# Patient Record
Sex: MALE | Race: WHITE | NOT HISPANIC OR LATINO
[De-identification: names, ages, dates, MRNs, and addresses within clinical notes are randomized per-mention and may not be internally consistent; named-entity substitution may affect disease eponyms.]

---

## 2019-11-11 ENCOUNTER — NON-APPOINTMENT (OUTPATIENT)
Age: 59
End: 2019-11-11

## 2019-11-11 ENCOUNTER — APPOINTMENT (OUTPATIENT)
Dept: OPHTHALMOLOGY | Facility: CLINIC | Age: 59
End: 2019-11-11
Payer: COMMERCIAL

## 2019-11-11 PROCEDURE — 92020 GONIOSCOPY: CPT

## 2019-11-11 PROCEDURE — 92134 CPTRZ OPH DX IMG PST SGM RTA: CPT

## 2019-11-11 PROCEDURE — 92004 COMPRE OPH EXAM NEW PT 1/>: CPT

## 2019-11-11 PROCEDURE — 76514 ECHO EXAM OF EYE THICKNESS: CPT

## 2019-11-29 ENCOUNTER — APPOINTMENT (OUTPATIENT)
Dept: OPHTHALMOLOGY | Facility: CLINIC | Age: 59
End: 2019-11-29
Payer: COMMERCIAL

## 2019-11-29 ENCOUNTER — NON-APPOINTMENT (OUTPATIENT)
Age: 59
End: 2019-11-29

## 2019-11-29 PROCEDURE — 92012 INTRM OPH EXAM EST PATIENT: CPT

## 2020-02-07 ENCOUNTER — APPOINTMENT (OUTPATIENT)
Dept: OPHTHALMOLOGY | Facility: CLINIC | Age: 60
End: 2020-02-07
Payer: COMMERCIAL

## 2020-02-07 ENCOUNTER — NON-APPOINTMENT (OUTPATIENT)
Age: 60
End: 2020-02-07

## 2020-02-07 PROCEDURE — 92083 EXTENDED VISUAL FIELD XM: CPT

## 2020-02-07 PROCEDURE — 92012 INTRM OPH EXAM EST PATIENT: CPT

## 2020-08-27 ENCOUNTER — APPOINTMENT (OUTPATIENT)
Dept: OPHTHALMOLOGY | Facility: CLINIC | Age: 60
End: 2020-08-27

## 2020-10-22 ENCOUNTER — APPOINTMENT (OUTPATIENT)
Dept: OPHTHALMOLOGY | Facility: CLINIC | Age: 60
End: 2020-10-22

## 2022-07-27 ENCOUNTER — APPOINTMENT (OUTPATIENT)
Dept: ORTHOPEDIC SURGERY | Facility: CLINIC | Age: 62
End: 2022-07-27

## 2022-07-27 VITALS
BODY MASS INDEX: 32.58 KG/M2 | HEIGHT: 69 IN | DIASTOLIC BLOOD PRESSURE: 81 MMHG | WEIGHT: 220 LBS | SYSTOLIC BLOOD PRESSURE: 201 MMHG | HEART RATE: 92 BPM

## 2022-07-27 DIAGNOSIS — M77.8 OTHER ENTHESOPATHIES, NOT ELSEWHERE CLASSIFIED: ICD-10-CM

## 2022-07-27 DIAGNOSIS — S62.002K UNSPECIFIED FRACTURE OF NAVICULAR [SCAPHOID] BONE OF LEFT WRIST, SUBSEQUENT ENCOUNTER FOR FRACTURE WITH NONUNION: ICD-10-CM

## 2022-07-27 DIAGNOSIS — M25.532 PAIN IN LEFT WRIST: ICD-10-CM

## 2022-07-27 DIAGNOSIS — M79.642 PAIN IN LEFT HAND: ICD-10-CM

## 2022-07-27 PROCEDURE — 99204 OFFICE O/P NEW MOD 45 MIN: CPT

## 2022-07-27 PROCEDURE — 73110 X-RAY EXAM OF WRIST: CPT | Mod: LT

## 2022-07-27 PROCEDURE — 73130 X-RAY EXAM OF HAND: CPT | Mod: LT

## 2022-07-27 RX ORDER — GLIPIZIDE 5 MG/1
5 TABLET ORAL
Qty: 90 | Refills: 0 | Status: ACTIVE | COMMUNITY
Start: 2022-05-19

## 2022-07-27 RX ORDER — ALOGLIPTIN AND METFORMIN HYDROCHLORIDE 12.5; 1 MG/1; MG/1
12.5-1 TABLET, FILM COATED ORAL
Qty: 180 | Refills: 0 | Status: ACTIVE | COMMUNITY
Start: 2022-05-19

## 2022-07-27 NOTE — PHYSICAL EXAM
[de-identified] : - Constitutional: This is a male in no obvious distress.  \par - Psych: Patient is alert and oriented to person, place and time.  Patient has a normal mood and affect.\par - Cardiovascular: Normal pulses throughout the upper extremities.  No significant varicosities are noted in the upper extremities. \par - Neuro: Strength and sensation are intact throughout the upper extremities.  Patient has normal coordination.\par - Respiratory:  Patient exhibits no evidence of shortness of breath or difficulty breathing.\par - Skin: No rashes, lesions, or other abnormalities are noted in the upper extremities.\par \par ---\par \par Examination of his left wrist and hand demonstrates mild swelling dorsally.  There is mild tenderness along the snuffbox and radial scaphoid joint.  He has limitation of motion of the wrist.  He also has a limitation of flexion and extension of the middle finger and cannot fully straighten out the middle finger.  There is triggering with flexion which appears to be just distal to the A1 pulley of the middle finger.  There is no triggering of the other digits.  He is neurovascularly intact distally. [de-identified] : PA, lateral, oblique and PA ulnar deviation views of his left wrist and hand demonstrate a scaphoid nonunion involving the distal waist.  There are spurs noted dorsally.  There is no obvious arthritis noted of the radiocarpal joint.  There are calcifications noted of the vessels radially and ulnarly.

## 2022-07-27 NOTE — HISTORY OF PRESENT ILLNESS
[Right] : right hand dominant [FreeTextEntry1] : He comes in today for evaluation of left wrist pain which suddenly began 26 days ago.  He has a history of a scaphoid nonunion which was treated with a cast at 16 years old.  He has had intermittent pain and more notable stiffness since then.  However, 26 days ago, he describes his pain as 10 out of 10.  He began to wear support and has been taking 1 Aleve twice a day and has noticed significant improvement in his symptoms.  He also has complaints of difficulty raising out his middle finger which does not particularly bother him.\par \par He has a history of type 2 diabetes.\par \par He was accompanied by his wife today.

## 2022-07-27 NOTE — DISCUSSION/SUMMARY
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  He has had chronic stiffness, but had a sudden onset of pain 26 days ago which has improved significantly with Aleve.  He also has left middle finger flexor tendinitis/trigger finger that is not particularly symptomatic.\par \par I had a discussion regarding today's visit, the prognosis of this diagnosis and treatment recommendations and options.  At this time, I recommended continuation of the Aleve for another 2 to 2 weeks.  He was warned about potential GI side effects.  If his symptoms recur, then he will follow-up for possible cortisone injection.  The possible need for surgery in the future was discussed if this becomes more of a chronic problem.\par \par The patient has agreed to this plan of management and has expressed full understanding.  All questions were fully answered to the patient's satisfaction.\par \par My cumulative time spent on this patient's visit included: Preparation for the visit, review of the medical records, review of pertinent diagnostic studies, examination and counseling of the patient on the above diagnosis, treatment plan and prognosis, orders of diagnostic tests, medications and/or appropriate procedures and documentation in the medical records of today's visit.

## 2023-11-29 ENCOUNTER — TRANSCRIPTION ENCOUNTER (OUTPATIENT)
Age: 63
End: 2023-11-29

## 2023-11-29 ENCOUNTER — RESULT REVIEW (OUTPATIENT)
Age: 63
End: 2023-11-29

## 2023-11-29 ENCOUNTER — INPATIENT (INPATIENT)
Facility: HOSPITAL | Age: 63
LOS: 16 days | Discharge: NURSING, REHAB OR LONG-TERM CARE | DRG: 853 | End: 2023-12-16
Attending: STUDENT IN AN ORGANIZED HEALTH CARE EDUCATION/TRAINING PROGRAM | Admitting: STUDENT IN AN ORGANIZED HEALTH CARE EDUCATION/TRAINING PROGRAM
Payer: COMMERCIAL

## 2023-11-29 VITALS
HEART RATE: 115 BPM | DIASTOLIC BLOOD PRESSURE: 84 MMHG | WEIGHT: 229.94 LBS | TEMPERATURE: 98 F | SYSTOLIC BLOOD PRESSURE: 164 MMHG | RESPIRATION RATE: 20 BRPM | HEIGHT: 69 IN | OXYGEN SATURATION: 97 %

## 2023-11-29 DIAGNOSIS — Z89.429 ACQUIRED ABSENCE OF OTHER TOE(S), UNSPECIFIED SIDE: Chronic | ICD-10-CM

## 2023-11-29 DIAGNOSIS — Z98.89 OTHER SPECIFIED POSTPROCEDURAL STATES: Chronic | ICD-10-CM

## 2023-11-29 DIAGNOSIS — M72.6 NECROTIZING FASCIITIS: ICD-10-CM

## 2023-11-29 LAB
ALBUMIN SERPL ELPH-MCNC: 2.6 G/DL — LOW (ref 3.3–5)
ALBUMIN SERPL ELPH-MCNC: 2.6 G/DL — LOW (ref 3.3–5)
ALBUMIN SERPL ELPH-MCNC: 2.7 G/DL — LOW (ref 3.3–5)
ALBUMIN SERPL ELPH-MCNC: 2.7 G/DL — LOW (ref 3.3–5)
ALP SERPL-CCNC: 232 U/L — HIGH (ref 40–120)
ALP SERPL-CCNC: 232 U/L — HIGH (ref 40–120)
ALP SERPL-CCNC: 253 U/L — HIGH (ref 40–120)
ALP SERPL-CCNC: 253 U/L — HIGH (ref 40–120)
ALT FLD-CCNC: 45 U/L — SIGNIFICANT CHANGE UP (ref 10–45)
ALT FLD-CCNC: 45 U/L — SIGNIFICANT CHANGE UP (ref 10–45)
ALT FLD-CCNC: 49 U/L — HIGH (ref 10–45)
ALT FLD-CCNC: 49 U/L — HIGH (ref 10–45)
ANION GAP SERPL CALC-SCNC: 12 MMOL/L — SIGNIFICANT CHANGE UP (ref 5–17)
ANION GAP SERPL CALC-SCNC: 12 MMOL/L — SIGNIFICANT CHANGE UP (ref 5–17)
ANION GAP SERPL CALC-SCNC: 15 MMOL/L — SIGNIFICANT CHANGE UP (ref 5–17)
ANION GAP SERPL CALC-SCNC: 15 MMOL/L — SIGNIFICANT CHANGE UP (ref 5–17)
APTT BLD: 27.9 SEC — SIGNIFICANT CHANGE UP (ref 24.5–35.6)
APTT BLD: 27.9 SEC — SIGNIFICANT CHANGE UP (ref 24.5–35.6)
APTT BLD: 29.2 SEC — SIGNIFICANT CHANGE UP (ref 24.5–35.6)
APTT BLD: 29.2 SEC — SIGNIFICANT CHANGE UP (ref 24.5–35.6)
AST SERPL-CCNC: 38 U/L — SIGNIFICANT CHANGE UP (ref 10–40)
AST SERPL-CCNC: 38 U/L — SIGNIFICANT CHANGE UP (ref 10–40)
AST SERPL-CCNC: 47 U/L — HIGH (ref 10–40)
AST SERPL-CCNC: 47 U/L — HIGH (ref 10–40)
BASE EXCESS BLDV CALC-SCNC: 2.3 MMOL/L — SIGNIFICANT CHANGE UP (ref -2–3)
BASE EXCESS BLDV CALC-SCNC: 2.3 MMOL/L — SIGNIFICANT CHANGE UP (ref -2–3)
BASE EXCESS BLDV CALC-SCNC: 5.6 MMOL/L — HIGH (ref -2–3)
BASE EXCESS BLDV CALC-SCNC: 5.6 MMOL/L — HIGH (ref -2–3)
BASOPHILS # BLD AUTO: 0.04 K/UL — SIGNIFICANT CHANGE UP (ref 0–0.2)
BASOPHILS # BLD AUTO: 0.04 K/UL — SIGNIFICANT CHANGE UP (ref 0–0.2)
BASOPHILS NFR BLD AUTO: 0.2 % — SIGNIFICANT CHANGE UP (ref 0–2)
BASOPHILS NFR BLD AUTO: 0.2 % — SIGNIFICANT CHANGE UP (ref 0–2)
BILIRUB SERPL-MCNC: 0.3 MG/DL — SIGNIFICANT CHANGE UP (ref 0.2–1.2)
BLD GP AB SCN SERPL QL: NEGATIVE — SIGNIFICANT CHANGE UP
BLD GP AB SCN SERPL QL: NEGATIVE — SIGNIFICANT CHANGE UP
BUN SERPL-MCNC: 14 MG/DL — SIGNIFICANT CHANGE UP (ref 7–23)
BUN SERPL-MCNC: 14 MG/DL — SIGNIFICANT CHANGE UP (ref 7–23)
BUN SERPL-MCNC: 16 MG/DL — SIGNIFICANT CHANGE UP (ref 7–23)
BUN SERPL-MCNC: 16 MG/DL — SIGNIFICANT CHANGE UP (ref 7–23)
CA-I SERPL-SCNC: 1.18 MMOL/L — SIGNIFICANT CHANGE UP (ref 1.15–1.33)
CALCIUM SERPL-MCNC: 8.9 MG/DL — SIGNIFICANT CHANGE UP (ref 8.4–10.5)
CALCIUM SERPL-MCNC: 8.9 MG/DL — SIGNIFICANT CHANGE UP (ref 8.4–10.5)
CALCIUM SERPL-MCNC: 9.5 MG/DL — SIGNIFICANT CHANGE UP (ref 8.4–10.5)
CALCIUM SERPL-MCNC: 9.5 MG/DL — SIGNIFICANT CHANGE UP (ref 8.4–10.5)
CHLORIDE BLDV-SCNC: 96 MMOL/L — SIGNIFICANT CHANGE UP (ref 96–108)
CHLORIDE BLDV-SCNC: 96 MMOL/L — SIGNIFICANT CHANGE UP (ref 96–108)
CHLORIDE BLDV-SCNC: 98 MMOL/L — SIGNIFICANT CHANGE UP (ref 96–108)
CHLORIDE BLDV-SCNC: 98 MMOL/L — SIGNIFICANT CHANGE UP (ref 96–108)
CHLORIDE SERPL-SCNC: 94 MMOL/L — LOW (ref 96–108)
CHLORIDE SERPL-SCNC: 94 MMOL/L — LOW (ref 96–108)
CHLORIDE SERPL-SCNC: 96 MMOL/L — SIGNIFICANT CHANGE UP (ref 96–108)
CHLORIDE SERPL-SCNC: 96 MMOL/L — SIGNIFICANT CHANGE UP (ref 96–108)
CO2 BLDV-SCNC: 29 MMOL/L — HIGH (ref 22–26)
CO2 BLDV-SCNC: 29 MMOL/L — HIGH (ref 22–26)
CO2 BLDV-SCNC: 31 MMOL/L — HIGH (ref 22–26)
CO2 BLDV-SCNC: 31 MMOL/L — HIGH (ref 22–26)
CO2 SERPL-SCNC: 22 MMOL/L — SIGNIFICANT CHANGE UP (ref 22–31)
CO2 SERPL-SCNC: 22 MMOL/L — SIGNIFICANT CHANGE UP (ref 22–31)
CO2 SERPL-SCNC: 24 MMOL/L — SIGNIFICANT CHANGE UP (ref 22–31)
CO2 SERPL-SCNC: 24 MMOL/L — SIGNIFICANT CHANGE UP (ref 22–31)
CREAT SERPL-MCNC: 0.85 MG/DL — SIGNIFICANT CHANGE UP (ref 0.5–1.3)
CREAT SERPL-MCNC: 0.85 MG/DL — SIGNIFICANT CHANGE UP (ref 0.5–1.3)
CREAT SERPL-MCNC: 0.94 MG/DL — SIGNIFICANT CHANGE UP (ref 0.5–1.3)
CREAT SERPL-MCNC: 0.94 MG/DL — SIGNIFICANT CHANGE UP (ref 0.5–1.3)
CRP SERPL-MCNC: 307 MG/L — HIGH (ref 0–4)
CRP SERPL-MCNC: 307 MG/L — HIGH (ref 0–4)
EGFR: 91 ML/MIN/1.73M2 — SIGNIFICANT CHANGE UP
EGFR: 91 ML/MIN/1.73M2 — SIGNIFICANT CHANGE UP
EGFR: 98 ML/MIN/1.73M2 — SIGNIFICANT CHANGE UP
EGFR: 98 ML/MIN/1.73M2 — SIGNIFICANT CHANGE UP
EOSINOPHIL # BLD AUTO: 0.02 K/UL — SIGNIFICANT CHANGE UP (ref 0–0.5)
EOSINOPHIL # BLD AUTO: 0.02 K/UL — SIGNIFICANT CHANGE UP (ref 0–0.5)
EOSINOPHIL NFR BLD AUTO: 0.1 % — SIGNIFICANT CHANGE UP (ref 0–6)
EOSINOPHIL NFR BLD AUTO: 0.1 % — SIGNIFICANT CHANGE UP (ref 0–6)
GAS PNL BLDV: 130 MMOL/L — LOW (ref 136–145)
GAS PNL BLDV: 130 MMOL/L — LOW (ref 136–145)
GAS PNL BLDV: 131 MMOL/L — LOW (ref 136–145)
GAS PNL BLDV: 131 MMOL/L — LOW (ref 136–145)
GAS PNL BLDV: SIGNIFICANT CHANGE UP
GLUCOSE BLDV-MCNC: 377 MG/DL — HIGH (ref 70–99)
GLUCOSE BLDV-MCNC: 377 MG/DL — HIGH (ref 70–99)
GLUCOSE BLDV-MCNC: 403 MG/DL — HIGH (ref 70–99)
GLUCOSE BLDV-MCNC: 403 MG/DL — HIGH (ref 70–99)
GLUCOSE SERPL-MCNC: 338 MG/DL — HIGH (ref 70–99)
GLUCOSE SERPL-MCNC: 338 MG/DL — HIGH (ref 70–99)
GLUCOSE SERPL-MCNC: 387 MG/DL — HIGH (ref 70–99)
GLUCOSE SERPL-MCNC: 387 MG/DL — HIGH (ref 70–99)
HCO3 BLDV-SCNC: 28 MMOL/L — SIGNIFICANT CHANGE UP (ref 22–29)
HCO3 BLDV-SCNC: 28 MMOL/L — SIGNIFICANT CHANGE UP (ref 22–29)
HCO3 BLDV-SCNC: 30 MMOL/L — HIGH (ref 22–29)
HCO3 BLDV-SCNC: 30 MMOL/L — HIGH (ref 22–29)
HCT VFR BLD CALC: 33.9 % — LOW (ref 39–50)
HCT VFR BLD CALC: 33.9 % — LOW (ref 39–50)
HCT VFR BLD CALC: 35.3 % — LOW (ref 39–50)
HCT VFR BLD CALC: 35.3 % — LOW (ref 39–50)
HCT VFR BLDA CALC: 33 % — LOW (ref 39–51)
HGB BLD CALC-MCNC: 11 G/DL — LOW (ref 12.6–17.4)
HGB BLD-MCNC: 11.1 G/DL — LOW (ref 13–17)
HGB BLD-MCNC: 11.1 G/DL — LOW (ref 13–17)
HGB BLD-MCNC: 11.4 G/DL — LOW (ref 13–17)
HGB BLD-MCNC: 11.4 G/DL — LOW (ref 13–17)
HOROWITZ INDEX BLDV+IHG-RTO: 21 — SIGNIFICANT CHANGE UP
HOROWITZ INDEX BLDV+IHG-RTO: 21 — SIGNIFICANT CHANGE UP
IMM GRANULOCYTES NFR BLD AUTO: 0.8 % — SIGNIFICANT CHANGE UP (ref 0–0.9)
IMM GRANULOCYTES NFR BLD AUTO: 0.8 % — SIGNIFICANT CHANGE UP (ref 0–0.9)
INR BLD: 1.32 RATIO — HIGH (ref 0.85–1.18)
INR BLD: 1.32 RATIO — HIGH (ref 0.85–1.18)
INR BLD: 1.4 RATIO — HIGH (ref 0.85–1.18)
INR BLD: 1.4 RATIO — HIGH (ref 0.85–1.18)
LACTATE BLDV-MCNC: 1.6 MMOL/L — SIGNIFICANT CHANGE UP (ref 0.5–2)
LACTATE BLDV-MCNC: 1.6 MMOL/L — SIGNIFICANT CHANGE UP (ref 0.5–2)
LACTATE BLDV-MCNC: 2.4 MMOL/L — HIGH (ref 0.5–2)
LACTATE BLDV-MCNC: 2.4 MMOL/L — HIGH (ref 0.5–2)
LYMPHOCYTES # BLD AUTO: 0.95 K/UL — LOW (ref 1–3.3)
LYMPHOCYTES # BLD AUTO: 0.95 K/UL — LOW (ref 1–3.3)
LYMPHOCYTES # BLD AUTO: 4.5 % — LOW (ref 13–44)
LYMPHOCYTES # BLD AUTO: 4.5 % — LOW (ref 13–44)
MAGNESIUM SERPL-MCNC: 1.4 MG/DL — LOW (ref 1.6–2.6)
MAGNESIUM SERPL-MCNC: 1.4 MG/DL — LOW (ref 1.6–2.6)
MCHC RBC-ENTMCNC: 26.5 PG — LOW (ref 27–34)
MCHC RBC-ENTMCNC: 26.5 PG — LOW (ref 27–34)
MCHC RBC-ENTMCNC: 26.6 PG — LOW (ref 27–34)
MCHC RBC-ENTMCNC: 26.6 PG — LOW (ref 27–34)
MCHC RBC-ENTMCNC: 32.3 GM/DL — SIGNIFICANT CHANGE UP (ref 32–36)
MCHC RBC-ENTMCNC: 32.3 GM/DL — SIGNIFICANT CHANGE UP (ref 32–36)
MCHC RBC-ENTMCNC: 32.7 GM/DL — SIGNIFICANT CHANGE UP (ref 32–36)
MCHC RBC-ENTMCNC: 32.7 GM/DL — SIGNIFICANT CHANGE UP (ref 32–36)
MCV RBC AUTO: 81.3 FL — SIGNIFICANT CHANGE UP (ref 80–100)
MCV RBC AUTO: 81.3 FL — SIGNIFICANT CHANGE UP (ref 80–100)
MCV RBC AUTO: 82.1 FL — SIGNIFICANT CHANGE UP (ref 80–100)
MCV RBC AUTO: 82.1 FL — SIGNIFICANT CHANGE UP (ref 80–100)
MONOCYTES # BLD AUTO: 1.24 K/UL — HIGH (ref 0–0.9)
MONOCYTES # BLD AUTO: 1.24 K/UL — HIGH (ref 0–0.9)
MONOCYTES NFR BLD AUTO: 5.9 % — SIGNIFICANT CHANGE UP (ref 2–14)
MONOCYTES NFR BLD AUTO: 5.9 % — SIGNIFICANT CHANGE UP (ref 2–14)
NEUTROPHILS # BLD AUTO: 18.62 K/UL — HIGH (ref 1.8–7.4)
NEUTROPHILS # BLD AUTO: 18.62 K/UL — HIGH (ref 1.8–7.4)
NEUTROPHILS NFR BLD AUTO: 88.5 % — HIGH (ref 43–77)
NEUTROPHILS NFR BLD AUTO: 88.5 % — HIGH (ref 43–77)
NRBC # BLD: 0 /100 WBCS — SIGNIFICANT CHANGE UP (ref 0–0)
PCO2 BLDV: 41 MMHG — LOW (ref 42–55)
PCO2 BLDV: 41 MMHG — LOW (ref 42–55)
PCO2 BLDV: 46 MMHG — SIGNIFICANT CHANGE UP (ref 42–55)
PCO2 BLDV: 46 MMHG — SIGNIFICANT CHANGE UP (ref 42–55)
PH BLDV: 7.39 — SIGNIFICANT CHANGE UP (ref 7.32–7.43)
PH BLDV: 7.39 — SIGNIFICANT CHANGE UP (ref 7.32–7.43)
PH BLDV: 7.47 — HIGH (ref 7.32–7.43)
PH BLDV: 7.47 — HIGH (ref 7.32–7.43)
PHOSPHATE SERPL-MCNC: 3.1 MG/DL — SIGNIFICANT CHANGE UP (ref 2.5–4.5)
PHOSPHATE SERPL-MCNC: 3.1 MG/DL — SIGNIFICANT CHANGE UP (ref 2.5–4.5)
PLATELET # BLD AUTO: 394 K/UL — SIGNIFICANT CHANGE UP (ref 150–400)
PLATELET # BLD AUTO: 394 K/UL — SIGNIFICANT CHANGE UP (ref 150–400)
PLATELET # BLD AUTO: 408 K/UL — HIGH (ref 150–400)
PLATELET # BLD AUTO: 408 K/UL — HIGH (ref 150–400)
PO2 BLDV: 30 MMHG — SIGNIFICANT CHANGE UP (ref 25–45)
PO2 BLDV: 30 MMHG — SIGNIFICANT CHANGE UP (ref 25–45)
PO2 BLDV: 61 MMHG — HIGH (ref 25–45)
PO2 BLDV: 61 MMHG — HIGH (ref 25–45)
POTASSIUM BLDV-SCNC: 4.6 MMOL/L — SIGNIFICANT CHANGE UP (ref 3.5–5.1)
POTASSIUM BLDV-SCNC: 4.6 MMOL/L — SIGNIFICANT CHANGE UP (ref 3.5–5.1)
POTASSIUM BLDV-SCNC: 5 MMOL/L — SIGNIFICANT CHANGE UP (ref 3.5–5.1)
POTASSIUM BLDV-SCNC: 5 MMOL/L — SIGNIFICANT CHANGE UP (ref 3.5–5.1)
POTASSIUM SERPL-MCNC: 4.7 MMOL/L — SIGNIFICANT CHANGE UP (ref 3.5–5.3)
POTASSIUM SERPL-MCNC: 4.7 MMOL/L — SIGNIFICANT CHANGE UP (ref 3.5–5.3)
POTASSIUM SERPL-MCNC: 5.1 MMOL/L — SIGNIFICANT CHANGE UP (ref 3.5–5.3)
POTASSIUM SERPL-MCNC: 5.1 MMOL/L — SIGNIFICANT CHANGE UP (ref 3.5–5.3)
POTASSIUM SERPL-SCNC: 4.7 MMOL/L — SIGNIFICANT CHANGE UP (ref 3.5–5.3)
POTASSIUM SERPL-SCNC: 4.7 MMOL/L — SIGNIFICANT CHANGE UP (ref 3.5–5.3)
POTASSIUM SERPL-SCNC: 5.1 MMOL/L — SIGNIFICANT CHANGE UP (ref 3.5–5.3)
POTASSIUM SERPL-SCNC: 5.1 MMOL/L — SIGNIFICANT CHANGE UP (ref 3.5–5.3)
PROT SERPL-MCNC: 7.3 G/DL — SIGNIFICANT CHANGE UP (ref 6–8.3)
PROT SERPL-MCNC: 7.3 G/DL — SIGNIFICANT CHANGE UP (ref 6–8.3)
PROT SERPL-MCNC: 7.8 G/DL — SIGNIFICANT CHANGE UP (ref 6–8.3)
PROT SERPL-MCNC: 7.8 G/DL — SIGNIFICANT CHANGE UP (ref 6–8.3)
PROTHROM AB SERPL-ACNC: 14.4 SEC — HIGH (ref 9.5–13)
PROTHROM AB SERPL-ACNC: 14.4 SEC — HIGH (ref 9.5–13)
PROTHROM AB SERPL-ACNC: 15.2 SEC — HIGH (ref 9.5–13)
PROTHROM AB SERPL-ACNC: 15.2 SEC — HIGH (ref 9.5–13)
RBC # BLD: 4.17 M/UL — LOW (ref 4.2–5.8)
RBC # BLD: 4.17 M/UL — LOW (ref 4.2–5.8)
RBC # BLD: 4.3 M/UL — SIGNIFICANT CHANGE UP (ref 4.2–5.8)
RBC # BLD: 4.3 M/UL — SIGNIFICANT CHANGE UP (ref 4.2–5.8)
RBC # FLD: 14.3 % — SIGNIFICANT CHANGE UP (ref 10.3–14.5)
RBC # FLD: 14.3 % — SIGNIFICANT CHANGE UP (ref 10.3–14.5)
RBC # FLD: 14.5 % — SIGNIFICANT CHANGE UP (ref 10.3–14.5)
RBC # FLD: 14.5 % — SIGNIFICANT CHANGE UP (ref 10.3–14.5)
RH IG SCN BLD-IMP: POSITIVE — SIGNIFICANT CHANGE UP
RH IG SCN BLD-IMP: POSITIVE — SIGNIFICANT CHANGE UP
SAO2 % BLDV: 36.3 % — LOW (ref 67–88)
SAO2 % BLDV: 36.3 % — LOW (ref 67–88)
SAO2 % BLDV: 91.4 % — HIGH (ref 67–88)
SAO2 % BLDV: 91.4 % — HIGH (ref 67–88)
SODIUM SERPL-SCNC: 131 MMOL/L — LOW (ref 135–145)
SODIUM SERPL-SCNC: 131 MMOL/L — LOW (ref 135–145)
SODIUM SERPL-SCNC: 132 MMOL/L — LOW (ref 135–145)
SODIUM SERPL-SCNC: 132 MMOL/L — LOW (ref 135–145)
WBC # BLD: 21.04 K/UL — HIGH (ref 3.8–10.5)
WBC # BLD: 21.04 K/UL — HIGH (ref 3.8–10.5)
WBC # BLD: 21.79 K/UL — HIGH (ref 3.8–10.5)
WBC # BLD: 21.79 K/UL — HIGH (ref 3.8–10.5)
WBC # FLD AUTO: 21.04 K/UL — HIGH (ref 3.8–10.5)
WBC # FLD AUTO: 21.04 K/UL — HIGH (ref 3.8–10.5)
WBC # FLD AUTO: 21.79 K/UL — HIGH (ref 3.8–10.5)
WBC # FLD AUTO: 21.79 K/UL — HIGH (ref 3.8–10.5)

## 2023-11-29 PROCEDURE — 99221 1ST HOSP IP/OBS SF/LOW 40: CPT | Mod: 57

## 2023-11-29 PROCEDURE — 88311 DECALCIFY TISSUE: CPT | Mod: 26

## 2023-11-29 PROCEDURE — 99223 1ST HOSP IP/OBS HIGH 75: CPT

## 2023-11-29 PROCEDURE — 73630 X-RAY EXAM OF FOOT: CPT | Mod: 26,LT

## 2023-11-29 PROCEDURE — 88307 TISSUE EXAM BY PATHOLOGIST: CPT | Mod: 26

## 2023-11-29 PROCEDURE — 99291 CRITICAL CARE FIRST HOUR: CPT

## 2023-11-29 PROCEDURE — 73701 CT LOWER EXTREMITY W/DYE: CPT | Mod: 26,LT

## 2023-11-29 RX ORDER — PIPERACILLIN AND TAZOBACTAM 4; .5 G/20ML; G/20ML
3.38 INJECTION, POWDER, LYOPHILIZED, FOR SOLUTION INTRAVENOUS ONCE
Refills: 0 | Status: COMPLETED | OUTPATIENT
Start: 2023-11-29 | End: 2023-11-29

## 2023-11-29 RX ORDER — VANCOMYCIN HCL 1 G
1000 VIAL (EA) INTRAVENOUS ONCE
Refills: 0 | Status: COMPLETED | OUTPATIENT
Start: 2023-11-29 | End: 2023-11-29

## 2023-11-29 RX ORDER — OXYCODONE HYDROCHLORIDE 5 MG/1
2.5 TABLET ORAL EVERY 4 HOURS
Refills: 0 | Status: DISCONTINUED | OUTPATIENT
Start: 2023-11-29 | End: 2023-11-30

## 2023-11-29 RX ORDER — INSULIN HUMAN 100 [IU]/ML
3 INJECTION, SOLUTION SUBCUTANEOUS
Qty: 100 | Refills: 0 | Status: DISCONTINUED | OUTPATIENT
Start: 2023-11-29 | End: 2023-11-30

## 2023-11-29 RX ORDER — OXYCODONE HYDROCHLORIDE 5 MG/1
5 TABLET ORAL
Refills: 0 | Status: DISCONTINUED | OUTPATIENT
Start: 2023-11-29 | End: 2023-11-30

## 2023-11-29 RX ORDER — MAGNESIUM SULFATE 500 MG/ML
2 VIAL (ML) INJECTION ONCE
Refills: 0 | Status: COMPLETED | OUTPATIENT
Start: 2023-11-29 | End: 2023-11-29

## 2023-11-29 RX ORDER — SODIUM CHLORIDE 9 MG/ML
1000 INJECTION, SOLUTION INTRAVENOUS
Refills: 0 | Status: DISCONTINUED | OUTPATIENT
Start: 2023-11-29 | End: 2023-11-30

## 2023-11-29 RX ORDER — ACETAMINOPHEN 500 MG
1000 TABLET ORAL ONCE
Refills: 0 | Status: COMPLETED | OUTPATIENT
Start: 2023-11-29 | End: 2023-11-29

## 2023-11-29 RX ORDER — INSULIN HUMAN 100 [IU]/ML
3 INJECTION, SOLUTION SUBCUTANEOUS ONCE
Refills: 0 | Status: COMPLETED | OUTPATIENT
Start: 2023-11-29 | End: 2023-11-29

## 2023-11-29 RX ORDER — MAGNESIUM SULFATE 500 MG/ML
2 VIAL (ML) INJECTION ONCE
Refills: 0 | Status: DISCONTINUED | OUTPATIENT
Start: 2023-11-29 | End: 2023-11-30

## 2023-11-29 RX ORDER — SODIUM CHLORIDE 9 MG/ML
1000 INJECTION INTRAMUSCULAR; INTRAVENOUS; SUBCUTANEOUS ONCE
Refills: 0 | Status: COMPLETED | OUTPATIENT
Start: 2023-11-29 | End: 2023-11-29

## 2023-11-29 RX ORDER — PIPERACILLIN AND TAZOBACTAM 4; .5 G/20ML; G/20ML
3.38 INJECTION, POWDER, LYOPHILIZED, FOR SOLUTION INTRAVENOUS EVERY 8 HOURS
Refills: 0 | Status: DISCONTINUED | OUTPATIENT
Start: 2023-11-30 | End: 2023-11-30

## 2023-11-29 RX ADMIN — PIPERACILLIN AND TAZOBACTAM 200 GRAM(S): 4; .5 INJECTION, POWDER, LYOPHILIZED, FOR SOLUTION INTRAVENOUS at 18:38

## 2023-11-29 RX ADMIN — INSULIN HUMAN 3 UNIT(S): 100 INJECTION, SOLUTION SUBCUTANEOUS at 22:39

## 2023-11-29 RX ADMIN — SODIUM CHLORIDE 125 MILLILITER(S): 9 INJECTION, SOLUTION INTRAVENOUS at 22:56

## 2023-11-29 RX ADMIN — SODIUM CHLORIDE 1000 MILLILITER(S): 9 INJECTION INTRAMUSCULAR; INTRAVENOUS; SUBCUTANEOUS at 18:37

## 2023-11-29 RX ADMIN — Medication 100 MILLIGRAM(S): at 22:42

## 2023-11-29 RX ADMIN — INSULIN HUMAN 3 UNIT(S)/HR: 100 INJECTION, SOLUTION SUBCUTANEOUS at 22:39

## 2023-11-29 RX ADMIN — Medication 1000 MILLIGRAM(S): at 22:10

## 2023-11-29 RX ADMIN — PIPERACILLIN AND TAZOBACTAM 25 GRAM(S): 4; .5 INJECTION, POWDER, LYOPHILIZED, FOR SOLUTION INTRAVENOUS at 23:22

## 2023-11-29 RX ADMIN — Medication 400 MILLIGRAM(S): at 18:40

## 2023-11-29 RX ADMIN — Medication 250 MILLIGRAM(S): at 20:14

## 2023-11-29 RX ADMIN — Medication 25 GRAM(S): at 23:22

## 2023-11-29 NOTE — CONSULT NOTE ADULT - ATTENDING COMMENTS
Patient seen and examined and agree with above.   63 year old male with PMH significant for HTN, HLD, DM, peripheral arterial disease on metformin who presents with left foot wound with cellulitis and concerns for sepsis. The patient was hyperglycemic in the emergency department requiring insulin drip. Vascular surgery was to take the patient for below knee amputation as foot was deemed not salvageable.   On exam the patient is hemodynamically appropriate.  Agree with exam stated above and in addition to the left foot is erythematous that extends proximal to the ankle with gangrenous ulcer noted on the heel area.  Current management includes:  Neuro- pain control as needed   CV- Hemodynamically stable   -will resuscitate as necessitated  Pulm - oxygenation appropriate on room air       -goal SpO2 92%  GI- NPO for plan for the OR   -on IVF  ID- continue zosyn, clindamycin and vancomycin  -will follow up with cultures   Endocrine - hyperglycemia on insulin drip; will monitor closely and suspect  as the infection is controlled the hyperglycemia will improve  -will send HbA1c  Dispo- plan for OR and back to the SICU

## 2023-11-29 NOTE — CONSULT NOTE ADULT - ASSESSMENT
63M presents with left foot wounds with likely necrotizing fascitis.   - Pt seen and evaluated.  - Temp 102.3, WBC 21.04.  - Left lateral foot into the posterior heel and leg wound to dermis with overlying sloughing skin and ischemic changes, severe malodor, erythema to the distal 1/2 leg. Left foot submet 1 wound to subq with no acute signs of infection. Right foot no wounds, no acute signs of infection.  - Left Foot X-Rays: Gas tracking up posterior heel to above the ankle joint.  - Recommend LLE CT scan.  - Recommend vascular consult for further management.  - No further podiatric intervention needed. Please re-consult as necessary.  - Pt may followup with Dr. Rodney Mercer ([phone]).  - Discussed with attending.

## 2023-11-29 NOTE — H&P ADULT - NSHPLABSRESULTS_GEN_ALL_CORE
11.4   21.04 )-----------( 394      ( 29 Nov 2023 18:29 )             35.3   11-29    131<L>  |  94<L>  |  16  ----------------------------<  387<H>  5.1   |  22  |  0.94    Ca    9.5      29 Nov 2023 18:29    TPro  7.8  /  Alb  2.7<L>  /  TBili  0.3  /  DBili  x   /  AST  47<H>  /  ALT  49<H>  /  AlkPhos  253<H>  11-29  < from: Xray Foot AP + Lateral + Oblique, Left (11.29.23 @ 18:20) >        INTERPRETATION:  Bandage material overlies the lateral hindfoot. Soft   tissue swelling about the lateral hindfoot with diffuse foci of   subcutaneous gas, predominantly adjacent to the 5th metatarsal stump and   the calcaneus. Onlateral view there is gas in the soft tissues posterior   to the ankle joint. No discrete cortical erosions.        ******PRELIMINARY REPORT******      ******PRELIMINARY REPORT******        MIHIR SEN MD; Resident Radiologist  This document is a PRELIMINARY interpretation and is pending final   attending approval. Nov 29 2023  6:34PM    < end of copied text >

## 2023-11-29 NOTE — ED ADULT NURSE NOTE - OBJECTIVE STATEMENT
63y Male PMHx HTN, HLD, DM on metformin and glipizide presenting to ED via walk-in triage for 3w of worsening L foot ulcer. PT states he had dry skin in the back of heel that caused a hairline cut and now wound has worsened and is causing swelling and redness travelling up leg. Pt states he's also had some fevers. Pt has hx of L toe amputation in 2010 and doesn't follow up with podiatry outpatient. Pt reports foul odor with purulent discharge and pain with ambulation. IV placed, labs drawn and sent, medicated as ordered, seen and eval by MD. Haresh in lowest position and locked.

## 2023-11-29 NOTE — ED PROVIDER NOTE - NSICDXFAMILYHX_GEN_ALL_CORE_FT
FAMILY HISTORY:  Grandparent  Still living? Unknown  Family history of diabetes mellitus in grandmother, Age at diagnosis: Age Unknown

## 2023-11-29 NOTE — H&P ADULT - NSHPPHYSICALEXAM_GEN_ALL_CORE
General: NAD  Neuro: A/Ox4  HEENT: NC/AT  Respiratory: RA, no increased work of breathing  CV: RRR  Abdomen: Soft, Non distended, non tender to palpation    Extremities: LLE w infected foot wound, see skin exam below  Vascular: b/l radial pulses palpable, b/l brachial pulses palpable, b/l femoral pulses palpable, RLE DP/PT Palpable, LLE DP/PT dopplerable  Skin: wet gangrene of L foot, w proximally tracking erythema to distal 1/3 of lower left leg, significant malodor

## 2023-11-29 NOTE — PATIENT PROFILE ADULT - FALL HARM RISK - RISK INTERVENTIONS
Assistance OOB with selected safe patient handling equipment/Communicate Fall Risk and Risk Factors to all staff, patient, and family/Reinforce activity limits and safety measures with patient and family/Visual Cue: Yellow wristband/Bed in lowest position, wheels locked, appropriate side rails in place/Call bell, personal items and telephone in reach/Instruct patient to call for assistance before getting out of bed or chair/Non-slip footwear when patient is out of bed/[place] to call system/Physically safe environment - no spills, clutter or unnecessary equipment/Purposeful Proactive Rounding/Room/bathroom lighting operational, light cord in reach
warm/dry

## 2023-11-29 NOTE — ED PROVIDER NOTE - CLINICAL SUMMARY MEDICAL DECISION MAKING FREE TEXT BOX
63-year-old male with history of diabetes prior foot infections resulting in amputation of the toe presenting with 3 weeks of left foot and heel pain discomfort drainage redness and warmth denies any fever or chills at home but has been feeling increasingly difficult to ambulate chronic has not taken any antibiotics and is an ex-smoker  On physical examination patient is well-appearing no acute distress nontoxic-appearing neuro grossly intact clear lungs S1-S2 soft nontender abdomen on lower extremity examination patient has an amputated 5 toe on the left side with extensive warmth redness swelling of the leg up to mid leg and an area of gangrenous changes at the heel extending into the sole and an additional area at the base of the first medical tarsal with no active drainage  High concern for osteomyelitis versus cellulitis we will start broad-spectrum antibiotics patient was febrile here we will treat as sepsis we will get x-rays as well as CT scan to evaluate for need for immediate debridement will consult podiatry and vascular surgery patient will need to be admitted

## 2023-11-29 NOTE — CONSULT NOTE ADULT - ASSESSMENT
Assessment  ELVIS VILLATORO is a 63yom pmhx of HTN HLD DM, on metformin, former smoker, presenting with 3 weeks of worsening L foot ulcer, now with leg swelling, redness, tachycardia, and fever. Hyperglycemic in ED  Sent to SICU for insulin drip pending OR guillotine amputation in LLE.    =============  NEURO  - AOx4 at present  - tylenol/oxycodone PRN for pain  =============  CV  #hx HTN, HLD, CAD s/p CABG  - c/w home enalapril 5mg BID, simvastatin 40mg qd     =============  PULM  #prior hx smoking  - no acute issues    =============  GI  #Diet  -NPO in preparation for OR    =============  /RENAL  -Cr 0.85; no acute issues  -1L NS in ED    =============  HEME  #elevated INR  - 1.32    =============  ID  #Necrotizing fasciitis LLE  #sepsis  - pt presented with ulcer under L 1st toe, with gangrenous tissue on L posterolateral heel, and erythema/swelling spreading up leg, below the knee, Initially tachycardic to 120, fever 102F, WBC 21, procal 0.33,   - XR LLE shows subcutaneous gas, predominantly adjacent to the 5th metatarsal stump and the calcaneus; gas in the soft tissues posterior to the ankle joint. No discrete cortical erosions.  - plan for OR  - s/p vanc/zosyn/clinda in ED; c/w abx after return from OR  - CTM procal, WBC    =============  ENDOCRINE  #DM2, currently hyperglycemic  - on metformin and glipizide at home, reports medication adherence  - insulin drip; currently NPO  - f/u A1c

## 2023-11-29 NOTE — ED PROVIDER NOTE - NSICDXPASTMEDICALHX_GEN_ALL_CORE_FT
PAST MEDICAL HISTORY:  Diabetes mellitus type 2 in obese     Hyperlipidemia     Retinopathy     Ruptured appendix     Toe infection s/p amputation

## 2023-11-29 NOTE — CONSULT NOTE ADULT - SUBJECTIVE AND OBJECTIVE BOX
63yom pmhx of HTN HLD DM, on metformin, former smoker, presenting with 3 weeks of worsening L foot ulcer, now with leg swelling, redness, tachycardia, and fever. PSH appendectomy, and L 5th toe amputation in 2010, poor outpt follow up with Podiatry and PMD. Reports foul odor with purulent discharge, and pain with ambulation.    Pt presentation concerning for necrotizing fasciitis in LLE. In ED, patient was tachycardic to 120s, febrile to 102.  L foot xray shows gas tracking up posterior heel to above ankle joint. In ED labs notable for WBC 21, H/H 11.4/35, Neutrophil%88, INR 1.4, Na 131, Gluc 387, albumin 2.7, Alk phos 253, , Procal 0.33,     Pt given vanc/zosyn/clindamycin, s/p 1L NS. Podiatry and vascular surgery consulted. Recs for CT lower extremity. Plans for patient to have guillotine amputation in E. SICU consulted for insulin drip for hyperglycemia and hemodynamic monitoring while pending OR.     HISTORY OF PRESENT ILLNESS:  ELVIS VILLATORO is a 63yom pmhx of HTN HLD DM, on metformin, former smoker, presenting with 3 weeks of worsening L foot ulcer, now with leg swelling, redness, tachycardia, and fever. PSH appendectomy, and L 5th toe amputation in 2010, poor outpatient follow up with Podiatry and PMD. Reports foul odor with purulent discharge, and pain with ambulation.    Pt presentation concerning for necrotizing fasciitis in LLE. In ED, patient was tachycardic to 120s, febrile to 102.  L foot xray shows gas tracking up posterior heel to above ankle joint. In ED labs notable for WBC 21, H/H 11.4/35, Neutrophil%88, INR 1.4, Na 131, Gluc 387, albumin 2.7, Alk phos 253, , Procal 0.33,     Pt given vanc/zosyn/clindamycin, s/p 1L NS. Podiatry and vascular surgery consulted. Recs for CT lower extremity. Plans for patient to have guillotine amputation in [facility]. SICU consulted for insulin drip for hyperglycemia and hemodynamic monitoring while pending OR.         PAST MEDICAL HISTORY: Non-Insulin Dependent Diabetes Mellitus    DM (Diabetes Mellitus)    HTN (Hypertension)    Dyslipidemia    Retinopathy    Diabetes mellitus type 2 in obese    Hyperlipidemia    Toe infection    Ruptured appendix        PAST SURGICAL HISTORY: S/P appendectomy    Toe amputation status        FAMILY HISTORY: Family history of diabetes mellitus in grandmother (Grandparent)        SOCIAL HISTORY: former smoker    CODE STATUS:     HOME MEDICATIONS:    ALLERGIES: No Known Allergies      VITAL SIGNS:  ICU Vital Signs Last 24 Hrs  T(C): 37.3 (29 Nov 2023 22:00), Max: 39.1 (29 Nov 2023 18:09)  T(F): 99.1 (29 Nov 2023 22:00), Max: 102.4 (29 Nov 2023 18:09)  HR: 101 (29 Nov 2023 22:00) (96 - 123)  BP: 186/85 (29 Nov 2023 22:00) (155/76 - 205/81)  BP(mean): 122 (29 Nov 2023 22:00) (122 - 122)  ABP: --  ABP(mean): --  RR: 18 (29 Nov 2023 22:00) (16 - 20)  SpO2: 96% (29 Nov 2023 22:00) (95% - 98%)    O2 Parameters below as of 29 Nov 2023 22:00  Patient On (Oxygen Delivery Method): room air            NEURO  Exam: AOx4  oxyCODONE    IR 2.5 milliGRAM(s) Oral every 4 hours PRN Moderate Pain (4 - 6)  oxyCODONE    IR 5 milliGRAM(s) Oral every 3 hours PRN Severe Pain (7 - 10)      RESPIRATORY  Mechanical Ventilation:     Exam: CTA b/l. breathing on RA. Non-tachypneic. No increased WOB      CARDIOVASCULAR  VBG - ( 29 Nov 2023 22:05 )  pH: 7.47  /  pCO2: 41    /  pO2: 61    / HCO3: 30    / Base Excess: 5.6   /  SaO2: 91.4   Lactate: 1.6      Exam: S1, S2, mildly tachycardic at 101 bpm. No R/M/G  Cardiac Rhythm: Tachycarcia 101, regular rhythm      GI/NUTRITION  Exam: Non-ttp, non-distended. Soft  Diet: NPO       GENITOURINARY/RENAL  lactated ringers. 1000 milliLiter(s) IV Continuous <Continuous>  magnesium sulfate  IVPB 2 Gram(s) IV Intermittent once      11-29 @ 07:01  -  11-30 @ 00:24  --------------------------------------------------------  IN:    Insulin: 6 mL    IV PiggyBack: 50 mL    IV PiggyBack: 50 mL    Lactated Ringers: 250 mL  Total IN: 356 mL    OUT:  Total OUT: 0 mL    Total NET: 356 mL        Weight (kg): 108.9 (11-29 @ 22:00)  11-29    132<L>  |  96  |  14  ----------------------------<  338<H>  4.7   |  24  |  0.85    Ca    8.9      29 Nov 2023 22:21  Phos  3.1     11-29  Mg     1.4     11-29    TPro  7.3  /  Alb  2.6<L>  /  TBili  0.3  /  DBili  x   /  AST  38  /  ALT  45  /  AlkPhos  232<H>  11-29    [ ] Contreras catheter, indication: urine output monitoring in critically ill patient    HEMATOLOGIC  [ ] VTE Prophylaxis:                          11.1   21.79 )-----------( 408      ( 29 Nov 2023 22:21 )             33.9     PT/INR - ( 29 Nov 2023 22:21 )   PT: 14.4 sec;   INR: 1.32 ratio         PTT - ( 29 Nov 2023 22:21 )  PTT:27.9 sec  Transfusion: [ ] PRBC	[ ] Platelets	[ ] FFP	[ ] Cryoprecipitate      INFECTIOUS DISEASES  piperacillin/tazobactam IVPB.. 3.375 Gram(s) IV Intermittent every 8 hours    RECENT CULTURES:      ENDOCRINE  insulin regular Infusion 3 Unit(s)/Hr IV Continuous <Continuous>    CAPILLARY BLOOD GLUCOSE      POCT Blood Glucose.: 260 mg/dL (29 Nov 2023 23:52)  POCT Blood Glucose.: 311 mg/dL (29 Nov 2023 22:11)      PATIENT CARE ACCESS DEVICES:  [ ] Peripheral IV  [ ] Central Venous Line	[ ] R	[ ] L	[ ] IJ	[ ] Fem	[ ] SC	Placed:   [ ] Arterial Line		[ ] R	[ ] L	[ ] Fem	[ ] Rad	[ ] Ax	Placed:   [ ] PICC:					[ ] Mediport  [ ] Urinary Catheter, Date Placed:   [x] Necessity of urinary, arterial, and venous catheters discussed    OTHER MEDICATIONS:     IMAGING STUDIES:

## 2023-11-29 NOTE — ED PROVIDER NOTE - OBJECTIVE STATEMENT
63yom pmhx of HTN HLD DM on metformin here with 3 weeks of worsening L foot ulcer now with leg swelling and redness and fever. pt s/o L 5th toe amputation in 2010, poor outpt follow up with Podiatry and PMD. reports foul odor with purulent discharge. pain with ambulation.

## 2023-11-29 NOTE — H&P ADULT - ATTENDING COMMENTS
OR for source control, necrotizing soft tissue infection  [facility]

## 2023-11-29 NOTE — CONSULT NOTE ADULT - SUBJECTIVE AND OBJECTIVE BOX
Patient is a 63y old  Male who presents with a chief complaint of     HPI:      PAST MEDICAL & SURGICAL HISTORY:  Retinopathy      Diabetes mellitus type 2 in obese      Hyperlipidemia      Toe infection  s/p amputation      Ruptured appendix      S/P appendectomy      Toe amputation status  left pinky toe          MEDICATIONS  (STANDING):  piperacillin/tazobactam IVPB.- 3.375 Gram(s) IV Intermittent once  vancomycin  IVPB. 1000 milliGRAM(s) IV Intermittent once    MEDICATIONS  (PRN):      Allergies    No Known Allergies    Intolerances        VITALS:    Vital Signs Last 24 Hrs  T(C): 39.1 (29 Nov 2023 18:09), Max: 39.1 (29 Nov 2023 18:09)  T(F): 102.4 (29 Nov 2023 18:09), Max: 102.4 (29 Nov 2023 18:09)  HR: 115 (29 Nov 2023 18:09) (115 - 123)  BP: 174/91 (29 Nov 2023 18:09) (164/84 - 205/81)  BP(mean): --  RR: 18 (29 Nov 2023 18:09) (18 - 20)  SpO2: 98% (29 Nov 2023 18:09) (97% - 98%)    Parameters below as of 29 Nov 2023 18:09  Patient On (Oxygen Delivery Method): room air        LABS:                          11.4   21.04 )-----------( 394      ( 29 Nov 2023 18:29 )             35.3               CAPILLARY BLOOD GLUCOSE          PT/INR - ( 29 Nov 2023 18:29 )   PT: 15.2 sec;   INR: 1.40 ratio         PTT - ( 29 Nov 2023 18:29 )  PTT:29.2 sec    LOWER EXTREMITY PHYSICAL EXAM:    Vascular: DP/PT 1/4, B/L, CFT <3 seconds B/L, Temperature gradient warm to cool, B/L.   Neuro: Epicritic sensation intact to the level of digits, B/L.  Musculoskeletal/Ortho: Unremarkable.  Skin: Left lateral foot into the posterior heel and leg wound to dermis with overlying sloughing skin and ischemic changes, severe malodor, erythema to the distal 1/2 leg. Left foot submet 1 wound to subq with no acute signs of infection. Right foot no wounds, no acute signs of infection.      RADIOLOGY & ADDITIONAL STUDIES:

## 2023-11-29 NOTE — H&P ADULT - HISTORY OF PRESENT ILLNESS
Mr. Mcdaniel is a 63M PMHx DM, HLD, PAD, prior toe amputation, presents to [facility] ED w L foot wound. Reports wound has been present on hindfoot x 3 weeks. Started as small cut. Over last 24 hours, pt unable to ambulate d/t severe pain. Reports significant malodor. Denies subjective fevers, chills, purulent drainage, numbness/paresthesia.     In ED, patient is tachycardic to 123. BP w HTN. Febrile to 102.4 F. Labs w WBC 21. Hgb 11.4. Lac 2.4. XR L foot w soft tissue gas at lateral hindfoot. CT LLE non con w soft tissue gas tracking ~7cm above foot.

## 2023-11-29 NOTE — ED ADULT NURSE REASSESSMENT NOTE - NS ED NURSE REASSESS COMMENT FT1
Report receive from Bibiana FALL in red. Pt is resting comfortably. Pt afebrile orally. Zosyn infusing. A&Ox4. Awaiting dispo.

## 2023-11-29 NOTE — ED PROVIDER NOTE - RAPID ASSESSMENT
63-year-old male with past medical history of hypertension, diabetes presenting with left foot ulcer.  Patient reports that he developed ulceration on his left heel 2 weeks ago.  Over the past 2 weeks he has noticed worsening redness and swelling to the area.  Patient denies fevers.  Patient reports that he had a similar episode several years ago that required amputation of his left fifth toe.  Patient has not seen a podiatrist for this.    **Patient was rapidly assessed by me, Miles Velez PA-C. A limited history was obtained. The patient will be seen and further examined/worked up in the main ED and their care will be completed by the main ED team. Receiving team will follow up on labs, analgesia, any clinical imaging, and perform reassessment and disposition of the patient as clinically indicated. All decisions regarding the progression of care will be made at their discretion. 63-year-old male with past medical history of hypertension, diabetes presenting with left foot ulcer.  Patient reports that he developed ulceration on his left heel 2 weeks ago.  Over the past 2 weeks he has noticed worsening redness and swelling to the area.  Patient denies fevers.  Patient reports that he had a similar episode several years ago that required amputation of his left fifth toe.  Patient has not seen a podiatrist for this.    **Patient was rapidly assessed by me, Miles Velez PA-C. A limited history was obtained. The patient will be seen and further examined/worked up in the main ED and their care will be completed by the main ED team. Receiving team will follow up on labs, analgesia, any clinical imaging, and perform reassessment and disposition of the patient as clinically indicated. All decisions regarding the progression of care will be made at their discretion.    Eddie YEE: This patient was seen and orders were placed by the PA as per our department's QPA model.  I was not consulted in regards to this patient although I was present and available in the Emergency Department to the PA.  Patient was to be sent to main ED for full medical evaluation and receiving team was to follow up on any labs, analgesia, clinical imaging ordered by the PA.  Any reassessment and disposition decisions were to be made by receiving team as clinically indicated, all decisions regarding the progression of care to be made at their discretion.  I did not perform a comprehensive history and physical on this patient unless stated otherwise in this note.

## 2023-11-29 NOTE — H&P ADULT - ASSESSMENT
Mr. Mcdaniel is a 63M PMHx DM, HLD, PAD, prior toe amputation, presents to [facility] ED w L foot wound.     Overall clinical picture cf NSTI of non salvageable L foot. Septic in ED. LRINEC 9. XR/CT both w soft tissue gas.     Plan:   -Admit to Dr. Dean, Vascular Surgery   -Emergent OR: LLE guillotine amputation   -SICU consult i/s/o sepsis and hyperglycemia requiring Insulin gtt  -Vanc/Zosyn/Clinda  -NPO/IVF  -Insulin gtt per SICU  -SQH for VTE ppx     Discussed with Attending Surgeon Dr. Jermaine Badillo MD PGY3  Vascular, [id number]

## 2023-11-29 NOTE — ED PROVIDER NOTE - PHYSICAL EXAMINATION
Gen: AAO x 3, appears uncomfortable   HEENT: NC/AT, PERRLA, EOMI, MMM  Resp: unlabored CTAB  Cardiac: tachy s1s2   GI: ND, +BS, Soft, NT  Ext: L leg with diffuse edema to the leg with redness and warmth up mid leg with large posterior calcaneus eschar ulcer, possible bone exposed with copious purulent discharge and foul odor.    Neuro: no focal deficits

## 2023-11-30 LAB
ALBUMIN SERPL ELPH-MCNC: 2.5 G/DL — LOW (ref 3.3–5)
ALBUMIN SERPL ELPH-MCNC: 2.5 G/DL — LOW (ref 3.3–5)
ALP SERPL-CCNC: 216 U/L — HIGH (ref 40–120)
ALP SERPL-CCNC: 216 U/L — HIGH (ref 40–120)
ALT FLD-CCNC: 42 U/L — SIGNIFICANT CHANGE UP (ref 10–45)
ALT FLD-CCNC: 42 U/L — SIGNIFICANT CHANGE UP (ref 10–45)
ANION GAP SERPL CALC-SCNC: 11 MMOL/L — SIGNIFICANT CHANGE UP (ref 5–17)
ANION GAP SERPL CALC-SCNC: 11 MMOL/L — SIGNIFICANT CHANGE UP (ref 5–17)
APTT BLD: 27.7 SEC — SIGNIFICANT CHANGE UP (ref 24.5–35.6)
APTT BLD: 27.7 SEC — SIGNIFICANT CHANGE UP (ref 24.5–35.6)
AST SERPL-CCNC: 30 U/L — SIGNIFICANT CHANGE UP (ref 10–40)
AST SERPL-CCNC: 30 U/L — SIGNIFICANT CHANGE UP (ref 10–40)
BASE EXCESS BLDV CALC-SCNC: 3.3 MMOL/L — HIGH (ref -2–3)
BASE EXCESS BLDV CALC-SCNC: 3.3 MMOL/L — HIGH (ref -2–3)
BILIRUB SERPL-MCNC: 0.3 MG/DL — SIGNIFICANT CHANGE UP (ref 0.2–1.2)
BILIRUB SERPL-MCNC: 0.3 MG/DL — SIGNIFICANT CHANGE UP (ref 0.2–1.2)
BUN SERPL-MCNC: 13 MG/DL — SIGNIFICANT CHANGE UP (ref 7–23)
BUN SERPL-MCNC: 13 MG/DL — SIGNIFICANT CHANGE UP (ref 7–23)
CA-I SERPL-SCNC: 1.14 MMOL/L — LOW (ref 1.15–1.33)
CA-I SERPL-SCNC: 1.14 MMOL/L — LOW (ref 1.15–1.33)
CALCIUM SERPL-MCNC: 8.6 MG/DL — SIGNIFICANT CHANGE UP (ref 8.4–10.5)
CALCIUM SERPL-MCNC: 8.6 MG/DL — SIGNIFICANT CHANGE UP (ref 8.4–10.5)
CHLORIDE BLDV-SCNC: 99 MMOL/L — SIGNIFICANT CHANGE UP (ref 96–108)
CHLORIDE BLDV-SCNC: 99 MMOL/L — SIGNIFICANT CHANGE UP (ref 96–108)
CHLORIDE SERPL-SCNC: 99 MMOL/L — SIGNIFICANT CHANGE UP (ref 96–108)
CHLORIDE SERPL-SCNC: 99 MMOL/L — SIGNIFICANT CHANGE UP (ref 96–108)
CO2 BLDV-SCNC: 29 MMOL/L — HIGH (ref 22–26)
CO2 BLDV-SCNC: 29 MMOL/L — HIGH (ref 22–26)
CO2 SERPL-SCNC: 26 MMOL/L — SIGNIFICANT CHANGE UP (ref 22–31)
CO2 SERPL-SCNC: 26 MMOL/L — SIGNIFICANT CHANGE UP (ref 22–31)
CREAT SERPL-MCNC: 0.86 MG/DL — SIGNIFICANT CHANGE UP (ref 0.5–1.3)
CREAT SERPL-MCNC: 0.86 MG/DL — SIGNIFICANT CHANGE UP (ref 0.5–1.3)
EGFR: 97 ML/MIN/1.73M2 — SIGNIFICANT CHANGE UP
EGFR: 97 ML/MIN/1.73M2 — SIGNIFICANT CHANGE UP
ERYTHROCYTE [SEDIMENTATION RATE] IN BLOOD: 120 MM/HR — HIGH (ref 0–20)
ERYTHROCYTE [SEDIMENTATION RATE] IN BLOOD: 120 MM/HR — HIGH (ref 0–20)
GAS PNL BLDV: 131 MMOL/L — LOW (ref 136–145)
GAS PNL BLDV: 131 MMOL/L — LOW (ref 136–145)
GAS PNL BLDV: SIGNIFICANT CHANGE UP
GAS PNL BLDV: SIGNIFICANT CHANGE UP
GLUCOSE BLDV-MCNC: 198 MG/DL — HIGH (ref 70–99)
GLUCOSE BLDV-MCNC: 198 MG/DL — HIGH (ref 70–99)
GLUCOSE SERPL-MCNC: 192 MG/DL — HIGH (ref 70–99)
GLUCOSE SERPL-MCNC: 192 MG/DL — HIGH (ref 70–99)
HCO3 BLDV-SCNC: 28 MMOL/L — SIGNIFICANT CHANGE UP (ref 22–29)
HCO3 BLDV-SCNC: 28 MMOL/L — SIGNIFICANT CHANGE UP (ref 22–29)
HCT VFR BLD CALC: 31.6 % — LOW (ref 39–50)
HCT VFR BLD CALC: 31.6 % — LOW (ref 39–50)
HCT VFR BLDA CALC: 33 % — LOW (ref 39–51)
HCT VFR BLDA CALC: 33 % — LOW (ref 39–51)
HGB BLD CALC-MCNC: 10.9 G/DL — LOW (ref 12.6–17.4)
HGB BLD CALC-MCNC: 10.9 G/DL — LOW (ref 12.6–17.4)
HGB BLD-MCNC: 10.5 G/DL — LOW (ref 13–17)
HGB BLD-MCNC: 10.5 G/DL — LOW (ref 13–17)
HOROWITZ INDEX BLDV+IHG-RTO: 28 — SIGNIFICANT CHANGE UP
HOROWITZ INDEX BLDV+IHG-RTO: 28 — SIGNIFICANT CHANGE UP
INR BLD: 1.39 RATIO — HIGH (ref 0.85–1.18)
INR BLD: 1.39 RATIO — HIGH (ref 0.85–1.18)
LACTATE BLDV-MCNC: 1 MMOL/L — SIGNIFICANT CHANGE UP (ref 0.5–2)
LACTATE BLDV-MCNC: 1 MMOL/L — SIGNIFICANT CHANGE UP (ref 0.5–2)
MAGNESIUM SERPL-MCNC: 1.7 MG/DL — SIGNIFICANT CHANGE UP (ref 1.6–2.6)
MAGNESIUM SERPL-MCNC: 1.7 MG/DL — SIGNIFICANT CHANGE UP (ref 1.6–2.6)
MCHC RBC-ENTMCNC: 26.9 PG — LOW (ref 27–34)
MCHC RBC-ENTMCNC: 26.9 PG — LOW (ref 27–34)
MCHC RBC-ENTMCNC: 33.2 GM/DL — SIGNIFICANT CHANGE UP (ref 32–36)
MCHC RBC-ENTMCNC: 33.2 GM/DL — SIGNIFICANT CHANGE UP (ref 32–36)
MCV RBC AUTO: 80.8 FL — SIGNIFICANT CHANGE UP (ref 80–100)
MCV RBC AUTO: 80.8 FL — SIGNIFICANT CHANGE UP (ref 80–100)
MRSA PCR RESULT.: SIGNIFICANT CHANGE UP
MRSA PCR RESULT.: SIGNIFICANT CHANGE UP
NRBC # BLD: 0 /100 WBCS — SIGNIFICANT CHANGE UP (ref 0–0)
NRBC # BLD: 0 /100 WBCS — SIGNIFICANT CHANGE UP (ref 0–0)
PCO2 BLDV: 41 MMHG — LOW (ref 42–55)
PCO2 BLDV: 41 MMHG — LOW (ref 42–55)
PH BLDV: 7.44 — HIGH (ref 7.32–7.43)
PH BLDV: 7.44 — HIGH (ref 7.32–7.43)
PHOSPHATE SERPL-MCNC: 3 MG/DL — SIGNIFICANT CHANGE UP (ref 2.5–4.5)
PHOSPHATE SERPL-MCNC: 3 MG/DL — SIGNIFICANT CHANGE UP (ref 2.5–4.5)
PLATELET # BLD AUTO: 362 K/UL — SIGNIFICANT CHANGE UP (ref 150–400)
PLATELET # BLD AUTO: 362 K/UL — SIGNIFICANT CHANGE UP (ref 150–400)
PO2 BLDV: 136 MMHG — HIGH (ref 25–45)
PO2 BLDV: 136 MMHG — HIGH (ref 25–45)
POTASSIUM BLDV-SCNC: 3.8 MMOL/L — SIGNIFICANT CHANGE UP (ref 3.5–5.1)
POTASSIUM BLDV-SCNC: 3.8 MMOL/L — SIGNIFICANT CHANGE UP (ref 3.5–5.1)
POTASSIUM SERPL-MCNC: 3.9 MMOL/L — SIGNIFICANT CHANGE UP (ref 3.5–5.3)
POTASSIUM SERPL-MCNC: 3.9 MMOL/L — SIGNIFICANT CHANGE UP (ref 3.5–5.3)
POTASSIUM SERPL-SCNC: 3.9 MMOL/L — SIGNIFICANT CHANGE UP (ref 3.5–5.3)
POTASSIUM SERPL-SCNC: 3.9 MMOL/L — SIGNIFICANT CHANGE UP (ref 3.5–5.3)
PROT SERPL-MCNC: 7.1 G/DL — SIGNIFICANT CHANGE UP (ref 6–8.3)
PROT SERPL-MCNC: 7.1 G/DL — SIGNIFICANT CHANGE UP (ref 6–8.3)
PROTHROM AB SERPL-ACNC: 15.1 SEC — HIGH (ref 9.5–13)
PROTHROM AB SERPL-ACNC: 15.1 SEC — HIGH (ref 9.5–13)
RBC # BLD: 3.91 M/UL — LOW (ref 4.2–5.8)
RBC # BLD: 3.91 M/UL — LOW (ref 4.2–5.8)
RBC # FLD: 14.4 % — SIGNIFICANT CHANGE UP (ref 10.3–14.5)
RBC # FLD: 14.4 % — SIGNIFICANT CHANGE UP (ref 10.3–14.5)
S AUREUS DNA NOSE QL NAA+PROBE: SIGNIFICANT CHANGE UP
S AUREUS DNA NOSE QL NAA+PROBE: SIGNIFICANT CHANGE UP
SAO2 % BLDV: 99.1 % — HIGH (ref 67–88)
SAO2 % BLDV: 99.1 % — HIGH (ref 67–88)
SODIUM SERPL-SCNC: 136 MMOL/L — SIGNIFICANT CHANGE UP (ref 135–145)
SODIUM SERPL-SCNC: 136 MMOL/L — SIGNIFICANT CHANGE UP (ref 135–145)
WBC # BLD: 20.54 K/UL — HIGH (ref 3.8–10.5)
WBC # BLD: 20.54 K/UL — HIGH (ref 3.8–10.5)
WBC # FLD AUTO: 20.54 K/UL — HIGH (ref 3.8–10.5)
WBC # FLD AUTO: 20.54 K/UL — HIGH (ref 3.8–10.5)

## 2023-11-30 PROCEDURE — 99233 SBSQ HOSP IP/OBS HIGH 50: CPT

## 2023-11-30 PROCEDURE — 51703 INSERT BLADDER CATH COMPLEX: CPT

## 2023-11-30 PROCEDURE — 27882 AMPUTATION OF LOWER LEG: CPT

## 2023-11-30 DEVICE — SURGICEL NU-KNIT 3 X 4": Type: IMPLANTABLE DEVICE | Site: LEFT | Status: FUNCTIONAL

## 2023-11-30 RX ORDER — OXYCODONE HYDROCHLORIDE 5 MG/1
2.5 TABLET ORAL EVERY 4 HOURS
Refills: 0 | Status: DISCONTINUED | OUTPATIENT
Start: 2023-11-30 | End: 2023-11-30

## 2023-11-30 RX ORDER — HYDROMORPHONE HYDROCHLORIDE 2 MG/ML
0.5 INJECTION INTRAMUSCULAR; INTRAVENOUS; SUBCUTANEOUS
Refills: 0 | Status: DISCONTINUED | OUTPATIENT
Start: 2023-11-30 | End: 2023-12-04

## 2023-11-30 RX ORDER — OXYCODONE HYDROCHLORIDE 5 MG/1
10 TABLET ORAL EVERY 4 HOURS
Refills: 0 | Status: DISCONTINUED | OUTPATIENT
Start: 2023-11-30 | End: 2023-12-04

## 2023-11-30 RX ORDER — CHLORHEXIDINE GLUCONATE 213 G/1000ML
1 SOLUTION TOPICAL
Refills: 0 | Status: DISCONTINUED | OUTPATIENT
Start: 2023-11-30 | End: 2023-12-02

## 2023-11-30 RX ORDER — LIDOCAINE HCL 20 MG/ML
5 VIAL (ML) INJECTION ONCE
Refills: 0 | Status: COMPLETED | OUTPATIENT
Start: 2023-11-30 | End: 2023-11-30

## 2023-11-30 RX ORDER — POLYETHYLENE GLYCOL 3350 17 G/17G
17 POWDER, FOR SOLUTION ORAL EVERY 24 HOURS
Refills: 0 | Status: DISCONTINUED | OUTPATIENT
Start: 2023-11-30 | End: 2023-12-01

## 2023-11-30 RX ORDER — MAGNESIUM SULFATE 500 MG/ML
2 VIAL (ML) INJECTION ONCE
Refills: 0 | Status: COMPLETED | OUTPATIENT
Start: 2023-11-30 | End: 2023-11-30

## 2023-11-30 RX ORDER — TAMSULOSIN HYDROCHLORIDE 0.4 MG/1
0.4 CAPSULE ORAL ONCE
Refills: 0 | Status: COMPLETED | OUTPATIENT
Start: 2023-11-30 | End: 2023-11-30

## 2023-11-30 RX ORDER — PIPERACILLIN AND TAZOBACTAM 4; .5 G/20ML; G/20ML
3.38 INJECTION, POWDER, LYOPHILIZED, FOR SOLUTION INTRAVENOUS EVERY 8 HOURS
Refills: 0 | Status: DISCONTINUED | OUTPATIENT
Start: 2023-11-30 | End: 2023-11-30

## 2023-11-30 RX ORDER — TAMSULOSIN HYDROCHLORIDE 0.4 MG/1
0.4 CAPSULE ORAL AT BEDTIME
Refills: 0 | Status: DISCONTINUED | OUTPATIENT
Start: 2023-11-30 | End: 2023-12-04

## 2023-11-30 RX ORDER — ENOXAPARIN SODIUM 100 MG/ML
40 INJECTION SUBCUTANEOUS EVERY 24 HOURS
Refills: 0 | Status: DISCONTINUED | OUTPATIENT
Start: 2023-11-30 | End: 2023-12-04

## 2023-11-30 RX ORDER — SODIUM CHLORIDE 9 MG/ML
1000 INJECTION, SOLUTION INTRAVENOUS
Refills: 0 | Status: DISCONTINUED | OUTPATIENT
Start: 2023-11-30 | End: 2023-11-30

## 2023-11-30 RX ORDER — VANCOMYCIN HCL 1 G
1250 VIAL (EA) INTRAVENOUS EVERY 12 HOURS
Refills: 0 | Status: DISCONTINUED | OUTPATIENT
Start: 2023-11-30 | End: 2023-12-01

## 2023-11-30 RX ORDER — CALCIUM GLUCONATE 100 MG/ML
2 VIAL (ML) INTRAVENOUS ONCE
Refills: 0 | Status: COMPLETED | OUTPATIENT
Start: 2023-11-30 | End: 2023-11-30

## 2023-11-30 RX ORDER — PIPERACILLIN AND TAZOBACTAM 4; .5 G/20ML; G/20ML
3.38 INJECTION, POWDER, LYOPHILIZED, FOR SOLUTION INTRAVENOUS EVERY 8 HOURS
Refills: 0 | Status: DISCONTINUED | OUTPATIENT
Start: 2023-11-30 | End: 2023-12-01

## 2023-11-30 RX ORDER — ACETAMINOPHEN 500 MG
1000 TABLET ORAL EVERY 6 HOURS
Refills: 0 | Status: COMPLETED | OUTPATIENT
Start: 2023-11-30 | End: 2023-11-30

## 2023-11-30 RX ORDER — INSULIN HUMAN 100 [IU]/ML
3 INJECTION, SOLUTION SUBCUTANEOUS
Qty: 100 | Refills: 0 | Status: DISCONTINUED | OUTPATIENT
Start: 2023-11-30 | End: 2023-11-30

## 2023-11-30 RX ORDER — OXYCODONE HYDROCHLORIDE 5 MG/1
5 TABLET ORAL
Refills: 0 | Status: DISCONTINUED | OUTPATIENT
Start: 2023-11-30 | End: 2023-11-30

## 2023-11-30 RX ORDER — SIMVASTATIN 20 MG/1
40 TABLET, FILM COATED ORAL AT BEDTIME
Refills: 0 | Status: DISCONTINUED | OUTPATIENT
Start: 2023-11-30 | End: 2023-12-04

## 2023-11-30 RX ORDER — PIPERACILLIN AND TAZOBACTAM 4; .5 G/20ML; G/20ML
3.38 INJECTION, POWDER, LYOPHILIZED, FOR SOLUTION INTRAVENOUS ONCE
Refills: 0 | Status: COMPLETED | OUTPATIENT
Start: 2023-11-30 | End: 2023-11-30

## 2023-11-30 RX ORDER — INSULIN GLARGINE 100 [IU]/ML
25 INJECTION, SOLUTION SUBCUTANEOUS ONCE
Refills: 0 | Status: COMPLETED | OUTPATIENT
Start: 2023-11-30 | End: 2023-11-30

## 2023-11-30 RX ORDER — VANCOMYCIN HCL 1 G
1250 VIAL (EA) INTRAVENOUS EVERY 12 HOURS
Refills: 0 | Status: DISCONTINUED | OUTPATIENT
Start: 2023-11-30 | End: 2023-11-30

## 2023-11-30 RX ORDER — OXYCODONE HYDROCHLORIDE 5 MG/1
5 TABLET ORAL EVERY 4 HOURS
Refills: 0 | Status: DISCONTINUED | OUTPATIENT
Start: 2023-11-30 | End: 2023-12-04

## 2023-11-30 RX ORDER — INSULIN LISPRO 100/ML
VIAL (ML) SUBCUTANEOUS
Refills: 0 | Status: DISCONTINUED | OUTPATIENT
Start: 2023-11-30 | End: 2023-12-04

## 2023-11-30 RX ORDER — GABAPENTIN 400 MG/1
300 CAPSULE ORAL THREE TIMES A DAY
Refills: 0 | Status: DISCONTINUED | OUTPATIENT
Start: 2023-11-30 | End: 2023-12-04

## 2023-11-30 RX ORDER — VANCOMYCIN HCL 1 G
1750 VIAL (EA) INTRAVENOUS EVERY 12 HOURS
Refills: 0 | Status: DISCONTINUED | OUTPATIENT
Start: 2023-11-30 | End: 2023-11-30

## 2023-11-30 RX ORDER — SENNA PLUS 8.6 MG/1
2 TABLET ORAL AT BEDTIME
Refills: 0 | Status: DISCONTINUED | OUTPATIENT
Start: 2023-11-30 | End: 2023-12-04

## 2023-11-30 RX ADMIN — OXYCODONE HYDROCHLORIDE 5 MILLIGRAM(S): 5 TABLET ORAL at 18:38

## 2023-11-30 RX ADMIN — GABAPENTIN 300 MILLIGRAM(S): 400 CAPSULE ORAL at 22:23

## 2023-11-30 RX ADMIN — Medication 1000 MILLIGRAM(S): at 18:36

## 2023-11-30 RX ADMIN — POLYETHYLENE GLYCOL 3350 17 GRAM(S): 17 POWDER, FOR SOLUTION ORAL at 04:33

## 2023-11-30 RX ADMIN — HYDROMORPHONE HYDROCHLORIDE 0.5 MILLIGRAM(S): 2 INJECTION INTRAMUSCULAR; INTRAVENOUS; SUBCUTANEOUS at 02:30

## 2023-11-30 RX ADMIN — Medication 166.67 MILLIGRAM(S): at 18:06

## 2023-11-30 RX ADMIN — INSULIN HUMAN 3 UNIT(S)/HR: 100 INJECTION, SOLUTION SUBCUTANEOUS at 02:59

## 2023-11-30 RX ADMIN — Medication 1000 MILLIGRAM(S): at 11:30

## 2023-11-30 RX ADMIN — Medication 112 MILLIGRAM(S): at 22:21

## 2023-11-30 RX ADMIN — PIPERACILLIN AND TAZOBACTAM 25 GRAM(S): 4; .5 INJECTION, POWDER, LYOPHILIZED, FOR SOLUTION INTRAVENOUS at 05:13

## 2023-11-30 RX ADMIN — Medication 25 GRAM(S): at 04:45

## 2023-11-30 RX ADMIN — OXYCODONE HYDROCHLORIDE 5 MILLIGRAM(S): 5 TABLET ORAL at 18:08

## 2023-11-30 RX ADMIN — TAMSULOSIN HYDROCHLORIDE 0.4 MILLIGRAM(S): 0.4 CAPSULE ORAL at 10:07

## 2023-11-30 RX ADMIN — OXYCODONE HYDROCHLORIDE 10 MILLIGRAM(S): 5 TABLET ORAL at 03:00

## 2023-11-30 RX ADMIN — Medication 100 MILLIGRAM(S): at 05:19

## 2023-11-30 RX ADMIN — Medication 4: at 23:03

## 2023-11-30 RX ADMIN — Medication 400 MILLIGRAM(S): at 23:03

## 2023-11-30 RX ADMIN — Medication 1000 MILLIGRAM(S): at 05:30

## 2023-11-30 RX ADMIN — SODIUM CHLORIDE 125 MILLILITER(S): 9 INJECTION, SOLUTION INTRAVENOUS at 07:39

## 2023-11-30 RX ADMIN — Medication 112 MILLIGRAM(S): at 15:08

## 2023-11-30 RX ADMIN — PIPERACILLIN AND TAZOBACTAM 200 GRAM(S): 4; .5 INJECTION, POWDER, LYOPHILIZED, FOR SOLUTION INTRAVENOUS at 12:27

## 2023-11-30 RX ADMIN — OXYCODONE HYDROCHLORIDE 10 MILLIGRAM(S): 5 TABLET ORAL at 13:28

## 2023-11-30 RX ADMIN — SODIUM CHLORIDE 125 MILLILITER(S): 9 INJECTION, SOLUTION INTRAVENOUS at 03:01

## 2023-11-30 RX ADMIN — SENNA PLUS 2 TABLET(S): 8.6 TABLET ORAL at 22:23

## 2023-11-30 RX ADMIN — TAMSULOSIN HYDROCHLORIDE 0.4 MILLIGRAM(S): 0.4 CAPSULE ORAL at 22:23

## 2023-11-30 RX ADMIN — Medication 25 GRAM(S): at 02:59

## 2023-11-30 RX ADMIN — Medication 1000 MILLIGRAM(S): at 23:33

## 2023-11-30 RX ADMIN — Medication 400 MILLIGRAM(S): at 05:00

## 2023-11-30 RX ADMIN — PIPERACILLIN AND TAZOBACTAM 25 GRAM(S): 4; .5 INJECTION, POWDER, LYOPHILIZED, FOR SOLUTION INTRAVENOUS at 22:24

## 2023-11-30 RX ADMIN — PIPERACILLIN AND TAZOBACTAM 25 GRAM(S): 4; .5 INJECTION, POWDER, LYOPHILIZED, FOR SOLUTION INTRAVENOUS at 15:07

## 2023-11-30 RX ADMIN — INSULIN GLARGINE 25 UNIT(S): 100 INJECTION, SOLUTION SUBCUTANEOUS at 20:05

## 2023-11-30 RX ADMIN — CHLORHEXIDINE GLUCONATE 1 APPLICATION(S): 213 SOLUTION TOPICAL at 05:13

## 2023-11-30 RX ADMIN — Medication 400 MILLIGRAM(S): at 11:01

## 2023-11-30 RX ADMIN — OXYCODONE HYDROCHLORIDE 10 MILLIGRAM(S): 5 TABLET ORAL at 12:28

## 2023-11-30 RX ADMIN — SIMVASTATIN 40 MILLIGRAM(S): 20 TABLET, FILM COATED ORAL at 22:22

## 2023-11-30 RX ADMIN — Medication 400 MILLIGRAM(S): at 18:06

## 2023-11-30 RX ADMIN — Medication 250 MILLIGRAM(S): at 06:18

## 2023-11-30 RX ADMIN — Medication 5 MILLIGRAM(S): at 18:06

## 2023-11-30 RX ADMIN — Medication 200 GRAM(S): at 04:32

## 2023-11-30 RX ADMIN — ENOXAPARIN SODIUM 40 MILLIGRAM(S): 100 INJECTION SUBCUTANEOUS at 04:33

## 2023-11-30 RX ADMIN — HYDROMORPHONE HYDROCHLORIDE 0.5 MILLIGRAM(S): 2 INJECTION INTRAMUSCULAR; INTRAVENOUS; SUBCUTANEOUS at 02:15

## 2023-11-30 RX ADMIN — OXYCODONE HYDROCHLORIDE 10 MILLIGRAM(S): 5 TABLET ORAL at 03:16

## 2023-11-30 RX ADMIN — INSULIN HUMAN 3 UNIT(S)/HR: 100 INJECTION, SOLUTION SUBCUTANEOUS at 07:39

## 2023-11-30 RX ADMIN — Medication 5 MILLILITER(S): at 04:52

## 2023-11-30 NOTE — CHART NOTE - NSCHARTNOTEFT_GEN_A_CORE
POST-OPERATIVE NOTE    Subjective:  Patient is s/p L silverio IZQUIERDO. Patient denies any n/v, chest pain, or SOB. Pain is currently well controlled. Recovering appropriately.    Objective:  Physical Exam:  General: NAD, resting comfortably in bed  Pulmonary: Nonlabored breathing, no respiratory distress  Cardiovascular: RRR  Abdominal: soft, nontender  Extremities: L BKA wound dressed, c/d/i      Vital Signs Last 24 Hrs  T(C): 37.1 (30 Nov 2023 03:00), Max: 39.1 (29 Nov 2023 18:09)  T(F): 98.7 (30 Nov 2023 03:00), Max: 102.4 (29 Nov 2023 18:09)  HR: 76 (30 Nov 2023 07:00) (76 - 123)  BP: 139/67 (30 Nov 2023 07:00) (123/61 - 205/81)  BP(mean): 96 (30 Nov 2023 07:00) (85 - 122)  RR: 17 (30 Nov 2023 07:00) (16 - 33)  SpO2: 94% (30 Nov 2023 07:00) (92% - 98%)    Parameters below as of 29 Nov 2023 22:00  Patient On (Oxygen Delivery Method): room air      I&O's Detail    29 Nov 2023 07:01  -  30 Nov 2023 07:00  --------------------------------------------------------  IN:    Insulin: 7 mL    Insulin: 14 mL    IV PiggyBack: 250 mL    IV PiggyBack: 200 mL    IV PiggyBack: 275 mL    Lactated Ringers: 250 mL    Lactated Ringers: 750 mL  Total IN: 1746 mL    OUT:    Indwelling Catheter - Urethral (mL): 620 mL  Total OUT: 620 mL    Total NET: 1126 mL        clindamycin IVPB 900  piperacillin/tazobactam IVPB.. 3.375  vancomycin  IVPB 1250  clindamycin IVPB 900  enoxaparin Injectable 40  piperacillin/tazobactam IVPB.. 3.375  vancomycin  IVPB 1250    PAST MEDICAL & SURGICAL HISTORY:  Retinopathy      Diabetes mellitus type 2 in obese      Hyperlipidemia      Toe infection  s/p amputation      Ruptured appendix      S/P appendectomy      Toe amputation status  left pinky toe              LABS:                        10.5   20.54 )-----------( 362      ( 30 Nov 2023 02:19 )             31.6     11-30    136  |  99  |  13  ----------------------------<  192<H>  3.9   |  26  |  0.86    Ca    8.6      30 Nov 2023 02:19  Phos  3.0     11-30  Mg     1.7     11-30    TPro  7.1  /  Alb  2.5<L>  /  TBili  0.3  /  DBili  x   /  AST  30  /  ALT  42  /  AlkPhos  216<H>  11-30    PT/INR - ( 30 Nov 2023 02:19 )   PT: 15.1 sec;   INR: 1.39 ratio         PTT - ( 30 Nov 2023 02:19 )  PTT:27.7 sec  CAPILLARY BLOOD GLUCOSE      POCT Blood Glucose.: 210 mg/dL (30 Nov 2023 09:08)  POCT Blood Glucose.: 190 mg/dL (30 Nov 2023 07:47)  POCT Blood Glucose.: 192 mg/dL (30 Nov 2023 06:50)  POCT Blood Glucose.: 174 mg/dL (30 Nov 2023 06:00)  POCT Blood Glucose.: 172 mg/dL (30 Nov 2023 04:58)  POCT Blood Glucose.: 160 mg/dL (30 Nov 2023 04:00)  POCT Blood Glucose.: 176 mg/dL (30 Nov 2023 03:08)  POCT Blood Glucose.: 175 mg/dL (30 Nov 2023 02:08)  POCT Blood Glucose.: 260 mg/dL (29 Nov 2023 23:52)  POCT Blood Glucose.: 272 mg/dL (29 Nov 2023 23:11)  POCT Blood Glucose.: 311 mg/dL (29 Nov 2023 22:11)      Radiology and Additional Studies:    Assessment:  The patient is a 63y Male who is now several hours post-op from  L guillotine ATIYA.     Plan:  - Pain control as needed  - IV Abx  - Lovenox for DVT ppx  - Activity as tolerated  - Continue insulin drip for glycemic control  - Care per SICU  - F/u AM labs

## 2023-11-30 NOTE — PHYSICAL THERAPY INITIAL EVALUATION ADULT - NSPTDMEREC_GEN_A_CORE
Patient will require a rolling walker for safe ambulation due to diagnosis of L BKA for discharge./rolling walker

## 2023-11-30 NOTE — PROGRESS NOTE ADULT - ATTENDING COMMENTS
63yr m pmhx of HTN HLD DM, on metformin, former smoker, presenting with 3 weeks of worsening L foot ulcer s/p   Sent to SICU for insulin drip pending OR guillotine amputation in LLE s/p Lt BKA  Awake and alert, pain well controlled. Add gabapentin for neurogenic pain  Saturating well on RA  Hemodynamically stable  Start PO  DC IVF, renal function stable, replace Mag  Abx Vanco, Zosyn, Clinda, now with source control can DC abx if BC remains neg  Wean off  Insulin drip gtt to Lantus/premeal/ISS by evening  Wound care  Start pharm DVT ppx with Lovenox  PT  Spoke to his wife in detail

## 2023-11-30 NOTE — PROGRESS NOTE ADULT - SUBJECTIVE AND OBJECTIVE BOX
VASCULAR SURGERY DAILY PROGRESS NOTE:     PROCEDURE: L guillotine BKA 11/30    INTERVAL EVENTS: On Insulin GTT    SUBJECTIVE/ROS: Patient seen and examined at bedside by surgical team.     OBJECTIVE:  Vital Signs Last 24 Hrs  T(C): 36.6 (30 Nov 2023 07:00), Max: 39.1 (29 Nov 2023 18:09)  T(F): 97.9 (30 Nov 2023 07:00), Max: 102.4 (29 Nov 2023 18:09)  HR: 76 (30 Nov 2023 09:00) (76 - 123)  BP: 152/78 (30 Nov 2023 09:00) (123/61 - 205/81)  BP(mean): 108 (30 Nov 2023 09:00) (85 - 122)  RR: 16 (30 Nov 2023 09:00) (16 - 33)  SpO2: 96% (30 Nov 2023 09:00) (92% - 98%)    Parameters below as of 30 Nov 2023 07:00  Patient On (Oxygen Delivery Method): room air    PHYSICAL EXAM:  Constitutional: NAD  Respiratory: non-labored breathing, patent airway  Extremities: warm  Neurological: intact  Extremities: L BKA wound dressing without strikethrough                        10.5   20.54 )-----------( 362      ( 30 Nov 2023 02:19 )             31.6     11-30    136  |  99  |  13  ----------------------------<  192<H>  3.9   |  26  |  0.86    Ca    8.6      30 Nov 2023 02:19  Phos  3.0     11-30  Mg     1.7     11-30    TPro  7.1  /  Alb  2.5<L>  /  TBili  0.3  /  DBili  x   /  AST  30  /  ALT  42  /  AlkPhos  216<H>  11-30   PT/INR - ( 30 Nov 2023 02:19 )   PT: 15.1 sec;   INR: 1.39 ratio         PTT - ( 30 Nov 2023 02:19 )  PTT:27.7 sec  I&O's Detail    29 Nov 2023 07:01  -  30 Nov 2023 07:00  --------------------------------------------------------  IN:    Insulin: 7 mL    Insulin: 14 mL    IV PiggyBack: 250 mL    IV PiggyBack: 200 mL    IV PiggyBack: 275 mL    Lactated Ringers: 250 mL    Lactated Ringers: 750 mL  Total IN: 1746 mL    OUT:    Indwelling Catheter - Urethral (mL): 620 mL  Total OUT: 620 mL    Total NET: 1126 mL      30 Nov 2023 07:01  -  30 Nov 2023 09:23  --------------------------------------------------------  IN:    Insulin: 5 mL    IV PiggyBack: 125 mL    IV PiggyBack: 50 mL    Lactated Ringers: 250 mL  Total IN: 430 mL    OUT:    Indwelling Catheter - Urethral (mL): 110 mL  Total OUT: 110 mL    Total NET: 320 mL

## 2023-11-30 NOTE — PROGRESS NOTE ADULT - SUBJECTIVE AND OBJECTIVE BOX
24 HOUR EVENTS:  -Insulin drip  - patient sent to OR    SUBJECTIVE/ROS:  [X] A ten-point review of systems was otherwise negative except as noted.  [ ] Due to altered mental status/intubation, subjective information were not able to be obtained from the patient. History was obtained, to the extent possible, from review of the chart and collateral sources of information.      NEURO  Exam: AOx4  Meds:   [x] Adequacy of sedation and pain control has been assessed and adjusted      RESPIRATORY  RR: 33 (11-30-23 @ 00:00) (16 - 33)  SpO2: 95% (11-30-23 @ 00:00) (95% - 98%)  Wt(kg): --  Exam: CTA b/l. No murmurs, rubs, gallops appreciated.   Mechanical Ventilation:     [ ] Extubation Readiness Assessed  Meds:       CARDIOVASCULAR  HR: 101 (11-30-23 @ 00:00) (96 - 123)  BP: 173/81 (11-30-23 @ 00:00) (155/76 - 205/81)  BP(mean): 117 (11-30-23 @ 00:00) (117 - 122)  ABP: --  ABP(mean): --  Wt(kg): --  CVP(cm H2O): --  VBG - ( 29 Nov 2023 22:05 )  pH: 7.47  /  pCO2: 41    /  pO2: 61    / HCO3: 30    / Base Excess: 5.6   /  SaO2: 91.4   Lactate: 1.6        Exam: S1S2. No murmurs, rubs, gallops appreciated.  Cardiac Rhythm: tachycardic;   Meds:       GI/NUTRITION  Exam: Soft, non-distended, non-tender.   Diet: NPO  Meds:     GENITOURINARY  I&O's Detail    11-29 @ 07:01  -  11-30 @ 00:59  --------------------------------------------------------  IN:    Insulin: 7 mL    IV PiggyBack: 50 mL    IV PiggyBack: 75 mL    Lactated Ringers: 250 mL  Total IN: 382 mL    OUT:  Total OUT: 0 mL    Total NET: 382 mL        Weight (kg): 108.9 (11-29 @ 22:00)  11-29    132<L>  |  96  |  14  ----------------------------<  338<H>  4.7   |  24  |  0.85    Ca    8.9      29 Nov 2023 22:21  Phos  3.1     11-29  Mg     1.4     11-29    TPro  7.3  /  Alb  2.6<L>  /  TBili  0.3  /  DBili  x   /  AST  38  /  ALT  45  /  AlkPhos  232<H>  11-29    [ ] Contreras catheter, indication: N/A  Meds:       HEMATOLOGIC  Meds:   [x] VTE Prophylaxis                        11.1 21.79 )-----------( 408      ( 29 Nov 2023 22:21 )             33.9     PT/INR - ( 29 Nov 2023 22:21 )   PT: 14.4 sec;   INR: 1.32 ratio         PTT - ( 29 Nov 2023 22:21 )  PTT:27.9 sec  Transfusion     [ ] PRBC   [ ] Platelets   [ ] FFP   [ ] Cryoprecipitate      INFECTIOUS DISEASES  T(C): 37.3 (11-29-23 @ 22:00), Max: 39.1 (11-29-23 @ 18:09)  Wt(kg): --  WBC Count: 21.79 K/uL (11-29 @ 22:21)  WBC Count: 21.04 K/uL (11-29 @ 18:29)    Recent Cultures:    Meds:       ENDOCRINE  Capillary Blood Glucose    Meds:       ACCESS DEVICES:  [ ] Peripheral IV  [ ] Central Venous Line	[ ] R	[ ] L	[ ] IJ	[ ] Fem	[ ] SC	Placed:   [ ] Arterial Line		[ ] R	[ ] L	[ ] Fem	[ ] Rad	[ ] Ax	Placed:   [ ] PICC:					[ ] Mediport  [ ] Urinary Catheter, Date Placed:   [ ] Necessity of urinary, arterial, and venous catheters discussed    IMAGING:    < from: Xray Foot AP + Lateral + Oblique, Left (11.29.23 @ 18:20) >  ACC: [id number] EXAM:  XR FOOT COMP MIN 3 VIEWS LT   ORDERED BY: ELIZ HUANG     PROCEDURE DATE:  11/29/2023    ******PRELIMINARY REPORT******      ******PRELIMINARY REPORT******           INTERPRETATION:  Bandage material overlies the lateral hindfoot. Soft   tissue swelling about the lateral hindfoot with diffuse foci of   subcutaneous gas, predominantly adjacent to the 5th metatarsal stump and   the calcaneus. Onlateral view there is gas in the soft tissues posterior   to the ankle joint. No discrete cortical erosions.    < end of copied text >

## 2023-11-30 NOTE — PROGRESS NOTE ADULT - ASSESSMENT
63M PMHx DM, HLD, PAD, prior toe amputation, presents to [facility] ED w L foot wound. NSTI of non salvageable L foot. Septic in ED. LRINEC 9. XR/CT both w soft tissue gas. s/p 11/30 L BKA guillotalanis amp.    PLAN  - Insulin GTT  - IV abx: Vanc/Zosyn/Clinda  - Diet: CC reg  - SQH for VTE ppx   - Dressing change tomorrow - sooner if sig strikethrough or clinically worsening  - Closure TBD  - Excellent care per SICU    Vascular Surgery  [id number]   63M PMHx DM, HLD, PAD, prior toe amputation, presents to [facility] ED w L foot wound. NSTI of non salvageable L foot. Septic in ED. LRINEC 9. XR/CT both w soft tissue gas. s/p 11/30 L BKA guillotine amp.    PLAN  - Insulin GTT  - IV abx: Vanc/Zosyn/Clinda  - Diet: CC reg  - DVT PPx: Lovenox  - Dressing change tomorrow - sooner if sig strikethrough or clinically worsening  - Closure TBD  - Excellent care per SICU    Vascular Surgery  [id number]   63M PMHx DM, HLD, PAD, prior toe amputation, presents to [facility] ED w L foot wound. NSTI of non salvageable L foot. Septic in ED. LRINEC 9. XR/CT both w soft tissue gas. s/p 11/30 L BKA guillotine amp.    PLAN  - Insulin GTT  - IV abx: Vanc/Zosyn/Clinda  - Diet: CC reg  - DVT PPx: Lovenox  - Dressing change tomorrow - sooner if sig strikethrough or clinically worsening  - Tentative amp revision on Mon, 12/4  - Excellent care per SICU    Vascular Surgery  [id number]

## 2023-11-30 NOTE — OCCUPATIONAL THERAPY INITIAL EVALUATION ADULT - PERTINENT HX OF CURRENT PROBLEM, REHAB EVAL
Mr. Mcdaniel is a 63M PMHx DM, HLD, PAD, prior toe amputation, presents to [facility] ED w L foot wound. Reports wound has been present on hindfoot x 3 weeks. Started as small cut. Over last 24 hours, pt unable to ambulate d/t severe pain. Reports significant malodor. Denies subjective fevers, chills, purulent drainage, numbness/paresthesia. In ED, patient is tachycardic to 123. BP w HTN. Febrile to 102.4 F. Labs w WBC 21. Hgb 11.4. Lac 2.4. XR L foot w soft tissue gas at lateral hindfoot. CT LLE non con w soft tissue gas tracking ~7cm above foot. now s/p Below the knee amputation, left 30-Nov-2023

## 2023-11-30 NOTE — PHYSICAL THERAPY INITIAL EVALUATION ADULT - ADDITIONAL COMMENTS
Pt lives in a private home with wife there is 1 flight of stairs to bedroom, can stay on first floor if needed. Pt performed ADL/IADLs independently. Ambulates with no assistive device.

## 2023-11-30 NOTE — PHYSICAL THERAPY INITIAL EVALUATION ADULT - PERTINENT HX OF CURRENT PROBLEM, REHAB EVAL
ELVIS VILLATORO is a 63yom pmhx of HTN HLD DM, on metformin, former smoker, presenting with 3 weeks of worsening L foot ulcer, now with leg swelling, redness, tachycardia, and fever. PSH appendectomy, and L 5th toe amputation in 2010, poor outpatient follow up with Podiatry and PMD. Reports foul odor with purulent discharge, and pain with ambulation.    Pt presentation concerning for necrotizing fasciitis in LLE. In ED, patient was tachycardic to 120s, febrile to 102.  L foot xray shows gas tracking up posterior heel to above ankle joint. In ED labs notable for WBC 21, H/H 11.4/35, Neutrophil%88, INR 1.4, Na 131, Gluc 387, albumin 2.7, Alk phos 253, , Procal 0.33,     Pt given vanc/zosyn/clindamycin, s/p 1L NS. Podiatry and vascular surgery consulted. Recs for CT lower extremity. Plans for patient to have guillotine amputation in E. SICU consulted for insulin drip for hyperglycemia and hemodynamic monitoring while pending OR. ELVIS VILLATORO is a 63yom pmhx of HTN HLD DM, on metformin, former smoker, presenting with 3 weeks of worsening L foot ulcer, now with leg swelling, redness, tachycardia, and fever. PSH appendectomy, and L 5th toe amputation in 2010, poor outpatient follow up with Podiatry and PMD. Reports foul odor with purulent discharge, and pain with ambulation. Pt presentation concerning for necrotizing fasciitis in LLE. In ED, patient was tachycardic to 120s, febrile to 102.  L foot xray shows gas tracking up posterior heel to above ankle joint. In ED labs notable for WBC 21, H/H 11.4/35, Neutrophil%88, INR 1.4, Na 131, Gluc 387, albumin 2.7, Alk phos 253, , Procal 0.33, Pt given vanc/zosyn/clindamycin, s/p 1L NS. Podiatry and vascular surgery consulted. Recs for CT lower extremity. Plans for patient to have guillotine amputation in LLE. SICU consulted for insulin drip for hyperglycemia and hemodynamic monitoring while pending OR.  CT LLE Soft tissue ulcerations seen along the plantar aspect of the first toe, posterior aspect of the heel, and possibly along the lateral aspect of the forefoot. 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 Amputation margin appears fairly well-corticated. However, there is soft tissue gas abutting the lateral margin of the fifth metatarsal. Underlying osteomyelitis of the fifth metatarsal cannot be excluded and is within the differential.Nonspecific subcutaneous edema about the visualized lower extremity, which may be related to cellulitis andlower extremity edema. No drainable fluid collection within the soft tissues.Chronic osteochondral lesion of the lateral talar dome. XR L foot Multiple foci of subcutaneous emphysema involving the soft tissues of left foot, concerning for necrotizing softtissue infection.

## 2023-11-30 NOTE — OCCUPATIONAL THERAPY INITIAL EVALUATION ADULT - LIVES WITH, PROFILE
Pt lives in a private home with wife there is 1 flight of stairs to bedroom, can stay on first floor if needed. Pt performed ADL/IADLs independently. Ambulates with no assistive device./spouse

## 2023-12-01 DIAGNOSIS — E78.5 HYPERLIPIDEMIA, UNSPECIFIED: ICD-10-CM

## 2023-12-01 DIAGNOSIS — I10 ESSENTIAL (PRIMARY) HYPERTENSION: ICD-10-CM

## 2023-12-01 DIAGNOSIS — E11.65 TYPE 2 DIABETES MELLITUS WITH HYPERGLYCEMIA: ICD-10-CM

## 2023-12-01 LAB
A1C WITH ESTIMATED AVERAGE GLUCOSE RESULT: 11.4 % — HIGH (ref 4–5.6)
A1C WITH ESTIMATED AVERAGE GLUCOSE RESULT: 11.4 % — HIGH (ref 4–5.6)
ALBUMIN SERPL ELPH-MCNC: 2.6 G/DL — LOW (ref 3.3–5)
ALBUMIN SERPL ELPH-MCNC: 2.6 G/DL — LOW (ref 3.3–5)
ALP SERPL-CCNC: 248 U/L — HIGH (ref 40–120)
ALP SERPL-CCNC: 248 U/L — HIGH (ref 40–120)
ALT FLD-CCNC: 40 U/L — SIGNIFICANT CHANGE UP (ref 10–45)
ALT FLD-CCNC: 40 U/L — SIGNIFICANT CHANGE UP (ref 10–45)
ANION GAP SERPL CALC-SCNC: 11 MMOL/L — SIGNIFICANT CHANGE UP (ref 5–17)
ANION GAP SERPL CALC-SCNC: 11 MMOL/L — SIGNIFICANT CHANGE UP (ref 5–17)
AST SERPL-CCNC: 35 U/L — SIGNIFICANT CHANGE UP (ref 10–40)
AST SERPL-CCNC: 35 U/L — SIGNIFICANT CHANGE UP (ref 10–40)
BILIRUB SERPL-MCNC: 0.2 MG/DL — SIGNIFICANT CHANGE UP (ref 0.2–1.2)
BILIRUB SERPL-MCNC: 0.2 MG/DL — SIGNIFICANT CHANGE UP (ref 0.2–1.2)
BUN SERPL-MCNC: 15 MG/DL — SIGNIFICANT CHANGE UP (ref 7–23)
BUN SERPL-MCNC: 15 MG/DL — SIGNIFICANT CHANGE UP (ref 7–23)
CALCIUM SERPL-MCNC: 8.7 MG/DL — SIGNIFICANT CHANGE UP (ref 8.4–10.5)
CALCIUM SERPL-MCNC: 8.7 MG/DL — SIGNIFICANT CHANGE UP (ref 8.4–10.5)
CHLORIDE SERPL-SCNC: 100 MMOL/L — SIGNIFICANT CHANGE UP (ref 96–108)
CHLORIDE SERPL-SCNC: 100 MMOL/L — SIGNIFICANT CHANGE UP (ref 96–108)
CO2 SERPL-SCNC: 25 MMOL/L — SIGNIFICANT CHANGE UP (ref 22–31)
CO2 SERPL-SCNC: 25 MMOL/L — SIGNIFICANT CHANGE UP (ref 22–31)
CREAT SERPL-MCNC: 0.86 MG/DL — SIGNIFICANT CHANGE UP (ref 0.5–1.3)
CREAT SERPL-MCNC: 0.86 MG/DL — SIGNIFICANT CHANGE UP (ref 0.5–1.3)
EGFR: 97 ML/MIN/1.73M2 — SIGNIFICANT CHANGE UP
EGFR: 97 ML/MIN/1.73M2 — SIGNIFICANT CHANGE UP
ESTIMATED AVERAGE GLUCOSE: 280 MG/DL — HIGH (ref 68–114)
ESTIMATED AVERAGE GLUCOSE: 280 MG/DL — HIGH (ref 68–114)
GLUCOSE SERPL-MCNC: 289 MG/DL — HIGH (ref 70–99)
GLUCOSE SERPL-MCNC: 289 MG/DL — HIGH (ref 70–99)
HCT VFR BLD CALC: 31.6 % — LOW (ref 39–50)
HCT VFR BLD CALC: 31.6 % — LOW (ref 39–50)
HGB BLD-MCNC: 10.2 G/DL — LOW (ref 13–17)
HGB BLD-MCNC: 10.2 G/DL — LOW (ref 13–17)
MAGNESIUM SERPL-MCNC: 1.8 MG/DL — SIGNIFICANT CHANGE UP (ref 1.6–2.6)
MAGNESIUM SERPL-MCNC: 1.8 MG/DL — SIGNIFICANT CHANGE UP (ref 1.6–2.6)
MCHC RBC-ENTMCNC: 26.4 PG — LOW (ref 27–34)
MCHC RBC-ENTMCNC: 26.4 PG — LOW (ref 27–34)
MCHC RBC-ENTMCNC: 32.3 GM/DL — SIGNIFICANT CHANGE UP (ref 32–36)
MCHC RBC-ENTMCNC: 32.3 GM/DL — SIGNIFICANT CHANGE UP (ref 32–36)
MCV RBC AUTO: 81.7 FL — SIGNIFICANT CHANGE UP (ref 80–100)
MCV RBC AUTO: 81.7 FL — SIGNIFICANT CHANGE UP (ref 80–100)
NRBC # BLD: 0 /100 WBCS — SIGNIFICANT CHANGE UP (ref 0–0)
NRBC # BLD: 0 /100 WBCS — SIGNIFICANT CHANGE UP (ref 0–0)
PHOSPHATE SERPL-MCNC: 3.5 MG/DL — SIGNIFICANT CHANGE UP (ref 2.5–4.5)
PHOSPHATE SERPL-MCNC: 3.5 MG/DL — SIGNIFICANT CHANGE UP (ref 2.5–4.5)
PLATELET # BLD AUTO: 405 K/UL — HIGH (ref 150–400)
PLATELET # BLD AUTO: 405 K/UL — HIGH (ref 150–400)
POTASSIUM SERPL-MCNC: 4.4 MMOL/L — SIGNIFICANT CHANGE UP (ref 3.5–5.3)
POTASSIUM SERPL-MCNC: 4.4 MMOL/L — SIGNIFICANT CHANGE UP (ref 3.5–5.3)
POTASSIUM SERPL-SCNC: 4.4 MMOL/L — SIGNIFICANT CHANGE UP (ref 3.5–5.3)
POTASSIUM SERPL-SCNC: 4.4 MMOL/L — SIGNIFICANT CHANGE UP (ref 3.5–5.3)
PROT SERPL-MCNC: 7 G/DL — SIGNIFICANT CHANGE UP (ref 6–8.3)
PROT SERPL-MCNC: 7 G/DL — SIGNIFICANT CHANGE UP (ref 6–8.3)
RBC # BLD: 3.87 M/UL — LOW (ref 4.2–5.8)
RBC # BLD: 3.87 M/UL — LOW (ref 4.2–5.8)
RBC # FLD: 14.6 % — HIGH (ref 10.3–14.5)
RBC # FLD: 14.6 % — HIGH (ref 10.3–14.5)
SODIUM SERPL-SCNC: 136 MMOL/L — SIGNIFICANT CHANGE UP (ref 135–145)
SODIUM SERPL-SCNC: 136 MMOL/L — SIGNIFICANT CHANGE UP (ref 135–145)
VANCOMYCIN TROUGH SERPL-MCNC: 10.4 UG/ML — SIGNIFICANT CHANGE UP (ref 10–20)
VANCOMYCIN TROUGH SERPL-MCNC: 10.4 UG/ML — SIGNIFICANT CHANGE UP (ref 10–20)
WBC # BLD: 11.18 K/UL — HIGH (ref 3.8–10.5)
WBC # BLD: 11.18 K/UL — HIGH (ref 3.8–10.5)
WBC # FLD AUTO: 11.18 K/UL — HIGH (ref 3.8–10.5)
WBC # FLD AUTO: 11.18 K/UL — HIGH (ref 3.8–10.5)

## 2023-12-01 PROCEDURE — 99223 1ST HOSP IP/OBS HIGH 75: CPT | Mod: GC

## 2023-12-01 PROCEDURE — 99233 SBSQ HOSP IP/OBS HIGH 50: CPT

## 2023-12-01 RX ORDER — INSULIN LISPRO 100/ML
6 VIAL (ML) SUBCUTANEOUS
Refills: 0 | Status: DISCONTINUED | OUTPATIENT
Start: 2023-12-01 | End: 2023-12-01

## 2023-12-01 RX ORDER — PIPERACILLIN AND TAZOBACTAM 4; .5 G/20ML; G/20ML
3.38 INJECTION, POWDER, LYOPHILIZED, FOR SOLUTION INTRAVENOUS EVERY 8 HOURS
Refills: 0 | Status: DISCONTINUED | OUTPATIENT
Start: 2023-12-01 | End: 2023-12-01

## 2023-12-01 RX ORDER — HYDRALAZINE HCL 50 MG
10 TABLET ORAL EVERY 6 HOURS
Refills: 0 | Status: DISCONTINUED | OUTPATIENT
Start: 2023-12-01 | End: 2023-12-02

## 2023-12-01 RX ORDER — POLYETHYLENE GLYCOL 3350 17 G/17G
17 POWDER, FOR SOLUTION ORAL EVERY 12 HOURS
Refills: 0 | Status: DISCONTINUED | OUTPATIENT
Start: 2023-12-01 | End: 2023-12-04

## 2023-12-01 RX ORDER — PIPERACILLIN AND TAZOBACTAM 4; .5 G/20ML; G/20ML
3.38 INJECTION, POWDER, LYOPHILIZED, FOR SOLUTION INTRAVENOUS EVERY 8 HOURS
Refills: 0 | Status: DISCONTINUED | OUTPATIENT
Start: 2023-12-01 | End: 2023-12-02

## 2023-12-01 RX ORDER — INSULIN GLARGINE 100 [IU]/ML
32 INJECTION, SOLUTION SUBCUTANEOUS AT BEDTIME
Refills: 0 | Status: DISCONTINUED | OUTPATIENT
Start: 2023-12-01 | End: 2023-12-03

## 2023-12-01 RX ORDER — INSULIN LISPRO 100/ML
10 VIAL (ML) SUBCUTANEOUS
Refills: 0 | Status: COMPLETED | OUTPATIENT
Start: 2023-12-01 | End: 2023-12-03

## 2023-12-01 RX ADMIN — OXYCODONE HYDROCHLORIDE 10 MILLIGRAM(S): 5 TABLET ORAL at 07:45

## 2023-12-01 RX ADMIN — HYDROMORPHONE HYDROCHLORIDE 0.5 MILLIGRAM(S): 2 INJECTION INTRAMUSCULAR; INTRAVENOUS; SUBCUTANEOUS at 09:18

## 2023-12-01 RX ADMIN — POLYETHYLENE GLYCOL 3350 17 GRAM(S): 17 POWDER, FOR SOLUTION ORAL at 17:09

## 2023-12-01 RX ADMIN — TAMSULOSIN HYDROCHLORIDE 0.4 MILLIGRAM(S): 0.4 CAPSULE ORAL at 22:10

## 2023-12-01 RX ADMIN — ENOXAPARIN SODIUM 40 MILLIGRAM(S): 100 INJECTION SUBCUTANEOUS at 06:30

## 2023-12-01 RX ADMIN — PIPERACILLIN AND TAZOBACTAM 25 GRAM(S): 4; .5 INJECTION, POWDER, LYOPHILIZED, FOR SOLUTION INTRAVENOUS at 05:12

## 2023-12-01 RX ADMIN — OXYCODONE HYDROCHLORIDE 10 MILLIGRAM(S): 5 TABLET ORAL at 18:54

## 2023-12-01 RX ADMIN — Medication 6 UNIT(S): at 07:57

## 2023-12-01 RX ADMIN — PIPERACILLIN AND TAZOBACTAM 25 GRAM(S): 4; .5 INJECTION, POWDER, LYOPHILIZED, FOR SOLUTION INTRAVENOUS at 22:09

## 2023-12-01 RX ADMIN — Medication 5 MILLIGRAM(S): at 17:10

## 2023-12-01 RX ADMIN — GABAPENTIN 300 MILLIGRAM(S): 400 CAPSULE ORAL at 13:03

## 2023-12-01 RX ADMIN — GABAPENTIN 300 MILLIGRAM(S): 400 CAPSULE ORAL at 22:10

## 2023-12-01 RX ADMIN — Medication 6: at 17:10

## 2023-12-01 RX ADMIN — Medication 6: at 11:43

## 2023-12-01 RX ADMIN — Medication 112 MILLIGRAM(S): at 06:29

## 2023-12-01 RX ADMIN — OXYCODONE HYDROCHLORIDE 10 MILLIGRAM(S): 5 TABLET ORAL at 09:18

## 2023-12-01 RX ADMIN — Medication 5 MILLIGRAM(S): at 06:29

## 2023-12-01 RX ADMIN — GABAPENTIN 300 MILLIGRAM(S): 400 CAPSULE ORAL at 06:28

## 2023-12-01 RX ADMIN — Medication 10 MILLIGRAM(S): at 18:18

## 2023-12-01 RX ADMIN — POLYETHYLENE GLYCOL 3350 17 GRAM(S): 17 POWDER, FOR SOLUTION ORAL at 06:30

## 2023-12-01 RX ADMIN — Medication 166.67 MILLIGRAM(S): at 05:12

## 2023-12-01 RX ADMIN — Medication 10 UNIT(S): at 17:10

## 2023-12-01 RX ADMIN — PIPERACILLIN AND TAZOBACTAM 25 GRAM(S): 4; .5 INJECTION, POWDER, LYOPHILIZED, FOR SOLUTION INTRAVENOUS at 13:01

## 2023-12-01 RX ADMIN — Medication 10 UNIT(S): at 11:45

## 2023-12-01 RX ADMIN — CHLORHEXIDINE GLUCONATE 1 APPLICATION(S): 213 SOLUTION TOPICAL at 06:28

## 2023-12-01 RX ADMIN — SENNA PLUS 2 TABLET(S): 8.6 TABLET ORAL at 22:12

## 2023-12-01 RX ADMIN — Medication 6: at 07:57

## 2023-12-01 RX ADMIN — SIMVASTATIN 40 MILLIGRAM(S): 20 TABLET, FILM COATED ORAL at 22:10

## 2023-12-01 RX ADMIN — INSULIN GLARGINE 32 UNIT(S): 100 INJECTION, SOLUTION SUBCUTANEOUS at 22:11

## 2023-12-01 RX ADMIN — HYDROMORPHONE HYDROCHLORIDE 0.5 MILLIGRAM(S): 2 INJECTION INTRAMUSCULAR; INTRAVENOUS; SUBCUTANEOUS at 09:04

## 2023-12-01 NOTE — PROGRESS NOTE ADULT - SUBJECTIVE AND OBJECTIVE BOX
24 HOUR EVENTS  - OR L BKA guillotine amputation POD 0  - Lantus 40  - restarted home enalapril  - Now regular diet, dc LR  - started flomax    SUBJECTIVE/ROS:  [X] A ten-point review of systems was otherwise negative except as noted.  [ ] Due to altered mental status/intubation, subjective information were not able to be obtained from the patient. History was obtained, to the extent possible, from review of the chart and collateral sources of information.      NEURO  Exam: AOx4  Meds: gabapentin 300 milliGRAM(s) Oral three times a day  HYDROmorphone  Injectable 0.5 milliGRAM(s) IV Push every 3 hours PRN breakthrough pain  oxyCODONE    IR 5 milliGRAM(s) Oral every 4 hours PRN Moderate Pain (4 - 6)  oxyCODONE    IR 10 milliGRAM(s) Oral every 4 hours PRN Severe Pain (7 - 10)    [x] Adequacy of sedation and pain control has been assessed and adjusted      RESPIRATORY  RR: 23 (11-30-23 @ 20:00) (14 - 33)  SpO2: 93% (11-30-23 @ 20:00) (91% - 97%)  Wt(kg): --  Exam: breathing comfortably on RA, O2 sat 93. non-tachypneic  Mechanical Ventilation:     [ ] Extubation Readiness Assessed  Meds:       CARDIOVASCULAR  HR: 82 (11-30-23 @ 20:00) (75 - 101)  BP: 154/80 (11-30-23 @ 20:00) (123/61 - 173/82)  BP(mean): 110 (11-30-23 @ 20:00) (85 - 122)  ABP: --  ABP(mean): --  Wt(kg): --  CVP(cm H2O): --  VBG - ( 30 Nov 2023 02:05 )  pH: 7.44  /  pCO2: 41    /  pO2: 136   / HCO3: 28    / Base Excess: 3.3   /  SaO2: 99.1   Lactate: 1.0          Exam: S1S2. No tachycardia  Cardiac Rhythm: NSR  Meds: enalapril 5 milliGRAM(s) Oral every 12 hours        GI/NUTRITION  Exam: Soft, non-distended, non-tender.   Diet: Regular  Meds: polyethylene glycol 3350 17 Gram(s) Oral every 24 hours  senna 2 Tablet(s) Oral at bedtime      GENITOURINARY  I&O's Detail    11-29 @ 07:01  -  11-30 @ 07:00  --------------------------------------------------------  IN:    Insulin: 7 mL    Insulin: 14 mL    IV PiggyBack: 250 mL    IV PiggyBack: 200 mL    IV PiggyBack: 275 mL    Lactated Ringers: 250 mL    Lactated Ringers: 750 mL  Total IN: 1746 mL    OUT:    Indwelling Catheter - Urethral (mL): 620 mL  Total OUT: 620 mL    Total NET: 1126 mL      11-30 @ 07:01 - 12-01 @ 00:21  --------------------------------------------------------  IN:    Insulin: 60 mL    IV PiggyBack: 125 mL    IV PiggyBack: 150 mL    IV PiggyBack: 400 mL    Lactated Ringers: 250 mL  Total IN: 985 mL    OUT:    Indwelling Catheter - Urethral (mL): 500 mL  Total OUT: 500 mL    Total NET: 485 mL        Weight (kg): 108.9 (11-30 @ 01:09)  11-30    136  |  99  |  13  ----------------------------<  192<H>  3.9   |  26  |  0.86    Ca    8.6      30 Nov 2023 02:19  Phos  3.0     11-30  Mg     1.7     11-30    TPro  7.1  /  Alb  2.5<L>  /  TBili  0.3  /  DBili  x   /  AST  30  /  ALT  42  /  AlkPhos  216<H>  11-30    [ ] Contreras catheter, indication: N/A  Meds: tamsulosin 0.4 milliGRAM(s) Oral at bedtime        HEMATOLOGIC  Meds: enoxaparin Injectable 40 milliGRAM(s) SubCutaneous every 24 hours    [x] VTE Prophylaxis                        10.5   20.54 )-----------( 362      ( 30 Nov 2023 02:19 )             31.6     PT/INR - ( 30 Nov 2023 02:19 )   PT: 15.1 sec;   INR: 1.39 ratio         PTT - ( 30 Nov 2023 02:19 )  PTT:27.7 sec  Transfusion     [ ] PRBC   [ ] Platelets   [ ] FFP   [ ] Cryoprecipitate      INFECTIOUS DISEASES  T(C): 36.6 (11-30-23 @ 15:00), Max: 37.3 (11-30-23 @ 01:09)  Wt(kg): --  WBC Count: 20.54 K/uL (11-30 @ 02:19)    Recent Cultures:  Specimen Source: .Blood Blood-Venous, 11-29 @ 18:23; Results   No growth at 24 hours; Gram Stain: --; Organism: --    Meds: clindamycin IVPB 900 milliGRAM(s) IV Intermittent every 8 hours  piperacillin/tazobactam IVPB.. 3.375 Gram(s) IV Intermittent every 8 hours  vancomycin  IVPB 1250 milliGRAM(s) IV Intermittent every 12 hours        ENDOCRINE  Capillary Blood Glucose    Meds: insulin lispro (ADMELOG) corrective regimen sliding scale   SubCutaneous three times a day before meals  simvastatin 40 milliGRAM(s) Oral at bedtime      ACCESS DEVICES:  [ ] Peripheral IV  [ ] Central Venous Line	[ ] R	[ ] L	[ ] IJ	[ ] Fem	[ ] SC	Placed:   [ ] Arterial Line		[ ] R	[ ] L	[ ] Fem	[ ] Rad	[ ] Ax	Placed:   [ ] PICC:					[ ] Mediport  [ ] Urinary Catheter, Date Placed:   [ ] Necessity of urinary, arterial, and venous catheters discussed    OTHER MEDICATIONS:  chlorhexidine 2% Cloths 1 Application(s) Topical <User Schedule>

## 2023-12-01 NOTE — PROGRESS NOTE ADULT - SUBJECTIVE AND OBJECTIVE BOX
VASCULAR SURGERY DAILY PROGRESS NOTE:     24 HOUR EVENTS  - OR L BKA guillotine amputation POD 1  - Lantus 40  - restarted home enalapril  - Now regular diet, dc LR  - started flomax    SUBJECTIVE/ROS: Patient seen and examined. No patient is resting comfortably, pain is controlled.      MEDICATIONS  (STANDING):  chlorhexidine 2% Cloths 1 Application(s) Topical <User Schedule>  clindamycin IVPB 900 milliGRAM(s) IV Intermittent every 8 hours  enalapril 5 milliGRAM(s) Oral every 12 hours  enoxaparin Injectable 40 milliGRAM(s) SubCutaneous every 24 hours  gabapentin 300 milliGRAM(s) Oral three times a day  insulin lispro (ADMELOG) corrective regimen sliding scale   SubCutaneous three times a day before meals  insulin lispro Injectable (ADMELOG) 6 Unit(s) SubCutaneous three times a day before meals  piperacillin/tazobactam IVPB.. 3.375 Gram(s) IV Intermittent every 8 hours  polyethylene glycol 3350 17 Gram(s) Oral every 24 hours  senna 2 Tablet(s) Oral at bedtime  simvastatin 40 milliGRAM(s) Oral at bedtime  tamsulosin 0.4 milliGRAM(s) Oral at bedtime  vancomycin  IVPB 1250 milliGRAM(s) IV Intermittent every 12 hours    MEDICATIONS  (PRN):  HYDROmorphone  Injectable 0.5 milliGRAM(s) IV Push every 3 hours PRN breakthrough pain  oxyCODONE    IR 5 milliGRAM(s) Oral every 4 hours PRN Moderate Pain (4 - 6)  oxyCODONE    IR 10 milliGRAM(s) Oral every 4 hours PRN Severe Pain (7 - 10)      OBJECTIVE:    Vital Signs Last 24 Hrs  T(C): 37.2 (01 Dec 2023 07:00), Max: 37.2 (01 Dec 2023 07:00)  T(F): 98.9 (01 Dec 2023 07:00), Max: 98.9 (01 Dec 2023 07:00)  HR: 92 (01 Dec 2023 08:00) (72 - 92)  BP: 184/87 (01 Dec 2023 08:00) (123/61 - 184/87)  BP(mean): 125 (01 Dec 2023 08:00) (85 - 125)  RR: 22 (01 Dec 2023 08:00) (13 - 32)  SpO2: 95% (01 Dec 2023 08:00) (91% - 97%)    Parameters below as of 30 Nov 2023 19:00  Patient On (Oxygen Delivery Method): room air            I&O's Detail    30 Nov 2023 07:01  -  01 Dec 2023 07:00  --------------------------------------------------------  IN:    Insulin: 60 mL    IV PiggyBack: 600 mL    IV PiggyBack: 500 mL    IV PiggyBack: 150 mL    Lactated Ringers: 250 mL    Oral Fluid: 720 mL  Total IN: 2280 mL    OUT:    Indwelling Catheter - Urethral (mL): 1310 mL  Total OUT: 1310 mL    Total NET: 970 mL      01 Dec 2023 07:01  -  01 Dec 2023 08:45  --------------------------------------------------------  IN:    IV PiggyBack: 25 mL  Total IN: 25 mL    OUT:    Indwelling Catheter - Urethral (mL): 175 mL  Total OUT: 175 mL    Total NET: -150 mL          Daily     Daily     LABS:                        10.2   11.18 )-----------( 405      ( 01 Dec 2023 05:12 )             31.6     12-01    136  |  100  |  15  ----------------------------<  289<H>  4.4   |  25  |  0.86    Ca    8.7      01 Dec 2023 05:12  Phos  3.5     12-01  Mg     1.8     12-01    TPro  7.0  /  Alb  2.6<L>  /  TBili  0.2  /  DBili  x   /  AST  35  /  ALT  40  /  AlkPhos  248<H>  12-01    PT/INR - ( 30 Nov 2023 02:19 )   PT: 15.1 sec;   INR: 1.39 ratio         PTT - ( 30 Nov 2023 02:19 )  PTT:27.7 sec  Urinalysis Basic - ( 01 Dec 2023 05:12 )    Color: x / Appearance: x / SG: x / pH: x  Gluc: 289 mg/dL / Ketone: x  / Bili: x / Urobili: x   Blood: x / Protein: x / Nitrite: x   Leuk Esterase: x / RBC: x / WBC x   Sq Epi: x / Non Sq Epi: x / Bacteria: x      PHYSICAL EXAM:  Physical Exam:  General: NAD, resting comfortably in bed  Pulmonary: Nonlabored breathing, no respiratory distress  Cardiovascular: RRR  Abdominal: soft, nontender  Extremities: L BKA wound dressed, c/d/i

## 2023-12-01 NOTE — CONSULT NOTE ADULT - SUBJECTIVE AND OBJECTIVE BOX
ENDOCRINE INITIAL CONSULT -     HPI:  Mr. Mcdaniel is a 63M PMHx DM, HLD, PAD, prior toe amputation, presents to [facility] ED w L foot wound. Reports wound has been present on hindfoot x 3 weeks. Started as small cut. Over last 24 hours, pt unable to ambulate d/t severe pain. Reports significant malodor. Denies subjective fevers, chills, purulent drainage, numbness/paresthesia.     In ED, patient is tachycardic to 123. BP w HTN. Febrile to 102.4 F. Labs w WBC 21. Hgb 11.4. Lac 2.4. XR L foot w soft tissue gas at lateral hindfoot. CT LLE non con w soft tissue gas tracking ~7cm above foot.      (29 Nov 2023 20:52)  HPI as above. Patient lost his primary care provider (retired) last year. since then, has not been consistently checking his sugars. Prior to this, he stated he would frequently check his sugars and watch his diet. c/o of polyuria and polydipsia for the past 2 months associated with generalized fatigue.   Diet - typically eats nut flakes and grapes for breakfast, chicken cutlet for lunch, dinner chicken and shrimp salad. Of note, patient recently have been eating more fast foods and pizza.     Diabetes history - DM type II  - Diagnosed 30 years ago   - Metformin 1000mg BID and Glipizide 5mg BID   - Also takes simvastatin and Enalapril  - Never on insulin   - Hx of retinopathy, foot ulcers, neuropathy, nephropathy (reported microalbumin)   - Does not recall last a1c   Patient is currently POD from a BKA due to necrotizing soft tissue infection.     ENDOCRINE HISTORY     Endocrinologist: N/a   Social History: No history of smoking or alcohol use. Previously worked in [employer].   Family History: Grandmother with DM   Surgical History: hx of appendectomy and currently BKA     PAST MEDICAL & SURGICAL HISTORY:  Retinopathy      Diabetes mellitus type 2 in obese      Hyperlipidemia      Toe infection  s/p amputation      Ruptured appendix      S/P appendectomy      Toe amputation status  left pinky toe          FAMILY HISTORY:  Family history of diabetes mellitus in grandmother (Grandparent)        Social History: as above       Home Medications:  glipiZIDE 5 mg oral tablet: 1 tab(s) orally 2 times a day (29 Nov 2023 20:56)  metFORMIN 1000 mg oral tablet: 1 tab(s) orally 2 times a day (29 Nov 2023 20:56)      MEDICATIONS  (STANDING):  chlorhexidine 2% Cloths 1 Application(s) Topical <User Schedule>  enalapril 5 milliGRAM(s) Oral every 12 hours  enoxaparin Injectable 40 milliGRAM(s) SubCutaneous every 24 hours  gabapentin 300 milliGRAM(s) Oral three times a day  insulin glargine Injectable (LANTUS) 32 Unit(s) SubCutaneous at bedtime  insulin lispro (ADMELOG) corrective regimen sliding scale   SubCutaneous three times a day before meals  insulin lispro Injectable (ADMELOG) 10 Unit(s) SubCutaneous three times a day before meals  piperacillin/tazobactam IVPB.. 3.375 Gram(s) IV Intermittent every 8 hours  polyethylene glycol 3350 17 Gram(s) Oral every 12 hours  senna 2 Tablet(s) Oral at bedtime  simvastatin 40 milliGRAM(s) Oral at bedtime  tamsulosin 0.4 milliGRAM(s) Oral at bedtime    MEDICATIONS  (PRN):  HYDROmorphone  Injectable 0.5 milliGRAM(s) IV Push every 3 hours PRN breakthrough pain  oxyCODONE    IR 5 milliGRAM(s) Oral every 4 hours PRN Moderate Pain (4 - 6)  oxyCODONE    IR 10 milliGRAM(s) Oral every 4 hours PRN Severe Pain (7 - 10)      Allergies    No Known Allergies    Intolerances        REVIEW OF SYSTEMS  Constitutional: No fever  Eyes: No blurry vision  Neuro: No tremors  HEENT: No pain  Cardiovascular: No chest pain, palpitations  Respiratory: No SOB, no cough  GI: No nausea, vomiting, abdominal pain  : No dysuria  Skin: no rash  Psych: no depression  Endocrine: no polyuria, polydipsia  Hem/lymph: no swelling  Osteoporosis: no fractures  ALL OTHER SYSTEMS REVIEWED AND NEGATIVE     UNABLE TO OBTAIN     PHYSICAL EXAM   Vital Signs Last 24 Hrs  T(C): 37.2 (01 Dec 2023 07:00), Max: 37.2 (01 Dec 2023 07:00)  T(F): 98.9 (01 Dec 2023 07:00), Max: 98.9 (01 Dec 2023 07:00)  HR: 96 (01 Dec 2023 10:45) (72 - 96)  BP: 136/60 (01 Dec 2023 10:45) (123/61 - 184/87)  BP(mean): 87 (01 Dec 2023 10:45) (85 - 125)  RR: 32 (01 Dec 2023 10:45) (11 - 32)  SpO2: 96% (01 Dec 2023 10:45) (91% - 97%)    Parameters below as of 01 Dec 2023 10:45  Patient On (Oxygen Delivery Method): room air      GENERAL: NAD, well-groomed, well-developed  EYES: No proptosis, no lid lag, anicteric  HEENT:  Atraumatic, Normocephalic, moist mucous membranes  THYROID: Normal size, no palpable nodules  RESPIRATORY: Clear to auscultation bilaterally; No rales, rhonchi, wheezing  CARDIOVASCULAR: Regular rate and rhythm; No murmurs; no peripheral edema  GI: Soft, nontender, mildly distended, normal bowel sounds   SKIN: Dry, intact, No rashes or lesions  MUSCULOSKELETAL: Full range of motion, normal strength  NEURO: sensation intact, extraocular movements intact, no tremor  PSYCH: Alert and oriented x 3, normal affect, normal mood  CUSHING'S SIGNS: no striae  Extremities - no ulceration on the right foot, left foot wrapped     CAPILLARY BLOOD GLUCOSE      POCT Blood Glucose.: 251 mg/dL (01 Dec 2023 07:54)  POCT Blood Glucose.: 265 mg/dL (01 Dec 2023 07:53)  POCT Blood Glucose.: 250 mg/dL (01 Dec 2023 03:40)  POCT Blood Glucose.: 211 mg/dL (30 Nov 2023 22:33)  POCT Blood Glucose.: 147 mg/dL (30 Nov 2023 19:37)  POCT Blood Glucose.: 118 mg/dL (30 Nov 2023 19:03)  POCT Blood Glucose.: 163 mg/dL (30 Nov 2023 18:11)  POCT Blood Glucose.: 124 mg/dL (30 Nov 2023 16:54)  POCT Blood Glucose.: 122 mg/dL (30 Nov 2023 16:14)  POCT Blood Glucose.: 156 mg/dL (30 Nov 2023 15:03)  POCT Blood Glucose.: 220 mg/dL (30 Nov 2023 14:09)  POCT Blood Glucose.: 286 mg/dL (30 Nov 2023 14:07)  POCT Blood Glucose.: 253 mg/dL (30 Nov 2023 13:03)  POCT Blood Glucose.: 267 mg/dL (30 Nov 2023 13:02)  POCT Blood Glucose.: 238 mg/dL (30 Nov 2023 11:51)      A1C with Estimated Average Glucose Result: 11.4 % (12-01-23 @ 05:19)                            10.2   11.18 )-----------( 405      ( 01 Dec 2023 05:12 )             31.6       12-01    136  |  100  |  15  ----------------------------<  289<H>  4.4   |  25  |  0.86    Ca    8.7      01 Dec 2023 05:12  Phos  3.5     12-01  Mg     1.8     12-01    TPro  7.0  /  Alb  2.6<L>  /  TBili  0.2  /  DBili  x   /  AST  35  /  ALT  40  /  AlkPhos  248<H>  12-01          LIPIDS    RADIOLOGY

## 2023-12-01 NOTE — PROGRESS NOTE ADULT - ASSESSMENT
63M PMHx DM, HLD, PAD, prior toe amputation, presents to [facility] ED w L foot wound. NSTI of non salvageable L foot. Septic in ED. LRINEC 9. XR/CT both w soft tissue gas. s/p 11/30 L BKA guillotine amp, recovering appropriately.    Plan:  - Pain control as needed  - IV Abx  - Lovenox for DVT ppx  - Activity as tolerated  - Continue insulin drip for glycemic control  - Care per SICU  - F/u AM labs    Vascular [id number]

## 2023-12-01 NOTE — CONSULT NOTE ADULT - ATTENDING COMMENTS
Agree with assessment and plan as above by Dr. Ferraro. Reviewed all pertinent labs, glucose values, and imaging studies. Modifications made as indicated above. Pt. with uncontrolled diabetes with A1c > 11% with severe hyperglycemia s/p left LE amputation. Was on insulin gtt now transitioned to basal bolus. Recommend Lantus 32 units daily and Admelog 10 units qac with moderate correction scale qac and low correction scale at bedtime. Will adjust further as needed. Plan to d/c on basal bolus insulin.     Cade Travis D.O  [phone]

## 2023-12-01 NOTE — PROGRESS NOTE ADULT - ASSESSMENT
ELVIS VILLATORO is a 63yom pmhx of HTN HLD DM, on metformin, former smoker, presenting with 3 weeks of worsening L foot ulcer, now with leg swelling, redness, tachycardia, and fever. Hyperglycemic in ED. Sent to SICU for insulin drip pending OR guillotine amputation in LLE.      NEURO  - AOx4 at present  - tylenol/oxycodone PRN for pain    CV  #hx HTN, HLD, CAD s/p CABG  - c/w home enalapril 5mg BID, simvastatin 40mg qd     PULM  #prior hx smoking  - no acute issues    GI  #Diet  - NPO in preparation for OR    /RENAL  - Cr 0.85; no acute issues  - DC LR  - remove crain    HEME  #elevated INR  - 1.32    ID  #Necrotizing fasciitis LLE  #sepsis  - pt presented with ulcer under L 1st toe, with gangrenous tissue on L posterolateral heel, and erythema/swelling spreading up leg, below the knee, Initially tachycardic to 120, fever 102F, WBC 21, procal 0.33,   - XR LLE shows subcutaneous gas, predominantly adjacent to the 5th metatarsal stump and the calcaneus; gas in the soft tissues posterior to the ankle joint. No discrete cortical erosions.  - plan for OR  - s/p vanc/zosyn/clinda in ED; c/w abx after return from OR  - CTM procal, WBC  - f/u vanc trough before 4th dose  - started gabapentin    ENDOCRINE  #DM2, currently hyperglycemic  - on metformin and glipizide at home, reports medication adherence  - insulin drip; currently NPO  - f/u A1c

## 2023-12-01 NOTE — CONSULT NOTE ADULT - ASSESSMENT
63M PMHx DM2 (on oral meds), HLD, PAD, hx of toe amputations, retinopathy, presenting to the [facility] ED with L foot wound, found to be tachycardic to 123, /80, Febrile 102, WBC 21, Hgb 11.4, Lact 2.4, XL L foot with soft tissue gas at lateral hindfoot. CT LLE non con with soft tissue taurus racking ~ 7 cm above foot. Consulted for hyperglycemia  Admitted with glucose of 400. Started on an insulin ggt for 1 day, with improving glycemic control.   Started on 25 units of Lantus and 10 units pre-meals with moderate dose correctional insulin.     Recommendations pending discussion with attending  #Hyperglycemia 2/2 to Type II DM    Long standing history of DM on for many years. Metformin and Glipizide regiment currently. Evidence of microvascular complications including retinopathy, nephropathy and neuropathy. History of taking Kazano (algoliptin and metformin), however discontinued.    - Insulin ggt started, ranging between 1.5units - 8 units / hour   - Started on 25 units at bedtime 11/30 and 10 units pre-meals   - Please check an a1c and Lipid panel while inpatient   - Patient should be set up with both primary care provider and endocrinology   - Upon discharge, recommend DM glucose monitoring supplies and teaching (Glucometer, lancets, testing strips and alcohol swabs)    #Hypertension   - Patient takes Enalapril for BP   - Will recommend out patient titration to goal of <130/85    #HLD   - Continue with high potency statin    63M PMHx DM2 (on oral meds), HLD, PAD, hx of toe amputations, retinopathy, presenting to the [facility] ED with L foot wound, found to be tachycardic to 123, /80, Febrile 102, WBC 21, Hgb 11.4, Lact 2.4, XL L foot with soft tissue gas at lateral hindfoot. CT LLE non con with soft tissue taurus racking ~ 7 cm above foot. Consulted for hyperglycemia  Admitted with glucose of 400. Started on an insulin ggt for 1 day, with improving glycemic control.   Started on 25 units of Lantus and 10 units pre-meals with moderate dose correctional insulin.     Recommendations pending discussion with attending  #Hyperglycemia 2/2 to Type II DM    Long standing history of DM on for many years. Metformin and Glipizide regiment currently. Evidence of microvascular complications including retinopathy, nephropathy and neuropathy. History of taking Kazano (algoliptin and metformin), however discontinued.    - Insulin ggt started, ranging between 1.5units - 8 units / hour   - Started on 25 units at bedtime 11/30 and 10 units pre-meals   - Continue with consistent carbohydrate diet  - Please check an a1c and Lipid panel while inpatient   - Patient should be set up with both primary care provider and endocrinology   - Upon discharge, recommend DM glucose monitoring supplies and teaching (Glucometer, lancets, testing strips and alcohol swabs)    #Hypertension   - Patient takes Enalapril for BP   - Will recommend out patient titration to goal of <130/85    #HLD   - Continue with high potency statin    63M PMHx DM2 (on oral meds), HLD, PAD, hx of toe amputations, retinopathy, presenting to the [facility] ED with L foot wound, found to be tachycardic to 123, /80, Febrile 102, WBC 21, Hgb 11.4, Lact 2.4, XL L foot with soft tissue gas at lateral hindfoot. CT LLE non con with soft tissue taurus racking ~ 7 cm above foot. Consulted for hyperglycemia. Admitted with glucose of 400. Started on an insulin ggt for 1 day, with improving glycemic control.   Started on 25 units of Lantus and 10 units pre-meals with moderate dose correctional insulin.     #Hyperglycemia 2/2 to Type II DM    Long standing history of DM on for many years. Metformin and Glipizide regiment currently. Evidence of microvascular complications including retinopathy, nephropathy and neuropathy. History of taking Kazano (algoliptin and metformin), however discontinued. a1c 11.   - Insulin ggt started, ranging between 1.5units - 8 units / hour   - Started on 25 units at bedtime 11/30 and 10 units pre-meals     Recommendations   - Please continue 32 units of Lantus at bedtime. Continue with 10 units pre-meal ADMELOG with moderate dose correctional insulin.   - Continue with consistent carbohydrate diet  - Please check an a1c and Lipid panel while inpatient   - Patient should be set up with both primary care provider and endocrinology   - Upon discharge, recommend DM glucose monitoring supplies and teaching (Glucometer, lancets, testing strips and alcohol swabs)    #Hypertension   - Patient takes Enalapril for BP   - Will recommend out patient titration to goal of <130/85    #HLD   - Continue with high potency statin    63M PMHx DM2 (on oral meds), HLD, PAD, hx of toe amputations, retinopathy, presenting to the [facility] ED with L foot wound, found to be tachycardic to 123, /80, Febrile 102, WBC 21, Hgb 11.4, Lact 2.4, XL L foot with soft tissue gas at lateral hindfoot. CT LLE non con with soft tissue taurus racking ~ 7 cm above foot. Consulted for hyperglycemia. Admitted with glucose of 400. Started on an insulin ggt for 1 day, with improving glycemic control. (high risk patient with severely uncontrolled diabetes with A1c of 11% at high risk of CAD and CVA with high level decision-making).   Started on 25 units of Lantus and 10 units pre-meals with moderate dose correctional insulin.     #Hyperglycemia 2/2 to Type II DM    Long standing history of DM on for many years. Metformin and Glipizide regiment currently. Evidence of microvascular complications including retinopathy, nephropathy and neuropathy. History of taking Kazano (algoliptin and metformin), however discontinued. a1c 11.   - Insulin ggt started, ranging between 1.5units - 8 units / hour   - Started on 25 units at bedtime 11/30 and 10 units pre-meals     Recommendations   - Please continue 32 units of Lantus at bedtime. Continue with 10 units pre-meal ADMELOG with moderate dose correctional insulin.   - Continue with consistent carbohydrate diet  - Please check an a1c and Lipid panel while inpatient   - Patient should be set up with both primary care provider and endocrinology   - Upon discharge, recommend DM glucose monitoring supplies and teaching (Glucometer, lancets, testing strips and alcohol swabs)    #Hypertension   - Patient takes Enalapril for BP   - Will recommend out patient titration to goal of <130/85    #HLD   - Continue with high potency statin

## 2023-12-01 NOTE — PROGRESS NOTE ADULT - ATTENDING COMMENTS
63yr m pmhx of HTN HLD DM, on metformin, former smoker, presenting with 3 weeks of worsening L foot ulcer s/p   Sent to SICU for insulin drip pending OR guillotine amputation in LLE s/p Lt BKA    Awake and alert, multimodal pain control  Saturating well on RA  Hemodynamically stable  Diabetic diet  Off IVF, renal function stable, replace Mag again   BC remains neg so far, tissue culture P, Can DC Clinda and Francko, continue Zosyn for now  Wean off  Insulin drip gtt to Lantus/premeal/ISS   Wound care  Pharm DVT ppx with Lovenox  PT  Spoke to his wife at bed side

## 2023-12-02 LAB
-  AMOXICILLIN/CLAVULANIC ACID: SIGNIFICANT CHANGE UP
-  AMOXICILLIN/CLAVULANIC ACID: SIGNIFICANT CHANGE UP
-  AMPICILLIN/SULBACTAM: SIGNIFICANT CHANGE UP
-  AMPICILLIN/SULBACTAM: SIGNIFICANT CHANGE UP
-  AMPICILLIN: SIGNIFICANT CHANGE UP
-  AZTREONAM: SIGNIFICANT CHANGE UP
-  AZTREONAM: SIGNIFICANT CHANGE UP
-  BLOOD PCR PANEL: SIGNIFICANT CHANGE UP
-  BLOOD PCR PANEL: SIGNIFICANT CHANGE UP
-  CEFAZOLIN: SIGNIFICANT CHANGE UP
-  CEFAZOLIN: SIGNIFICANT CHANGE UP
-  CEFEPIME: SIGNIFICANT CHANGE UP
-  CEFEPIME: SIGNIFICANT CHANGE UP
-  CEFOXITIN: SIGNIFICANT CHANGE UP
-  CEFOXITIN: SIGNIFICANT CHANGE UP
-  CEFTRIAXONE: SIGNIFICANT CHANGE UP
-  CEFTRIAXONE: SIGNIFICANT CHANGE UP
-  CIPROFLOXACIN: SIGNIFICANT CHANGE UP
-  CIPROFLOXACIN: SIGNIFICANT CHANGE UP
-  ERTAPENEM: SIGNIFICANT CHANGE UP
-  ERTAPENEM: SIGNIFICANT CHANGE UP
-  GENTAMICIN: SIGNIFICANT CHANGE UP
-  GENTAMICIN: SIGNIFICANT CHANGE UP
-  IMIPENEM: SIGNIFICANT CHANGE UP
-  IMIPENEM: SIGNIFICANT CHANGE UP
-  LEVOFLOXACIN: SIGNIFICANT CHANGE UP
-  LEVOFLOXACIN: SIGNIFICANT CHANGE UP
-  MEROPENEM: SIGNIFICANT CHANGE UP
-  MEROPENEM: SIGNIFICANT CHANGE UP
-  PIPERACILLIN/TAZOBACTAM: SIGNIFICANT CHANGE UP
-  PIPERACILLIN/TAZOBACTAM: SIGNIFICANT CHANGE UP
-  TETRACYCLINE: SIGNIFICANT CHANGE UP
-  TETRACYCLINE: SIGNIFICANT CHANGE UP
-  TOBRAMYCIN: SIGNIFICANT CHANGE UP
-  TOBRAMYCIN: SIGNIFICANT CHANGE UP
-  TRIMETHOPRIM/SULFAMETHOXAZOLE: SIGNIFICANT CHANGE UP
-  TRIMETHOPRIM/SULFAMETHOXAZOLE: SIGNIFICANT CHANGE UP
-  VANCOMYCIN: SIGNIFICANT CHANGE UP
-  VANCOMYCIN: SIGNIFICANT CHANGE UP
ANION GAP SERPL CALC-SCNC: 8 MMOL/L — SIGNIFICANT CHANGE UP (ref 5–17)
ANION GAP SERPL CALC-SCNC: 8 MMOL/L — SIGNIFICANT CHANGE UP (ref 5–17)
BUN SERPL-MCNC: 10 MG/DL — SIGNIFICANT CHANGE UP (ref 7–23)
BUN SERPL-MCNC: 10 MG/DL — SIGNIFICANT CHANGE UP (ref 7–23)
CALCIUM SERPL-MCNC: 8.9 MG/DL — SIGNIFICANT CHANGE UP (ref 8.4–10.5)
CALCIUM SERPL-MCNC: 8.9 MG/DL — SIGNIFICANT CHANGE UP (ref 8.4–10.5)
CHLORIDE SERPL-SCNC: 100 MMOL/L — SIGNIFICANT CHANGE UP (ref 96–108)
CHLORIDE SERPL-SCNC: 100 MMOL/L — SIGNIFICANT CHANGE UP (ref 96–108)
CO2 SERPL-SCNC: 29 MMOL/L — SIGNIFICANT CHANGE UP (ref 22–31)
CO2 SERPL-SCNC: 29 MMOL/L — SIGNIFICANT CHANGE UP (ref 22–31)
CREAT SERPL-MCNC: 0.83 MG/DL — SIGNIFICANT CHANGE UP (ref 0.5–1.3)
CREAT SERPL-MCNC: 0.83 MG/DL — SIGNIFICANT CHANGE UP (ref 0.5–1.3)
CULTURE RESULTS: ABNORMAL
EGFR: 98 ML/MIN/1.73M2 — SIGNIFICANT CHANGE UP
EGFR: 98 ML/MIN/1.73M2 — SIGNIFICANT CHANGE UP
GLUCOSE SERPL-MCNC: 117 MG/DL — HIGH (ref 70–99)
GLUCOSE SERPL-MCNC: 117 MG/DL — HIGH (ref 70–99)
GRAM STN FLD: ABNORMAL
GRAM STN FLD: ABNORMAL
HCT VFR BLD CALC: 33.8 % — LOW (ref 39–50)
HCT VFR BLD CALC: 33.8 % — LOW (ref 39–50)
HGB BLD-MCNC: 10.8 G/DL — LOW (ref 13–17)
HGB BLD-MCNC: 10.8 G/DL — LOW (ref 13–17)
MAGNESIUM SERPL-MCNC: 1.9 MG/DL — SIGNIFICANT CHANGE UP (ref 1.6–2.6)
MAGNESIUM SERPL-MCNC: 1.9 MG/DL — SIGNIFICANT CHANGE UP (ref 1.6–2.6)
MCHC RBC-ENTMCNC: 26.2 PG — LOW (ref 27–34)
MCHC RBC-ENTMCNC: 26.2 PG — LOW (ref 27–34)
MCHC RBC-ENTMCNC: 32 GM/DL — SIGNIFICANT CHANGE UP (ref 32–36)
MCHC RBC-ENTMCNC: 32 GM/DL — SIGNIFICANT CHANGE UP (ref 32–36)
MCV RBC AUTO: 81.8 FL — SIGNIFICANT CHANGE UP (ref 80–100)
MCV RBC AUTO: 81.8 FL — SIGNIFICANT CHANGE UP (ref 80–100)
METHOD TYPE: SIGNIFICANT CHANGE UP
NRBC # BLD: 0 /100 WBCS — SIGNIFICANT CHANGE UP (ref 0–0)
NRBC # BLD: 0 /100 WBCS — SIGNIFICANT CHANGE UP (ref 0–0)
ORGANISM # SPEC MICROSCOPIC CNT: ABNORMAL
PHOSPHATE SERPL-MCNC: 3 MG/DL — SIGNIFICANT CHANGE UP (ref 2.5–4.5)
PHOSPHATE SERPL-MCNC: 3 MG/DL — SIGNIFICANT CHANGE UP (ref 2.5–4.5)
PLATELET # BLD AUTO: 468 K/UL — HIGH (ref 150–400)
PLATELET # BLD AUTO: 468 K/UL — HIGH (ref 150–400)
POTASSIUM SERPL-MCNC: 3.9 MMOL/L — SIGNIFICANT CHANGE UP (ref 3.5–5.3)
POTASSIUM SERPL-MCNC: 3.9 MMOL/L — SIGNIFICANT CHANGE UP (ref 3.5–5.3)
POTASSIUM SERPL-SCNC: 3.9 MMOL/L — SIGNIFICANT CHANGE UP (ref 3.5–5.3)
POTASSIUM SERPL-SCNC: 3.9 MMOL/L — SIGNIFICANT CHANGE UP (ref 3.5–5.3)
RBC # BLD: 4.13 M/UL — LOW (ref 4.2–5.8)
RBC # BLD: 4.13 M/UL — LOW (ref 4.2–5.8)
RBC # FLD: 14.7 % — HIGH (ref 10.3–14.5)
RBC # FLD: 14.7 % — HIGH (ref 10.3–14.5)
SODIUM SERPL-SCNC: 137 MMOL/L — SIGNIFICANT CHANGE UP (ref 135–145)
SODIUM SERPL-SCNC: 137 MMOL/L — SIGNIFICANT CHANGE UP (ref 135–145)
SPECIMEN SOURCE: SIGNIFICANT CHANGE UP
WBC # BLD: 12.07 K/UL — HIGH (ref 3.8–10.5)
WBC # BLD: 12.07 K/UL — HIGH (ref 3.8–10.5)
WBC # FLD AUTO: 12.07 K/UL — HIGH (ref 3.8–10.5)
WBC # FLD AUTO: 12.07 K/UL — HIGH (ref 3.8–10.5)

## 2023-12-02 RX ORDER — PIPERACILLIN AND TAZOBACTAM 4; .5 G/20ML; G/20ML
3.38 INJECTION, POWDER, LYOPHILIZED, FOR SOLUTION INTRAVENOUS EVERY 8 HOURS
Refills: 0 | Status: DISCONTINUED | OUTPATIENT
Start: 2023-12-02 | End: 2023-12-04

## 2023-12-02 RX ADMIN — Medication 10 MILLIGRAM(S): at 17:24

## 2023-12-02 RX ADMIN — GABAPENTIN 300 MILLIGRAM(S): 400 CAPSULE ORAL at 14:03

## 2023-12-02 RX ADMIN — POLYETHYLENE GLYCOL 3350 17 GRAM(S): 17 POWDER, FOR SOLUTION ORAL at 05:13

## 2023-12-02 RX ADMIN — PIPERACILLIN AND TAZOBACTAM 25 GRAM(S): 4; .5 INJECTION, POWDER, LYOPHILIZED, FOR SOLUTION INTRAVENOUS at 12:26

## 2023-12-02 RX ADMIN — GABAPENTIN 300 MILLIGRAM(S): 400 CAPSULE ORAL at 21:16

## 2023-12-02 RX ADMIN — Medication 5 MILLIGRAM(S): at 05:12

## 2023-12-02 RX ADMIN — POLYETHYLENE GLYCOL 3350 17 GRAM(S): 17 POWDER, FOR SOLUTION ORAL at 17:16

## 2023-12-02 RX ADMIN — Medication 2: at 18:44

## 2023-12-02 RX ADMIN — Medication 10 UNIT(S): at 14:03

## 2023-12-02 RX ADMIN — Medication 10 UNIT(S): at 09:50

## 2023-12-02 RX ADMIN — ENOXAPARIN SODIUM 40 MILLIGRAM(S): 100 INJECTION SUBCUTANEOUS at 06:54

## 2023-12-02 RX ADMIN — Medication 6: at 14:02

## 2023-12-02 RX ADMIN — Medication 10 UNIT(S): at 18:44

## 2023-12-02 RX ADMIN — INSULIN GLARGINE 32 UNIT(S): 100 INJECTION, SOLUTION SUBCUTANEOUS at 22:13

## 2023-12-02 RX ADMIN — SENNA PLUS 2 TABLET(S): 8.6 TABLET ORAL at 21:16

## 2023-12-02 RX ADMIN — TAMSULOSIN HYDROCHLORIDE 0.4 MILLIGRAM(S): 0.4 CAPSULE ORAL at 21:16

## 2023-12-02 RX ADMIN — SIMVASTATIN 40 MILLIGRAM(S): 20 TABLET, FILM COATED ORAL at 21:17

## 2023-12-02 RX ADMIN — GABAPENTIN 300 MILLIGRAM(S): 400 CAPSULE ORAL at 05:12

## 2023-12-02 RX ADMIN — Medication 2: at 09:49

## 2023-12-02 RX ADMIN — PIPERACILLIN AND TAZOBACTAM 25 GRAM(S): 4; .5 INJECTION, POWDER, LYOPHILIZED, FOR SOLUTION INTRAVENOUS at 05:12

## 2023-12-02 RX ADMIN — PIPERACILLIN AND TAZOBACTAM 25 GRAM(S): 4; .5 INJECTION, POWDER, LYOPHILIZED, FOR SOLUTION INTRAVENOUS at 20:59

## 2023-12-02 NOTE — PROGRESS NOTE ADULT - ASSESSMENT
63M PMHx DM, HLD, PAD, prior toe amputation, presents to [facility] ED w L foot wound. NSTI of non salvageable L foot. Septic in ED. LRINEC 9. XR/CT both w soft tissue gas. s/p 11/30 L BKA guillotine amp, downgraded from SICU and recovering appropriately on the floor. Formalization planning for Monday.    Plan:  - Plan for formalization Mon 12/4  - Pain control as needed  - Zosyn  - Lovenox for DVT ppx  - Activity as tolerated  - Endo consulted, appreciate recs- 32u lantus qhs, lispro 10u premeal, ISS  - BP control  - Bowel regimen  - Daily dressing changes    Vascular Surgery  [phone]

## 2023-12-02 NOTE — PROGRESS NOTE ADULT - SUBJECTIVE AND OBJECTIVE BOX
SURGERY DAILY PROGRESS NOTE    SUBJECTIVE: Patient seen and examined on AM rounds. 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 Pain is tolerable but he is worried about taking opiates as he wants to avoid constipation. Also reports discomfort with his Contreras. Denies fevers, chills, N/V, chest pain.      OBJECTIVE:  Vital Signs Last 24 Hrs  T(C): 36.9 (02 Dec 2023 09:04), Max: 37.4 (01 Dec 2023 21:45)  T(F): 98.4 (02 Dec 2023 09:04), Max: 99.4 (01 Dec 2023 21:45)  HR: 91 (02 Dec 2023 09:04) (86 - 97)  BP: 161/80 (02 Dec 2023 09:35) (126/61 - 192/98)  BP(mean): 108 (01 Dec 2023 16:00) (84 - 108)  RR: 18 (02 Dec 2023 09:04) (11 - 28)  SpO2: 96% (02 Dec 2023 09:04) (93% - 99%)    Parameters below as of 02 Dec 2023 09:04  Patient On (Oxygen Delivery Method): room air        I&O's Summary    01 Dec 2023 07:01  -  02 Dec 2023 07:00  --------------------------------------------------------  IN: 565 mL / OUT: 1655 mL / NET: -1090 mL    02 Dec 2023 07:01  -  02 Dec 2023 11:01  --------------------------------------------------------  IN: 240 mL / OUT: 0 mL / NET: 240 mL        Physical Exam:  General Appearance: Appears well, NAD   Chest: Nonlabored breathing on RA  CV: RRR  Extremities: s/p L BKA, dressing C/D/I with some strikethrough  Vascular: L BKA amputation site C/D/I, minimal bleeding, no evidence of purulence    LABS:                        10.8   12.07 )-----------( 468      ( 02 Dec 2023 07:11 )             33.8     12-02    137  |  100  |  10  ----------------------------<  117<H>  3.9   |  29  |  0.83    Ca    8.9      02 Dec 2023 07:10  Phos  3.0     12-02  Mg     1.9     12-02    TPro  7.0  /  Alb  2.6<L>  /  TBili  0.2  /  DBili  x   /  AST  35  /  ALT  40  /  AlkPhos  248<H>  12-01      Urinalysis Basic - ( 02 Dec 2023 07:10 )    Color: x / Appearance: x / SG: x / pH: x  Gluc: 117 mg/dL / Ketone: x  / Bili: x / Urobili: x   Blood: x / Protein: x / Nitrite: x   Leuk Esterase: x / RBC: x / WBC x   Sq Epi: x / Non Sq Epi: x / Bacteria: x

## 2023-12-03 ENCOUNTER — TRANSCRIPTION ENCOUNTER (OUTPATIENT)
Age: 63
End: 2023-12-03

## 2023-12-03 DIAGNOSIS — Z01.818 ENCOUNTER FOR OTHER PREPROCEDURAL EXAMINATION: ICD-10-CM

## 2023-12-03 DIAGNOSIS — M72.6 NECROTIZING FASCIITIS: ICD-10-CM

## 2023-12-03 LAB
ANION GAP SERPL CALC-SCNC: 10 MMOL/L — SIGNIFICANT CHANGE UP (ref 5–17)
ANION GAP SERPL CALC-SCNC: 10 MMOL/L — SIGNIFICANT CHANGE UP (ref 5–17)
BLD GP AB SCN SERPL QL: NEGATIVE — SIGNIFICANT CHANGE UP
BLD GP AB SCN SERPL QL: NEGATIVE — SIGNIFICANT CHANGE UP
BUN SERPL-MCNC: 13 MG/DL — SIGNIFICANT CHANGE UP (ref 7–23)
BUN SERPL-MCNC: 13 MG/DL — SIGNIFICANT CHANGE UP (ref 7–23)
CALCIUM SERPL-MCNC: 9.3 MG/DL — SIGNIFICANT CHANGE UP (ref 8.4–10.5)
CALCIUM SERPL-MCNC: 9.3 MG/DL — SIGNIFICANT CHANGE UP (ref 8.4–10.5)
CHLORIDE SERPL-SCNC: 99 MMOL/L — SIGNIFICANT CHANGE UP (ref 96–108)
CHLORIDE SERPL-SCNC: 99 MMOL/L — SIGNIFICANT CHANGE UP (ref 96–108)
CO2 SERPL-SCNC: 27 MMOL/L — SIGNIFICANT CHANGE UP (ref 22–31)
CO2 SERPL-SCNC: 27 MMOL/L — SIGNIFICANT CHANGE UP (ref 22–31)
CREAT SERPL-MCNC: 0.85 MG/DL — SIGNIFICANT CHANGE UP (ref 0.5–1.3)
CREAT SERPL-MCNC: 0.85 MG/DL — SIGNIFICANT CHANGE UP (ref 0.5–1.3)
EGFR: 98 ML/MIN/1.73M2 — SIGNIFICANT CHANGE UP
EGFR: 98 ML/MIN/1.73M2 — SIGNIFICANT CHANGE UP
GLUCOSE SERPL-MCNC: 245 MG/DL — HIGH (ref 70–99)
GLUCOSE SERPL-MCNC: 245 MG/DL — HIGH (ref 70–99)
HCT VFR BLD CALC: 36.1 % — LOW (ref 39–50)
HCT VFR BLD CALC: 36.1 % — LOW (ref 39–50)
HGB BLD-MCNC: 11.1 G/DL — LOW (ref 13–17)
HGB BLD-MCNC: 11.1 G/DL — LOW (ref 13–17)
MCHC RBC-ENTMCNC: 26.1 PG — LOW (ref 27–34)
MCHC RBC-ENTMCNC: 26.1 PG — LOW (ref 27–34)
MCHC RBC-ENTMCNC: 30.7 GM/DL — LOW (ref 32–36)
MCHC RBC-ENTMCNC: 30.7 GM/DL — LOW (ref 32–36)
MCV RBC AUTO: 84.7 FL — SIGNIFICANT CHANGE UP (ref 80–100)
MCV RBC AUTO: 84.7 FL — SIGNIFICANT CHANGE UP (ref 80–100)
NRBC # BLD: 0 /100 WBCS — SIGNIFICANT CHANGE UP (ref 0–0)
NRBC # BLD: 0 /100 WBCS — SIGNIFICANT CHANGE UP (ref 0–0)
PLATELET # BLD AUTO: 469 K/UL — HIGH (ref 150–400)
PLATELET # BLD AUTO: 469 K/UL — HIGH (ref 150–400)
POTASSIUM SERPL-MCNC: 4.4 MMOL/L — SIGNIFICANT CHANGE UP (ref 3.5–5.3)
POTASSIUM SERPL-MCNC: 4.4 MMOL/L — SIGNIFICANT CHANGE UP (ref 3.5–5.3)
POTASSIUM SERPL-SCNC: 4.4 MMOL/L — SIGNIFICANT CHANGE UP (ref 3.5–5.3)
POTASSIUM SERPL-SCNC: 4.4 MMOL/L — SIGNIFICANT CHANGE UP (ref 3.5–5.3)
RBC # BLD: 4.26 M/UL — SIGNIFICANT CHANGE UP (ref 4.2–5.8)
RBC # BLD: 4.26 M/UL — SIGNIFICANT CHANGE UP (ref 4.2–5.8)
RBC # FLD: 14.7 % — HIGH (ref 10.3–14.5)
RBC # FLD: 14.7 % — HIGH (ref 10.3–14.5)
RH IG SCN BLD-IMP: POSITIVE — SIGNIFICANT CHANGE UP
RH IG SCN BLD-IMP: POSITIVE — SIGNIFICANT CHANGE UP
SODIUM SERPL-SCNC: 136 MMOL/L — SIGNIFICANT CHANGE UP (ref 135–145)
SODIUM SERPL-SCNC: 136 MMOL/L — SIGNIFICANT CHANGE UP (ref 135–145)
WBC # BLD: 12.09 K/UL — HIGH (ref 3.8–10.5)
WBC # BLD: 12.09 K/UL — HIGH (ref 3.8–10.5)
WBC # FLD AUTO: 12.09 K/UL — HIGH (ref 3.8–10.5)
WBC # FLD AUTO: 12.09 K/UL — HIGH (ref 3.8–10.5)

## 2023-12-03 PROCEDURE — 99223 1ST HOSP IP/OBS HIGH 75: CPT

## 2023-12-03 RX ORDER — INSULIN LISPRO 100/ML
VIAL (ML) SUBCUTANEOUS EVERY 6 HOURS
Refills: 0 | Status: DISCONTINUED | OUTPATIENT
Start: 2023-12-03 | End: 2023-12-04

## 2023-12-03 RX ORDER — INSULIN GLARGINE 100 [IU]/ML
30 INJECTION, SOLUTION SUBCUTANEOUS AT BEDTIME
Refills: 0 | Status: DISCONTINUED | OUTPATIENT
Start: 2023-12-03 | End: 2023-12-04

## 2023-12-03 RX ADMIN — Medication 10 UNIT(S): at 18:55

## 2023-12-03 RX ADMIN — ENOXAPARIN SODIUM 40 MILLIGRAM(S): 100 INJECTION SUBCUTANEOUS at 05:08

## 2023-12-03 RX ADMIN — Medication 6: at 18:54

## 2023-12-03 RX ADMIN — GABAPENTIN 300 MILLIGRAM(S): 400 CAPSULE ORAL at 22:19

## 2023-12-03 RX ADMIN — INSULIN GLARGINE 30 UNIT(S): 100 INJECTION, SOLUTION SUBCUTANEOUS at 22:20

## 2023-12-03 RX ADMIN — PIPERACILLIN AND TAZOBACTAM 25 GRAM(S): 4; .5 INJECTION, POWDER, LYOPHILIZED, FOR SOLUTION INTRAVENOUS at 14:01

## 2023-12-03 RX ADMIN — SIMVASTATIN 40 MILLIGRAM(S): 20 TABLET, FILM COATED ORAL at 22:19

## 2023-12-03 RX ADMIN — Medication 6: at 09:19

## 2023-12-03 RX ADMIN — TAMSULOSIN HYDROCHLORIDE 0.4 MILLIGRAM(S): 0.4 CAPSULE ORAL at 22:19

## 2023-12-03 RX ADMIN — Medication 100 MILLIGRAM(S): at 22:20

## 2023-12-03 RX ADMIN — Medication 10 UNIT(S): at 14:02

## 2023-12-03 RX ADMIN — PIPERACILLIN AND TAZOBACTAM 25 GRAM(S): 4; .5 INJECTION, POWDER, LYOPHILIZED, FOR SOLUTION INTRAVENOUS at 22:20

## 2023-12-03 RX ADMIN — Medication 100 MILLIGRAM(S): at 14:01

## 2023-12-03 RX ADMIN — GABAPENTIN 300 MILLIGRAM(S): 400 CAPSULE ORAL at 05:07

## 2023-12-03 RX ADMIN — Medication 10 MILLIGRAM(S): at 05:07

## 2023-12-03 RX ADMIN — PIPERACILLIN AND TAZOBACTAM 25 GRAM(S): 4; .5 INJECTION, POWDER, LYOPHILIZED, FOR SOLUTION INTRAVENOUS at 04:53

## 2023-12-03 RX ADMIN — Medication 10 UNIT(S): at 09:19

## 2023-12-03 RX ADMIN — GABAPENTIN 300 MILLIGRAM(S): 400 CAPSULE ORAL at 14:02

## 2023-12-03 RX ADMIN — Medication 10 MILLIGRAM(S): at 18:55

## 2023-12-03 NOTE — CHART NOTE - NSCHARTNOTEFT_GEN_A_CORE
Age: 63y    Gender: Male    POCT Blood Glucose:  257 mg/dL (12-03-23 @ 08:55)  180 mg/dL (12-02-23 @ 21:57)  177 mg/dL (12-02-23 @ 18:42)  253 mg/dL (12-02-23 @ 13:57)      eMAR:insulin glargine Injectable (LANTUS)   32 Unit(s) SubCutaneous (12-02-23 @ 22:13)    insulin lispro (ADMELOG) corrective regimen sliding scale   6 Unit(s) SubCutaneous (12-03-23 @ 09:19)   2 Unit(s) SubCutaneous (12-02-23 @ 18:44)   6 Unit(s) SubCutaneous (12-02-23 @ 14:02)    insulin lispro Injectable (ADMELOG)   10 Unit(s) SubCutaneous (12-03-23 @ 09:19)   10 Unit(s) SubCutaneous (12-02-23 @ 18:44)   10 Unit(s) SubCutaneous (12-02-23 @ 14:03)    simvastatin   40 milliGRAM(s) Oral (12-02-23 @ 21:17)      POC glucose, insulin requirements, lab values reviewed.   FBG above goal, post prandial BG at/mildly above goal  plan for vascular formalization on monday 12/4     Hyperglycemia 2/2 to Type II DM    A1C with Estimated Average Glucose Result: 11.4 % (12-01-23 @ 05:19)  Long standing history of DM on for many years. Metformin and Glipizide regiment currently. Evidence of microvascular complications including retinopathy, nephropathy and neuropathy. History of taking Kazano (algoliptin and metformin), however discontinued. a1c 11.     Recommendations   - If diet continues, Increase Lantus to 34 units sq qhs, if patient is NPO P mn reduce Lantus to 30 units sq qhs  - Adjust to Admelog 11 units TID AC HOLD IF NPO   - continue with  moderate dose correctional insulin TID AC and moderate correctional scale qhs  - Continue with consistent carbohydrate diet  - Please check an a1c and Lipid panel while inpatient   - Patient should be set up with both primary care provider and endocrinology   - Upon discharge, recommend DM glucose monitoring supplies and teaching (Glucometer, lancets, testing strips and alcohol swabs)

## 2023-12-03 NOTE — CONSULT NOTE ADULT - PROBLEM SELECTOR RECOMMENDATION 4
Patient with no known cardiac/pulmonary disease. He is able to achieve >4 METs without anginal signs/symptoms. The left BKA formalization is moderate risk procedure.    Patient may proceed from a medical standpoint without further work up
No significant past surgical history

## 2023-12-03 NOTE — CONSULT NOTE ADULT - SUBJECTIVE AND OBJECTIVE BOX
Patient is a 63y old  Male who presents with a chief complaint of LE amputation (01 Dec 2023 11:35)    HPI:  "Mr. Mcdaneil is a 63M PMHx DM, HLD, PAD, prior toe amputation, presents to [facility] ED w L foot wound. Reports wound has been present on hindfoot x 3 weeks. Started as small cut. Over last 24 hours, pt unable to ambulate d/t severe pain. Reports significant malodor. Denies subjective fevers, chills, purulent drainage, numbness/paresthesia.     In ED, patient is tachycardic to 123. BP w HTN. Febrile to 102.4 F. Labs w WBC 21. Hgb 11.4. Lac 2.4. XR L foot w soft tissue gas at lateral hindfoot. CT LLE non con w soft tissue gas tracking ~7cm above foot. "     (29 Nov 2023 20:52)    HOSPITAL COURSE: Patient admitted to SICU for necrotizing fasciitis of left foot. He underwent emergent left BKA and was admitted to SICU. While there was on insulin drip for hyperglycemia. He was being treated with Vanc/Zosyn/Clindamycin, but now only on Zosyn. Downgraded to regular surgical floor. Pending formalization on 12/4. Medicine consulted for medical clearance.    SUBJECTIVE / OVERNIGHT EVENTS: Patient seen and examined at bedside. No acute events overnight. Denies pain at stump. No CP/SOB.    REVIEW OF SYSTEMS:   GEN: no night sweats or change in appetite  EYES: no changes in vision or diplopia   ENT: no epistaxis, sinus pain, gingival bleeding, odynophagia or dysphagia  CV: no CP, PND or palpitations  RESP: no cough, wheezing, or hemoptysis  GI: no hematemesis, hematochezia, or melena  : no dysuria, polyuria, or hematuria  MSK: no arthralgias or joint swelling   NEURO: no gross sensory changes, numbness, focal deficits  PSYCH: no depression or changes in concentration  HEME/ONC: no purpura, petechiae or night sweats  SKIN: no pruritus, hair loss or skin lesions  ALL: no photosensitivity, no complaints of anaphylaxis (SOB, throat swelling)      PAST MEDICAL & SURGICAL HISTORY:  Retinopathy  Diabetes mellitus type 2 in obese  Hyperlipidemia    Toe infection  s/p amputation    Ruptured appendix  S/P appendectomy    Toe amputation status  left pinky toe    FAMILY HISTORY:  Family history of diabetes mellitus in grandmother (Grandparent)    Social History: Patient denies tobacco or IVDU.       MEDICATIONS  (STANDING):  enalapril 10 milliGRAM(s) Oral every 12 hours  enoxaparin Injectable 40 milliGRAM(s) SubCutaneous every 24 hours  gabapentin 300 milliGRAM(s) Oral three times a day  insulin glargine Injectable (LANTUS) 30 Unit(s) SubCutaneous at bedtime  insulin lispro (ADMELOG) corrective regimen sliding scale   SubCutaneous three times a day before meals  insulin lispro Injectable (ADMELOG) 10 Unit(s) SubCutaneous three times a day before meals  piperacillin/tazobactam IVPB.. 3.375 Gram(s) IV Intermittent every 8 hours  polyethylene glycol 3350 17 Gram(s) Oral every 12 hours  senna 2 Tablet(s) Oral at bedtime  simvastatin 40 milliGRAM(s) Oral at bedtime  tamsulosin 0.4 milliGRAM(s) Oral at bedtime    MEDICATIONS  (PRN):  guaiFENesin Oral Liquid (Sugar-Free) 100 milliGRAM(s) Oral every 6 hours PRN Cough  HYDROmorphone  Injectable 0.5 milliGRAM(s) IV Push every 3 hours PRN breakthrough pain  oxyCODONE    IR 5 milliGRAM(s) Oral every 4 hours PRN Moderate Pain (4 - 6)  oxyCODONE    IR 10 milliGRAM(s) Oral every 4 hours PRN Severe Pain (7 - 10)      CAPILLARY BLOOD GLUCOSE      POCT Blood Glucose.: 145 mg/dL (03 Dec 2023 13:32)  POCT Blood Glucose.: 257 mg/dL (03 Dec 2023 08:55)  POCT Blood Glucose.: 180 mg/dL (02 Dec 2023 21:57)  POCT Blood Glucose.: 177 mg/dL (02 Dec 2023 18:42)    I&O's Summary    02 Dec 2023 07:01  -  03 Dec 2023 07:00  --------------------------------------------------------  IN: 1060 mL / OUT: 1575 mL / NET: -515 mL    03 Dec 2023 07:01  -  03 Dec 2023 14:30  --------------------------------------------------------  IN: 500 mL / OUT: 0 mL / NET: 500 mL        PHYSICAL EXAM:  Vital Signs Last 24 Hrs  T(C): 36.8 (03 Dec 2023 10:03), Max: 37.1 (02 Dec 2023 21:16)  T(F): 98.3 (03 Dec 2023 10:03), Max: 98.7 (02 Dec 2023 21:16)  HR: 97 (03 Dec 2023 10:03) (90 - 97)  BP: 132/75 (03 Dec 2023 10:03) (132/75 - 173/84)  BP(mean): --  RR: 18 (03 Dec 2023 10:03) (18 - 18)  SpO2: 95% (03 Dec 2023 10:03) (95% - 97%)    Parameters below as of 03 Dec 2023 10:03  Patient On (Oxygen Delivery Method): room air    GEN: male in NAD, appears comfortable, no diaphoresis  EYES: No scleral injection, PERRL, EOMI  ENTM: neck supple & symmetric without tracheal deviation, moist membranes, no gross hearing impairment, thyroid gland not enlarged  CV: +S1/S2, no m/r/g, no abdominal bruit, no LE edema  RESP: breathing comfortably, no respiratory accessory muscle use, CTAB, no w/r/r  GI: normoactive BS, soft, NTND, no rebounding/guarding, no palpable masses  LYMPHATICS: no LAD or tenderness to palpation  NEURO: AOx3, no focal deficits, CNII-XII grossly intact  PSYCH: No SI/HI/AVH, appropriate affect, appropriate insight/judgment   SKIN: no petechiae, ecchymosis or maculopapular rash noted    LABS:                        11.1   12.09 )-----------( 469      ( 03 Dec 2023 07:20 )             36.1     12-03    136  |  99  |  13  ----------------------------<  245<H>  4.4   |  27  |  0.85    Ca    9.3      03 Dec 2023 07:16  Phos  3.0     12-02  Mg     1.9     12-02            Urinalysis Basic - ( 03 Dec 2023 07:16 )    Color: x / Appearance: x / SG: x / pH: x  Gluc: 245 mg/dL / Ketone: x  / Bili: x / Urobili: x   Blood: x / Protein: x / Nitrite: x   Leuk Esterase: x / RBC: x / WBC x   Sq Epi: x / Non Sq Epi: x / Bacteria: x          RADIOLOGY & ADDITIONAL TESTS:  Results Reviewed:   Imaging Personally Reviewed:  Electrocardiogram Personally Reviewed:    COORDINATION OF CARE:  Care Discussed with Consultants/Other Providers [Y/N]:  Prior or Outpatient Records Reviewed [Y/N]: Patient is a 63y old  Male who presents with a chief complaint of LE amputation (01 Dec 2023 11:35)    HPI:  "Mr. Mcdaniel is a 63M PMHx DM, HLD, PAD, prior toe amputation, presents to [facility] ED w L foot wound. Reports wound has been present on hindfoot x 3 weeks. Started as small cut. Over last 24 hours, pt unable to ambulate d/t severe pain. Reports significant malodor. Denies subjective fevers, chills, purulent drainage, numbness/paresthesia.     In ED, patient is tachycardic to 123. BP w HTN. Febrile to 102.4 F. Labs w WBC 21. Hgb 11.4. Lac 2.4. XR L foot w soft tissue gas at lateral hindfoot. CT LLE non con w soft tissue gas tracking ~7cm above foot. "     (29 Nov 2023 20:52)    HOSPITAL COURSE: Patient admitted to SICU for necrotizing fasciitis of left foot. He underwent emergent left BKA and was admitted to SICU. While there was on insulin drip for hyperglycemia. He was being treated with Vanc/Zosyn/Clindamycin, but now only on Zosyn. Downgraded to regular surgical floor. Pending formalization on 12/4. Medicine consulted for medical clearance.    SUBJECTIVE / OVERNIGHT EVENTS: Patient seen and examined at bedside. No acute events overnight. Denies pain at stump. No CP/SOB.    REVIEW OF SYSTEMS:   GEN: no night sweats or change in appetite  EYES: no changes in vision or diplopia   ENT: no epistaxis, sinus pain, gingival bleeding, odynophagia or dysphagia  CV: no CP, PND or palpitations  RESP: no cough, wheezing, or hemoptysis  GI: no hematemesis, hematochezia, or melena  : no dysuria, polyuria, or hematuria  MSK: no arthralgias or joint swelling   NEURO: no gross sensory changes, numbness, focal deficits  PSYCH: no depression or changes in concentration  HEME/ONC: no purpura, petechiae or night sweats  SKIN: no pruritus, hair loss or skin lesions  ALL: no photosensitivity, no complaints of anaphylaxis (SOB, throat swelling)      PAST MEDICAL & SURGICAL HISTORY:  Retinopathy  Diabetes mellitus type 2 in obese  Hyperlipidemia    Toe infection  s/p amputation    Ruptured appendix  S/P appendectomy    Toe amputation status  left pinky toe    FAMILY HISTORY:  Family history of diabetes mellitus in grandmother (Grandparent)    Social History: Patient denies tobacco or IVDU.       MEDICATIONS  (STANDING):  enalapril 10 milliGRAM(s) Oral every 12 hours  enoxaparin Injectable 40 milliGRAM(s) SubCutaneous every 24 hours  gabapentin 300 milliGRAM(s) Oral three times a day  insulin glargine Injectable (LANTUS) 30 Unit(s) SubCutaneous at bedtime  insulin lispro (ADMELOG) corrective regimen sliding scale   SubCutaneous three times a day before meals  insulin lispro Injectable (ADMELOG) 10 Unit(s) SubCutaneous three times a day before meals  piperacillin/tazobactam IVPB.. 3.375 Gram(s) IV Intermittent every 8 hours  polyethylene glycol 3350 17 Gram(s) Oral every 12 hours  senna 2 Tablet(s) Oral at bedtime  simvastatin 40 milliGRAM(s) Oral at bedtime  tamsulosin 0.4 milliGRAM(s) Oral at bedtime    MEDICATIONS  (PRN):  guaiFENesin Oral Liquid (Sugar-Free) 100 milliGRAM(s) Oral every 6 hours PRN Cough  HYDROmorphone  Injectable 0.5 milliGRAM(s) IV Push every 3 hours PRN breakthrough pain  oxyCODONE    IR 5 milliGRAM(s) Oral every 4 hours PRN Moderate Pain (4 - 6)  oxyCODONE    IR 10 milliGRAM(s) Oral every 4 hours PRN Severe Pain (7 - 10)      CAPILLARY BLOOD GLUCOSE      POCT Blood Glucose.: 145 mg/dL (03 Dec 2023 13:32)  POCT Blood Glucose.: 257 mg/dL (03 Dec 2023 08:55)  POCT Blood Glucose.: 180 mg/dL (02 Dec 2023 21:57)  POCT Blood Glucose.: 177 mg/dL (02 Dec 2023 18:42)    I&O's Summary    02 Dec 2023 07:01  -  03 Dec 2023 07:00  --------------------------------------------------------  IN: 1060 mL / OUT: 1575 mL / NET: -515 mL    03 Dec 2023 07:01  -  03 Dec 2023 14:30  --------------------------------------------------------  IN: 500 mL / OUT: 0 mL / NET: 500 mL        PHYSICAL EXAM:  Vital Signs Last 24 Hrs  T(C): 36.8 (03 Dec 2023 10:03), Max: 37.1 (02 Dec 2023 21:16)  T(F): 98.3 (03 Dec 2023 10:03), Max: 98.7 (02 Dec 2023 21:16)  HR: 97 (03 Dec 2023 10:03) (90 - 97)  BP: 132/75 (03 Dec 2023 10:03) (132/75 - 173/84)  BP(mean): --  RR: 18 (03 Dec 2023 10:03) (18 - 18)  SpO2: 95% (03 Dec 2023 10:03) (95% - 97%)    Parameters below as of 03 Dec 2023 10:03  Patient On (Oxygen Delivery Method): room air    GEN: male in NAD, appears comfortable, no diaphoresis  EYES: No scleral injection, PERRL, EOMI  ENTM: neck supple & symmetric without tracheal deviation, moist membranes, no gross hearing impairment, thyroid gland not enlarged  CV: +S1/S2, no m/r/g, no abdominal bruit, no LE edema  RESP: breathing comfortably, no respiratory accessory muscle use, CTAB, no w/r/r  GI: normoactive BS, soft, NTND, no rebounding/guarding, no palpable masses  LYMPHATICS: no LAD or tenderness to palpation  NEURO: AOx3, no focal deficits, CNII-XII grossly intact  PSYCH: No SI/HI/AVH, appropriate affect, appropriate insight/judgment   SKIN: no petechiae, ecchymosis or maculopapular rash noted    LABS:                        11.1   12.09 )-----------( 469      ( 03 Dec 2023 07:20 )             36.1     12-03    136  |  99  |  13  ----------------------------<  245<H>  4.4   |  27  |  0.85    Ca    9.3      03 Dec 2023 07:16  Phos  3.0     12-02  Mg     1.9     12-02            Urinalysis Basic - ( 03 Dec 2023 07:16 )    Color: x / Appearance: x / SG: x / pH: x  Gluc: 245 mg/dL / Ketone: x  / Bili: x / Urobili: x   Blood: x / Protein: x / Nitrite: x   Leuk Esterase: x / RBC: x / WBC x   Sq Epi: x / Non Sq Epi: x / Bacteria: x          RADIOLOGY & ADDITIONAL TESTS:  Results Reviewed:   Imaging Personally Reviewed:  Electrocardiogram Personally Reviewed:    COORDINATION OF CARE:  Care Discussed with Consultants/Other Providers [Y/N]:  Prior or Outpatient Records Reviewed [Y/N]:

## 2023-12-03 NOTE — CONSULT NOTE ADULT - PROBLEM SELECTOR RECOMMENDATION 9
- Patient is currently on Zosyn which seems appropriate given wound culture with enterococcus and citrobacter  - His admission BCx with 1/2 Dermobacter hominis. Unclear the significance of this? I would repeat blood cultures and consult ID  - He is now s/p left BKA on 11/30 and pending formalization  - Pain control per surgery

## 2023-12-03 NOTE — PROGRESS NOTE ADULT - SUBJECTIVE AND OBJECTIVE BOX
SUBJECTIVE: Patient seen and examined on AM rounds. Patient reports that they're feeling well. Denies fever, chills. Reports pain as controlled. No complaints at this time.     Vital Signs Last 24 Hrs  T(C): 36.8 (03 Dec 2023 10:03), Max: 37.1 (02 Dec 2023 21:16)  T(F): 98.3 (03 Dec 2023 10:03), Max: 98.7 (02 Dec 2023 21:16)  HR: 97 (03 Dec 2023 10:03) (90 - 97)  BP: 132/75 (03 Dec 2023 10:03) (132/75 - 173/84)  BP(mean): --  RR: 18 (03 Dec 2023 10:03) (18 - 18)  SpO2: 95% (03 Dec 2023 10:03) (95% - 97%)    Parameters below as of 03 Dec 2023 10:03  Patient On (Oxygen Delivery Method): room air        General Appearance: Appears well, NAD  Neck: Supple  Chest: Equal expansion bilaterally  CV: Pulse regular presently  Abdomen: Soft, nontense  Extremities: L BKA amputation site C/D/I, minimal bleeding, no evidence of purulence    I&O's Summary    02 Dec 2023 07:01  -  03 Dec 2023 07:00  --------------------------------------------------------  IN: 1060 mL / OUT: 1575 mL / NET: -515 mL    03 Dec 2023 07:01  -  03 Dec 2023 15:43  --------------------------------------------------------  IN: 500 mL / OUT: 0 mL / NET: 500 mL      I&O's Detail    02 Dec 2023 07:01  -  03 Dec 2023 07:00  --------------------------------------------------------  IN:    IV PiggyBack: 100 mL    Oral Fluid: 960 mL  Total IN: 1060 mL    OUT:    Intermittent Catheterization - Urethral (mL): 600 mL    Voided (mL): 975 mL  Total OUT: 1575 mL    Total NET: -515 mL      03 Dec 2023 07:01  -  03 Dec 2023 15:43  --------------------------------------------------------  IN:    Oral Fluid: 500 mL  Total IN: 500 mL    OUT:  Total OUT: 0 mL    Total NET: 500 mL          LABS:                        11.1   12.09 )-----------( 469      ( 03 Dec 2023 07:20 )             36.1     12-03    136  |  99  |  13  ----------------------------<  245<H>  4.4   |  27  |  0.85    Ca    9.3      03 Dec 2023 07:16  Phos  3.0     12-02  Mg     1.9     12-02        Urinalysis Basic - ( 03 Dec 2023 07:16 )    Color: x / Appearance: x / SG: x / pH: x  Gluc: 245 mg/dL / Ketone: x  / Bili: x / Urobili: x   Blood: x / Protein: x / Nitrite: x   Leuk Esterase: x / RBC: x / WBC x   Sq Epi: x / Non Sq Epi: x / Bacteria: x        RADIOLOGY & ADDITIONAL STUDIES:

## 2023-12-03 NOTE — CONSULT NOTE ADULT - ASSESSMENT
63M with PMHx of poorly controlled diabetes presented to [facility] with several week history of worsening left hindfoot wound. On admission CT of foot showing soft tissue gas tracking. Patient admitted to SICU for necrotizing fasciitis of left foot now s/p emergent left BKA. 63M with PMHx of poorly controlled diabetes presented to [facility] with several week history of worsening left hindfoot wound. On admission CT of foot showing soft tissue gas tracking. Patient admitted to SICU for necrotizing fasciitis of left foot now s/p emergent left BKA.

## 2023-12-03 NOTE — PROGRESS NOTE ADULT - ASSESSMENT
63M PMHx DM, HLD, PAD, prior toe amputation, presents to [facility] ED w L foot wound. NSTI of non salvageable L foot. Septic in ED. LRINEC 9. XR/CT both w soft tissue gas. s/p 11/30 L BKA guillotine amp, downgraded from SICU and recovering appropriately on the floor. Formalization planning for Monday.    Plan:  - Plan for formalization Mon 12/4  - Pain control as needed  - Zosyn  - Lovenox for DVT ppx  - Activity as tolerated  - Endo consulted, appreciate recs- 32u lantus qhs, lispro 10u premeal, ISS (30u lantus qhs and hold premeal while NPO)  - BP control  - Bowel regimen  - Daily dressing changes    Plan discussed with fellow on behalf of attending.     Vascular Surgery  [id number] 63M PMHx DM, HLD, PAD, prior toe amputation, presents to [facility] ED w L foot wound. NSTI of non salvageable L foot. Septic in ED. LRINEC 9. XR/CT both w soft tissue gas. s/p 11/30 L BKA guillotine amp, downgraded from SICU and recovering appropriately on the floor. Formalization planning for Monday.    Plan:  - Plan for formalization Mon 12/4  - Pain control as needed  - Zosyn  - Lovenox for DVT ppx  - Activity as tolerated  - Endo consulted, appreciate recs- 32u lantus qhs, lispro 10u premeal, ISS (30u lantus qhs and hold premeal while NPO)  - BP control  - Bowel regimen  - Daily dressing changes    Plan discussed with fellow on behalf of attending.     Vascular Surgery  [id number]

## 2023-12-04 ENCOUNTER — RESULT REVIEW (OUTPATIENT)
Age: 63
End: 2023-12-04

## 2023-12-04 DIAGNOSIS — R78.81 BACTEREMIA: ICD-10-CM

## 2023-12-04 DIAGNOSIS — E66.01 MORBID (SEVERE) OBESITY DUE TO EXCESS CALORIES: ICD-10-CM

## 2023-12-04 LAB
ANION GAP SERPL CALC-SCNC: 9 MMOL/L — SIGNIFICANT CHANGE UP (ref 5–17)
ANION GAP SERPL CALC-SCNC: 9 MMOL/L — SIGNIFICANT CHANGE UP (ref 5–17)
APTT BLD: 29.2 SEC — SIGNIFICANT CHANGE UP (ref 24.5–35.6)
APTT BLD: 29.2 SEC — SIGNIFICANT CHANGE UP (ref 24.5–35.6)
BLD GP AB SCN SERPL QL: NEGATIVE — SIGNIFICANT CHANGE UP
BLD GP AB SCN SERPL QL: NEGATIVE — SIGNIFICANT CHANGE UP
BUN SERPL-MCNC: 15 MG/DL — SIGNIFICANT CHANGE UP (ref 7–23)
BUN SERPL-MCNC: 15 MG/DL — SIGNIFICANT CHANGE UP (ref 7–23)
CALCIUM SERPL-MCNC: 9 MG/DL — SIGNIFICANT CHANGE UP (ref 8.4–10.5)
CALCIUM SERPL-MCNC: 9 MG/DL — SIGNIFICANT CHANGE UP (ref 8.4–10.5)
CHLORIDE SERPL-SCNC: 99 MMOL/L — SIGNIFICANT CHANGE UP (ref 96–108)
CHLORIDE SERPL-SCNC: 99 MMOL/L — SIGNIFICANT CHANGE UP (ref 96–108)
CO2 SERPL-SCNC: 28 MMOL/L — SIGNIFICANT CHANGE UP (ref 22–31)
CO2 SERPL-SCNC: 28 MMOL/L — SIGNIFICANT CHANGE UP (ref 22–31)
CREAT SERPL-MCNC: 0.89 MG/DL — SIGNIFICANT CHANGE UP (ref 0.5–1.3)
CREAT SERPL-MCNC: 0.89 MG/DL — SIGNIFICANT CHANGE UP (ref 0.5–1.3)
CULTURE RESULTS: SIGNIFICANT CHANGE UP
CULTURE RESULTS: SIGNIFICANT CHANGE UP
EGFR: 96 ML/MIN/1.73M2 — SIGNIFICANT CHANGE UP
EGFR: 96 ML/MIN/1.73M2 — SIGNIFICANT CHANGE UP
GLUCOSE SERPL-MCNC: 187 MG/DL — HIGH (ref 70–99)
GLUCOSE SERPL-MCNC: 187 MG/DL — HIGH (ref 70–99)
HCT VFR BLD CALC: 33.9 % — LOW (ref 39–50)
HCT VFR BLD CALC: 33.9 % — LOW (ref 39–50)
HGB BLD-MCNC: 10.7 G/DL — LOW (ref 13–17)
HGB BLD-MCNC: 10.7 G/DL — LOW (ref 13–17)
INR BLD: 1.06 RATIO — SIGNIFICANT CHANGE UP (ref 0.85–1.18)
INR BLD: 1.06 RATIO — SIGNIFICANT CHANGE UP (ref 0.85–1.18)
MAGNESIUM SERPL-MCNC: 1.9 MG/DL — SIGNIFICANT CHANGE UP (ref 1.6–2.6)
MAGNESIUM SERPL-MCNC: 1.9 MG/DL — SIGNIFICANT CHANGE UP (ref 1.6–2.6)
MCHC RBC-ENTMCNC: 26.4 PG — LOW (ref 27–34)
MCHC RBC-ENTMCNC: 26.4 PG — LOW (ref 27–34)
MCHC RBC-ENTMCNC: 31.6 GM/DL — LOW (ref 32–36)
MCHC RBC-ENTMCNC: 31.6 GM/DL — LOW (ref 32–36)
MCV RBC AUTO: 83.5 FL — SIGNIFICANT CHANGE UP (ref 80–100)
MCV RBC AUTO: 83.5 FL — SIGNIFICANT CHANGE UP (ref 80–100)
NRBC # BLD: 0 /100 WBCS — SIGNIFICANT CHANGE UP (ref 0–0)
NRBC # BLD: 0 /100 WBCS — SIGNIFICANT CHANGE UP (ref 0–0)
PHOSPHATE SERPL-MCNC: 3.4 MG/DL — SIGNIFICANT CHANGE UP (ref 2.5–4.5)
PHOSPHATE SERPL-MCNC: 3.4 MG/DL — SIGNIFICANT CHANGE UP (ref 2.5–4.5)
PLATELET # BLD AUTO: 427 K/UL — HIGH (ref 150–400)
PLATELET # BLD AUTO: 427 K/UL — HIGH (ref 150–400)
POTASSIUM SERPL-MCNC: 4.3 MMOL/L — SIGNIFICANT CHANGE UP (ref 3.5–5.3)
POTASSIUM SERPL-MCNC: 4.3 MMOL/L — SIGNIFICANT CHANGE UP (ref 3.5–5.3)
POTASSIUM SERPL-SCNC: 4.3 MMOL/L — SIGNIFICANT CHANGE UP (ref 3.5–5.3)
POTASSIUM SERPL-SCNC: 4.3 MMOL/L — SIGNIFICANT CHANGE UP (ref 3.5–5.3)
PROTHROM AB SERPL-ACNC: 11.6 SEC — SIGNIFICANT CHANGE UP (ref 9.5–13)
PROTHROM AB SERPL-ACNC: 11.6 SEC — SIGNIFICANT CHANGE UP (ref 9.5–13)
RBC # BLD: 4.06 M/UL — LOW (ref 4.2–5.8)
RBC # BLD: 4.06 M/UL — LOW (ref 4.2–5.8)
RBC # FLD: 14.8 % — HIGH (ref 10.3–14.5)
RBC # FLD: 14.8 % — HIGH (ref 10.3–14.5)
RH IG SCN BLD-IMP: POSITIVE — SIGNIFICANT CHANGE UP
RH IG SCN BLD-IMP: POSITIVE — SIGNIFICANT CHANGE UP
SODIUM SERPL-SCNC: 136 MMOL/L — SIGNIFICANT CHANGE UP (ref 135–145)
SODIUM SERPL-SCNC: 136 MMOL/L — SIGNIFICANT CHANGE UP (ref 135–145)
SPECIMEN SOURCE: SIGNIFICANT CHANGE UP
SPECIMEN SOURCE: SIGNIFICANT CHANGE UP
WBC # BLD: 14.15 K/UL — HIGH (ref 3.8–10.5)
WBC # BLD: 14.15 K/UL — HIGH (ref 3.8–10.5)
WBC # FLD AUTO: 14.15 K/UL — HIGH (ref 3.8–10.5)
WBC # FLD AUTO: 14.15 K/UL — HIGH (ref 3.8–10.5)

## 2023-12-04 PROCEDURE — 99222 1ST HOSP IP/OBS MODERATE 55: CPT

## 2023-12-04 PROCEDURE — 88307 TISSUE EXAM BY PATHOLOGIST: CPT | Mod: 26

## 2023-12-04 PROCEDURE — 27882 AMPUTATION OF LOWER LEG: CPT | Mod: LT,58

## 2023-12-04 PROCEDURE — 99232 SBSQ HOSP IP/OBS MODERATE 35: CPT

## 2023-12-04 RX ORDER — INSULIN GLARGINE 100 [IU]/ML
34 INJECTION, SOLUTION SUBCUTANEOUS AT BEDTIME
Refills: 0 | Status: DISCONTINUED | OUTPATIENT
Start: 2023-12-04 | End: 2023-12-05

## 2023-12-04 RX ORDER — GABAPENTIN 400 MG/1
300 CAPSULE ORAL THREE TIMES A DAY
Refills: 0 | Status: DISCONTINUED | OUTPATIENT
Start: 2023-12-04 | End: 2023-12-12

## 2023-12-04 RX ORDER — INSULIN LISPRO 100/ML
12 VIAL (ML) SUBCUTANEOUS
Refills: 0 | Status: DISCONTINUED | OUTPATIENT
Start: 2023-12-04 | End: 2023-12-06

## 2023-12-04 RX ORDER — ENOXAPARIN SODIUM 100 MG/ML
40 INJECTION SUBCUTANEOUS EVERY 24 HOURS
Refills: 0 | Status: DISCONTINUED | OUTPATIENT
Start: 2023-12-04 | End: 2023-12-12

## 2023-12-04 RX ORDER — VANCOMYCIN HCL 1 G
1750 VIAL (EA) INTRAVENOUS EVERY 12 HOURS
Refills: 0 | Status: DISCONTINUED | OUTPATIENT
Start: 2023-12-04 | End: 2023-12-05

## 2023-12-04 RX ORDER — INSULIN LISPRO 100/ML
VIAL (ML) SUBCUTANEOUS
Refills: 0 | Status: DISCONTINUED | OUTPATIENT
Start: 2023-12-04 | End: 2023-12-11

## 2023-12-04 RX ORDER — FENTANYL CITRATE 50 UG/ML
50 INJECTION INTRAVENOUS ONCE
Refills: 0 | Status: DISCONTINUED | OUTPATIENT
Start: 2023-12-04 | End: 2023-12-04

## 2023-12-04 RX ORDER — INSULIN GLARGINE 100 [IU]/ML
34 INJECTION, SOLUTION SUBCUTANEOUS AT BEDTIME
Refills: 0 | Status: DISCONTINUED | OUTPATIENT
Start: 2023-12-04 | End: 2023-12-04

## 2023-12-04 RX ORDER — FENTANYL CITRATE 50 UG/ML
25 INJECTION INTRAVENOUS
Refills: 0 | Status: DISCONTINUED | OUTPATIENT
Start: 2023-12-04 | End: 2023-12-04

## 2023-12-04 RX ORDER — INSULIN LISPRO 100/ML
VIAL (ML) SUBCUTANEOUS EVERY 6 HOURS
Refills: 0 | Status: DISCONTINUED | OUTPATIENT
Start: 2023-12-04 | End: 2023-12-04

## 2023-12-04 RX ORDER — SIMVASTATIN 20 MG/1
40 TABLET, FILM COATED ORAL AT BEDTIME
Refills: 0 | Status: DISCONTINUED | OUTPATIENT
Start: 2023-12-04 | End: 2023-12-12

## 2023-12-04 RX ORDER — INSULIN LISPRO 100/ML
12 VIAL (ML) SUBCUTANEOUS
Refills: 0 | Status: DISCONTINUED | OUTPATIENT
Start: 2023-12-04 | End: 2023-12-04

## 2023-12-04 RX ORDER — OXYCODONE HYDROCHLORIDE 5 MG/1
5 TABLET ORAL EVERY 4 HOURS
Refills: 0 | Status: DISCONTINUED | OUTPATIENT
Start: 2023-12-04 | End: 2023-12-05

## 2023-12-04 RX ORDER — SODIUM CHLORIDE 9 MG/ML
1000 INJECTION, SOLUTION INTRAVENOUS
Refills: 0 | Status: DISCONTINUED | OUTPATIENT
Start: 2023-12-04 | End: 2023-12-06

## 2023-12-04 RX ORDER — HYDROMORPHONE HYDROCHLORIDE 2 MG/ML
0.5 INJECTION INTRAMUSCULAR; INTRAVENOUS; SUBCUTANEOUS
Refills: 0 | Status: DISCONTINUED | OUTPATIENT
Start: 2023-12-04 | End: 2023-12-04

## 2023-12-04 RX ORDER — HYDROMORPHONE HYDROCHLORIDE 2 MG/ML
0.5 INJECTION INTRAMUSCULAR; INTRAVENOUS; SUBCUTANEOUS
Refills: 0 | Status: DISCONTINUED | OUTPATIENT
Start: 2023-12-04 | End: 2023-12-09

## 2023-12-04 RX ORDER — TAMSULOSIN HYDROCHLORIDE 0.4 MG/1
0.4 CAPSULE ORAL AT BEDTIME
Refills: 0 | Status: DISCONTINUED | OUTPATIENT
Start: 2023-12-04 | End: 2023-12-12

## 2023-12-04 RX ORDER — SENNA PLUS 8.6 MG/1
2 TABLET ORAL AT BEDTIME
Refills: 0 | Status: DISCONTINUED | OUTPATIENT
Start: 2023-12-04 | End: 2023-12-12

## 2023-12-04 RX ORDER — SODIUM CHLORIDE 9 MG/ML
1000 INJECTION, SOLUTION INTRAVENOUS
Refills: 0 | Status: DISCONTINUED | OUTPATIENT
Start: 2023-12-04 | End: 2023-12-04

## 2023-12-04 RX ORDER — OXYCODONE HYDROCHLORIDE 5 MG/1
10 TABLET ORAL EVERY 4 HOURS
Refills: 0 | Status: DISCONTINUED | OUTPATIENT
Start: 2023-12-04 | End: 2023-12-06

## 2023-12-04 RX ORDER — POLYETHYLENE GLYCOL 3350 17 G/17G
17 POWDER, FOR SOLUTION ORAL EVERY 12 HOURS
Refills: 0 | Status: DISCONTINUED | OUTPATIENT
Start: 2023-12-04 | End: 2023-12-12

## 2023-12-04 RX ORDER — PIPERACILLIN AND TAZOBACTAM 4; .5 G/20ML; G/20ML
3.38 INJECTION, POWDER, LYOPHILIZED, FOR SOLUTION INTRAVENOUS EVERY 8 HOURS
Refills: 0 | Status: DISCONTINUED | OUTPATIENT
Start: 2023-12-04 | End: 2023-12-05

## 2023-12-04 RX ADMIN — PIPERACILLIN AND TAZOBACTAM 25 GRAM(S): 4; .5 INJECTION, POWDER, LYOPHILIZED, FOR SOLUTION INTRAVENOUS at 13:08

## 2023-12-04 RX ADMIN — HYDROMORPHONE HYDROCHLORIDE 0.5 MILLIGRAM(S): 2 INJECTION INTRAMUSCULAR; INTRAVENOUS; SUBCUTANEOUS at 20:04

## 2023-12-04 RX ADMIN — Medication 10 MILLIGRAM(S): at 23:37

## 2023-12-04 RX ADMIN — PIPERACILLIN AND TAZOBACTAM 25 GRAM(S): 4; .5 INJECTION, POWDER, LYOPHILIZED, FOR SOLUTION INTRAVENOUS at 05:33

## 2023-12-04 RX ADMIN — GABAPENTIN 300 MILLIGRAM(S): 400 CAPSULE ORAL at 13:07

## 2023-12-04 RX ADMIN — Medication 10 MILLIGRAM(S): at 05:33

## 2023-12-04 RX ADMIN — Medication 100 MILLIGRAM(S): at 13:39

## 2023-12-04 RX ADMIN — HYDROMORPHONE HYDROCHLORIDE 0.5 MILLIGRAM(S): 2 INJECTION INTRAMUSCULAR; INTRAVENOUS; SUBCUTANEOUS at 19:16

## 2023-12-04 RX ADMIN — FENTANYL CITRATE 50 MICROGRAM(S): 50 INJECTION INTRAVENOUS at 19:04

## 2023-12-04 RX ADMIN — GABAPENTIN 300 MILLIGRAM(S): 400 CAPSULE ORAL at 23:36

## 2023-12-04 RX ADMIN — GABAPENTIN 300 MILLIGRAM(S): 400 CAPSULE ORAL at 05:33

## 2023-12-04 RX ADMIN — FENTANYL CITRATE 50 MICROGRAM(S): 50 INJECTION INTRAVENOUS at 19:00

## 2023-12-04 RX ADMIN — HYDROMORPHONE HYDROCHLORIDE 0.5 MILLIGRAM(S): 2 INJECTION INTRAMUSCULAR; INTRAVENOUS; SUBCUTANEOUS at 19:41

## 2023-12-04 RX ADMIN — TAMSULOSIN HYDROCHLORIDE 0.4 MILLIGRAM(S): 0.4 CAPSULE ORAL at 23:37

## 2023-12-04 RX ADMIN — Medication 2: at 00:08

## 2023-12-04 RX ADMIN — ENOXAPARIN SODIUM 40 MILLIGRAM(S): 100 INJECTION SUBCUTANEOUS at 05:33

## 2023-12-04 RX ADMIN — Medication 100 MILLIGRAM(S): at 05:34

## 2023-12-04 RX ADMIN — OXYCODONE HYDROCHLORIDE 10 MILLIGRAM(S): 5 TABLET ORAL at 21:16

## 2023-12-04 RX ADMIN — SIMVASTATIN 40 MILLIGRAM(S): 20 TABLET, FILM COATED ORAL at 23:37

## 2023-12-04 RX ADMIN — OXYCODONE HYDROCHLORIDE 10 MILLIGRAM(S): 5 TABLET ORAL at 20:46

## 2023-12-04 RX ADMIN — SODIUM CHLORIDE 100 MILLILITER(S): 9 INJECTION, SOLUTION INTRAVENOUS at 04:35

## 2023-12-04 RX ADMIN — Medication 2: at 06:51

## 2023-12-04 RX ADMIN — HYDROMORPHONE HYDROCHLORIDE 0.5 MILLIGRAM(S): 2 INJECTION INTRAMUSCULAR; INTRAVENOUS; SUBCUTANEOUS at 20:34

## 2023-12-04 RX ADMIN — PIPERACILLIN AND TAZOBACTAM 25 GRAM(S): 4; .5 INJECTION, POWDER, LYOPHILIZED, FOR SOLUTION INTRAVENOUS at 23:39

## 2023-12-04 RX ADMIN — Medication 2: at 13:05

## 2023-12-04 NOTE — PROGRESS NOTE ADULT - PROBLEM SELECTOR PLAN 1
Test BG ac and hs. Q6H for NPO status  Increase Lantus dose to 34 units sine pt's BG are high 100s while NPO.    Increase Admelog to 12 units ac meals. HOLD IF NOT EATING  Continue with moderate dose correctional insulin scales ac and add moderate scale at hs .   Will adjust regimen as needed  Please teach and allow pt to do own finger sticks and insulin injections under RN supervision  Please teach use of insulin pen  Please document teach back  Discharge:  Will be determined according to BG/insulin needs/PO intake at time of discharge.   Likely Metformin plus  basal/bolus due To high insulin doses requirements and to promote wound  healing   Please write Rxs for: Solo Star Insulin pen/Humalog Kwik insulin pen/Bren insulin pen needles/glucose meter/strips/lancets  -Will need home care upon discharge due to new insulin therapy  Can follow at [facility] practice. [address]. Phone . Call for apt closer to discharge  Needs podiatry/vascular and optho f/u care.   Might benefir from GLP1RA for cardiorenal protection and also weight control in addition to DM, control> will need close f/u with optho. Test BG ac and hs. Q6H for NPO status  Increase Lantus dose to 34 units sine pt's BG are high 100s while NPO.    Increase Admelog to 12 units ac meals. HOLD IF NOT EATING  Continue with moderate dose correctional insulin scales ac and add moderate scale at hs .   Will adjust regimen as needed  Please teach and allow pt to do own finger sticks and insulin injections under RN supervision  Please teach use of insulin pen  Please document teach back  Discharge:  Will be determined according to BG/insulin needs/PO intake at time of discharge.   Likely Metformin plus  basal/bolus due To high insulin doses requirements and to promote wound  healing   Please write Rxs for: Solo Star Insulin pen/Humalog Kwik insulin pen/Bren insulin pen needles/glucose meter/strips/lancets  -Will need home care upon discharge due to new insulin therapy  Can follow at [facility] practice. [address]. Phone . Call for apt closer to discharge  Needs podiatry/vascular and optho f/u care.   Might benefir from GLP1RA for cardiorenal protection and also weight control in addition to DM, control> will need close f/u with optho.

## 2023-12-04 NOTE — PROGRESS NOTE ADULT - NSPROGADDITIONALINFOA_GEN_ALL_CORE
-Plan discussed with pt/team.  Contact info: [phone] (24/7). pager [phone]  Amion on [place]  Teams on M-T-W-F. Unavailable Thu/Weekends/Holidays  Provided face to face education as well as assessed  pt/labs/meds and discussed plan with primary team  Adjusting insulin  Discharge plan  Follow up care -Plan discussed with pt/team.  Contact info: [phone] (24/7). pager [phone]  Amion on [place]  Teams on M-T-W-F. Unavailable Thu/Weekends/Holidays  Provided face to face education as well as assessed  pt/labs/meds and discussed plan with primary team  Adjusting insulin  Discharge plan  Follow up care

## 2023-12-04 NOTE — CONSULT NOTE ADULT - ATTENDING COMMENTS
Patient seen and examined. Chart with pertinent labs and imaging reviewed.  S/P guillotine amputation, for additional debridement and closure this PM.  Blood isolate of dubious significance. Coryneform-type bacteria, skin wil, later growth, single bottle.  Leukocytosis with reactive component in this clinical setting given accompanying thrombocytosis.  Await OR findings  Hope to limit antibiotic use here.  Thank you for the courtesy of this referral.  Eduin Gutierrez MD  Attending Physician  [facility]  Division of Infectious Diseases  [phone] Patient seen and examined. Chart with pertinent labs and imaging reviewed.  S/P guillotine amputation, for additional debridement and closure this PM.  Blood isolate of dubious significance. Coryneform-type bacteria, skin wil, later growth, single bottle.  Leukocytosis with reactive component in this clinical setting given accompanying thrombocytosis.  Await OR findings  Hope to limit antibiotic use here.  Thank you for the courtesy of this referral.  Eduin Gutierrez MD  Attending Physician  [facility]  Division of Infectious Diseases  [phone]

## 2023-12-04 NOTE — CONSULT NOTE ADULT - SUBJECTIVE AND OBJECTIVE BOX
Patient is a 63y old  Male who presents with a chief complaint of Necrotizing fasciitis (03 Dec 2023 14:29)    HPI:  Mr. Mcdaniel is a 63M PMHx DM, HLD, PAD, prior toe amputation, [place] [facility] w L foot wound. Reports wound has been present on hindfoot x 3 weeks. Started as small cut. Over last 24 hours, pt unable to ambulate d/t severe pain. Reports significant malodor. Denies subjective fevers, chills, purulent drainage, numbness/paresthesia.     In ED, patient is tachycardic to 123. BP w HTN. Febrile to 102.4 F. Labs w WBC 21. Hgb 11.4. Lac 2.4. . XR L foot w soft tissue gas at lateral hindfoot. CT LLE non con w soft tissue gas tracking ~7cm above foot.     Was started on Vancomycin, Zosyn and Clindamycin. 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    No further fevers, WBC 21-->12-->14  ,   Bcx 11/29; 1/4 Dermabacter hominis  Wound CX 11/30 : Citrobacter Koseri, Enterococcus fecalis    prior hospital charts reviewed [  ]  primary team notes reviewed [ x ]  other consultant notes reviewed [  x]    PAST MEDICAL & SURGICAL HISTORY:  Retinopathy      Diabetes mellitus type 2 in obese      Hyperlipidemia      Toe infection  s/p amputation      Ruptured appendix      S/P appendectomy      Toe amputation status  left pinky toe          Allergies  No Known Allergies    ANTIMICROBIALS (past 90 days)  MEDICATIONS  (STANDING):  clindamycin IVPB   112 mL/Hr IV Intermittent (12-01-23 @ 06:29)   112 mL/Hr IV Intermittent (11-30-23 @ 22:21)   112 mL/Hr IV Intermittent (11-30-23 @ 15:08)    clindamycin IVPB   100 mL/Hr IV Intermittent (11-30-23 @ 05:19)    clindamycin IVPB   100 mL/Hr IV Intermittent (11-29-23 @ 22:42)    piperacillin/tazobactam IVPB.   200 mL/Hr IV Intermittent (11-29-23 @ 18:38)    piperacillin/tazobactam IVPB.   200 mL/Hr IV Intermittent (11-30-23 @ 12:27)    piperacillin/tazobactam IVPB.-   25 mL/Hr IV Intermittent (11-30-23 @ 15:07)    piperacillin/tazobactam IVPB.-   25 mL/Hr IV Intermittent (11-29-23 @ 23:22)    piperacillin/tazobactam IVPB..   25 mL/Hr IV Intermittent (12-01-23 @ 05:12)   25 mL/Hr IV Intermittent (11-30-23 @ 22:24)    piperacillin/tazobactam IVPB..   25 mL/Hr IV Intermittent (11-30-23 @ 05:13)    piperacillin/tazobactam IVPB..   25 mL/Hr IV Intermittent (12-02-23 @ 12:26)   25 mL/Hr IV Intermittent (12-02-23 @ 05:12)   25 mL/Hr IV Intermittent (12-01-23 @ 22:09)   25 mL/Hr IV Intermittent (12-01-23 @ 13:01)    piperacillin/tazobactam IVPB..   25 mL/Hr IV Intermittent (12-04-23 @ 05:33)   25 mL/Hr IV Intermittent (12-03-23 @ 22:20)   25 mL/Hr IV Intermittent (12-03-23 @ 14:01)   25 mL/Hr IV Intermittent (12-03-23 @ 04:53)   25 mL/Hr IV Intermittent (12-02-23 @ 20:59)    vancomycin  IVPB   166.67 mL/Hr IV Intermittent (12-01-23 @ 05:12)   166.67 mL/Hr IV Intermittent (11-30-23 @ 18:06)    vancomycin  IVPB   250 mL/Hr IV Intermittent (11-30-23 @ 06:18)    vancomycin  IVPB.   250 mL/Hr IV Intermittent (11-29-23 @ 20:14)        piperacillin/tazobactam IVPB.. 3.375 every 8 hours    MEDICATIONS  (STANDING):  enalapril 10 every 12 hours  enoxaparin Injectable 40 every 24 hours  gabapentin 300 three times a day  guaiFENesin Oral Liquid (Sugar-Free) 100 every 6 hours PRN  HYDROmorphone  Injectable 0.5 every 3 hours PRN  insulin glargine Injectable (LANTUS) 30 at bedtime  insulin lispro (ADMELOG) corrective regimen sliding scale  every 6 hours  insulin lispro (ADMELOG) corrective regimen sliding scale  three times a day before meals  oxyCODONE    IR 5 every 4 hours PRN  oxyCODONE    IR 10 every 4 hours PRN  polyethylene glycol 3350 17 every 12 hours  senna 2 at bedtime  simvastatin 40 at bedtime  tamsulosin 0.4 at bedtime    SOCIAL HISTORY:       FAMILY HISTORY:  Family history of diabetes mellitus in grandmother (Grandparent)      REVIEW OF SYSTEMS  [  ] ROS unobtainable because:    [  ] All other systems negative except as noted below:	    Constitutional:  [ ] fever [ ] chills  [ ] weight loss  [ ] weakness  Skin:  [ ] rash [ ] phlebitis	  Eyes: [ ] icterus [ ] pain  [ ] discharge	  ENMT: [ ] sore throat  [ ] thrush [ ] ulcers [ ] exudates  Respiratory: [ ] dyspnea [ ] hemoptysis [ ] cough [ ] sputum	  Cardiovascular:  [ ] chest pain [ ] palpitations [ ] edema	  Gastrointestinal:  [ ] nausea [ ] vomiting [ ] diarrhea [ ] constipation [ ] pain	  Genitourinary:  [ ] dysuria [ ] frequency [ ] hematuria [ ] discharge [ ] flank pain  [ ] incontinence  Musculoskeletal:  [ ] myalgias [ ] arthralgias [ ] arthritis  [ ] back pain  Neurological:  [ ] headache [ ] seizures  [ ] confusion/altered mental status  Psychiatric:  [ ] anxiety [ ] depression	  Hematology/Lymphatics:  [ ] lymphadenopathy  Endocrine:  [ ] adrenal [ ] thyroid  Allergic/Immunologic:	 [ ] transplant [ ] seasonal    Vital Signs Last 24 Hrs  T(F): 97.9 (12-04-23 @ 10:11), Max: 102.4 (11-29-23 @ 18:09)  Vital Signs Last 24 Hrs  HR: 91 (12-04-23 @ 10:11) (89 - 92)  BP: 137/72 (12-04-23 @ 10:11) (137/72 - 167/90)  RR: 18 (12-04-23 @ 10:11)  SpO2: 93% (12-04-23 @ 10:11) (92% - 95%)  Wt(kg): --    PHYSICAL EXAM:                              10.7   14.15 )-----------( 427      ( 04 Dec 2023 05:31 )             33.9   12-04    136  |  99  |  15  ----------------------------<  187<H>  4.3   |  28  |  0.89    Ca    9.0      04 Dec 2023 05:31  Phos  3.4     12-04  Mg     1.9     12-04      Urinalysis Basic - ( 04 Dec 2023 05:31 )    Color: x / Appearance: x / SG: x / pH: x  Gluc: 187 mg/dL / Ketone: x  / Bili: x / Urobili: x   Blood: x / Protein: x / Nitrite: x   Leuk Esterase: x / RBC: x / WBC x   Sq Epi: x / Non Sq Epi: x / Bacteria: x    MICROBIOLOGY:Vancomycin Level, Trough: 10.4 (12-01 @ 05:13)    Culture - Other (collected 30 Nov 2023 06:27)  Source: .Other Other  Final Report (02 Dec 2023 22:18):    Numerous Citrobacter koseri    Moderate Enterococcus faecalis  Organism: Citrobacter koseri  Enterococcus faecalis (02 Dec 2023 22:18)  Organism: Enterococcus faecalis (02 Dec 2023 22:18)      -  Vancomycin: S 2      -  Ampicillin: S <=2 Predicts results to ampicillin/sulbactam, amoxacillin-clavulanate and  piperacillin-tazobactam.      -  Tetracycline: R >8      Method Type: ARMEN  Organism: Citrobacter koseri (02 Dec 2023 22:18)      -  Levofloxacin: S <=0.5      -  Tobramycin: S <=2      -  Aztreonam: S <=4      -  Gentamicin: S <=2      -  Cefazolin: S <=2      -  Cefepime: S <=2      -  Piperacillin/Tazobactam: S <=8      -  Ciprofloxacin: S <=0.25      -  Imipenem: S <=1      -  Ceftriaxone: S <=1      -  Ampicillin: R >16 These ampicillin results predict results for amoxicillin      Method Type: ARMEN      -  Meropenem: S <=1      -  Ampicillin/Sulbactam: S <=4/2      -  Cefoxitin: S <=8      -  Amoxicillin/Clavulanic Acid: S <=8/4      -  Trimethoprim/Sulfamethoxazole: S <=0.5/9.5      -  Ertapenem: S <=0.5    Culture - Blood (collected 29 Nov 2023 18:23)  Source: .Blood Blood-Venous  Preliminary Report (03 Dec 2023 22:01):    No growth at 4 days    Culture - Blood (collected 29 Nov 2023 18:23)  Source: .Blood Blood-Venous  Gram Stain (02 Dec 2023 03:35):    Growth in anaerobic bottle: Gram Positive Rods  Final Report (02 Dec 2023 23:49):    Growth in anaerobic bottle: Dermabacter hominis "Susceptibilities not    performed"    Direct identification is available within approximately 3-5    hours either by Blood Panel Multiplexed PCR or Direct    MALDI-TOF. Details: [email or web address]  Organism: Blood Culture PCR (02 Dec 2023 23:49)  Organism: Blood Culture PCR (02 Dec 2023 23:49)      Method Type: PCR      -  Blood PCR Panel: NEG                  RADIOLOGY:  imaging below personally reviewed and agree with findings    < from: CT Lower Extremity w/ IV Cont, Left (11.29.23 @ 21:05) >    ACC: [id number] EXAM:  CT LWR EXT IC LT   ORDERED BY: SARI VILLAR     PROCEDURE DATE:  11/29/2023          INTERPRETATION:  INDICATION: Evaluate for osteomyelitis.    TECHNIQUE: CT of the left tibia/fibula/ankle with intravenous contrast   with axial, sagittal, and coronal multiplanar reformats. Approximately 90   cc intravenous Omnipaque 350 was administered.    Comparison:Foot radiographs dated 11/29/2023    FINDINGS:    Soft tissue ulcerations seen along the plantar aspect of the first toe,   posterior aspect of the heel, and possibly along the lateral aspect of   the forefoot. There is associated soft tissue gas tracking within the   lateral soft tissues along the fifth metatarsal, within the plantar soft   tissues of the midfoot/hindfoot, posterior aspect of the heel, and   traveling proximally along the full length of the Achilles tendon.    No drainable fluid collection seen within the soft tissues. Nonspecific   subcutaneous edema about the visualized lower extremity, which may be  related to cellulitis and lower extremity edema.    There is emphysematous osteomyelitis of the posterolateral aspect of the   calcaneus (series 4 image 493).    Status post amputation of the fifth ray to the level of the fifth   metatarsal neck. Amputation margin appears fairly well-corticated.   However, there is soft tissue gas abutting the lateral margin of the   fifth metatarsal. Underlying osteomyelitis of the fifth metatarsal cannot   be excluded and is within the differential.    No acute displaced fracture or dislocation. Osteoarthritic changes within   the knee. Chronic osteochondral lesion of the lateral talar dome.   Osteoarthritic changes throughout the foot.    Atherosclerotic calcifications are noted.    IMPRESSION:    Soft tissue ulcerations seen along the plantar aspect of the first toe,   posterior aspect of the heel, and possibly along the lateral aspect of   the forefoot. Associated soft tissue gas tracking within the lateral soft   tissues along the fifth metatarsal, within the plantar soft tissues of   the midfoot/hindfoot, posterior aspect of the heel, and traveling   proximally along the full length of the Achilles tendon, which may   represent a gas-forming/necrotizing infection.    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    Status post amputation of the fifth ray to the level of the fifth   metatarsal neck. Amputation margin appears fairly well-corticated.   However, there is soft tissue gas abutting the lateral margin of the   fifth metatarsal. Underlying osteomyelitis of the fifth metatarsal cannot   be excluded and is within the differential.    Nonspecific subcutaneous edema about the visualized lower extremity,   which may be related to cellulitis andlower extremity edema. No   drainable fluid collection within the soft tissues.    Chronic osteochondral lesion of the lateral talar dome.    --- End of Report ---    < end of copied text >     Patient is a 63y old  Male who presents with a chief complaint of Necrotizing fasciitis (03 Dec 2023 14:29)    HPI:  Mr. Mcdaniel is a 63M PMHx DM, HLD, PAD, prior toe amputation, presents to [facility] ED w L foot wound. Reports wound has been present on hindfoot x 3 weeks. Started as small cut. Over last 24 hours, pt unable to ambulate d/t severe pain. Reports significant malodor. Denies subjective fevers, chills, purulent drainage, numbness/paresthesia.     In ED, patient is tachycardic to 123. BP w HTN. Febrile to 102.4 F. Labs w WBC 21. Hgb 11.4. Lac 2.4. . XR L foot w soft tissue gas at lateral hindfoot. CT LLE non con w soft tissue gas tracking ~7cm above foot.     Was started on Vancomycin, Zosyn and Clindamycin. 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    No further fevers, WBC 21-->12-->14  ,   Bcx 11/29; 1/4 Dermabacter hominis  Wound CX 11/30 : Citrobacter Koseri, Enterococcus fecalis    prior hospital charts reviewed [  ]  primary team notes reviewed [ x ]  other consultant notes reviewed [  x]    PAST MEDICAL & SURGICAL HISTORY:  Retinopathy      Diabetes mellitus type 2 in obese      Hyperlipidemia      Toe infection  s/p amputation      Ruptured appendix      S/P appendectomy      Toe amputation status  left pinky toe          Allergies  No Known Allergies    ANTIMICROBIALS (past 90 days)  MEDICATIONS  (STANDING):  clindamycin IVPB   112 mL/Hr IV Intermittent (12-01-23 @ 06:29)   112 mL/Hr IV Intermittent (11-30-23 @ 22:21)   112 mL/Hr IV Intermittent (11-30-23 @ 15:08)    clindamycin IVPB   100 mL/Hr IV Intermittent (11-30-23 @ 05:19)    clindamycin IVPB   100 mL/Hr IV Intermittent (11-29-23 @ 22:42)    piperacillin/tazobactam IVPB.   200 mL/Hr IV Intermittent (11-29-23 @ 18:38)    piperacillin/tazobactam IVPB.   200 mL/Hr IV Intermittent (11-30-23 @ 12:27)    piperacillin/tazobactam IVPB.-   25 mL/Hr IV Intermittent (11-30-23 @ 15:07)    piperacillin/tazobactam IVPB.-   25 mL/Hr IV Intermittent (11-29-23 @ 23:22)    piperacillin/tazobactam IVPB..   25 mL/Hr IV Intermittent (12-01-23 @ 05:12)   25 mL/Hr IV Intermittent (11-30-23 @ 22:24)    piperacillin/tazobactam IVPB..   25 mL/Hr IV Intermittent (11-30-23 @ 05:13)    piperacillin/tazobactam IVPB..   25 mL/Hr IV Intermittent (12-02-23 @ 12:26)   25 mL/Hr IV Intermittent (12-02-23 @ 05:12)   25 mL/Hr IV Intermittent (12-01-23 @ 22:09)   25 mL/Hr IV Intermittent (12-01-23 @ 13:01)    piperacillin/tazobactam IVPB..   25 mL/Hr IV Intermittent (12-04-23 @ 05:33)   25 mL/Hr IV Intermittent (12-03-23 @ 22:20)   25 mL/Hr IV Intermittent (12-03-23 @ 14:01)   25 mL/Hr IV Intermittent (12-03-23 @ 04:53)   25 mL/Hr IV Intermittent (12-02-23 @ 20:59)    vancomycin  IVPB   166.67 mL/Hr IV Intermittent (12-01-23 @ 05:12)   166.67 mL/Hr IV Intermittent (11-30-23 @ 18:06)    vancomycin  IVPB   250 mL/Hr IV Intermittent (11-30-23 @ 06:18)    vancomycin  IVPB.   250 mL/Hr IV Intermittent (11-29-23 @ 20:14)        piperacillin/tazobactam IVPB.. 3.375 every 8 hours    MEDICATIONS  (STANDING):  enalapril 10 every 12 hours  enoxaparin Injectable 40 every 24 hours  gabapentin 300 three times a day  guaiFENesin Oral Liquid (Sugar-Free) 100 every 6 hours PRN  HYDROmorphone  Injectable 0.5 every 3 hours PRN  insulin glargine Injectable (LANTUS) 30 at bedtime  insulin lispro (ADMELOG) corrective regimen sliding scale  every 6 hours  insulin lispro (ADMELOG) corrective regimen sliding scale  three times a day before meals  oxyCODONE    IR 5 every 4 hours PRN  oxyCODONE    IR 10 every 4 hours PRN  polyethylene glycol 3350 17 every 12 hours  senna 2 at bedtime  simvastatin 40 at bedtime  tamsulosin 0.4 at bedtime    SOCIAL HISTORY:       FAMILY HISTORY:  Family history of diabetes mellitus in grandmother (Grandparent)      REVIEW OF SYSTEMS  [  ] ROS unobtainable because:    [  ] All other systems negative except as noted below:	    Constitutional:  [ ] fever [ ] chills  [ ] weight loss  [ ] weakness  Skin:  [ ] rash [ ] phlebitis	  Eyes: [ ] icterus [ ] pain  [ ] discharge	  ENMT: [ ] sore throat  [ ] thrush [ ] ulcers [ ] exudates  Respiratory: [ ] dyspnea [ ] hemoptysis [ ] cough [ ] sputum	  Cardiovascular:  [ ] chest pain [ ] palpitations [ ] edema	  Gastrointestinal:  [ ] nausea [ ] vomiting [ ] diarrhea [ ] constipation [ ] pain	  Genitourinary:  [ ] dysuria [ ] frequency [ ] hematuria [ ] discharge [ ] flank pain  [ ] incontinence  Musculoskeletal:  [ ] myalgias [ ] arthralgias [ ] arthritis  [ ] back pain  Neurological:  [ ] headache [ ] seizures  [ ] confusion/altered mental status  Psychiatric:  [ ] anxiety [ ] depression	  Hematology/Lymphatics:  [ ] lymphadenopathy  Endocrine:  [ ] adrenal [ ] thyroid  Allergic/Immunologic:	 [ ] transplant [ ] seasonal    Vital Signs Last 24 Hrs  T(F): 97.9 (12-04-23 @ 10:11), Max: 102.4 (11-29-23 @ 18:09)  Vital Signs Last 24 Hrs  HR: 91 (12-04-23 @ 10:11) (89 - 92)  BP: 137/72 (12-04-23 @ 10:11) (137/72 - 167/90)  RR: 18 (12-04-23 @ 10:11)  SpO2: 93% (12-04-23 @ 10:11) (92% - 95%)  Wt(kg): --    PHYSICAL EXAM:                              10.7   14.15 )-----------( 427      ( 04 Dec 2023 05:31 )             33.9   12-04    136  |  99  |  15  ----------------------------<  187<H>  4.3   |  28  |  0.89    Ca    9.0      04 Dec 2023 05:31  Phos  3.4     12-04  Mg     1.9     12-04      Urinalysis Basic - ( 04 Dec 2023 05:31 )    Color: x / Appearance: x / SG: x / pH: x  Gluc: 187 mg/dL / Ketone: x  / Bili: x / Urobili: x   Blood: x / Protein: x / Nitrite: x   Leuk Esterase: x / RBC: x / WBC x   Sq Epi: x / Non Sq Epi: x / Bacteria: x    MICROBIOLOGY:Vancomycin Level, Trough: 10.4 (12-01 @ 05:13)    Culture - Other (collected 30 Nov 2023 06:27)  Source: .Other Other  Final Report (02 Dec 2023 22:18):    Numerous Citrobacter koseri    Moderate Enterococcus faecalis  Organism: Citrobacter koseri  Enterococcus faecalis (02 Dec 2023 22:18)  Organism: Enterococcus faecalis (02 Dec 2023 22:18)      -  Vancomycin: S 2      -  Ampicillin: S <=2 Predicts results to ampicillin/sulbactam, amoxacillin-clavulanate and  piperacillin-tazobactam.      -  Tetracycline: R >8      Method Type: ARMEN  Organism: Citrobacter koseri (02 Dec 2023 22:18)      -  Levofloxacin: S <=0.5      -  Tobramycin: S <=2      -  Aztreonam: S <=4      -  Gentamicin: S <=2      -  Cefazolin: S <=2      -  Cefepime: S <=2      -  Piperacillin/Tazobactam: S <=8      -  Ciprofloxacin: S <=0.25      -  Imipenem: S <=1      -  Ceftriaxone: S <=1      -  Ampicillin: R >16 These ampicillin results predict results for amoxicillin      Method Type: ARMEN      -  Meropenem: S <=1      -  Ampicillin/Sulbactam: S <=4/2      -  Cefoxitin: S <=8      -  Amoxicillin/Clavulanic Acid: S <=8/4      -  Trimethoprim/Sulfamethoxazole: S <=0.5/9.5      -  Ertapenem: S <=0.5    Culture - Blood (collected 29 Nov 2023 18:23)  Source: .Blood Blood-Venous  Preliminary Report (03 Dec 2023 22:01):    No growth at 4 days    Culture - Blood (collected 29 Nov 2023 18:23)  Source: .Blood Blood-Venous  Gram Stain (02 Dec 2023 03:35):    Growth in anaerobic bottle: Gram Positive Rods  Final Report (02 Dec 2023 23:49):    Growth in anaerobic bottle: Dermabacter hominis "Susceptibilities not    performed"    Direct identification is available within approximately 3-5    hours either by Blood Panel Multiplexed PCR or Direct    MALDI-TOF. Details: [email or web address]  Organism: Blood Culture PCR (02 Dec 2023 23:49)  Organism: Blood Culture PCR (02 Dec 2023 23:49)      Method Type: PCR      -  Blood PCR Panel: NEG                  RADIOLOGY:  imaging below personally reviewed and agree with findings    < from: CT Lower Extremity w/ IV Cont, Left (11.29.23 @ 21:05) >    ACC: [id number] EXAM:  CT LWR EXT IC LT   ORDERED BY: SARI VILLAR     PROCEDURE DATE:  11/29/2023          INTERPRETATION:  INDICATION: Evaluate for osteomyelitis.    TECHNIQUE: CT of the left tibia/fibula/ankle with intravenous contrast   with axial, sagittal, and coronal multiplanar reformats. Approximately 90   cc intravenous Omnipaque 350 was administered.    Comparison:Foot radiographs dated 11/29/2023    FINDINGS:    Soft tissue ulcerations seen along the plantar aspect of the first toe,   posterior aspect of the heel, and possibly along the lateral aspect of   the forefoot. There is associated soft tissue gas tracking within the   lateral soft tissues along the fifth metatarsal, within the plantar soft   tissues of the midfoot/hindfoot, posterior aspect of the heel, and   traveling proximally along the full length of the Achilles tendon.    No drainable fluid collection seen within the soft tissues. Nonspecific   subcutaneous edema about the visualized lower extremity, which may be  related to cellulitis and lower extremity edema.    There is emphysematous osteomyelitis of the posterolateral aspect of the   calcaneus (series 4 image 493).    Status post amputation of the fifth ray to the level of the fifth   metatarsal neck. Amputation margin appears fairly well-corticated.   However, there is soft tissue gas abutting the lateral margin of the   fifth metatarsal. Underlying osteomyelitis of the fifth metatarsal cannot   be excluded and is within the differential.    No acute displaced fracture or dislocation. Osteoarthritic changes within   the knee. Chronic osteochondral lesion of the lateral talar dome.   Osteoarthritic changes throughout the foot.    Atherosclerotic calcifications are noted.    IMPRESSION:    Soft tissue ulcerations seen along the plantar aspect of the first toe,   posterior aspect of the heel, and possibly along the lateral aspect of   the forefoot. Associated soft tissue gas tracking within the lateral soft   tissues along the fifth metatarsal, within the plantar soft tissues of   the midfoot/hindfoot, posterior aspect of the heel, and traveling   proximally along the full length of the Achilles tendon, which may   represent a gas-forming/necrotizing infection.    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    Status post amputation of the fifth ray to the level of the fifth   metatarsal neck. Amputation margin appears fairly well-corticated.   However, there is soft tissue gas abutting the lateral margin of the   fifth metatarsal. Underlying osteomyelitis of the fifth metatarsal cannot   be excluded and is within the differential.    Nonspecific subcutaneous edema about the visualized lower extremity,   which may be related to cellulitis andlower extremity edema. No   drainable fluid collection within the soft tissues.    Chronic osteochondral lesion of the lateral talar dome.    --- End of Report ---    < end of copied text >     Patient is a 63y old  Male who presents with a chief complaint of Necrotizing fasciitis (03 Dec 2023 14:29)    HPI:  Mr. Mcdaniel is a 63M PMHx DM, HLD, PAD, prior toe amputation, presents to [facility] ED w L foot wound. Reports wound has been present on hindfoot x 3 weeks. Started as small cut. Over last 24 hours, pt unable to ambulate d/t severe pain. Reports significant malodor. Denies subjective fevers, chills, purulent drainage, numbness/paresthesia.     In ED, patient is tachycardic to 123. BP w HTN. Febrile to 102.4 F. Labs w WBC 21. Hgb 11.4. Lac 2.4. . XR L foot w soft tissue gas at lateral hindfoot. CT LLE non con w soft tissue gas tracking ~7cm above foot.     Was started on Vancomycin, Zosyn and Clindamycin. 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    No further fevers, WBC 21-->12-->14  ,   Bcx 11/29; 1/4 Dermabacter hominis  Wound CX 11/30 : Citrobacter Koseri, Enterococcus fecalis    s/p Vancomycin 11/29-12/1  s/p clindamycin 11/29/21/1  On Zosyn    prior hospital charts reviewed [  ]  primary team notes reviewed [ x ]  other consultant notes reviewed [  x]    PAST MEDICAL & SURGICAL HISTORY:  Retinopathy      Diabetes mellitus type 2 in obese      Hyperlipidemia      Toe infection  s/p amputation      Ruptured appendix      S/P appendectomy      Toe amputation status  left pinky toe          Allergies  No Known Allergies    ANTIMICROBIALS (past 90 days)  MEDICATIONS  (STANDING):      piperacillin/tazobactam IVPB.. 3.375 every 8 hours    MEDICATIONS  (STANDING):  enalapril 10 every 12 hours  enoxaparin Injectable 40 every 24 hours  gabapentin 300 three times a day  guaiFENesin Oral Liquid (Sugar-Free) 100 every 6 hours PRN  HYDROmorphone  Injectable 0.5 every 3 hours PRN  insulin glargine Injectable (LANTUS) 30 at bedtime  insulin lispro (ADMELOG) corrective regimen sliding scale  every 6 hours  insulin lispro (ADMELOG) corrective regimen sliding scale  three times a day before meals  oxyCODONE    IR 5 every 4 hours PRN  oxyCODONE    IR 10 every 4 hours PRN  polyethylene glycol 3350 17 every 12 hours  senna 2 at bedtime  simvastatin 40 at bedtime  tamsulosin 0.4 at bedtime    SOCIAL HISTORY:      FAMILY HISTORY:  Family history of diabetes mellitus in grandmother (Grandparent)      REVIEW OF SYSTEMS  [  ] ROS unobtainable because:    [ x ] All other systems negative except as noted below:	    Constitutional:  [ ] fever [ ] chills  [ ] weight loss  [ ] weakness  Skin:  [ ] rash [ ] phlebitis	  Eyes: [ ] icterus [ ] pain  [ ] discharge	  ENMT: [ ] sore throat  [ ] thrush [ ] ulcers [ ] exudates  Respiratory: [ ] dyspnea [ ] hemoptysis [ ] cough [ ] sputum	  Cardiovascular:  [ ] chest pain [ ] palpitations [ ] edema	  Gastrointestinal:  [ ] nausea [ ] vomiting [ ] diarrhea [ ] constipation [ ] pain	  Genitourinary:  [ ] dysuria [ ] frequency [ ] hematuria [ ] discharge [ ] flank pain  [ ] incontinence  Musculoskeletal:  [ ] myalgias [ ] arthralgias [ ] arthritis  [ ] back pain  Neurological:  [ ] headache [ ] seizures  [ ] confusion/altered mental status  Psychiatric:  [ ] anxiety [ ] depression	  Hematology/Lymphatics:  [ ] lymphadenopathy  Endocrine:  [ ] adrenal [ ] thyroid  Allergic/Immunologic:	 [ ] transplant [ ] seasonal    Vital Signs Last 24 Hrs  T(F): 97.9 (12-04-23 @ 10:11), Max: 102.4 (11-29-23 @ 18:09)  Vital Signs Last 24 Hrs  HR: 91 (12-04-23 @ 10:11) (89 - 92)  BP: 137/72 (12-04-23 @ 10:11) (137/72 - 167/90)  RR: 18 (12-04-23 @ 10:11)  SpO2: 93% (12-04-23 @ 10:11) (92% - 95%)  Wt(kg): --    PHYSICAL EXAM:                              10.7   14.15 )-----------( 427      ( 04 Dec 2023 05:31 )             33.9   12-04    136  |  99  |  15  ----------------------------<  187<H>  4.3   |  28  |  0.89    Ca    9.0      04 Dec 2023 05:31  Phos  3.4     12-04  Mg     1.9     12-04      Urinalysis Basic - ( 04 Dec 2023 05:31 )    Color: x / Appearance: x / SG: x / pH: x  Gluc: 187 mg/dL / Ketone: x  / Bili: x / Urobili: x   Blood: x / Protein: x / Nitrite: x   Leuk Esterase: x / RBC: x / WBC x   Sq Epi: x / Non Sq Epi: x / Bacteria: x    MICROBIOLOGY:Vancomycin Level, Trough: 10.4 (12-01 @ 05:13)    Culture - Other (collected 30 Nov 2023 06:27)  Source: .Other Other  Final Report (02 Dec 2023 22:18):    Numerous Citrobacter koseri    Moderate Enterococcus faecalis  Organism: Citrobacter koseri  Enterococcus faecalis (02 Dec 2023 22:18)  Organism: Enterococcus faecalis (02 Dec 2023 22:18)      -  Vancomycin: S 2      -  Ampicillin: S <=2 Predicts results to ampicillin/sulbactam, amoxacillin-clavulanate and  piperacillin-tazobactam.      -  Tetracycline: R >8      Method Type: ARMEN  Organism: Citrobacter koseri (02 Dec 2023 22:18)      -  Levofloxacin: S <=0.5      -  Tobramycin: S <=2      -  Aztreonam: S <=4      -  Gentamicin: S <=2      -  Cefazolin: S <=2      -  Cefepime: S <=2      -  Piperacillin/Tazobactam: S <=8      -  Ciprofloxacin: S <=0.25      -  Imipenem: S <=1      -  Ceftriaxone: S <=1      -  Ampicillin: R >16 These ampicillin results predict results for amoxicillin      Method Type: ARMEN      -  Meropenem: S <=1      -  Ampicillin/Sulbactam: S <=4/2      -  Cefoxitin: S <=8      -  Amoxicillin/Clavulanic Acid: S <=8/4      -  Trimethoprim/Sulfamethoxazole: S <=0.5/9.5      -  Ertapenem: S <=0.5    Culture - Blood (collected 29 Nov 2023 18:23)  Source: .Blood Blood-Venous  Preliminary Report (03 Dec 2023 22:01):    No growth at 4 days    Culture - Blood (collected 29 Nov 2023 18:23)  Source: .Blood Blood-Venous  Gram Stain (02 Dec 2023 03:35):    Growth in anaerobic bottle: Gram Positive Rods  Final Report (02 Dec 2023 23:49):    Growth in anaerobic bottle: Dermabacter hominis "Susceptibilities not    performed"    Direct identification is available within approximately 3-5    hours either by Blood Panel Multiplexed PCR or Direct    MALDI-TOF. Details: [email or web address]  Organism: Blood Culture PCR (02 Dec 2023 23:49)  Organism: Blood Culture PCR (02 Dec 2023 23:49)      Method Type: PCR      -  Blood PCR Panel: NEG                  RADIOLOGY:  imaging below personally reviewed and agree with findings    < from: CT Lower Extremity w/ IV Cont, Left (11.29.23 @ 21:05) >    ACC: [id number] EXAM:  CT LWR EXT IC LT   ORDERED BY: SARI VILLAR     PROCEDURE DATE:  11/29/2023          INTERPRETATION:  INDICATION: Evaluate for osteomyelitis.    TECHNIQUE: CT of the left tibia/fibula/ankle with intravenous contrast   with axial, sagittal, and coronal multiplanar reformats. Approximately 90   cc intravenous Omnipaque 350 was administered.    Comparison:Foot radiographs dated 11/29/2023    FINDINGS:    Soft tissue ulcerations seen along the plantar aspect of the first toe,   posterior aspect of the heel, and possibly along the lateral aspect of   the forefoot. There is associated soft tissue gas tracking within the   lateral soft tissues along the fifth metatarsal, within the plantar soft   tissues of the midfoot/hindfoot, posterior aspect of the heel, and   traveling proximally along the full length of the Achilles tendon.    No drainable fluid collection seen within the soft tissues. Nonspecific   subcutaneous edema about the visualized lower extremity, which may be  related to cellulitis and lower extremity edema.    There is emphysematous osteomyelitis of the posterolateral aspect of the   calcaneus (series 4 image 493).    Status post amputation of the fifth ray to the level of the fifth   metatarsal neck. Amputation margin appears fairly well-corticated.   However, there is soft tissue gas abutting the lateral margin of the   fifth metatarsal. Underlying osteomyelitis of the fifth metatarsal cannot   be excluded and is within the differential.    No acute displaced fracture or dislocation. Osteoarthritic changes within   the knee. Chronic osteochondral lesion of the lateral talar dome.   Osteoarthritic changes throughout the foot.    Atherosclerotic calcifications are noted.    IMPRESSION:    Soft tissue ulcerations seen along the plantar aspect of the first toe,   posterior aspect of the heel, and possibly along the lateral aspect of   the forefoot. Associated soft tissue gas tracking within the lateral soft   tissues along the fifth metatarsal, within the plantar soft tissues of   the midfoot/hindfoot, posterior aspect of the heel, and traveling   proximally along the full length of the Achilles tendon, which may   represent a gas-forming/necrotizing infection.    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    Status post amputation of the fifth ray to the level of the fifth   metatarsal neck. Amputation margin appears fairly well-corticated.   However, there is soft tissue gas abutting the lateral margin of the   fifth metatarsal. Underlying osteomyelitis of the fifth metatarsal cannot   be excluded and is within the differential.    Nonspecific subcutaneous edema about the visualized lower extremity,   which may be related to cellulitis andlower extremity edema. No   drainable fluid collection within the soft tissues.    Chronic osteochondral lesion of the lateral talar dome.    --- End of Report ---    < end of copied text >     Patient is a 63y old  Male who presents with a chief complaint of Necrotizing fasciitis (03 Dec 2023 14:29)    HPI:  Mr. Mcdaniel is a 63M PMHx DM, HLD, PAD, prior toe amputation, presents to [facility] ED w L foot wound. Reports wound has been present on hindfoot x 3 weeks. Started as small cut. Over last 24 hours, pt unable to ambulate d/t severe pain. Reports significant malodor. Denies subjective fevers, chills, purulent drainage, numbness/paresthesia.     In ED, patient is tachycardic to 123. BP w HTN. Febrile to 102.4 F. Labs w WBC 21. Hgb 11.4. Lac 2.4. . XR L foot w soft tissue gas at lateral hindfoot. CT LLE non con w soft tissue gas tracking ~7cm above foot.     Was started on Vancomycin, Zosyn and Clindamycin. 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    No further fevers, WBC 21-->12-->14  ,   Bcx 11/29; 1/4 Dermabacter hominis  Wound CX 11/30 : Citrobacter Koseri, Enterococcus fecalis    s/p Vancomycin 11/29-12/1  s/p clindamycin 11/29/21/1  On Zosyn    prior hospital charts reviewed [  ]  primary team notes reviewed [ x ]  other consultant notes reviewed [  x]    PAST MEDICAL & SURGICAL HISTORY:  Retinopathy      Diabetes mellitus type 2 in obese      Hyperlipidemia      Toe infection  s/p amputation      Ruptured appendix      S/P appendectomy      Toe amputation status  left pinky toe          Allergies  No Known Allergies    ANTIMICROBIALS (past 90 days)  MEDICATIONS  (STANDING):      piperacillin/tazobactam IVPB.. 3.375 every 8 hours    MEDICATIONS  (STANDING):  enalapril 10 every 12 hours  enoxaparin Injectable 40 every 24 hours  gabapentin 300 three times a day  guaiFENesin Oral Liquid (Sugar-Free) 100 every 6 hours PRN  HYDROmorphone  Injectable 0.5 every 3 hours PRN  insulin glargine Injectable (LANTUS) 30 at bedtime  insulin lispro (ADMELOG) corrective regimen sliding scale  every 6 hours  insulin lispro (ADMELOG) corrective regimen sliding scale  three times a day before meals  oxyCODONE    IR 5 every 4 hours PRN  oxyCODONE    IR 10 every 4 hours PRN  polyethylene glycol 3350 17 every 12 hours  senna 2 at bedtime  simvastatin 40 at bedtime  tamsulosin 0.4 at bedtime    SOCIAL HISTORY:      FAMILY HISTORY:  Family history of diabetes mellitus in grandmother (Grandparent)      REVIEW OF SYSTEMS  [  ] ROS unobtainable because:    [ x ] All other systems negative except as noted below:	    Constitutional:  [ ] fever [ ] chills  [ ] weight loss  [ ] weakness  Skin:  [ ] rash [ ] phlebitis	  Eyes: [ ] icterus [ ] pain  [ ] discharge	  ENMT: [ ] sore throat  [ ] thrush [ ] ulcers [ ] exudates  Respiratory: [ ] dyspnea [ ] hemoptysis [ ] cough [ ] sputum	  Cardiovascular:  [ ] chest pain [ ] palpitations [ ] edema	  Gastrointestinal:  [ ] nausea [ ] vomiting [ ] diarrhea [ ] constipation [ ] pain	  Genitourinary:  [ ] dysuria [ ] frequency [ ] hematuria [ ] discharge [ ] flank pain  [ ] incontinence  Musculoskeletal:  [ ] myalgias [ ] arthralgias [ ] arthritis  [ ] back pain  Neurological:  [ ] headache [ ] seizures  [ ] confusion/altered mental status  Psychiatric:  [ ] anxiety [ ] depression	  Hematology/Lymphatics:  [ ] lymphadenopathy  Endocrine:  [ ] adrenal [ ] thyroid  Allergic/Immunologic:	 [ ] transplant [ ] seasonal    Vital Signs Last 24 Hrs  T(F): 97.9 (12-04-23 @ 10:11), Max: 102.4 (11-29-23 @ 18:09)  Vital Signs Last 24 Hrs  HR: 91 (12-04-23 @ 10:11) (89 - 92)  BP: 137/72 (12-04-23 @ 10:11) (137/72 - 167/90)  RR: 18 (12-04-23 @ 10:11)  SpO2: 93% (12-04-23 @ 10:11) (92% - 95%)  Wt(kg): --    PHYSICAL EXAM:                              10.7   14.15 )-----------( 427      ( 04 Dec 2023 05:31 )             33.9   12-04    136  |  99  |  15  ----------------------------<  187<H>  4.3   |  28  |  0.89    Ca    9.0      04 Dec 2023 05:31  Phos  3.4     12-04  Mg     1.9     12-04      Urinalysis Basic - ( 04 Dec 2023 05:31 )    Color: x / Appearance: x / SG: x / pH: x  Gluc: 187 mg/dL / Ketone: x  / Bili: x / Urobili: x   Blood: x / Protein: x / Nitrite: x   Leuk Esterase: x / RBC: x / WBC x   Sq Epi: x / Non Sq Epi: x / Bacteria: x    MICROBIOLOGY:Vancomycin Level, Trough: 10.4 (12-01 @ 05:13)    Culture - Other (collected 30 Nov 2023 06:27)  Source: .Other Other  Final Report (02 Dec 2023 22:18):    Numerous Citrobacter koseri    Moderate Enterococcus faecalis  Organism: Citrobacter koseri  Enterococcus faecalis (02 Dec 2023 22:18)  Organism: Enterococcus faecalis (02 Dec 2023 22:18)      -  Vancomycin: S 2      -  Ampicillin: S <=2 Predicts results to ampicillin/sulbactam, amoxacillin-clavulanate and  piperacillin-tazobactam.      -  Tetracycline: R >8      Method Type: ARMEN  Organism: Citrobacter koseri (02 Dec 2023 22:18)      -  Levofloxacin: S <=0.5      -  Tobramycin: S <=2      -  Aztreonam: S <=4      -  Gentamicin: S <=2      -  Cefazolin: S <=2      -  Cefepime: S <=2      -  Piperacillin/Tazobactam: S <=8      -  Ciprofloxacin: S <=0.25      -  Imipenem: S <=1      -  Ceftriaxone: S <=1      -  Ampicillin: R >16 These ampicillin results predict results for amoxicillin      Method Type: ARMEN      -  Meropenem: S <=1      -  Ampicillin/Sulbactam: S <=4/2      -  Cefoxitin: S <=8      -  Amoxicillin/Clavulanic Acid: S <=8/4      -  Trimethoprim/Sulfamethoxazole: S <=0.5/9.5      -  Ertapenem: S <=0.5    Culture - Blood (collected 29 Nov 2023 18:23)  Source: .Blood Blood-Venous  Preliminary Report (03 Dec 2023 22:01):    No growth at 4 days    Culture - Blood (collected 29 Nov 2023 18:23)  Source: .Blood Blood-Venous  Gram Stain (02 Dec 2023 03:35):    Growth in anaerobic bottle: Gram Positive Rods  Final Report (02 Dec 2023 23:49):    Growth in anaerobic bottle: Dermabacter hominis "Susceptibilities not    performed"    Direct identification is available within approximately 3-5    hours either by Blood Panel Multiplexed PCR or Direct    MALDI-TOF. Details: [email or web address]  Organism: Blood Culture PCR (02 Dec 2023 23:49)  Organism: Blood Culture PCR (02 Dec 2023 23:49)      Method Type: PCR      -  Blood PCR Panel: NEG                  RADIOLOGY:  imaging below personally reviewed and agree with findings    < from: CT Lower Extremity w/ IV Cont, Left (11.29.23 @ 21:05) >    ACC: [id number] EXAM:  CT LWR EXT IC LT   ORDERED BY: SARI VILLAR     PROCEDURE DATE:  11/29/2023          INTERPRETATION:  INDICATION: Evaluate for osteomyelitis.    TECHNIQUE: CT of the left tibia/fibula/ankle with intravenous contrast   with axial, sagittal, and coronal multiplanar reformats. Approximately 90   cc intravenous Omnipaque 350 was administered.    Comparison:Foot radiographs dated 11/29/2023    FINDINGS:    Soft tissue ulcerations seen along the plantar aspect of the first toe,   posterior aspect of the heel, and possibly along the lateral aspect of   the forefoot. There is associated soft tissue gas tracking within the   lateral soft tissues along the fifth metatarsal, within the plantar soft   tissues of the midfoot/hindfoot, posterior aspect of the heel, and   traveling proximally along the full length of the Achilles tendon.    No drainable fluid collection seen within the soft tissues. Nonspecific   subcutaneous edema about the visualized lower extremity, which may be  related to cellulitis and lower extremity edema.    There is emphysematous osteomyelitis of the posterolateral aspect of the   calcaneus (series 4 image 493).    Status post amputation of the fifth ray to the level of the fifth   metatarsal neck. Amputation margin appears fairly well-corticated.   However, there is soft tissue gas abutting the lateral margin of the   fifth metatarsal. Underlying osteomyelitis of the fifth metatarsal cannot   be excluded and is within the differential.    No acute displaced fracture or dislocation. Osteoarthritic changes within   the knee. Chronic osteochondral lesion of the lateral talar dome.   Osteoarthritic changes throughout the foot.    Atherosclerotic calcifications are noted.    IMPRESSION:    Soft tissue ulcerations seen along the plantar aspect of the first toe,   posterior aspect of the heel, and possibly along the lateral aspect of   the forefoot. Associated soft tissue gas tracking within the lateral soft   tissues along the fifth metatarsal, within the plantar soft tissues of   the midfoot/hindfoot, posterior aspect of the heel, and traveling   proximally along the full length of the Achilles tendon, which may   represent a gas-forming/necrotizing infection.    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    Status post amputation of the fifth ray to the level of the fifth   metatarsal neck. Amputation margin appears fairly well-corticated.   However, there is soft tissue gas abutting the lateral margin of the   fifth metatarsal. Underlying osteomyelitis of the fifth metatarsal cannot   be excluded and is within the differential.    Nonspecific subcutaneous edema about the visualized lower extremity,   which may be related to cellulitis andlower extremity edema. No   drainable fluid collection within the soft tissues.    Chronic osteochondral lesion of the lateral talar dome.    --- End of Report ---    < end of copied text >     Patient is a 63y old  Male who presents with a chief complaint of Necrotizing fasciitis (03 Dec 2023 14:29)    HPI:  Mr. Mcdaniel is a 63M PMHx DM, HLD, PAD, prior toe amputation ( 2013), presents to [facility] ED w L foot wound. Reports wound has been present on hindfoot x 3 weeks. Started as small cut. Over last 24 hours, pt unable to ambulate d/t severe pain. Reports significant malodor. Denies subjective fevers, chills, purulent drainage, numbness/paresthesia.     In ED, patient is tachycardic to 123. BP w HTN. Febrile to 102.4 F. Labs w WBC 21. Hgb 11.4. Lac 2.4. . XR L foot w soft tissue gas at lateral hindfoot. CT LLE non con w soft tissue gas tracking ~7cm above foot.     Was started on Vancomycin, Zosyn and Clindamycin. 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    No further fevers, WBC 21-->12-->14  ,   Bcx 11/29; 1/4 Dermabacter hominis  Wound CX 11/30 : Citrobacter Koseri, Enterococcus fecalis    s/p Vancomycin 11/29-12/1  s/p clindamycin 11/29/21/1  On Zosyn    prior hospital charts reviewed [  ]  primary team notes reviewed [ x ]  other consultant notes reviewed [  x]    PAST MEDICAL & SURGICAL HISTORY:  Retinopathy      Diabetes mellitus type 2 in obese      Hyperlipidemia      Toe infection  s/p amputation      Ruptured appendix      S/P appendectomy      Toe amputation status  left pinky toe          Allergies  No Known Allergies    ANTIMICROBIALS (past 90 days)  MEDICATIONS  (STANDING):      piperacillin/tazobactam IVPB.. 3.375 every 8 hours    MEDICATIONS  (STANDING):  enalapril 10 every 12 hours  enoxaparin Injectable 40 every 24 hours  gabapentin 300 three times a day  guaiFENesin Oral Liquid (Sugar-Free) 100 every 6 hours PRN  HYDROmorphone  Injectable 0.5 every 3 hours PRN  insulin glargine Injectable (LANTUS) 30 at bedtime  insulin lispro (ADMELOG) corrective regimen sliding scale  every 6 hours  insulin lispro (ADMELOG) corrective regimen sliding scale  three times a day before meals  oxyCODONE    IR 5 every 4 hours PRN  oxyCODONE    IR 10 every 4 hours PRN  polyethylene glycol 3350 17 every 12 hours  senna 2 at bedtime  simvastatin 40 at bedtime  tamsulosin 0.4 at bedtime    SOCIAL HISTORY:      FAMILY HISTORY:  Family history of diabetes mellitus in grandmother (Grandparent)      REVIEW OF SYSTEMS  [  ] ROS unobtainable because:    [ x ] All other systems negative except as noted below:	    Constitutional:  [ ] fever [ ] chills  [ ] weight loss  [ ] weakness  Skin:  [ ] rash [ ] phlebitis	  Eyes: [ ] icterus [ ] pain  [ ] discharge	  ENMT: [ ] sore throat  [ ] thrush [ ] ulcers [ ] exudates  Respiratory: [ ] dyspnea [ ] hemoptysis [ ] cough [ ] sputum	  Cardiovascular:  [ ] chest pain [ ] palpitations [ ] edema	  Gastrointestinal:  [ ] nausea [ ] vomiting [ ] diarrhea [ ] constipation [ ] pain	  Genitourinary:  [ ] dysuria [ ] frequency [ ] hematuria [ ] discharge [ ] flank pain  [ ] incontinence  Musculoskeletal:  [ ] myalgias [ ] arthralgias [ ] arthritis  [ ] back pain  Neurological:  [ ] headache [ ] seizures  [ ] confusion/altered mental status  Psychiatric:  [ ] anxiety [ ] depression	  Hematology/Lymphatics:  [ ] lymphadenopathy  Endocrine:  [ ] adrenal [ ] thyroid  Allergic/Immunologic:	 [ ] transplant [ ] seasonal    Vital Signs Last 24 Hrs  T(F): 97.9 (12-04-23 @ 10:11), Max: 102.4 (11-29-23 @ 18:09)  Vital Signs Last 24 Hrs  HR: 91 (12-04-23 @ 10:11) (89 - 92)  BP: 137/72 (12-04-23 @ 10:11) (137/72 - 167/90)  RR: 18 (12-04-23 @ 10:11)  SpO2: 93% (12-04-23 @ 10:11) (92% - 95%)  Wt(kg): --    PHYSICAL EXAM:                              10.7   14.15 )-----------( 427      ( 04 Dec 2023 05:31 )             33.9   12-04    136  |  99  |  15  ----------------------------<  187<H>  4.3   |  28  |  0.89    Ca    9.0      04 Dec 2023 05:31  Phos  3.4     12-04  Mg     1.9     12-04      Urinalysis Basic - ( 04 Dec 2023 05:31 )    Color: x / Appearance: x / SG: x / pH: x  Gluc: 187 mg/dL / Ketone: x  / Bili: x / Urobili: x   Blood: x / Protein: x / Nitrite: x   Leuk Esterase: x / RBC: x / WBC x   Sq Epi: x / Non Sq Epi: x / Bacteria: x    MICROBIOLOGY:Vancomycin Level, Trough: 10.4 (12-01 @ 05:13)    Culture - Other (collected 30 Nov 2023 06:27)  Source: .Other Other  Final Report (02 Dec 2023 22:18):    Numerous Citrobacter koseri    Moderate Enterococcus faecalis  Organism: Citrobacter koseri  Enterococcus faecalis (02 Dec 2023 22:18)  Organism: Enterococcus faecalis (02 Dec 2023 22:18)      -  Vancomycin: S 2      -  Ampicillin: S <=2 Predicts results to ampicillin/sulbactam, amoxacillin-clavulanate and  piperacillin-tazobactam.      -  Tetracycline: R >8      Method Type: ARMEN  Organism: Citrobacter koseri (02 Dec 2023 22:18)      -  Levofloxacin: S <=0.5      -  Tobramycin: S <=2      -  Aztreonam: S <=4      -  Gentamicin: S <=2      -  Cefazolin: S <=2      -  Cefepime: S <=2      -  Piperacillin/Tazobactam: S <=8      -  Ciprofloxacin: S <=0.25      -  Imipenem: S <=1      -  Ceftriaxone: S <=1      -  Ampicillin: R >16 These ampicillin results predict results for amoxicillin      Method Type: ARMEN      -  Meropenem: S <=1      -  Ampicillin/Sulbactam: S <=4/2      -  Cefoxitin: S <=8      -  Amoxicillin/Clavulanic Acid: S <=8/4      -  Trimethoprim/Sulfamethoxazole: S <=0.5/9.5      -  Ertapenem: S <=0.5    Culture - Blood (collected 29 Nov 2023 18:23)  Source: .Blood Blood-Venous  Preliminary Report (03 Dec 2023 22:01):    No growth at 4 days    Culture - Blood (collected 29 Nov 2023 18:23)  Source: .Blood Blood-Venous  Gram Stain (02 Dec 2023 03:35):    Growth in anaerobic bottle: Gram Positive Rods  Final Report (02 Dec 2023 23:49):    Growth in anaerobic bottle: Dermabacter hominis "Susceptibilities not    performed"    Direct identification is available within approximately 3-5    hours either by Blood Panel Multiplexed PCR or Direct    MALDI-TOF. Details: [email or web address]  Organism: Blood Culture PCR (02 Dec 2023 23:49)  Organism: Blood Culture PCR (02 Dec 2023 23:49)      Method Type: PCR      -  Blood PCR Panel: NEG                  RADIOLOGY:  imaging below personally reviewed and agree with findings    < from: CT Lower Extremity w/ IV Cont, Left (11.29.23 @ 21:05) >    ACC: [id number] EXAM:  CT LWR EXT IC LT   ORDERED BY: SARI VILLAR     PROCEDURE DATE:  11/29/2023          INTERPRETATION:  INDICATION: Evaluate for osteomyelitis.    TECHNIQUE: CT of the left tibia/fibula/ankle with intravenous contrast   with axial, sagittal, and coronal multiplanar reformats. Approximately 90   cc intravenous Omnipaque 350 was administered.    Comparison:Foot radiographs dated 11/29/2023    FINDINGS:    Soft tissue ulcerations seen along the plantar aspect of the first toe,   posterior aspect of the heel, and possibly along the lateral aspect of   the forefoot. There is associated soft tissue gas tracking within the   lateral soft tissues along the fifth metatarsal, within the plantar soft   tissues of the midfoot/hindfoot, posterior aspect of the heel, and   traveling proximally along the full length of the Achilles tendon.    No drainable fluid collection seen within the soft tissues. Nonspecific   subcutaneous edema about the visualized lower extremity, which may be  related to cellulitis and lower extremity edema.    There is emphysematous osteomyelitis of the posterolateral aspect of the   calcaneus (series 4 image 493).    Status post amputation of the fifth ray to the level of the fifth   metatarsal neck. Amputation margin appears fairly well-corticated.   However, there is soft tissue gas abutting the lateral margin of the   fifth metatarsal. Underlying osteomyelitis of the fifth metatarsal cannot   be excluded and is within the differential.    No acute displaced fracture or dislocation. Osteoarthritic changes within   the knee. Chronic osteochondral lesion of the lateral talar dome.   Osteoarthritic changes throughout the foot.    Atherosclerotic calcifications are noted.    IMPRESSION:    Soft tissue ulcerations seen along the plantar aspect of the first toe,   posterior aspect of the heel, and possibly along the lateral aspect of   the forefoot. Associated soft tissue gas tracking within the lateral soft   tissues along the fifth metatarsal, within the plantar soft tissues of   the midfoot/hindfoot, posterior aspect of the heel, and traveling   proximally along the full length of the Achilles tendon, which may   represent a gas-forming/necrotizing infection.    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    Status post amputation of the fifth ray to the level of the fifth   metatarsal neck. Amputation margin appears fairly well-corticated.   However, there is soft tissue gas abutting the lateral margin of the   fifth metatarsal. Underlying osteomyelitis of the fifth metatarsal cannot   be excluded and is within the differential.    Nonspecific subcutaneous edema about the visualized lower extremity,   which may be related to cellulitis andlower extremity edema. No   drainable fluid collection within the soft tissues.    Chronic osteochondral lesion of the lateral talar dome.    --- End of Report ---    < end of copied text >     Patient is a 63y old  Male who presents with a chief complaint of Necrotizing fasciitis (03 Dec 2023 14:29)    HPI:  Mr. Mcdaniel is a 63M PMHx DM, HLD, PAD, prior toe amputation ( 2013), presents to [facility] ED w L foot wound. Reports wound has been present on hindfoot x 3 weeks. Started as small cut. Over last 24 hours, pt unable to ambulate d/t severe pain. Reports significant malodor. Denies subjective fevers, chills, purulent drainage, numbness/paresthesia.     In ED, patient is tachycardic to 123. BP w HTN. Febrile to 102.4 F. Labs w WBC 21. Hgb 11.4. Lac 2.4. . XR L foot w soft tissue gas at lateral hindfoot. CT LLE non con w soft tissue gas tracking ~7cm above foot.     Was started on Vancomycin, Zosyn and Clindamycin. 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    No further fevers, WBC 21-->12-->14  ,   Bcx 11/29; 1/4 Dermabacter hominis  Wound CX 11/30 : Citrobacter Koseri, Enterococcus fecalis    s/p Vancomycin 11/29-12/1  s/p clindamycin 11/29/21/1  On Zosyn    prior hospital charts reviewed [  ]  primary team notes reviewed [ x ]  other consultant notes reviewed [  x]    PAST MEDICAL & SURGICAL HISTORY:  Retinopathy      Diabetes mellitus type 2 in obese      Hyperlipidemia      Toe infection  s/p amputation      Ruptured appendix      S/P appendectomy      Toe amputation status  left pinky toe          Allergies  No Known Allergies    ANTIMICROBIALS (past 90 days)  MEDICATIONS  (STANDING):      piperacillin/tazobactam IVPB.. 3.375 every 8 hours    MEDICATIONS  (STANDING):  enalapril 10 every 12 hours  enoxaparin Injectable 40 every 24 hours  gabapentin 300 three times a day  guaiFENesin Oral Liquid (Sugar-Free) 100 every 6 hours PRN  HYDROmorphone  Injectable 0.5 every 3 hours PRN  insulin glargine Injectable (LANTUS) 30 at bedtime  insulin lispro (ADMELOG) corrective regimen sliding scale  every 6 hours  insulin lispro (ADMELOG) corrective regimen sliding scale  three times a day before meals  oxyCODONE    IR 5 every 4 hours PRN  oxyCODONE    IR 10 every 4 hours PRN  polyethylene glycol 3350 17 every 12 hours  senna 2 at bedtime  simvastatin 40 at bedtime  tamsulosin 0.4 at bedtime    SOCIAL HISTORY:      FAMILY HISTORY:  Family history of diabetes mellitus in grandmother (Grandparent)      REVIEW OF SYSTEMS  [  ] ROS unobtainable because:    [ x ] All other systems negative except as noted below:	    Constitutional:  [ ] fever [ ] chills  [ ] weight loss  [ ] weakness  Skin:  [ ] rash [ ] phlebitis	  Eyes: [ ] icterus [ ] pain  [ ] discharge	  ENMT: [ ] sore throat  [ ] thrush [ ] ulcers [ ] exudates  Respiratory: [ ] dyspnea [ ] hemoptysis [ ] cough [ ] sputum	  Cardiovascular:  [ ] chest pain [ ] palpitations [ ] edema	  Gastrointestinal:  [ ] nausea [ ] vomiting [ ] diarrhea [ ] constipation [ ] pain	  Genitourinary:  [ ] dysuria [ ] frequency [ ] hematuria [ ] discharge [ ] flank pain  [ ] incontinence  Musculoskeletal:  [ ] myalgias [ ] arthralgias [ ] arthritis  [ ] back pain  Neurological:  [ ] headache [ ] seizures  [ ] confusion/altered mental status  Psychiatric:  [ ] anxiety [ ] depression	  Hematology/Lymphatics:  [ ] lymphadenopathy  Endocrine:  [ ] adrenal [ ] thyroid  Allergic/Immunologic:	 [ ] transplant [ ] seasonal    Vital Signs Last 24 Hrs  T(F): 97.9 (12-04-23 @ 10:11), Max: 102.4 (11-29-23 @ 18:09)  Vital Signs Last 24 Hrs  HR: 91 (12-04-23 @ 10:11) (89 - 92)  BP: 137/72 (12-04-23 @ 10:11) (137/72 - 167/90)  RR: 18 (12-04-23 @ 10:11)  SpO2: 93% (12-04-23 @ 10:11) (92% - 95%)  Wt(kg): --    PHYSICAL EXAM:                              10.7   14.15 )-----------( 427      ( 04 Dec 2023 05:31 )             33.9   12-04    136  |  99  |  15  ----------------------------<  187<H>  4.3   |  28  |  0.89    Ca    9.0      04 Dec 2023 05:31  Phos  3.4     12-04  Mg     1.9     12-04      Urinalysis Basic - ( 04 Dec 2023 05:31 )    Color: x / Appearance: x / SG: x / pH: x  Gluc: 187 mg/dL / Ketone: x  / Bili: x / Urobili: x   Blood: x / Protein: x / Nitrite: x   Leuk Esterase: x / RBC: x / WBC x   Sq Epi: x / Non Sq Epi: x / Bacteria: x    MICROBIOLOGY:Vancomycin Level, Trough: 10.4 (12-01 @ 05:13)    Culture - Other (collected 30 Nov 2023 06:27)  Source: .Other Other  Final Report (02 Dec 2023 22:18):    Numerous Citrobacter koseri    Moderate Enterococcus faecalis  Organism: Citrobacter koseri  Enterococcus faecalis (02 Dec 2023 22:18)  Organism: Enterococcus faecalis (02 Dec 2023 22:18)      -  Vancomycin: S 2      -  Ampicillin: S <=2 Predicts results to ampicillin/sulbactam, amoxacillin-clavulanate and  piperacillin-tazobactam.      -  Tetracycline: R >8      Method Type: ARMEN  Organism: Citrobacter koseri (02 Dec 2023 22:18)      -  Levofloxacin: S <=0.5      -  Tobramycin: S <=2      -  Aztreonam: S <=4      -  Gentamicin: S <=2      -  Cefazolin: S <=2      -  Cefepime: S <=2      -  Piperacillin/Tazobactam: S <=8      -  Ciprofloxacin: S <=0.25      -  Imipenem: S <=1      -  Ceftriaxone: S <=1      -  Ampicillin: R >16 These ampicillin results predict results for amoxicillin      Method Type: ARMEN      -  Meropenem: S <=1      -  Ampicillin/Sulbactam: S <=4/2      -  Cefoxitin: S <=8      -  Amoxicillin/Clavulanic Acid: S <=8/4      -  Trimethoprim/Sulfamethoxazole: S <=0.5/9.5      -  Ertapenem: S <=0.5    Culture - Blood (collected 29 Nov 2023 18:23)  Source: .Blood Blood-Venous  Preliminary Report (03 Dec 2023 22:01):    No growth at 4 days    Culture - Blood (collected 29 Nov 2023 18:23)  Source: .Blood Blood-Venous  Gram Stain (02 Dec 2023 03:35):    Growth in anaerobic bottle: Gram Positive Rods  Final Report (02 Dec 2023 23:49):    Growth in anaerobic bottle: Dermabacter hominis "Susceptibilities not    performed"    Direct identification is available within approximately 3-5    hours either by Blood Panel Multiplexed PCR or Direct    MALDI-TOF. Details: [email or web address]  Organism: Blood Culture PCR (02 Dec 2023 23:49)  Organism: Blood Culture PCR (02 Dec 2023 23:49)      Method Type: PCR      -  Blood PCR Panel: NEG                  RADIOLOGY:  imaging below personally reviewed and agree with findings    < from: CT Lower Extremity w/ IV Cont, Left (11.29.23 @ 21:05) >    ACC: [id number] EXAM:  CT LWR EXT IC LT   ORDERED BY: SARI VILLAR     PROCEDURE DATE:  11/29/2023          INTERPRETATION:  INDICATION: Evaluate for osteomyelitis.    TECHNIQUE: CT of the left tibia/fibula/ankle with intravenous contrast   with axial, sagittal, and coronal multiplanar reformats. Approximately 90   cc intravenous Omnipaque 350 was administered.    Comparison:Foot radiographs dated 11/29/2023    FINDINGS:    Soft tissue ulcerations seen along the plantar aspect of the first toe,   posterior aspect of the heel, and possibly along the lateral aspect of   the forefoot. There is associated soft tissue gas tracking within the   lateral soft tissues along the fifth metatarsal, within the plantar soft   tissues of the midfoot/hindfoot, posterior aspect of the heel, and   traveling proximally along the full length of the Achilles tendon.    No drainable fluid collection seen within the soft tissues. Nonspecific   subcutaneous edema about the visualized lower extremity, which may be  related to cellulitis and lower extremity edema.    There is emphysematous osteomyelitis of the posterolateral aspect of the   calcaneus (series 4 image 493).    Status post amputation of the fifth ray to the level of the fifth   metatarsal neck. Amputation margin appears fairly well-corticated.   However, there is soft tissue gas abutting the lateral margin of the   fifth metatarsal. Underlying osteomyelitis of the fifth metatarsal cannot   be excluded and is within the differential.    No acute displaced fracture or dislocation. Osteoarthritic changes within   the knee. Chronic osteochondral lesion of the lateral talar dome.   Osteoarthritic changes throughout the foot.    Atherosclerotic calcifications are noted.    IMPRESSION:    Soft tissue ulcerations seen along the plantar aspect of the first toe,   posterior aspect of the heel, and possibly along the lateral aspect of   the forefoot. Associated soft tissue gas tracking within the lateral soft   tissues along the fifth metatarsal, within the plantar soft tissues of   the midfoot/hindfoot, posterior aspect of the heel, and traveling   proximally along the full length of the Achilles tendon, which may   represent a gas-forming/necrotizing infection.    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    Status post amputation of the fifth ray to the level of the fifth   metatarsal neck. Amputation margin appears fairly well-corticated.   However, there is soft tissue gas abutting the lateral margin of the   fifth metatarsal. Underlying osteomyelitis of the fifth metatarsal cannot   be excluded and is within the differential.    Nonspecific subcutaneous edema about the visualized lower extremity,   which may be related to cellulitis andlower extremity edema. No   drainable fluid collection within the soft tissues.    Chronic osteochondral lesion of the lateral talar dome.    --- End of Report ---    < end of copied text >     Patient is a 63y old  Male who presents with a chief complaint of Necrotizing fasciitis (03 Dec 2023 14:29)    HPI:  Mr. Mcdaniel is a 63M PMHx DM, HLD, PAD, prior toe amputation ( 2013), presents to [facility] ED w L foot wound. Reports wound has been present on hindfoot x 3 weeks. Started as small cut. Over last 24 hours, pt unable to ambulate d/t severe pain. Reports significant malodor. Denies subjective fevers, chills, purulent drainage, numbness/paresthesia.     In ED, patient is tachycardic to 123. BP w HTN. Febrile to 102.4 F. Labs w WBC 21. Hgb 11.4. Lac 2.4. . XR L foot w soft tissue gas at lateral hindfoot. CT LLE non con w soft tissue gas tracking ~7cm above foot.     Was started on Vancomycin, Zosyn and Clindamycin. 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    No further fevers, WBC 21-->12-->14  ,   Bcx 11/29; 1/4 Dermabacter hominis  Wound CX 11/30 : Citrobacter Koseri, Enterococcus fecalis    s/p Vancomycin 11/29-12/1  s/p clindamycin 11/29/21/1  On Zosyn    prior hospital charts reviewed [  ]  primary team notes reviewed [ x ]  other consultant notes reviewed [  x]    PAST MEDICAL & SURGICAL HISTORY:  Retinopathy      Diabetes mellitus type 2 in obese      Hyperlipidemia      Toe infection  s/p amputation      Ruptured appendix      S/P appendectomy      Toe amputation status  left pinky toe          Allergies  No Known Allergies    ANTIMICROBIALS (past 90 days)  MEDICATIONS  (STANDING):      piperacillin/tazobactam IVPB.. 3.375 every 8 hours    MEDICATIONS  (STANDING):  enalapril 10 every 12 hours  enoxaparin Injectable 40 every 24 hours  gabapentin 300 three times a day  guaiFENesin Oral Liquid (Sugar-Free) 100 every 6 hours PRN  HYDROmorphone  Injectable 0.5 every 3 hours PRN  insulin glargine Injectable (LANTUS) 30 at bedtime  insulin lispro (ADMELOG) corrective regimen sliding scale  every 6 hours  insulin lispro (ADMELOG) corrective regimen sliding scale  three times a day before meals  oxyCODONE    IR 5 every 4 hours PRN  oxyCODONE    IR 10 every 4 hours PRN  polyethylene glycol 3350 17 every 12 hours  senna 2 at bedtime  simvastatin 40 at bedtime  tamsulosin 0.4 at bedtime    SOCIAL HISTORY:  Lives with family, no drug alcohol use    FAMILY HISTORY:  Family history of diabetes mellitus in grandmother (Grandparent)      REVIEW OF SYSTEMS  [  ] ROS unobtainable because:    [ x ] All other systems negative except as noted below:	    Constitutional:  [ ] fever [ ] chills  [ ] weight loss  [ ] weakness  Skin:  [ ] rash [ ] phlebitis	  Eyes: [ ] icterus [ ] pain  [ ] discharge	  ENMT: [ ] sore throat  [ ] thrush [ ] ulcers [ ] exudates  Respiratory: [ ] dyspnea [ ] hemoptysis [ ] cough [ ] sputum	  Cardiovascular:  [ ] chest pain [ ] palpitations [ ] edema	  Gastrointestinal:  [ ] nausea [ ] vomiting [ ] diarrhea [ ] constipation [ ] pain	  Genitourinary:  [ ] dysuria [ ] frequency [ ] hematuria [ ] discharge [ ] flank pain  [ ] incontinence  Musculoskeletal:  [ ] myalgias [ ] arthralgias [ ] arthritis  [ ] back pain + leg pain  Neurological:  [ ] headache [ ] seizures  [ ] confusion/altered mental status  Psychiatric:  [ ] anxiety [ ] depression	  Hematology/Lymphatics:  [ ] lymphadenopathy  Endocrine:  [ ] adrenal [ ] thyroid  Allergic/Immunologic:	 [ ] transplant [ ] seasonal    Vital Signs Last 24 Hrs  T(F): 97.9 (12-04-23 @ 10:11), Max: 102.4 (11-29-23 @ 18:09)  Vital Signs Last 24 Hrs  HR: 91 (12-04-23 @ 10:11) (89 - 92)  BP: 137/72 (12-04-23 @ 10:11) (137/72 - 167/90)  RR: 18 (12-04-23 @ 10:11)  SpO2: 93% (12-04-23 @ 10:11) (92% - 95%)  Wt(kg): --    PHYSICAL EXAM:    General: Patient in NAD  HEENT: NCAT, EOMI, PERRL, no oral lesions  CV: S1+S2, no m/r/g appreciated   Lungs: No respiratory distress, CTAB  Abd: Soft, nontender, no guarding, no rebound tenderness, + bowel sounds   : No suprapubic tenderness  Neuro: Alert and oriented to time, place and person. No focal deficits noted.   Ext: L BKA; stump with no discharge/purulence  Skin: No rash, no phlebitis                              10.7   14.15 )-----------( 427      ( 04 Dec 2023 05:31 )             33.9   12-04    136  |  99  |  15  ----------------------------<  187<H>  4.3   |  28  |  0.89    Ca    9.0      04 Dec 2023 05:31  Phos  3.4     12-04  Mg     1.9     12-04      Urinalysis Basic - ( 04 Dec 2023 05:31 )    Color: x / Appearance: x / SG: x / pH: x  Gluc: 187 mg/dL / Ketone: x  / Bili: x / Urobili: x   Blood: x / Protein: x / Nitrite: x   Leuk Esterase: x / RBC: x / WBC x   Sq Epi: x / Non Sq Epi: x / Bacteria: x    MICROBIOLOGY:Vancomycin Level, Trough: 10.4 (12-01 @ 05:13)    Culture - Other (collected 30 Nov 2023 06:27)  Source: .Other Other  Final Report (02 Dec 2023 22:18):    Numerous Citrobacter koseri    Moderate Enterococcus faecalis  Organism: Citrobacter koseri  Enterococcus faecalis (02 Dec 2023 22:18)  Organism: Enterococcus faecalis (02 Dec 2023 22:18)      -  Vancomycin: S 2      -  Ampicillin: S <=2 Predicts results to ampicillin/sulbactam, amoxacillin-clavulanate and  piperacillin-tazobactam.      -  Tetracycline: R >8      Method Type: ARMEN  Organism: Citrobacter koseri (02 Dec 2023 22:18)      -  Levofloxacin: S <=0.5      -  Tobramycin: S <=2      -  Aztreonam: S <=4      -  Gentamicin: S <=2      -  Cefazolin: S <=2      -  Cefepime: S <=2      -  Piperacillin/Tazobactam: S <=8      -  Ciprofloxacin: S <=0.25      -  Imipenem: S <=1      -  Ceftriaxone: S <=1      -  Ampicillin: R >16 These ampicillin results predict results for amoxicillin      Method Type: ARMEN      -  Meropenem: S <=1      -  Ampicillin/Sulbactam: S <=4/2      -  Cefoxitin: S <=8      -  Amoxicillin/Clavulanic Acid: S <=8/4      -  Trimethoprim/Sulfamethoxazole: S <=0.5/9.5      -  Ertapenem: S <=0.5    Culture - Blood (collected 29 Nov 2023 18:23)  Source: .Blood Blood-Venous  Preliminary Report (03 Dec 2023 22:01):    No growth at 4 days    Culture - Blood (collected 29 Nov 2023 18:23)  Source: .Blood Blood-Venous  Gram Stain (02 Dec 2023 03:35):    Growth in anaerobic bottle: Gram Positive Rods  Final Report (02 Dec 2023 23:49):    Growth in anaerobic bottle: Dermabacter hominis "Susceptibilities not    performed"    Direct identification is available within approximately 3-5    hours either by Blood Panel Multiplexed PCR or Direct    MALDI-TOF. Details: [email or web address]  Organism: Blood Culture PCR (02 Dec 2023 23:49)  Organism: Blood Culture PCR (02 Dec 2023 23:49)      Method Type: PCR      -  Blood PCR Panel: NEG                  RADIOLOGY:  imaging below personally reviewed and agree with findings    < from: CT Lower Extremity w/ IV Cont, Left (11.29.23 @ 21:05) >    ACC: [id number] EXAM:  CT LWR EXT IC LT   ORDERED BY: SARI VILLAR     PROCEDURE DATE:  11/29/2023          INTERPRETATION:  INDICATION: Evaluate for osteomyelitis.    TECHNIQUE: CT of the left tibia/fibula/ankle with intravenous contrast   with axial, sagittal, and coronal multiplanar reformats. Approximately 90   cc intravenous Omnipaque 350 was administered.    Comparison:Foot radiographs dated 11/29/2023    FINDINGS:    Soft tissue ulcerations seen along the plantar aspect of the first toe,   posterior aspect of the heel, and possibly along the lateral aspect of   the forefoot. There is associated soft tissue gas tracking within the   lateral soft tissues along the fifth metatarsal, within the plantar soft   tissues of the midfoot/hindfoot, posterior aspect of the heel, and   traveling proximally along the full length of the Achilles tendon.    No drainable fluid collection seen within the soft tissues. Nonspecific   subcutaneous edema about the visualized lower extremity, which may be  related to cellulitis and lower extremity edema.    There is emphysematous osteomyelitis of the posterolateral aspect of the   calcaneus (series 4 image 493).    Status post amputation of the fifth ray to the level of the fifth   metatarsal neck. Amputation margin appears fairly well-corticated.   However, there is soft tissue gas abutting the lateral margin of the   fifth metatarsal. Underlying osteomyelitis of the fifth metatarsal cannot   be excluded and is within the differential.    No acute displaced fracture or dislocation. Osteoarthritic changes within   the knee. Chronic osteochondral lesion of the lateral talar dome.   Osteoarthritic changes throughout the foot.    Atherosclerotic calcifications are noted.    IMPRESSION:    Soft tissue ulcerations seen along the plantar aspect of the first toe,   posterior aspect of the heel, and possibly along the lateral aspect of   the forefoot. Associated soft tissue gas tracking within the lateral soft   tissues along the fifth metatarsal, within the plantar soft tissues of   the midfoot/hindfoot, posterior aspect of the heel, and traveling   proximally along the full length of the Achilles tendon, which may   represent a gas-forming/necrotizing infection.    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    Status post amputation of the fifth ray to the level of the fifth   metatarsal neck. Amputation margin appears fairly well-corticated.   However, there is soft tissue gas abutting the lateral margin of the   fifth metatarsal. Underlying osteomyelitis of the fifth metatarsal cannot   be excluded and is within the differential.    Nonspecific subcutaneous edema about the visualized lower extremity,   which may be related to cellulitis andlower extremity edema. No   drainable fluid collection within the soft tissues.    Chronic osteochondral lesion of the lateral talar dome.    --- End of Report ---    < end of copied text >     Patient is a 63y old  Male who presents with a chief complaint of Necrotizing fasciitis (03 Dec 2023 14:29)    HPI:  Mr. Mcdaniel is a 63M PMHx DM, HLD, PAD, prior toe amputation ( 2013), presents to [facility] ED w L foot wound. Reports wound has been present on hindfoot x 3 weeks. Started as small cut. Over last 24 hours, pt unable to ambulate d/t severe pain. Reports significant malodor. Denies subjective fevers, chills, purulent drainage, numbness/paresthesia.     In ED, patient is tachycardic to 123. BP w HTN. Febrile to 102.4 F. Labs w WBC 21. Hgb 11.4. Lac 2.4. . XR L foot w soft tissue gas at lateral hindfoot. CT LLE non con w soft tissue gas tracking ~7cm above foot.     Was started on Vancomycin, Zosyn and Clindamycin. 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    No further fevers, WBC 21-->12-->14  ,   Bcx 11/29; 1/4 Dermabacter hominis  Wound CX 11/30 : Citrobacter Koseri, Enterococcus fecalis    s/p Vancomycin 11/29-12/1  s/p clindamycin 11/29/21/1  On Zosyn    prior hospital charts reviewed [  ]  primary team notes reviewed [ x ]  other consultant notes reviewed [  x]    PAST MEDICAL & SURGICAL HISTORY:  Retinopathy      Diabetes mellitus type 2 in obese      Hyperlipidemia      Toe infection  s/p amputation      Ruptured appendix      S/P appendectomy      Toe amputation status  left pinky toe          Allergies  No Known Allergies    ANTIMICROBIALS (past 90 days)  MEDICATIONS  (STANDING):      piperacillin/tazobactam IVPB.. 3.375 every 8 hours    MEDICATIONS  (STANDING):  enalapril 10 every 12 hours  enoxaparin Injectable 40 every 24 hours  gabapentin 300 three times a day  guaiFENesin Oral Liquid (Sugar-Free) 100 every 6 hours PRN  HYDROmorphone  Injectable 0.5 every 3 hours PRN  insulin glargine Injectable (LANTUS) 30 at bedtime  insulin lispro (ADMELOG) corrective regimen sliding scale  every 6 hours  insulin lispro (ADMELOG) corrective regimen sliding scale  three times a day before meals  oxyCODONE    IR 5 every 4 hours PRN  oxyCODONE    IR 10 every 4 hours PRN  polyethylene glycol 3350 17 every 12 hours  senna 2 at bedtime  simvastatin 40 at bedtime  tamsulosin 0.4 at bedtime    SOCIAL HISTORY:  Lives with family, no drug alcohol use    FAMILY HISTORY:  Family history of diabetes mellitus in grandmother (Grandparent)      REVIEW OF SYSTEMS  [  ] ROS unobtainable because:    [ x ] All other systems negative except as noted below:	    Constitutional:  [ ] fever [ ] chills  [ ] weight loss  [ ] weakness  Skin:  [ ] rash [ ] phlebitis	  Eyes: [ ] icterus [ ] pain  [ ] discharge	  ENMT: [ ] sore throat  [ ] thrush [ ] ulcers [ ] exudates  Respiratory: [ ] dyspnea [ ] hemoptysis [ ] cough [ ] sputum	  Cardiovascular:  [ ] chest pain [ ] palpitations [ ] edema	  Gastrointestinal:  [ ] nausea [ ] vomiting [ ] diarrhea [ ] constipation [ ] pain	  Genitourinary:  [ ] dysuria [ ] frequency [ ] hematuria [ ] discharge [ ] flank pain  [ ] incontinence  Musculoskeletal:  [ ] myalgias [ ] arthralgias [ ] arthritis  [ ] back pain + leg pain  Neurological:  [ ] headache [ ] seizures  [ ] confusion/altered mental status  Psychiatric:  [ ] anxiety [ ] depression	  Hematology/Lymphatics:  [ ] lymphadenopathy  Endocrine:  [ ] adrenal [ ] thyroid  Allergic/Immunologic:	 [ ] transplant [ ] seasonal    Vital Signs Last 24 Hrs  T(F): 97.9 (12-04-23 @ 10:11), Max: 102.4 (11-29-23 @ 18:09)  Vital Signs Last 24 Hrs  HR: 91 (12-04-23 @ 10:11) (89 - 92)  BP: 137/72 (12-04-23 @ 10:11) (137/72 - 167/90)  RR: 18 (12-04-23 @ 10:11)  SpO2: 93% (12-04-23 @ 10:11) (92% - 95%)  Wt(kg): --    PHYSICAL EXAM:    General: Patient in NAD  HEENT: NCAT, EOMI, PERRL, no oral lesions  CV: S1+S2, no m/r/g appreciated   Lungs: No respiratory distress, CTAB  Abd: Soft, nontender, no guarding, no rebound tenderness, + bowel sounds   : No suprapubic tenderness  Neuro: Alert and oriented to time, place and person. No focal deficits noted.   Ext: L BKA; stump with no discharge/purulence  Skin: No rash, no phlebitis                              10.7   14.15 )-----------( 427      ( 04 Dec 2023 05:31 )             33.9   12-04    136  |  99  |  15  ----------------------------<  187<H>  4.3   |  28  |  0.89    Ca    9.0      04 Dec 2023 05:31  Phos  3.4     12-04  Mg     1.9     12-04      Urinalysis Basic - ( 04 Dec 2023 05:31 )    Color: x / Appearance: x / SG: x / pH: x  Gluc: 187 mg/dL / Ketone: x  / Bili: x / Urobili: x   Blood: x / Protein: x / Nitrite: x   Leuk Esterase: x / RBC: x / WBC x   Sq Epi: x / Non Sq Epi: x / Bacteria: x    MICROBIOLOGY:Vancomycin Level, Trough: 10.4 (12-01 @ 05:13)    Culture - Other (collected 30 Nov 2023 06:27)  Source: .Other Other  Final Report (02 Dec 2023 22:18):    Numerous Citrobacter koseri    Moderate Enterococcus faecalis  Organism: Citrobacter koseri  Enterococcus faecalis (02 Dec 2023 22:18)  Organism: Enterococcus faecalis (02 Dec 2023 22:18)      -  Vancomycin: S 2      -  Ampicillin: S <=2 Predicts results to ampicillin/sulbactam, amoxacillin-clavulanate and  piperacillin-tazobactam.      -  Tetracycline: R >8      Method Type: ARMEN  Organism: Citrobacter koseri (02 Dec 2023 22:18)      -  Levofloxacin: S <=0.5      -  Tobramycin: S <=2      -  Aztreonam: S <=4      -  Gentamicin: S <=2      -  Cefazolin: S <=2      -  Cefepime: S <=2      -  Piperacillin/Tazobactam: S <=8      -  Ciprofloxacin: S <=0.25      -  Imipenem: S <=1      -  Ceftriaxone: S <=1      -  Ampicillin: R >16 These ampicillin results predict results for amoxicillin      Method Type: ARMEN      -  Meropenem: S <=1      -  Ampicillin/Sulbactam: S <=4/2      -  Cefoxitin: S <=8      -  Amoxicillin/Clavulanic Acid: S <=8/4      -  Trimethoprim/Sulfamethoxazole: S <=0.5/9.5      -  Ertapenem: S <=0.5    Culture - Blood (collected 29 Nov 2023 18:23)  Source: .Blood Blood-Venous  Preliminary Report (03 Dec 2023 22:01):    No growth at 4 days    Culture - Blood (collected 29 Nov 2023 18:23)  Source: .Blood Blood-Venous  Gram Stain (02 Dec 2023 03:35):    Growth in anaerobic bottle: Gram Positive Rods  Final Report (02 Dec 2023 23:49):    Growth in anaerobic bottle: Dermabacter hominis "Susceptibilities not    performed"    Direct identification is available within approximately 3-5    hours either by Blood Panel Multiplexed PCR or Direct    MALDI-TOF. Details: [email or web address]  Organism: Blood Culture PCR (02 Dec 2023 23:49)  Organism: Blood Culture PCR (02 Dec 2023 23:49)      Method Type: PCR      -  Blood PCR Panel: NEG                  RADIOLOGY:  imaging below personally reviewed and agree with findings    < from: CT Lower Extremity w/ IV Cont, Left (11.29.23 @ 21:05) >    ACC: [id number] EXAM:  CT LWR EXT IC LT   ORDERED BY: SARI VILLAR     PROCEDURE DATE:  11/29/2023          INTERPRETATION:  INDICATION: Evaluate for osteomyelitis.    TECHNIQUE: CT of the left tibia/fibula/ankle with intravenous contrast   with axial, sagittal, and coronal multiplanar reformats. Approximately 90   cc intravenous Omnipaque 350 was administered.    Comparison:Foot radiographs dated 11/29/2023    FINDINGS:    Soft tissue ulcerations seen along the plantar aspect of the first toe,   posterior aspect of the heel, and possibly along the lateral aspect of   the forefoot. There is associated soft tissue gas tracking within the   lateral soft tissues along the fifth metatarsal, within the plantar soft   tissues of the midfoot/hindfoot, posterior aspect of the heel, and   traveling proximally along the full length of the Achilles tendon.    No drainable fluid collection seen within the soft tissues. Nonspecific   subcutaneous edema about the visualized lower extremity, which may be  related to cellulitis and lower extremity edema.    There is emphysematous osteomyelitis of the posterolateral aspect of the   calcaneus (series 4 image 493).    Status post amputation of the fifth ray to the level of the fifth   metatarsal neck. Amputation margin appears fairly well-corticated.   However, there is soft tissue gas abutting the lateral margin of the   fifth metatarsal. Underlying osteomyelitis of the fifth metatarsal cannot   be excluded and is within the differential.    No acute displaced fracture or dislocation. Osteoarthritic changes within   the knee. Chronic osteochondral lesion of the lateral talar dome.   Osteoarthritic changes throughout the foot.    Atherosclerotic calcifications are noted.    IMPRESSION:    Soft tissue ulcerations seen along the plantar aspect of the first toe,   posterior aspect of the heel, and possibly along the lateral aspect of   the forefoot. Associated soft tissue gas tracking within the lateral soft   tissues along the fifth metatarsal, within the plantar soft tissues of   the midfoot/hindfoot, posterior aspect of the heel, and traveling   proximally along the full length of the Achilles tendon, which may   represent a gas-forming/necrotizing infection.    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    Status post amputation of the fifth ray to the level of the fifth   metatarsal neck. Amputation margin appears fairly well-corticated.   However, there is soft tissue gas abutting the lateral margin of the   fifth metatarsal. Underlying osteomyelitis of the fifth metatarsal cannot   be excluded and is within the differential.    Nonspecific subcutaneous edema about the visualized lower extremity,   which may be related to cellulitis andlower extremity edema. No   drainable fluid collection within the soft tissues.    Chronic osteochondral lesion of the lateral talar dome.    --- End of Report ---    < end of copied text >

## 2023-12-04 NOTE — PRE-ANESTHESIA EVALUATION ADULT - NSANTHPEFT_GEN_ALL_CORE
General: well appearing, appears stated age  Cardiovascular: RRR, no murmurs appreciated  Respiratory: CTA B/L
General: well appearing, appears stated age  Cardiovascular: RRR, no murmurs appreciated  Respiratory: CTA B/L

## 2023-12-04 NOTE — PROGRESS NOTE ADULT - ASSESSMENT
63M PMHx DM, HLD, PAD, prior toe amputation, presents to [facility] ED w L foot wound. NSTI of non salvageable L foot. Septic in ED. LRINEC 9. XR/CT both w soft tissue gas. s/p 11/30 L BKA guillotine amp, downgraded from SICU and recovering appropriately on the floor. Formalization planning for Monday.    Plan:  - Plan for formalization today  - Pain control as needed  - Zosyn  - Lovenox for DVT ppx  - Activity as tolerated  - Endo consulted, appreciate recs- 32u lantus qhs, lispro 10u premeal, ISS (30u lantus qhs and hold premeal while NPO)  - BP control  - Bowel regimen  - Daily dressing changes    Plan discussed with fellow on behalf of attending.     Vascular Surgery  [id number]   63M PMHx DM, HLD, PAD, prior toe amputation, presents to [facility] ED w L foot wound. NSTI of non salvageable L foot. Septic in ED. LRINEC 9. XR/CT both w soft tissue gas. s/p 11/30 L BKA guillotine amp, downgraded from SICU and recovering appropriately on the floor. Formalization planning for Monday.    Plan:  - Plan for formalization today  - Pain control as needed  - Zosyn  - Lovenox for DVT ppx  - Activity as tolerated  - Endo consulted, appreciate recs- 32u lantus qhs, lispro 10u premeal, ISS (30u lantus qhs and hold premeal while NPO)  - BP control  - Bowel regimen  - Daily dressing changes    Plan discussed with fellow on behalf of attending.     Vascular Surgery  [id number]

## 2023-12-04 NOTE — CHART NOTE - NSCHARTNOTEFT_GEN_A_CORE
Post Operative Note  Patient: ELVIS VILLATORO 63y (1960) Male   MRN: [id number]  Location: [facility] 9MON 914 W1  Visit: 11-29-23 Inpatient  Date: 12-04-23 @ 23:05    Procedure: S/P below the knee amputation revision on 12/4.    Subjective: Patient seen and examined post operatively. Reports pain as controlled. Denies nausea, vomiting, fever, chills, chest pain, SOB, cough.      Objective:  Vitals: T(F): 98.1 (12-04-23 @ 22:26), Max: 99 (12-04-23 @ 16:36)  HR: 104 (12-04-23 @ 22:26)  BP: 167/98 (12-04-23 @ 22:26) (137/72 - 178/88)  RR: 18 (12-04-23 @ 22:26)  SpO2: 96% (12-04-23 @ 22:26)  Vent Settings:     In:   12-03-23 @ 07:01  -  12-04-23 @ 07:00  --------------------------------------------------------  IN: 1400 mL    12-04-23 @ 07:01  -  12-04-23 @ 23:05  --------------------------------------------------------  IN: 300 mL      IV Fluids: lactated ringers. 1000 milliLiter(s) (100 mL/Hr) IV Continuous <Continuous>      Out:   12-03-23 @ 07:01  -  12-04-23 @ 07:00  --------------------------------------------------------  OUT: 0 mL    12-04-23 @ 07:01  -  12-04-23 @ 23:05  --------------------------------------------------------  OUT: 600 mL      EBL:     Voided Urine:   12-03-23 @ 07:01  -  12-04-23 @ 07:00  --------------------------------------------------------  OUT: 0 mL    12-04-23 @ 07:01  -  12-04-23 @ 23:05  --------------------------------------------------------  OUT: 600 mL      Physical Examination:  General: NAD, resting comfortably in bed, eating sandwich  HEENT: Normocephalic atraumatic  Respiratory: Nonlabored respirations, normal CW expansion.  Cardio: pulse present  Abdomen: soft, nontender  Vascular: left BKA, dressing c/d/i    Imaging:  No post-op imaging studies    Assessment:  63yMale patient S/P below the knee amputation revision on 12/4. No formalization at this time as there was persistent blanching erythema at stump. Will add vanc to zosyn.     Plan:  - IV Abx: vanc/zosyn  - Pain control PRN oxycodone  - Diet: regular, CC  - DVT ppx: SCD's & lovenox    Date/Time: 12-04-23 @ 23:05 Post Operative Note  Patient: ELVIS VILLATORO 63y (1960) Male   MRN: [id number]  Location: [facility] 9MON 914 W1  Visit: 11-29-23 Inpatient  Date: 12-04-23 @ 23:05    Procedure: S/P below the knee amputation revision on 12/4.    Subjective: Patient seen and examined post operatively. Reports pain as controlled. Denies nausea, vomiting, fever, chills, chest pain, SOB, cough.      Objective:  Vitals: T(F): 98.1 (12-04-23 @ 22:26), Max: 99 (12-04-23 @ 16:36)  HR: 104 (12-04-23 @ 22:26)  BP: 167/98 (12-04-23 @ 22:26) (137/72 - 178/88)  RR: 18 (12-04-23 @ 22:26)  SpO2: 96% (12-04-23 @ 22:26)  Vent Settings:     In:   12-03-23 @ 07:01  -  12-04-23 @ 07:00  --------------------------------------------------------  IN: 1400 mL    12-04-23 @ 07:01  -  12-04-23 @ 23:05  --------------------------------------------------------  IN: 300 mL      IV Fluids: lactated ringers. 1000 milliLiter(s) (100 mL/Hr) IV Continuous <Continuous>      Out:   12-03-23 @ 07:01  -  12-04-23 @ 07:00  --------------------------------------------------------  OUT: 0 mL    12-04-23 @ 07:01  -  12-04-23 @ 23:05  --------------------------------------------------------  OUT: 600 mL      EBL:     Voided Urine:   12-03-23 @ 07:01  -  12-04-23 @ 07:00  --------------------------------------------------------  OUT: 0 mL    12-04-23 @ 07:01  -  12-04-23 @ 23:05  --------------------------------------------------------  OUT: 600 mL      Physical Examination:  General: NAD, resting comfortably in bed, eating sandwich  HEENT: Normocephalic atraumatic  Respiratory: Nonlabored respirations, normal CW expansion.  Cardio: pulse present  Abdomen: soft, nontender  Vascular: left BKA, dressing c/d/i    Imaging:  No post-op imaging studies    Assessment:  63yMale patient S/P below the knee amputation revision on 12/4. No formalization at this time as there was persistent blanching erythema at stump. Will add vanc to zosyn.     Plan:  - IV Abx: vanc/zosyn  - Pain control PRN oxycodone  - Diet: regular, CC  - DVT ppx: SCD's & lovenox    Date/Time: 12-04-23 @ 23:05

## 2023-12-04 NOTE — PROGRESS NOTE ADULT - ASSESSMENT
63M w/h/o uncontrolled  DM2 (A1C 11.4%)  > on DM  oral meds PTA. DM c/b Neuropathy, PAD> s/p toe amputation, retinopathy. ? nephropathy per EMR. Also h/o HLD, HTN. Here with severe hyperglycemia and L foot wound and found to have soft tissue gas at lateral hindfoot. 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 Also required insulin drip at time of admission. Endocrine consulted for hyperglycemia. Tolerating POs with BG levels variable. NPO today for vascular surgery. Will continiue to adjust insulin to BG goal 100 to 180s. No hypoglycemia.     Discussed with pt A1C level, comorbidites and the need for at least basal insulin upon discharge to improve DM control and also promote wound healing. Pt states he is willing to do basal/bolus insulin if needed > appears motivated to improve DM care.     Home regimen: [id number] bid and Glipizide 5mg bid. On  Kazano (algoliptin and metformin) in the past,         63M w/h/o uncontrolled  DM2 (A1C 11.4%)  > on DM  oral meds PTA. DM c/b Neuropathy, PAD> s/p toe amputation, retinopathy. ? nephropathy per EMR. Also h/o HLD, HTN. Here with severe hyperglycemia and L foot wound and found to have soft tissue gas at lateral hindfoot. 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 Also required insulin drip at time of admission. Endocrine consulted for hyperglycemia. Tolerating POs with BG levels variable. NPO today for vascular surgery. Will continiue to adjust insulin to BG goal 100 to 180s. No hypoglycemia.     Discussed with pt A1C level, comorbidites and the need for at least basal insulin upon discharge to improve DM control and also promote wound healing. Pt states he is willing to do basal/bolus insulin if needed > appears motivated to improve DM care.     Home regimen: [id number] bid and Glipizide 5mg bid. On  Kazano (algoliptin and metformin) in the past,

## 2023-12-04 NOTE — CONSULT NOTE ADULT - ASSESSMENT
Mr. Mcdaniel is a 63M PMHx DM, HLD, PAD, prior toe amputation, presents to [facility] ED w L foot wound. Reports wound has been present on hindfoot x 3 weeks. Started as small cut. Over last 24 hours, pt unable to ambulate d/t severe pain. Reports significant malodor. Denies subjective fevers, chills, purulent drainage, numbness/paresthesia.     In ED, patient is tachycardic to 123. BP w HTN. Febrile to 102.4 F. Labs w WBC 21. Hgb 11.4. Lac 2.4. . XR L foot w soft tissue gas at lateral hindfoot. CT LLE non con w soft tissue gas tracking ~7cm above foot.     Was started on Vancomycin, Zosyn and Clindamycin. 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    No further fevers, WBC 21-->12-->14  ,   Bcx 11/29; 1/4 Dermabacter hominis  Wound CX 11/30 : Citrobacter Koseri, Enterococcus fecalis    CT  soft tissue ulcerations seen along the plantar aspect of the first toe,   posterior aspect of the heel, and possibly along the lateral aspect of   the forefoot. Associated soft tissue gas tracking within the lateral soft   tissues along the fifth metatarsal, within the plantar soft tissues of   the midfoot/hindfoot, posterior aspect of the heel, and traveling   proximally along the full length of the Achilles tendon, which may   represent a gas-forming/necrotizing infection.    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    # Necrotising fascitis Left foot s/p BKA  # Leucocytosis  # Dermabacter hominis bacteremia  - Noted plan for OR?formalization today  - Please send surgical cultures if concerns for infection  - Agree with Zosyn; will discuss possible Vancomycin coverage for bacteremia    Incomplete note      All recommendations are tentative pending Attending Attestation.    Earnest Kebede MD, PGY-4  ID Fellow  [employer] Teams Preferred  After 5pm/weekends call [phone]     Mr. Mcdaniel is a 63M PMHx DM, HLD, PAD, prior toe amputation, presents to [facility] ED w L foot wound. Reports wound has been present on hindfoot x 3 weeks. Started as small cut. Over last 24 hours, pt unable to ambulate d/t severe pain. Reports significant malodor. Denies subjective fevers, chills, purulent drainage, numbness/paresthesia.     In ED, patient is tachycardic to 123. BP w HTN. Febrile to 102.4 F. Labs w WBC 21. Hgb 11.4. Lac 2.4. . XR L foot w soft tissue gas at lateral hindfoot. CT LLE non con w soft tissue gas tracking ~7cm above foot.     Was started on Vancomycin, Zosyn and Clindamycin. 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    No further fevers, WBC 21-->12-->14  ,   Bcx 11/29; 1/4 Dermabacter hominis  Wound CX 11/30 : Citrobacter Koseri, Enterococcus fecalis    CT  soft tissue ulcerations seen along the plantar aspect of the first toe,   posterior aspect of the heel, and possibly along the lateral aspect of   the forefoot. Associated soft tissue gas tracking within the lateral soft   tissues along the fifth metatarsal, within the plantar soft tissues of   the midfoot/hindfoot, posterior aspect of the heel, and traveling   proximally along the full length of the Achilles tendon, which may   represent a gas-forming/necrotizing infection.    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    # Necrotising fascitis Left foot s/p BKA  # Leucocytosis  # Dermabacter hominis bacteremia  - Noted plan for OR?formalization today  - Please send surgical cultures if concerns for infection  - Agree with Zosyn; will discuss possible Vancomycin coverage for bacteremia    Incomplete note      All recommendations are tentative pending Attending Attestation.    Earnest Kebede MD, PGY-4  ID Fellow  [employer] Teams Preferred  After 5pm/weekends call [phone]     Mr. Mcdaniel is a 63M PMHx DM, HLD, PAD, prior toe amputation, presents to [facility] ED w L foot wound. Reports wound has been present on hindfoot x 3 weeks. Started as small cut. Over last 24 hours, pt unable to ambulate d/t severe pain. Reports significant malodor. Denies subjective fevers, chills, purulent drainage, numbness/paresthesia.     In ED, patient is tachycardic to 123. BP w HTN. Febrile to 102.4 F. Labs w WBC 21. Hgb 11.4. Lac 2.4. . XR L foot w soft tissue gas at lateral hindfoot. CT LLE non con w soft tissue gas tracking ~7cm above foot.     Was started on Vancomycin, Zosyn and Clindamycin. 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    No further fevers, WBC 21-->12-->14  ,   Bcx 11/29; 1/4 Dermabacter hominis  Wound CX 11/30 : Citrobacter Koseri, Enterococcus fecalis    CT  soft tissue ulcerations seen along the plantar aspect of the first toe,   posterior aspect of the heel, and possibly along the lateral aspect of   the forefoot. Associated soft tissue gas tracking within the lateral soft   tissues along the fifth metatarsal, within the plantar soft tissues of   the midfoot/hindfoot, posterior aspect of the heel, and traveling   proximally along the full length of the Achilles tendon, which may   represent a gas-forming/necrotizing infection.    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    # Necrotising fascitis Left foot s/p BKA  # Leucocytosis  # Dermabacter hominis bacteremia  - Noted plan for revision/ formalization today  - Please send surgical cultures if concerns for infection  - Dermacentor bacteremia likely contamination/would not treat  - Leucocytosis likely reactive  - Can switch Zosyn to Unasyn 3 gm [id number]    Discussed with attending    Earnest Kebede MD, PGY-4  ID Fellow  [employer] Teams Preferred  After 5pm/weekends call [phone]     Mr. Mcdaniel is a 63M PMHx DM, HLD, PAD, prior toe amputation, presents to [facility] ED w L foot wound. Reports wound has been present on hindfoot x 3 weeks. Started as small cut. Over last 24 hours, pt unable to ambulate d/t severe pain. Reports significant malodor. Denies subjective fevers, chills, purulent drainage, numbness/paresthesia.     In ED, patient is tachycardic to 123. BP w HTN. Febrile to 102.4 F. Labs w WBC 21. Hgb 11.4. Lac 2.4. . XR L foot w soft tissue gas at lateral hindfoot. CT LLE non con w soft tissue gas tracking ~7cm above foot.     Was started on Vancomycin, Zosyn and Clindamycin. 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    No further fevers, WBC 21-->12-->14  ,   Bcx 11/29; 1/4 Dermabacter hominis  Wound CX 11/30 : Citrobacter Koseri, Enterococcus fecalis    CT  soft tissue ulcerations seen along the plantar aspect of the first toe,   posterior aspect of the heel, and possibly along the lateral aspect of   the forefoot. Associated soft tissue gas tracking within the lateral soft   tissues along the fifth metatarsal, within the plantar soft tissues of   the midfoot/hindfoot, posterior aspect of the heel, and traveling   proximally along the full length of the Achilles tendon, which may   represent a gas-forming/necrotizing infection.    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    # Necrotising fascitis Left foot s/p BKA  # Leucocytosis  # Dermabacter hominis bacteremia  - Noted plan for revision/ formalization today  - Please send surgical cultures if concerns for infection  - Dermacentor bacteremia likely contamination/would not treat  - Leucocytosis likely reactive  - Can switch Zosyn to Unasyn 3 gm [id number]    Discussed with attending    Earnest Kebede MD, PGY-4  ID Fellow  [employer] Teams Preferred  After 5pm/weekends call [phone]

## 2023-12-04 NOTE — PROGRESS NOTE ADULT - SUBJECTIVE AND OBJECTIVE BOX
VASCULAR SURGERY DAILY PROGRESS NOTE:     SUBJECTIVE/ROS: Patient seen and examined. He is resting comfortably with no complaints. NPO for OR today.      MEDICATIONS  (STANDING):  enalapril 10 milliGRAM(s) Oral every 12 hours  enoxaparin Injectable 40 milliGRAM(s) SubCutaneous every 24 hours  gabapentin 300 milliGRAM(s) Oral three times a day  insulin glargine Injectable (LANTUS) 30 Unit(s) SubCutaneous at bedtime  insulin lispro (ADMELOG) corrective regimen sliding scale   SubCutaneous every 6 hours  insulin lispro (ADMELOG) corrective regimen sliding scale   SubCutaneous three times a day before meals  lactated ringers. 1000 milliLiter(s) (100 mL/Hr) IV Continuous <Continuous>  piperacillin/tazobactam IVPB.. 3.375 Gram(s) IV Intermittent every 8 hours  polyethylene glycol 3350 17 Gram(s) Oral every 12 hours  senna 2 Tablet(s) Oral at bedtime  simvastatin 40 milliGRAM(s) Oral at bedtime  tamsulosin 0.4 milliGRAM(s) Oral at bedtime    MEDICATIONS  (PRN):  guaiFENesin Oral Liquid (Sugar-Free) 100 milliGRAM(s) Oral every 6 hours PRN Cough  HYDROmorphone  Injectable 0.5 milliGRAM(s) IV Push every 3 hours PRN breakthrough pain  oxyCODONE    IR 5 milliGRAM(s) Oral every 4 hours PRN Moderate Pain (4 - 6)  oxyCODONE    IR 10 milliGRAM(s) Oral every 4 hours PRN Severe Pain (7 - 10)      OBJECTIVE:    Vital Signs Last 24 Hrs  T(C): 36.6 (04 Dec 2023 05:21), Max: 36.8 (03 Dec 2023 10:03)  T(F): 97.8 (04 Dec 2023 05:21), Max: 98.3 (03 Dec 2023 10:03)  HR: 89 (04 Dec 2023 05:21) (89 - 97)  BP: 157/84 (04 Dec 2023 05:21) (132/75 - 167/90)  BP(mean): --  RR: 18 (04 Dec 2023 05:21) (18 - 18)  SpO2: 92% (04 Dec 2023 05:21) (92% - 96%)    Parameters below as of 04 Dec 2023 05:21  Patient On (Oxygen Delivery Method): room air            I&O's Detail    03 Dec 2023 07:01  -  04 Dec 2023 07:00  --------------------------------------------------------  IN:    IV PiggyBack: 100 mL    Lactated Ringers: 200 mL    Oral Fluid: 1100 mL  Total IN: 1400 mL    OUT:  Total OUT: 0 mL    Total NET: 1400 mL          Daily     Daily     LABS:                        10.7   14.15 )-----------( 427      ( 04 Dec 2023 05:31 )             33.9     12-04    136  |  99  |  15  ----------------------------<  187<H>  4.3   |  28  |  0.89    Ca    9.0      04 Dec 2023 05:31  Phos  3.4     12-04  Mg     1.9     12-04      PT/INR - ( 04 Dec 2023 05:32 )   PT: 11.6 sec;   INR: 1.06 ratio         PTT - ( 04 Dec 2023 05:32 )  PTT:29.2 sec  Urinalysis Basic - ( 04 Dec 2023 05:31 )    Color: x / Appearance: x / SG: x / pH: x  Gluc: 187 mg/dL / Ketone: x  / Bili: x / Urobili: x   Blood: x / Protein: x / Nitrite: x   Leuk Esterase: x / RBC: x / WBC x   Sq Epi: x / Non Sq Epi: x / Bacteria: x                PHYSICAL EXAM:  General Appearance: Appears well, NAD  Neck: Supple  Chest: Equal expansion bilaterally  CV: Pulse regular presently  Abdomen: Soft, nontense  Extremities: L BKA amputation site C/D/I, minimal bleeding, no evidence of purulence

## 2023-12-04 NOTE — PROGRESS NOTE ADULT - SUBJECTIVE AND OBJECTIVE BOX
DIABETES FOLLOW UP NOTE: Saw pt earlier today    Chief Complaint: Endocrine consult requested for management of T2DM    INTERVAL HX: Pt stable, reports tolerating POs with BG variable between 100s to 200s while on present insulin doses. Pt NPO today for vascular surgery with BG 170s to 190s. Noted more hyperglycemia in pm than am. No hypoglycemia. Pt reports no pain in surgical site.       Review of Systems:  General: As above  Cardiovascular: No chest pain, palpitations  Respiratory: No SOB, no cough  GI: No nausea, vomiting, abdominal pain  Endocrine: No polyuria, polydipsia or S&Sx of hypoglycemia    Allergies    No Known Allergies    Intolerances      MEDICATIONS:  gabapentin 300 milliGRAM(s) Oral three times a day  insulin glargine Injectable (LANTUS) 30 Unit(s) SubCutaneous at bedtime  insulin lispro (ADMELOG) corrective regimen sliding scale   SubCutaneous every 6 hours  insulin lispro (ADMELOG) corrective regimen sliding scale   SubCutaneous three times a day before meals  piperacillin/tazobactam IVPB.. 3.375 Gram(s) IV Intermittent every 8 hours  simvastatin 40 milliGRAM(s) Oral at bedtime        PHYSICAL EXAM:  VITALS: T(C): 36.7 (12-04-23 @ 13:48)  T(F): 98 (12-04-23 @ 13:48), Max: 98.2 (12-03-23 @ 21:49)  HR: 92 (12-04-23 @ 13:48) (89 - 92)  BP: 161/86 (12-04-23 @ 13:48) (137/72 - 167/90)  RR:  (18 - 18)  SpO2:  (92% - 95%)  Wt(kg): --  GENERAL: Male laying in bed in NAD  Abdomen: Soft, nontender, non distended,  + obesity  Extremities: Warm, L BKA dressing dry and intact. Minimal edema in RLE.   NEURO: A&O X3    LABS:  POCT Blood Glucose.: 192 mg/dL (12-04-23 @ 12:12)  POCT Blood Glucose.: 176 mg/dL (12-04-23 @ 06:44)  POCT Blood Glucose.: 199 mg/dL (12-04-23 @ 00:00)  POCT Blood Glucose.: 235 mg/dL (12-03-23 @ 21:39)  POCT Blood Glucose.: 258 mg/dL (12-03-23 @ 18:28)  POCT Blood Glucose.: 145 mg/dL (12-03-23 @ 13:32)  POCT Blood Glucose.: 257 mg/dL (12-03-23 @ 08:55)  POCT Blood Glucose.: 180 mg/dL (12-02-23 @ 21:57)  POCT Blood Glucose.: 177 mg/dL (12-02-23 @ 18:42)  POCT Blood Glucose.: 253 mg/dL (12-02-23 @ 13:57)  POCT Blood Glucose.: 167 mg/dL (12-02-23 @ 09:48)  POCT Blood Glucose.: 177 mg/dL (12-01-23 @ 21:26)  POCT Blood Glucose.: 277 mg/dL (12-01-23 @ 17:02)  POCT Blood Glucose.: 277 mg/dL (12-01-23 @ 17:01)                            10.7   14.15 )-----------( 427      ( 04 Dec 2023 05:31 )             33.9       12-04    136  |  99  |  15  ----------------------------<  187<H>  4.3   |  28  |  0.89    eGFR: 96    Ca    9.0      12-04  Mg     1.9     12-04  Phos  3.4     12-04      A1C with Estimated Average Glucose Result: 11.4 % (12-01-23 @ 05:19)      Estimated Average Glucose: 280 mg/dL (12-01-23 @ 05:19)

## 2023-12-05 LAB
ANION GAP SERPL CALC-SCNC: 8 MMOL/L — SIGNIFICANT CHANGE UP (ref 5–17)
ANION GAP SERPL CALC-SCNC: 8 MMOL/L — SIGNIFICANT CHANGE UP (ref 5–17)
BUN SERPL-MCNC: 17 MG/DL — SIGNIFICANT CHANGE UP (ref 7–23)
BUN SERPL-MCNC: 17 MG/DL — SIGNIFICANT CHANGE UP (ref 7–23)
CALCIUM SERPL-MCNC: 9 MG/DL — SIGNIFICANT CHANGE UP (ref 8.4–10.5)
CALCIUM SERPL-MCNC: 9 MG/DL — SIGNIFICANT CHANGE UP (ref 8.4–10.5)
CHLORIDE SERPL-SCNC: 98 MMOL/L — SIGNIFICANT CHANGE UP (ref 96–108)
CHLORIDE SERPL-SCNC: 98 MMOL/L — SIGNIFICANT CHANGE UP (ref 96–108)
CO2 SERPL-SCNC: 28 MMOL/L — SIGNIFICANT CHANGE UP (ref 22–31)
CO2 SERPL-SCNC: 28 MMOL/L — SIGNIFICANT CHANGE UP (ref 22–31)
CREAT SERPL-MCNC: 1.12 MG/DL — SIGNIFICANT CHANGE UP (ref 0.5–1.3)
CREAT SERPL-MCNC: 1.12 MG/DL — SIGNIFICANT CHANGE UP (ref 0.5–1.3)
EGFR: 74 ML/MIN/1.73M2 — SIGNIFICANT CHANGE UP
EGFR: 74 ML/MIN/1.73M2 — SIGNIFICANT CHANGE UP
GLUCOSE SERPL-MCNC: 291 MG/DL — HIGH (ref 70–99)
GLUCOSE SERPL-MCNC: 291 MG/DL — HIGH (ref 70–99)
HCT VFR BLD CALC: 35.7 % — LOW (ref 39–50)
HCT VFR BLD CALC: 35.7 % — LOW (ref 39–50)
HGB BLD-MCNC: 11.1 G/DL — LOW (ref 13–17)
HGB BLD-MCNC: 11.1 G/DL — LOW (ref 13–17)
MAGNESIUM SERPL-MCNC: 2.1 MG/DL — SIGNIFICANT CHANGE UP (ref 1.6–2.6)
MAGNESIUM SERPL-MCNC: 2.1 MG/DL — SIGNIFICANT CHANGE UP (ref 1.6–2.6)
MCHC RBC-ENTMCNC: 26.6 PG — LOW (ref 27–34)
MCHC RBC-ENTMCNC: 26.6 PG — LOW (ref 27–34)
MCHC RBC-ENTMCNC: 31.1 GM/DL — LOW (ref 32–36)
MCHC RBC-ENTMCNC: 31.1 GM/DL — LOW (ref 32–36)
MCV RBC AUTO: 85.4 FL — SIGNIFICANT CHANGE UP (ref 80–100)
MCV RBC AUTO: 85.4 FL — SIGNIFICANT CHANGE UP (ref 80–100)
NRBC # BLD: 0 /100 WBCS — SIGNIFICANT CHANGE UP (ref 0–0)
NRBC # BLD: 0 /100 WBCS — SIGNIFICANT CHANGE UP (ref 0–0)
PHOSPHATE SERPL-MCNC: 3.6 MG/DL — SIGNIFICANT CHANGE UP (ref 2.5–4.5)
PHOSPHATE SERPL-MCNC: 3.6 MG/DL — SIGNIFICANT CHANGE UP (ref 2.5–4.5)
PLATELET # BLD AUTO: 519 K/UL — HIGH (ref 150–400)
PLATELET # BLD AUTO: 519 K/UL — HIGH (ref 150–400)
POTASSIUM SERPL-MCNC: 5.1 MMOL/L — SIGNIFICANT CHANGE UP (ref 3.5–5.3)
POTASSIUM SERPL-MCNC: 5.1 MMOL/L — SIGNIFICANT CHANGE UP (ref 3.5–5.3)
POTASSIUM SERPL-SCNC: 5.1 MMOL/L — SIGNIFICANT CHANGE UP (ref 3.5–5.3)
POTASSIUM SERPL-SCNC: 5.1 MMOL/L — SIGNIFICANT CHANGE UP (ref 3.5–5.3)
RBC # BLD: 4.18 M/UL — LOW (ref 4.2–5.8)
RBC # BLD: 4.18 M/UL — LOW (ref 4.2–5.8)
RBC # FLD: 15.2 % — HIGH (ref 10.3–14.5)
RBC # FLD: 15.2 % — HIGH (ref 10.3–14.5)
SODIUM SERPL-SCNC: 134 MMOL/L — LOW (ref 135–145)
SODIUM SERPL-SCNC: 134 MMOL/L — LOW (ref 135–145)
WBC # BLD: 21.09 K/UL — HIGH (ref 3.8–10.5)
WBC # BLD: 21.09 K/UL — HIGH (ref 3.8–10.5)
WBC # FLD AUTO: 21.09 K/UL — HIGH (ref 3.8–10.5)
WBC # FLD AUTO: 21.09 K/UL — HIGH (ref 3.8–10.5)

## 2023-12-05 PROCEDURE — 99232 SBSQ HOSP IP/OBS MODERATE 35: CPT

## 2023-12-05 PROCEDURE — 99222 1ST HOSP IP/OBS MODERATE 55: CPT

## 2023-12-05 RX ORDER — INSULIN LISPRO 100/ML
VIAL (ML) SUBCUTANEOUS AT BEDTIME
Refills: 0 | Status: DISCONTINUED | OUTPATIENT
Start: 2023-12-05 | End: 2023-12-09

## 2023-12-05 RX ORDER — VANCOMYCIN HCL 1 G
1750 VIAL (EA) INTRAVENOUS ONCE
Refills: 0 | Status: DISCONTINUED | OUTPATIENT
Start: 2023-12-05 | End: 2023-12-05

## 2023-12-05 RX ORDER — AMPICILLIN SODIUM AND SULBACTAM SODIUM 250; 125 MG/ML; MG/ML
3 INJECTION, POWDER, FOR SUSPENSION INTRAMUSCULAR; INTRAVENOUS EVERY 6 HOURS
Refills: 0 | Status: DISCONTINUED | OUTPATIENT
Start: 2023-12-05 | End: 2023-12-12

## 2023-12-05 RX ORDER — VANCOMYCIN HCL 1 G
VIAL (EA) INTRAVENOUS
Refills: 0 | Status: DISCONTINUED | OUTPATIENT
Start: 2023-12-05 | End: 2023-12-05

## 2023-12-05 RX ORDER — INSULIN LISPRO 100/ML
VIAL (ML) SUBCUTANEOUS ONCE
Refills: 0 | Status: COMPLETED | OUTPATIENT
Start: 2023-12-05 | End: 2023-12-05

## 2023-12-05 RX ORDER — VANCOMYCIN HCL 1 G
1750 VIAL (EA) INTRAVENOUS EVERY 12 HOURS
Refills: 0 | Status: DISCONTINUED | OUTPATIENT
Start: 2023-12-05 | End: 2023-12-05

## 2023-12-05 RX ORDER — PIPERACILLIN AND TAZOBACTAM 4; .5 G/20ML; G/20ML
3.38 INJECTION, POWDER, LYOPHILIZED, FOR SOLUTION INTRAVENOUS EVERY 8 HOURS
Refills: 0 | Status: DISCONTINUED | OUTPATIENT
Start: 2023-12-05 | End: 2023-12-05

## 2023-12-05 RX ORDER — INSULIN GLARGINE 100 [IU]/ML
36 INJECTION, SOLUTION SUBCUTANEOUS AT BEDTIME
Refills: 0 | Status: DISCONTINUED | OUTPATIENT
Start: 2023-12-05 | End: 2023-12-06

## 2023-12-05 RX ADMIN — OXYCODONE HYDROCHLORIDE 5 MILLIGRAM(S): 5 TABLET ORAL at 14:19

## 2023-12-05 RX ADMIN — Medication 2: at 00:03

## 2023-12-05 RX ADMIN — INSULIN GLARGINE 34 UNIT(S): 100 INJECTION, SOLUTION SUBCUTANEOUS at 00:02

## 2023-12-05 RX ADMIN — Medication 6: at 09:37

## 2023-12-05 RX ADMIN — Medication 10 MILLIGRAM(S): at 19:05

## 2023-12-05 RX ADMIN — Medication 100 MILLIGRAM(S): at 01:42

## 2023-12-05 RX ADMIN — OXYCODONE HYDROCHLORIDE 5 MILLIGRAM(S): 5 TABLET ORAL at 21:56

## 2023-12-05 RX ADMIN — OXYCODONE HYDROCHLORIDE 5 MILLIGRAM(S): 5 TABLET ORAL at 22:26

## 2023-12-05 RX ADMIN — TAMSULOSIN HYDROCHLORIDE 0.4 MILLIGRAM(S): 0.4 CAPSULE ORAL at 21:55

## 2023-12-05 RX ADMIN — Medication 100 MILLIGRAM(S): at 08:30

## 2023-12-05 RX ADMIN — OXYCODONE HYDROCHLORIDE 5 MILLIGRAM(S): 5 TABLET ORAL at 13:49

## 2023-12-05 RX ADMIN — Medication 12 UNIT(S): at 14:07

## 2023-12-05 RX ADMIN — AMPICILLIN SODIUM AND SULBACTAM SODIUM 200 GRAM(S): 250; 125 INJECTION, POWDER, FOR SUSPENSION INTRAMUSCULAR; INTRAVENOUS at 19:06

## 2023-12-05 RX ADMIN — ENOXAPARIN SODIUM 40 MILLIGRAM(S): 100 INJECTION SUBCUTANEOUS at 07:30

## 2023-12-05 RX ADMIN — INSULIN GLARGINE 36 UNIT(S): 100 INJECTION, SOLUTION SUBCUTANEOUS at 21:55

## 2023-12-05 RX ADMIN — Medication 100 MILLIGRAM(S): at 21:56

## 2023-12-05 RX ADMIN — GABAPENTIN 300 MILLIGRAM(S): 400 CAPSULE ORAL at 21:55

## 2023-12-05 RX ADMIN — Medication 8: at 01:36

## 2023-12-05 RX ADMIN — GABAPENTIN 300 MILLIGRAM(S): 400 CAPSULE ORAL at 13:32

## 2023-12-05 RX ADMIN — GABAPENTIN 300 MILLIGRAM(S): 400 CAPSULE ORAL at 07:31

## 2023-12-05 RX ADMIN — Medication 12 UNIT(S): at 18:53

## 2023-12-05 RX ADMIN — Medication 2: at 18:52

## 2023-12-05 RX ADMIN — OXYCODONE HYDROCHLORIDE 10 MILLIGRAM(S): 5 TABLET ORAL at 07:31

## 2023-12-05 RX ADMIN — SIMVASTATIN 40 MILLIGRAM(S): 20 TABLET, FILM COATED ORAL at 21:56

## 2023-12-05 RX ADMIN — Medication 12 UNIT(S): at 09:39

## 2023-12-05 RX ADMIN — OXYCODONE HYDROCHLORIDE 10 MILLIGRAM(S): 5 TABLET ORAL at 08:31

## 2023-12-05 RX ADMIN — Medication 2: at 14:06

## 2023-12-05 RX ADMIN — AMPICILLIN SODIUM AND SULBACTAM SODIUM 200 GRAM(S): 250; 125 INJECTION, POWDER, FOR SUSPENSION INTRAMUSCULAR; INTRAVENOUS at 13:33

## 2023-12-05 NOTE — PROGRESS NOTE ADULT - ASSESSMENT
63M w/h/o uncontrolled  DM2 (A1C 11.4%)  > on DM  oral meds PTA. DM c/b Neuropathy, PAD> s/p toe amputation, retinopathy. ? nephropathy per EMR. Also h/o HLD, HTN. Here with severe hyperglycemia and L foot wound and found to have soft tissue gas at lateral hindfoot. 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 Also required insulin drip at time of admission. Endocrine consulted for hyperglycemia. Tolerating POs with BG levels variable. s/p  vascular surgery.   continue to adjust insulin to BG goal 100 to 180s. No hypoglycemia.     Discussed with pt A1C level, comorbidites and the need for at least basal insulin upon discharge to improve DM control and also promote wound healing. Pt states he is willing to do basal/bolus insulin if needed > appears motivated to improve DM care.     Home regimen: [id number] bid and Glipizide 5mg bid. On  Kazano (algoliptin and metformin) in the past,         63M w/h/o uncontrolled  DM2 (A1C 11.4%)  > on DM  oral meds PTA. DM c/b Neuropathy, PAD> s/p toe amputation, retinopathy. ? nephropathy per EMR. Also h/o HLD, HTN. Here with severe hyperglycemia and L foot wound and found to have soft tissue gas at lateral hindfoot. 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 Also required insulin drip at time of admission. Endocrine consulted for hyperglycemia. Tolerating POs with BG levels variable. s/p  vascular surgery.   continue to adjust insulin to BG goal 100 to 180s. No hypoglycemia.     Discussed with pt A1C level, comorbidites and the need for at least basal insulin upon discharge to improve DM control and also promote wound healing. Pt states he is willing to do basal/bolus insulin if needed > appears motivated to improve DM care.     Home regimen: [id number] bid and Glipizide 5mg bid. On  Kazano (algoliptin and metformin) in the past,

## 2023-12-05 NOTE — PROGRESS NOTE ADULT - PROBLEM SELECTOR PLAN 1
Test BG ac and hs. Q6H for NPO status  Increase Lantus dose to 36 units sine pt's BG are high 100s while NPO.    Monitor on Admelog to 12 units ac meals. HOLD IF NOT EATING  Continue with moderate dose correctional insulin scales ac and add moderate scale at hs .   Please teach and allow pt to do own finger sticks and insulin injections under RN supervision  Please teach use of insulin pen  Please document teach back  Discharge:  Will be determined according to BG/insulin needs/PO intake at time of discharge.   Likely Metformin plus  basal/bolus due To high insulin doses requirements and to promote wound  healing   Please write Rxs for: Solo Star Insulin pen/Humalog Kwik insulin pen/Bren insulin pen needles/glucose meter/strips/lancets  -Will need home care upon discharge due to new insulin therapy  Can follow at [facility] practice. [address]. Phone . Call for apt closer to discharge  Needs podiatry/vascular and optho f/u care.   Might benefir from GLP1RA for cardiorenal protection and also weight control in addition to DM, control> will need close f/u with optho. Test BG ac and hs. Q6H for NPO status  Increase Lantus dose to 36 units sine pt's BG are high 100s while NPO.    Monitor on Admelog to 12 units ac meals. HOLD IF NOT EATING  Continue with moderate dose correctional insulin scales ac and add moderate scale at hs .   Please teach and allow pt to do own finger sticks and insulin injections under RN supervision  Please teach use of insulin pen  Please document teach back  Discharge:  Will be determined according to BG/insulin needs/PO intake at time of discharge.   Likely Metformin plus  basal/bolus due To high insulin doses requirements and to promote wound  healing   Please write Rxs for: Solo Star Insulin pen/Humalog Kwik insulin pen/Bren insulin pen needles/glucose meter/strips/lancets  -Will need home care upon discharge due to new insulin therapy  Can follow at [facility] practice. [address]. Phone . Call for apt closer to discharge  Needs podiatry/vascular and optho f/u care.   Might benefir from GLP1RA for cardiorenal protection and also weight control in addition to DM, control> will need close f/u with optho.

## 2023-12-05 NOTE — PROGRESS NOTE ADULT - SUBJECTIVE AND OBJECTIVE BOX
[facility]  Division of Infectious Diseases  [phone]    Name: ELVIS VILLATORO  Age: 63y  Gender: Male  MRN: [id number]    Interval History--  Notes reviewed.     Past Medical History--  Non-Insulin Dependent Diabetes Mellitus    DM (Diabetes Mellitus)    HTN (Hypertension)    Dyslipidemia    Retinopathy    Diabetes mellitus type 2 in obese    Hyperlipidemia    Toe infection    Ruptured appendix    S/P appendectomy    Toe amputation status        For details regarding the patient's social history, family history, and other miscellaneous elements, please refer the initial infectious diseases consultation and/or the admitting history and physical examination for this admission.    Allergies    No Known Allergies    Intolerances        Medications--  Antibiotics:    Immunologic:    Other:  enalapril  enoxaparin Injectable  gabapentin  guaiFENesin Oral Liquid (Sugar-Free) PRN  HYDROmorphone  Injectable PRN  insulin glargine Injectable (LANTUS)  insulin lispro (ADMELOG) corrective regimen sliding scale  insulin lispro (ADMELOG) corrective regimen sliding scale  insulin lispro Injectable (ADMELOG)  lactated ringers.  oxyCODONE    IR PRN  oxyCODONE    IR PRN  polyethylene glycol 3350  senna  simvastatin  tamsulosin      Review of Systems--  A 10-point review of systems was obtained.     Pertinent positives and negatives--  Constitutional: No fevers. No Chills. No Rigors.   Cardiovascular: No chest pain. No palpitations.  Respiratory: No shortness of breath. No cough.  Gastrointestinal: No nausea or vomiting. No diarrhea or constipation.   Psychiatric: Pleasant. Appropriate affect.    Review of systems otherwise negative except as previously noted.    Physical Examination--  Vital Signs: T(F): 98 (12-05-23 @ 06:29), Max: 99 (12-04-23 @ 16:36)  HR: 96 (12-05-23 @ 06:29)  BP: 150/74 (12-05-23 @ 06:29)  RR: 18 (12-05-23 @ 06:29)  SpO2: 96% (12-05-23 @ 06:29)  Wt(kg): --  General: Nontoxic-appearing Male in no acute distress.  HEENT: AT/NC. PERRL. EOMI. Anicteric. Conjunctiva pink and moist. Oropharynx clear. Dentition fair.  Neck: Not rigid. No sense of mass.  Nodes: None palpable.  Lungs: Clear bilaterally without rales, wheezing or rhonchi  Heart: Regular rate and rhythm. No Murmur. No rub. No gallop. No palpable thrill.  Abdomen: Bowel sounds present and normoactive. Soft. Nondistended. Nontender. No sense of mass. No organomegaly.  Back: No spinal tenderness. No costovertebral angle tenderness.   Extremities: No cyanosis or clubbing. No edema.   Skin: Warm. Dry. Good turgor. No rash. No vasculitic stigmata.  Psychiatric: Appropriate affect and mood for situation.         Laboratory Studies--  CBC                        11.1   21.09 )-----------( 519      ( 05 Dec 2023 07:22 )             35.7       Chemistries  12-05    134<L>  |  98  |  17  ----------------------------<  291<H>  5.1   |  28  |  1.12    Ca    9.0      05 Dec 2023 07:22  Phos  3.6     12-05  Mg     2.1     12-05        Culture Data    Culture - Other (collected 30 Nov 2023 06:27)  Source: .Other Other  Final Report (02 Dec 2023 22:18):    Numerous Citrobacter koseri    Moderate Enterococcus faecalis  Organism: Citrobacter koseri  Enterococcus faecalis (02 Dec 2023 22:18)  Organism: Enterococcus faecalis (02 Dec 2023 22:18)  Organism: Citrobacter koseri (02 Dec 2023 22:18)    Culture - Blood (collected 29 Nov 2023 18:23)  Source: .Blood Blood-Venous  Final Report (04 Dec 2023 22:01):    No growth at 5 days    Culture - Blood (collected 29 Nov 2023 18:23)  Source: .Blood Blood-Venous  Gram Stain (02 Dec 2023 03:35):    Growth in anaerobic bottle: Gram Positive Rods  Final Report (02 Dec 2023 23:49):    Growth in anaerobic bottle: Dermabacter hominis "Susceptibilities not    performed"    Direct identification is available within approximately 3-5    hours either by Blood Panel Multiplexed PCR or Direct    MALDI-TOF. Details: [email or web address]  Organism: Blood Culture PCR (02 Dec 2023 23:49)  Organism: Blood Culture PCR (02 Dec 2023 23:49)             [facility]  Division of Infectious Diseases  [phone]    Name: ELVIS VILLATORO  Age: 63y  Gender: Male  MRN: [id number]    Interval History--  Notes reviewed.     Past Medical History--  Non-Insulin Dependent Diabetes Mellitus    DM (Diabetes Mellitus)    HTN (Hypertension)    Dyslipidemia    Retinopathy    Diabetes mellitus type 2 in obese    Hyperlipidemia    Toe infection    Ruptured appendix    S/P appendectomy    Toe amputation status        For details regarding the patient's social history, family history, and other miscellaneous elements, please refer the initial infectious diseases consultation and/or the admitting history and physical examination for this admission.    Allergies    No Known Allergies    Intolerances        Medications--  Antibiotics:    Immunologic:    Other:  enalapril  enoxaparin Injectable  gabapentin  guaiFENesin Oral Liquid (Sugar-Free) PRN  HYDROmorphone  Injectable PRN  insulin glargine Injectable (LANTUS)  insulin lispro (ADMELOG) corrective regimen sliding scale  insulin lispro (ADMELOG) corrective regimen sliding scale  insulin lispro Injectable (ADMELOG)  lactated ringers.  oxyCODONE    IR PRN  oxyCODONE    IR PRN  polyethylene glycol 3350  senna  simvastatin  tamsulosin      Review of Systems--  A 10-point review of systems was obtained.     Pertinent positives and negatives--  Constitutional: No fevers. No Chills. No Rigors.   Cardiovascular: No chest pain. No palpitations.  Respiratory: No shortness of breath. No cough.  Gastrointestinal: No nausea or vomiting. No diarrhea or constipation.   Psychiatric: Pleasant. Appropriate affect.    Review of systems otherwise negative except as previously noted.    Physical Examination--  Vital Signs: T(F): 98 (12-05-23 @ 06:29), Max: 99 (12-04-23 @ 16:36)  HR: 96 (12-05-23 @ 06:29)  BP: 150/74 (12-05-23 @ 06:29)  RR: 18 (12-05-23 @ 06:29)  SpO2: 96% (12-05-23 @ 06:29)  Wt(kg): --  General: Nontoxic-appearing Male in no acute distress.  HEENT: AT/NC. PERRL. EOMI. Anicteric. Conjunctiva pink and moist. Oropharynx clear. Dentition fair.  Neck: Not rigid. No sense of mass.  Nodes: None palpable.  Lungs: Clear bilaterally without rales, wheezing or rhonchi  Heart: Regular rate and rhythm. No Murmur. No rub. No gallop. No palpable thrill.  Abdomen: Bowel sounds present and normoactive. Soft. Nondistended. Nontender. No sense of mass. No organomegaly.  Back: No spinal tenderness. No costovertebral angle tenderness.   Extremities: No cyanosis or clubbing. No edema.   Skin: Warm. Dry. Good turgor. No rash. No vasculitic stigmata.  Psychiatric: Appropriate affect and mood for situation.         Laboratory Studies--  CBC                        11.1   21.09 )-----------( 519      ( 05 Dec 2023 07:22 )             35.7       Chemistries  12-05    134<L>  |  98  |  17  ----------------------------<  291<H>  5.1   |  28  |  1.12    Ca    9.0      05 Dec 2023 07:22  Phos  3.6     12-05  Mg     2.1     12-05        Culture Data    Culture - Other (collected 30 Nov 2023 06:27)  Source: .Other Other  Final Report (02 Dec 2023 22:18):    Numerous Citrobacter koseri    Moderate Enterococcus faecalis  Organism: Citrobacter koseri  Enterococcus faecalis (02 Dec 2023 22:18)  Organism: Enterococcus faecalis (02 Dec 2023 22:18)  Organism: Citrobacter koseri (02 Dec 2023 22:18)    Culture - Blood (collected 29 Nov 2023 18:23)  Source: .Blood Blood-Venous  Final Report (04 Dec 2023 22:01):    No growth at 5 days    Culture - Blood (collected 29 Nov 2023 18:23)  Source: .Blood Blood-Venous  Gram Stain (02 Dec 2023 03:35):    Growth in anaerobic bottle: Gram Positive Rods  Final Report (02 Dec 2023 23:49):    Growth in anaerobic bottle: Dermabacter hominis "Susceptibilities not    performed"    Direct identification is available within approximately 3-5    hours either by Blood Panel Multiplexed PCR or Direct    MALDI-TOF. Details: [email or web address]  Organism: Blood Culture PCR (02 Dec 2023 23:49)  Organism: Blood Culture PCR (02 Dec 2023 23:49)             [facility]  Division of Infectious Diseases  [phone]    Name: ELVIS VILLATORO  Age: 63y  Gender: Male  MRN: [id number]    Interval History--  Notes reviewed. Seen earlier today.  Status post LR, but formal closure not completed given apparent concern for viability.  Feels fine.  Denies pain.  No fevers chills or rigors.    Past Medical History--  Non-Insulin Dependent Diabetes Mellitus    DM (Diabetes Mellitus)    HTN (Hypertension)    Dyslipidemia    Retinopathy    Diabetes mellitus type 2 in obese    Hyperlipidemia    Toe infection    Ruptured appendix    S/P appendectomy    Toe amputation status        For details regarding the patient's social history, family history, and other miscellaneous elements, please refer the initial infectious diseases consultation and/or the admitting history and physical examination for this admission.    Allergies    No Known Allergies    Intolerances        Medications--  Antibiotics:    Immunologic:    Other:  enalapril  enoxaparin Injectable  gabapentin  guaiFENesin Oral Liquid (Sugar-Free) PRN  HYDROmorphone  Injectable PRN  insulin glargine Injectable (LANTUS)  insulin lispro (ADMELOG) corrective regimen sliding scale  insulin lispro (ADMELOG) corrective regimen sliding scale  insulin lispro Injectable (ADMELOG)  lactated ringers.  oxyCODONE    IR PRN  oxyCODONE    IR PRN  polyethylene glycol 3350  senna  simvastatin  tamsulosin      Review of Systems--  A 10-point review of systems was obtained.   Review of systems otherwise negative except as previously noted.    Physical Examination--  Vital Signs: T(F): 98 (12-05-23 @ 06:29), Max: 99 (12-04-23 @ 16:36)  HR: 96 (12-05-23 @ 06:29)  BP: 150/74 (12-05-23 @ 06:29)  RR: 18 (12-05-23 @ 06:29)  SpO2: 96% (12-05-23 @ 06:29)  Wt(kg): --  General: Nontoxic-appearing Male in no acute distress.  HEENT: AT/NC. Anicteric. Conjunctiva pink and moist. Oropharynx clear.  Neck: Not rigid. No sense of mass.  Nodes: None palpable.  Lungs: Clear bilaterally without rales, wheezing or rhonchi  Heart: Regular rate and rhythm.  Abdomen: Bowel sounds present and normoactive. Soft. Nondistended. Nontender. Obese.   Extremities: No cyanosis or clubbing. No edema. BKA wrapped, some strike through.   Skin: Warm. Dry. Good turgor. No rash. No vasculitic stigmata.  Psychiatric: Appropriate affect and mood for situation.         Laboratory Studies--  CBC                        11.1   21.09 )-----------( 519      ( 05 Dec 2023 07:22 )             35.7       Chemistries  12-05    134<L>  |  98  |  17  ----------------------------<  291<H>  5.1   |  28  |  1.12    Ca    9.0      05 Dec 2023 07:22  Phos  3.6     12-05  Mg     2.1     12-05        Culture Data    Culture - Other (collected 30 Nov 2023 06:27)  Source: .Other Other  Final Report (02 Dec 2023 22:18):    Numerous Citrobacter koseri    Moderate Enterococcus faecalis  Organism: Citrobacter koseri  Enterococcus faecalis (02 Dec 2023 22:18)  Organism: Enterococcus faecalis (02 Dec 2023 22:18)  Organism: Citrobacter koseri (02 Dec 2023 22:18)    Culture - Blood (collected 29 Nov 2023 18:23)  Source: .Blood Blood-Venous  Final Report (04 Dec 2023 22:01):    No growth at 5 days    Culture - Blood (collected 29 Nov 2023 18:23)  Source: .Blood Blood-Venous  Gram Stain (02 Dec 2023 03:35):    Growth in anaerobic bottle: Gram Positive Rods  Final Report (02 Dec 2023 23:49):    Growth in anaerobic bottle: Dermabacter hominis "Susceptibilities not    performed"    Direct identification is available within approximately 3-5    hours either by Blood Panel Multiplexed PCR or Direct    MALDI-TOF. Details: [email or web address]  Organism: Blood Culture PCR (02 Dec 2023 23:49)  Organism: Blood Culture PCR (02 Dec 2023 23:49)             [facility]  Division of Infectious Diseases  [phone]    Name: ELVIS VILLATORO  Age: 63y  Gender: Male  MRN: [id number]    Interval History--  Notes reviewed. Seen earlier today.  Status post LR, but formal closure not completed given apparent concern for viability.  Feels fine.  Denies pain.  No fevers chills or rigors.    Past Medical History--  Non-Insulin Dependent Diabetes Mellitus    DM (Diabetes Mellitus)    HTN (Hypertension)    Dyslipidemia    Retinopathy    Diabetes mellitus type 2 in obese    Hyperlipidemia    Toe infection    Ruptured appendix    S/P appendectomy    Toe amputation status        For details regarding the patient's social history, family history, and other miscellaneous elements, please refer the initial infectious diseases consultation and/or the admitting history and physical examination for this admission.    Allergies    No Known Allergies    Intolerances        Medications--  Antibiotics:    Immunologic:    Other:  enalapril  enoxaparin Injectable  gabapentin  guaiFENesin Oral Liquid (Sugar-Free) PRN  HYDROmorphone  Injectable PRN  insulin glargine Injectable (LANTUS)  insulin lispro (ADMELOG) corrective regimen sliding scale  insulin lispro (ADMELOG) corrective regimen sliding scale  insulin lispro Injectable (ADMELOG)  lactated ringers.  oxyCODONE    IR PRN  oxyCODONE    IR PRN  polyethylene glycol 3350  senna  simvastatin  tamsulosin      Review of Systems--  A 10-point review of systems was obtained.   Review of systems otherwise negative except as previously noted.    Physical Examination--  Vital Signs: T(F): 98 (12-05-23 @ 06:29), Max: 99 (12-04-23 @ 16:36)  HR: 96 (12-05-23 @ 06:29)  BP: 150/74 (12-05-23 @ 06:29)  RR: 18 (12-05-23 @ 06:29)  SpO2: 96% (12-05-23 @ 06:29)  Wt(kg): --  General: Nontoxic-appearing Male in no acute distress.  HEENT: AT/NC. Anicteric. Conjunctiva pink and moist. Oropharynx clear.  Neck: Not rigid. No sense of mass.  Nodes: None palpable.  Lungs: Clear bilaterally without rales, wheezing or rhonchi  Heart: Regular rate and rhythm.  Abdomen: Bowel sounds present and normoactive. Soft. Nondistended. Nontender. Obese.   Extremities: No cyanosis or clubbing. No edema. BKA wrapped, some strike through.   Skin: Warm. Dry. Good turgor. No rash. No vasculitic stigmata.  Psychiatric: Appropriate affect and mood for situation.         Laboratory Studies--  CBC                        11.1   21.09 )-----------( 519      ( 05 Dec 2023 07:22 )             35.7       Chemistries  12-05    134<L>  |  98  |  17  ----------------------------<  291<H>  5.1   |  28  |  1.12    Ca    9.0      05 Dec 2023 07:22  Phos  3.6     12-05  Mg     2.1     12-05        Culture Data    Culture - Other (collected 30 Nov 2023 06:27)  Source: .Other Other  Final Report (02 Dec 2023 22:18):    Numerous Citrobacter koseri    Moderate Enterococcus faecalis  Organism: Citrobacter koseri  Enterococcus faecalis (02 Dec 2023 22:18)  Organism: Enterococcus faecalis (02 Dec 2023 22:18)  Organism: Citrobacter koseri (02 Dec 2023 22:18)    Culture - Blood (collected 29 Nov 2023 18:23)  Source: .Blood Blood-Venous  Final Report (04 Dec 2023 22:01):    No growth at 5 days    Culture - Blood (collected 29 Nov 2023 18:23)  Source: .Blood Blood-Venous  Gram Stain (02 Dec 2023 03:35):    Growth in anaerobic bottle: Gram Positive Rods  Final Report (02 Dec 2023 23:49):    Growth in anaerobic bottle: Dermabacter hominis "Susceptibilities not    performed"    Direct identification is available within approximately 3-5    hours either by Blood Panel Multiplexed PCR or Direct    MALDI-TOF. Details: [email or web address]  Organism: Blood Culture PCR (02 Dec 2023 23:49)  Organism: Blood Culture PCR (02 Dec 2023 23:49)

## 2023-12-05 NOTE — PROGRESS NOTE ADULT - SUBJECTIVE AND OBJECTIVE BOX
VASCULAR SURGERY DAILY PROGRESS NOTE:     SUBJECTIVE/ROS: Patient seen and examined. pain is well controlled.  He denies chest pain, SOB.    MEDICATIONS  (STANDING):  enalapril 10 milliGRAM(s) Oral every 12 hours  enoxaparin Injectable 40 milliGRAM(s) SubCutaneous every 24 hours  gabapentin 300 milliGRAM(s) Oral three times a day  insulin glargine Injectable (LANTUS) 34 Unit(s) SubCutaneous at bedtime  insulin lispro (ADMELOG) corrective regimen sliding scale   SubCutaneous three times a day before meals  insulin lispro (ADMELOG) corrective regimen sliding scale   SubCutaneous at bedtime  insulin lispro Injectable (ADMELOG) 12 Unit(s) SubCutaneous three times a day before meals  lactated ringers. 1000 milliLiter(s) (100 mL/Hr) IV Continuous <Continuous>  piperacillin/tazobactam IVPB.. 3.375 Gram(s) IV Intermittent every 8 hours  polyethylene glycol 3350 17 Gram(s) Oral every 12 hours  senna 2 Tablet(s) Oral at bedtime  simvastatin 40 milliGRAM(s) Oral at bedtime  tamsulosin 0.4 milliGRAM(s) Oral at bedtime  vancomycin  IVPB        MEDICATIONS  (PRN):  guaiFENesin Oral Liquid (Sugar-Free) 100 milliGRAM(s) Oral every 6 hours PRN Cough  HYDROmorphone  Injectable 0.5 milliGRAM(s) IV Push every 3 hours PRN breakthrough pain  oxyCODONE    IR 10 milliGRAM(s) Oral every 4 hours PRN Severe Pain (7 - 10)  oxyCODONE    IR 5 milliGRAM(s) Oral every 4 hours PRN Moderate Pain (4 - 6)      OBJECTIVE:    Vital Signs Last 24 Hrs  T(C): 36.7 (05 Dec 2023 06:29), Max: 37.2 (04 Dec 2023 16:36)  T(F): 98 (05 Dec 2023 06:29), Max: 99 (04 Dec 2023 16:36)  HR: 96 (05 Dec 2023 06:29) (90 - 106)  BP: 150/74 (05 Dec 2023 06:29) (116/60 - 178/88)  BP(mean): 114 (04 Dec 2023 21:00) (108 - 122)  RR: 18 (05 Dec 2023 06:29) (12 - 18)  SpO2: 96% (05 Dec 2023 06:29) (93% - 100%)    Parameters below as of 05 Dec 2023 06:29  Patient On (Oxygen Delivery Method): room air            I&O's Detail    04 Dec 2023 07:01  -  05 Dec 2023 07:00  --------------------------------------------------------  IN:    Lactated Ringers: 300 mL  Total IN: 300 mL    OUT:    Oral Fluid: 0 mL    Voided (mL): 600 mL  Total OUT: 600 mL    Total NET: -300 mL          Daily Height in cm: 175.26 (04 Dec 2023 18:11)    Daily     LABS:                        10.7   14.15 )-----------( 427      ( 04 Dec 2023 05:31 )             33.9     12-04    136  |  99  |  15  ----------------------------<  187<H>  4.3   |  28  |  0.89    Ca    9.0      04 Dec 2023 05:31  Phos  3.4     12-04  Mg     1.9     12-04      PT/INR - ( 04 Dec 2023 05:32 )   PT: 11.6 sec;   INR: 1.06 ratio         PTT - ( 04 Dec 2023 05:32 )  PTT:29.2 sec  Urinalysis Basic - ( 04 Dec 2023 05:31 )    Color: x / Appearance: x / SG: x / pH: x  Gluc: 187 mg/dL / Ketone: x  / Bili: x / Urobili: x   Blood: x / Protein: x / Nitrite: x   Leuk Esterase: x / RBC: x / WBC x   Sq Epi: x / Non Sq Epi: x / Bacteria: x                PHYSICAL EXAM:    General Appearance: Appears well, NAD  Neck: Supple  Chest: Equal expansion bilaterally  CV: Pulse regular presently  Abdomen: Soft, nontense  Extremities: L BKA amputation site C/D/I, minimal bleeding, no evidence of purulence

## 2023-12-05 NOTE — CONSULT NOTE ADULT - SUBJECTIVE AND OBJECTIVE BOX
Patient is a 63y old  Male who presents with a chief complaint of LE amputation (04 Dec 2023 16:10)    Admission HPI:  Mr. Mcdaniel is a 63M PMHx DM, HLD, PAD, prior toe amputation, presents to [facility] ED w L foot wound. Reports wound has been present on hindfoot x 3 weeks. Started as small cut. Over last 24 hours, pt unable to ambulate d/t severe pain. Reports significant malodor. Denies subjective fevers, chills, purulent drainage, numbness/paresthesia.     In ED, patient is tachycardic to 123. BP w HTN. Febrile to 102.4 F. Labs w WBC 21. Hgb 11.4. Lac 2.4. XR L foot w soft tissue gas at lateral hindfoot. CT LLE non con w soft tissue gas tracking ~7cm above foot.    (29 Nov 2023 20:52)    Interval History:  Patient s/p L BKA on 11/30 and s/p revision on 12/4.  Post-op w functional deficits.     REVIEW OF SYSTEMS: No chest pain, shortness of breath, nausea, vomiting or diarhea; other ROS neg     PAST MEDICAL & SURGICAL HISTORY  Non-Insulin Dependent Diabetes Mellitus  DM (Diabetes Mellitus)  HTN (Hypertension)  Dyslipidemia  Retinopathy  Diabetes mellitus type 2 in obese  Hyperlipidemia  Toe infection  Ruptured appendix  S/P appendectomy  Toe amputation status    FUNCTIONAL HISTORY:   Lives w wife in home w one flight.  PTA Independent    CURRENT FUNCTIONAL STATUS:  Min A transfers and gait    FAMILY HISTORY   Family history of diabetes mellitus in grandmother (Grandparent)    MEDICATIONS   ampicillin/sulbactam  IVPB 3 Gram(s) IV Intermittent every 6 hours  enalapril 10 milliGRAM(s) Oral every 12 hours  enoxaparin Injectable 40 milliGRAM(s) SubCutaneous every 24 hours  gabapentin 300 milliGRAM(s) Oral three times a day  guaiFENesin Oral Liquid (Sugar-Free) 100 milliGRAM(s) Oral every 6 hours PRN  HYDROmorphone  Injectable 0.5 milliGRAM(s) IV Push every 3 hours PRN  insulin glargine Injectable (LANTUS) 34 Unit(s) SubCutaneous at bedtime  insulin lispro (ADMELOG) corrective regimen sliding scale   SubCutaneous three times a day before meals  insulin lispro (ADMELOG) corrective regimen sliding scale   SubCutaneous at bedtime  insulin lispro Injectable (ADMELOG) 12 Unit(s) SubCutaneous three times a day before meals  lactated ringers. 1000 milliLiter(s) IV Continuous <Continuous>  oxyCODONE    IR 10 milliGRAM(s) Oral every 4 hours PRN  oxyCODONE    IR 5 milliGRAM(s) Oral every 4 hours PRN  polyethylene glycol 3350 17 Gram(s) Oral every 12 hours  senna 2 Tablet(s) Oral at bedtime  simvastatin 40 milliGRAM(s) Oral at bedtime  tamsulosin 0.4 milliGRAM(s) Oral at bedtime    ALLERGIES  No Known Allergies    VITALS  T(C): 36.7 (12-05-23 @ 06:29), Max: 37.2 (12-04-23 @ 16:36)  HR: 96 (12-05-23 @ 06:29) (90 - 106)  BP: 150/74 (12-05-23 @ 06:29) (116/60 - 178/88)  RR: 18 (12-05-23 @ 06:29) (12 - 18)  SpO2: 96% (12-05-23 @ 06:29) (93% - 100%)  Wt(kg): --    PHYSICAL EXAM  Constitutional - NAD, Comfortable  HEENT - NCAT, EOMI  Neck - Supple  Chest - No distress, no use of accessory muscles for respiration  Cardiovascular -Well perfused  Abdomen - BS+, Soft, NTND  Extremities - No C/C/E, No calf tenderness   Neurologic Exam -                    Cognitive - Awake, Alert, AAO to self, place, date, year, situation     Communication - Fluent, No dysarthria, no aphasia     Cranial Nerves - CN 2-12 intact     Motor - No focal deficits      Sensory - Intact to LT     Reflexes - DTR Intact, No primitive reflexive  Psychiatric - Mood stable, Affect WNL    RECENT LABS/IMAGING  CBC Full  -  ( 05 Dec 2023 07:22 )  WBC Count : 21.09 K/uL  RBC Count : 4.18 M/uL  Hemoglobin : 11.1 g/dL  Hematocrit : 35.7 %  Platelet Count - Automated : 519 K/uL  Mean Cell Volume : 85.4 fl  Mean Cell Hemoglobin : 26.6 pg  Mean Cell Hemoglobin Concentration : 31.1 gm/dL  Auto Neutrophil # : x  Auto Lymphocyte # : x  Auto Monocyte # : x  Auto Eosinophil # : x  Auto Basophil # : x  Auto Neutrophil % : x  Auto Lymphocyte % : x  Auto Monocyte % : x  Auto Eosinophil % : x  Auto Basophil % : x    12-05    134<L>  |  98  |  17  ----------------------------<  291<H>  5.1   |  28  |  1.12    Ca    9.0      05 Dec 2023 07:22  Phos  3.6     12-05  Mg     2.1     12-05      Urinalysis Basic - ( 05 Dec 2023 07:22 )    Color: x / Appearance: x / SG: x / pH: x  Gluc: 291 mg/dL / Ketone: x  / Bili: x / Urobili: x   Blood: x / Protein: x / Nitrite: x   Leuk Esterase: x / RBC: x / WBC x   Sq Epi: x / Non Sq Epi: x / Bacteria: x    Impression:  64 yo with functional deficits secondary to diagnosis of L BKA    Plan:  PT- ROM, Bed Mob, Transfers, Amb w AD and bracing as needed  OT- ADLs, bracing  Prec- Falls, Cardiac  DVT Prophylaxis- Lovenox  Monitor wbc ct  Skin- Turn q2 h  Dispo-  Patient is a 63y old  Male who presents with a chief complaint of LE amputation (04 Dec 2023 16:10)    Admission HPI:  Mr. Mcdaniel is a 63M PMHx DM, HLD, PAD, prior toe amputation, presents to [facility] ED w L foot wound. Reports wound has been present on hindfoot x 3 weeks. Started as small cut. Over last 24 hours, pt unable to ambulate d/t severe pain. Reports significant malodor. Denies subjective fevers, chills, purulent drainage, numbness/paresthesia.     In ED, patient is tachycardic to 123. BP w HTN. Febrile to 102.4 F. Labs w WBC 21. Hgb 11.4. Lac 2.4. XR L foot w soft tissue gas at lateral hindfoot. CT LLE non con w soft tissue gas tracking ~7cm above foot.    (29 Nov 2023 20:52)    Interval History:  Patient s/p L BKA on 11/30 and s/p revision on 12/4.  Post-op w functional deficits.     REVIEW OF SYSTEMS: No chest pain, shortness of breath, nausea, vomiting or diarhea; other ROS neg     PAST MEDICAL & SURGICAL HISTORY  Non-Insulin Dependent Diabetes Mellitus  DM (Diabetes Mellitus)  HTN (Hypertension)  Dyslipidemia  Retinopathy  Diabetes mellitus type 2 in obese  Hyperlipidemia  Toe infection  Ruptured appendix  S/P appendectomy  Toe amputation status    FUNCTIONAL HISTORY:   Lives w wife in home w one flight.  PTA Independent    CURRENT FUNCTIONAL STATUS:  Min A transfers and gait    FAMILY HISTORY   Family history of diabetes mellitus in grandmother (Grandparent)    MEDICATIONS   ampicillin/sulbactam  IVPB 3 Gram(s) IV Intermittent every 6 hours  enalapril 10 milliGRAM(s) Oral every 12 hours  enoxaparin Injectable 40 milliGRAM(s) SubCutaneous every 24 hours  gabapentin 300 milliGRAM(s) Oral three times a day  guaiFENesin Oral Liquid (Sugar-Free) 100 milliGRAM(s) Oral every 6 hours PRN  HYDROmorphone  Injectable 0.5 milliGRAM(s) IV Push every 3 hours PRN  insulin glargine Injectable (LANTUS) 34 Unit(s) SubCutaneous at bedtime  insulin lispro (ADMELOG) corrective regimen sliding scale   SubCutaneous three times a day before meals  insulin lispro (ADMELOG) corrective regimen sliding scale   SubCutaneous at bedtime  insulin lispro Injectable (ADMELOG) 12 Unit(s) SubCutaneous three times a day before meals  lactated ringers. 1000 milliLiter(s) IV Continuous <Continuous>  oxyCODONE    IR 10 milliGRAM(s) Oral every 4 hours PRN  oxyCODONE    IR 5 milliGRAM(s) Oral every 4 hours PRN  polyethylene glycol 3350 17 Gram(s) Oral every 12 hours  senna 2 Tablet(s) Oral at bedtime  simvastatin 40 milliGRAM(s) Oral at bedtime  tamsulosin 0.4 milliGRAM(s) Oral at bedtime    ALLERGIES  No Known Allergies    VITALS  T(C): 36.7 (12-05-23 @ 06:29), Max: 37.2 (12-04-23 @ 16:36)  HR: 96 (12-05-23 @ 06:29) (90 - 106)  BP: 150/74 (12-05-23 @ 06:29) (116/60 - 178/88)  RR: 18 (12-05-23 @ 06:29) (12 - 18)  SpO2: 96% (12-05-23 @ 06:29) (93% - 100%)  Wt(kg): --    PHYSICAL EXAM  Constitutional - NAD, Comfortable  HEENT - NCAT, EOMI  Neck - Supple  Chest - No distress, no use of accessory muscles for respiration  Cardiovascular -Well perfused  Abdomen - BS+, Soft, NTND  Extremities - No C/C/E, No calf tenderness   Neurologic Exam -                    Cognitive - Awake, Alert, AAO to self, place, date, year, situation     Communication - Fluent, No dysarthria, no aphasia     Cranial Nerves - CN 2-12 intact     Motor - No focal deficits      Sensory - Intact to LT     Reflexes - DTR Intact, No primitive reflexive  Psychiatric - Mood stable, Affect WNL    RECENT LABS/IMAGING  CBC Full  -  ( 05 Dec 2023 07:22 )  WBC Count : 21.09 K/uL  RBC Count : 4.18 M/uL  Hemoglobin : 11.1 g/dL  Hematocrit : 35.7 %  Platelet Count - Automated : 519 K/uL  Mean Cell Volume : 85.4 fl  Mean Cell Hemoglobin : 26.6 pg  Mean Cell Hemoglobin Concentration : 31.1 gm/dL  Auto Neutrophil # : x  Auto Lymphocyte # : x  Auto Monocyte # : x  Auto Eosinophil # : x  Auto Basophil # : x  Auto Neutrophil % : x  Auto Lymphocyte % : x  Auto Monocyte % : x  Auto Eosinophil % : x  Auto Basophil % : x    12-05    134<L>  |  98  |  17  ----------------------------<  291<H>  5.1   |  28  |  1.12    Ca    9.0      05 Dec 2023 07:22  Phos  3.6     12-05  Mg     2.1     12-05      Urinalysis Basic - ( 05 Dec 2023 07:22 )    Color: x / Appearance: x / SG: x / pH: x  Gluc: 291 mg/dL / Ketone: x  / Bili: x / Urobili: x   Blood: x / Protein: x / Nitrite: x   Leuk Esterase: x / RBC: x / WBC x   Sq Epi: x / Non Sq Epi: x / Bacteria: x    Impression:  62 yo with functional deficits secondary to diagnosis of L BKA    Plan:  PT- ROM, Bed Mob, Transfers, Amb w AD and bracing as needed  OT- ADLs, bracing  Prec- Falls, Cardiac  DVT Prophylaxis- Lovenox  Monitor wbc ct  Skin- Turn q2 h  Dispo-  Patient is a 63y old  Male who presents with a chief complaint of LE amputation (04 Dec 2023 16:10)    Admission HPI:  Mr. Mcdaniel is a 63M PMHx DM, HLD, PAD, prior toe amputation, presents to [facility] ED w L foot wound. Reports wound has been present on hindfoot x 3 weeks. Started as small cut. Over last 24 hours, pt unable to ambulate d/t severe pain. Reports significant malodor. Denies subjective fevers, chills, purulent drainage, numbness/paresthesia.     In ED, patient is tachycardic to 123. BP w HTN. Febrile to 102.4 F. Labs w WBC 21. Hgb 11.4. Lac 2.4. XR L foot w soft tissue gas at lateral hindfoot. CT LLE non con w soft tissue gas tracking ~7cm above foot.    (29 Nov 2023 20:52)    Interval History:  Patient s/p L BKA on 11/30 and s/p revision on 12/4.  Post-op w functional deficits.     REVIEW OF SYSTEMS: + phantom pain - controlled w meds, No chest pain, shortness of breath, nausea, vomiting or diarhea; other ROS neg     PAST MEDICAL & SURGICAL HISTORY  Non-Insulin Dependent Diabetes Mellitus  DM (Diabetes Mellitus)  HTN (Hypertension)  Dyslipidemia  Retinopathy  Diabetes mellitus type 2 in obese  Hyperlipidemia  Toe infection  Ruptured appendix  S/P appendectomy  Toe amputation status    FUNCTIONAL HISTORY:   Lives w wife in home w one flight.  PTA Independent    CURRENT FUNCTIONAL STATUS:  Min A transfers and gait    FAMILY HISTORY   Family history of diabetes mellitus in grandmother (Grandparent)    MEDICATIONS   ampicillin/sulbactam  IVPB 3 Gram(s) IV Intermittent every 6 hours  enalapril 10 milliGRAM(s) Oral every 12 hours  enoxaparin Injectable 40 milliGRAM(s) SubCutaneous every 24 hours  gabapentin 300 milliGRAM(s) Oral three times a day  guaiFENesin Oral Liquid (Sugar-Free) 100 milliGRAM(s) Oral every 6 hours PRN  HYDROmorphone  Injectable 0.5 milliGRAM(s) IV Push every 3 hours PRN  insulin glargine Injectable (LANTUS) 34 Unit(s) SubCutaneous at bedtime  insulin lispro (ADMELOG) corrective regimen sliding scale   SubCutaneous three times a day before meals  insulin lispro (ADMELOG) corrective regimen sliding scale   SubCutaneous at bedtime  insulin lispro Injectable (ADMELOG) 12 Unit(s) SubCutaneous three times a day before meals  lactated ringers. 1000 milliLiter(s) IV Continuous <Continuous>  oxyCODONE    IR 10 milliGRAM(s) Oral every 4 hours PRN  oxyCODONE    IR 5 milliGRAM(s) Oral every 4 hours PRN  polyethylene glycol 3350 17 Gram(s) Oral every 12 hours  senna 2 Tablet(s) Oral at bedtime  simvastatin 40 milliGRAM(s) Oral at bedtime  tamsulosin 0.4 milliGRAM(s) Oral at bedtime    ALLERGIES  No Known Allergies    VITALS  T(C): 36.7 (12-05-23 @ 06:29), Max: 37.2 (12-04-23 @ 16:36)  HR: 96 (12-05-23 @ 06:29) (90 - 106)  BP: 150/74 (12-05-23 @ 06:29) (116/60 - 178/88)  RR: 18 (12-05-23 @ 06:29) (12 - 18)  SpO2: 96% (12-05-23 @ 06:29) (93% - 100%)  Wt(kg): --    PHYSICAL EXAM  Constitutional - NAD, Comfortable  HEENT - NCAT, EOMI  Neck - Supple  Chest - No distress, no use of accessory muscles for respiration  Cardiovascular -Well perfused  Abdomen - BS+, Soft, NTND  Extremities - LLE dressing intact, RLE no calf pain.   Neurologic Exam -                 AAO x 3  Motor nml grade throughout     Psychiatric - Mood stable, Affect WNL    RECENT LABS/IMAGING  CBC Full  -  ( 05 Dec 2023 07:22 )  WBC Count : 21.09 K/uL  RBC Count : 4.18 M/uL  Hemoglobin : 11.1 g/dL  Hematocrit : 35.7 %  Platelet Count - Automated : 519 K/uL  Mean Cell Volume : 85.4 fl  Mean Cell Hemoglobin : 26.6 pg  Mean Cell Hemoglobin Concentration : 31.1 gm/dL  Auto Neutrophil # : x  Auto Lymphocyte # : x  Auto Monocyte # : x  Auto Eosinophil # : x  Auto Basophil # : x  Auto Neutrophil % : x  Auto Lymphocyte % : x  Auto Monocyte % : x  Auto Eosinophil % : x  Auto Basophil % : x    12-05    134<L>  |  98  |  17  ----------------------------<  291<H>  5.1   |  28  |  1.12    Ca    9.0      05 Dec 2023 07:22  Phos  3.6     12-05  Mg     2.1     12-05      Urinalysis Basic - ( 05 Dec 2023 07:22 )    Color: x / Appearance: x / SG: x / pH: x  Gluc: 291 mg/dL / Ketone: x  / Bili: x / Urobili: x   Blood: x / Protein: x / Nitrite: x   Leuk Esterase: x / RBC: x / WBC x   Sq Epi: x / Non Sq Epi: x / Bacteria: x    Impression:  62 yo with functional deficits secondary to diagnosis of L BKA    Plan:  PT- ROM, Bed Mob, Transfers, Amb w AD and bracing as needed  OT- ADLs, bracing  Prec- Falls, Cardiac  DVT Prophylaxis- Lovenox  Monitor wbc ct  Skin- Turn q2 h  Dispo- Acute Rehab- patient requires active and ongoing therapeutic interventions of multiple disciplines and can tolerate 3 hours of therapies x 4wks. Can actively participate and benefit from  an intensive rehabilitation program. Requires supervision by a rehabilitation physician and a coordinated interdisciplinary approach to providing rehabilitation.   d/w patient functional prognosis and rehab options.  Patient is a 63y old  Male who presents with a chief complaint of LE amputation (04 Dec 2023 16:10)    Admission HPI:  Mr. Mcdaniel is a 63M PMHx DM, HLD, PAD, prior toe amputation, presents to [facility] ED w L foot wound. Reports wound has been present on hindfoot x 3 weeks. Started as small cut. Over last 24 hours, pt unable to ambulate d/t severe pain. Reports significant malodor. Denies subjective fevers, chills, purulent drainage, numbness/paresthesia.     In ED, patient is tachycardic to 123. BP w HTN. Febrile to 102.4 F. Labs w WBC 21. Hgb 11.4. Lac 2.4. XR L foot w soft tissue gas at lateral hindfoot. CT LLE non con w soft tissue gas tracking ~7cm above foot.    (29 Nov 2023 20:52)    Interval History:  Patient s/p L BKA on 11/30 and s/p revision on 12/4.  Post-op w functional deficits.     REVIEW OF SYSTEMS: + phantom pain - controlled w meds, No chest pain, shortness of breath, nausea, vomiting or diarhea; other ROS neg     PAST MEDICAL & SURGICAL HISTORY  Non-Insulin Dependent Diabetes Mellitus  DM (Diabetes Mellitus)  HTN (Hypertension)  Dyslipidemia  Retinopathy  Diabetes mellitus type 2 in obese  Hyperlipidemia  Toe infection  Ruptured appendix  S/P appendectomy  Toe amputation status    FUNCTIONAL HISTORY:   Lives w wife in home w one flight.  PTA Independent    CURRENT FUNCTIONAL STATUS:  Min A transfers and gait    FAMILY HISTORY   Family history of diabetes mellitus in grandmother (Grandparent)    MEDICATIONS   ampicillin/sulbactam  IVPB 3 Gram(s) IV Intermittent every 6 hours  enalapril 10 milliGRAM(s) Oral every 12 hours  enoxaparin Injectable 40 milliGRAM(s) SubCutaneous every 24 hours  gabapentin 300 milliGRAM(s) Oral three times a day  guaiFENesin Oral Liquid (Sugar-Free) 100 milliGRAM(s) Oral every 6 hours PRN  HYDROmorphone  Injectable 0.5 milliGRAM(s) IV Push every 3 hours PRN  insulin glargine Injectable (LANTUS) 34 Unit(s) SubCutaneous at bedtime  insulin lispro (ADMELOG) corrective regimen sliding scale   SubCutaneous three times a day before meals  insulin lispro (ADMELOG) corrective regimen sliding scale   SubCutaneous at bedtime  insulin lispro Injectable (ADMELOG) 12 Unit(s) SubCutaneous three times a day before meals  lactated ringers. 1000 milliLiter(s) IV Continuous <Continuous>  oxyCODONE    IR 10 milliGRAM(s) Oral every 4 hours PRN  oxyCODONE    IR 5 milliGRAM(s) Oral every 4 hours PRN  polyethylene glycol 3350 17 Gram(s) Oral every 12 hours  senna 2 Tablet(s) Oral at bedtime  simvastatin 40 milliGRAM(s) Oral at bedtime  tamsulosin 0.4 milliGRAM(s) Oral at bedtime    ALLERGIES  No Known Allergies    VITALS  T(C): 36.7 (12-05-23 @ 06:29), Max: 37.2 (12-04-23 @ 16:36)  HR: 96 (12-05-23 @ 06:29) (90 - 106)  BP: 150/74 (12-05-23 @ 06:29) (116/60 - 178/88)  RR: 18 (12-05-23 @ 06:29) (12 - 18)  SpO2: 96% (12-05-23 @ 06:29) (93% - 100%)  Wt(kg): --    PHYSICAL EXAM  Constitutional - NAD, Comfortable  HEENT - NCAT, EOMI  Neck - Supple  Chest - No distress, no use of accessory muscles for respiration  Cardiovascular -Well perfused  Abdomen - BS+, Soft, NTND  Extremities - LLE dressing intact, RLE no calf pain.   Neurologic Exam -                 AAO x 3  Motor nml grade throughout     Psychiatric - Mood stable, Affect WNL    RECENT LABS/IMAGING  CBC Full  -  ( 05 Dec 2023 07:22 )  WBC Count : 21.09 K/uL  RBC Count : 4.18 M/uL  Hemoglobin : 11.1 g/dL  Hematocrit : 35.7 %  Platelet Count - Automated : 519 K/uL  Mean Cell Volume : 85.4 fl  Mean Cell Hemoglobin : 26.6 pg  Mean Cell Hemoglobin Concentration : 31.1 gm/dL  Auto Neutrophil # : x  Auto Lymphocyte # : x  Auto Monocyte # : x  Auto Eosinophil # : x  Auto Basophil # : x  Auto Neutrophil % : x  Auto Lymphocyte % : x  Auto Monocyte % : x  Auto Eosinophil % : x  Auto Basophil % : x    12-05    134<L>  |  98  |  17  ----------------------------<  291<H>  5.1   |  28  |  1.12    Ca    9.0      05 Dec 2023 07:22  Phos  3.6     12-05  Mg     2.1     12-05      Urinalysis Basic - ( 05 Dec 2023 07:22 )    Color: x / Appearance: x / SG: x / pH: x  Gluc: 291 mg/dL / Ketone: x  / Bili: x / Urobili: x   Blood: x / Protein: x / Nitrite: x   Leuk Esterase: x / RBC: x / WBC x   Sq Epi: x / Non Sq Epi: x / Bacteria: x    Impression:  62 yo with functional deficits secondary to diagnosis of L BKA    Plan:  PT- ROM, Bed Mob, Transfers, Amb w AD and bracing as needed  OT- ADLs, bracing  Prec- Falls, Cardiac  DVT Prophylaxis- Lovenox  Monitor wbc ct  Skin- Turn q2 h  Dispo- Acute Rehab- patient requires active and ongoing therapeutic interventions of multiple disciplines and can tolerate 3 hours of therapies x 4wks. Can actively participate and benefit from  an intensive rehabilitation program. Requires supervision by a rehabilitation physician and a coordinated interdisciplinary approach to providing rehabilitation.   d/w patient functional prognosis and rehab options.

## 2023-12-05 NOTE — PROGRESS NOTE ADULT - SUBJECTIVE AND OBJECTIVE BOX
seen earlier today     Chief Complaint: Diabetes Mellitus follow up    INTERVAL HX: patient tolerating po, having optavia bars as snack at bedtime (8gcarbs ) patient returned from OR s/p BKA and ate dinner late last night without insulin coverage-> BG to 309       Review of Systems:  General: As above  GI: No nausea, vomiting  Endocrine: no  S&Sx of hypoglycemia    Allergies    No Known Allergies    Intolerances      MEDICATIONS  (STANDING):  ampicillin/sulbactam  IVPB 3 Gram(s) IV Intermittent every 6 hours  enalapril 10 milliGRAM(s) Oral every 12 hours  enoxaparin Injectable 40 milliGRAM(s) SubCutaneous every 24 hours  gabapentin 300 milliGRAM(s) Oral three times a day  insulin glargine Injectable (LANTUS) 36 Unit(s) SubCutaneous at bedtime  insulin lispro (ADMELOG) corrective regimen sliding scale   SubCutaneous at bedtime  insulin lispro (ADMELOG) corrective regimen sliding scale   SubCutaneous three times a day before meals  insulin lispro Injectable (ADMELOG) 12 Unit(s) SubCutaneous three times a day before meals  lactated ringers. 1000 milliLiter(s) (100 mL/Hr) IV Continuous <Continuous>  polyethylene glycol 3350 17 Gram(s) Oral every 12 hours  senna 2 Tablet(s) Oral at bedtime  simvastatin 40 milliGRAM(s) Oral at bedtime  tamsulosin 0.4 milliGRAM(s) Oral at bedtime        insulin glargine Injectable (LANTUS)   34 Unit(s) SubCutaneous (12-05-23 @ 00:02)    insulin lispro (ADMELOG) corrective regimen sliding scale   2 Unit(s) SubCutaneous (12-05-23 @ 00:03)    insulin lispro (ADMELOG) corrective regimen sliding scale   2 Unit(s) SubCutaneous (12-05-23 @ 14:06)   6 Unit(s) SubCutaneous (12-05-23 @ 09:37)    insulin lispro (ADMELOG) corrective regimen sliding scale   8 Unit(s) SubCutaneous (12-05-23 @ 01:36)    insulin lispro Injectable (ADMELOG)   12 Unit(s) SubCutaneous (12-05-23 @ 14:07)   12 Unit(s) SubCutaneous (12-05-23 @ 09:39)    simvastatin   40 milliGRAM(s) Oral (12-04-23 @ 23:37)        PHYSICAL EXAM:  VITALS: T(C): 37.1 (12-05-23 @ 13:14)  T(F): 98.7 (12-05-23 @ 13:14), Max: 99 (12-04-23 @ 16:36)  HR: 93 (12-05-23 @ 13:14) (90 - 106)  BP: 117/69 (12-05-23 @ 13:14) (116/60 - 178/88)  RR:  (12 - 18)  SpO2:  (93% - 100%)  Wt(kg): --  GENERAL: NAD  Respiratory: Respirations unlabored   Extremities: Warm, L BKA dressing cdi   NEURO: Alert , appropriate     LABS:  POCT Blood Glucose.: 192 mg/dL (12-05-23 @ 14:00)  POCT Blood Glucose.: 263 mg/dL (12-05-23 @ 09:34)  POCT Blood Glucose.: 264 mg/dL (12-05-23 @ 04:06)  POCT Blood Glucose.: 298 mg/dL (12-05-23 @ 02:50)  POCT Blood Glucose.: 340 mg/dL (12-05-23 @ 01:07)  POCT Blood Glucose.: 316 mg/dL (12-05-23 @ 01:02)  POCT Blood Glucose.: 314 mg/dL (12-04-23 @ 23:45)  POCT Blood Glucose.: 309 mg/dL (12-04-23 @ 23:43)  POCT Blood Glucose.: 142 mg/dL (12-04-23 @ 18:53)  POCT Blood Glucose.: 192 mg/dL (12-04-23 @ 12:12)  POCT Blood Glucose.: 176 mg/dL (12-04-23 @ 06:44)  POCT Blood Glucose.: 199 mg/dL (12-04-23 @ 00:00)  POCT Blood Glucose.: 235 mg/dL (12-03-23 @ 21:39)  POCT Blood Glucose.: 258 mg/dL (12-03-23 @ 18:28)  POCT Blood Glucose.: 145 mg/dL (12-03-23 @ 13:32)  POCT Blood Glucose.: 257 mg/dL (12-03-23 @ 08:55)  POCT Blood Glucose.: 180 mg/dL (12-02-23 @ 21:57)  POCT Blood Glucose.: 177 mg/dL (12-02-23 @ 18:42)                          11.1   21.09 )-----------( 519      ( 05 Dec 2023 07:22 )             35.7     12-05    134<L>  |  98  |  17  ----------------------------<  291<H>  5.1   |  28  |  1.12    Ca    9.0      05 Dec 2023 07:22  Phos  3.6     12-05  Mg     2.1     12-05      eGFR: 74 mL/min/1.73m2 (05 Dec 2023 07:22)      Thyroid Function Tests:      A1C with Estimated Average Glucose Result: 11.4 % (12-01-23 @ 05:19)    Estimated Average Glucose: 280 mg/dL (12-01-23 @ 05:19)        Diet, Regular:   Consistent Carbohydrate Evening Snack (CSTCHOSN) (12-04-23 @ 19:34) [Active]

## 2023-12-05 NOTE — PROGRESS NOTE ADULT - ASSESSMENT
63M PMHx DM, HLD, PAD, prior toe amputation, presents to [facility] ED w L foot wound. NSTI of non salvageable L foot. Septic in ED. LRINEC 9. XR/CT both w soft tissue gas. s/p 11/30 L BKA guillotine amp, downgraded from SICU and recovering appropriately on the floor. now S/P below the knee amputation revision on 12/4. Needs eventual formalization.     Plan:  - Pain control as needed  - Zosyn/vanco  - Lovenox for DVT ppx  - Activity as tolerated  - Endo consulted, appreciate recs- will continue to follow   - BP control  - Bowel regimen  - Daily dressing changes    Plan discussed with fellow on behalf of attending.     Vascular Surgery  [id number] 63M PMHx DM, HLD, PAD, prior toe amputation, presents to [facility] ED w L foot wound. NSTI of non salvageable L foot. Septic in ED. LRINEC 9. XR/CT both w soft tissue gas. s/p 11/30 L BKA guillotine amp, downgraded from SICU and recovering appropriately on the floor. now S/P below the knee amputation revision on 12/4. Needs eventual formalization.     Plan:  - Pain control as needed  - Zosyn/vanco  - Lovenox for DVT ppx  - Activity as tolerated  - Endo consulted, appreciate recs- will continue to follow   - BP control  - Bowel regimen  - Daily dressing changes    Plan discussed with fellow on behalf of attending.     Vascular Surgery  [id number]

## 2023-12-05 NOTE — PHARMACOTHERAPY INTERVENTION NOTE - COMMENTS
ELVIS VILLATORO, 63y Male with admission for necrotizing fascitis of left foot, now s/p OR on 12/4 for formalization. Patient was started on zosyn empirically.    Initial OR cultures from admission are growing Citrobacter koseri and Enterococcus faecalis.    Recommendation(s):  1) Discussed on ID rounds, would suggest narrowing zosyn to unasyn 3 grams IV Q6H to cover the organisms from OR cultures (no need for Pseudomonas nor MRSA coverage given lack of growth).  2) Blood culture with Dermabacter likely represents contamination give 1/4 blood culture growth and >50 hours time to positivity, and common skin wil organism.    With kind regards,  Jacrlos Weaver, PharmD, BCIDP  Infectious Diseases Clinical Pharmacist  Available on [employer] Teams  . ELVIS VILLATORO, 63y Male with admission for necrotizing fascitis of left foot, now s/p OR on 12/4 for formalization. Patient was started on zosyn empirically.    Initial OR cultures from admission are growing Citrobacter koseri and Enterococcus faecalis.    Recommendation(s):  1) Discussed on ID rounds, would suggest narrowing zosyn to unasyn 3 grams IV Q6H to cover the organisms from OR cultures (no need for Pseudomonas nor MRSA coverage given lack of growth).  2) Blood culture with Dermabacter likely represents contamination give 1/4 blood culture growth and >50 hours time to positivity, and common skin wil organism.    With kind regards,  Jcarlos Weaver, PharmD, BCIDP  Infectious Diseases Clinical Pharmacist  Available on [employer] Teams  .

## 2023-12-05 NOTE — PROGRESS NOTE ADULT - ASSESSMENT
Mr. Mcdaniel is a 63M PMHx DM, HLD, PAD, prior toe amputation, presents to [facility] ED w L foot wound. Reports wound has been present on hindfoot x 3 weeks. Started as small cut. Over last 24 hours, pt unable to ambulate d/t severe pain. Reports significant malodor. Denies subjective fevers, chills, purulent drainage, numbness/paresthesia.   In ED, patient is tachycardic to 123. BP w HTN. Febrile to 102.4 F. Labs w WBC 21. Hgb 11.4. Lac 2.4. . XR L foot w soft tissue gas at lateral hindfoot. CT LLE non con w soft tissue gas tracking ~7cm above foot.   Was started on Vancomycin, Zosyn and Clindamycin. 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  No further fevers, WBC 21-->12-->14  ,   Bcx 11/29; 1/4 Dermabacter hominis  Wound CX 11/30 : Citrobacter Koseri, Enterococcus fecalis  CT  soft tissue ulcerations seen along the plantar aspect of the first toe,   posterior aspect of the heel, and possibly along the lateral aspect of   the forefoot. Associated soft tissue gas tracking within the lateral soft   tissues along the fifth metatarsal, within the plantar soft tissues of   the midfoot/hindfoot, posterior aspect of the heel, and traveling   proximally along the full length of the Achilles tendon, which may   represent a gas-forming/necrotizing infection.  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    # Necrotising fascitis Left foot s/p BKA  # Leucocytosis  # Dermabacter hominis bacteremia    12/5: Overall appears reasonably stable.  Follow-up cultures thus far negative today.  Renal function adequate.  Still with leukocytosis, reactive component given thrombocytosis and surgery yesterday.  I do not see a role to alter antibiotics here.      Suggestions continue Unasyn  Await formalization  Trend CBC  Monitor renal function  Good but not overly tight diabetic control    Eduin Gutierrez MD  Attending Physician  [facility]  Division of Infectious Diseases  [phone]   Mr. Mcdaniel is a 63M PMHx DM, HLD, PAD, prior toe amputation, presents to [facility] ED w L foot wound. Reports wound has been present on hindfoot x 3 weeks. Started as small cut. Over last 24 hours, pt unable to ambulate d/t severe pain. Reports significant malodor. Denies subjective fevers, chills, purulent drainage, numbness/paresthesia.   In ED, patient is tachycardic to 123. BP w HTN. Febrile to 102.4 F. Labs w WBC 21. Hgb 11.4. Lac 2.4. . XR L foot w soft tissue gas at lateral hindfoot. CT LLE non con w soft tissue gas tracking ~7cm above foot.   Was started on Vancomycin, Zosyn and Clindamycin. 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  No further fevers, WBC 21-->12-->14  ,   Bcx 11/29; 1/4 Dermabacter hominis  Wound CX 11/30 : Citrobacter Koseri, Enterococcus fecalis  CT  soft tissue ulcerations seen along the plantar aspect of the first toe,   posterior aspect of the heel, and possibly along the lateral aspect of   the forefoot. Associated soft tissue gas tracking within the lateral soft   tissues along the fifth metatarsal, within the plantar soft tissues of   the midfoot/hindfoot, posterior aspect of the heel, and traveling   proximally along the full length of the Achilles tendon, which may   represent a gas-forming/necrotizing infection.  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    # Necrotising fascitis Left foot s/p BKA  # Leucocytosis  # Dermabacter hominis bacteremia    12/5: Overall appears reasonably stable.  Follow-up cultures thus far negative today.  Renal function adequate.  Still with leukocytosis, reactive component given thrombocytosis and surgery yesterday.  I do not see a role to alter antibiotics here.      Suggestions continue Unasyn  Await formalization  Trend CBC  Monitor renal function  Good but not overly tight diabetic control    Eduin Gutierrez MD  Attending Physician  [facility]  Division of Infectious Diseases  [phone]

## 2023-12-06 LAB
ANION GAP SERPL CALC-SCNC: 10 MMOL/L — SIGNIFICANT CHANGE UP (ref 5–17)
ANION GAP SERPL CALC-SCNC: 10 MMOL/L — SIGNIFICANT CHANGE UP (ref 5–17)
BUN SERPL-MCNC: 17 MG/DL — SIGNIFICANT CHANGE UP (ref 7–23)
BUN SERPL-MCNC: 17 MG/DL — SIGNIFICANT CHANGE UP (ref 7–23)
CALCIUM SERPL-MCNC: 9 MG/DL — SIGNIFICANT CHANGE UP (ref 8.4–10.5)
CALCIUM SERPL-MCNC: 9 MG/DL — SIGNIFICANT CHANGE UP (ref 8.4–10.5)
CHLORIDE SERPL-SCNC: 100 MMOL/L — SIGNIFICANT CHANGE UP (ref 96–108)
CHLORIDE SERPL-SCNC: 100 MMOL/L — SIGNIFICANT CHANGE UP (ref 96–108)
CO2 SERPL-SCNC: 28 MMOL/L — SIGNIFICANT CHANGE UP (ref 22–31)
CO2 SERPL-SCNC: 28 MMOL/L — SIGNIFICANT CHANGE UP (ref 22–31)
CREAT SERPL-MCNC: 0.99 MG/DL — SIGNIFICANT CHANGE UP (ref 0.5–1.3)
CREAT SERPL-MCNC: 0.99 MG/DL — SIGNIFICANT CHANGE UP (ref 0.5–1.3)
EGFR: 86 ML/MIN/1.73M2 — SIGNIFICANT CHANGE UP
EGFR: 86 ML/MIN/1.73M2 — SIGNIFICANT CHANGE UP
GLUCOSE SERPL-MCNC: 219 MG/DL — HIGH (ref 70–99)
GLUCOSE SERPL-MCNC: 219 MG/DL — HIGH (ref 70–99)
HCT VFR BLD CALC: 33.9 % — LOW (ref 39–50)
HCT VFR BLD CALC: 33.9 % — LOW (ref 39–50)
HGB BLD-MCNC: 10.1 G/DL — LOW (ref 13–17)
HGB BLD-MCNC: 10.1 G/DL — LOW (ref 13–17)
MAGNESIUM SERPL-MCNC: 1.9 MG/DL — SIGNIFICANT CHANGE UP (ref 1.6–2.6)
MAGNESIUM SERPL-MCNC: 1.9 MG/DL — SIGNIFICANT CHANGE UP (ref 1.6–2.6)
MCHC RBC-ENTMCNC: 26 PG — LOW (ref 27–34)
MCHC RBC-ENTMCNC: 26 PG — LOW (ref 27–34)
MCHC RBC-ENTMCNC: 29.8 GM/DL — LOW (ref 32–36)
MCHC RBC-ENTMCNC: 29.8 GM/DL — LOW (ref 32–36)
MCV RBC AUTO: 87.4 FL — SIGNIFICANT CHANGE UP (ref 80–100)
MCV RBC AUTO: 87.4 FL — SIGNIFICANT CHANGE UP (ref 80–100)
NRBC # BLD: 0 /100 WBCS — SIGNIFICANT CHANGE UP (ref 0–0)
NRBC # BLD: 0 /100 WBCS — SIGNIFICANT CHANGE UP (ref 0–0)
PHOSPHATE SERPL-MCNC: 2.6 MG/DL — SIGNIFICANT CHANGE UP (ref 2.5–4.5)
PHOSPHATE SERPL-MCNC: 2.6 MG/DL — SIGNIFICANT CHANGE UP (ref 2.5–4.5)
PLATELET # BLD AUTO: 463 K/UL — HIGH (ref 150–400)
PLATELET # BLD AUTO: 463 K/UL — HIGH (ref 150–400)
POTASSIUM SERPL-MCNC: 4.9 MMOL/L — SIGNIFICANT CHANGE UP (ref 3.5–5.3)
POTASSIUM SERPL-MCNC: 4.9 MMOL/L — SIGNIFICANT CHANGE UP (ref 3.5–5.3)
POTASSIUM SERPL-SCNC: 4.9 MMOL/L — SIGNIFICANT CHANGE UP (ref 3.5–5.3)
POTASSIUM SERPL-SCNC: 4.9 MMOL/L — SIGNIFICANT CHANGE UP (ref 3.5–5.3)
RBC # BLD: 3.88 M/UL — LOW (ref 4.2–5.8)
RBC # BLD: 3.88 M/UL — LOW (ref 4.2–5.8)
RBC # FLD: 15.4 % — HIGH (ref 10.3–14.5)
RBC # FLD: 15.4 % — HIGH (ref 10.3–14.5)
SODIUM SERPL-SCNC: 138 MMOL/L — SIGNIFICANT CHANGE UP (ref 135–145)
SODIUM SERPL-SCNC: 138 MMOL/L — SIGNIFICANT CHANGE UP (ref 135–145)
WBC # BLD: 14.66 K/UL — HIGH (ref 3.8–10.5)
WBC # BLD: 14.66 K/UL — HIGH (ref 3.8–10.5)
WBC # FLD AUTO: 14.66 K/UL — HIGH (ref 3.8–10.5)
WBC # FLD AUTO: 14.66 K/UL — HIGH (ref 3.8–10.5)

## 2023-12-06 PROCEDURE — 99232 SBSQ HOSP IP/OBS MODERATE 35: CPT

## 2023-12-06 PROCEDURE — 99233 SBSQ HOSP IP/OBS HIGH 50: CPT

## 2023-12-06 RX ORDER — INSULIN GLARGINE 100 [IU]/ML
39 INJECTION, SOLUTION SUBCUTANEOUS AT BEDTIME
Refills: 0 | Status: DISCONTINUED | OUTPATIENT
Start: 2023-12-06 | End: 2023-12-09

## 2023-12-06 RX ORDER — INSULIN LISPRO 100/ML
14 VIAL (ML) SUBCUTANEOUS
Refills: 0 | Status: DISCONTINUED | OUTPATIENT
Start: 2023-12-06 | End: 2023-12-08

## 2023-12-06 RX ADMIN — GABAPENTIN 300 MILLIGRAM(S): 400 CAPSULE ORAL at 06:07

## 2023-12-06 RX ADMIN — AMPICILLIN SODIUM AND SULBACTAM SODIUM 200 GRAM(S): 250; 125 INJECTION, POWDER, FOR SUSPENSION INTRAMUSCULAR; INTRAVENOUS at 17:27

## 2023-12-06 RX ADMIN — Medication 4: at 09:00

## 2023-12-06 RX ADMIN — Medication 2: at 12:50

## 2023-12-06 RX ADMIN — AMPICILLIN SODIUM AND SULBACTAM SODIUM 200 GRAM(S): 250; 125 INJECTION, POWDER, FOR SUSPENSION INTRAMUSCULAR; INTRAVENOUS at 06:07

## 2023-12-06 RX ADMIN — HYDROMORPHONE HYDROCHLORIDE 0.5 MILLIGRAM(S): 2 INJECTION INTRAMUSCULAR; INTRAVENOUS; SUBCUTANEOUS at 10:38

## 2023-12-06 RX ADMIN — GABAPENTIN 300 MILLIGRAM(S): 400 CAPSULE ORAL at 14:34

## 2023-12-06 RX ADMIN — SIMVASTATIN 40 MILLIGRAM(S): 20 TABLET, FILM COATED ORAL at 22:25

## 2023-12-06 RX ADMIN — AMPICILLIN SODIUM AND SULBACTAM SODIUM 200 GRAM(S): 250; 125 INJECTION, POWDER, FOR SUSPENSION INTRAMUSCULAR; INTRAVENOUS at 12:09

## 2023-12-06 RX ADMIN — AMPICILLIN SODIUM AND SULBACTAM SODIUM 200 GRAM(S): 250; 125 INJECTION, POWDER, FOR SUSPENSION INTRAMUSCULAR; INTRAVENOUS at 00:01

## 2023-12-06 RX ADMIN — Medication 100 MILLIGRAM(S): at 06:07

## 2023-12-06 RX ADMIN — ENOXAPARIN SODIUM 40 MILLIGRAM(S): 100 INJECTION SUBCUTANEOUS at 08:30

## 2023-12-06 RX ADMIN — Medication 10 MILLIGRAM(S): at 06:07

## 2023-12-06 RX ADMIN — OXYCODONE HYDROCHLORIDE 10 MILLIGRAM(S): 5 TABLET ORAL at 21:15

## 2023-12-06 RX ADMIN — HYDROMORPHONE HYDROCHLORIDE 0.5 MILLIGRAM(S): 2 INJECTION INTRAMUSCULAR; INTRAVENOUS; SUBCUTANEOUS at 11:40

## 2023-12-06 RX ADMIN — OXYCODONE HYDROCHLORIDE 10 MILLIGRAM(S): 5 TABLET ORAL at 20:15

## 2023-12-06 RX ADMIN — INSULIN GLARGINE 39 UNIT(S): 100 INJECTION, SOLUTION SUBCUTANEOUS at 22:24

## 2023-12-06 RX ADMIN — TAMSULOSIN HYDROCHLORIDE 0.4 MILLIGRAM(S): 0.4 CAPSULE ORAL at 22:52

## 2023-12-06 RX ADMIN — Medication 12 UNIT(S): at 12:51

## 2023-12-06 RX ADMIN — Medication 12 UNIT(S): at 09:00

## 2023-12-06 RX ADMIN — Medication 2: at 18:08

## 2023-12-06 RX ADMIN — Medication 10 MILLIGRAM(S): at 17:27

## 2023-12-06 RX ADMIN — Medication 14 UNIT(S): at 18:08

## 2023-12-06 NOTE — PROGRESS NOTE ADULT - ASSESSMENT
63M PMHx DM, HLD, PAD, prior toe amputation, presents to [facility] ED w L foot wound. NSTI of non salvageable L foot. Septic in ED. LRINEC 9. XR/CT both w soft tissue gas. s/p 11/30 L BKA guillotine amp, downgraded from SICU and recovering appropriately on the floor. now S/P below the knee amputation revision on 12/4. Needs eventual formalization.     - Pain control as needed  - ID on board: on Unasyn   - Lovenox for DVT ppx  - Activity as tolerated  - Endo consulted, appreciate recs- will continue to follow   - BP control  - Bowel regimen  - Daily dressing changes  - formalization plan for next week      Vascular Surgery  [id number]    - PM&R recommended AR on discharge  63M PMHx DM, HLD, PAD, prior toe amputation, presents to [facility] ED w L foot wound. NSTI of non salvageable L foot. Septic in ED. LRINEC 9. XR/CT both w soft tissue gas. s/p 11/30 L BKA guillotine amp, downgraded from SICU and recovering appropriately on the floor. now S/P below the knee amputation revision on 12/4. Needs eventual formalization.     - Pain control as needed  - ID on board: on Unasyn   - Lovenox for DVT ppx  - Activity as tolerated  - Endo consulted, appreciate recs- will continue to follow   - BP control  - Bowel regimen  - Daily dressing changes  - formalization plan for next week      Vascular Surgery  [id number]    - PM&R recommended AR on discharge

## 2023-12-06 NOTE — PROGRESS NOTE ADULT - SUBJECTIVE AND OBJECTIVE BOX
Patient is a 63y old  Male who presents with a chief complaint of LE amputation (04 Dec 2023 16:10)    Patient resting in bed.  With some phantom pain but not too severe.    MEDICATIONS  (STANDING):  ampicillin/sulbactam  IVPB 3 Gram(s) IV Intermittent every 6 hours  enalapril 10 milliGRAM(s) Oral every 12 hours  enoxaparin Injectable 40 milliGRAM(s) SubCutaneous every 24 hours  gabapentin 300 milliGRAM(s) Oral three times a day  insulin glargine Injectable (LANTUS) 36 Unit(s) SubCutaneous at bedtime  insulin lispro (ADMELOG) corrective regimen sliding scale   SubCutaneous three times a day before meals  insulin lispro (ADMELOG) corrective regimen sliding scale   SubCutaneous at bedtime  insulin lispro Injectable (ADMELOG) 12 Unit(s) SubCutaneous three times a day before meals  polyethylene glycol 3350 17 Gram(s) Oral every 12 hours  senna 2 Tablet(s) Oral at bedtime  simvastatin 40 milliGRAM(s) Oral at bedtime  tamsulosin 0.4 milliGRAM(s) Oral at bedtime    MEDICATIONS  (PRN):  guaiFENesin Oral Liquid (Sugar-Free) 100 milliGRAM(s) Oral every 6 hours PRN Cough  HYDROmorphone  Injectable 0.5 milliGRAM(s) IV Push every 3 hours PRN breakthrough pain  oxyCODONE    IR 10 milliGRAM(s) Oral every 4 hours PRN Severe Pain (7 - 10)  oxyCODONE    IR 5 milliGRAM(s) Oral every 4 hours PRN Moderate Pain (4 - 6)    Vital Signs Last 24 Hrs  T(C): 37.1 (06 Dec 2023 10:03), Max: 37.1 (05 Dec 2023 13:14)  T(F): 98.7 (06 Dec 2023 10:03), Max: 98.7 (05 Dec 2023 13:14)  HR: 92 (06 Dec 2023 10:03) (86 - 93)  BP: 157/76 (06 Dec 2023 10:03) (105/64 - 170/80)  BP(mean): --  RR: 18 (06 Dec 2023 10:03) (17 - 18)  SpO2: 94% (06 Dec 2023 10:03) (94% - 99%)    PHYSICAL EXAM  Constitutional - NAD, Comfortable  HEENT - NCAT, EOMI  Neck - Supple  Chest - No distress, no use of accessory muscles for respiration  Cardiovascular -Well perfused  Abdomen - BS+, Soft, NTND  Extremities - LLE dressing intact, RLE no calf pain.   Neurologic Exam -                 AAO x 3  Motor nml grade throughout     Psychiatric - Mood stable, Affect WNL                          10.1   14.66 )-----------( 463      ( 06 Dec 2023 08:39 )             33.9     12-06    138  |  100  |  17  ----------------------------<  219<H>  4.9   |  28  |  0.99    Ca    9.0      06 Dec 2023 08:39  Phos  2.6     12-06  Mg     1.9     12-06    Impression:  62 yo with functional deficits secondary to diagnosis of L BKA    Plan:  PT- ROM, Bed Mob, Transfers, Amb w AD and bracing as needed  OT- ADLs, bracing  Prec- Falls, Cardiac  DVT Prophylaxis- Lovenox  Monitor wbc ct (trending down)  Skin- Turn q2 h  Dispo- Acute Rehab- patient requires active and ongoing therapeutic interventions of multiple disciplines and can tolerate 3 hours of therapies x 4wks. Can actively participate and benefit from  an intensive rehabilitation program. Requires supervision by a rehabilitation physician and a coordinated interdisciplinary approach to providing rehabilitation.   d/w patient functional prognosis and rehab options. Patient w multiple questions regarding rehab process which were answered. Patient is a 63y old  Male who presents with a chief complaint of LE amputation (04 Dec 2023 16:10)    Patient resting in bed.  With some phantom pain but not too severe.    MEDICATIONS  (STANDING):  ampicillin/sulbactam  IVPB 3 Gram(s) IV Intermittent every 6 hours  enalapril 10 milliGRAM(s) Oral every 12 hours  enoxaparin Injectable 40 milliGRAM(s) SubCutaneous every 24 hours  gabapentin 300 milliGRAM(s) Oral three times a day  insulin glargine Injectable (LANTUS) 36 Unit(s) SubCutaneous at bedtime  insulin lispro (ADMELOG) corrective regimen sliding scale   SubCutaneous three times a day before meals  insulin lispro (ADMELOG) corrective regimen sliding scale   SubCutaneous at bedtime  insulin lispro Injectable (ADMELOG) 12 Unit(s) SubCutaneous three times a day before meals  polyethylene glycol 3350 17 Gram(s) Oral every 12 hours  senna 2 Tablet(s) Oral at bedtime  simvastatin 40 milliGRAM(s) Oral at bedtime  tamsulosin 0.4 milliGRAM(s) Oral at bedtime    MEDICATIONS  (PRN):  guaiFENesin Oral Liquid (Sugar-Free) 100 milliGRAM(s) Oral every 6 hours PRN Cough  HYDROmorphone  Injectable 0.5 milliGRAM(s) IV Push every 3 hours PRN breakthrough pain  oxyCODONE    IR 10 milliGRAM(s) Oral every 4 hours PRN Severe Pain (7 - 10)  oxyCODONE    IR 5 milliGRAM(s) Oral every 4 hours PRN Moderate Pain (4 - 6)    Vital Signs Last 24 Hrs  T(C): 37.1 (06 Dec 2023 10:03), Max: 37.1 (05 Dec 2023 13:14)  T(F): 98.7 (06 Dec 2023 10:03), Max: 98.7 (05 Dec 2023 13:14)  HR: 92 (06 Dec 2023 10:03) (86 - 93)  BP: 157/76 (06 Dec 2023 10:03) (105/64 - 170/80)  BP(mean): --  RR: 18 (06 Dec 2023 10:03) (17 - 18)  SpO2: 94% (06 Dec 2023 10:03) (94% - 99%)    PHYSICAL EXAM  Constitutional - NAD, Comfortable  HEENT - NCAT, EOMI  Neck - Supple  Chest - No distress, no use of accessory muscles for respiration  Cardiovascular -Well perfused  Abdomen - BS+, Soft, NTND  Extremities - LLE dressing intact, RLE no calf pain.   Neurologic Exam -                 AAO x 3  Motor nml grade throughout     Psychiatric - Mood stable, Affect WNL                          10.1   14.66 )-----------( 463      ( 06 Dec 2023 08:39 )             33.9     12-06    138  |  100  |  17  ----------------------------<  219<H>  4.9   |  28  |  0.99    Ca    9.0      06 Dec 2023 08:39  Phos  2.6     12-06  Mg     1.9     12-06    Impression:  64 yo with functional deficits secondary to diagnosis of L BKA    Plan:  PT- ROM, Bed Mob, Transfers, Amb w AD and bracing as needed  OT- ADLs, bracing  Prec- Falls, Cardiac  DVT Prophylaxis- Lovenox  Monitor wbc ct (trending down)  Skin- Turn q2 h  Dispo- Acute Rehab- patient requires active and ongoing therapeutic interventions of multiple disciplines and can tolerate 3 hours of therapies x 4wks. Can actively participate and benefit from  an intensive rehabilitation program. Requires supervision by a rehabilitation physician and a coordinated interdisciplinary approach to providing rehabilitation.   d/w patient functional prognosis and rehab options. Patient w multiple questions regarding rehab process which were answered.

## 2023-12-06 NOTE — PROGRESS NOTE ADULT - PROBLEM SELECTOR PLAN 1
Test BG ac and hs. Q6H for NPO status  Fasting glucose elevated, Increase Lantus dose to 39 units    Postprandial glucose elevated, increase Admelog to 14 units ac meals. HOLD IF NOT EATING  Continue with moderate dose correctional insulin scales ac and add moderate scale at hs .   Please teach and allow pt to do own finger sticks and insulin injections under RN supervision  Please teach use of insulin pen  Please document teach back  Discharge:  Will be determined according to BG/insulin needs/PO intake at time of discharge.   Likely Metformin plus  basal/bolus due To high insulin doses requirements and to promote wound  healing   Please write Rxs for: Solo Star Insulin pen/Humalog Kwik insulin pen/Bren insulin pen needles/glucose meter/strips/lancets  -Will need home care upon discharge due to new insulin therapy  Can follow at [facility] practice. [address] [place] [address] [address]. Phone . Call for apt closer to discharge  Needs podiatry/vascular and optho f/u care.   Might benefit  from GLP1RA for cardiorenal protection and also weight control in addition to DM, control> will need close f/u with optho. Test BG ac and hs. Q6H for NPO status  Fasting glucose elevated, Increase Lantus dose to 39 units    Postprandial glucose elevated, increase Admelog to 14 units ac meals. HOLD IF NOT EATING  Continue with moderate dose correctional insulin scales ac and add moderate scale at hs .   Please teach and allow pt to do own finger sticks and insulin injections under RN supervision  Please teach use of insulin pen  Please document teach back  Discharge:  Will be determined according to BG/insulin needs/PO intake at time of discharge.   Likely Metformin plus  basal/bolus due To high insulin doses requirements and to promote wound  healing   Please write Rxs for: Solo Star Insulin pen/Humalog Kwik insulin pen/Bren insulin pen needles/glucose meter/strips/lancets  -Will need home care upon discharge due to new insulin therapy  Can follow at [facility] practice. [address] [place] [address] [address]. Phone . Call for apt closer to discharge  Needs podiatry/vascular and optho f/u care.   Might benefit  from GLP1RA for cardiorenal protection and also weight control in addition to DM, control> will need close f/u with optho.

## 2023-12-06 NOTE — PROGRESS NOTE ADULT - SUBJECTIVE AND OBJECTIVE BOX
SURGERY DAILY PROGRESS NOTE:       SUBJECTIVE/ROS: Patient seen and evaluated on AM rounds.   No complaints. Pain is well controlled.        OBJECTIVE:    Vital Signs Last 24 Hrs  T(C): 36.9 (06 Dec 2023 06:07), Max: 37.1 (05 Dec 2023 13:14)  T(F): 98.5 (06 Dec 2023 06:07), Max: 98.7 (05 Dec 2023 13:14)  HR: 86 (06 Dec 2023 06:07) (86 - 93)  BP: 170/80 (06 Dec 2023 06:07) (105/64 - 170/80)  BP(mean): --  RR: 18 (06 Dec 2023 06:07) (17 - 18)  SpO2: 95% (06 Dec 2023 06:07) (94% - 99%)    Parameters below as of 06 Dec 2023 06:07  Patient On (Oxygen Delivery Method): room air      I&O's Detail    05 Dec 2023 07:01  -  06 Dec 2023 07:00  --------------------------------------------------------  IN:    IV PiggyBack: 200 mL    Oral Fluid: 1040 mL  Total IN: 1240 mL    OUT:  Total OUT: 0 mL    Total NET: 1240 mL        Daily     Daily   MEDICATIONS  (STANDING):  ampicillin/sulbactam  IVPB 3 Gram(s) IV Intermittent every 6 hours  enalapril 10 milliGRAM(s) Oral every 12 hours  enoxaparin Injectable 40 milliGRAM(s) SubCutaneous every 24 hours  gabapentin 300 milliGRAM(s) Oral three times a day  insulin glargine Injectable (LANTUS) 36 Unit(s) SubCutaneous at bedtime  insulin lispro (ADMELOG) corrective regimen sliding scale   SubCutaneous three times a day before meals  insulin lispro (ADMELOG) corrective regimen sliding scale   SubCutaneous at bedtime  insulin lispro Injectable (ADMELOG) 12 Unit(s) SubCutaneous three times a day before meals  lactated ringers. 1000 milliLiter(s) (100 mL/Hr) IV Continuous <Continuous>  polyethylene glycol 3350 17 Gram(s) Oral every 12 hours  senna 2 Tablet(s) Oral at bedtime  simvastatin 40 milliGRAM(s) Oral at bedtime  tamsulosin 0.4 milliGRAM(s) Oral at bedtime    MEDICATIONS  (PRN):  guaiFENesin Oral Liquid (Sugar-Free) 100 milliGRAM(s) Oral every 6 hours PRN Cough  HYDROmorphone  Injectable 0.5 milliGRAM(s) IV Push every 3 hours PRN breakthrough pain  oxyCODONE    IR 10 milliGRAM(s) Oral every 4 hours PRN Severe Pain (7 - 10)  oxyCODONE    IR 5 milliGRAM(s) Oral every 4 hours PRN Moderate Pain (4 - 6)      LABS:                        10.1   14.66 )-----------( 463      ( 06 Dec 2023 08:39 )             33.9     12-05    134<L>  |  98  |  17  ----------------------------<  291<H>  5.1   |  28  |  1.12    Ca    9.0      05 Dec 2023 07:22  Phos  3.6     12-05  Mg     2.1     12-05        Urinalysis Basic - ( 05 Dec 2023 07:22 )    Color: x / Appearance: x / SG: x / pH: x  Gluc: 291 mg/dL / Ketone: x  / Bili: x / Urobili: x   Blood: x / Protein: x / Nitrite: x   Leuk Esterase: x / RBC: x / WBC x   Sq Epi: x / Non Sq Epi: x / Bacteria: x          PHYSICAL EXAM:  General Appearance: Appears well, NAD  Chest: nonlabored breathing on room air   Abdomen: Soft, nontense  Extremities: L BKA amputation site C/D/I, minimal bleeding, no evidence of purulence

## 2023-12-06 NOTE — PROGRESS NOTE ADULT - ASSESSMENT
63M w/h/o uncontrolled  DM2 (A1C 11.4%)  > on DM  oral meds PTA. DM c/b Neuropathy, PAD> s/p toe amputation, retinopathy. ? nephropathy per EMR. Also h/o HLD, HTN. Here with severe hyperglycemia and L foot wound and found to have soft tissue gas at lateral hindfoot. 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 Also required insulin drip at time of admission. Endocrine consulted for hyperglycemia. Patient is high risk with high level decision making due to uncontrolled diabetes with A1C>10 which places patient at high risk for cardiovascular and cerebrovascular events. Patient with lability of glucose requiring close monitoring and insulin adjustments.    Tolerating POs with BG levels variable. s/p  vascular surgery.   continue to adjust insulin to BG goal 100 to 180s. No hypoglycemia.     Discussed with pt A1C level, comorbidites and the need for at least basal insulin upon discharge to improve DM control and also promote wound healing. Pt states he is willing to do basal/bolus insulin if needed > appears motivated to improve DM care.     Home regimen: [id number] bid and Glipizide 5mg bid. On  Kazano (algoliptin and metformin) in the past,         63M w/h/o uncontrolled  DM2 (A1C 11.4%)  > on DM  oral meds PTA. DM c/b Neuropathy, PAD> s/p toe amputation, retinopathy. ? nephropathy per EMR. Also h/o HLD, HTN. Here with severe hyperglycemia and L foot wound and found to have soft tissue gas at lateral hindfoot. 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 Also required insulin drip at time of admission. Endocrine consulted for hyperglycemia. Patient is high risk with high level decision making due to uncontrolled diabetes with A1C>10 which places patient at high risk for cardiovascular and cerebrovascular events. Patient with lability of glucose requiring close monitoring and insulin adjustments.    Tolerating POs with BG levels variable. s/p  vascular surgery.   continue to adjust insulin to BG goal 100 to 180s. No hypoglycemia.     Discussed with pt A1C level, comorbidites and the need for at least basal insulin upon discharge to improve DM control and also promote wound healing. Pt states he is willing to do basal/bolus insulin if needed > appears motivated to improve DM care.     Home regimen: [id number] bid and Glipizide 5mg bid. On  Kazano (algoliptin and metformin) in the past,

## 2023-12-06 NOTE — PROGRESS NOTE ADULT - SUBJECTIVE AND OBJECTIVE BOX
seen earlier today     Chief Complaint: Diabetes Mellitus follow up    INTERVAL HX: patient tolerating po, appetite is excellent, ate everything on lunch tray. Had a long conversation at the bedside about diet and lifestyle modification at home.       Review of Systems:  General: As above  GI: No nausea, vomiting  Endocrine: no  S&Sx of hypoglycemia    Allergies    No Known Allergies    Intolerances        MEDICATIONS  (STANDING):  ampicillin/sulbactam  IVPB 3 Gram(s) IV Intermittent every 6 hours  enalapril 10 milliGRAM(s) Oral every 12 hours  enoxaparin Injectable 40 milliGRAM(s) SubCutaneous every 24 hours  gabapentin 300 milliGRAM(s) Oral three times a day  insulin glargine Injectable (LANTUS) 39 Unit(s) SubCutaneous at bedtime  insulin lispro (ADMELOG) corrective regimen sliding scale   SubCutaneous three times a day before meals  insulin lispro (ADMELOG) corrective regimen sliding scale   SubCutaneous at bedtime  insulin lispro Injectable (ADMELOG) 14 Unit(s) SubCutaneous three times a day before meals  polyethylene glycol 3350 17 Gram(s) Oral every 12 hours  senna 2 Tablet(s) Oral at bedtime  simvastatin 40 milliGRAM(s) Oral at bedtime  tamsulosin 0.4 milliGRAM(s) Oral at bedtime    MEDICATIONS  (PRN):  guaiFENesin Oral Liquid (Sugar-Free) 100 milliGRAM(s) Oral every 6 hours PRN Cough  HYDROmorphone  Injectable 0.5 milliGRAM(s) IV Push every 3 hours PRN breakthrough pain  oxyCODONE    IR 10 milliGRAM(s) Oral every 4 hours PRN Severe Pain (7 - 10)  oxyCODONE    IR 5 milliGRAM(s) Oral every 4 hours PRN Moderate Pain (4 - 6)      PHYSICAL EXAM:   Vital Signs Last 24 Hrs  T(C): 36.9 (06 Dec 2023 13:19), Max: 37.1 (05 Dec 2023 17:13)  T(F): 98.5 (06 Dec 2023 13:19), Max: 98.7 (05 Dec 2023 17:13)  HR: 97 (06 Dec 2023 15:33) (86 - 97)  BP: 135/75 (06 Dec 2023 15:33) (105/64 - 170/80)  BP(mean): --  RR: 18 (06 Dec 2023 13:19) (17 - 18)  SpO2: 95% (06 Dec 2023 15:33) (94% - 99%)    Parameters below as of 06 Dec 2023 15:33  Patient On (Oxygen Delivery Method): room air      GENERAL: NAD  Respiratory: Respirations unlabored   Extremities: Warm, L BKA dressing cdi   NEURO: Alert , appropriate     LABS:   CAPILLARY BLOOD GLUCOSE      POCT Blood Glucose.: 177 mg/dL (06 Dec 2023 12:45)  POCT Blood Glucose.: 216 mg/dL (06 Dec 2023 08:51)  POCT Blood Glucose.: 202 mg/dL (05 Dec 2023 21:20)  POCT Blood Glucose.: 195 mg/dL (05 Dec 2023 18:45)                             11.1   21.09 )-----------( 519      ( 05 Dec 2023 07:22 )             35.7     12-05    134<L>  |  98  |  17  ----------------------------<  291<H>  5.1   |  28  |  1.12    Ca    9.0      05 Dec 2023 07:22  Phos  3.6     12-05  Mg     2.1     12-05      eGFR: 74 mL/min/1.73m2 (05 Dec 2023 07:22)      Thyroid Function Tests:      A1C with Estimated Average Glucose Result: 11.4 % (12-01-23 @ 05:19)    Estimated Average Glucose: 280 mg/dL (12-01-23 @ 05:19)        Diet, Regular:   Consistent Carbohydrate Evening Snack (CSTCHOSN) (12-04-23 @ 19:34) [Active]

## 2023-12-07 LAB
ANION GAP SERPL CALC-SCNC: 8 MMOL/L — SIGNIFICANT CHANGE UP (ref 5–17)
ANION GAP SERPL CALC-SCNC: 8 MMOL/L — SIGNIFICANT CHANGE UP (ref 5–17)
BUN SERPL-MCNC: 15 MG/DL — SIGNIFICANT CHANGE UP (ref 7–23)
BUN SERPL-MCNC: 15 MG/DL — SIGNIFICANT CHANGE UP (ref 7–23)
CALCIUM SERPL-MCNC: 9.2 MG/DL — SIGNIFICANT CHANGE UP (ref 8.4–10.5)
CALCIUM SERPL-MCNC: 9.2 MG/DL — SIGNIFICANT CHANGE UP (ref 8.4–10.5)
CHLORIDE SERPL-SCNC: 101 MMOL/L — SIGNIFICANT CHANGE UP (ref 96–108)
CHLORIDE SERPL-SCNC: 101 MMOL/L — SIGNIFICANT CHANGE UP (ref 96–108)
CO2 SERPL-SCNC: 30 MMOL/L — SIGNIFICANT CHANGE UP (ref 22–31)
CO2 SERPL-SCNC: 30 MMOL/L — SIGNIFICANT CHANGE UP (ref 22–31)
CREAT SERPL-MCNC: 0.88 MG/DL — SIGNIFICANT CHANGE UP (ref 0.5–1.3)
CREAT SERPL-MCNC: 0.88 MG/DL — SIGNIFICANT CHANGE UP (ref 0.5–1.3)
EGFR: 97 ML/MIN/1.73M2 — SIGNIFICANT CHANGE UP
EGFR: 97 ML/MIN/1.73M2 — SIGNIFICANT CHANGE UP
GLUCOSE SERPL-MCNC: 139 MG/DL — HIGH (ref 70–99)
GLUCOSE SERPL-MCNC: 139 MG/DL — HIGH (ref 70–99)
HCT VFR BLD CALC: 33.9 % — LOW (ref 39–50)
HCT VFR BLD CALC: 33.9 % — LOW (ref 39–50)
HGB BLD-MCNC: 10.1 G/DL — LOW (ref 13–17)
HGB BLD-MCNC: 10.1 G/DL — LOW (ref 13–17)
MAGNESIUM SERPL-MCNC: 1.9 MG/DL — SIGNIFICANT CHANGE UP (ref 1.6–2.6)
MAGNESIUM SERPL-MCNC: 1.9 MG/DL — SIGNIFICANT CHANGE UP (ref 1.6–2.6)
MCHC RBC-ENTMCNC: 25.8 PG — LOW (ref 27–34)
MCHC RBC-ENTMCNC: 25.8 PG — LOW (ref 27–34)
MCHC RBC-ENTMCNC: 29.8 GM/DL — LOW (ref 32–36)
MCHC RBC-ENTMCNC: 29.8 GM/DL — LOW (ref 32–36)
MCV RBC AUTO: 86.7 FL — SIGNIFICANT CHANGE UP (ref 80–100)
MCV RBC AUTO: 86.7 FL — SIGNIFICANT CHANGE UP (ref 80–100)
NRBC # BLD: 0 /100 WBCS — SIGNIFICANT CHANGE UP (ref 0–0)
NRBC # BLD: 0 /100 WBCS — SIGNIFICANT CHANGE UP (ref 0–0)
PHOSPHATE SERPL-MCNC: 3.4 MG/DL — SIGNIFICANT CHANGE UP (ref 2.5–4.5)
PHOSPHATE SERPL-MCNC: 3.4 MG/DL — SIGNIFICANT CHANGE UP (ref 2.5–4.5)
PLATELET # BLD AUTO: 454 K/UL — HIGH (ref 150–400)
PLATELET # BLD AUTO: 454 K/UL — HIGH (ref 150–400)
POTASSIUM SERPL-MCNC: 4.4 MMOL/L — SIGNIFICANT CHANGE UP (ref 3.5–5.3)
POTASSIUM SERPL-MCNC: 4.4 MMOL/L — SIGNIFICANT CHANGE UP (ref 3.5–5.3)
POTASSIUM SERPL-SCNC: 4.4 MMOL/L — SIGNIFICANT CHANGE UP (ref 3.5–5.3)
POTASSIUM SERPL-SCNC: 4.4 MMOL/L — SIGNIFICANT CHANGE UP (ref 3.5–5.3)
RBC # BLD: 3.91 M/UL — LOW (ref 4.2–5.8)
RBC # BLD: 3.91 M/UL — LOW (ref 4.2–5.8)
RBC # FLD: 15.3 % — HIGH (ref 10.3–14.5)
RBC # FLD: 15.3 % — HIGH (ref 10.3–14.5)
SODIUM SERPL-SCNC: 139 MMOL/L — SIGNIFICANT CHANGE UP (ref 135–145)
SODIUM SERPL-SCNC: 139 MMOL/L — SIGNIFICANT CHANGE UP (ref 135–145)
WBC # BLD: 12.6 K/UL — HIGH (ref 3.8–10.5)
WBC # BLD: 12.6 K/UL — HIGH (ref 3.8–10.5)
WBC # FLD AUTO: 12.6 K/UL — HIGH (ref 3.8–10.5)
WBC # FLD AUTO: 12.6 K/UL — HIGH (ref 3.8–10.5)

## 2023-12-07 PROCEDURE — 99232 SBSQ HOSP IP/OBS MODERATE 35: CPT

## 2023-12-07 RX ADMIN — GABAPENTIN 300 MILLIGRAM(S): 400 CAPSULE ORAL at 14:26

## 2023-12-07 RX ADMIN — Medication 2: at 14:24

## 2023-12-07 RX ADMIN — SIMVASTATIN 40 MILLIGRAM(S): 20 TABLET, FILM COATED ORAL at 21:59

## 2023-12-07 RX ADMIN — Medication 14 UNIT(S): at 14:24

## 2023-12-07 RX ADMIN — ENOXAPARIN SODIUM 40 MILLIGRAM(S): 100 INJECTION SUBCUTANEOUS at 07:00

## 2023-12-07 RX ADMIN — Medication 4: at 18:36

## 2023-12-07 RX ADMIN — AMPICILLIN SODIUM AND SULBACTAM SODIUM 200 GRAM(S): 250; 125 INJECTION, POWDER, FOR SUSPENSION INTRAMUSCULAR; INTRAVENOUS at 17:24

## 2023-12-07 RX ADMIN — TAMSULOSIN HYDROCHLORIDE 0.4 MILLIGRAM(S): 0.4 CAPSULE ORAL at 21:59

## 2023-12-07 RX ADMIN — INSULIN GLARGINE 39 UNIT(S): 100 INJECTION, SOLUTION SUBCUTANEOUS at 22:00

## 2023-12-07 RX ADMIN — Medication 14 UNIT(S): at 09:46

## 2023-12-07 RX ADMIN — GABAPENTIN 300 MILLIGRAM(S): 400 CAPSULE ORAL at 06:27

## 2023-12-07 RX ADMIN — Medication 100 MILLIGRAM(S): at 17:24

## 2023-12-07 RX ADMIN — Medication 10 MILLIGRAM(S): at 06:26

## 2023-12-07 RX ADMIN — GABAPENTIN 300 MILLIGRAM(S): 400 CAPSULE ORAL at 21:59

## 2023-12-07 RX ADMIN — AMPICILLIN SODIUM AND SULBACTAM SODIUM 200 GRAM(S): 250; 125 INJECTION, POWDER, FOR SUSPENSION INTRAMUSCULAR; INTRAVENOUS at 00:27

## 2023-12-07 RX ADMIN — Medication 10 MILLIGRAM(S): at 17:24

## 2023-12-07 RX ADMIN — AMPICILLIN SODIUM AND SULBACTAM SODIUM 200 GRAM(S): 250; 125 INJECTION, POWDER, FOR SUSPENSION INTRAMUSCULAR; INTRAVENOUS at 06:26

## 2023-12-07 RX ADMIN — Medication 14 UNIT(S): at 18:35

## 2023-12-07 RX ADMIN — AMPICILLIN SODIUM AND SULBACTAM SODIUM 200 GRAM(S): 250; 125 INJECTION, POWDER, FOR SUSPENSION INTRAMUSCULAR; INTRAVENOUS at 12:07

## 2023-12-07 NOTE — PROGRESS NOTE ADULT - PROBLEM SELECTOR PLAN 1
Test BG ac and hs. Q6H for NPO status  Fasting glucose better today, Continue Lantus 39 units QHS    Postprandial glucose elevated yesterday, continue increased Admelog 14 units ac meals for now. HOLD IF NOT EATING  Continue with moderate dose correctional insulin scale ac and changing to moderate dose correctional insulin scale for bedtime  Please teach and allow pt to do own finger sticks and insulin injections under RN supervision  Please teach use of insulin pen  Please document teach back  Discharge:  Will be determined according to BG/insulin needs/PO intake at time of discharge.   Likely Metformin plus  basal/bolus due To high insulin doses requirements and to promote wound  healing   Please write Rxs for: Solo Star Insulin pen/Humalog Kwik insulin pen/Bren insulin pen needles/glucose meter/strips/lancets  -Will need home care upon discharge due to new insulin therapy  Can follow at [facility] practice. [address] [place] [address] [address]. Phone . Call for apt closer to discharge  Needs podiatry/vascular and optho f/u care.   Might benefit  from GLP1RA for cardiorenal protection and also weight control in addition to DM, control> will need close f/u with optho. Test BG ac and hs. Q6H for NPO status  Fasting glucose better today, Continue Lantus 39 units QHS    Postprandial glucose elevated yesterday, continue increased Admelog 14 units ac meals for now. HOLD IF NOT EATING  Continue with moderate dose correctional insulin scale ac and changing to moderate dose correctional insulin scale for bedtime  Please teach and allow pt to do own finger sticks and insulin injections under RN supervision  Please teach use of insulin pen  Please document teach back  Discharge:  Will be determined according to BG/insulin needs/PO intake at time of discharge.   Likely Metformin plus  basal/bolus due To high insulin doses requirements and to promote wound  healing   Please write Rxs for: Solo Star Insulin pen/Humalog Kwik insulin pen/Bren insulin pen needles/glucose meter/strips/lancets  -Will need home care upon discharge due to new insulin therapy  Can follow at [facility] practice. [address] [place] [address] [address]. Phone . Call for apt closer to discharge  Needs podiatry/vascular and optho f/u care.   Might benefit  from GLP1RA for cardiorenal protection and also weight control in addition to DM, control> will need close f/u with optho. Test BG ac and hs. Q6H for NPO status  Fasting glucose better today, Continue Lantus 39 units QHS    Postprandial glucose elevated yesterday, continue increased Admelog 14 units ac meals for now and will continue to monitor/titrate as needed. HOLD IF NOT EATING  Continue with moderate dose correctional insulin scale ac and changing to moderate dose correctional insulin scale for bedtime  Please teach and allow pt to do own finger sticks and insulin injections under RN supervision  Please teach use of insulin pen  Please document teach back  Discharge:  Will be determined according to BG/insulin needs/PO intake at time of discharge.   Likely Metformin plus  basal/bolus due To high insulin doses requirements and to promote wound  healing   Please write Rxs for: Solo Star Insulin pen/Humalog Kwik insulin pen/Bren insulin pen needles/glucose meter/strips/lancets  Can follow at [facility] practice. [address] [facility] [address]. Phone . Call for apt closer to discharge  Needs podiatry/vascular and optho f/u care.   Might benefit  from GLP1RA for cardiorenal protection and also weight control in addition to DM, control> will need close f/u with optho. Test BG ac and hs. Q6H for NPO status  Fasting glucose better today, Continue Lantus 39 units QHS    Postprandial glucose elevated yesterday, continue increased Admelog 14 units ac meals for now and will continue to monitor/titrate as needed. HOLD IF NOT EATING  Continue with moderate dose correctional insulin scale ac and changing to moderate dose correctional insulin scale for bedtime  Please teach and allow pt to do own finger sticks and insulin injections under RN supervision  Please teach use of insulin pen  Please document teach back  Discharge:  Will be determined according to BG/insulin needs/PO intake at time of discharge.   Likely Metformin plus  basal/bolus due To high insulin doses requirements and to promote wound  healing   Please write Rxs for: Solo Star Insulin pen/Humalog Kwik insulin pen/Bren insulin pen needles/glucose meter/strips/lancets  Can follow at [facility] practice. [address] Phone . Call for apt closer to discharge  Needs podiatry/vascular and optho f/u care.   Might benefit  from GLP1RA for cardiorenal protection and also weight control in addition to DM, control> will need close f/u with optho.

## 2023-12-07 NOTE — PROGRESS NOTE ADULT - SUBJECTIVE AND OBJECTIVE BOX
ENDOCRINE FOLLOW UP     Chief Complaint: DM2    History:     MEDICATIONS  (STANDING):  ampicillin/sulbactam  IVPB 3 Gram(s) IV Intermittent every 6 hours  enalapril 10 milliGRAM(s) Oral every 12 hours  enoxaparin Injectable 40 milliGRAM(s) SubCutaneous every 24 hours  gabapentin 300 milliGRAM(s) Oral three times a day  insulin glargine Injectable (LANTUS) 39 Unit(s) SubCutaneous at bedtime  insulin lispro (ADMELOG) corrective regimen sliding scale   SubCutaneous three times a day before meals  insulin lispro (ADMELOG) corrective regimen sliding scale   SubCutaneous at bedtime  insulin lispro Injectable (ADMELOG) 14 Unit(s) SubCutaneous three times a day before meals  polyethylene glycol 3350 17 Gram(s) Oral every 12 hours  senna 2 Tablet(s) Oral at bedtime  simvastatin 40 milliGRAM(s) Oral at bedtime  tamsulosin 0.4 milliGRAM(s) Oral at bedtime    MEDICATIONS  (PRN):  guaiFENesin Oral Liquid (Sugar-Free) 100 milliGRAM(s) Oral every 6 hours PRN Cough  HYDROmorphone  Injectable 0.5 milliGRAM(s) IV Push every 3 hours PRN breakthrough pain  oxyCODONE    IR 10 milliGRAM(s) Oral every 4 hours PRN Severe Pain (7 - 10)  oxyCODONE    IR 5 milliGRAM(s) Oral every 4 hours PRN Moderate Pain (4 - 6)      Allergies    No Known Allergies    Intolerances        ROS: All other systems reviewed and negative    PHYSICAL EXAM:  VITALS: T(C): 36.7 (12-07-23 @ 09:17)  T(F): 98 (12-07-23 @ 09:17), Max: 98.7 (12-06-23 @ 10:03)  HR: 89 (12-07-23 @ 09:17) (87 - 98)  BP: 146/78 (12-07-23 @ 09:17) (135/75 - 157/76)  RR:  (18 - 18)  SpO2:  (93% - 95%)  Wt(kg): --  GENERAL: NAD, resting comfortably   EYES: No proptosis,  anicteric  HEENT:  Atraumatic, Normocephalic, moist mucous membranes  RESPIRATORY: Nonlabored respirations on room air, normal rate/effort   CARDIOVASCULAR: Regular rate and rhythm; No murmurs  GI: Soft, nontender, non distended, normal bowel sounds  NEURO: AOx3, moves all extremities spontaenuously   PSYCH:  reactive affect, euthymic mood    POCT Blood Glucose.: 136 mg/dL (12-07-23 @ 09:10)   POCT Blood Glucose.: 207 mg/dL (12-06-23 @ 21:52) 39L  POCT Blood Glucose.: 175 mg/dL (12-06-23 @ 17:33) 14+2A  POCT Blood Glucose.: 177 mg/dL (12-06-23 @ 12:45) 12+2A  POCT Blood Glucose.: 216 mg/dL (12-06-23 @ 08:51)  POCT Blood Glucose.: 202 mg/dL (12-05-23 @ 21:20)  POCT Blood Glucose.: 195 mg/dL (12-05-23 @ 18:45)  POCT Blood Glucose.: 192 mg/dL (12-05-23 @ 14:00)  POCT Blood Glucose.: 263 mg/dL (12-05-23 @ 09:34)  POCT Blood Glucose.: 264 mg/dL (12-05-23 @ 04:06)  POCT Blood Glucose.: 298 mg/dL (12-05-23 @ 02:50)  POCT Blood Glucose.: 340 mg/dL (12-05-23 @ 01:07)  POCT Blood Glucose.: 316 mg/dL (12-05-23 @ 01:02)  POCT Blood Glucose.: 314 mg/dL (12-04-23 @ 23:45)  POCT Blood Glucose.: 309 mg/dL (12-04-23 @ 23:43)  POCT Blood Glucose.: 142 mg/dL (12-04-23 @ 18:53)  POCT Blood Glucose.: 192 mg/dL (12-04-23 @ 12:12)      eMAR:  insulin glargine Injectable (LANTUS)   39 Unit(s) SubCutaneous (12-06-23 @ 22:24)    insulin lispro (ADMELOG) corrective regimen sliding scale   2 Unit(s) SubCutaneous (12-06-23 @ 18:08)   2 Unit(s) SubCutaneous (12-06-23 @ 12:50)    insulin lispro Injectable (ADMELOG)   12 Unit(s) SubCutaneous (12-06-23 @ 12:51)    insulin lispro Injectable (ADMELOG)   14 Unit(s) SubCutaneous (12-06-23 @ 18:08)    simvastatin   40 milliGRAM(s) Oral (12-06-23 @ 22:25)        12-07    139  |  101  |  15  ----------------------------<  139<H>  4.4   |  30  |  0.88    eGFR: 97    Ca    9.2      12-07  Mg     1.9     12-07  Phos  3.4     12-07        A1C with Estimated Average Glucose Result: 11.4 % (12-01-23 @ 05:19)       ENDOCRINE FOLLOW UP     Chief Complaint: DM2    History: Patient seen this morning. In good spirits. Reports he works hard to ensure his BG levels are well controlled. Ate well for breakfast this morning, plans to walk with PT. No other concerns or complaints reported by patient.     MEDICATIONS  (STANDING):  ampicillin/sulbactam  IVPB 3 Gram(s) IV Intermittent every 6 hours  enalapril 10 milliGRAM(s) Oral every 12 hours  enoxaparin Injectable 40 milliGRAM(s) SubCutaneous every 24 hours  gabapentin 300 milliGRAM(s) Oral three times a day  insulin glargine Injectable (LANTUS) 39 Unit(s) SubCutaneous at bedtime  insulin lispro (ADMELOG) corrective regimen sliding scale   SubCutaneous three times a day before meals  insulin lispro (ADMELOG) corrective regimen sliding scale   SubCutaneous at bedtime  insulin lispro Injectable (ADMELOG) 14 Unit(s) SubCutaneous three times a day before meals  polyethylene glycol 3350 17 Gram(s) Oral every 12 hours  senna 2 Tablet(s) Oral at bedtime  simvastatin 40 milliGRAM(s) Oral at bedtime  tamsulosin 0.4 milliGRAM(s) Oral at bedtime    MEDICATIONS  (PRN):  guaiFENesin Oral Liquid (Sugar-Free) 100 milliGRAM(s) Oral every 6 hours PRN Cough  HYDROmorphone  Injectable 0.5 milliGRAM(s) IV Push every 3 hours PRN breakthrough pain  oxyCODONE    IR 10 milliGRAM(s) Oral every 4 hours PRN Severe Pain (7 - 10)  oxyCODONE    IR 5 milliGRAM(s) Oral every 4 hours PRN Moderate Pain (4 - 6)      Allergies    No Known Allergies    Intolerances        ROS: All other systems reviewed and negative    PHYSICAL EXAM:  VITALS: T(C): 36.7 (12-07-23 @ 09:17)  T(F): 98 (12-07-23 @ 09:17), Max: 98.7 (12-06-23 @ 10:03)  HR: 89 (12-07-23 @ 09:17) (87 - 98)  BP: 146/78 (12-07-23 @ 09:17) (135/75 - 157/76)  RR:  (18 - 18)  SpO2:  (93% - 95%)  Wt(kg): --  GENERAL: NAD, resting comfortably   EYES: No proptosis,  anicteric  HEENT:  Atraumatic, Normocephalic, moist mucous membranes  RESPIRATORY: Nonlabored respirations on room air, normal rate/effort   NEURO: AOx3, moves all extremities spontaneously  MSK: LLE amputation/BKA noted   PSYCH:  reactive affect, euthymic mood    POCT Blood Glucose.: 136 mg/dL (12-07-23 @ 09:10)   POCT Blood Glucose.: 207 mg/dL (12-06-23 @ 21:52) 39L  POCT Blood Glucose.: 175 mg/dL (12-06-23 @ 17:33) 14+2A  POCT Blood Glucose.: 177 mg/dL (12-06-23 @ 12:45) 12+2A  POCT Blood Glucose.: 216 mg/dL (12-06-23 @ 08:51)  POCT Blood Glucose.: 202 mg/dL (12-05-23 @ 21:20)  POCT Blood Glucose.: 195 mg/dL (12-05-23 @ 18:45)  POCT Blood Glucose.: 192 mg/dL (12-05-23 @ 14:00)  POCT Blood Glucose.: 263 mg/dL (12-05-23 @ 09:34)  POCT Blood Glucose.: 264 mg/dL (12-05-23 @ 04:06)  POCT Blood Glucose.: 298 mg/dL (12-05-23 @ 02:50)  POCT Blood Glucose.: 340 mg/dL (12-05-23 @ 01:07)  POCT Blood Glucose.: 316 mg/dL (12-05-23 @ 01:02)  POCT Blood Glucose.: 314 mg/dL (12-04-23 @ 23:45)  POCT Blood Glucose.: 309 mg/dL (12-04-23 @ 23:43)  POCT Blood Glucose.: 142 mg/dL (12-04-23 @ 18:53)  POCT Blood Glucose.: 192 mg/dL (12-04-23 @ 12:12)      eMAR:  insulin glargine Injectable (LANTUS)   39 Unit(s) SubCutaneous (12-06-23 @ 22:24)    insulin lispro (ADMELOG) corrective regimen sliding scale   2 Unit(s) SubCutaneous (12-06-23 @ 18:08)   2 Unit(s) SubCutaneous (12-06-23 @ 12:50)    insulin lispro Injectable (ADMELOG)   12 Unit(s) SubCutaneous (12-06-23 @ 12:51)    insulin lispro Injectable (ADMELOG)   14 Unit(s) SubCutaneous (12-06-23 @ 18:08)    simvastatin   40 milliGRAM(s) Oral (12-06-23 @ 22:25)        12-07    139  |  101  |  15  ----------------------------<  139<H>  4.4   |  30  |  0.88    eGFR: 97    Ca    9.2      12-07  Mg     1.9     12-07  Phos  3.4     12-07        A1C with Estimated Average Glucose Result: 11.4 % (12-01-23 @ 05:19)

## 2023-12-07 NOTE — PROGRESS NOTE ADULT - ASSESSMENT
63M w/h/o uncontrolled  DM2 (A1C 11.4%)  > on DM  oral meds PTA. DM c/b Neuropathy, PAD> s/p toe amputation, retinopathy. ? nephropathy per EMR. Also h/o HLD, HTN. Here with severe hyperglycemia and L foot wound and found to have soft tissue gas at lateral hindfoot. 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 Also required insulin drip at time of admission. Endocrine consulted for hyperglycemia. Patient is high risk with high level decision making due to uncontrolled diabetes with A1C>10 which places patient at high risk for cardiovascular and cerebrovascular events. Patient with lability of glucose requiring close monitoring and insulin adjustments.     Tolerating POs with BG levels variable. s/p  vascular surgery.   continue to adjust insulin to BG goal 100 to 180s. No hypoglycemia.     Discussed with pt A1C level, comorbidites and the need for at least basal insulin upon discharge to improve DM control and also promote wound healing. Pt states he is willing to do basal/bolus insulin if needed > appears motivated to improve DM care.     Home regimen: [id number] bid and Glipizide 5mg bid. On  Kazano (algoliptin and metformin) in the past,         63M w/h/o uncontrolled  DM2 (A1C 11.4%)  > on DM  oral meds PTA. DM c/b Neuropathy, PAD> s/p toe amputation, retinopathy. ? nephropathy per EMR. Also h/o HLD, HTN. Here with severe hyperglycemia and L foot wound and found to have soft tissue gas at lateral hindfoot. 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 Also required insulin drip at time of admission. Endocrine consulted for hyperglycemia. Patient is high risk with high level decision making due to uncontrolled diabetes with A1C>10 which places patient at high risk for cardiovascular and cerebrovascular events. Patient with lability of glucose requiring close monitoring and insulin adjustments.     Tolerating POs with BG levels variable. s/p  vascular surgery.   continue to adjust insulin to BG goal 100 to 180s. No hypoglycemia.     Discussed with pt A1C level, comorbidites and the need for at least basal insulin upon discharge to improve DM control and also promote wound healing. Pt states he is willing to do basal/bolus insulin if needed > appears motivated to improve DM care.     Home regimen: [id number] bid and Glipizide 5mg bid. On  Kazano (algoliptin and metformin) in the past,         63M w/h/o uncontrolled  DM2 (A1C 11.4%)  > on DM  oral meds PTA. DM c/b Neuropathy, PAD> s/p toe amputation, retinopathy. ? nephropathy per EMR. Also h/o HLD, HTN. Here with severe hyperglycemia and L foot wound and found to have soft tissue gas at lateral hindfoot. 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 Also required insulin drip at time of admission. Endocrine consulted for hyperglycemia. Patient is high risk with high level decision making due to uncontrolled diabetes with A1C>10 which places patient at high risk for cardiovascular and cerebrovascular events. Patient with lability of glucose requiring close monitoring and insulin adjustments.     Tolerating POs with BG levels variable. s/p  vascular surgery. Continue to adjust insulin to BG goal 100 to 180s. No hypoglycemia.     Discussed with pt A1C level, comorbidites and the need for at least basal insulin upon discharge to improve DM control and also promote wound healing. Pt states he is willing to do basal/bolus insulin if needed > appears motivated to improve DM care.     Home regimen: [id number] bid and Glipizide 5mg bid. On  Kazano (algoliptin and metformin) in the past,         63M w/h/o uncontrolled  DM2 (A1C 11.4%)  > on DM  oral meds PTA. DM c/b Neuropathy, PAD> s/p toe amputation, retinopathy. ? nephropathy per EMR. Also h/o HLD, HTN. Here with severe hyperglycemia and L foot wound and found to have soft tissue gas at lateral hindfoot. 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 Also required insulin drip at time of admission. Endocrine consulted for hyperglycemia. Patient is high risk with high level decision making due to uncontrolled diabetes with A1C>10 which places patient at high risk for cardiovascular and cerebrovascular events. Patient with lability of glucose requiring close monitoring and insulin adjustments.     Tolerating POs with BG levels variable. s/p  vascular surgery. Continue to adjust insulin to BG goal 100 to 180s. No hypoglycemia.     Discussed with pt A1C level, comorbidites and the need for at least basal insulin upon discharge to improve DM control and also promote wound healing. Pt states he is willing to do basal/bolus insulin if needed > appears motivated to improve DM care.     Home regimen: [id number] bid and Glipizide 5mg bid. On  Kazano (algoliptin and metformin) in the past,

## 2023-12-07 NOTE — PROGRESS NOTE ADULT - PROBLEM SELECTOR PLAN 2
BP goal <130/80  Manage per primary team

## 2023-12-07 NOTE — PROGRESS NOTE ADULT - SUBJECTIVE AND OBJECTIVE BOX
[facility]  Division of Infectious Diseases  [phone]    Name: ELVIS VILLATORO  Age: 63y  Gender: Male  MRN: [id number]    Interval History--  Notes reviewed.     Past Medical History--  Non-Insulin Dependent Diabetes Mellitus    DM (Diabetes Mellitus)    HTN (Hypertension)    Dyslipidemia    Retinopathy    Diabetes mellitus type 2 in obese    Hyperlipidemia    Toe infection    Ruptured appendix    S/P appendectomy    Toe amputation status        For details regarding the patient's social history, family history, and other miscellaneous elements, please refer the initial infectious diseases consultation and/or the admitting history and physical examination for this admission.    Allergies    No Known Allergies    Intolerances        Medications--  Antibiotics:  ampicillin/sulbactam  IVPB 3 Gram(s) IV Intermittent every 6 hours    Immunologic:    Other:  enalapril  enoxaparin Injectable  gabapentin  guaiFENesin Oral Liquid (Sugar-Free) PRN  HYDROmorphone  Injectable PRN  insulin glargine Injectable (LANTUS)  insulin lispro (ADMELOG) corrective regimen sliding scale  insulin lispro (ADMELOG) corrective regimen sliding scale  insulin lispro Injectable (ADMELOG)  oxyCODONE    IR PRN  oxyCODONE    IR PRN  polyethylene glycol 3350  senna  simvastatin  tamsulosin      Review of Systems--  A 10-point review of systems was obtained.     Pertinent positives and negatives--  Constitutional: No fevers. No Chills. No Rigors.   Cardiovascular: No chest pain. No palpitations.  Respiratory: No shortness of breath. No cough.  Gastrointestinal: No nausea or vomiting. No diarrhea or constipation.   Psychiatric: Pleasant. Appropriate affect.    Review of systems otherwise negative except as previously noted.    Physical Examination--  Vital Signs: T(F): 98.3 (12-07-23 @ 06:22), Max: 98.7 (12-06-23 @ 10:03)  HR: 94 (12-07-23 @ 06:22)  BP: 156/80 (12-07-23 @ 06:22)  RR: 18 (12-07-23 @ 06:22)  SpO2: 94% (12-07-23 @ 06:22)  Wt(kg): --  General: Nontoxic-appearing Male in no acute distress.  HEENT: AT/NC. PERRL. EOMI. Anicteric. Conjunctiva pink and moist. Oropharynx clear. Dentition fair.  Neck: Not rigid. No sense of mass.  Nodes: None palpable.  Lungs: Clear bilaterally without rales, wheezing or rhonchi  Heart: Regular rate and rhythm. No Murmur. No rub. No gallop. No palpable thrill.  Abdomen: Bowel sounds present and normoactive. Soft. Nondistended. Nontender. No sense of mass. No organomegaly.  Back: No spinal tenderness. No costovertebral angle tenderness.   Extremities: No cyanosis or clubbing. No edema.   Skin: Warm. Dry. Good turgor. No rash. No vasculitic stigmata.  Psychiatric: Appropriate affect and mood for situation.         Laboratory Studies--  CBC                        10.1   12.60 )-----------( 454      ( 07 Dec 2023 07:31 )             33.9       Chemistries  12-07    139  |  101  |  15  ----------------------------<  139<H>  4.4   |  30  |  0.88    Ca    9.2      07 Dec 2023 07:29  Phos  3.4     12-07  Mg     1.9     12-07        Culture Data    Culture - Blood (collected 04 Dec 2023 05:49)  Source: .Blood Blood-Peripheral  Preliminary Report (06 Dec 2023 11:02):    No growth at 48 Hours    Culture - Blood (collected 04 Dec 2023 05:49)  Source: .Blood Blood-Peripheral  Preliminary Report (06 Dec 2023 11:02):    No growth at 48 Hours             [facility]  Division of Infectious Diseases  [phone]    Name: ELVIS VILLATORO  Age: 63y  Gender: Male  MRN: [id number]    Interval History--  Notes reviewed.     Past Medical History--  Non-Insulin Dependent Diabetes Mellitus    DM (Diabetes Mellitus)    HTN (Hypertension)    Dyslipidemia    Retinopathy    Diabetes mellitus type 2 in obese    Hyperlipidemia    Toe infection    Ruptured appendix    S/P appendectomy    Toe amputation status        For details regarding the patient's social history, family history, and other miscellaneous elements, please refer the initial infectious diseases consultation and/or the admitting history and physical examination for this admission.    Allergies    No Known Allergies    Intolerances        Medications--  Antibiotics:  ampicillin/sulbactam  IVPB 3 Gram(s) IV Intermittent every 6 hours    Immunologic:    Other:  enalapril  enoxaparin Injectable  gabapentin  guaiFENesin Oral Liquid (Sugar-Free) PRN  HYDROmorphone  Injectable PRN  insulin glargine Injectable (LANTUS)  insulin lispro (ADMELOG) corrective regimen sliding scale  insulin lispro (ADMELOG) corrective regimen sliding scale  insulin lispro Injectable (ADMELOG)  oxyCODONE    IR PRN  oxyCODONE    IR PRN  polyethylene glycol 3350  senna  simvastatin  tamsulosin      Review of Systems--  A 10-point review of systems was obtained.     Pertinent positives and negatives--  Constitutional: No fevers. No Chills. No Rigors.   Cardiovascular: No chest pain. No palpitations.  Respiratory: No shortness of breath. No cough.  Gastrointestinal: No nausea or vomiting. No diarrhea or constipation.   Psychiatric: Pleasant. Appropriate affect.    Review of systems otherwise negative except as previously noted.    Physical Examination--  Vital Signs: T(F): 98.3 (12-07-23 @ 06:22), Max: 98.7 (12-06-23 @ 10:03)  HR: 94 (12-07-23 @ 06:22)  BP: 156/80 (12-07-23 @ 06:22)  RR: 18 (12-07-23 @ 06:22)  SpO2: 94% (12-07-23 @ 06:22)  Wt(kg): --  General: Nontoxic-appearing Male in no acute distress.  HEENT: AT/NC. PERRL. EOMI. Anicteric. Conjunctiva pink and moist. Oropharynx clear. Dentition fair.  Neck: Not rigid. No sense of mass.  Nodes: None palpable.  Lungs: Clear bilaterally without rales, wheezing or rhonchi  Heart: Regular rate and rhythm. No Murmur. No rub. No gallop. No palpable thrill.  Abdomen: Bowel sounds present and normoactive. Soft. Nondistended. Nontender. No sense of mass. No organomegaly.  Back: No spinal tenderness. No costovertebral angle tenderness.   Extremities: No cyanosis or clubbing. No edema.   Skin: Warm. Dry. Good turgor. No rash. No vasculitic stigmata.  Psychiatric: Appropriate affect and mood for situation.         Laboratory Studies--  CBC                        10.1   12.60 )-----------( 454      ( 07 Dec 2023 07:31 )             33.9       Chemistries  12-07    139  |  101  |  15  ----------------------------<  139<H>  4.4   |  30  |  0.88    Ca    9.2      07 Dec 2023 07:29  Phos  3.4     12-07  Mg     1.9     12-07        Culture Data    Culture - Blood (collected 04 Dec 2023 05:49)  Source: .Blood Blood-Peripheral  Preliminary Report (06 Dec 2023 11:02):    No growth at 48 Hours    Culture - Blood (collected 04 Dec 2023 05:49)  Source: .Blood Blood-Peripheral  Preliminary Report (06 Dec 2023 11:02):    No growth at 48 Hours             [facility]  Division of Infectious Diseases  [phone]    Name: ELVIS VILLATORO  Age: 63y  Gender: Male  MRN: [id number]    Interval History--  Notes reviewed. Seen earlier today.  No complaints.  Pain not an issue.    Past Medical History--  Non-Insulin Dependent Diabetes Mellitus    DM (Diabetes Mellitus)    HTN (Hypertension)    Dyslipidemia    Retinopathy    Diabetes mellitus type 2 in obese    Hyperlipidemia    Toe infection    Ruptured appendix    S/P appendectomy    Toe amputation status        For details regarding the patient's social history, family history, and other miscellaneous elements, please refer the initial infectious diseases consultation and/or the admitting history and physical examination for this admission.    Allergies    No Known Allergies    Intolerances        Medications--  Antibiotics:  ampicillin/sulbactam  IVPB 3 Gram(s) IV Intermittent every 6 hours    Immunologic:    Other:  enalapril  enoxaparin Injectable  gabapentin  guaiFENesin Oral Liquid (Sugar-Free) PRN  HYDROmorphone  Injectable PRN  insulin glargine Injectable (LANTUS)  insulin lispro (ADMELOG) corrective regimen sliding scale  insulin lispro (ADMELOG) corrective regimen sliding scale  insulin lispro Injectable (ADMELOG)  oxyCODONE    IR PRN  oxyCODONE    IR PRN  polyethylene glycol 3350  senna  simvastatin  tamsulosin      Review of Systems--  A 10-point review of systems was obtained.   Review of systems otherwise negative except as previously noted.    Physical Examination--  Vital Signs: T(F): 98.3 (12-07-23 @ 06:22), Max: 98.7 (12-06-23 @ 10:03)  HR: 94 (12-07-23 @ 06:22)  BP: 156/80 (12-07-23 @ 06:22)  RR: 18 (12-07-23 @ 06:22)  SpO2: 94% (12-07-23 @ 06:22)  Wt(kg): --  General: Nontoxic-appearing Male in no acute distress.  HEENT: AT/NC. Anicteric. Conjunctiva pink and moist. Oropharynx clear.  Neck: Not rigid. No sense of mass.  Nodes: None palpable.  Lungs: Clear bilaterally without rales, wheezing or rhonchi  Heart: Regular rate and rhythm.  Abdomen: Bowel sounds present and normoactive. Soft. Nondistended. Nontender. Obese.   Extremities: No cyanosis or clubbing. No edema. BKA dressed.  Skin: Warm. Dry. Good turgor. No rash. No vasculitic stigmata.  Psychiatric: Appropriate affect and mood for situation.         Laboratory Studies--  CBC                        10.1   12.60 )-----------( 454      ( 07 Dec 2023 07:31 )             33.9       Chemistries  12-07    139  |  101  |  15  ----------------------------<  139<H>  4.4   |  30  |  0.88    Ca    9.2      07 Dec 2023 07:29  Phos  3.4     12-07  Mg     1.9     12-07        Culture Data    Culture - Blood (collected 04 Dec 2023 05:49)  Source: .Blood Blood-Peripheral  Preliminary Report (06 Dec 2023 11:02):    No growth at 48 Hours    Culture - Blood (collected 04 Dec 2023 05:49)  Source: .Blood Blood-Peripheral  Preliminary Report (06 Dec 2023 11:02):    No growth at 48 Hours             [facility]  Division of Infectious Diseases  [phone]    Name: ELVIS VILLATORO  Age: 63y  Gender: Male  MRN: [id number]    Interval History--  Notes reviewed. Seen earlier today.  No complaints.  Pain not an issue.    Past Medical History--  Non-Insulin Dependent Diabetes Mellitus    DM (Diabetes Mellitus)    HTN (Hypertension)    Dyslipidemia    Retinopathy    Diabetes mellitus type 2 in obese    Hyperlipidemia    Toe infection    Ruptured appendix    S/P appendectomy    Toe amputation status        For details regarding the patient's social history, family history, and other miscellaneous elements, please refer the initial infectious diseases consultation and/or the admitting history and physical examination for this admission.    Allergies    No Known Allergies    Intolerances        Medications--  Antibiotics:  ampicillin/sulbactam  IVPB 3 Gram(s) IV Intermittent every 6 hours    Immunologic:    Other:  enalapril  enoxaparin Injectable  gabapentin  guaiFENesin Oral Liquid (Sugar-Free) PRN  HYDROmorphone  Injectable PRN  insulin glargine Injectable (LANTUS)  insulin lispro (ADMELOG) corrective regimen sliding scale  insulin lispro (ADMELOG) corrective regimen sliding scale  insulin lispro Injectable (ADMELOG)  oxyCODONE    IR PRN  oxyCODONE    IR PRN  polyethylene glycol 3350  senna  simvastatin  tamsulosin      Review of Systems--  A 10-point review of systems was obtained.   Review of systems otherwise negative except as previously noted.    Physical Examination--  Vital Signs: T(F): 98.3 (12-07-23 @ 06:22), Max: 98.7 (12-06-23 @ 10:03)  HR: 94 (12-07-23 @ 06:22)  BP: 156/80 (12-07-23 @ 06:22)  RR: 18 (12-07-23 @ 06:22)  SpO2: 94% (12-07-23 @ 06:22)  Wt(kg): --  General: Nontoxic-appearing Male in no acute distress.  HEENT: AT/NC. Anicteric. Conjunctiva pink and moist. Oropharynx clear.  Neck: Not rigid. No sense of mass.  Nodes: None palpable.  Lungs: Clear bilaterally without rales, wheezing or rhonchi  Heart: Regular rate and rhythm.  Abdomen: Bowel sounds present and normoactive. Soft. Nondistended. Nontender. Obese.   Extremities: No cyanosis or clubbing. No edema. BKA dressed.  Skin: Warm. Dry. Good turgor. No rash. No vasculitic stigmata.  Psychiatric: Appropriate affect and mood for situation.         Laboratory Studies--  CBC                        10.1   12.60 )-----------( 454      ( 07 Dec 2023 07:31 )             33.9       Chemistries  12-07    139  |  101  |  15  ----------------------------<  139<H>  4.4   |  30  |  0.88    Ca    9.2      07 Dec 2023 07:29  Phos  3.4     12-07  Mg     1.9     12-07        Culture Data    Culture - Blood (collected 04 Dec 2023 05:49)  Source: .Blood Blood-Peripheral  Preliminary Report (06 Dec 2023 11:02):    No growth at 48 Hours    Culture - Blood (collected 04 Dec 2023 05:49)  Source: .Blood Blood-Peripheral  Preliminary Report (06 Dec 2023 11:02):    No growth at 48 Hours

## 2023-12-07 NOTE — PROGRESS NOTE ADULT - PROBLEM SELECTOR PLAN 4
Consider addition of GLP1RA as out pt if not contraindicated  Consider referral to weigh control clinic  for assessment and treatment of obesity

## 2023-12-07 NOTE — PROGRESS NOTE ADULT - ASSESSMENT
63M PMHx DM, HLD, PAD, prior toe amputation, presents to [facility] ED w L foot wound. NSTI of non salvageable L foot. Septic in ED. LRINEC 9. XR/CT both w soft tissue gas. s/p 11/30 L BKA guillotine amp, downgraded from SICU and recovering appropriately on the floor. now S/P below the knee amputation revision on 12/4. Needs eventual formalization.     - Pain control as needed  - ID on board: on Unasyn   - Lovenox for DVT ppx  - Activity as tolerated  - Endo consulted, appreciate recs- will continue to follow   - BP control  - Bowel regimen  - Daily dressing changes  - formalization plan for next week  - PM&R recommends AR on discharge       Vascular Surgery  3111     63M PMHx DM, HLD, PAD, prior toe amputation, presents to [facility] ED w L foot wound. NSTI of non salvageable L foot. Septic in ED. LRINEC 9. XR/CT both w soft tissue gas. s/p 11/30 L BKA guillotine amp, downgraded from SICU and recovering appropriately on the floor. now S/P below the knee amputation revision on 12/4. Needs eventual formalization.     - Pain control as needed  - ID on board: on Unasyn   - Lovenox for DVT ppx  - Activity as tolerated  - Endo consulted, appreciate recs- will continue to follow   - BP control  - Bowel regimen  - Daily dressing changes  - formalization plan for next week  - PM&R recommends AR on discharge       Vascular Surgery  [id number]

## 2023-12-07 NOTE — PROGRESS NOTE ADULT - NSPROGADDITIONALINFOA_GEN_ALL_CORE
Leticia Denny, DO   Endocrine Attending    Please note that this patient may be followed by different provider tomorrow.  Notify endocrine 24 hours prior to discharge for final recommendations    For follow up questions, discharge recommendations, or new consults please call answering service at [phone] (weekdays), [phone] (nights/weekends). For nonurgent matters, please email [email or web address] Leticia Denny, DO   Endocrine Attending    Please note that this patient may be followed by different provider tomorrow.  Notify endocrine 24 hours prior to discharge for final recommendations    For follow up questions, discharge recommendations, or new consults please call answering service at [phone] (weekdays), [phone] (nights/weekends). For nonurgent matters, please email [email or web address] or [email or web address]

## 2023-12-07 NOTE — PROGRESS NOTE ADULT - SUBJECTIVE AND OBJECTIVE BOX
SURGERY DAILY PROGRESS NOTE:       SUBJECTIVE/ROS: Patient seen and evaluated on AM rounds.   No complaints, pain well controlled. Worked with PT yesterday.        OBJECTIVE:    Vital Signs Last 24 Hrs  T(C): 36.8 (07 Dec 2023 06:22), Max: 37.1 (06 Dec 2023 10:03)  T(F): 98.3 (07 Dec 2023 06:22), Max: 98.7 (06 Dec 2023 10:03)  HR: 94 (07 Dec 2023 06:22) (87 - 98)  BP: 156/80 (07 Dec 2023 06:22) (135/75 - 157/76)  BP(mean): --  RR: 18 (07 Dec 2023 06:22) (18 - 18)  SpO2: 94% (07 Dec 2023 06:22) (94% - 95%)    Parameters below as of 07 Dec 2023 06:22  Patient On (Oxygen Delivery Method): room air      I&O's Detail    06 Dec 2023 07:01  -  07 Dec 2023 07:00  --------------------------------------------------------  IN:    Oral Fluid: 1000 mL  Total IN: 1000 mL    OUT:  Total OUT: 0 mL    Total NET: 1000 mL        Daily     Daily Weight in k.5 (06 Dec 2023 10:03)  MEDICATIONS  (STANDING):  ampicillin/sulbactam  IVPB 3 Gram(s) IV Intermittent every 6 hours  enalapril 10 milliGRAM(s) Oral every 12 hours  enoxaparin Injectable 40 milliGRAM(s) SubCutaneous every 24 hours  gabapentin 300 milliGRAM(s) Oral three times a day  insulin glargine Injectable (LANTUS) 39 Unit(s) SubCutaneous at bedtime  insulin lispro (ADMELOG) corrective regimen sliding scale   SubCutaneous three times a day before meals  insulin lispro (ADMELOG) corrective regimen sliding scale   SubCutaneous at bedtime  insulin lispro Injectable (ADMELOG) 14 Unit(s) SubCutaneous three times a day before meals  polyethylene glycol 3350 17 Gram(s) Oral every 12 hours  senna 2 Tablet(s) Oral at bedtime  simvastatin 40 milliGRAM(s) Oral at bedtime  tamsulosin 0.4 milliGRAM(s) Oral at bedtime    MEDICATIONS  (PRN):  guaiFENesin Oral Liquid (Sugar-Free) 100 milliGRAM(s) Oral every 6 hours PRN Cough  HYDROmorphone  Injectable 0.5 milliGRAM(s) IV Push every 3 hours PRN breakthrough pain  oxyCODONE    IR 10 milliGRAM(s) Oral every 4 hours PRN Severe Pain (7 - 10)  oxyCODONE    IR 5 milliGRAM(s) Oral every 4 hours PRN Moderate Pain (4 - 6)      LABS:                        10.1   12.60 )-----------( 454      ( 07 Dec 2023 07:31 )             33.9     12-    138  |  100  |  17  ----------------------------<  219<H>  4.9   |  28  |  0.99    Ca    9.0      06 Dec 2023 08:39  Phos  2.6     12-  Mg     1.9     12-        Urinalysis Basic - ( 06 Dec 2023 08:39 )  Color: x / Appearance: x / SG: x / pH: x  Gluc: 219 mg/dL / Ketone: x  / Bili: x / Urobili: x   Blood: x / Protein: x / Nitrite: x   Leuk Esterase: x / RBC: x / WBC x   Sq Epi: x / Non Sq Epi: x / Bacteria: x          PHYSICAL EXAM:  General Appearance: Appears well, NAD  Chest: nonlabored breathing on room air   Abdomen: Soft, nontense  Extremities: L BKA amputation site C/D/I, minimal bleeding, no evidence of purulence

## 2023-12-07 NOTE — PROGRESS NOTE ADULT - ASSESSMENT
Mr. Mcdaniel is a 63M PMHx DM, HLD, PAD, prior toe amputation, presents to [facility] ED w L foot wound. Reports wound has been present on hindfoot x 3 weeks. Started as small cut. Over last 24 hours, pt unable to ambulate d/t severe pain. Reports significant malodor. Denies subjective fevers, chills, purulent drainage, numbness/paresthesia.   In ED, patient is tachycardic to 123. BP w HTN. Febrile to 102.4 F. Labs w WBC 21. Hgb 11.4. Lac 2.4. . XR L foot w soft tissue gas at lateral hindfoot. CT LLE non con w soft tissue gas tracking ~7cm above foot.   Was started on Vancomycin, Zosyn and Clindamycin. 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  No further fevers, WBC 21-->12-->14  ,   Bcx 11/29; 1/4 Dermabacter hominis  Wound CX 11/30 : Citrobacter Koseri, Enterococcus fecalis  CT  soft tissue ulcerations seen along the plantar aspect of the first toe,   posterior aspect of the heel, and possibly along the lateral aspect of   the forefoot. Associated soft tissue gas tracking within the lateral soft   tissues along the fifth metatarsal, within the plantar soft tissues of   the midfoot/hindfoot, posterior aspect of the heel, and traveling   proximally along the full length of the Achilles tendon, which may   represent a gas-forming/necrotizing infection.  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    # Necrotising fascitis Left foot s/p BKA  # Leucocytosis  # Dermabacter hominis bacteremia    12/5: Overall appears reasonably stable.  Follow-up cultures thus far negative today.  Renal function adequate.  Still with leukocytosis, reactive component given thrombocytosis and surgery yesterday.  I do not see a role to alter antibiotics here.  12/7: Awaiting formalization.  No concern of uncontrolled infection on examination.      Suggestions   Continue Unasyn  Await formalization  Trend CBC  Monitor renal function  Good but not overly tight diabetic control    I will be away 12/8, returning on 12/11. My colleagues with the ID service will be available for any issues in my absence and can be reached at [phone].     Eduin Gutierrez MD  Attending Physician  [facility]  Division of Infectious Diseases  [phone]   Mr. Mcdaniel is a 63M PMHx DM, HLD, PAD, prior toe amputation, presents to [facility] ED w L foot wound. Reports wound has been present on hindfoot x 3 weeks. Started as small cut. Over last 24 hours, pt unable to ambulate d/t severe pain. Reports significant malodor. Denies subjective fevers, chills, purulent drainage, numbness/paresthesia.   In ED, patient is tachycardic to 123. BP w HTN. Febrile to 102.4 F. Labs w WBC 21. Hgb 11.4. Lac 2.4. . XR L foot w soft tissue gas at lateral hindfoot. CT LLE non con w soft tissue gas tracking ~7cm above foot.   Was started on Vancomycin, Zosyn and Clindamycin. 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  No further fevers, WBC 21-->12-->14  ,   Bcx 11/29; 1/4 Dermabacter hominis  Wound CX 11/30 : Citrobacter Koseri, Enterococcus fecalis  CT  soft tissue ulcerations seen along the plantar aspect of the first toe,   posterior aspect of the heel, and possibly along the lateral aspect of   the forefoot. Associated soft tissue gas tracking within the lateral soft   tissues along the fifth metatarsal, within the plantar soft tissues of   the midfoot/hindfoot, posterior aspect of the heel, and traveling   proximally along the full length of the Achilles tendon, which may   represent a gas-forming/necrotizing infection.  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    # Necrotising fascitis Left foot s/p BKA  # Leucocytosis  # Dermabacter hominis bacteremia    12/5: Overall appears reasonably stable.  Follow-up cultures thus far negative today.  Renal function adequate.  Still with leukocytosis, reactive component given thrombocytosis and surgery yesterday.  I do not see a role to alter antibiotics here.  12/7: Awaiting formalization.  No concern of uncontrolled infection on examination.      Suggestions   Continue Unasyn  Await formalization  Trend CBC  Monitor renal function  Good but not overly tight diabetic control    I will be away 12/8, returning on 12/11. My colleagues with the ID service will be available for any issues in my absence and can be reached at [phone].     Eduin Gutierrez MD  Attending Physician  [facility]  Division of Infectious Diseases  [phone]

## 2023-12-07 NOTE — PROGRESS NOTE ADULT - NUTRITIONAL ASSESSMENT
Diet, NPO after Midnight:      NPO Start Date: 03-Dec-2023,   NPO Start Time: 23:59  Except Medications (12-03-23 @ 13:31) [Active]  Diet, Regular:   Consistent Carbohydrate {Evening Snack} (CSTCHOSN) (11-30-23 @ 03:09) [Active]
Diet, Regular:   Consistent Carbohydrate {Evening Snack} (CSTCHOSN) (11-30-23 @ 03:09) [Active]
Diet, NPO after Midnight:      NPO Start Date: 03-Dec-2023,   NPO Start Time: 23:59  Except Medications (12-03-23 @ 13:31) [Active]  Diet, Regular:   Consistent Carbohydrate {Evening Snack} (CSTCHOSN) (11-30-23 @ 03:09) [Active]
Diet, Regular:   Consistent Carbohydrate {Evening Snack} (CSTCHOSN) (11-30-23 @ 03:09) [Active]

## 2023-12-07 NOTE — PROGRESS NOTE ADULT - PROBLEM SELECTOR PLAN 3
LDL goal <55 due to DM/PAD  Pt LDL na  Order fasting lipid if not recently done  Statin as noted above  Consider low cholesterol diet   Manage per primary team   F/u levels as out pt
LDL goal <55 due to DM/PAD  Pt LDL na  outpatient fasting lipid if not recently done  currently on Simvastatin 40mg qhs  Consider low cholesterol diet   Manage per primary team
LDL goal <55 due to DM/PAD  Pt LDL na  Order fasting lipid if not recently done  Statin as noted above  Consider low cholesterol diet   Manage per primary team   F/u levels as out pt
LDL goal <55 due to DM/PAD  Pt LDL na  Order fasting lipid if not recently done  Statin as noted above  Consider low cholesterol diet   Manage per primary team   F/u levels as out pt

## 2023-12-08 LAB
ANION GAP SERPL CALC-SCNC: 5 MMOL/L — SIGNIFICANT CHANGE UP (ref 5–17)
ANION GAP SERPL CALC-SCNC: 5 MMOL/L — SIGNIFICANT CHANGE UP (ref 5–17)
BUN SERPL-MCNC: 15 MG/DL — SIGNIFICANT CHANGE UP (ref 7–23)
BUN SERPL-MCNC: 15 MG/DL — SIGNIFICANT CHANGE UP (ref 7–23)
CALCIUM SERPL-MCNC: 9.4 MG/DL — SIGNIFICANT CHANGE UP (ref 8.4–10.5)
CALCIUM SERPL-MCNC: 9.4 MG/DL — SIGNIFICANT CHANGE UP (ref 8.4–10.5)
CHLORIDE SERPL-SCNC: 102 MMOL/L — SIGNIFICANT CHANGE UP (ref 96–108)
CHLORIDE SERPL-SCNC: 102 MMOL/L — SIGNIFICANT CHANGE UP (ref 96–108)
CO2 SERPL-SCNC: 31 MMOL/L — SIGNIFICANT CHANGE UP (ref 22–31)
CO2 SERPL-SCNC: 31 MMOL/L — SIGNIFICANT CHANGE UP (ref 22–31)
CREAT SERPL-MCNC: 0.84 MG/DL — SIGNIFICANT CHANGE UP (ref 0.5–1.3)
CREAT SERPL-MCNC: 0.84 MG/DL — SIGNIFICANT CHANGE UP (ref 0.5–1.3)
EGFR: 98 ML/MIN/1.73M2 — SIGNIFICANT CHANGE UP
EGFR: 98 ML/MIN/1.73M2 — SIGNIFICANT CHANGE UP
GLUCOSE SERPL-MCNC: 155 MG/DL — HIGH (ref 70–99)
GLUCOSE SERPL-MCNC: 155 MG/DL — HIGH (ref 70–99)
HCT VFR BLD CALC: 33.8 % — LOW (ref 39–50)
HCT VFR BLD CALC: 33.8 % — LOW (ref 39–50)
HGB BLD-MCNC: 10.3 G/DL — LOW (ref 13–17)
HGB BLD-MCNC: 10.3 G/DL — LOW (ref 13–17)
MAGNESIUM SERPL-MCNC: 1.8 MG/DL — SIGNIFICANT CHANGE UP (ref 1.6–2.6)
MAGNESIUM SERPL-MCNC: 1.8 MG/DL — SIGNIFICANT CHANGE UP (ref 1.6–2.6)
MCHC RBC-ENTMCNC: 26.2 PG — LOW (ref 27–34)
MCHC RBC-ENTMCNC: 26.2 PG — LOW (ref 27–34)
MCHC RBC-ENTMCNC: 30.5 GM/DL — LOW (ref 32–36)
MCHC RBC-ENTMCNC: 30.5 GM/DL — LOW (ref 32–36)
MCV RBC AUTO: 86 FL — SIGNIFICANT CHANGE UP (ref 80–100)
MCV RBC AUTO: 86 FL — SIGNIFICANT CHANGE UP (ref 80–100)
NRBC # BLD: 0 /100 WBCS — SIGNIFICANT CHANGE UP (ref 0–0)
NRBC # BLD: 0 /100 WBCS — SIGNIFICANT CHANGE UP (ref 0–0)
PHOSPHATE SERPL-MCNC: 4 MG/DL — SIGNIFICANT CHANGE UP (ref 2.5–4.5)
PHOSPHATE SERPL-MCNC: 4 MG/DL — SIGNIFICANT CHANGE UP (ref 2.5–4.5)
PLATELET # BLD AUTO: 432 K/UL — HIGH (ref 150–400)
PLATELET # BLD AUTO: 432 K/UL — HIGH (ref 150–400)
POTASSIUM SERPL-MCNC: 4.5 MMOL/L — SIGNIFICANT CHANGE UP (ref 3.5–5.3)
POTASSIUM SERPL-MCNC: 4.5 MMOL/L — SIGNIFICANT CHANGE UP (ref 3.5–5.3)
POTASSIUM SERPL-SCNC: 4.5 MMOL/L — SIGNIFICANT CHANGE UP (ref 3.5–5.3)
POTASSIUM SERPL-SCNC: 4.5 MMOL/L — SIGNIFICANT CHANGE UP (ref 3.5–5.3)
RBC # BLD: 3.93 M/UL — LOW (ref 4.2–5.8)
RBC # BLD: 3.93 M/UL — LOW (ref 4.2–5.8)
RBC # FLD: 15.3 % — HIGH (ref 10.3–14.5)
RBC # FLD: 15.3 % — HIGH (ref 10.3–14.5)
SODIUM SERPL-SCNC: 138 MMOL/L — SIGNIFICANT CHANGE UP (ref 135–145)
SODIUM SERPL-SCNC: 138 MMOL/L — SIGNIFICANT CHANGE UP (ref 135–145)
WBC # BLD: 11.54 K/UL — HIGH (ref 3.8–10.5)
WBC # BLD: 11.54 K/UL — HIGH (ref 3.8–10.5)
WBC # FLD AUTO: 11.54 K/UL — HIGH (ref 3.8–10.5)
WBC # FLD AUTO: 11.54 K/UL — HIGH (ref 3.8–10.5)

## 2023-12-08 RX ORDER — INSULIN LISPRO 100/ML
16 VIAL (ML) SUBCUTANEOUS
Refills: 0 | Status: DISCONTINUED | OUTPATIENT
Start: 2023-12-08 | End: 2023-12-09

## 2023-12-08 RX ORDER — INSULIN GLARGINE 100 [IU]/ML
35 INJECTION, SOLUTION SUBCUTANEOUS ONCE
Refills: 0 | Status: COMPLETED | OUTPATIENT
Start: 2023-12-08 | End: 2023-12-08

## 2023-12-08 RX ADMIN — GABAPENTIN 300 MILLIGRAM(S): 400 CAPSULE ORAL at 06:54

## 2023-12-08 RX ADMIN — AMPICILLIN SODIUM AND SULBACTAM SODIUM 200 GRAM(S): 250; 125 INJECTION, POWDER, FOR SUSPENSION INTRAMUSCULAR; INTRAVENOUS at 01:13

## 2023-12-08 RX ADMIN — AMPICILLIN SODIUM AND SULBACTAM SODIUM 200 GRAM(S): 250; 125 INJECTION, POWDER, FOR SUSPENSION INTRAMUSCULAR; INTRAVENOUS at 11:18

## 2023-12-08 RX ADMIN — Medication 16 UNIT(S): at 18:20

## 2023-12-08 RX ADMIN — INSULIN GLARGINE 35 UNIT(S): 100 INJECTION, SOLUTION SUBCUTANEOUS at 23:59

## 2023-12-08 RX ADMIN — TAMSULOSIN HYDROCHLORIDE 0.4 MILLIGRAM(S): 0.4 CAPSULE ORAL at 22:13

## 2023-12-08 RX ADMIN — Medication 10 MILLIGRAM(S): at 06:49

## 2023-12-08 RX ADMIN — GABAPENTIN 300 MILLIGRAM(S): 400 CAPSULE ORAL at 13:34

## 2023-12-08 RX ADMIN — AMPICILLIN SODIUM AND SULBACTAM SODIUM 200 GRAM(S): 250; 125 INJECTION, POWDER, FOR SUSPENSION INTRAMUSCULAR; INTRAVENOUS at 06:49

## 2023-12-08 RX ADMIN — AMPICILLIN SODIUM AND SULBACTAM SODIUM 200 GRAM(S): 250; 125 INJECTION, POWDER, FOR SUSPENSION INTRAMUSCULAR; INTRAVENOUS at 18:20

## 2023-12-08 RX ADMIN — Medication 10 MILLIGRAM(S): at 18:19

## 2023-12-08 RX ADMIN — Medication 14 UNIT(S): at 10:10

## 2023-12-08 RX ADMIN — Medication 100 MILLIGRAM(S): at 17:21

## 2023-12-08 RX ADMIN — Medication 2: at 10:11

## 2023-12-08 RX ADMIN — ENOXAPARIN SODIUM 40 MILLIGRAM(S): 100 INJECTION SUBCUTANEOUS at 06:49

## 2023-12-08 RX ADMIN — GABAPENTIN 300 MILLIGRAM(S): 400 CAPSULE ORAL at 22:13

## 2023-12-08 RX ADMIN — SIMVASTATIN 40 MILLIGRAM(S): 20 TABLET, FILM COATED ORAL at 22:14

## 2023-12-08 RX ADMIN — Medication 14 UNIT(S): at 13:34

## 2023-12-08 RX ADMIN — Medication 2: at 18:20

## 2023-12-08 RX ADMIN — Medication 4: at 13:34

## 2023-12-08 NOTE — DIETITIAN INITIAL EVALUATION ADULT - ENERGY INTAKE
Pt reports good PO intake 100% of foods provided while in house, c/o hungry upon RD visit. Menu at bedside, pt is aware of menu ordering procedure in house, has been ordering preferred foods as needed since admission.  Adequate (%)

## 2023-12-08 NOTE — DIETITIAN INITIAL EVALUATION ADULT - ADD RECOMMEND
-- Recommend MVI and Vitamin C, pending no medical contraindications, for micronutrient support.   -- Monitor PO intake, GI tolerance, skin integrity, labs, weight, and bowel movement regularity.   -- Will continue to honor food and beverage preferences within diet restriction of patient in an effort to maximize level of nutrient intake.  -- Assist with meals PRN.

## 2023-12-08 NOTE — DIETITIAN INITIAL EVALUATION ADULT - PHYSCIAL ASSESSMENT
Drug Dosing Weight  Height (cm): 175.3 (04 Dec 2023 18:11)  Weight (kg): 108.9 (04 Dec 2023 18:11)  BMI (kg/m2): 34.1 (obese)- adjusted for left BKA     Daily wt (bed scale in KG): 101.5kg (12/6), 108.9kg (11/30)   UBW: ~109kg/240lb PTA, denies wt loss PTA  Wt history from previous RD notes: 93kg (10/21/11), 97.5kg (8/16/10)  [facility] 96.5kg (8/11/16), 97.5kg (8/13/10)      ** Wt loss during admission is expected due to amputation (left BKA). RD will continue to monitor wt trends as available/able.     IBW: 170lb (adjusted for left BKA), 132% IBW Drug Dosing Weight  Height (cm): 175.3 (04 Dec 2023 18:11)  Weight (kg): 108.9 (04 Dec 2023 18:11)  BMI (kg/m2): 34.1 (obese)- adjusted for left BKA     Daily wt (bed scale in KG): 101.5kg (12/6), 108.9kg (11/30)   UBW: ~109kg/240lb PTA, denies wt loss PTA  Wt history from previous RD notes: 93kg (10/21/11), 97.5kg (8/16/10)  [facility]), 96.5kg (8/11/16), 97.5kg (8/13/10)      ** Wt loss during admission is expected due to amputation (left BKA). RD will continue to monitor wt trends as available/able.     IBW: 170lb (adjusted for left BKA), 132% IBW

## 2023-12-08 NOTE — DIETITIAN INITIAL EVALUATION ADULT - ETIOLOGY
lack of prior reinforcement for nutrition-related recommendations   increased physiological demand for nutrients

## 2023-12-08 NOTE — DIETITIAN INITIAL EVALUATION ADULT - PERTINENT LABORATORY DATA
12-08    138  |  102  |  15  ----------------------------<  155<H>  4.5   |  31  |  0.84    Ca    9.4      08 Dec 2023 06:38  Phos  4.0     12-08  Mg     1.8     12-08    CAPILLARY BLOOD GLUCOSE  POCT Blood Glucose.: 236 mg/dL (08 Dec 2023 13:27)  POCT Blood Glucose.: 188 mg/dL (08 Dec 2023 10:08)  POCT Blood Glucose.: 159 mg/dL (07 Dec 2023 21:41)  POCT Blood Glucose.: 208 mg/dL (07 Dec 2023 18:17)    A1C with Estimated Average Glucose Result: 11.4 % (12-01-23 @ 05:19)

## 2023-12-08 NOTE — DIETITIAN INITIAL EVALUATION ADULT - PERTINENT MEDS FT
MEDICATIONS  (STANDING):  ampicillin/sulbactam  IVPB 3 Gram(s) IV Intermittent every 6 hours  enalapril 10 milliGRAM(s) Oral every 12 hours  enoxaparin Injectable 40 milliGRAM(s) SubCutaneous every 24 hours  gabapentin 300 milliGRAM(s) Oral three times a day  insulin glargine Injectable (LANTUS) 39 Unit(s) SubCutaneous at bedtime  insulin lispro (ADMELOG) corrective regimen sliding scale   SubCutaneous three times a day before meals  insulin lispro (ADMELOG) corrective regimen sliding scale   SubCutaneous at bedtime  insulin lispro Injectable (ADMELOG) 16 Unit(s) SubCutaneous three times a day before meals  polyethylene glycol 3350 17 Gram(s) Oral every 12 hours  senna 2 Tablet(s) Oral at bedtime  simvastatin 40 milliGRAM(s) Oral at bedtime  tamsulosin 0.4 milliGRAM(s) Oral at bedtime    MEDICATIONS  (PRN):  guaiFENesin Oral Liquid (Sugar-Free) 100 milliGRAM(s) Oral every 6 hours PRN Cough  HYDROmorphone  Injectable 0.5 milliGRAM(s) IV Push every 3 hours PRN breakthrough pain  oxyCODONE    IR 10 milliGRAM(s) Oral every 4 hours PRN Severe Pain (7 - 10)  oxyCODONE    IR 5 milliGRAM(s) Oral every 4 hours PRN Moderate Pain (4 - 6)

## 2023-12-08 NOTE — DIETITIAN INITIAL EVALUATION ADULT - NS FNS DIET ORDER
Diet, Regular:   Consistent Carbohydrate {Evening Snack} (CSTCHOSN) (12-04-23 @ 19:34) [Active]

## 2023-12-08 NOTE — DIETITIAN INITIAL EVALUATION ADULT - CONTINUE CURRENT NUTRITION CARE PLAN
Continue therapeutic diet Rx as tolerated: CSTCHO. RD remains available to adjust diet as needed./yes

## 2023-12-08 NOTE — DIETITIAN INITIAL EVALUATION ADULT - OTHER INFO
-- s/p left BKA on 11/30, revision on 12/4. On Abx  -- Pt is on ISS (Lispro, Lantus) to regulate blood glucose while in house.   -- GI: on Miralax and Senna

## 2023-12-08 NOTE — CHART NOTE - NSCHARTNOTEFT_GEN_A_CORE
63M w/h/o uncontrolled  DM2 (A1C 11.4%)  > on DM  oral meds PTA. DM c/b Neuropathy, PAD> s/p toe amputation, retinopathy. ? nephropathy per EMR. Also h/o HLD, HTN. Here with severe hyperglycemia and L foot wound and found to have soft tissue gas at lateral hindfoot. 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 Also required insulin drip at time of admission. Endocrine consulted for hyperglycemia. Patient is high risk with high level decision making due to uncontrolled diabetes with A1C>10 which places patient at high risk for cardiovascular and cerebrovascular events. Patient with lability of glucose requiring close monitoring and insulin adjustments.     Chart reviewed today.     eMAR:  insulin glargine Injectable (LANTUS)   39 Unit(s) SubCutaneous (12-07-23 @ 22:00)    insulin lispro (ADMELOG) corrective regimen sliding scale   4 Unit(s) SubCutaneous (12-08-23 @ 13:34)   2 Unit(s) SubCutaneous (12-08-23 @ 10:11)   4 Unit(s) SubCutaneous (12-07-23 @ 18:36)   2 Unit(s) SubCutaneous (12-07-23 @ 14:24)    insulin lispro Injectable (ADMELOG)   14 Unit(s) SubCutaneous (12-08-23 @ 13:34)   14 Unit(s) SubCutaneous (12-08-23 @ 10:10)   14 Unit(s) SubCutaneous (12-07-23 @ 18:35)   14 Unit(s) SubCutaneous (12-07-23 @ 14:24)      # Uncontrolled type 2 diabetes mellitus with hyperglycemia.   Recommendations:   Test BG ac and hs. Q6H for NPO status  Fasting glucose better, Continue Lantus 39 units QHS    Postprandial glucose elevated, increased to Admelog 16 units ac meals for now and will continue to monitor/titrate as needed. HOLD IF NOT EATING  Continue with moderate dose correctional insulin scale ac and changing to moderate dose correctional insulin scale for bedtime  Please teach and allow pt to do own finger sticks and insulin injections under RN supervision  Please teach use of insulin pen  Please document teach back  Discharge:  Will be determined according to BG/insulin needs/PO intake at time of discharge.   Likely Metformin plus  basal/bolus due To high insulin doses requirements and to promote wound  healing   Please write Rxs for: Solo Star Insulin pen/Humalog Kwik insulin pen/Bren insulin pen needles/glucose meter/strips/lancets  Can follow at [facility]. [address]. Phone . Call for apt closer to discharge  Needs podiatry/vascular and optho f/u care.   Might benefit  from GLP1RA for cardiorenal protection and also weight control in addition to DM, control> will need close f/u with optho.      Leticia Denny DO   Attending Physician   Department of Endocrinology, Diabetes and Metabolism     If before 9AM or after 5PM, or on weekends/holidays, please call the Endocrine answering service for assistance ([phone]).  For nonurgent matters, please email [email or web address] or [email or web address]     Please note that this patient may be followed by different provider tomorrow.  Notify endocrine 24 hours prior to discharge for final recommendations     Additional Information:  Additional Information: Leticia Denny DO   Endocrine Attending    Please note that this patient may be followed by different provider tomorrow.  Notify endocrine 24 hours prior to discharge for final recommendations    For follow up questions, discharge recommendations, or new consults please call answering service at [phone] (weekdays), [phone] (nights/weekends). For nonurgent matters, please email [email or web address] or [email or web address] 63M w/h/o uncontrolled  DM2 (A1C 11.4%)  > on DM  oral meds PTA. DM c/b Neuropathy, PAD> s/p toe amputation, retinopathy. ? nephropathy per EMR. Also h/o HLD, HTN. Here with severe hyperglycemia and L foot wound and found to have soft tissue gas at lateral hindfoot. 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 Also required insulin drip at time of admission. Endocrine consulted for hyperglycemia. Patient is high risk with high level decision making due to uncontrolled diabetes with A1C>10 which places patient at high risk for cardiovascular and cerebrovascular events. Patient with lability of glucose requiring close monitoring and insulin adjustments.     Chart reviewed today.     eMAR:  insulin glargine Injectable (LANTUS)   39 Unit(s) SubCutaneous (12-07-23 @ 22:00)    insulin lispro (ADMELOG) corrective regimen sliding scale   4 Unit(s) SubCutaneous (12-08-23 @ 13:34)   2 Unit(s) SubCutaneous (12-08-23 @ 10:11)   4 Unit(s) SubCutaneous (12-07-23 @ 18:36)   2 Unit(s) SubCutaneous (12-07-23 @ 14:24)    insulin lispro Injectable (ADMELOG)   14 Unit(s) SubCutaneous (12-08-23 @ 13:34)   14 Unit(s) SubCutaneous (12-08-23 @ 10:10)   14 Unit(s) SubCutaneous (12-07-23 @ 18:35)   14 Unit(s) SubCutaneous (12-07-23 @ 14:24)      # Uncontrolled type 2 diabetes mellitus with hyperglycemia.   Recommendations:   Test BG ac and hs. Q6H for NPO status  Fasting glucose better, Continue Lantus 39 units QHS    Postprandial glucose elevated, increased to Admelog 16 units ac meals for now and will continue to monitor/titrate as needed. HOLD IF NOT EATING  Continue with moderate dose correctional insulin scale ac and changing to moderate dose correctional insulin scale for bedtime  Please teach and allow pt to do own finger sticks and insulin injections under RN supervision  Please teach use of insulin pen  Please document teach back  Discharge:  Will be determined according to BG/insulin needs/PO intake at time of discharge.   Likely Metformin plus  basal/bolus due To high insulin doses requirements and to promote wound  healing   Please write Rxs for: Solo Star Insulin pen/Humalog Kwik insulin pen/Bren insulin pen needles/glucose meter/strips/lancets  Can follow at [facility]. [address]. Phone . Call for apt closer to discharge  Needs podiatry/vascular and optho f/u care.   Might benefit  from GLP1RA for cardiorenal protection and also weight control in addition to DM, control> will need close f/u with optho.      Leticia Denny DO   Attending Physician   Department of Endocrinology, Diabetes and Metabolism     If before 9AM or after 5PM, or on weekends/holidays, please call the Endocrine answering service for assistance ([phone]).  For nonurgent matters, please email [email or web address] or [email or web address]     Please note that this patient may be followed by different provider tomorrow.  Notify endocrine 24 hours prior to discharge for final recommendations     Additional Information:  Additional Information: Leticia Denny DO   Endocrine Attending    Please note that this patient may be followed by different provider tomorrow.  Notify endocrine 24 hours prior to discharge for final recommendations    For follow up questions, discharge recommendations, or new consults please call answering service at [phone] (weekdays), [phone] (nights/weekends). For nonurgent matters, please email [email or web address] or [email or web address]

## 2023-12-08 NOTE — DIETITIAN INITIAL EVALUATION ADULT - EDUCATION DIETARY MODIFICATIONS
Encouraged PO intake as tolerated with emphasis on protein foods as tolerated. Educated pt on foods rich in protein and encouraged consumption as able. Provided education on nutrition therapy for Diabetes including sources of carbohydrates, choosing whole grains vs refined carbohydrates, portion sizes, pairing protein with carbohydrates for glycemic control, limiting refined sugars in diet, using measuring cups for carbohydrate counting and the importance of consistent eating pattern to help optimize glycemic control. Discussed DASH diet education. Reviewed foods high in Na and cholesterol to avoid. Reviewed ways to decrease Na in your diet, discussed meal and snack options, tips for eating out./(1) partially meets; needs review/practice/verbalization

## 2023-12-08 NOTE — DIETITIAN INITIAL EVALUATION ADULT - OTHER CALCULATIONS
Fluid needs deferred to team. Energy and protein needs based on adjusted IBW of 170lb in consideration of BMI >30, Increased nutrient needs for s/p left BKA.

## 2023-12-08 NOTE — DIETITIAN INITIAL EVALUATION ADULT - ORAL INTAKE PTA/DIET HISTORY
Pt reports excellent PO intake, denies history of poor PO intake PTA. Pt does not like fish, other seafoods are ok. Pt states he has been dx with DM for 30+ years and he knows how to follow diet Rx however has not been following any therapeutic restriction for few months PTA.   Denies difficulty chewing/swallowing PTA  Denies nausea/vomiting/constipation/diarrhea PTA  Confirms NKFA/intolerance  Micronutrient/Other supplementation: none  Protein-energy supplementation: none

## 2023-12-08 NOTE — DIETITIAN INITIAL EVALUATION ADULT - ORAL NUTRITION SUPPLEMENTS
recommend Liquid Protein Supplement (100 kcal, 15 gm protein per packet) 1x daily to provide additional calories and nutrients to aid wound healing.

## 2023-12-08 NOTE — DIETITIAN INITIAL EVALUATION ADULT - NSFNSGIASSESSMENTFT_GEN_A_CORE
"Rita Escobedoos RUSSELL (89 y.o. Male)     Date of Birth Social Security Number Address Home Phone MRN    05/24/1925  [address] [address] [phone] [id number]    Muslim Marital Status          None        Admission Date Admission Type Admitting Provider Attending Provider Department, Room/Bed    10/10/19 Emergency Ramon Freedman MD Ozor, Uchenna, MD [facility] 3A MEDICAL INPATIENT, 310/1    Discharge Date Discharge Disposition Discharge Destination                       Attending Provider:  Ramon Freedman MD    Allergies:  Celecoxib, Ibuprofen    Isolation:  None   Infection:  None   Code Status:  CPR    Ht:  177.8 cm (70\")   Wt:  69.3 kg (152 lb 12.5 oz)    Admission Cmt:  None   Principal Problem:  Dysphagia [R13.10]                 Active Insurance as of 10/10/2019     Primary Coverage     Payor Plan Insurance Group Employer/Plan Group    ANTHEM MEDICARE REPLACEMENT ANTHEM MEDICARE ADVANTAGE [id number]     Payor Plan Address Payor Plan Phone Number Payor Plan Fax Number Effective Dates    [address] [phone]  1/1/2019 - None Entered    [facility] [id number]       Subscriber Name Subscriber Birth Date Member ID       ANUJ ESCOBEDO 5/24/1925 [id number]                 Emergency Contacts      (Rel.) Home Phone Work Phone Mobile Phone    JOSUE ESCOBEDO (Spouse) [phone] -- --              " Denies nausea/vomiting/constipation/diarrhea. Last BM on 12/7 per flowsheet. On Miralax and Senna.

## 2023-12-08 NOTE — DIETITIAN INITIAL EVALUATION ADULT - SIGNS/SYMPTOMS
s/p left BKA to aid wound healing elevated HgbA1c, reported not following any dietary restrictions at home PTA

## 2023-12-08 NOTE — PROGRESS NOTE ADULT - ASSESSMENT
63M PMHx DM, HLD, PAD, prior toe amputation, presents to [facility] ED w L foot wound. NSTI of non salvageable L foot. Septic in ED. LRINEC 9. XR/CT both w soft tissue gas. s/p 11/30 L BKA guillotine amp, downgraded from SICU and recovering appropriately on the floor. now S/P below the knee amputation revision on 12/4. Needs eventual formalization.     - Pain control as needed  - ID on board: on Unasyn   - Lovenox for DVT ppx  - Activity as tolerated  - Endo consulted, appreciate recs- will continue to follow   - BP control  - Bowel regimen  - Daily dressing changes  - formalization plan for next week  - PM&R recommends AR on discharge       Vascular Surgery  1342 63M PMHx DM, HLD, PAD, prior toe amputation, presents to [facility] ED w L foot wound. NSTI of non salvageable L foot. Septic in ED. LRINEC 9. XR/CT both w soft tissue gas. s/p 11/30 L BKA guillotine amp, downgraded from SICU and recovering appropriately on the floor. now S/P below the knee amputation revision on 12/4. Needs eventual formalization.     - Pain control as needed  - ID on board: on Unasyn   - Lovenox for DVT ppx  - Activity as tolerated  - Endo consulted, appreciate recs- will continue to follow   - BP control  - Bowel regimen  - Daily dressing changes  - formalization plan for next week  - PM&R recommends AR on discharge       Vascular Surgery  [id number]

## 2023-12-08 NOTE — DIETITIAN INITIAL EVALUATION ADULT - NSFNSADHERENCEPTAFT_GEN_A_CORE
Pt was on finger stick and medications (Metformin and Glipizide) to regulate blood glucose PTA. Most recent HbA1c of 11.4 on 12/1 indicates poor glycemic control.

## 2023-12-08 NOTE — DIETITIAN INITIAL EVALUATION ADULT - REASON INDICATOR FOR ASSESSMENT
seen for length of stay. Information obtained from pt, RN, electronic medical record, wife at bedside. Chart reviewed, events noted.

## 2023-12-08 NOTE — PROGRESS NOTE ADULT - SUBJECTIVE AND OBJECTIVE BOX
SURGERY DAILY PROGRESS NOTE:       SUBJECTIVE/ROS: Patient seen and evaluated on AM rounds.   Feels great, without any complaints.        OBJECTIVE:    Vital Signs Last 24 Hrs  T(C): 36.9 (08 Dec 2023 01:34), Max: 37.1 (07 Dec 2023 14:07)  T(F): 98.5 (08 Dec 2023 01:34), Max: 98.7 (07 Dec 2023 14:07)  HR: 87 (08 Dec 2023 01:34) (87 - 94)  BP: 167/91 (08 Dec 2023 01:34) (146/78 - 180/80)  BP(mean): --  RR: 18 (08 Dec 2023 01:34) (18 - 95)  SpO2: 94% (08 Dec 2023 01:34) (93% - 97%)    Parameters below as of 08 Dec 2023 01:34  Patient On (Oxygen Delivery Method): room air      I&O's Detail    06 Dec 2023 07:01  -  07 Dec 2023 07:00  --------------------------------------------------------  IN:    Oral Fluid: 1000 mL  Total IN: 1000 mL    OUT:  Total OUT: 0 mL    Total NET: 1000 mL      07 Dec 2023 07:01  -  08 Dec 2023 05:56  --------------------------------------------------------  IN:    Oral Fluid: 800 mL  Total IN: 800 mL    OUT:  Total OUT: 0 mL    Total NET: 800 mL        Daily     Daily   MEDICATIONS  (STANDING):  ampicillin/sulbactam  IVPB 3 Gram(s) IV Intermittent every 6 hours  enalapril 10 milliGRAM(s) Oral every 12 hours  enoxaparin Injectable 40 milliGRAM(s) SubCutaneous every 24 hours  gabapentin 300 milliGRAM(s) Oral three times a day  insulin glargine Injectable (LANTUS) 39 Unit(s) SubCutaneous at bedtime  insulin lispro (ADMELOG) corrective regimen sliding scale   SubCutaneous at bedtime  insulin lispro (ADMELOG) corrective regimen sliding scale   SubCutaneous three times a day before meals  insulin lispro Injectable (ADMELOG) 14 Unit(s) SubCutaneous three times a day before meals  polyethylene glycol 3350 17 Gram(s) Oral every 12 hours  senna 2 Tablet(s) Oral at bedtime  simvastatin 40 milliGRAM(s) Oral at bedtime  tamsulosin 0.4 milliGRAM(s) Oral at bedtime    MEDICATIONS  (PRN):  guaiFENesin Oral Liquid (Sugar-Free) 100 milliGRAM(s) Oral every 6 hours PRN Cough  HYDROmorphone  Injectable 0.5 milliGRAM(s) IV Push every 3 hours PRN breakthrough pain  oxyCODONE    IR 10 milliGRAM(s) Oral every 4 hours PRN Severe Pain (7 - 10)  oxyCODONE    IR 5 milliGRAM(s) Oral every 4 hours PRN Moderate Pain (4 - 6)      LABS:                        10.1   12.60 )-----------( 454      ( 07 Dec 2023 07:31 )             33.9     12-07    139  |  101  |  15  ----------------------------<  139<H>  4.4   |  30  |  0.88    Ca    9.2      07 Dec 2023 07:29  Phos  3.4     12-07  Mg     1.9     12-07        Urinalysis Basic - ( 07 Dec 2023 07:29 )    Color: x / Appearance: x / SG: x / pH: x  Gluc: 139 mg/dL / Ketone: x  / Bili: x / Urobili: x   Blood: x / Protein: x / Nitrite: x   Leuk Esterase: x / RBC: x / WBC x   Sq Epi: x / Non Sq Epi: x / Bacteria: x          PHYSICAL EXAM:  General Appearance: Appears well, NAD  Chest: nonlabored breathing on room air   Abdomen: Soft, nontense  Extremities: L BKA amputation site C/D/I, minimal bleeding, no evidence of purulence

## 2023-12-09 LAB
ANION GAP SERPL CALC-SCNC: 7 MMOL/L — SIGNIFICANT CHANGE UP (ref 5–17)
ANION GAP SERPL CALC-SCNC: 7 MMOL/L — SIGNIFICANT CHANGE UP (ref 5–17)
BUN SERPL-MCNC: 15 MG/DL — SIGNIFICANT CHANGE UP (ref 7–23)
BUN SERPL-MCNC: 15 MG/DL — SIGNIFICANT CHANGE UP (ref 7–23)
CALCIUM SERPL-MCNC: 9.2 MG/DL — SIGNIFICANT CHANGE UP (ref 8.4–10.5)
CALCIUM SERPL-MCNC: 9.2 MG/DL — SIGNIFICANT CHANGE UP (ref 8.4–10.5)
CHLORIDE SERPL-SCNC: 102 MMOL/L — SIGNIFICANT CHANGE UP (ref 96–108)
CHLORIDE SERPL-SCNC: 102 MMOL/L — SIGNIFICANT CHANGE UP (ref 96–108)
CO2 SERPL-SCNC: 32 MMOL/L — HIGH (ref 22–31)
CO2 SERPL-SCNC: 32 MMOL/L — HIGH (ref 22–31)
CREAT SERPL-MCNC: 0.84 MG/DL — SIGNIFICANT CHANGE UP (ref 0.5–1.3)
CREAT SERPL-MCNC: 0.84 MG/DL — SIGNIFICANT CHANGE UP (ref 0.5–1.3)
CULTURE RESULTS: SIGNIFICANT CHANGE UP
EGFR: 98 ML/MIN/1.73M2 — SIGNIFICANT CHANGE UP
EGFR: 98 ML/MIN/1.73M2 — SIGNIFICANT CHANGE UP
GLUCOSE SERPL-MCNC: 100 MG/DL — HIGH (ref 70–99)
GLUCOSE SERPL-MCNC: 100 MG/DL — HIGH (ref 70–99)
HCT VFR BLD CALC: 33.3 % — LOW (ref 39–50)
HCT VFR BLD CALC: 33.3 % — LOW (ref 39–50)
HGB BLD-MCNC: 10.3 G/DL — LOW (ref 13–17)
HGB BLD-MCNC: 10.3 G/DL — LOW (ref 13–17)
MAGNESIUM SERPL-MCNC: 1.8 MG/DL — SIGNIFICANT CHANGE UP (ref 1.6–2.6)
MAGNESIUM SERPL-MCNC: 1.8 MG/DL — SIGNIFICANT CHANGE UP (ref 1.6–2.6)
MCHC RBC-ENTMCNC: 26.4 PG — LOW (ref 27–34)
MCHC RBC-ENTMCNC: 26.4 PG — LOW (ref 27–34)
MCHC RBC-ENTMCNC: 30.9 GM/DL — LOW (ref 32–36)
MCHC RBC-ENTMCNC: 30.9 GM/DL — LOW (ref 32–36)
MCV RBC AUTO: 85.4 FL — SIGNIFICANT CHANGE UP (ref 80–100)
MCV RBC AUTO: 85.4 FL — SIGNIFICANT CHANGE UP (ref 80–100)
NRBC # BLD: 0 /100 WBCS — SIGNIFICANT CHANGE UP (ref 0–0)
NRBC # BLD: 0 /100 WBCS — SIGNIFICANT CHANGE UP (ref 0–0)
PHOSPHATE SERPL-MCNC: 3.6 MG/DL — SIGNIFICANT CHANGE UP (ref 2.5–4.5)
PHOSPHATE SERPL-MCNC: 3.6 MG/DL — SIGNIFICANT CHANGE UP (ref 2.5–4.5)
PLATELET # BLD AUTO: 426 K/UL — HIGH (ref 150–400)
PLATELET # BLD AUTO: 426 K/UL — HIGH (ref 150–400)
POTASSIUM SERPL-MCNC: 3.9 MMOL/L — SIGNIFICANT CHANGE UP (ref 3.5–5.3)
POTASSIUM SERPL-MCNC: 3.9 MMOL/L — SIGNIFICANT CHANGE UP (ref 3.5–5.3)
POTASSIUM SERPL-SCNC: 3.9 MMOL/L — SIGNIFICANT CHANGE UP (ref 3.5–5.3)
POTASSIUM SERPL-SCNC: 3.9 MMOL/L — SIGNIFICANT CHANGE UP (ref 3.5–5.3)
RBC # BLD: 3.9 M/UL — LOW (ref 4.2–5.8)
RBC # BLD: 3.9 M/UL — LOW (ref 4.2–5.8)
RBC # FLD: 15.7 % — HIGH (ref 10.3–14.5)
RBC # FLD: 15.7 % — HIGH (ref 10.3–14.5)
SODIUM SERPL-SCNC: 141 MMOL/L — SIGNIFICANT CHANGE UP (ref 135–145)
SODIUM SERPL-SCNC: 141 MMOL/L — SIGNIFICANT CHANGE UP (ref 135–145)
SPECIMEN SOURCE: SIGNIFICANT CHANGE UP
WBC # BLD: 12.35 K/UL — HIGH (ref 3.8–10.5)
WBC # BLD: 12.35 K/UL — HIGH (ref 3.8–10.5)
WBC # FLD AUTO: 12.35 K/UL — HIGH (ref 3.8–10.5)
WBC # FLD AUTO: 12.35 K/UL — HIGH (ref 3.8–10.5)

## 2023-12-09 RX ORDER — INSULIN LISPRO 100/ML
12 VIAL (ML) SUBCUTANEOUS
Refills: 0 | Status: DISCONTINUED | OUTPATIENT
Start: 2023-12-10 | End: 2023-12-10

## 2023-12-09 RX ORDER — INSULIN GLARGINE 100 [IU]/ML
30 INJECTION, SOLUTION SUBCUTANEOUS AT BEDTIME
Refills: 0 | Status: DISCONTINUED | OUTPATIENT
Start: 2023-12-09 | End: 2023-12-11

## 2023-12-09 RX ORDER — INSULIN LISPRO 100/ML
16 VIAL (ML) SUBCUTANEOUS
Refills: 0 | Status: DISCONTINUED | OUTPATIENT
Start: 2023-12-10 | End: 2023-12-12

## 2023-12-09 RX ORDER — INSULIN LISPRO 100/ML
VIAL (ML) SUBCUTANEOUS
Refills: 0 | Status: DISCONTINUED | OUTPATIENT
Start: 2023-12-09 | End: 2023-12-11

## 2023-12-09 RX ADMIN — Medication 10 MILLIGRAM(S): at 06:53

## 2023-12-09 RX ADMIN — GABAPENTIN 300 MILLIGRAM(S): 400 CAPSULE ORAL at 13:28

## 2023-12-09 RX ADMIN — Medication 4: at 19:35

## 2023-12-09 RX ADMIN — ENOXAPARIN SODIUM 40 MILLIGRAM(S): 100 INJECTION SUBCUTANEOUS at 06:53

## 2023-12-09 RX ADMIN — Medication 16 UNIT(S): at 13:29

## 2023-12-09 RX ADMIN — INSULIN GLARGINE 30 UNIT(S): 100 INJECTION, SOLUTION SUBCUTANEOUS at 21:17

## 2023-12-09 RX ADMIN — HYDROMORPHONE HYDROCHLORIDE 0.5 MILLIGRAM(S): 2 INJECTION INTRAMUSCULAR; INTRAVENOUS; SUBCUTANEOUS at 10:36

## 2023-12-09 RX ADMIN — Medication 10 MILLIGRAM(S): at 19:45

## 2023-12-09 RX ADMIN — HYDROMORPHONE HYDROCHLORIDE 0.5 MILLIGRAM(S): 2 INJECTION INTRAMUSCULAR; INTRAVENOUS; SUBCUTANEOUS at 10:06

## 2023-12-09 RX ADMIN — SIMVASTATIN 40 MILLIGRAM(S): 20 TABLET, FILM COATED ORAL at 21:17

## 2023-12-09 RX ADMIN — TAMSULOSIN HYDROCHLORIDE 0.4 MILLIGRAM(S): 0.4 CAPSULE ORAL at 21:17

## 2023-12-09 RX ADMIN — Medication 4: at 13:29

## 2023-12-09 RX ADMIN — AMPICILLIN SODIUM AND SULBACTAM SODIUM 200 GRAM(S): 250; 125 INJECTION, POWDER, FOR SUSPENSION INTRAMUSCULAR; INTRAVENOUS at 06:55

## 2023-12-09 RX ADMIN — GABAPENTIN 300 MILLIGRAM(S): 400 CAPSULE ORAL at 21:17

## 2023-12-09 RX ADMIN — Medication 100 MILLIGRAM(S): at 21:28

## 2023-12-09 RX ADMIN — AMPICILLIN SODIUM AND SULBACTAM SODIUM 200 GRAM(S): 250; 125 INJECTION, POWDER, FOR SUSPENSION INTRAMUSCULAR; INTRAVENOUS at 00:00

## 2023-12-09 RX ADMIN — Medication 16 UNIT(S): at 19:35

## 2023-12-09 RX ADMIN — POLYETHYLENE GLYCOL 3350 17 GRAM(S): 17 POWDER, FOR SOLUTION ORAL at 19:36

## 2023-12-09 RX ADMIN — Medication 100 MILLIGRAM(S): at 00:06

## 2023-12-09 RX ADMIN — GABAPENTIN 300 MILLIGRAM(S): 400 CAPSULE ORAL at 06:54

## 2023-12-09 RX ADMIN — AMPICILLIN SODIUM AND SULBACTAM SODIUM 200 GRAM(S): 250; 125 INJECTION, POWDER, FOR SUSPENSION INTRAMUSCULAR; INTRAVENOUS at 11:49

## 2023-12-09 RX ADMIN — AMPICILLIN SODIUM AND SULBACTAM SODIUM 200 GRAM(S): 250; 125 INJECTION, POWDER, FOR SUSPENSION INTRAMUSCULAR; INTRAVENOUS at 17:45

## 2023-12-09 NOTE — PROGRESS NOTE ADULT - SUBJECTIVE AND OBJECTIVE BOX
SUBJECTIVE: Patient seen and examined on AM rounds. Patient reports that they're feeling well. Denies fever, chills. Reports pain as controlled. No complaints at this time.     Vital Signs Last 24 Hrs  T(C): 37.1 (09 Dec 2023 17:23), Max: 37.1 (08 Dec 2023 21:55)  T(F): 98.7 (09 Dec 2023 17:23), Max: 98.7 (08 Dec 2023 21:55)  HR: 84 (09 Dec 2023 17:23) (84 - 92)  BP: 124/70 (09 Dec 2023 17:23) (122/61 - 167/90)  BP(mean): --  RR: 16 (09 Dec 2023 17:23) (16 - 18)  SpO2: 95% (09 Dec 2023 17:23) (93% - 96%)    Parameters below as of 09 Dec 2023 17:23  Patient On (Oxygen Delivery Method): room air        General Appearance: Appears well, NAD  Neck: Supple  Chest: Equal expansion bilaterally  CV: Pulse regular presently  Abdomen: Soft, nontense  Extremities: BKA wound dressing changed, wound appears healthy without erythema    I&O's Summary    08 Dec 2023 07:01  -  09 Dec 2023 07:00  --------------------------------------------------------  IN: 1640 mL / OUT: 0 mL / NET: 1640 mL    09 Dec 2023 07:01  -  09 Dec 2023 18:42  --------------------------------------------------------  IN: 820 mL / OUT: 0 mL / NET: 820 mL      I&O's Detail    08 Dec 2023 07:01  -  09 Dec 2023 07:00  --------------------------------------------------------  IN:    IV PiggyBack: 300 mL    Oral Fluid: 1340 mL  Total IN: 1640 mL    OUT:  Total OUT: 0 mL    Total NET: 1640 mL      09 Dec 2023 07:01  -  09 Dec 2023 18:42  --------------------------------------------------------  IN:    IV PiggyBack: 100 mL    Oral Fluid: 720 mL  Total IN: 820 mL    OUT:  Total OUT: 0 mL    Total NET: 820 mL          LABS:                        10.3   12.35 )-----------( 426      ( 09 Dec 2023 08:45 )             33.3     12-09    141  |  102  |  15  ----------------------------<  100<H>  3.9   |  32<H>  |  0.84    Ca    9.2      09 Dec 2023 08:45  Phos  3.6     12-09  Mg     1.8     12-09        Urinalysis Basic - ( 09 Dec 2023 08:45 )    Color: x / Appearance: x / SG: x / pH: x  Gluc: 100 mg/dL / Ketone: x  / Bili: x / Urobili: x   Blood: x / Protein: x / Nitrite: x   Leuk Esterase: x / RBC: x / WBC x   Sq Epi: x / Non Sq Epi: x / Bacteria: x        RADIOLOGY & ADDITIONAL STUDIES:

## 2023-12-09 NOTE — CHART NOTE - NSCHARTNOTEFT_GEN_A_CORE
3M w/h/o uncontrolled  DM2 (A1C 11.4%)  > on DM  oral meds PTA. DM c/b Neuropathy, PAD> s/p toe amputation, retinopathy. ? nephropathy per EMR. Also h/o HLD, HTN. Here with severe hyperglycemia and L foot wound and found to have soft tissue gas at lateral hindfoot. 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 Also required insulin drip at time of admission. Endocrine consulted for hyperglycemia. Patient is high risk with high level decision making due to uncontrolled diabetes with A1C>10 which places patient at high risk for cardiovascular and cerebrovascular events. Patient with lability of glucose requiring close monitoring and insulin adjustments.     Chart reviewed today. fasting BG 90 and HS BG 81 tightly controlled. Tolerating POs, eat full meals       POCT Blood Glucose:  220 mg/dL (12-09-23 @ 18:57)  245 mg/dL (12-09-23 @ 13:27)  210 mg/dL (12-09-23 @ 13:23)  138 mg/dL (12-09-23 @ 11:20)  90 mg/dL (12-09-23 @ 09:17)  127 mg/dL (12-08-23 @ 23:46)  94 mg/dL (12-08-23 @ 22:26)  88 mg/dL (12-08-23 @ 21:45)      eMAR:  insulin glargine Injectable (LANTUS)   35 Unit(s) SubCutaneous (12-08-23 @ 23:59)    insulin lispro (ADMELOG) corrective regimen sliding scale   4 Unit(s) SubCutaneous (12-09-23 @ 19:35)   4 Unit(s) SubCutaneous (12-09-23 @ 13:29)    insulin lispro Injectable (ADMELOG)   16 Unit(s) SubCutaneous (12-09-23 @ 19:35)   16 Unit(s) SubCutaneous (12-09-23 @ 13:29)    simvastatin   40 milliGRAM(s) Oral (12-08-23 @ 22:14)        # Uncontrolled type 2 diabetes mellitus with hyperglycemia.   Recommendations:   Test BG ac and hs. Q6H for NPO status  Fasting BG tighlty controlled, Decreased Lantus to 30 units QHS    Bed time BG tightly controlled, Adjust Admelog 16 -16-12 units ac meals for now and will continue to monitor/titrate as needed. HOLD IF NOT EATING  Continue with moderate dose correctional insulin scale ac and changing to moderate dose correctional insulin scale for bedtime  Please teach and allow pt to do own finger sticks and insulin injections under RN supervision  Please teach use of insulin pen  Please document teach back    Lucy Giles NPCelestineC   Contact via Microsoft Teams during business hours  To reach covering provider access AMION via sunrise tools  For Urgent matters/after-hours/weekends/holidays please page endocrine fellow on call   For nonurgent matters please email [email or web address]    Please note that this patient may be followed by different provider tomorrow.  Notify endocrine 24 hours prior to discharge for final recommendations 3M w/h/o uncontrolled  DM2 (A1C 11.4%)  > on DM  oral meds PTA. DM c/b Neuropathy, PAD> s/p toe amputation, retinopathy. ? nephropathy per EMR. Also h/o HLD, HTN. Here with severe hyperglycemia and L foot wound and found to have soft tissue gas at lateral hindfoot. 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 Also required insulin drip at time of admission. Endocrine consulted for hyperglycemia. Patient is high risk with high level decision making due to uncontrolled diabetes with A1C>10 which places patient at high risk for cardiovascular and cerebrovascular events. Patient with lability of glucose requiring close monitoring and insulin adjustments.     Chart reviewed today. fasting BG 90 and HS BG 81 tightly controlled. Tolerating POs, eat full meals       POCT Blood Glucose:  220 mg/dL (12-09-23 @ 18:57)  245 mg/dL (12-09-23 @ 13:27)  210 mg/dL (12-09-23 @ 13:23)  138 mg/dL (12-09-23 @ 11:20)  90 mg/dL (12-09-23 @ 09:17)  127 mg/dL (12-08-23 @ 23:46)  94 mg/dL (12-08-23 @ 22:26)  88 mg/dL (12-08-23 @ 21:45)      eMAR:  insulin glargine Injectable (LANTUS)   35 Unit(s) SubCutaneous (12-08-23 @ 23:59)    insulin lispro (ADMELOG) corrective regimen sliding scale   4 Unit(s) SubCutaneous (12-09-23 @ 19:35)   4 Unit(s) SubCutaneous (12-09-23 @ 13:29)    insulin lispro Injectable (ADMELOG)   16 Unit(s) SubCutaneous (12-09-23 @ 19:35)   16 Unit(s) SubCutaneous (12-09-23 @ 13:29)    simvastatin   40 milliGRAM(s) Oral (12-08-23 @ 22:14)        # Uncontrolled type 2 diabetes mellitus with hyperglycemia.   Recommendations:   Test BG ac and hs. Q6H for NPO status  Fasting BG tighlty controlled, Decreased Lantus to 30 units QHS    Bed time BG tightly controlled, Adjust Admelog 16 -16-12 units ac meals for now and will continue to monitor/titrate as needed. HOLD IF NOT EATING  Continue with moderate dose correctional insulin scale ac and changing to moderate dose correctional insulin scale for bedtime  Please teach and allow pt to do own finger sticks and insulin injections under RN supervision  Please teach use of insulin pen  Please document teach back    Lucy Giles NPCelestineC   Contact via Microsoft Teams during business hours  To reach covering provider access AMION via sunrise tools  For Urgent matters/after-hours/weekends/holidays please page endocrine fellow on call   For nonurgent matters please email [email or web address]    Please note that this patient may be followed by different provider tomorrow.  Notify endocrine 24 hours prior to discharge for final recommendations

## 2023-12-09 NOTE — PROVIDER CONTACT NOTE (MEDICATION) - SITUATION
Patient's BS 90.  Sliding scale insulin held per parameter. Contacted provider to confirm giving standing premeal insulin. Vascular deferred to endocrine. No response from endocrine. Patient already finished eating his meal.

## 2023-12-09 NOTE — PROGRESS NOTE ADULT - ASSESSMENT
63M PMHx DM, HLD, PAD, prior toe amputation, presents to [facility] ED w L foot wound. NSTI of non salvageable L foot. Septic in ED. LRINEC 9. XR/CT both w soft tissue gas. s/p 11/30 L BKA guillotine amp, downgraded from SICU and recovering appropriately on the floor. now S/P below the knee amputation revision on 12/4. Needs eventual formalization.     - Pain control as needed  - ID on board: on Unasyn   - Lovenox for DVT ppx  - Activity as tolerated  - Endo consulted, appreciate recs- will continue to follow   - BP control  - Bowel regimen  - Daily dressing changes  - formalization plan for next week  - PM&R recommends AR on discharge       Vascular Surgery  [id number] 63M PMHx DM, HLD, PAD, prior toe amputation, presents to [facility] ED w L foot wound. NSTI of non salvageable L foot. Septic in ED. LRINEC 9. XR/CT both w soft tissue gas. s/p 11/30 L BKA guillotine amp, downgraded from SICU and recovering appropriately on the floor. now S/P below the knee amputation revision on 12/4. Needs eventual formalization.     - Pain control as needed  - ID on board: on Unasyn   - Lovenox for DVT ppx  - Activity as tolerated  - Endo consulted, appreciate recs- will continue to follow   - BP control  - Bowel regimen  - Daily dressing changes  - formalization plan for next week  - PM&R recommends AR on discharge       Vascular Surgery  [id number]

## 2023-12-10 LAB
ANION GAP SERPL CALC-SCNC: 8 MMOL/L — SIGNIFICANT CHANGE UP (ref 5–17)
ANION GAP SERPL CALC-SCNC: 8 MMOL/L — SIGNIFICANT CHANGE UP (ref 5–17)
BUN SERPL-MCNC: 15 MG/DL — SIGNIFICANT CHANGE UP (ref 7–23)
BUN SERPL-MCNC: 15 MG/DL — SIGNIFICANT CHANGE UP (ref 7–23)
CALCIUM SERPL-MCNC: 9.1 MG/DL — SIGNIFICANT CHANGE UP (ref 8.4–10.5)
CALCIUM SERPL-MCNC: 9.1 MG/DL — SIGNIFICANT CHANGE UP (ref 8.4–10.5)
CHLORIDE SERPL-SCNC: 102 MMOL/L — SIGNIFICANT CHANGE UP (ref 96–108)
CHLORIDE SERPL-SCNC: 102 MMOL/L — SIGNIFICANT CHANGE UP (ref 96–108)
CO2 SERPL-SCNC: 28 MMOL/L — SIGNIFICANT CHANGE UP (ref 22–31)
CO2 SERPL-SCNC: 28 MMOL/L — SIGNIFICANT CHANGE UP (ref 22–31)
CREAT SERPL-MCNC: 0.93 MG/DL — SIGNIFICANT CHANGE UP (ref 0.5–1.3)
CREAT SERPL-MCNC: 0.93 MG/DL — SIGNIFICANT CHANGE UP (ref 0.5–1.3)
EGFR: 92 ML/MIN/1.73M2 — SIGNIFICANT CHANGE UP
EGFR: 92 ML/MIN/1.73M2 — SIGNIFICANT CHANGE UP
GLUCOSE SERPL-MCNC: 156 MG/DL — HIGH (ref 70–99)
GLUCOSE SERPL-MCNC: 156 MG/DL — HIGH (ref 70–99)
HCT VFR BLD CALC: 33.6 % — LOW (ref 39–50)
HCT VFR BLD CALC: 33.6 % — LOW (ref 39–50)
HGB BLD-MCNC: 10.3 G/DL — LOW (ref 13–17)
HGB BLD-MCNC: 10.3 G/DL — LOW (ref 13–17)
MAGNESIUM SERPL-MCNC: 1.9 MG/DL — SIGNIFICANT CHANGE UP (ref 1.6–2.6)
MAGNESIUM SERPL-MCNC: 1.9 MG/DL — SIGNIFICANT CHANGE UP (ref 1.6–2.6)
MCHC RBC-ENTMCNC: 26.8 PG — LOW (ref 27–34)
MCHC RBC-ENTMCNC: 26.8 PG — LOW (ref 27–34)
MCHC RBC-ENTMCNC: 30.7 GM/DL — LOW (ref 32–36)
MCHC RBC-ENTMCNC: 30.7 GM/DL — LOW (ref 32–36)
MCV RBC AUTO: 87.5 FL — SIGNIFICANT CHANGE UP (ref 80–100)
MCV RBC AUTO: 87.5 FL — SIGNIFICANT CHANGE UP (ref 80–100)
NRBC # BLD: 0 /100 WBCS — SIGNIFICANT CHANGE UP (ref 0–0)
NRBC # BLD: 0 /100 WBCS — SIGNIFICANT CHANGE UP (ref 0–0)
PHOSPHATE SERPL-MCNC: 3.4 MG/DL — SIGNIFICANT CHANGE UP (ref 2.5–4.5)
PHOSPHATE SERPL-MCNC: 3.4 MG/DL — SIGNIFICANT CHANGE UP (ref 2.5–4.5)
PLATELET # BLD AUTO: 399 K/UL — SIGNIFICANT CHANGE UP (ref 150–400)
PLATELET # BLD AUTO: 399 K/UL — SIGNIFICANT CHANGE UP (ref 150–400)
POTASSIUM SERPL-MCNC: 4.5 MMOL/L — SIGNIFICANT CHANGE UP (ref 3.5–5.3)
POTASSIUM SERPL-MCNC: 4.5 MMOL/L — SIGNIFICANT CHANGE UP (ref 3.5–5.3)
POTASSIUM SERPL-SCNC: 4.5 MMOL/L — SIGNIFICANT CHANGE UP (ref 3.5–5.3)
POTASSIUM SERPL-SCNC: 4.5 MMOL/L — SIGNIFICANT CHANGE UP (ref 3.5–5.3)
RBC # BLD: 3.84 M/UL — LOW (ref 4.2–5.8)
RBC # BLD: 3.84 M/UL — LOW (ref 4.2–5.8)
RBC # FLD: 15.8 % — HIGH (ref 10.3–14.5)
RBC # FLD: 15.8 % — HIGH (ref 10.3–14.5)
SODIUM SERPL-SCNC: 138 MMOL/L — SIGNIFICANT CHANGE UP (ref 135–145)
SODIUM SERPL-SCNC: 138 MMOL/L — SIGNIFICANT CHANGE UP (ref 135–145)
WBC # BLD: 13.73 K/UL — HIGH (ref 3.8–10.5)
WBC # BLD: 13.73 K/UL — HIGH (ref 3.8–10.5)
WBC # FLD AUTO: 13.73 K/UL — HIGH (ref 3.8–10.5)
WBC # FLD AUTO: 13.73 K/UL — HIGH (ref 3.8–10.5)

## 2023-12-10 RX ORDER — INSULIN LISPRO 100/ML
14 VIAL (ML) SUBCUTANEOUS
Refills: 0 | Status: DISCONTINUED | OUTPATIENT
Start: 2023-12-10 | End: 2023-12-12

## 2023-12-10 RX ADMIN — Medication 10 MILLIGRAM(S): at 17:56

## 2023-12-10 RX ADMIN — AMPICILLIN SODIUM AND SULBACTAM SODIUM 200 GRAM(S): 250; 125 INJECTION, POWDER, FOR SUSPENSION INTRAMUSCULAR; INTRAVENOUS at 00:23

## 2023-12-10 RX ADMIN — AMPICILLIN SODIUM AND SULBACTAM SODIUM 200 GRAM(S): 250; 125 INJECTION, POWDER, FOR SUSPENSION INTRAMUSCULAR; INTRAVENOUS at 17:56

## 2023-12-10 RX ADMIN — Medication 100 MILLIGRAM(S): at 21:36

## 2023-12-10 RX ADMIN — ENOXAPARIN SODIUM 40 MILLIGRAM(S): 100 INJECTION SUBCUTANEOUS at 06:40

## 2023-12-10 RX ADMIN — SIMVASTATIN 40 MILLIGRAM(S): 20 TABLET, FILM COATED ORAL at 21:32

## 2023-12-10 RX ADMIN — Medication 16 UNIT(S): at 12:55

## 2023-12-10 RX ADMIN — GABAPENTIN 300 MILLIGRAM(S): 400 CAPSULE ORAL at 21:32

## 2023-12-10 RX ADMIN — Medication 10 MILLIGRAM(S): at 06:39

## 2023-12-10 RX ADMIN — GABAPENTIN 300 MILLIGRAM(S): 400 CAPSULE ORAL at 13:06

## 2023-12-10 RX ADMIN — AMPICILLIN SODIUM AND SULBACTAM SODIUM 200 GRAM(S): 250; 125 INJECTION, POWDER, FOR SUSPENSION INTRAMUSCULAR; INTRAVENOUS at 12:56

## 2023-12-10 RX ADMIN — INSULIN GLARGINE 30 UNIT(S): 100 INJECTION, SOLUTION SUBCUTANEOUS at 21:32

## 2023-12-10 RX ADMIN — TAMSULOSIN HYDROCHLORIDE 0.4 MILLIGRAM(S): 0.4 CAPSULE ORAL at 21:32

## 2023-12-10 RX ADMIN — Medication 4: at 18:57

## 2023-12-10 RX ADMIN — Medication 8: at 12:56

## 2023-12-10 RX ADMIN — AMPICILLIN SODIUM AND SULBACTAM SODIUM 200 GRAM(S): 250; 125 INJECTION, POWDER, FOR SUSPENSION INTRAMUSCULAR; INTRAVENOUS at 06:40

## 2023-12-10 RX ADMIN — AMPICILLIN SODIUM AND SULBACTAM SODIUM 200 GRAM(S): 250; 125 INJECTION, POWDER, FOR SUSPENSION INTRAMUSCULAR; INTRAVENOUS at 23:26

## 2023-12-10 RX ADMIN — GABAPENTIN 300 MILLIGRAM(S): 400 CAPSULE ORAL at 06:39

## 2023-12-10 RX ADMIN — Medication 14 UNIT(S): at 18:57

## 2023-12-10 NOTE — PROGRESS NOTE ADULT - SUBJECTIVE AND OBJECTIVE BOX
SUBJECTIVE: Patient seen and examined on AM rounds. Patient reports that they're feeling well. Denies fever, chills. Reports pain as controlled. No complaints at this time.     Vital Signs Last 24 Hrs  T(C): 36.8 (10 Dec 2023 13:05), Max: 37.2 (10 Dec 2023 08:34)  T(F): 98.2 (10 Dec 2023 13:05), Max: 99 (10 Dec 2023 08:34)  HR: 89 (10 Dec 2023 13:05) (84 - 93)  BP: 133/77 (10 Dec 2023 13:05) (124/70 - 160/74)  BP(mean): --  RR: 18 (10 Dec 2023 13:05) (16 - 18)  SpO2: 93% (10 Dec 2023 13:05) (93% - 96%)    Parameters below as of 10 Dec 2023 13:05  Patient On (Oxygen Delivery Method): room air        General Appearance: Appears well, NAD  Neck: Supple  Chest: Equal expansion bilaterally  CV: Pulse regular presently  Abdomen: Soft, nontense  Extremities: L BKA wound appears stable, mild erythema on anterior skin, dressing changed    I&O's Summary    09 Dec 2023 07:01  -  10 Dec 2023 07:00  --------------------------------------------------------  IN: 1740 mL / OUT: 0 mL / NET: 1740 mL    10 Dec 2023 07:01  -  10 Dec 2023 16:20  --------------------------------------------------------  IN: 480 mL / OUT: 0 mL / NET: 480 mL      I&O's Detail    09 Dec 2023 07:01  -  10 Dec 2023 07:00  --------------------------------------------------------  IN:    IV PiggyBack: 300 mL    Oral Fluid: 1440 mL  Total IN: 1740 mL    OUT:  Total OUT: 0 mL    Total NET: 1740 mL      10 Dec 2023 07:01  -  10 Dec 2023 16:20  --------------------------------------------------------  IN:    Oral Fluid: 480 mL  Total IN: 480 mL    OUT:  Total OUT: 0 mL    Total NET: 480 mL          LABS:                        10.3   13.73 )-----------( 399      ( 10 Dec 2023 07:28 )             33.6     12-10    138  |  102  |  15  ----------------------------<  156<H>  4.5   |  28  |  0.93    Ca    9.1      10 Dec 2023 07:27  Phos  3.4     12-10  Mg     1.9     12-10        Urinalysis Basic - ( 10 Dec 2023 07:27 )    Color: x / Appearance: x / SG: x / pH: x  Gluc: 156 mg/dL / Ketone: x  / Bili: x / Urobili: x   Blood: x / Protein: x / Nitrite: x   Leuk Esterase: x / RBC: x / WBC x   Sq Epi: x / Non Sq Epi: x / Bacteria: x        RADIOLOGY & ADDITIONAL STUDIES:

## 2023-12-10 NOTE — PROGRESS NOTE ADULT - ASSESSMENT
63M PMHx DM, HLD, PAD, prior toe amputation, presents to [facility] ED w L foot wound. NSTI of non salvageable L foot. Septic in ED. LRINEC 9. XR/CT both w soft tissue gas. s/p 11/30 L BKA guillotine amp, downgraded from SICU and recovering appropriately on the floor. now S/P below the knee amputation revision on 12/4. Needs eventual formalization.     - Pain control as needed  - ID on board: on Unasyn   - Lovenox for DVT ppx  - Activity as tolerated  - Endo consulted, appreciate recs- will continue to follow   - BP control  - Bowel regimen  - Daily dressing changes  - formalization plan for next week  - PM&R recommends AR on discharge       Vascular Surgery  [id number] 63M PMHx DM, HLD, PAD, prior toe amputation, presents to [facility] ED w L foot wound. NSTI of non salvageable L foot. Septic in ED. LRINEC 9. XR/CT both w soft tissue gas. s/p 11/30 L BKA guillotine amp, downgraded from SICU and recovering appropriately on the floor. now S/P below the knee amputation revision on 12/4. Needs eventual formalization.     - Pain control as needed  - ID on board: on Unasyn   - Lovenox for DVT ppx  - Activity as tolerated  - Endo consulted, appreciate recs- will continue to follow   - BP control  - Bowel regimen  - Daily dressing changes  - formalization plan for next week  - PM&R recommends AR on discharge       Vascular Surgery  [id number]

## 2023-12-10 NOTE — CHART NOTE - NSCHARTNOTEFT_GEN_A_CORE
63M w/h/o uncontrolled  DM2 (A1C 11.4%)  > on DM  oral meds PTA. DM c/b Neuropathy, PAD> s/p toe amputation, retinopathy. ? nephropathy per EMR. Also h/o HLD, HTN. Here with severe hyperglycemia and L foot wound and found to have soft tissue gas at lateral hindfoot. 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 Also required insulin drip at time of admission. Endocrine consulted for hyperglycemia. Patient is high risk with high level decision making due to uncontrolled diabetes with A1C>10 which places patient at high risk for cardiovascular and cerebrovascular events. Patient with lability of glucose requiring close monitoring and insulin adjustments.     Chart reviewed today. Fasting BG close to goal, no hypoglycemia event over night. Severe hyperglycemia at luch time due to not reveiving premeal admelog for breakfast. BGs at HS labile . Will adjust premeal insulin dose       POCT Blood Glucose:  238 mg/dL (12-10-23 @ 18:40)  309 mg/dL (12-10-23 @ 12:54)  155 mg/dL (12-10-23 @ 08:57)  114 mg/dL (12-10-23 @ 01:56)  233 mg/dL (12-09-23 @ 21:15)  243 mg/dL (12-09-23 @ 21:10)      eMAR:  insulin glargine Injectable (LANTUS)   30 Unit(s) SubCutaneous (12-09-23 @ 21:17)    insulin lispro (ADMELOG) corrective regimen sliding scale   4 Unit(s) SubCutaneous (12-10-23 @ 18:57)   8 Unit(s) SubCutaneous (12-10-23 @ 12:56)    insulin lispro Injectable (ADMELOG)   16 Unit(s) SubCutaneous (12-10-23 @ 12:55)    insulin lispro Injectable (ADMELOG)   14 Unit(s) SubCutaneous (12-10-23 @ 18:57)    simvastatin   40 milliGRAM(s) Oral (12-09-23 @ 21:17)    # Uncontrolled type 2 diabetes mellitus with hyperglycemia.   Recommendations:   Test BG ac and hs. Q6H for NPO status  Fasting BG close to goal, continue Lantus to 30 units QHS    Bed time BG tightly controlled, Adjust Admelog 16 -16-14 units ac meals for now and will continue to monitor/titrate as needed. HOLD IF NOT EATING  Continue with moderate dose correctional insulin scale ac and changing to moderate dose correctional insulin scale for bedtime  Please teach and allow pt to do own finger sticks and insulin injections under RN supervision  Please teach use of insulin pen  Please document teach back    Lucy RIGGINS   Contact via Microsoft Teams during business hours  To reach covering provider access AMION via sunrise tools  For Urgent matters/after-hours/weekends/holidays please page endocrine fellow on call   For nonurgent matters please email [email or web address]    Please note that this patient may be followed by different provider tomorrow.  Notify endocrine 24 hours prior to discharge for final recommendations. 63M w/h/o uncontrolled  DM2 (A1C 11.4%)  > on DM  oral meds PTA. DM c/b Neuropathy, PAD> s/p toe amputation, retinopathy. ? nephropathy per EMR. Also h/o HLD, HTN. Here with severe hyperglycemia and L foot wound and found to have soft tissue gas at lateral hindfoot. 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 Also required insulin drip at time of admission. Endocrine consulted for hyperglycemia. Patient is high risk with high level decision making due to uncontrolled diabetes with A1C>10 which places patient at high risk for cardiovascular and cerebrovascular events. Patient with lability of glucose requiring close monitoring and insulin adjustments.     Chart reviewed today. Fasting BG close to goal, no hypoglycemia event over night. Severe hyperglycemia at luch time due to not reveiving premeal admelog for breakfast. BGs at HS labile . Will adjust premeal insulin dose       POCT Blood Glucose:  238 mg/dL (12-10-23 @ 18:40)  309 mg/dL (12-10-23 @ 12:54)  155 mg/dL (12-10-23 @ 08:57)  114 mg/dL (12-10-23 @ 01:56)  233 mg/dL (12-09-23 @ 21:15)  243 mg/dL (12-09-23 @ 21:10)      eMAR:  insulin glargine Injectable (LANTUS)   30 Unit(s) SubCutaneous (12-09-23 @ 21:17)    insulin lispro (ADMELOG) corrective regimen sliding scale   4 Unit(s) SubCutaneous (12-10-23 @ 18:57)   8 Unit(s) SubCutaneous (12-10-23 @ 12:56)    insulin lispro Injectable (ADMELOG)   16 Unit(s) SubCutaneous (12-10-23 @ 12:55)    insulin lispro Injectable (ADMELOG)   14 Unit(s) SubCutaneous (12-10-23 @ 18:57)    simvastatin   40 milliGRAM(s) Oral (12-09-23 @ 21:17)    # Uncontrolled type 2 diabetes mellitus with hyperglycemia.   Recommendations:   Test BG ac and hs. Q6H for NPO status  Fasting BG close to goal, continue Lantus to 30 units QHS    Bed time BG tightly controlled, Adjust Admelog 16 -16-14 units ac meals for now and will continue to monitor/titrate as needed. HOLD IF NOT EATING  Continue with moderate dose correctional insulin scale ac and changing to moderate dose correctional insulin scale for bedtime  Please teach and allow pt to do own finger sticks and insulin injections under RN supervision  Please teach use of insulin pen  Please document teach back    Lucy RIGGINS   Contact via Microsoft Teams during business hours  To reach covering provider access AMION via sunrise tools  For Urgent matters/after-hours/weekends/holidays please page endocrine fellow on call   For nonurgent matters please email [email or web address]    Please note that this patient may be followed by different provider tomorrow.  Notify endocrine 24 hours prior to discharge for final recommendations.

## 2023-12-11 ENCOUNTER — TRANSCRIPTION ENCOUNTER (OUTPATIENT)
Age: 63
End: 2023-12-11

## 2023-12-11 LAB
ANION GAP SERPL CALC-SCNC: 8 MMOL/L — SIGNIFICANT CHANGE UP (ref 5–17)
ANION GAP SERPL CALC-SCNC: 8 MMOL/L — SIGNIFICANT CHANGE UP (ref 5–17)
BUN SERPL-MCNC: 13 MG/DL — SIGNIFICANT CHANGE UP (ref 7–23)
BUN SERPL-MCNC: 13 MG/DL — SIGNIFICANT CHANGE UP (ref 7–23)
CALCIUM SERPL-MCNC: 9.1 MG/DL — SIGNIFICANT CHANGE UP (ref 8.4–10.5)
CALCIUM SERPL-MCNC: 9.1 MG/DL — SIGNIFICANT CHANGE UP (ref 8.4–10.5)
CHLORIDE SERPL-SCNC: 99 MMOL/L — SIGNIFICANT CHANGE UP (ref 96–108)
CHLORIDE SERPL-SCNC: 99 MMOL/L — SIGNIFICANT CHANGE UP (ref 96–108)
CO2 SERPL-SCNC: 29 MMOL/L — SIGNIFICANT CHANGE UP (ref 22–31)
CO2 SERPL-SCNC: 29 MMOL/L — SIGNIFICANT CHANGE UP (ref 22–31)
CREAT SERPL-MCNC: 0.9 MG/DL — SIGNIFICANT CHANGE UP (ref 0.5–1.3)
CREAT SERPL-MCNC: 0.9 MG/DL — SIGNIFICANT CHANGE UP (ref 0.5–1.3)
EGFR: 96 ML/MIN/1.73M2 — SIGNIFICANT CHANGE UP
EGFR: 96 ML/MIN/1.73M2 — SIGNIFICANT CHANGE UP
GLUCOSE SERPL-MCNC: 143 MG/DL — HIGH (ref 70–99)
GLUCOSE SERPL-MCNC: 143 MG/DL — HIGH (ref 70–99)
HCT VFR BLD CALC: 34.4 % — LOW (ref 39–50)
HCT VFR BLD CALC: 34.4 % — LOW (ref 39–50)
HGB BLD-MCNC: 10.5 G/DL — LOW (ref 13–17)
HGB BLD-MCNC: 10.5 G/DL — LOW (ref 13–17)
MCHC RBC-ENTMCNC: 26.1 PG — LOW (ref 27–34)
MCHC RBC-ENTMCNC: 26.1 PG — LOW (ref 27–34)
MCHC RBC-ENTMCNC: 30.5 GM/DL — LOW (ref 32–36)
MCHC RBC-ENTMCNC: 30.5 GM/DL — LOW (ref 32–36)
MCV RBC AUTO: 85.6 FL — SIGNIFICANT CHANGE UP (ref 80–100)
MCV RBC AUTO: 85.6 FL — SIGNIFICANT CHANGE UP (ref 80–100)
NRBC # BLD: 0 /100 WBCS — SIGNIFICANT CHANGE UP (ref 0–0)
NRBC # BLD: 0 /100 WBCS — SIGNIFICANT CHANGE UP (ref 0–0)
PLATELET # BLD AUTO: 379 K/UL — SIGNIFICANT CHANGE UP (ref 150–400)
PLATELET # BLD AUTO: 379 K/UL — SIGNIFICANT CHANGE UP (ref 150–400)
POTASSIUM SERPL-MCNC: 4 MMOL/L — SIGNIFICANT CHANGE UP (ref 3.5–5.3)
POTASSIUM SERPL-MCNC: 4 MMOL/L — SIGNIFICANT CHANGE UP (ref 3.5–5.3)
POTASSIUM SERPL-SCNC: 4 MMOL/L — SIGNIFICANT CHANGE UP (ref 3.5–5.3)
POTASSIUM SERPL-SCNC: 4 MMOL/L — SIGNIFICANT CHANGE UP (ref 3.5–5.3)
RBC # BLD: 4.02 M/UL — LOW (ref 4.2–5.8)
RBC # BLD: 4.02 M/UL — LOW (ref 4.2–5.8)
RBC # FLD: 15.8 % — HIGH (ref 10.3–14.5)
RBC # FLD: 15.8 % — HIGH (ref 10.3–14.5)
SODIUM SERPL-SCNC: 136 MMOL/L — SIGNIFICANT CHANGE UP (ref 135–145)
SODIUM SERPL-SCNC: 136 MMOL/L — SIGNIFICANT CHANGE UP (ref 135–145)
WBC # BLD: 12.25 K/UL — HIGH (ref 3.8–10.5)
WBC # BLD: 12.25 K/UL — HIGH (ref 3.8–10.5)
WBC # FLD AUTO: 12.25 K/UL — HIGH (ref 3.8–10.5)
WBC # FLD AUTO: 12.25 K/UL — HIGH (ref 3.8–10.5)

## 2023-12-11 RX ORDER — DEXTROSE MONOHYDRATE, SODIUM CHLORIDE, AND POTASSIUM CHLORIDE 50; .745; 4.5 G/1000ML; G/1000ML; G/1000ML
1000 INJECTION, SOLUTION INTRAVENOUS
Refills: 0 | Status: DISCONTINUED | OUTPATIENT
Start: 2023-12-11 | End: 2023-12-12

## 2023-12-11 RX ORDER — INSULIN LISPRO 100/ML
VIAL (ML) SUBCUTANEOUS EVERY 6 HOURS
Refills: 0 | Status: DISCONTINUED | OUTPATIENT
Start: 2023-12-11 | End: 2023-12-12

## 2023-12-11 RX ORDER — DIPHENHYDRAMINE HCL 50 MG
25 CAPSULE ORAL ONCE
Refills: 0 | Status: COMPLETED | OUTPATIENT
Start: 2023-12-11 | End: 2023-12-11

## 2023-12-11 RX ORDER — INSULIN GLARGINE 100 [IU]/ML
25 INJECTION, SOLUTION SUBCUTANEOUS AT BEDTIME
Refills: 0 | Status: DISCONTINUED | OUTPATIENT
Start: 2023-12-11 | End: 2023-12-12

## 2023-12-11 RX ADMIN — GABAPENTIN 300 MILLIGRAM(S): 400 CAPSULE ORAL at 21:32

## 2023-12-11 RX ADMIN — GABAPENTIN 300 MILLIGRAM(S): 400 CAPSULE ORAL at 13:54

## 2023-12-11 RX ADMIN — TAMSULOSIN HYDROCHLORIDE 0.4 MILLIGRAM(S): 0.4 CAPSULE ORAL at 21:32

## 2023-12-11 RX ADMIN — ENOXAPARIN SODIUM 40 MILLIGRAM(S): 100 INJECTION SUBCUTANEOUS at 07:00

## 2023-12-11 RX ADMIN — SIMVASTATIN 40 MILLIGRAM(S): 20 TABLET, FILM COATED ORAL at 21:38

## 2023-12-11 RX ADMIN — Medication 14 UNIT(S): at 19:23

## 2023-12-11 RX ADMIN — AMPICILLIN SODIUM AND SULBACTAM SODIUM 200 GRAM(S): 250; 125 INJECTION, POWDER, FOR SUSPENSION INTRAMUSCULAR; INTRAVENOUS at 05:21

## 2023-12-11 RX ADMIN — Medication 10 MILLIGRAM(S): at 17:34

## 2023-12-11 RX ADMIN — Medication 16 UNIT(S): at 13:12

## 2023-12-11 RX ADMIN — Medication 10 MILLIGRAM(S): at 05:21

## 2023-12-11 RX ADMIN — Medication 100 MILLIGRAM(S): at 22:54

## 2023-12-11 RX ADMIN — AMPICILLIN SODIUM AND SULBACTAM SODIUM 200 GRAM(S): 250; 125 INJECTION, POWDER, FOR SUSPENSION INTRAMUSCULAR; INTRAVENOUS at 12:01

## 2023-12-11 RX ADMIN — AMPICILLIN SODIUM AND SULBACTAM SODIUM 200 GRAM(S): 250; 125 INJECTION, POWDER, FOR SUSPENSION INTRAMUSCULAR; INTRAVENOUS at 17:34

## 2023-12-11 RX ADMIN — INSULIN GLARGINE 25 UNIT(S): 100 INJECTION, SOLUTION SUBCUTANEOUS at 21:31

## 2023-12-11 RX ADMIN — Medication 2: at 13:12

## 2023-12-11 RX ADMIN — Medication 16 UNIT(S): at 09:47

## 2023-12-11 RX ADMIN — GABAPENTIN 300 MILLIGRAM(S): 400 CAPSULE ORAL at 05:20

## 2023-12-11 NOTE — PROGRESS NOTE ADULT - SUBJECTIVE AND OBJECTIVE BOX
SUBJECTIVE: Patient seen and examined on AM rounds. Patient reports that they're feeling well. Denies fever, chills. Reports pain as controlled. No complaints at this time.     Vital Signs Last 24 Hrs  T(C): 36.7 (11 Dec 2023 05:19), Max: 37.3 (10 Dec 2023 21:34)  T(F): 98.1 (11 Dec 2023 05:19), Max: 99.1 (10 Dec 2023 21:34)  HR: 90 (11 Dec 2023 05:19) (89 - 96)  BP: 158/69 (11 Dec 2023 05:19) (133/77 - 158/69)  BP(mean): --  RR: 18 (11 Dec 2023 05:19) (18 - 18)  SpO2: 93% (11 Dec 2023 05:19) (93% - 93%)    Parameters below as of 11 Dec 2023 05:19  Patient On (Oxygen Delivery Method): room air        General Appearance: Appears well, NAD  Neck: Supple  Chest: Equal expansion bilaterally  CV: Pulse regular presently  Abdomen: Soft, nontense  Extremities: L BKA dressing changed, wound appears stable and healthy    I&O's Summary    10 Dec 2023 07:01  -  11 Dec 2023 07:00  --------------------------------------------------------  IN: 1220 mL / OUT: 0 mL / NET: 1220 mL      I&O's Detail    10 Dec 2023 07:01  -  11 Dec 2023 07:00  --------------------------------------------------------  IN:    IV PiggyBack: 200 mL    Oral Fluid: 1020 mL  Total IN: 1220 mL    OUT:  Total OUT: 0 mL    Total NET: 1220 mL          LABS:                        10.5   12.25 )-----------( 379      ( 11 Dec 2023 08:03 )             34.4     12-10    138  |  102  |  15  ----------------------------<  156<H>  4.5   |  28  |  0.93    Ca    9.1      10 Dec 2023 07:27  Phos  3.4     12-10  Mg     1.9     12-10        Urinalysis Basic - ( 10 Dec 2023 07:27 )    Color: x / Appearance: x / SG: x / pH: x  Gluc: 156 mg/dL / Ketone: x  / Bili: x / Urobili: x   Blood: x / Protein: x / Nitrite: x   Leuk Esterase: x / RBC: x / WBC x   Sq Epi: x / Non Sq Epi: x / Bacteria: x        RADIOLOGY & ADDITIONAL STUDIES:

## 2023-12-11 NOTE — PRE PROCEDURE NOTE - PRE PROCEDURE EVALUATION
Preop Dx: S/p R Soledad IZQUIERDO  Surgeon: Jermaine  Procedure: R BKA formalization    Vital Signs Last 24 Hrs  T(C): 36.8 (03 Dec 2023 10:03), Max: 37.1 (02 Dec 2023 21:16)  T(F): 98.3 (03 Dec 2023 10:03), Max: 98.7 (02 Dec 2023 21:16)  HR: 97 (03 Dec 2023 10:03) (90 - 97)  BP: 132/75 (03 Dec 2023 10:03) (132/75 - 173/84)  BP(mean): --  RR: 18 (03 Dec 2023 10:03) (18 - 18)  SpO2: 95% (03 Dec 2023 10:03) (95% - 97%)    Parameters below as of 03 Dec 2023 10:03  Patient On (Oxygen Delivery Method): room air                            11.1   12.09 )-----------( 469      ( 03 Dec 2023 07:20 )             36.1     12-03    136  |  99  |  13  ----------------------------<  245<H>  4.4   |  27  |  0.85    Ca    9.3      03 Dec 2023 07:16  Phos  3.0     12-02  Mg     1.9     12-02        Daily     Daily     EKG: X  CXR: X  Type and Screen: X        A/P: 63y Male     - OR 12/4/23 for SIENA IZQUIERDO formalization with Dr. Dean  - NPO past midnight, except medications  - IVF while NPO  - Consent signed and in chart  - Medical clearance for OR
Preop Dx: s/p guillotine left bka  Surgeon: Jermaine  Procedure: Left below knee amputation formalization    Vital Signs Last 24 Hrs  T(C): 36.7 (11 Dec 2023 05:19), Max: 37.3 (10 Dec 2023 21:34)  T(F): 98.1 (11 Dec 2023 05:19), Max: 99.1 (10 Dec 2023 21:34)  HR: 90 (11 Dec 2023 05:19) (89 - 96)  BP: 158/69 (11 Dec 2023 05:19) (133/77 - 158/69)  BP(mean): --  RR: 18 (11 Dec 2023 05:19) (18 - 18)  SpO2: 93% (11 Dec 2023 05:19) (93% - 93%)    Parameters below as of 11 Dec 2023 05:19  Patient On (Oxygen Delivery Method): room air                            10.5   12.25 )-----------( 379      ( 11 Dec 2023 08:03 )             34.4     12-11    136  |  99  |  13  ----------------------------<  143<H>  4.0   |  29  |  0.90    Ca    9.1      11 Dec 2023 08:06  Phos  3.4     12-10  Mg     1.9     12-10        Daily     Daily     EKG:  CXR:   Type and Screen:         A/P: 63y Male     - OR 12/12/23 for left below knee amputation formalization with Dr. Dean  - NPO past midnight, except medications  - IVF while NPO  - Consent signed and in chart  - Medical clearance for OR

## 2023-12-11 NOTE — PROGRESS NOTE ADULT - ASSESSMENT
63M PMHx DM, HLD, PAD, prior toe amputation, presents to [facility] ED w L foot wound. NSTI of non salvageable L foot. Septic in ED. LRINEC 9. XR/CT both w soft tissue gas. s/p 11/30 L BKA guillotine amp, downgraded from SICU and recovering appropriately on the floor. now S/P below the knee amputation revision on 12/4.     Plan formalization 12/12.    - Pain control as needed  - ID on board: on Unasyn   - Lovenox for DVT ppx  - Activity as tolerated  - Endo consulted, appreciate recs- will continue to follow   - BP control  - Bowel regimen  - Daily dressing changes  - PM&R recommends AR on discharge       Vascular Surgery  [id number] 63M PMHx DM, HLD, PAD, prior toe amputation, presents to [facility] ED w L foot wound. NSTI of non salvageable L foot. Septic in ED. LRINEC 9. XR/CT both w soft tissue gas. s/p 11/30 L BKA guillotine amp, downgraded from SICU and recovering appropriately on the floor. now S/P below the knee amputation revision on 12/4.     Plan formalization 12/12.    - Pain control as needed  - ID on board: on Unasyn   - Lovenox for DVT ppx  - Activity as tolerated  - Endo consulted, appreciate recs- will continue to follow   - BP control  - Bowel regimen  - Daily dressing changes  - PM&R recommends AR on discharge       Vascular Surgery  [id number]

## 2023-12-11 NOTE — CHART NOTE - NSCHARTNOTEFT_GEN_A_CORE
Endocrine follow up     63M w/h/o uncontrolled  DM2 (A1C 11.4%)  > on DM  oral meds PTA. DM c/b Neuropathy, PAD> s/p toe amputation, retinopathy. ? nephropathy per EMR. Also h/o HLD, HTN. Here with severe hyperglycemia and L foot wound and found to have soft tissue gas at lateral hindfoot. 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 Also required insulin drip at time of admission. Endocrine consulted for hyperglycemia. Patient is high risk with high level decision making due to uncontrolled diabetes with A1C>10 which places patient at high risk for cardiovascular and cerebrovascular events. Patient with lability of glucose requiring close monitoring and insulin adjustments.     Chart reviewed today. Fasting BG close to goal, no hypoglycemia event over night.   Premeal insulin doses adjusted yesterday.     POCT Blood Glucose:  193 mg/dL (12-11-23 @ 13:11)  148 mg/dL (12-11-23 @ 09:27)  136 mg/dL (12-11-23 @ 01:59)  164 mg/dL (12-10-23 @ 21:12)  238 mg/dL (12-10-23 @ 18:40)      eMAR:  insulin glargine Injectable (LANTUS)   30 Unit(s) SubCutaneous (12-10-23 @ 21:32)    insulin lispro (ADMELOG) corrective regimen sliding scale   2 Unit(s) SubCutaneous (12-11-23 @ 13:12)   4 Unit(s) SubCutaneous (12-10-23 @ 18:57)    insulin lispro Injectable (ADMELOG)   16 Unit(s) SubCutaneous (12-11-23 @ 09:47)    insulin lispro Injectable (ADMELOG)   16 Unit(s) SubCutaneous (12-11-23 @ 13:12)    insulin lispro Injectable (ADMELOG)   14 Unit(s) SubCutaneous (12-10-23 @ 18:57)    simvastatin   40 milliGRAM(s) Oral (12-10-23 @ 21:32)      # Uncontrolled type 2 diabetes mellitus with hyperglycemia.   Recommendations:   - Test BG ac and hs. Q6H for NPO status  - As patient will be NPO tonight after midnight, decrease to Lantus 25 units sc qhs   - Continue Admelog 16 -16-14 units ac meals for now and will continue to monitor/titrate as needed. HOLD IF NOT EATING/while NPO  - Continue with moderate dose correctional insulin scale ac and changing to moderate dose correctional insulin scale for bedtime -- when NPO, change to moderate dose correctional insulin scale [id number]   - Goal -180  - Hypoglycemia protocol   - Carb Consistent Diet     recommendations discussed with primary team     Leticia Denny DO   Attending Physician   Department of Endocrinology, Diabetes and Metabolism     If before 9AM or after 5PM, or on weekends/holidays, please call the Endocrine answering service for assistance ([phone]).  For nonurgent matters, please email [email or web address] or [email or web address]     Please note that this patient may be followed by different provider tomorrow.  Notify endocrine 24 hours prior to discharge for final recommendations Endocrine follow up     63M w/h/o uncontrolled  DM2 (A1C 11.4%)  > on DM  oral meds PTA. DM c/b Neuropathy, PAD> s/p toe amputation, retinopathy. ? nephropathy per EMR. Also h/o HLD, HTN. Here with severe hyperglycemia and L foot wound and found to have soft tissue gas at lateral hindfoot. 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 Also required insulin drip at time of admission. Endocrine consulted for hyperglycemia. Patient is high risk with high level decision making due to uncontrolled diabetes with A1C>10 which places patient at high risk for cardiovascular and cerebrovascular events. Patient with lability of glucose requiring close monitoring and insulin adjustments.     Chart reviewed today. Fasting BG close to goal, no hypoglycemia event over night.   Premeal insulin doses adjusted yesterday.     POCT Blood Glucose:  193 mg/dL (12-11-23 @ 13:11)  148 mg/dL (12-11-23 @ 09:27)  136 mg/dL (12-11-23 @ 01:59)  164 mg/dL (12-10-23 @ 21:12)  238 mg/dL (12-10-23 @ 18:40)      eMAR:  insulin glargine Injectable (LANTUS)   30 Unit(s) SubCutaneous (12-10-23 @ 21:32)    insulin lispro (ADMELOG) corrective regimen sliding scale   2 Unit(s) SubCutaneous (12-11-23 @ 13:12)   4 Unit(s) SubCutaneous (12-10-23 @ 18:57)    insulin lispro Injectable (ADMELOG)   16 Unit(s) SubCutaneous (12-11-23 @ 09:47)    insulin lispro Injectable (ADMELOG)   16 Unit(s) SubCutaneous (12-11-23 @ 13:12)    insulin lispro Injectable (ADMELOG)   14 Unit(s) SubCutaneous (12-10-23 @ 18:57)    simvastatin   40 milliGRAM(s) Oral (12-10-23 @ 21:32)      # Uncontrolled type 2 diabetes mellitus with hyperglycemia.   Recommendations:   - Test BG ac and hs. Q6H for NPO status  - As patient will be NPO tonight after midnight, decrease to Lantus 25 units sc qhs   - Continue Admelog 16 -16-14 units ac meals for now and will continue to monitor/titrate as needed. HOLD IF NOT EATING/while NPO  - Continue with moderate dose correctional insulin scale ac and changing to moderate dose correctional insulin scale for bedtime -- when NPO, change to moderate dose correctional insulin scale [id number]   - Goal -180  - Hypoglycemia protocol   - Carb Consistent Diet     recommendations discussed with primary team     Leticia Denny DO   Attending Physician   Department of Endocrinology, Diabetes and Metabolism     If before 9AM or after 5PM, or on weekends/holidays, please call the Endocrine answering service for assistance ([phone]).  For nonurgent matters, please email [email or web address] or [email or web address]     Please note that this patient may be followed by different provider tomorrow.  Notify endocrine 24 hours prior to discharge for final recommendations

## 2023-12-12 ENCOUNTER — TRANSCRIPTION ENCOUNTER (OUTPATIENT)
Age: 63
End: 2023-12-12

## 2023-12-12 ENCOUNTER — RESULT REVIEW (OUTPATIENT)
Age: 63
End: 2023-12-12

## 2023-12-12 LAB
ANION GAP SERPL CALC-SCNC: 10 MMOL/L — SIGNIFICANT CHANGE UP (ref 5–17)
ANION GAP SERPL CALC-SCNC: 10 MMOL/L — SIGNIFICANT CHANGE UP (ref 5–17)
APTT BLD: 28.9 SEC — SIGNIFICANT CHANGE UP (ref 24.5–35.6)
APTT BLD: 28.9 SEC — SIGNIFICANT CHANGE UP (ref 24.5–35.6)
BLD GP AB SCN SERPL QL: NEGATIVE — SIGNIFICANT CHANGE UP
BLD GP AB SCN SERPL QL: NEGATIVE — SIGNIFICANT CHANGE UP
BUN SERPL-MCNC: 12 MG/DL — SIGNIFICANT CHANGE UP (ref 7–23)
BUN SERPL-MCNC: 12 MG/DL — SIGNIFICANT CHANGE UP (ref 7–23)
CALCIUM SERPL-MCNC: 9.2 MG/DL — SIGNIFICANT CHANGE UP (ref 8.4–10.5)
CALCIUM SERPL-MCNC: 9.2 MG/DL — SIGNIFICANT CHANGE UP (ref 8.4–10.5)
CHLORIDE SERPL-SCNC: 99 MMOL/L — SIGNIFICANT CHANGE UP (ref 96–108)
CHLORIDE SERPL-SCNC: 99 MMOL/L — SIGNIFICANT CHANGE UP (ref 96–108)
CO2 SERPL-SCNC: 28 MMOL/L — SIGNIFICANT CHANGE UP (ref 22–31)
CO2 SERPL-SCNC: 28 MMOL/L — SIGNIFICANT CHANGE UP (ref 22–31)
CREAT SERPL-MCNC: 0.87 MG/DL — SIGNIFICANT CHANGE UP (ref 0.5–1.3)
CREAT SERPL-MCNC: 0.87 MG/DL — SIGNIFICANT CHANGE UP (ref 0.5–1.3)
EGFR: 97 ML/MIN/1.73M2 — SIGNIFICANT CHANGE UP
EGFR: 97 ML/MIN/1.73M2 — SIGNIFICANT CHANGE UP
GLUCOSE SERPL-MCNC: 163 MG/DL — HIGH (ref 70–99)
GLUCOSE SERPL-MCNC: 163 MG/DL — HIGH (ref 70–99)
HCT VFR BLD CALC: 37 % — LOW (ref 39–50)
HCT VFR BLD CALC: 37 % — LOW (ref 39–50)
HGB BLD-MCNC: 11.2 G/DL — LOW (ref 13–17)
HGB BLD-MCNC: 11.2 G/DL — LOW (ref 13–17)
INR BLD: 1.13 RATIO — SIGNIFICANT CHANGE UP (ref 0.85–1.18)
INR BLD: 1.13 RATIO — SIGNIFICANT CHANGE UP (ref 0.85–1.18)
MAGNESIUM SERPL-MCNC: 1.8 MG/DL — SIGNIFICANT CHANGE UP (ref 1.6–2.6)
MAGNESIUM SERPL-MCNC: 1.8 MG/DL — SIGNIFICANT CHANGE UP (ref 1.6–2.6)
MCHC RBC-ENTMCNC: 26.2 PG — LOW (ref 27–34)
MCHC RBC-ENTMCNC: 26.2 PG — LOW (ref 27–34)
MCHC RBC-ENTMCNC: 30.3 GM/DL — LOW (ref 32–36)
MCHC RBC-ENTMCNC: 30.3 GM/DL — LOW (ref 32–36)
MCV RBC AUTO: 86.7 FL — SIGNIFICANT CHANGE UP (ref 80–100)
MCV RBC AUTO: 86.7 FL — SIGNIFICANT CHANGE UP (ref 80–100)
NRBC # BLD: 0 /100 WBCS — SIGNIFICANT CHANGE UP (ref 0–0)
NRBC # BLD: 0 /100 WBCS — SIGNIFICANT CHANGE UP (ref 0–0)
PHOSPHATE SERPL-MCNC: 3.2 MG/DL — SIGNIFICANT CHANGE UP (ref 2.5–4.5)
PHOSPHATE SERPL-MCNC: 3.2 MG/DL — SIGNIFICANT CHANGE UP (ref 2.5–4.5)
PLATELET # BLD AUTO: 402 K/UL — HIGH (ref 150–400)
PLATELET # BLD AUTO: 402 K/UL — HIGH (ref 150–400)
POTASSIUM SERPL-MCNC: 3.9 MMOL/L — SIGNIFICANT CHANGE UP (ref 3.5–5.3)
POTASSIUM SERPL-MCNC: 3.9 MMOL/L — SIGNIFICANT CHANGE UP (ref 3.5–5.3)
POTASSIUM SERPL-SCNC: 3.9 MMOL/L — SIGNIFICANT CHANGE UP (ref 3.5–5.3)
POTASSIUM SERPL-SCNC: 3.9 MMOL/L — SIGNIFICANT CHANGE UP (ref 3.5–5.3)
PROTHROM AB SERPL-ACNC: 11.8 SEC — SIGNIFICANT CHANGE UP (ref 9.5–13)
PROTHROM AB SERPL-ACNC: 11.8 SEC — SIGNIFICANT CHANGE UP (ref 9.5–13)
RBC # BLD: 4.27 M/UL — SIGNIFICANT CHANGE UP (ref 4.2–5.8)
RBC # BLD: 4.27 M/UL — SIGNIFICANT CHANGE UP (ref 4.2–5.8)
RBC # FLD: 15.9 % — HIGH (ref 10.3–14.5)
RBC # FLD: 15.9 % — HIGH (ref 10.3–14.5)
RH IG SCN BLD-IMP: POSITIVE — SIGNIFICANT CHANGE UP
RH IG SCN BLD-IMP: POSITIVE — SIGNIFICANT CHANGE UP
SODIUM SERPL-SCNC: 137 MMOL/L — SIGNIFICANT CHANGE UP (ref 135–145)
SODIUM SERPL-SCNC: 137 MMOL/L — SIGNIFICANT CHANGE UP (ref 135–145)
WBC # BLD: 11.87 K/UL — HIGH (ref 3.8–10.5)
WBC # BLD: 11.87 K/UL — HIGH (ref 3.8–10.5)
WBC # FLD AUTO: 11.87 K/UL — HIGH (ref 3.8–10.5)
WBC # FLD AUTO: 11.87 K/UL — HIGH (ref 3.8–10.5)

## 2023-12-12 PROCEDURE — 88307 TISSUE EXAM BY PATHOLOGIST: CPT | Mod: 26

## 2023-12-12 PROCEDURE — 27886 AMPUTATION FOLLOW-UP SURGERY: CPT | Mod: 58

## 2023-12-12 RX ORDER — GABAPENTIN 400 MG/1
300 CAPSULE ORAL THREE TIMES A DAY
Refills: 0 | Status: DISCONTINUED | OUTPATIENT
Start: 2023-12-12 | End: 2023-12-16

## 2023-12-12 RX ORDER — INSULIN LISPRO 100/ML
16 VIAL (ML) SUBCUTANEOUS
Refills: 0 | Status: DISCONTINUED | OUTPATIENT
Start: 2023-12-12 | End: 2023-12-16

## 2023-12-12 RX ORDER — SENNA PLUS 8.6 MG/1
2 TABLET ORAL AT BEDTIME
Refills: 0 | Status: DISCONTINUED | OUTPATIENT
Start: 2023-12-12 | End: 2023-12-16

## 2023-12-12 RX ORDER — INSULIN GLARGINE 100 [IU]/ML
30 INJECTION, SOLUTION SUBCUTANEOUS AT BEDTIME
Refills: 0 | Status: DISCONTINUED | OUTPATIENT
Start: 2023-12-12 | End: 2023-12-14

## 2023-12-12 RX ORDER — ENOXAPARIN SODIUM 100 MG/ML
40 INJECTION SUBCUTANEOUS EVERY 24 HOURS
Refills: 0 | Status: DISCONTINUED | OUTPATIENT
Start: 2023-12-12 | End: 2023-12-16

## 2023-12-12 RX ORDER — TAMSULOSIN HYDROCHLORIDE 0.4 MG/1
0.4 CAPSULE ORAL AT BEDTIME
Refills: 0 | Status: DISCONTINUED | OUTPATIENT
Start: 2023-12-12 | End: 2023-12-16

## 2023-12-12 RX ORDER — HYDROMORPHONE HYDROCHLORIDE 2 MG/ML
0.5 INJECTION INTRAMUSCULAR; INTRAVENOUS; SUBCUTANEOUS
Refills: 0 | Status: DISCONTINUED | OUTPATIENT
Start: 2023-12-12 | End: 2023-12-12

## 2023-12-12 RX ORDER — OXYCODONE HYDROCHLORIDE 5 MG/1
10 TABLET ORAL EVERY 6 HOURS
Refills: 0 | Status: DISCONTINUED | OUTPATIENT
Start: 2023-12-12 | End: 2023-12-16

## 2023-12-12 RX ORDER — ONDANSETRON 8 MG/1
4 TABLET, FILM COATED ORAL ONCE
Refills: 0 | Status: DISCONTINUED | OUTPATIENT
Start: 2023-12-12 | End: 2023-12-12

## 2023-12-12 RX ORDER — INSULIN LISPRO 100/ML
14 VIAL (ML) SUBCUTANEOUS
Refills: 0 | Status: DISCONTINUED | OUTPATIENT
Start: 2023-12-12 | End: 2023-12-15

## 2023-12-12 RX ORDER — INSULIN LISPRO 100/ML
VIAL (ML) SUBCUTANEOUS
Refills: 0 | Status: DISCONTINUED | OUTPATIENT
Start: 2023-12-12 | End: 2023-12-16

## 2023-12-12 RX ORDER — SIMVASTATIN 20 MG/1
40 TABLET, FILM COATED ORAL AT BEDTIME
Refills: 0 | Status: DISCONTINUED | OUTPATIENT
Start: 2023-12-12 | End: 2023-12-16

## 2023-12-12 RX ORDER — INSULIN LISPRO 100/ML
VIAL (ML) SUBCUTANEOUS AT BEDTIME
Refills: 0 | Status: DISCONTINUED | OUTPATIENT
Start: 2023-12-12 | End: 2023-12-16

## 2023-12-12 RX ORDER — POLYETHYLENE GLYCOL 3350 17 G/17G
17 POWDER, FOR SOLUTION ORAL EVERY 12 HOURS
Refills: 0 | Status: DISCONTINUED | OUTPATIENT
Start: 2023-12-12 | End: 2023-12-16

## 2023-12-12 RX ORDER — SODIUM CHLORIDE 9 MG/ML
1000 INJECTION, SOLUTION INTRAVENOUS
Refills: 0 | Status: DISCONTINUED | OUTPATIENT
Start: 2023-12-12 | End: 2023-12-12

## 2023-12-12 RX ORDER — OXYCODONE HYDROCHLORIDE 5 MG/1
5 TABLET ORAL EVERY 6 HOURS
Refills: 0 | Status: DISCONTINUED | OUTPATIENT
Start: 2023-12-12 | End: 2023-12-16

## 2023-12-12 RX ADMIN — AMPICILLIN SODIUM AND SULBACTAM SODIUM 200 GRAM(S): 250; 125 INJECTION, POWDER, FOR SUSPENSION INTRAMUSCULAR; INTRAVENOUS at 00:24

## 2023-12-12 RX ADMIN — Medication 4: at 16:23

## 2023-12-12 RX ADMIN — OXYCODONE HYDROCHLORIDE 10 MILLIGRAM(S): 5 TABLET ORAL at 20:56

## 2023-12-12 RX ADMIN — ENOXAPARIN SODIUM 40 MILLIGRAM(S): 100 INJECTION SUBCUTANEOUS at 06:23

## 2023-12-12 RX ADMIN — HYDROMORPHONE HYDROCHLORIDE 0.5 MILLIGRAM(S): 2 INJECTION INTRAMUSCULAR; INTRAVENOUS; SUBCUTANEOUS at 16:59

## 2023-12-12 RX ADMIN — Medication 2: at 06:19

## 2023-12-12 RX ADMIN — AMPICILLIN SODIUM AND SULBACTAM SODIUM 200 GRAM(S): 250; 125 INJECTION, POWDER, FOR SUSPENSION INTRAMUSCULAR; INTRAVENOUS at 11:24

## 2023-12-12 RX ADMIN — Medication 10 MILLIGRAM(S): at 06:18

## 2023-12-12 RX ADMIN — HYDROMORPHONE HYDROCHLORIDE 0.5 MILLIGRAM(S): 2 INJECTION INTRAMUSCULAR; INTRAVENOUS; SUBCUTANEOUS at 16:23

## 2023-12-12 RX ADMIN — Medication 16 UNIT(S): at 18:43

## 2023-12-12 RX ADMIN — Medication 25 MILLIGRAM(S): at 00:23

## 2023-12-12 RX ADMIN — Medication 10 MILLIGRAM(S): at 18:43

## 2023-12-12 RX ADMIN — HYDROMORPHONE HYDROCHLORIDE 0.5 MILLIGRAM(S): 2 INJECTION INTRAMUSCULAR; INTRAVENOUS; SUBCUTANEOUS at 16:18

## 2023-12-12 RX ADMIN — Medication 2: at 12:10

## 2023-12-12 RX ADMIN — HYDROMORPHONE HYDROCHLORIDE 0.5 MILLIGRAM(S): 2 INJECTION INTRAMUSCULAR; INTRAVENOUS; SUBCUTANEOUS at 16:44

## 2023-12-12 RX ADMIN — OXYCODONE HYDROCHLORIDE 10 MILLIGRAM(S): 5 TABLET ORAL at 21:30

## 2023-12-12 RX ADMIN — SIMVASTATIN 40 MILLIGRAM(S): 20 TABLET, FILM COATED ORAL at 22:32

## 2023-12-12 RX ADMIN — AMPICILLIN SODIUM AND SULBACTAM SODIUM 200 GRAM(S): 250; 125 INJECTION, POWDER, FOR SUSPENSION INTRAMUSCULAR; INTRAVENOUS at 06:18

## 2023-12-12 RX ADMIN — HYDROMORPHONE HYDROCHLORIDE 0.5 MILLIGRAM(S): 2 INJECTION INTRAMUSCULAR; INTRAVENOUS; SUBCUTANEOUS at 16:03

## 2023-12-12 RX ADMIN — DEXTROSE MONOHYDRATE, SODIUM CHLORIDE, AND POTASSIUM CHLORIDE 100 MILLILITER(S): 50; .745; 4.5 INJECTION, SOLUTION INTRAVENOUS at 00:24

## 2023-12-12 RX ADMIN — HYDROMORPHONE HYDROCHLORIDE 0.5 MILLIGRAM(S): 2 INJECTION INTRAMUSCULAR; INTRAVENOUS; SUBCUTANEOUS at 16:38

## 2023-12-12 RX ADMIN — GABAPENTIN 300 MILLIGRAM(S): 400 CAPSULE ORAL at 22:38

## 2023-12-12 RX ADMIN — INSULIN GLARGINE 30 UNIT(S): 100 INJECTION, SOLUTION SUBCUTANEOUS at 22:33

## 2023-12-12 RX ADMIN — GABAPENTIN 300 MILLIGRAM(S): 400 CAPSULE ORAL at 06:18

## 2023-12-12 NOTE — PROGRESS NOTE ADULT - ASSESSMENT
63M PMHx DM, HLD, PAD, prior toe amputation, presents to [facility] ED w L foot wound. NSTI of non salvageable L foot. Septic in ED. LRINEC 9. XR/CT both w soft tissue gas. s/p 11/30 L BKA guillotine amp, downgraded from SICU and recovering appropriately on the floor. now S/P below the knee amputation revision on 12/4. Plan for formalization today.     - Pain control as needed  - ID on board: on Unasyn   - Lovenox for DVT ppx  - Activity as tolerated  - Endo consulted, appreciate recs- will continue to follow   - BP control  - Bowel regimen  - Daily dressing changes  - PM&R recommends AR on discharge       Vascular Surgery  [id number]       63M PMHx DM, HLD, PAD, prior toe amputation, presents to [facility] ED w L foot wound. NSTI of non salvageable L foot. Septic in ED. LRINEC 9. XR/CT both w soft tissue gas. s/p 11/30 L BKA guillotine amp, downgraded from SICU and recovering appropriately on the floor. now S/P below the knee amputation revision on 12/4. Plan for formalization today.     - Pain control as needed  - ID on board: on Unasyn   - Lovenox for DVT ppx  - Activity as tolerated  - Endo consulted, appreciate recs- will continue to follow   - BP control  - Bowel regimen  - Daily dressing changes  - PM&R recommends AR on discharge       Vascular Surgery  [id number]

## 2023-12-12 NOTE — DISCHARGE NOTE PROVIDER - NSDCFUADDAPPT_GEN_ALL_CORE_FT
Please call for a follow up appointment with your primary care medical doctor upon discharge regarding your recent surgery and hospitalization.

## 2023-12-12 NOTE — BRIEF OPERATIVE NOTE - NSICDXBRIEFPROCEDURE_GEN_ALL_CORE_FT
PROCEDURES:  Below the knee amputation, left 30-Nov-2023 01:52:42 formalization Rodo Page  
PROCEDURES:  Below the knee amputation, left 30-Nov-2023 01:52:42  Rodo Page  
PROCEDURES:  Below the knee amputation, left 30-Nov-2023 01:52:42  Rodo Page

## 2023-12-12 NOTE — PACU DISCHARGE NOTE - NSPTMEETSDISCHCRITERIADT_GEN_A_CORE
12-Dec-2023 17:47
04-Dec-2023 21:18
Airway patent, Nasal mucosa clear. Mouth with normal mucosa. Throat has no vesicles, no oropharyngeal exudates and uvula is midline.

## 2023-12-12 NOTE — PROGRESS NOTE ADULT - SUBJECTIVE AND OBJECTIVE BOX
VASCULAR SURGERY DAILY PROGRESS NOTE:     SUBJECTIVE/ROS: Patient seen and examined. Pain is controlled. No new events/complaints.      MEDICATIONS  (STANDING):  ampicillin/sulbactam  IVPB 3 Gram(s) IV Intermittent every 6 hours  dextrose 5% + sodium chloride 0.45% with potassium chloride 20 mEq/L 1000 milliLiter(s) (100 mL/Hr) IV Continuous <Continuous>  enalapril 10 milliGRAM(s) Oral every 12 hours  enoxaparin Injectable 40 milliGRAM(s) SubCutaneous every 24 hours  gabapentin 300 milliGRAM(s) Oral three times a day  insulin glargine Injectable (LANTUS) 25 Unit(s) SubCutaneous at bedtime  insulin lispro (ADMELOG) corrective regimen sliding scale   SubCutaneous every 6 hours  insulin lispro Injectable (ADMELOG) 16 Unit(s) SubCutaneous before breakfast  insulin lispro Injectable (ADMELOG) 16 Unit(s) SubCutaneous before lunch  insulin lispro Injectable (ADMELOG) 14 Unit(s) SubCutaneous before dinner  polyethylene glycol 3350 17 Gram(s) Oral every 12 hours  senna 2 Tablet(s) Oral at bedtime  simvastatin 40 milliGRAM(s) Oral at bedtime  tamsulosin 0.4 milliGRAM(s) Oral at bedtime    MEDICATIONS  (PRN):  guaiFENesin Oral Liquid (Sugar-Free) 100 milliGRAM(s) Oral every 6 hours PRN Cough      OBJECTIVE:    Vital Signs Last 24 Hrs  T(C): 37.1 (12 Dec 2023 05:30), Max: 37.1 (12 Dec 2023 05:30)  T(F): 98.8 (12 Dec 2023 05:30), Max: 98.8 (12 Dec 2023 05:30)  HR: 97 (12 Dec 2023 05:30) (81 - 98)  BP: 167/75 (12 Dec 2023 05:30) (130/79 - 167/75)  BP(mean): --  RR: 18 (12 Dec 2023 05:30) (18 - 18)  SpO2: 94% (12 Dec 2023 05:30) (94% - 96%)    Parameters below as of 12 Dec 2023 05:30  Patient On (Oxygen Delivery Method): room air            I&O's Detail    11 Dec 2023 07:01  -  12 Dec 2023 07:00  --------------------------------------------------------  IN:    dextrose 5% + sodium chloride 0.45% w/ Additives: 700 mL    Oral Fluid: 700 mL  Total IN: 1400 mL    OUT:  Total OUT: 0 mL    Total NET: 1400 mL          Daily     Daily     LABS:                        11.2   11.87 )-----------( 402      ( 12 Dec 2023 07:27 )             37.0     12-12    137  |  99  |  12  ----------------------------<  163<H>  3.9   |  28  |  0.87    Ca    9.2      12 Dec 2023 07:28  Phos  3.2     12-12  Mg     1.8     12-12      PT/INR - ( 12 Dec 2023 07:27 )   PT: 11.8 sec;   INR: 1.13 ratio         PTT - ( 12 Dec 2023 07:27 )  PTT:28.9 sec  Urinalysis Basic - ( 12 Dec 2023 07:28 )    Color: x / Appearance: x / SG: x / pH: x  Gluc: 163 mg/dL / Ketone: x  / Bili: x / Urobili: x   Blood: x / Protein: x / Nitrite: x   Leuk Esterase: x / RBC: x / WBC x   Sq Epi: x / Non Sq Epi: x / Bacteria: x                PHYSICAL EXAM:  General Appearance: Appears well, NAD  Neck: Supple  Chest: Equal expansion bilaterally  CV: Pulse regular presently  Abdomen: Soft, nontense  Extremities: L BKA dressing changed, wound appears stable and healthy

## 2023-12-12 NOTE — DISCHARGE NOTE PROVIDER - NSDCFUSCHEDAPPT_GEN_ALL_CORE_FT
Hector Lopez  [facility] PreAdmits  Scheduled Appointment: 12/16/2023     Hector Lopez  [facility] PreAdmits  Scheduled Appointment: 12/16/2023

## 2023-12-12 NOTE — CHART NOTE - NSCHARTNOTEFT_GEN_A_CORE
Post Operative Note  Patient: ELVIS VILLATORO 63y (1960) Male   MRN: [id number]  Location: [facility] 9MON 914 W1  Visit: 11-29-23 Inpatient  Date: 12-12-23 @ 21:35    Procedure: S/P Left below the knee amputation formalization on 12/12.     Subjective: Patient seen and examined post operatively. Reports pain as controlled. Denies nausea, vomiting, fever, chills, chest pain, SOB, cough.      Objective:  Vitals: T(F): 98.6 (12-12-23 @ 20:03), Max: 100.2 (12-12-23 @ 12:22)  HR: 92 (12-12-23 @ 20:03)  BP: 143/75 (12-12-23 @ 20:03) (124/60 - 167/75)  RR: 18 (12-12-23 @ 20:03)  SpO2: 93% (12-12-23 @ 20:03)    Physical Examination:  General: NAD, resting comfortably in bed  HEENT: Normocephalic atraumatic  Respiratory: Nonlabored respirations, normal CW expansion.  Cardio: pulse present  Abdomen: soft/nontender   Extrem: LLE s/p BKA, dressing c/d/i, knee brace     Imaging:  No post-op imaging studies    Assessment:  63yMale patient S/P Left below the knee amputation formalization on 12/12. He is recovering well.     Plan:  - Pain control PRN oxycodone  - Diet: regular  - IVF  - DVT ppx: SCD's & lovenox    Date/Time: 12-12-23 @ 21:35 Post Operative Note  Patient: ELVIS VILLATORO 63y (1960) Male   MRN: [id number]  Location: [facility] 9MON 914 W1  Visit: 11-29-23 Inpatient  Date: 12-12-23 @ 21:35    Procedure: S/P Left below the knee amputation formalization on 12/12.     Subjective: Patient seen and examined post operatively. Reports pain as controlled. Denies nausea, vomiting, fever, chills, chest pain, SOB, cough.      Objective:  Vitals: T(F): 98.6 (12-12-23 @ 20:03), Max: 100.2 (12-12-23 @ 12:22)  HR: 92 (12-12-23 @ 20:03)  BP: 143/75 (12-12-23 @ 20:03) (124/60 - 167/75)  RR: 18 (12-12-23 @ 20:03)  SpO2: 93% (12-12-23 @ 20:03)    Physical Examination:  General: NAD, resting comfortably in bed  HEENT: Normocephalic atraumatic  Respiratory: Nonlabored respirations, normal CW expansion.  Cardio: pulse present  Abdomen: soft/nontender   Extrem: LLE s/p BKA, dressing c/d/i, knee brace     Imaging:  No post-op imaging studies    Assessment:  63yMale patient S/P Left below the knee amputation formalization on 12/12. He is recovering well.     Plan:  - Pain control PRN oxycodone  - Diet: regular  - IVF  - DVT ppx: SCD's & lovenox    Date/Time: 12-12-23 @ 21:35

## 2023-12-12 NOTE — DISCHARGE NOTE PROVIDER - NSFOLLOWUPCLINICS_GEN_ALL_ED_FT
[facility] Endocrinology  Endocrinology  [address] [place] [address]  Phone: [phone]  Fax:   Follow Up Time: 2 weeks     [facility] Endocrinology  Endocrinology  [address] [place] [address]  Phone: [phone]  Fax:   Follow Up Time: 2 weeks     [facility] Endocrinology  Endocrinology  [address] [place] [address]  Phone: [phone]  Fax:   Follow Up Time: 2 weeks    [facility]  Urology  [address] [place] [address]  Phone: [phone]  Fax:   Follow Up Time: 1 week     [facility] Endocrinology  Endocrinology  [address] [place] [address]  Phone: [phone]  Fax:   Follow Up Time: 2 weeks    [facility]  Urology  [address] [place] [address]  Phone: [phone]  Fax:   Follow Up Time: 1 week

## 2023-12-12 NOTE — DISCHARGE NOTE PROVIDER - PROVIDER TOKENS
PROVIDER:[TOKEN:[[id number]],FOLLOWUP:[2 weeks]] PROVIDER:[TOKEN:[[id number]],FOLLOWUP:[2 weeks]]

## 2023-12-12 NOTE — DISCHARGE NOTE PROVIDER - NSDCCPCAREPLAN_GEN_ALL_CORE_FT
PRINCIPAL DISCHARGE DIAGNOSIS  Diagnosis: Necrotizing fasciitis  Assessment and Plan of Treatment: Recovering from surgery   WOUND CARE: ****  BATHING: Please do not submerge wound underwater. Do not take a bath. You may shower and/or sponge bathe.  ACTIVITY: No heavy lifting or straining; do not lift anything greater than 10 pounds. Otherwise, you may return to your usual level of physical activity. If you are taking narcotic pain medication (such as Percocet), do NOT drive a car, operate machinery or make important decisions.  DIET: Return to your usual diet.  NOTIFY YOUR SURGEON IF: You have any bleeding that does not stop, any pus draining from your wound, any fever (over 100.4 F) or chills, persistent nausea/vomiting, persistent diarrhea, or if your pain is not controlled on your discharge pain medications.  FOLLOW-UP:  1. Please call to make a follow-up appointment within 1-2 weeks of discharge with Dr. Dean.  2. Please follow up with your primary care physician in one week regarding your hospitalization.        SECONDARY DISCHARGE DIAGNOSES  Diagnosis: Uncontrolled type 2 diabetes mellitus with hyperglycemia  Assessment and Plan of Treatment: While you were in the hospital you were seen by the endocrinology team.     PRINCIPAL DISCHARGE DIAGNOSIS  Diagnosis: Necrotizing fasciitis  Assessment and Plan of Treatment: Recovering from surgery   WOUND CARE: daily dressing changes with gauze, Kerlix, and Ace wrap. Wear the knee immobilizing brace every day.  BATHING: Please do not submerge wound underwater. Do not take a bath. You may shower and/or sponge bathe.  ACTIVITY: No heavy lifting or straining; do not lift anything greater than 10 pounds. Otherwise, you may return to your usual level of physical activity. If you are taking narcotic pain medication (such as Percocet), do NOT drive a car, operate machinery or make important decisions.  DIET: Return to your usual diet.  NOTIFY YOUR SURGEON IF: You have any bleeding that does not stop, any pus draining from your wound, any fever (over 100.4 F) or chills, persistent nausea/vomiting, persistent diarrhea, or if your pain is not controlled on your discharge pain medications.  FOLLOW-UP:  1. Please call to make a follow-up appointment within 1-2 weeks of discharge with Dr. Dean.  2. Please follow up with your primary care physician in one week regarding your hospitalization.  3. Follow up with the [facility] for urinary retention and follow up of the Contreras catheter in your bladder.        SECONDARY DISCHARGE DIAGNOSES  Diagnosis: Uncontrolled type 2 diabetes mellitus with hyperglycemia  Assessment and Plan of Treatment: While you were in the hospital you were seen by the endocrinology team.     PRINCIPAL DISCHARGE DIAGNOSIS  Diagnosis: Necrotizing fasciitis  Assessment and Plan of Treatment: Recovering from surgery   WOUND CARE: daily dressing changes with gauze, Kerlix, and Ace wrap. Wear the knee immobilizing brace every day.  BATHING: Please do not submerge wound underwater. Do not take a bath. You may shower and/or sponge bathe.  ACTIVITY: No heavy lifting or straining; do not lift anything greater than 10 pounds. Otherwise, you may return to your usual level of physical activity. If you are taking narcotic pain medication (such as Percocet), do NOT drive a car, operate machinery or make important decisions.  DIET: Return to your usual diet.  NOTIFY YOUR SURGEON IF: You have any bleeding that does not stop, any pus draining from your wound, any fever (over 100.4 F) or chills, persistent nausea/vomiting, persistent diarrhea, or if your pain is not controlled on your discharge pain medications.  FOLLOW-UP:  1. Please call to make a follow-up appointment within 1-2 weeks of discharge with Dr. Dean.  2. Please follow up with your primary care physician in one week regarding your hospitalization.  3. Follow up with the [facility] for urinary retention and follow up of the Contreras catheter in your bladder.        SECONDARY DISCHARGE DIAGNOSES  Diagnosis: Uncontrolled type 2 diabetes mellitus with hyperglycemia  Assessment and Plan of Treatment: While you were in the hospital you were seen by the endocrinology team.

## 2023-12-12 NOTE — BRIEF OPERATIVE NOTE - NSICDXBRIEFPOSTOP_GEN_ALL_CORE_FT
POST-OP DIAGNOSIS:  Necrotizing soft tissue infection 30-Nov-2023 01:53:03  Rodo Page  

## 2023-12-12 NOTE — DISCHARGE NOTE PROVIDER - NSDCMRMEDTOKEN_GEN_ALL_CORE_FT
enalapril 5 mg oral tablet: 1 tab(s) orally 2 times a day  glipiZIDE 5 mg oral tablet: 1 tab(s) orally 2 times a day  metFORMIN 1000 mg oral tablet: 1 tab(s) orally 2 times a day   acetaminophen 325 mg oral tablet: 3 tab(s) orally every 6 hours  enalapril 10 mg oral tablet: 1 tab(s) orally every 12 hours  enoxaparin: 40 milligram(s) subcutaneous every 24 hours  gabapentin 300 mg oral capsule: 1 cap(s) orally 3 times a day  glipiZIDE 5 mg oral tablet: 1 tab(s) orally 2 times a day  guaiFENesin 100 mg/5 mL oral liquid: 5 milliliter(s) orally every 6 hours As needed Cough  insulin glargine 100 units/mL subcutaneous solution: 32 unit(s) subcutaneous once a day (at bedtime)  metFORMIN 1000 mg oral tablet: 1 tab(s) orally 2 times a day  oxyCODONE 10 mg oral tablet: 1 tab(s) orally every 6 hours As needed Severe Pain (7 - 10)  oxyCODONE 5 mg oral tablet: 1 tab(s) orally every 6 hours As needed Moderate Pain (4 - 6)  polyethylene glycol 3350 oral powder for reconstitution: 17 gram(s) orally every 12 hours  senna leaf extract oral tablet: 2 tab(s) orally once a day (at bedtime)  simvastatin 40 mg oral tablet: 1 tab(s) orally once a day (at bedtime)  tamsulosin 0.4 mg oral capsule: 1 cap(s) orally once a day (at bedtime)   acetaminophen 325 mg oral tablet: 3 tab(s) orally every 6 hours  enalapril 10 mg oral tablet: 1 tab(s) orally every 12 hours  enoxaparin: 40 milligram(s) subcutaneous every 24 hours  gabapentin 300 mg oral capsule: 1 cap(s) orally 3 times a day  guaiFENesin 100 mg/5 mL oral liquid: 5 milliliter(s) orally every 6 hours As needed Cough  insulin glargine 100 units/mL subcutaneous solution: 32 unit(s) subcutaneous once a day (at bedtime)  insulin lispro 100 units/mL injectable solution: 16 unit(s) subcutaneous 3 times a day (before meals)  oxyCODONE 10 mg oral tablet: 1 tab(s) orally every 6 hours As needed Severe Pain (7 - 10)  oxyCODONE 5 mg oral tablet: 1 tab(s) orally every 6 hours As needed Moderate Pain (4 - 6)  polyethylene glycol 3350 oral powder for reconstitution: 17 gram(s) orally every 12 hours  senna leaf extract oral tablet: 2 tab(s) orally once a day (at bedtime)  simvastatin 40 mg oral tablet: 1 tab(s) orally once a day (at bedtime)  tamsulosin 0.4 mg oral capsule: 1 cap(s) orally once a day (at bedtime)

## 2023-12-12 NOTE — DISCHARGE NOTE PROVIDER - CARE PROVIDERS DIRECT ADDRESSES
[email or web address]

## 2023-12-12 NOTE — DISCHARGE NOTE PROVIDER - HOSPITAL COURSE
HPI:  Mr. Mcdaniel is a 63M PMHx DM, HLD, PAD, prior toe amputation, presents to [facility] ED w L foot wound. Reports wound has been present on hindfoot x 3 weeks. Started as small cut. Over last 24 hours, pt unable to ambulate d/t severe pain. Reports significant malodor. Denies subjective fevers, chills, purulent drainage, numbness/paresthesia.     In ED, patient is tachycardic to 123. BP w HTN. Febrile to 102.4 F. Labs w WBC 21. Hgb 11.4. Lac 2.4. XR L foot w soft tissue gas at lateral hindfoot. CT LLE non con w soft tissue gas tracking ~7cm above foot.      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 Patient admitted to the SICU pre and post op for hyperglycemia and insulin drip management. Endocrinology was consulted for the hyperglycemia.  Patient transferred out of the SICU when he was able to be weaned off the insulin drip. Internal medicine was consulted for malachi-operative clearance for formalization surgery. On 12/4 the patient was taken back to the operating room for a left lower extremity guillotine revision. Infectious disease was consulted for antibiotic management and recommended switching from Zosyn to Unasyn. Rehab medicine was consulted to evaluate for rehab needs and recommended acute rehab on discharge. HPI:  Mr. Mcdaniel is a 63M PMHx DM, HLD, PAD, prior toe amputation, presents to [facility] ED w L foot wound. Reports wound has been present on hindfoot x 3 weeks. Started as small cut. Over last 24 hours, pt unable to ambulate d/t severe pain. Reports significant malodor. Denies subjective fevers, chills, purulent drainage, numbness/paresthesia.     In ED, patient is tachycardic to 123. BP w HTN. Febrile to 102.4 F. Labs w WBC 21. Hgb 11.4. Lac 2.4. XR L foot w soft tissue gas at lateral hindfoot. CT LLE non con w soft tissue gas tracking ~7cm above foot.      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 Patient admitted to the SICU pre and post op for hyperglycemia and insulin drip management. Endocrinology was consulted for the hyperglycemia.  Patient transferred out of the SICU when he was able to be weaned off the insulin drip. Internal medicine was consulted for malachi-operative clearance for formalization surgery. On 12/4 the patient was taken back to the operating room for a left lower extremity guillotine revision. Infectious disease was consulted for antibiotic management and recommended switching from Zosyn to Unasyn. Rehab medicine was consulted to evaluate for rehab needs and recommended acute rehab on discharge. HPI:  Mr. Mcdaniel is a 63M PMHx DM, HLD, PAD, prior toe amputation, presents to [facility] ED w L foot wound. Reports wound has been present on hindfoot x 3 weeks. Started as small cut. Over last 24 hours, pt unable to ambulate d/t severe pain. Reports significant malodor. Denies subjective fevers, chills, purulent drainage, numbness/paresthesia.     In ED, patient is tachycardic to 123. BP w HTN. Febrile to 102.4 F. Labs w WBC 21. Hgb 11.4. Lac 2.4. XR L foot w soft tissue gas at lateral hindfoot. CT LLE non con w soft tissue gas tracking ~7cm above foot.      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 Patient admitted to the SICU pre and post op for hyperglycemia and insulin drip management. Endocrinology was consulted for the hyperglycemia.  Patient transferred out of the SICU when he was able to be weaned off the insulin drip. Internal medicine was consulted for malachi-operative clearance for formalization surgery. On 12/4 the patient was taken back to the operating room for a left lower extremity guillotine revision. Infectious disease was consulted for antibiotic management and recommended switching from Zosyn to Unasyn. Rehab medicine was consulted to evaluate for rehab needs and recommended acute rehab on discharge. Patient discharged with crain for urinary retention and instructions to follow up with urology outpatient. HPI:  Mr. Mcdaniel is a 63M PMHx DM, HLD, PAD, prior toe amputation, presents to [facility] ED w L foot wound. Reports wound has been present on hindfoot x 3 weeks. Started as small cut. Over last 24 hours, pt unable to ambulate d/t severe pain. Reports significant malodor. Denies subjective fevers, chills, purulent drainage, numbness/paresthesia.     In ED, patient is tachycardic to 123. BP w HTN. Febrile to 102.4 F. Labs w WBC 21. Hgb 11.4. Lac 2.4. XR L foot w soft tissue gas at lateral hindfoot. CT LLE non con w soft tissue gas tracking ~7cm above foot.      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 Patient admitted to the SICU pre and post op for hyperglycemia and insulin drip management. Endocrinology was consulted for the hyperglycemia.  Patient transferred out of the SICU when he was able to be weaned off the insulin drip. Internal medicine was consulted for malachi-operative clearance for formalization surgery. On 12/4 the patient was taken back to the operating room for a left lower extremity guillotine revision. Infectious disease was consulted for antibiotic management and recommended switching from Zosyn to Unasyn. Rehab medicine was consulted to evaluate for rehab needs and recommended acute rehab on discharge. Patient discharged with crain for urinary retention and instructions to follow up with urology outpatient. HPI:  Mr. Mcdaniel is a 63M PMHx DM, HLD, PAD, prior toe amputation, presents to [facility] ED w L foot wound. Reports wound has been present on hindfoot x 3 weeks. Started as small cut. Over last 24 hours, pt unable to ambulate d/t severe pain. Reports significant malodor. Denies subjective fevers, chills, purulent drainage, numbness/paresthesia.     In ED, patient is tachycardic to 123. BP w HTN. Febrile to 102.4 F. Labs w WBC 21. Hgb 11.4. Lac 2.4. XR L foot w soft tissue gas at lateral hindfoot. CT LLE non con w soft tissue gas tracking ~7cm above foot.      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 Patient admitted to the SICU pre and post op for hyperglycemia and insulin drip management. Endocrinology was consulted for the hyperglycemia.  Patient transferred out of the SICU when he was able to be weaned off the insulin drip. Internal medicine was consulted for malachi-operative clearance for formalization surgery. On 12/4 the patient was taken back to the operating room for a left lower extremity guillotine revision. Infectious disease was consulted for antibiotic management and recommended switching from Zosyn to Unasyn. Patient finished antibiotic course and showed no continued signs of infection. Patient had formalization of BKA on 12/12. Rehab medicine was consulted to evaluate for rehab needs and recommended acute rehab on discharge. Patient discharged with crain for urinary retention and instructions to follow up with urology outpatient. HPI:  Mr. Mcdaniel is a 63M PMHx DM, HLD, PAD, prior toe amputation, presents to [facility] ED w L foot wound. Reports wound has been present on hindfoot x 3 weeks. Started as small cut. Over last 24 hours, pt unable to ambulate d/t severe pain. Reports significant malodor. Denies subjective fevers, chills, purulent drainage, numbness/paresthesia.     In ED, patient is tachycardic to 123. BP w HTN. Febrile to 102.4 F. Labs w WBC 21. Hgb 11.4. Lac 2.4. XR L foot w soft tissue gas at lateral hindfoot. CT LLE non con w soft tissue gas tracking ~7cm above foot.      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 Patient admitted to the SICU pre and post op for hyperglycemia and insulin drip management. Endocrinology was consulted for the hyperglycemia.  Patient transferred out of the SICU when he was able to be weaned off the insulin drip. Internal medicine was consulted for malachi-operative clearance for formalization surgery. On 12/4 the patient was taken back to the operating room for a left lower extremity guillotine revision. Infectious disease was consulted for antibiotic management and recommended switching from Zosyn to Unasyn. Patient finished antibiotic course and showed no continued signs of infection. Patient had formalization of BKA on 12/12. Rehab medicine was consulted to evaluate for rehab needs and recommended acute rehab on discharge. Patient discharged with crain for urinary retention and instructions to follow up with urology outpatient. HPI:  Mr. Mcdaniel is a 63M PMHx DM, HLD, PAD, prior toe amputation, presents to [facility] ED w L foot wound. Reports wound has been present on hindfoot x 3 weeks. Started as small cut. Over last 24 hours, pt unable to ambulate d/t severe pain. Reports significant malodor. Denies subjective fevers, chills, purulent drainage, numbness/paresthesia.     In ED, patient is tachycardic to 123. BP w HTN. Febrile to 102.4 F. Labs w WBC 21. Hgb 11.4. Lac 2.4. XR L foot w soft tissue gas at lateral hindfoot. CT LLE non con w soft tissue gas tracking ~7cm above foot.     Patient taken to the OR for a left guillotine below the knee amputation on admission. Patient admitted to the SICU pre and post op for hyperglycemia and insulin drip management. Endocrinology was consulted for the hyperglycemia.  Patient transferred out of the SICU when he was able to be weaned off the insulin drip. Internal medicine was consulted for malachi-operative clearance for formalization surgery. On 12/4 the patient was taken back to the operating room for a left lower extremity guillotine revision. Infectious disease was consulted for antibiotic management and recommended switching from Zosyn to Unasyn. Patient finished antibiotic course and showed no continued signs of infection. Patient had formalization of BKA on 12/12. Rehab medicine was consulted to evaluate for rehab needs and recommended acute rehab on discharge. Patient discharged with Contreras for urinary retention and instructions to follow up with urology outpatient. HPI:  Mr. Mcdaniel is a 63M PMHx DM, HLD, PAD, prior toe amputation, presents to [facility] ED w L foot wound. Reports wound has been present on hindfoot x 3 weeks. Started as small cut. Over last 24 hours, pt unable to ambulate d/t severe pain. Reports significant malodor. Denies subjective fevers, chills, purulent drainage, numbness/paresthesia.     In ED, patient is tachycardic to 123. BP w HTN. Febrile to 102.4 F. Labs w WBC 21. Hgb 11.4. Lac 2.4. XR L foot w soft tissue gas at lateral hindfoot. CT LLE non con w soft tissue gas tracking ~7cm above foot.     Patient taken to the OR for a left guillotine below the knee amputation on admission. Patient admitted to the SICU pre and post op for hyperglycemia and insulin drip management. Endocrinology was consulted for the hyperglycemia.  Patient transferred out of the SICU when he was able to be weaned off the insulin drip. Internal medicine was consulted for malachi-operative clearance for formalization surgery. On 12/4 the patient was taken back to the operating room for a left lower extremity guillotine revision. Infectious disease was consulted for antibiotic management and recommended switching from Zosyn to Unasyn. Patient finished antibiotic course and showed no continued signs of infection. Patient had formalization of BKA on 12/12. Rehab medicine was consulted to evaluate for rehab needs and recommended acute rehab on discharge. Patient discharged with Contreras for urinary retention and instructions to follow up with urology outpatient.

## 2023-12-12 NOTE — DISCHARGE NOTE PROVIDER - NSDCQMSTROKE_NEU_ALL_CORE
Subjective:      Patient ID: Wai Moody is a 44 y.o. male who presents today for:  Chief Complaint   Patient presents with    Sinusitis     Pt. is here for sinusitis X 2 months. Pt. c/o loss of smell, sinus pressure, headaches and a red bump on his nose. Pt. states he has tried all kinds of OTC medications with no relief. Pt. denies any sore throat, fever or chills. Sinusitis   This is a new problem. The current episode started more than 1 month ago. The problem has been gradually worsening since onset. There has been no fever. His pain is at a severity of 3/10. The pain is mild. Associated symptoms include congestion, headaches and sinus pressure. Pertinent negatives include no chills, coughing, diaphoresis, ear pain, hoarse voice, neck pain, shortness of breath, sneezing, sore throat or swollen glands. Past treatments include oral decongestants (otc medications). The treatment provided no relief. Pt states that he has noticed a red bump on his nose & has had it X 2 months. States that he has had the sinus pressure ever since this spot showed up.     Past Medical History:   Diagnosis Date    Abdominal pain, epigastric 3/23/2017    ADHD (attention deficit hyperactivity disorder)     Anxiety state, unspecified     Bipolar disorder, unspecified     Carpal tunnel syndrome, bilateral     emg pos    Fibromyalgia     dr Estefania Haile Insomnia, unspecified      Past Surgical History:   Procedure Laterality Date    HERNIA REPAIR       Family History   Problem Relation Age of Onset    Cancer Father      Social History     Social History    Marital status:      Spouse name: Antoinette Emanuel Number of children: 3    Years of education: 15     Occupational History    Security      Social History Main Topics    Smoking status: Former Smoker     Years: 10.00     Quit date: 2005    Smokeless tobacco: Never Used    Alcohol use Yes      Comment: occasional    Drug use: No    Sexual activity: Not on file
No

## 2023-12-12 NOTE — PRE-OP CHECKLIST - TO WHOM
----- Message from Jorge Ashraf MD sent at 1/11/2018  7:29 PM CST -----  No pathology noted    Office plan:  CT then office cysto  
Letter sent to notify as below  Patient does have a office visit scheduled for cysto with Dr Ashraf  
RN OR

## 2023-12-12 NOTE — DISCHARGE NOTE PROVIDER - NSFOLLOWUPCLINICSTOKEN_GEN_ALL_ED_FT
019742:2 weeks|| ||00\01||False; 233979:2 weeks|| ||00\01||False; 047911:2 weeks|| ||00\01||False;343631:1 week|| ||00\01||False; 877634:2 weeks|| ||00\01||False;446272:1 week|| ||00\01||False;

## 2023-12-12 NOTE — BRIEF OPERATIVE NOTE - OPERATION/FINDINGS
Sol[place]ad
Persistent blanching erythema at stump
Left below knee amputation formalization. No evidence of purulence or ischemia. Stump closed with 3-0 Vicryl and staples, dressed with Xeroform, gauze, Kerlix, and Ace.

## 2023-12-12 NOTE — PRE-OP CHECKLIST - 1.
emotional support and preop teaching provided to pt and family.
patient oriented to unit and procedure   patient oriented to unit and procedure   patient oriented to unit and procedure

## 2023-12-12 NOTE — PACU DISCHARGE NOTE - PAIN:
Delirium resolving.  Rule out REM sleep behavior disorder    Recommend  Continue with Seroquel 25mg qhs, Paxil 40mg/day, and Lamictal 25mg/day  Told daughter that he should be supervised at home 24 hours a day. Social work consult for home aid referrals.   [facility]  Discussed with NP and neurologist.     Dimitri Vazquez M.D.  Psychiatry  [phone]
Controlled with current regime
Controlled with current regime

## 2023-12-12 NOTE — DISCHARGE NOTE PROVIDER - CARE PROVIDER_API CALL
Jose Francisco Dean  Vascular Surgery  [id number]  [place] [address]  Phone: [phone]  Fax: [phone]  Follow Up Time: 2 weeks   Jose Francisco Dean  Vascular Surgery  [id number]  [place] [address]  Phone: [phone]  Fax: [phone]  Follow Up Time: 2 weeks

## 2023-12-12 NOTE — BRIEF OPERATIVE NOTE - NSICDXBRIEFPREOP_GEN_ALL_CORE_FT
PRE-OP DIAGNOSIS:  Necrotizing soft tissue infection 30-Nov-2023 01:52:57  Rodo Page  

## 2023-12-13 LAB
ANION GAP SERPL CALC-SCNC: 9 MMOL/L — SIGNIFICANT CHANGE UP (ref 5–17)
ANION GAP SERPL CALC-SCNC: 9 MMOL/L — SIGNIFICANT CHANGE UP (ref 5–17)
BUN SERPL-MCNC: 17 MG/DL — SIGNIFICANT CHANGE UP (ref 7–23)
BUN SERPL-MCNC: 17 MG/DL — SIGNIFICANT CHANGE UP (ref 7–23)
CALCIUM SERPL-MCNC: 8.4 MG/DL — SIGNIFICANT CHANGE UP (ref 8.4–10.5)
CALCIUM SERPL-MCNC: 8.4 MG/DL — SIGNIFICANT CHANGE UP (ref 8.4–10.5)
CHLORIDE SERPL-SCNC: 97 MMOL/L — SIGNIFICANT CHANGE UP (ref 96–108)
CHLORIDE SERPL-SCNC: 97 MMOL/L — SIGNIFICANT CHANGE UP (ref 96–108)
CO2 SERPL-SCNC: 28 MMOL/L — SIGNIFICANT CHANGE UP (ref 22–31)
CO2 SERPL-SCNC: 28 MMOL/L — SIGNIFICANT CHANGE UP (ref 22–31)
CREAT SERPL-MCNC: 1.11 MG/DL — SIGNIFICANT CHANGE UP (ref 0.5–1.3)
CREAT SERPL-MCNC: 1.11 MG/DL — SIGNIFICANT CHANGE UP (ref 0.5–1.3)
EGFR: 75 ML/MIN/1.73M2 — SIGNIFICANT CHANGE UP
EGFR: 75 ML/MIN/1.73M2 — SIGNIFICANT CHANGE UP
GLUCOSE SERPL-MCNC: 231 MG/DL — HIGH (ref 70–99)
GLUCOSE SERPL-MCNC: 231 MG/DL — HIGH (ref 70–99)
HCT VFR BLD CALC: 31.7 % — LOW (ref 39–50)
HCT VFR BLD CALC: 31.7 % — LOW (ref 39–50)
HGB BLD-MCNC: 9.8 G/DL — LOW (ref 13–17)
HGB BLD-MCNC: 9.8 G/DL — LOW (ref 13–17)
MAGNESIUM SERPL-MCNC: 2 MG/DL — SIGNIFICANT CHANGE UP (ref 1.6–2.6)
MAGNESIUM SERPL-MCNC: 2 MG/DL — SIGNIFICANT CHANGE UP (ref 1.6–2.6)
MCHC RBC-ENTMCNC: 26.2 PG — LOW (ref 27–34)
MCHC RBC-ENTMCNC: 26.2 PG — LOW (ref 27–34)
MCHC RBC-ENTMCNC: 30.9 GM/DL — LOW (ref 32–36)
MCHC RBC-ENTMCNC: 30.9 GM/DL — LOW (ref 32–36)
MCV RBC AUTO: 84.8 FL — SIGNIFICANT CHANGE UP (ref 80–100)
MCV RBC AUTO: 84.8 FL — SIGNIFICANT CHANGE UP (ref 80–100)
NRBC # BLD: 0 /100 WBCS — SIGNIFICANT CHANGE UP (ref 0–0)
NRBC # BLD: 0 /100 WBCS — SIGNIFICANT CHANGE UP (ref 0–0)
PHOSPHATE SERPL-MCNC: 3 MG/DL — SIGNIFICANT CHANGE UP (ref 2.5–4.5)
PHOSPHATE SERPL-MCNC: 3 MG/DL — SIGNIFICANT CHANGE UP (ref 2.5–4.5)
PLATELET # BLD AUTO: 324 K/UL — SIGNIFICANT CHANGE UP (ref 150–400)
PLATELET # BLD AUTO: 324 K/UL — SIGNIFICANT CHANGE UP (ref 150–400)
POTASSIUM SERPL-MCNC: 5.2 MMOL/L — SIGNIFICANT CHANGE UP (ref 3.5–5.3)
POTASSIUM SERPL-MCNC: 5.2 MMOL/L — SIGNIFICANT CHANGE UP (ref 3.5–5.3)
POTASSIUM SERPL-SCNC: 5.2 MMOL/L — SIGNIFICANT CHANGE UP (ref 3.5–5.3)
POTASSIUM SERPL-SCNC: 5.2 MMOL/L — SIGNIFICANT CHANGE UP (ref 3.5–5.3)
RBC # BLD: 3.74 M/UL — LOW (ref 4.2–5.8)
RBC # BLD: 3.74 M/UL — LOW (ref 4.2–5.8)
RBC # FLD: 15.8 % — HIGH (ref 10.3–14.5)
RBC # FLD: 15.8 % — HIGH (ref 10.3–14.5)
SODIUM SERPL-SCNC: 134 MMOL/L — LOW (ref 135–145)
SODIUM SERPL-SCNC: 134 MMOL/L — LOW (ref 135–145)
WBC # BLD: 12.55 K/UL — HIGH (ref 3.8–10.5)
WBC # BLD: 12.55 K/UL — HIGH (ref 3.8–10.5)
WBC # FLD AUTO: 12.55 K/UL — HIGH (ref 3.8–10.5)
WBC # FLD AUTO: 12.55 K/UL — HIGH (ref 3.8–10.5)

## 2023-12-13 PROCEDURE — 99233 SBSQ HOSP IP/OBS HIGH 50: CPT

## 2023-12-13 RX ORDER — SODIUM CHLORIDE 9 MG/ML
1000 INJECTION, SOLUTION INTRAVENOUS
Refills: 0 | Status: DISCONTINUED | OUTPATIENT
Start: 2023-12-13 | End: 2023-12-16

## 2023-12-13 RX ORDER — DEXTROSE 50 % IN WATER 50 %
15 SYRINGE (ML) INTRAVENOUS ONCE
Refills: 0 | Status: DISCONTINUED | OUTPATIENT
Start: 2023-12-13 | End: 2023-12-16

## 2023-12-13 RX ORDER — ACETAMINOPHEN 500 MG
975 TABLET ORAL EVERY 6 HOURS
Refills: 0 | Status: DISCONTINUED | OUTPATIENT
Start: 2023-12-13 | End: 2023-12-16

## 2023-12-13 RX ORDER — DEXTROSE 50 % IN WATER 50 %
25 SYRINGE (ML) INTRAVENOUS ONCE
Refills: 0 | Status: DISCONTINUED | OUTPATIENT
Start: 2023-12-13 | End: 2023-12-16

## 2023-12-13 RX ORDER — GLUCAGON INJECTION, SOLUTION 0.5 MG/.1ML
1 INJECTION, SOLUTION SUBCUTANEOUS ONCE
Refills: 0 | Status: DISCONTINUED | OUTPATIENT
Start: 2023-12-13 | End: 2023-12-16

## 2023-12-13 RX ORDER — DEXTROSE 50 % IN WATER 50 %
12.5 SYRINGE (ML) INTRAVENOUS ONCE
Refills: 0 | Status: DISCONTINUED | OUTPATIENT
Start: 2023-12-13 | End: 2023-12-16

## 2023-12-13 RX ADMIN — Medication 10 MILLIGRAM(S): at 17:11

## 2023-12-13 RX ADMIN — OXYCODONE HYDROCHLORIDE 10 MILLIGRAM(S): 5 TABLET ORAL at 04:30

## 2023-12-13 RX ADMIN — GABAPENTIN 300 MILLIGRAM(S): 400 CAPSULE ORAL at 13:16

## 2023-12-13 RX ADMIN — Medication 975 MILLIGRAM(S): at 14:15

## 2023-12-13 RX ADMIN — OXYCODONE HYDROCHLORIDE 10 MILLIGRAM(S): 5 TABLET ORAL at 11:13

## 2023-12-13 RX ADMIN — TAMSULOSIN HYDROCHLORIDE 0.4 MILLIGRAM(S): 0.4 CAPSULE ORAL at 02:58

## 2023-12-13 RX ADMIN — Medication 975 MILLIGRAM(S): at 17:11

## 2023-12-13 RX ADMIN — OXYCODONE HYDROCHLORIDE 10 MILLIGRAM(S): 5 TABLET ORAL at 18:11

## 2023-12-13 RX ADMIN — Medication 10 MILLIGRAM(S): at 06:12

## 2023-12-13 RX ADMIN — Medication 975 MILLIGRAM(S): at 23:16

## 2023-12-13 RX ADMIN — Medication 975 MILLIGRAM(S): at 18:11

## 2023-12-13 RX ADMIN — GABAPENTIN 300 MILLIGRAM(S): 400 CAPSULE ORAL at 21:32

## 2023-12-13 RX ADMIN — Medication 6: at 10:12

## 2023-12-13 RX ADMIN — OXYCODONE HYDROCHLORIDE 10 MILLIGRAM(S): 5 TABLET ORAL at 03:58

## 2023-12-13 RX ADMIN — Medication 4: at 13:54

## 2023-12-13 RX ADMIN — Medication 1: at 22:59

## 2023-12-13 RX ADMIN — GABAPENTIN 300 MILLIGRAM(S): 400 CAPSULE ORAL at 06:12

## 2023-12-13 RX ADMIN — OXYCODONE HYDROCHLORIDE 10 MILLIGRAM(S): 5 TABLET ORAL at 17:11

## 2023-12-13 RX ADMIN — Medication 16 UNIT(S): at 13:55

## 2023-12-13 RX ADMIN — Medication 975 MILLIGRAM(S): at 07:43

## 2023-12-13 RX ADMIN — Medication 16 UNIT(S): at 10:12

## 2023-12-13 RX ADMIN — SIMVASTATIN 40 MILLIGRAM(S): 20 TABLET, FILM COATED ORAL at 21:32

## 2023-12-13 RX ADMIN — ENOXAPARIN SODIUM 40 MILLIGRAM(S): 100 INJECTION SUBCUTANEOUS at 06:13

## 2023-12-13 RX ADMIN — OXYCODONE HYDROCHLORIDE 10 MILLIGRAM(S): 5 TABLET ORAL at 23:04

## 2023-12-13 RX ADMIN — INSULIN GLARGINE 30 UNIT(S): 100 INJECTION, SOLUTION SUBCUTANEOUS at 22:59

## 2023-12-13 RX ADMIN — TAMSULOSIN HYDROCHLORIDE 0.4 MILLIGRAM(S): 0.4 CAPSULE ORAL at 21:31

## 2023-12-13 RX ADMIN — Medication 975 MILLIGRAM(S): at 13:15

## 2023-12-13 RX ADMIN — Medication 975 MILLIGRAM(S): at 09:00

## 2023-12-13 RX ADMIN — OXYCODONE HYDROCHLORIDE 10 MILLIGRAM(S): 5 TABLET ORAL at 10:13

## 2023-12-13 NOTE — PROGRESS NOTE ADULT - ATTENDING COMMENTS
Addendum to this note did not transition into the system completely, Addendum recreated.  Patient seen and examined. Chart with pertinent labs and imaging reviewed.  Fever overnight likely noninfectious.    General: Nontoxic-appearing Male in no acute distress.  HEENT: AT/NC. Anicteric. Conjunctiva pink and moist. Oropharynx clear.  Neck: Not rigid. No sense of mass.  Nodes: None palpable.  Lungs: Clear bilaterally without rales, wheezing or rhonchi  Heart: Regular rate and rhythm.  Abdomen: Bowel sounds present and normoactive. Soft. Nondistended. Nontender. Obese.   Extremities: No cyanosis or clubbing. No edema. BKA dressed.  Skin: Warm. Dry. Good turgor. No rash. No vasculitic stigmata.  Psychiatric: Appropriate affect and mood for situation.     Defer antibiotics  If recurrent fever w/u as per above.    D/W patient and his wife.    Eduin Gutierrez MD  Attending Physician  [facility]  Division of Infectious Diseases  [phone] Addendum to this note did not transition into the system completely, Addendum recreated.  Patient seen and examined. Chart with pertinent labs and imaging reviewed.  Fever overnight likely noninfectious.    General: Nontoxic-appearing Male in no acute distress.  HEENT: AT/NC. Anicteric. Conjunctiva pink and moist. Oropharynx clear.  Neck: Not rigid. No sense of mass.  Nodes: None palpable.  Lungs: Clear bilaterally without rales, wheezing or rhonchi  Heart: Regular rate and rhythm.  Abdomen: Bowel sounds present and normoactive. Soft. Nondistended. Nontender. Obese.   Extremities: No cyanosis or clubbing. No edema. BKA dressed.  Skin: Warm. Dry. Good turgor. No rash. No vasculitic stigmata.  Psychiatric: Appropriate affect and mood for situation.     Defer antibiotics  If recurrent fever w/u as per above.    D/W patient and his wife.    Eduin Gutierrez MD  Attending Physician  [facility]  Division of Infectious Diseases  [phone]

## 2023-12-13 NOTE — PROVIDER CONTACT NOTE (HYPOGLYCEMIA EVENT) - NS PROVIDER CONTACT RECOMMEND-HYPO
Pt refusing to have apple juice, pt states that he has not had sugar in 13 years and does not want to drink apple juice because he feels fine. MD Tyson made aware-IVP dextrose to be ordered

## 2023-12-13 NOTE — CHART NOTE - NSCHARTNOTESELECT_GEN_ALL_CORE
Endocrine follow up/Event Note
Endocrine followup/Event Note
Endocrinology/Event Note
Endocrinology/Event Note
Post Operative Check
endocrine/Event Note
post operative check
Endocrinology/Event Note
Post-Operative Check

## 2023-12-13 NOTE — CHART NOTE - NSCHARTNOTEFT_GEN_A_CORE
Brief Endocrinology Note:    Endocrinology consulted for management of diabetes and hyperglycemia    Chart reviewed and glucose trends and insulin dosing reviewed    Noted slightly worsening glucose overnight    Recommendations:  If FSG before dinner is >180, can increase admelog to 18 units before meals, otherwise if FSG <180 and at target before dinner can continue with admelog 16 units before meals  moderate admelog correction scales with meals  low admelog correction scales before bedtime  can increase lantus to 32 units nightly tonight    Endocrinology will continue to follow    Case discussed with primary team    Hesham Avendaño MD  Endocrine Fellow  Can be reached via teams. For follow up questions, discharge recommendations, or new consults, please call answering service at [phone] (weekdays); [phone] (nights/weekends) Brief Endocrinology Note:    Endocrinology consulted for management of diabetes and hyperglycemia    Chart reviewed and glucose trends and insulin dosing reviewed    Noted slightly worsening glucose overnight    Recommendations:  If FSG before dinner is >180, can increase admelog to 18 units before meals, otherwise if FSG <180 and at target before dinner can continue with admelog 16 units before meals  moderate admelog correction scales with meals  low admelog correction scales before bedtime  can increase lantus to 32 units nightly tonight    Endocrinology will continue to follow    Case discussed with primary team    Hesham Avendaño MD  Endocrine Fellow  Can be reached via teams. For follow up questions, discharge recommendations, or new consults, please call answering service at [phone] (weekdays); [phone] (nights/weekends)

## 2023-12-13 NOTE — PROGRESS NOTE ADULT - ASSESSMENT
63M PMHx DM, HLD, PAD, prior toe amputation, presents to [facility] ED w L foot wound. Reports wound has been present on hindfoot x 3 weeks. Started as small cut. Over last 24 hours, pt unable to ambulate d/t severe pain. Reports significant malodor  . Bcx 11/29; 1/4 Dermabacter hominis  Wound CX 11/30 : Citrobacter Koseri, Enterococcus fecalisEmphysematous osteomyelitis of the posterior and lateral aspect of the   calcaneus.  Blood culture negative   # Necrotising fascitis Left foot s/p BKA s/p formalization  # Leucocytosis  # Dermabacter hominis bacteremia  s/p formalization of necrotizing fascitis  Wound looks clean  His antibiotics are dced  We will monitor his wound  Monitor his WBC  we will discuss with Dr Oneal    Will tailor plan for ID issues  per course,results.Will defer to primary team on management of other issues.    63M PMHx DM, HLD, PAD, prior toe amputation, presents to [facility] ED w L foot wound. Reports wound has been present on hindfoot x 3 weeks. Started as small cut. Over last 24 hours, pt unable to ambulate d/t severe pain. Reports significant malodor  . Bcx 11/29; 1/4 Dermabacter hominis  Wound CX 11/30 : Citrobacter Koseri, Enterococcus fecalisEmphysematous osteomyelitis of the posterior and lateral aspect of the   calcaneus.  Blood culture negative   # Necrotising fascitis Left foot s/p BKA s/p formalization  # Leucocytosis  # Dermabacter hominis bacteremia  s/p formalization of necrotizing fascitis  Wound looks clean  His antibiotics are dced  We will monitor his wound  Monitor his WBC  we will discuss with Dr Oneal    Will tailor plan for ID issues  per course,results.Will defer to primary team on management of other issues.    63M PMHx DM, HLD, PAD, prior toe amputation, presents to [facility] ED w L foot wound.   Reports wound has been present on hindfoot x 3 weeks. Started as small cut. Over last 24 hours, pt unable to ambulate d/t severe pain.   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  Blood culture negative   # Necrotising fascitis Left foot s/p BKA s/p formalization  # Leucocytosis  # Dermabacter hominis bacteremia  s/p formalization of necrotizing fascitis  Wound looks clean,he was given  vanco.zosyn 11/29-12/4 and then unasyn 12/4-12/12  His antibiotics are dced  We will monitor his wound  Monitor his WBC  we will discuss with Dr Oneal    Will tailor plan for ID issues  per course,results.Will defer to primary team on management of other issues.    63M PMHx DM, HLD, PAD, prior toe amputation, presents to [facility] ED w L foot wound.   Reports wound has been present on hindfoot x 3 weeks. Started as small cut. Over last 24 hours, pt unable to ambulate d/t severe pain.   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  Blood culture negative   # Necrotising fascitis Left foot s/p BKA s/p formalization  # Leucocytosis  # Dermabacter hominis bacteremia  s/p formalization of necrotizing fascitis  Wound looks clean,he was given  vanco.zosyn 11/29-12/4 and then unasyn 12/4-12/12  His antibiotics are dced  We will monitor his wound  Monitor his WBC  we will discuss with Dr Oneal    Will tailor plan for ID issues  per course,results.Will defer to primary team on management of other issues.    63M PMHx DM, HLD, PAD, prior toe amputation, presents to [facility] ED w L foot wound.   Reports wound has been present on hindfoot x 3 weeks. Started as small cut. Over last 24 hours, pt unable to ambulate d/t severe pain.   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  Blood culture negative   # Necrotising fascitis Left foot s/p BKA s/p formalization  # Leucocytosis  # Dermabacter hominis bacteremia  s/p formalization of necrotizing fascitis    Wound looks clean,he was given  vanco.zosyn 11/29-12/4 and then unasyn 12/4-12/12  His antibiotics are dced  A)Fevers ?post op  we will monitor him  If he has fevre we will check blood culture,urine cx and CXR.  We will monitor his wound and start him on vanco+ zosyn  Monitor his WBC    we will discuss with Dr Oneal    Will tailor plan for ID issues  per course,results.Will defer to primary team on management of other issues.    63M PMHx DM, HLD, PAD, prior toe amputation, presents to [facility] ED w L foot wound.   Reports wound has been present on hindfoot x 3 weeks. Started as small cut. Over last 24 hours, pt unable to ambulate d/t severe pain.   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  Blood culture negative   # Necrotising fascitis Left foot s/p BKA s/p formalization  # Leucocytosis  # Dermabacter hominis bacteremia  s/p formalization of necrotizing fascitis    Wound looks clean,he was given  vanco.zosyn 11/29-12/4 and then unasyn 12/4-12/12  His antibiotics are dced  A)Fevers ?post op  we will monitor him  If he has fevre we will check blood culture,urine cx and CXR.  We will monitor his wound and start him on vanco+ zosyn  Monitor his WBC    we will discuss with Dr Oneal    Will tailor plan for ID issues  per course,results.Will defer to primary team on management of other issues.    63M PMHx DM, HLD, PAD, prior toe amputation, presents to [facility] ED w L foot wound.   Reports wound has been present on hindfoot x 3 weeks. Started as small cut. Over last 24 hours, pt unable to ambulate d/t severe pain.   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  Blood culture negative   # Necrotising fascitis Left foot s/p BKA s/p formalization  # Leucocytosis  # Dermabacter hominis bacteremia  s/p formalization of necrotizing fascitis    Wound looks clean,he was given  vanco.zosyn 11/29-12/4 and the  unasyn dced 12/4-12/12  he has a fever-?Post OP    A)Fevers ?post op  we will monitor him  For now monitor him off antimicrobials.  If he has fever we will check blood culture,urine cx and CXR.  Check a Nasal RVP  We will monitor his wound   we will start him on vanco+ zosyn if fevers continue  Monitor his WBC      we will discuss with Dr Oneal  Will tailor plan for ID issues  per course,results.Will defer to primary team on management of other issues.    63M PMHx DM, HLD, PAD, prior toe amputation, presents to [facility] ED w L foot wound.   Reports wound has been present on hindfoot x 3 weeks. Started as small cut. Over last 24 hours, pt unable to ambulate d/t severe pain.   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  Blood culture negative   # Necrotising fascitis Left foot s/p BKA s/p formalization  # Leucocytosis  # Dermabacter hominis bacteremia  s/p formalization of necrotizing fascitis    Wound looks clean,he was given  vanco.zosyn 11/29-12/4 and the  unasyn dced 12/4-12/12  he has a fever-?Post OP    A)Fevers ?post op  we will monitor him  For now monitor him off antimicrobials.  If he has fever we will check blood culture,urine cx and CXR.  Check a Nasal RVP  We will monitor his wound   we will start him on vanco+ zosyn if fevers continue  Monitor his WBC      we will discuss with Dr Oneal  Will tailor plan for ID issues  per course,results.Will defer to primary team on management of other issues.    63M PMHx DM, HLD, PAD, prior toe amputation, presents to [facility] ED w L foot wound.   Reports wound has been present on hindfoot x 3 weeks. Started as small cut. Over last 24 hours, pt unable to ambulate d/t severe pain.   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  Blood culture negative   Necrotising fascitis Left foot s/p BKA s/p formalization   Leucocytosis  Dermabacter hominis bacteremia  s/p formalization of necrotizing fascitis    Wound looks clean,he was given  vanco.zosyn 11/29-12/4 and the  unasyn dced 12/4-12/12  He however has a fever-?Post OP    A)Fevers   ?post op  He has no more fevers   We will monitor him  For now, monitor him off antimicrobials.  If he has fever, we will check blood culture,urine cx and CXR.  Check a Nasal RVP  We will monitor his wound   Then we  will start him on vanco+ zosyn( if fevers continue)  Monitor his WBC      we will discuss with Dr Gutierrez  Will tailor plan for ID issues  per course,results.Will defer to primary team on management of other issues.    63M PMHx DM, HLD, PAD, prior toe amputation, presents to [facility] ED w L foot wound.   Reports wound has been present on hindfoot x 3 weeks. Started as small cut. Over last 24 hours, pt unable to ambulate d/t severe pain.   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  Blood culture negative   Necrotising fascitis Left foot s/p BKA s/p formalization   Leucocytosis  Dermabacter hominis bacteremia  s/p formalization of necrotizing fascitis    Wound looks clean,he was given  vanco.zosyn 11/29-12/4 and the  unasyn dced 12/4-12/12  He however has a fever-?Post OP    A)Fevers   ?post op  He has no more fevers   We will monitor him  For now, monitor him off antimicrobials.  If he has fever, we will check blood culture,urine cx and CXR.  Check a Nasal RVP  We will monitor his wound   Then we  will start him on vanco+ zosyn( if fevers continue)  Monitor his WBC      we will discuss with Dr Gutierrez  Will tailor plan for ID issues  per course,results.Will defer to primary team on management of other issues.    63M PMHx DM, HLD, PAD, prior toe amputation, presents to [facility] ED w L foot wound.   Reports wound has been present on hindfoot x 3 weeks. Started as small cut. Over last 24 hours, pt unable to ambulate d/t severe pain.   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  Blood culture negative   Necrotising fascitis Left foot s/p BKA s/p formalization   Leucocytosis  Dermabacter hominis bacteremia  s/p formalization of necrotizing fascitis    Wound looks clean,he was given  vanco.zosyn 11/29-12/4 and the  unasyn dced 12/4-12/12  He however has a fever-?Post OP    A)Fevers   ?post op  He has no more fevers   We will monitor him  For now, monitor him off antimicrobials.  If he has fever check:  blood culture  urine cx/UA   CXR.  Nasal RVP  We will monitor his wound   Then we  will start him on vanco+ zosyn( if fevers continue)  Monitor his WBC      we will discuss with Dr Gutierrez  Will tailor plan for ID issues  per course,results.Will defer to primary team on management of other issues.    63M PMHx DM, HLD, PAD, prior toe amputation, presents to [facility] ED w L foot wound.   Reports wound has been present on hindfoot x 3 weeks. Started as small cut. Over last 24 hours, pt unable to ambulate d/t severe pain.   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  Blood culture negative   Necrotising fascitis Left foot s/p BKA s/p formalization   Leucocytosis  Dermabacter hominis bacteremia  s/p formalization of necrotizing fascitis    Wound looks clean,he was given  vanco.zosyn 11/29-12/4 and the  unasyn dced 12/4-12/12  He however has a fever-?Post OP    A)Fevers   ?post op  He has no more fevers   We will monitor him  For now, monitor him off antimicrobials.  If he has fever check:  blood culture  urine cx/UA   CXR.  Nasal RVP  We will monitor his wound   Then we  will start him on vanco+ zosyn( if fevers continue)  Monitor his WBC      we will discuss with Dr Gutierrez  Will tailor plan for ID issues  per course,results.Will defer to primary team on management of other issues.

## 2023-12-13 NOTE — PROGRESS NOTE ADULT - SUBJECTIVE AND OBJECTIVE BOX
VASCULAR SURGERY DAILY PROGRESS NOTE:     SUBJECTIVE/ROS: febrile to 102 overnight  Patient seen and examined. he complains of pain in left leg. denies chest pain , n/v.        MEDICATIONS  (STANDING):  acetaminophen     Tablet .. 975 milliGRAM(s) Oral every 6 hours  enalapril 10 milliGRAM(s) Oral every 12 hours  enoxaparin Injectable 40 milliGRAM(s) SubCutaneous every 24 hours  gabapentin 300 milliGRAM(s) Oral three times a day  insulin glargine Injectable (LANTUS) 30 Unit(s) SubCutaneous at bedtime  insulin lispro (ADMELOG) corrective regimen sliding scale   SubCutaneous three times a day before meals  insulin lispro (ADMELOG) corrective regimen sliding scale   SubCutaneous at bedtime  insulin lispro Injectable (ADMELOG) 16 Unit(s) SubCutaneous before breakfast  insulin lispro Injectable (ADMELOG) 16 Unit(s) SubCutaneous before lunch  insulin lispro Injectable (ADMELOG) 14 Unit(s) SubCutaneous before dinner  polyethylene glycol 3350 17 Gram(s) Oral every 12 hours  senna 2 Tablet(s) Oral at bedtime  simvastatin 40 milliGRAM(s) Oral at bedtime  tamsulosin 0.4 milliGRAM(s) Oral at bedtime    MEDICATIONS  (PRN):  guaiFENesin Oral Liquid (Sugar-Free) 100 milliGRAM(s) Oral every 6 hours PRN Cough  oxyCODONE    IR 10 milliGRAM(s) Oral every 6 hours PRN Severe Pain (7 - 10)  oxyCODONE    IR 5 milliGRAM(s) Oral every 6 hours PRN Moderate Pain (4 - 6)      OBJECTIVE:    Vital Signs Last 24 Hrs  T(C): 39.3 (13 Dec 2023 06:21), Max: 39.3 (13 Dec 2023 06:21)  T(F): 102.8 (13 Dec 2023 06:21), Max: 102.8 (13 Dec 2023 06:21)  HR: 61 (13 Dec 2023 06:21) (61 - 96)  BP: 146/72 (13 Dec 2023 06:21) (124/60 - 169/85)  BP(mean): 111 (12 Dec 2023 17:45) (86 - 114)  RR: 18 (13 Dec 2023 06:21) (12 - 20)  SpO2: 92% (13 Dec 2023 06:21) (92% - 100%)    Parameters below as of 13 Dec 2023 06:21  Patient On (Oxygen Delivery Method): room air            I&O's Detail    12 Dec 2023 07:01  -  13 Dec 2023 07:00  --------------------------------------------------------  IN:    dextrose 5% + sodium chloride 0.45% w/ Additives: 400 mL    Oral Fluid: 830 mL  Total IN: 1230 mL    OUT:    Voided (mL): 400 mL  Total OUT: 400 mL    Total NET: 830 mL          Daily Height in cm: 175.26 (12 Dec 2023 11:56)    Daily     LABS:                        9.8    12.55 )-----------( 324      ( 13 Dec 2023 08:40 )             31.7     12-13    134<L>  |  97  |  17  ----------------------------<  231<H>  5.2   |  28  |  1.11    Ca    8.4      13 Dec 2023 08:40  Phos  3.0     12-13  Mg     2.0     12-13      PT/INR - ( 12 Dec 2023 07:27 )   PT: 11.8 sec;   INR: 1.13 ratio         PTT - ( 12 Dec 2023 07:27 )  PTT:28.9 sec  Urinalysis Basic - ( 13 Dec 2023 08:40 )    Color: x / Appearance: x / SG: x / pH: x  Gluc: 231 mg/dL / Ketone: x  / Bili: x / Urobili: x   Blood: x / Protein: x / Nitrite: x   Leuk Esterase: x / RBC: x / WBC x   Sq Epi: x / Non Sq Epi: x / Bacteria: x            PHYSICAL EXAM:  General Appearance: Appears well, NAD  Neck: Supple  Chest: Equal expansion bilaterally  Abdomen: Soft, nontense  Extremities: L BKA dressed, no drainage

## 2023-12-13 NOTE — PROVIDER CONTACT NOTE (HYPOGLYCEMIA EVENT) - NS PROVIDER CONTACT NOTE-TREATMENT-HYPO
Patient refusing oral intake at this time, no activational hypoglycemic orders in place. MD aware and stated will order dextrose IVP.

## 2023-12-13 NOTE — PROGRESS NOTE ADULT - ASSESSMENT
63M PMHx DM, HLD, PAD, prior toe amputation, presents to [facility] ED w L foot wound. NSTI of non salvageable L foot. Septic in ED. LRINEC 9. XR/CT both w soft tissue gas. s/p 11/30 L BKA guillotine amp, downgraded from SICU and recovering appropriately on the floor. now S/P below the knee amputation revision on 12/4. now s/p formalization 12/12.     - Pain control as needed  - ID on board: on Unasyn   - Lovenox for DVT ppx  - Activity as tolerated  - Endo consulted, appreciate recs- will continue to follow   - BP control  - Bowel regimen  - Daily dressing changes  - PM&R recommends AR on discharge     Vascular Surgery  [id number] 63M PMHx DM, HLD, PAD, prior toe amputation, presents to [facility] ED w L foot wound. NSTI of non salvageable L foot. Septic in ED. LRINEC 9. XR/CT both w soft tissue gas. s/p 11/30 L BKA guillotine amp, downgraded from SICU and recovering appropriately on the floor. now S/P below the knee amputation revision on 12/4. now s/p formalization 12/12.     - Pain control as needed  - ID on board: on Unasyn   - Lovenox for DVT ppx  - Activity as tolerated  - Endo consulted, appreciate recs- will continue to follow   - BP control  - Bowel regimen  - Daily dressing changes  - PM&R recommends AR on discharge     Vascular Surgery  [id number]

## 2023-12-13 NOTE — PROVIDER CONTACT NOTE (HYPOGLYCEMIA EVENT) - NS PROVIDER CONTACT BACKGROUND-HYPO
Age: 63y    Gender: Male    POCT Blood Glucose:  69 mg/dL (12-13-23 @ 19:18)  68 mg/dL (12-13-23 @ 19:16)  83 mg/dL (12-13-23 @ 19:15)  87 mg/dL (12-13-23 @ 18:04)  210 mg/dL (12-13-23 @ 13:42)  265 mg/dL (12-13-23 @ 10:05)  211 mg/dL (12-12-23 @ 21:37)      eMAR:  insulin glargine Injectable (LANTUS)   30 Unit(s) SubCutaneous (12-12-23 @ 22:33)    insulin lispro (ADMELOG) corrective regimen sliding scale   4 Unit(s) SubCutaneous (12-13-23 @ 13:54)   6 Unit(s) SubCutaneous (12-13-23 @ 10:12)    insulin lispro Injectable (ADMELOG)   16 Unit(s) SubCutaneous (12-13-23 @ 13:55)    insulin lispro Injectable (ADMELOG)   16 Unit(s) SubCutaneous (12-13-23 @ 10:12)    simvastatin   40 milliGRAM(s) Oral (12-12-23 @ 22:32)

## 2023-12-13 NOTE — PROGRESS NOTE ADULT - SUBJECTIVE AND OBJECTIVE BOX
Patient is a 63y old  Male who presents with a chief complaint of Necrotizing fasciitis     (08 Dec 2023 15:52)    Being followed by ID for necrotizing fascitis    Interval history:obelow the knee amputation  and formalization  some pain  No other fevers  stopped antibiotics  No acute events      ROS:  No cough,SOB,CP  No N/V/D./abd pain  No other complaints      Antimicrobials:      Other medications reviewed  Unqasyn 12/4-12/12    Vital Signs Last 24 Hrs  T(C): 39.3 (12-13-23 @ 06:21), Max: 39.3 (12-13-23 @ 06:21)  T(F): 102.8 (12-13-23 @ 06:21), Max: 102.8 (12-13-23 @ 06:21)  HR: 61 (12-13-23 @ 06:21) (61 - 96)  BP: 146/72 (12-13-23 @ 06:21) (124/60 - 169/85)  BP(mean): 111 (12-12-23 @ 17:45) (86 - 114)  RR: 18 (12-13-23 @ 06:21) (12 - 20)  SpO2: 92% (12-13-23 @ 06:21) (92% - 100%)    Physical Exam:        HEENT PERRLA EOMI    No oral exudate or erythema    Chest Good AE,CTA    CVS RRR S1 S2 WNl No murmur or rub or gallop    Abd soft BS normal No tenderness no masses  ext Left belowknee amputation-dressing no hematoma    IV site no erythema tenderness or discharge    CNS AAO X 3 no focal    Lab Data:                          9.8    12.55 )-----------( 324      ( 13 Dec 2023 08:40 )             31.7     WBC Count: 12.55 (12-13-23 @ 08:40)  WBC Count: 11.87 (12-12-23 @ 07:27)  WBC Count: 12.25 (12-11-23 @ 08:03)  WBC Count: 13.73 (12-10-23 @ 07:28)  WBC Count: 12.35 (12-09-23 @ 08:45)  WBC Count: 11.54 (12-08-23 @ 06:38)  WBC Count: 12.60 (12-07-23 @ 07:31)    12-13    134<L>  |  97  |  17  ----------------------------<  231<H>  5.2   |  28  |  1.11    Ca    8.4      13 Dec 2023 08:40  Phos  3.0     12-13  Mg     2.0     12-13        Culture - Blood (12.04.23 @ 05:49)   Specimen Source: .Blood Blood-Peripheral  Culture Results:   No growth at 5 days    Culture - Blood (12.04.23 @ 05:49)   Specimen Source: .Blood Blood-Peripheral  Culture Results:   No growth at 5 daysCulture - Other (11.30.23 @ 06:27)   - Amoxicillin/Clavulanic Acid: S <=8/4  - Ampicillin: R >16 These ampicillin results predict results for amoxicillin  - Ampicillin: S <=2 Predicts results to ampicillin/sulbactam, amoxacillin-clavulanate and piperacillin-tazobactam.  - Ampicillin/Sulbactam: S <=4/2  - Aztreonam: S <=4  - Cefazolin: S <=2  - Cefepime: S <=2  - Cefoxitin: S <=8  - Ceftriaxone: S <=1  - Ciprofloxacin: S <=0.25  - Ertapenem: S <=0.5  - Gentamicin: S <=2  - Imipenem: S <=1  - Levofloxacin: S <=0.5  - Meropenem: S <=1  - Piperacillin/Tazobactam: S <=8  - Tetracycline: R >8  - Tobramycin: S <=2  - Trimethoprim/Sulfamethoxazole: S <=0.5/9.5  - Vancomycin: S 2  Specimen Source: .Other Other  Culture Results:   Numerous Citrobacter koseri   Moderate Enterococcus faecalis  Organism Identification: Citrobacter koseri   Enterococcus faecalis  Organism: Citrobacter koseri  Organism: Enterococcus faecalis  Method Type: ARMEN  Method Type: MICCulture - Blood (11.29.23 @ 18:23)   - Blood PCR Panel: NEG  Gram Stain:   Growth in anaerobic bottle: Gram Positive Rods  Specimen Source: .Blood Blood-Venous  Organism: Blood Culture PCR  Culture Results:   Growth in anaerobic bottle: Dermabacter hominis "Susceptibilities not   performed"   Direct identification is available within approximately 3-5   hours either by Blood Panel Multiplexed PCR or Direct   MALDI-TOF. Details: [email or web address]  Organism Identification: Blood Culture PCR  Method Type: PCR                   Patient is a 63y old  Male who presents with a chief complaint of Necrotizing fasciitis     (08 Dec 2023 15:52)    Being followed by ID for necrotizing fascitis    Interval history:obelow the knee amputation  and formalization  some pain  No other fevers  stopped antibiotics  No acute events      ROS:  No cough,SOB,CP  No N/V/D./abd pain  No other complaints      Antimicrobials:      Other medications reviewed  Unqasyn 12/4-12/12    Vital Signs Last 24 Hrs  T(C): 39.3 (12-13-23 @ 06:21), Max: 39.3 (12-13-23 @ 06:21)  T(F): 102.8 (12-13-23 @ 06:21), Max: 102.8 (12-13-23 @ 06:21)  HR: 61 (12-13-23 @ 06:21) (61 - 96)  BP: 146/72 (12-13-23 @ 06:21) (124/60 - 169/85)  BP(mean): 111 (12-12-23 @ 17:45) (86 - 114)  RR: 18 (12-13-23 @ 06:21) (12 - 20)  SpO2: 92% (12-13-23 @ 06:21) (92% - 100%)    Physical Exam:        HEENT PERRLA EOMI    No oral exudate or erythema    Chest Good AE,CTA    CVS RRR S1 S2 WNl No murmur or rub or gallop    Abd soft BS normal No tenderness no masses  ext Left belowknee amputation-dressing no hematoma    IV site no erythema tenderness or discharge    CNS AAO X 3 no focal    Lab Data:                          9.8    12.55 )-----------( 324      ( 13 Dec 2023 08:40 )             31.7     WBC Count: 12.55 (12-13-23 @ 08:40)  WBC Count: 11.87 (12-12-23 @ 07:27)  WBC Count: 12.25 (12-11-23 @ 08:03)  WBC Count: 13.73 (12-10-23 @ 07:28)  WBC Count: 12.35 (12-09-23 @ 08:45)  WBC Count: 11.54 (12-08-23 @ 06:38)  WBC Count: 12.60 (12-07-23 @ 07:31)    12-13    134<L>  |  97  |  17  ----------------------------<  231<H>  5.2   |  28  |  1.11    Ca    8.4      13 Dec 2023 08:40  Phos  3.0     12-13  Mg     2.0     12-13        Culture - Blood (12.04.23 @ 05:49)   Specimen Source: .Blood Blood-Peripheral  Culture Results:   No growth at 5 days    Culture - Blood (12.04.23 @ 05:49)   Specimen Source: .Blood Blood-Peripheral  Culture Results:   No growth at 5 daysCulture - Other (11.30.23 @ 06:27)   - Amoxicillin/Clavulanic Acid: S <=8/4  - Ampicillin: R >16 These ampicillin results predict results for amoxicillin  - Ampicillin: S <=2 Predicts results to ampicillin/sulbactam, amoxacillin-clavulanate and piperacillin-tazobactam.  - Ampicillin/Sulbactam: S <=4/2  - Aztreonam: S <=4  - Cefazolin: S <=2  - Cefepime: S <=2  - Cefoxitin: S <=8  - Ceftriaxone: S <=1  - Ciprofloxacin: S <=0.25  - Ertapenem: S <=0.5  - Gentamicin: S <=2  - Imipenem: S <=1  - Levofloxacin: S <=0.5  - Meropenem: S <=1  - Piperacillin/Tazobactam: S <=8  - Tetracycline: R >8  - Tobramycin: S <=2  - Trimethoprim/Sulfamethoxazole: S <=0.5/9.5  - Vancomycin: S 2  Specimen Source: .Other Other  Culture Results:   Numerous Citrobacter koseri   Moderate Enterococcus faecalis  Organism Identification: Citrobacter koseri   Enterococcus faecalis  Organism: Citrobacter koseri  Organism: Enterococcus faecalis  Method Type: ARMEN  Method Type: MICCulture - Blood (11.29.23 @ 18:23)   - Blood PCR Panel: NEG  Gram Stain:   Growth in anaerobic bottle: Gram Positive Rods  Specimen Source: .Blood Blood-Venous  Organism: Blood Culture PCR  Culture Results:   Growth in anaerobic bottle: Dermabacter hominis "Susceptibilities not   performed"   Direct identification is available within approximately 3-5   hours either by Blood Panel Multiplexed PCR or Direct   MALDI-TOF. Details: [email or web address]  Organism Identification: Blood Culture PCR  Method Type: PCR                   Patient is a 63y old  Male who presents with a chief complaint of Necrotizing fasciitis     (08 Dec 2023 15:52)    Being followed by ID for necrotizing fascitis    Interval history:below the knee amputation  and formalization  some pain  No other fevers  stopped antibiotics  No acute events      ROS:  No cough,SOB,CP  No N/V/D./abd pain  No other complaints      Antimicrobials:      Other medications reviewed  Unqasyn 12/4-12/12    Vital Signs Last 24 Hrs  T(C): 39.3 (12-13-23 @ 06:21), Max: 39.3 (12-13-23 @ 06:21)  T(F): 102.8 (12-13-23 @ 06:21), Max: 102.8 (12-13-23 @ 06:21)  HR: 61 (12-13-23 @ 06:21) (61 - 96)  BP: 146/72 (12-13-23 @ 06:21) (124/60 - 169/85)  BP(mean): 111 (12-12-23 @ 17:45) (86 - 114)  RR: 18 (12-13-23 @ 06:21) (12 - 20)  SpO2: 92% (12-13-23 @ 06:21) (92% - 100%)    Physical Exam:        HEENT PERRLA EOMI    No oral exudate or erythema    Chest Good AE,CTA    CVS RRR S1 S2 WNl No murmur or rub or gallop    Abd soft BS normal No tenderness no masses  ext Left below knee amputation-dressing, no hematoma    IV site no erythema tenderness or discharge    CNS AAO X 3 no focal    Lab Data:                          9.8    12.55 )-----------( 324      ( 13 Dec 2023 08:40 )             31.7     WBC Count: 12.55 (12-13-23 @ 08:40)  WBC Count: 11.87 (12-12-23 @ 07:27)  WBC Count: 12.25 (12-11-23 @ 08:03)  WBC Count: 13.73 (12-10-23 @ 07:28)  WBC Count: 12.35 (12-09-23 @ 08:45)  WBC Count: 11.54 (12-08-23 @ 06:38)  WBC Count: 12.60 (12-07-23 @ 07:31)    12-13    134<L>  |  97  |  17  ----------------------------<  231<H>  5.2   |  28  |  1.11    Ca    8.4      13 Dec 2023 08:40  Phos  3.0     12-13  Mg     2.0     12-13        Culture - Blood (12.04.23 @ 05:49)   Specimen Source: .Blood Blood-Peripheral  Culture Results:   No growth at 5 days    Culture - Blood (12.04.23 @ 05:49)   Specimen Source: .Blood Blood-Peripheral  Culture Results:   No growth at 5 daysCulture - Other (11.30.23 @ 06:27)   - Amoxicillin/Clavulanic Acid: S <=8/4  - Ampicillin: R >16 These ampicillin results predict results for amoxicillin  - Ampicillin: S <=2 Predicts results to ampicillin/sulbactam, amoxacillin-clavulanate and piperacillin-tazobactam.  - Ampicillin/Sulbactam: S <=4/2  - Aztreonam: S <=4  - Cefazolin: S <=2  - Cefepime: S <=2  - Cefoxitin: S <=8  - Ceftriaxone: S <=1  - Ciprofloxacin: S <=0.25  - Ertapenem: S <=0.5  - Gentamicin: S <=2  - Imipenem: S <=1  - Levofloxacin: S <=0.5  - Meropenem: S <=1  - Piperacillin/Tazobactam: S <=8  - Tetracycline: R >8  - Tobramycin: S <=2  - Trimethoprim/Sulfamethoxazole: S <=0.5/9.5  - Vancomycin: S 2  Specimen Source: .Other Other  Culture Results:   Numerous Citrobacter koseri   Moderate Enterococcus faecalis  Organism Identification: Citrobacter koseri   Enterococcus faecalis  Organism: Citrobacter koseri  Organism: Enterococcus faecalis  Method Type: ARMEN  Method Type: MICCulture - Blood (11.29.23 @ 18:23)   - Blood PCR Panel: NEG  Gram Stain:   Growth in anaerobic bottle: Gram Positive Rods  Specimen Source: .Blood Blood-Venous  Organism: Blood Culture PCR  Culture Results:   Growth in anaerobic bottle: Dermabacter hominis "Susceptibilities not   performed"   Direct identification is available within approximately 3-5   hours either by Blood Panel Multiplexed PCR or Direct   MALDI-TOF. Details: [email or web address]  Organism Identification: Blood Culture PCR  Method Type: PCR                   Patient is a 63y old  Male who presents with a chief complaint of Necrotizing fasciitis     (08 Dec 2023 15:52)    Being followed by ID for necrotizing fascitis    Interval history:below the knee amputation  and formalization  some pain  No other fevers  stopped antibiotics  No acute events      ROS:  No cough,SOB,CP  No N/V/D./abd pain  No other complaints      Antimicrobials:      Other medications reviewed  Unqasyn 12/4-12/12    Vital Signs Last 24 Hrs  T(C): 39.3 (12-13-23 @ 06:21), Max: 39.3 (12-13-23 @ 06:21)  T(F): 102.8 (12-13-23 @ 06:21), Max: 102.8 (12-13-23 @ 06:21)  HR: 61 (12-13-23 @ 06:21) (61 - 96)  BP: 146/72 (12-13-23 @ 06:21) (124/60 - 169/85)  BP(mean): 111 (12-12-23 @ 17:45) (86 - 114)  RR: 18 (12-13-23 @ 06:21) (12 - 20)  SpO2: 92% (12-13-23 @ 06:21) (92% - 100%)    Physical Exam:        HEENT PERRLA EOMI    No oral exudate or erythema    Chest Good AE,CTA    CVS RRR S1 S2 WNl No murmur or rub or gallop    Abd soft BS normal No tenderness no masses  ext Left below knee amputation-dressing, no hematoma    IV site no erythema tenderness or discharge    CNS AAO X 3 no focal    Lab Data:                          9.8    12.55 )-----------( 324      ( 13 Dec 2023 08:40 )             31.7     WBC Count: 12.55 (12-13-23 @ 08:40)  WBC Count: 11.87 (12-12-23 @ 07:27)  WBC Count: 12.25 (12-11-23 @ 08:03)  WBC Count: 13.73 (12-10-23 @ 07:28)  WBC Count: 12.35 (12-09-23 @ 08:45)  WBC Count: 11.54 (12-08-23 @ 06:38)  WBC Count: 12.60 (12-07-23 @ 07:31)    12-13    134<L>  |  97  |  17  ----------------------------<  231<H>  5.2   |  28  |  1.11    Ca    8.4      13 Dec 2023 08:40  Phos  3.0     12-13  Mg     2.0     12-13        Culture - Blood (12.04.23 @ 05:49)   Specimen Source: .Blood Blood-Peripheral  Culture Results:   No growth at 5 days    Culture - Blood (12.04.23 @ 05:49)   Specimen Source: .Blood Blood-Peripheral  Culture Results:   No growth at 5 daysCulture - Other (11.30.23 @ 06:27)   - Amoxicillin/Clavulanic Acid: S <=8/4  - Ampicillin: R >16 These ampicillin results predict results for amoxicillin  - Ampicillin: S <=2 Predicts results to ampicillin/sulbactam, amoxacillin-clavulanate and piperacillin-tazobactam.  - Ampicillin/Sulbactam: S <=4/2  - Aztreonam: S <=4  - Cefazolin: S <=2  - Cefepime: S <=2  - Cefoxitin: S <=8  - Ceftriaxone: S <=1  - Ciprofloxacin: S <=0.25  - Ertapenem: S <=0.5  - Gentamicin: S <=2  - Imipenem: S <=1  - Levofloxacin: S <=0.5  - Meropenem: S <=1  - Piperacillin/Tazobactam: S <=8  - Tetracycline: R >8  - Tobramycin: S <=2  - Trimethoprim/Sulfamethoxazole: S <=0.5/9.5  - Vancomycin: S 2  Specimen Source: .Other Other  Culture Results:   Numerous Citrobacter koseri   Moderate Enterococcus faecalis  Organism Identification: Citrobacter koseri   Enterococcus faecalis  Organism: Citrobacter koseri  Organism: Enterococcus faecalis  Method Type: ARMEN  Method Type: MICCulture - Blood (11.29.23 @ 18:23)   - Blood PCR Panel: NEG  Gram Stain:   Growth in anaerobic bottle: Gram Positive Rods  Specimen Source: .Blood Blood-Venous  Organism: Blood Culture PCR  Culture Results:   Growth in anaerobic bottle: Dermabacter hominis "Susceptibilities not   performed"   Direct identification is available within approximately 3-5   hours either by Blood Panel Multiplexed PCR or Direct   MALDI-TOF. Details: [email or web address]  Organism Identification: Blood Culture PCR  Method Type: PCR

## 2023-12-14 DIAGNOSIS — R50.82 POSTPROCEDURAL FEVER: ICD-10-CM

## 2023-12-14 LAB
ANION GAP SERPL CALC-SCNC: 6 MMOL/L — SIGNIFICANT CHANGE UP (ref 5–17)
ANION GAP SERPL CALC-SCNC: 6 MMOL/L — SIGNIFICANT CHANGE UP (ref 5–17)
APPEARANCE UR: CLEAR — SIGNIFICANT CHANGE UP
APPEARANCE UR: CLEAR — SIGNIFICANT CHANGE UP
BACTERIA # UR AUTO: NEGATIVE /HPF — SIGNIFICANT CHANGE UP
BACTERIA # UR AUTO: NEGATIVE /HPF — SIGNIFICANT CHANGE UP
BILIRUB UR-MCNC: NEGATIVE — SIGNIFICANT CHANGE UP
BILIRUB UR-MCNC: NEGATIVE — SIGNIFICANT CHANGE UP
BUN SERPL-MCNC: 12 MG/DL — SIGNIFICANT CHANGE UP (ref 7–23)
BUN SERPL-MCNC: 12 MG/DL — SIGNIFICANT CHANGE UP (ref 7–23)
CALCIUM SERPL-MCNC: 8.4 MG/DL — SIGNIFICANT CHANGE UP (ref 8.4–10.5)
CALCIUM SERPL-MCNC: 8.4 MG/DL — SIGNIFICANT CHANGE UP (ref 8.4–10.5)
CAST: 1 /LPF — SIGNIFICANT CHANGE UP (ref 0–4)
CAST: 1 /LPF — SIGNIFICANT CHANGE UP (ref 0–4)
CHLORIDE SERPL-SCNC: 98 MMOL/L — SIGNIFICANT CHANGE UP (ref 96–108)
CHLORIDE SERPL-SCNC: 98 MMOL/L — SIGNIFICANT CHANGE UP (ref 96–108)
CO2 SERPL-SCNC: 31 MMOL/L — SIGNIFICANT CHANGE UP (ref 22–31)
CO2 SERPL-SCNC: 31 MMOL/L — SIGNIFICANT CHANGE UP (ref 22–31)
COLOR SPEC: YELLOW — SIGNIFICANT CHANGE UP
COLOR SPEC: YELLOW — SIGNIFICANT CHANGE UP
CREAT SERPL-MCNC: 0.87 MG/DL — SIGNIFICANT CHANGE UP (ref 0.5–1.3)
CREAT SERPL-MCNC: 0.87 MG/DL — SIGNIFICANT CHANGE UP (ref 0.5–1.3)
DIFF PNL FLD: ABNORMAL
DIFF PNL FLD: ABNORMAL
EGFR: 97 ML/MIN/1.73M2 — SIGNIFICANT CHANGE UP
EGFR: 97 ML/MIN/1.73M2 — SIGNIFICANT CHANGE UP
GLUCOSE SERPL-MCNC: 193 MG/DL — HIGH (ref 70–99)
GLUCOSE SERPL-MCNC: 193 MG/DL — HIGH (ref 70–99)
GLUCOSE UR QL: 250 MG/DL
GLUCOSE UR QL: 250 MG/DL
HCT VFR BLD CALC: 31.1 % — LOW (ref 39–50)
HCT VFR BLD CALC: 31.1 % — LOW (ref 39–50)
HGB BLD-MCNC: 9.4 G/DL — LOW (ref 13–17)
HGB BLD-MCNC: 9.4 G/DL — LOW (ref 13–17)
KETONES UR-MCNC: NEGATIVE MG/DL — SIGNIFICANT CHANGE UP
KETONES UR-MCNC: NEGATIVE MG/DL — SIGNIFICANT CHANGE UP
LEUKOCYTE ESTERASE UR-ACNC: NEGATIVE — SIGNIFICANT CHANGE UP
LEUKOCYTE ESTERASE UR-ACNC: NEGATIVE — SIGNIFICANT CHANGE UP
MAGNESIUM SERPL-MCNC: 1.9 MG/DL — SIGNIFICANT CHANGE UP (ref 1.6–2.6)
MAGNESIUM SERPL-MCNC: 1.9 MG/DL — SIGNIFICANT CHANGE UP (ref 1.6–2.6)
MCHC RBC-ENTMCNC: 25.8 PG — LOW (ref 27–34)
MCHC RBC-ENTMCNC: 25.8 PG — LOW (ref 27–34)
MCHC RBC-ENTMCNC: 30.2 GM/DL — LOW (ref 32–36)
MCHC RBC-ENTMCNC: 30.2 GM/DL — LOW (ref 32–36)
MCV RBC AUTO: 85.4 FL — SIGNIFICANT CHANGE UP (ref 80–100)
MCV RBC AUTO: 85.4 FL — SIGNIFICANT CHANGE UP (ref 80–100)
NITRITE UR-MCNC: NEGATIVE — SIGNIFICANT CHANGE UP
NITRITE UR-MCNC: NEGATIVE — SIGNIFICANT CHANGE UP
NRBC # BLD: 0 /100 WBCS — SIGNIFICANT CHANGE UP (ref 0–0)
NRBC # BLD: 0 /100 WBCS — SIGNIFICANT CHANGE UP (ref 0–0)
PH UR: 6.5 — SIGNIFICANT CHANGE UP (ref 5–8)
PH UR: 6.5 — SIGNIFICANT CHANGE UP (ref 5–8)
PHOSPHATE SERPL-MCNC: 2.2 MG/DL — LOW (ref 2.5–4.5)
PHOSPHATE SERPL-MCNC: 2.2 MG/DL — LOW (ref 2.5–4.5)
PLATELET # BLD AUTO: 305 K/UL — SIGNIFICANT CHANGE UP (ref 150–400)
PLATELET # BLD AUTO: 305 K/UL — SIGNIFICANT CHANGE UP (ref 150–400)
POTASSIUM SERPL-MCNC: 4.5 MMOL/L — SIGNIFICANT CHANGE UP (ref 3.5–5.3)
POTASSIUM SERPL-MCNC: 4.5 MMOL/L — SIGNIFICANT CHANGE UP (ref 3.5–5.3)
POTASSIUM SERPL-SCNC: 4.5 MMOL/L — SIGNIFICANT CHANGE UP (ref 3.5–5.3)
POTASSIUM SERPL-SCNC: 4.5 MMOL/L — SIGNIFICANT CHANGE UP (ref 3.5–5.3)
PROT UR-MCNC: 100 MG/DL
PROT UR-MCNC: 100 MG/DL
RBC # BLD: 3.64 M/UL — LOW (ref 4.2–5.8)
RBC # BLD: 3.64 M/UL — LOW (ref 4.2–5.8)
RBC # FLD: 15.7 % — HIGH (ref 10.3–14.5)
RBC # FLD: 15.7 % — HIGH (ref 10.3–14.5)
RBC CASTS # UR COMP ASSIST: 2 /HPF — SIGNIFICANT CHANGE UP (ref 0–4)
RBC CASTS # UR COMP ASSIST: 2 /HPF — SIGNIFICANT CHANGE UP (ref 0–4)
REVIEW: SIGNIFICANT CHANGE UP
REVIEW: SIGNIFICANT CHANGE UP
SODIUM SERPL-SCNC: 135 MMOL/L — SIGNIFICANT CHANGE UP (ref 135–145)
SODIUM SERPL-SCNC: 135 MMOL/L — SIGNIFICANT CHANGE UP (ref 135–145)
SP GR SPEC: 1.02 — SIGNIFICANT CHANGE UP (ref 1–1.03)
SP GR SPEC: 1.02 — SIGNIFICANT CHANGE UP (ref 1–1.03)
SQUAMOUS # UR AUTO: 0 /HPF — SIGNIFICANT CHANGE UP (ref 0–5)
SQUAMOUS # UR AUTO: 0 /HPF — SIGNIFICANT CHANGE UP (ref 0–5)
UROBILINOGEN FLD QL: 0.2 MG/DL — SIGNIFICANT CHANGE UP (ref 0.2–1)
UROBILINOGEN FLD QL: 0.2 MG/DL — SIGNIFICANT CHANGE UP (ref 0.2–1)
WBC # BLD: 11.56 K/UL — HIGH (ref 3.8–10.5)
WBC # BLD: 11.56 K/UL — HIGH (ref 3.8–10.5)
WBC # FLD AUTO: 11.56 K/UL — HIGH (ref 3.8–10.5)
WBC # FLD AUTO: 11.56 K/UL — HIGH (ref 3.8–10.5)
WBC UR QL: 1 /HPF — SIGNIFICANT CHANGE UP (ref 0–5)
WBC UR QL: 1 /HPF — SIGNIFICANT CHANGE UP (ref 0–5)

## 2023-12-14 PROCEDURE — 99233 SBSQ HOSP IP/OBS HIGH 50: CPT

## 2023-12-14 RX ORDER — INSULIN GLARGINE 100 [IU]/ML
32 INJECTION, SOLUTION SUBCUTANEOUS AT BEDTIME
Refills: 0 | Status: DISCONTINUED | OUTPATIENT
Start: 2023-12-14 | End: 2023-12-16

## 2023-12-14 RX ORDER — SODIUM,POTASSIUM PHOSPHATES 278-250MG
1 POWDER IN PACKET (EA) ORAL ONCE
Refills: 0 | Status: COMPLETED | OUTPATIENT
Start: 2023-12-14 | End: 2023-12-14

## 2023-12-14 RX ADMIN — SIMVASTATIN 40 MILLIGRAM(S): 20 TABLET, FILM COATED ORAL at 22:20

## 2023-12-14 RX ADMIN — OXYCODONE HYDROCHLORIDE 10 MILLIGRAM(S): 5 TABLET ORAL at 06:21

## 2023-12-14 RX ADMIN — INSULIN GLARGINE 32 UNIT(S): 100 INJECTION, SOLUTION SUBCUTANEOUS at 22:20

## 2023-12-14 RX ADMIN — Medication 16 UNIT(S): at 10:05

## 2023-12-14 RX ADMIN — OXYCODONE HYDROCHLORIDE 10 MILLIGRAM(S): 5 TABLET ORAL at 21:16

## 2023-12-14 RX ADMIN — Medication 10 MILLIGRAM(S): at 06:15

## 2023-12-14 RX ADMIN — OXYCODONE HYDROCHLORIDE 10 MILLIGRAM(S): 5 TABLET ORAL at 12:24

## 2023-12-14 RX ADMIN — ENOXAPARIN SODIUM 40 MILLIGRAM(S): 100 INJECTION SUBCUTANEOUS at 06:14

## 2023-12-14 RX ADMIN — Medication 2: at 10:05

## 2023-12-14 RX ADMIN — GABAPENTIN 300 MILLIGRAM(S): 400 CAPSULE ORAL at 06:15

## 2023-12-14 RX ADMIN — Medication 2: at 18:20

## 2023-12-14 RX ADMIN — Medication 100 MILLIGRAM(S): at 23:08

## 2023-12-14 RX ADMIN — SENNA PLUS 2 TABLET(S): 8.6 TABLET ORAL at 22:22

## 2023-12-14 RX ADMIN — OXYCODONE HYDROCHLORIDE 10 MILLIGRAM(S): 5 TABLET ORAL at 07:21

## 2023-12-14 RX ADMIN — Medication 975 MILLIGRAM(S): at 11:16

## 2023-12-14 RX ADMIN — OXYCODONE HYDROCHLORIDE 10 MILLIGRAM(S): 5 TABLET ORAL at 00:04

## 2023-12-14 RX ADMIN — OXYCODONE HYDROCHLORIDE 10 MILLIGRAM(S): 5 TABLET ORAL at 13:24

## 2023-12-14 RX ADMIN — Medication 975 MILLIGRAM(S): at 06:16

## 2023-12-14 RX ADMIN — POLYETHYLENE GLYCOL 3350 17 GRAM(S): 17 POWDER, FOR SOLUTION ORAL at 22:22

## 2023-12-14 RX ADMIN — GABAPENTIN 300 MILLIGRAM(S): 400 CAPSULE ORAL at 22:19

## 2023-12-14 RX ADMIN — Medication 975 MILLIGRAM(S): at 07:16

## 2023-12-14 RX ADMIN — Medication 1 PACKET(S): at 11:16

## 2023-12-14 RX ADMIN — GABAPENTIN 300 MILLIGRAM(S): 400 CAPSULE ORAL at 13:39

## 2023-12-14 RX ADMIN — Medication 2: at 13:15

## 2023-12-14 RX ADMIN — Medication 975 MILLIGRAM(S): at 18:19

## 2023-12-14 RX ADMIN — Medication 10 MILLIGRAM(S): at 18:19

## 2023-12-14 RX ADMIN — Medication 975 MILLIGRAM(S): at 23:04

## 2023-12-14 RX ADMIN — OXYCODONE HYDROCHLORIDE 10 MILLIGRAM(S): 5 TABLET ORAL at 22:16

## 2023-12-14 RX ADMIN — Medication 975 MILLIGRAM(S): at 19:19

## 2023-12-14 RX ADMIN — Medication 16 UNIT(S): at 13:16

## 2023-12-14 RX ADMIN — Medication 975 MILLIGRAM(S): at 12:16

## 2023-12-14 RX ADMIN — Medication 14 UNIT(S): at 18:20

## 2023-12-14 RX ADMIN — TAMSULOSIN HYDROCHLORIDE 0.4 MILLIGRAM(S): 0.4 CAPSULE ORAL at 22:20

## 2023-12-14 RX ADMIN — Medication 975 MILLIGRAM(S): at 00:16

## 2023-12-14 NOTE — PROCEDURE NOTE - NSURITECHNIQUE_GU_A_CORE
Proper hand hygiene was performed/Sterile gloves were worn for the duration of the procedure/A sterile drape was used to cover all adjacent areas/The site was cleaned with soap/water and sterile solution (betadine)/The catheter was appropriately lubricated/The catheter was secured with a securement device (e.g. StatLock)/The urinary drainage system is closed at the end of the procedure/The collection bag is below the level of the patient and urinary bladder
Proper hand hygiene was performed/Sterile gloves were worn for the duration of the procedure/A sterile drape was used to cover all adjacent areas/The site was cleaned with soap/water and sterile solution (betadine)/The catheter was appropriately lubricated/The catheter was secured with a securement device (e.g. StatLock)/The urinary drainage system is closed at the end of the procedure/The collection bag is below the level of the patient and urinary bladder/All applicable medical record documentation is completed

## 2023-12-14 NOTE — PROCEDURE NOTE - ADDITIONAL PROCEDURE DETAILS
called to help place difficult catheter after unsuccessful attempt  using aseptic technique, a 18f coude was placed with return of clear yellow urine. 10cc in balloon  plan for crain per primary team
called to see patient for difficult crain placement.  Unsuccessful attempt by nursing staff.  18F coude crain placed under typical sterile technique.  No complications.  Patient tolerated procedure well.  ~700cc clear yellow urine drained.  Crain plan as per primary team.

## 2023-12-14 NOTE — PROGRESS NOTE ADULT - ASSESSMENT
63M PMHx DM, HLD, PAD, prior toe amputation, presents to [facility] ED w L foot wound.   Reports wound has been present on hindfoot x 3 weeks. Started as small cut. Over last 24 hours, pt unable to ambulate d/t severe pain.   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  Blood culture negative   Necrotising fascitis Left foot s/p BKA s/p formalization   Leucocytosis  Dermabacter hominis bacteremia  s/p formalization of necrotizing fascitis    Wound looks clean,he was given  vanco.zosyn 11/29-12/4 and the  unasyn dced 12/4-12/12  He however had a fever-?Post OP  100.2 -mild WBC  Mild rhonchi-no more signs of pneumonia        A)Fevers   ?post op  He has no more fevers   check a CXR to r/o Pneumonia  We will otherwise monitor him  For now, monitor him off antimicrobials-watch the CXR.  Monitor his WBC  If he has a fever or CXR has a pneumonia, we check Blood cultures, urine cx, Nasal RVP and start him on vanco+zosyn  Please monitor his Left BKA      we will discuss with Dr Gutierrez  Will tailor plan for ID issues  per course,results.Will defer to primary team on management of other issues.  63M PMHx DM, HLD, PAD, prior toe amputation, presents to [facility] ED w L foot wound.   Reports wound has been present on hindfoot x 3 weeks. Started as small cut. Over last 24 hours, pt unable to ambulate d/t severe pain.   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  Blood culture negative   Necrotising fascitis Left foot s/p BKA s/p formalization   Leucocytosis  Dermabacter hominis bacteremia  s/p formalization of necrotizing fascitis    Wound looks clean,he was given  vanco.zosyn 11/29-12/4 and the  unasyn dced 12/4-12/12  He however had a fever-?Post OP  100.2 -mild WBC  Mild rhonchi-no more signs of pneumonia        A)Fevers   ?post op  He has no more fevers   check a CXR to r/o Pneumonia  We will otherwise monitor him  For now, monitor him off antimicrobials-watch the CXR.  Monitor his WBC  If he has a fever or CXR has a pneumonia, we check Blood cultures, urine cx, Nasal RVP and start him on vanco+zosyn  Please monitor his Left BKA      we will discuss with Dr Gutierrez  Will tailor plan for ID issues  per course,results.Will defer to primary team on management of other issues.  63M PMHx DM, HLD, PAD, prior toe amputation, presents to [facility] ED w L foot wound.   Reports wound has been present on hindfoot x 3 weeks. Started as small cut. Over last 24 hours, pt unable to ambulate d/t severe pain.   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  Blood culture negative   Necrotising fascitis Left foot s/p BKA s/p formalization  Mild leucocytosis  Dermabacter hominis bacteremia  s/p formalization of necrotizing fascitis    Wound looks clean,he was given  vanco.zosyn 11/29-12/4 and the  unasyn dced 12/4-12/12  He however has a fever-?Post OP    A)Fevers   ?post op  He has no more fevers   We will monitor him  For now, monitor him off antimicrobials.  If he has fever, we will check blood culture,urine cx and CXR.  Check a Nasal RVP  We will monitor his wound   Then we  will start him on vanco+ zosyn( if fevers continue)  Monitor his WBC    B) Rhonchi  we monitor his lung capacity,he is asked to do the pulmonary Exercisor   Monitor his lung volumes      we will discuss with Dr Gutierrez  Will tailor plan for ID issues  per course,results.Will defer to primary team on management of other issues.  63M PMHx DM, HLD, PAD, prior toe amputation, presents to [facility] ED w L foot wound.   Reports wound has been present on hindfoot x 3 weeks. Started as small cut. Over last 24 hours, pt unable to ambulate d/t severe pain.   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  Blood culture negative   Necrotising fascitis Left foot s/p BKA s/p formalization  Mild leucocytosis  Dermabacter hominis bacteremia  s/p formalization of necrotizing fascitis    Wound looks clean,he was given  vanco.zosyn 11/29-12/4 and the  unasyn dced 12/4-12/12  He however has a fever-?Post OP    A)Fevers   ?post op  He has no more fevers   We will monitor him  For now, monitor him off antimicrobials.  If he has fever, we will check blood culture,urine cx and CXR.  Check a Nasal RVP  We will monitor his wound   Then we  will start him on vanco+ zosyn( if fevers continue)  Monitor his WBC    B) Rhonchi  we monitor his lung capacity,he is asked to do the pulmonary Exercisor   Monitor his lung volumes      we will discuss with Dr Gutierrez  Will tailor plan for ID issues  per course,results.Will defer to primary team on management of other issues.  63M PMHx DM, HLD, PAD, prior toe amputation, presents to [facility] ED w L foot wound.   Reports wound has been present on hindfoot x 3 weeks. Started as small cut. Over last 24 hours, pt unable to ambulate d/t severe pain.   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  Blood culture negative   Necrotising fascitis Left foot s/p BKA s/p formalization  Mild leucocytosis  Dermabacter hominis bacteremia  s/p formalization of necrotizing fascitis    Wound looks clean,he was given  vanco.zosyn 11/29-12/4 and the  unasyn dced 12/4-12/12  He however has a fever-?Post OP    A)Fevers   ?post op  He has no more fevers   We will monitor him  For now, monitor him off antimicrobials.  If he has fever, we will check blood culture,urine cx and CXR.  Check a Nasal RVP  We will monitor his wound   Then we  will start him on vanco+ zosyn( if fevers continue)  Monitor his WBC    B) Rhonchi  No pneumonia  we will monitor him      we will discuss with Dr Gutierrez  Will tailor plan for ID issues  per course,results.Will defer to primary team on management of other issues.  63M PMHx DM, HLD, PAD, prior toe amputation, presents to [facility] ED w L foot wound.   Reports wound has been present on hindfoot x 3 weeks. Started as small cut. Over last 24 hours, pt unable to ambulate d/t severe pain.   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  Blood culture negative   Necrotising fascitis Left foot s/p BKA s/p formalization  Mild leucocytosis  Dermabacter hominis bacteremia  s/p formalization of necrotizing fascitis    Wound looks clean,he was given  vanco.zosyn 11/29-12/4 and the  unasyn dced 12/4-12/12  He however has a fever-?Post OP    A)Fevers   ?post op  He has no more fevers   We will monitor him  For now, monitor him off antimicrobials.  If he has fever, we will check blood culture,urine cx and CXR.  Check a Nasal RVP  We will monitor his wound   Then we  will start him on vanco+ zosyn( if fevers continue)  Monitor his WBC    B) Rhonchi  No pneumonia  we will monitor him      we will discuss with Dr Gutierrez  Will tailor plan for ID issues  per course,results.Will defer to primary team on management of other issues.

## 2023-12-14 NOTE — PROVIDER CONTACT NOTE (OTHER) - SITUATION
pt tachycardic , orthostatic vitals showing pt /98 lying, 176/95 sitting, and 110/72 standing
Oral temperature 102.8
 Contacted regarding Patients High BP see flow sheet.
Patient retaining urine
Pt retaining 650 mL urine.

## 2023-12-14 NOTE — PROGRESS NOTE ADULT - SUBJECTIVE AND OBJECTIVE BOX
Patient is a 63y old  Male who presents with a chief complaint of Necrotizing fasciitis     (08 Dec 2023 15:52)    Being followed by ID for necrotizing fascitis    Interval history:he si doing well  has a crain  denied any Pyuria  some pain  No acute events      ROS:  No cough,SOB,CP  No N/V/D./abd pain  No other complaints      Antimicrobials:      Other medications reviewed  MEDICATIONS  (STANDING):  acetaminophen     Tablet .. 975 milliGRAM(s) Oral every 6 hours  dextrose 5%. 1000 milliLiter(s) (50 mL/Hr) IV Continuous <Continuous>  dextrose 5%. 1000 milliLiter(s) (100 mL/Hr) IV Continuous <Continuous>  dextrose 50% Injectable 25 Gram(s) IV Push once  dextrose 50% Injectable 12.5 Gram(s) IV Push once  dextrose 50% Injectable 25 Gram(s) IV Push once  enalapril 10 milliGRAM(s) Oral every 12 hours  enoxaparin Injectable 40 milliGRAM(s) SubCutaneous every 24 hours  gabapentin 300 milliGRAM(s) Oral three times a day  glucagon  Injectable 1 milliGRAM(s) IntraMuscular once  insulin glargine Injectable (LANTUS) 32 Unit(s) SubCutaneous at bedtime  insulin lispro (ADMELOG) corrective regimen sliding scale   SubCutaneous at bedtime  insulin lispro (ADMELOG) corrective regimen sliding scale   SubCutaneous three times a day before meals  insulin lispro Injectable (ADMELOG) 16 Unit(s) SubCutaneous before lunch  insulin lispro Injectable (ADMELOG) 14 Unit(s) SubCutaneous before dinner  insulin lispro Injectable (ADMELOG) 16 Unit(s) SubCutaneous before breakfast  polyethylene glycol 3350 17 Gram(s) Oral every 12 hours  senna 2 Tablet(s) Oral at bedtime  simvastatin 40 milliGRAM(s) Oral at bedtime  tamsulosin 0.4 milliGRAM(s) Oral at bedtime      Vital Signs Last 24 Hrs  T(C): 36 (12-14-23 @ 08:48), Max: 37.9 (12-14-23 @ 06:08)  T(F): 96.8 (12-14-23 @ 08:48), Max: 100.2 (12-14-23 @ 06:08)  HR: 82 (12-14-23 @ 08:48) (82 - 102)  BP: 114/71 (12-14-23 @ 08:48) (107/61 - 200/96)  BP(mean): --  RR: 17 (12-14-23 @ 08:48) (17 - 18)  SpO2: 94% (12-14-23 @ 08:48) (90% - 94%)    Physical Exam:    HEENT PERRLA EOMI    No oral exudate or erythema    Chest Good AE,Mild R rhonchi    CVS RRR S1 S2 WNl No murmur or rub or gallop    Abd soft BS normal No tenderness no masses  ext Left below knee amputation-dressing, no hematoma    IV site no erythema tenderness or discharge    CNS AAO X 3 no focal    Lab Data:                          9.8    12.55 )-----------( 324      ( 13 Dec 2023 08:40 )             31.7     WBC Count: 12.55 (12-13-23 @ 08:40)  WBC Count: 11.87 (12-12-23 @ 07:27)  WBC Count: 12.25 (12-11-23 @ 08:03)  WBC Count: 13.73 (12-10-23 @ 07:28)  WBC Count: 12.35 (12-09-23 @ 08:45)  WBC Count: 11.54 (12-08-23 @ 06:38)    12-13    134<L>  |  97  |  17  ----------------------------<  231<H>  5.2   |  28  |  1.11    Ca    8.4      13 Dec 2023 08:40  Phos  3.0     12-13  Mg     2.0     12-13        Culture - Blood (12.04.23 @ 05:49)   Specimen Source: .Blood Blood-Peripheral  Culture Results:   No growth at 5 days                   Patient is a 63y old  Male who presents with a chief complaint of Necrotizing fasciitis     (08 Dec 2023 15:52)    Being followed by ID for necrotizing fascitis    Interval history:he si doing well  has a crain  denied any Pyuria  some pain  No acute events      ROS:  No cough,SOB,CP  No N/V/D./abd pain  No other complaints      Antimicrobials:      Other medications reviewed  MEDICATIONS  (STANDING):  acetaminophen     Tablet .. 975 milliGRAM(s) Oral every 6 hours  dextrose 5%. 1000 milliLiter(s) (50 mL/Hr) IV Continuous <Continuous>  dextrose 5%. 1000 milliLiter(s) (100 mL/Hr) IV Continuous <Continuous>  dextrose 50% Injectable 25 Gram(s) IV Push once  dextrose 50% Injectable 12.5 Gram(s) IV Push once  dextrose 50% Injectable 25 Gram(s) IV Push once  enalapril 10 milliGRAM(s) Oral every 12 hours  enoxaparin Injectable 40 milliGRAM(s) SubCutaneous every 24 hours  gabapentin 300 milliGRAM(s) Oral three times a day  glucagon  Injectable 1 milliGRAM(s) IntraMuscular once  insulin glargine Injectable (LANTUS) 32 Unit(s) SubCutaneous at bedtime  insulin lispro (ADMELOG) corrective regimen sliding scale   SubCutaneous at bedtime  insulin lispro (ADMELOG) corrective regimen sliding scale   SubCutaneous three times a day before meals  insulin lispro Injectable (ADMELOG) 16 Unit(s) SubCutaneous before lunch  insulin lispro Injectable (ADMELOG) 14 Unit(s) SubCutaneous before dinner  insulin lispro Injectable (ADMELOG) 16 Unit(s) SubCutaneous before breakfast  polyethylene glycol 3350 17 Gram(s) Oral every 12 hours  senna 2 Tablet(s) Oral at bedtime  simvastatin 40 milliGRAM(s) Oral at bedtime  tamsulosin 0.4 milliGRAM(s) Oral at bedtime      Vital Signs Last 24 Hrs  T(C): 36 (12-14-23 @ 08:48), Max: 37.9 (12-14-23 @ 06:08)  T(F): 96.8 (12-14-23 @ 08:48), Max: 100.2 (12-14-23 @ 06:08)  HR: 82 (12-14-23 @ 08:48) (82 - 102)  BP: 114/71 (12-14-23 @ 08:48) (107/61 - 200/96)  BP(mean): --  RR: 17 (12-14-23 @ 08:48) (17 - 18)  SpO2: 94% (12-14-23 @ 08:48) (90% - 94%)    Physical Exam:    HEENT PERRLA EOMI    No oral exudate or erythema    Chest Good AE,Mild R rhonchi    CVS RRR S1 S2 WNl No murmur or rub or gallop    Abd soft BS normal No tenderness no masses  ext Left below knee amputation-dressing, no hematoma    IV site no erythema tenderness or discharge    CNS AAO X 3 no focal    Lab Data:    12/14 WBC 11.56 Hb 9.4 Plt 305                        9.8    12.55 )-----------( 324      ( 13 Dec 2023 08:40 )             31.7     WBC Count: 12.55 (12-13-23 @ 08:40)  WBC Count: 11.87 (12-12-23 @ 07:27)  WBC Count: 12.25 (12-11-23 @ 08:03)  WBC Count: 13.73 (12-10-23 @ 07:28)  WBC Count: 12.35 (12-09-23 @ 08:45)  WBC Count: 11.54 (12-08-23 @ 06:38)    12-13    134<L>  |  97  |  17  ----------------------------<  231<H>  5.2   |  28  |  1.11    Ca    8.4      13 Dec 2023 08:40  Phos  3.0     12-13  Mg     2.0     12-13        Culture - Blood (12.04.23 @ 05:49)   Specimen Source: .Blood Blood-Peripheral  Culture Results:   No growth at 5 days

## 2023-12-14 NOTE — PROVIDER CONTACT NOTE (OTHER) - ASSESSMENT
Oral Temp 102.8   /72 HR 61  Pt denies pain & discomfort at this time no abnormal symptoms
Patient denies any chest pain or leg pain. Patient did state feeling anxious in new environment
Pt retaining 650 mL urine. MD John Tyson made aware.
Patient A&Ox4, forgetful. Patient complaining of inability to urinate despite having sensation to go. RN performed bladder scan and found patient to be retaining 619cc. Patient attempted to urinate multiple times, including standing with assistance of sarasteady but unable to void.
Pt orthostatic vital signs performed with SBP decreasing significant amount when going from lying to sitting, to standing position. Pt denies any dizziness or lightheadedness, no c/o of CP or SOB. Pt also tachycardic with .

## 2023-12-14 NOTE — PROVIDER CONTACT NOTE (OTHER) - BACKGROUND
Pt was admitted on 11/29 and is s/p OR for L BKA
Pt s/p L BKA. Patient noted to be febrile this AM to 102.8
L BKA
Pt s/p BKA.

## 2023-12-14 NOTE — PROGRESS NOTE ADULT - SUBJECTIVE AND OBJECTIVE BOX
SURGERY DAILY PROGRESS NOTE:       SUBJECTIVE/ROS: Patient seen and evaluated. Feels well. Pain controlled. Failed trial of void, and crain catheter placed for rentention.        OBJECTIVE:    Vital Signs Last 24 Hrs  T(C): 37.1 (14 Dec 2023 06:30), Max: 37.9 (14 Dec 2023 06:08)  T(F): 98.8 (14 Dec 2023 06:30), Max: 100.2 (14 Dec 2023 06:08)  HR: 102 (14 Dec 2023 06:30) (86 - 102)  BP: 200/96 (14 Dec 2023 06:30) (107/61 - 200/96)  BP(mean): --  RR: 18 (14 Dec 2023 06:30) (17 - 18)  SpO2: 92% (14 Dec 2023 06:30) (90% - 94%)    Parameters below as of 14 Dec 2023 06:30  Patient On (Oxygen Delivery Method): room air      I&O's Detail    13 Dec 2023 07:01  -  14 Dec 2023 07:00  --------------------------------------------------------  IN:    Oral Fluid: 1110 mL  Total IN: 1110 mL    OUT:    Intermittent Catheterization - Urethral (mL): 1200 mL    Voided (mL): 0 mL  Total OUT: 1200 mL    Total NET: -90 mL        Daily     Daily   MEDICATIONS  (STANDING):  acetaminophen     Tablet .. 975 milliGRAM(s) Oral every 6 hours  dextrose 5%. 1000 milliLiter(s) (50 mL/Hr) IV Continuous <Continuous>  dextrose 5%. 1000 milliLiter(s) (100 mL/Hr) IV Continuous <Continuous>  dextrose 50% Injectable 25 Gram(s) IV Push once  dextrose 50% Injectable 25 Gram(s) IV Push once  dextrose 50% Injectable 12.5 Gram(s) IV Push once  enalapril 10 milliGRAM(s) Oral every 12 hours  enoxaparin Injectable 40 milliGRAM(s) SubCutaneous every 24 hours  gabapentin 300 milliGRAM(s) Oral three times a day  glucagon  Injectable 1 milliGRAM(s) IntraMuscular once  insulin glargine Injectable (LANTUS) 32 Unit(s) SubCutaneous at bedtime  insulin lispro (ADMELOG) corrective regimen sliding scale   SubCutaneous three times a day before meals  insulin lispro (ADMELOG) corrective regimen sliding scale   SubCutaneous at bedtime  insulin lispro Injectable (ADMELOG) 16 Unit(s) SubCutaneous before lunch  insulin lispro Injectable (ADMELOG) 14 Unit(s) SubCutaneous before dinner  insulin lispro Injectable (ADMELOG) 16 Unit(s) SubCutaneous before breakfast  polyethylene glycol 3350 17 Gram(s) Oral every 12 hours  senna 2 Tablet(s) Oral at bedtime  simvastatin 40 milliGRAM(s) Oral at bedtime  tamsulosin 0.4 milliGRAM(s) Oral at bedtime    MEDICATIONS  (PRN):  dextrose Oral Gel 15 Gram(s) Oral once PRN Blood Glucose LESS THAN 70 milliGRAM(s)/deciliter  guaiFENesin Oral Liquid (Sugar-Free) 100 milliGRAM(s) Oral every 6 hours PRN Cough  oxyCODONE    IR 10 milliGRAM(s) Oral every 6 hours PRN Severe Pain (7 - 10)  oxyCODONE    IR 5 milliGRAM(s) Oral every 6 hours PRN Moderate Pain (4 - 6)      LABS:                        9.8    12.55 )-----------( 324      ( 13 Dec 2023 08:40 )             31.7     12-13    134<L>  |  97  |  17  ----------------------------<  231<H>  5.2   |  28  |  1.11    Ca    8.4      13 Dec 2023 08:40  Phos  3.0     12-13  Mg     2.0     12-13        Urinalysis Basic - ( 13 Dec 2023 08:40 )  Color: x / Appearance: x / SG: x / pH: x  Gluc: 231 mg/dL / Ketone: x  / Bili: x / Urobili: x   Blood: x / Protein: x / Nitrite: x   Leuk Esterase: x / RBC: x / WBC x   Sq Epi: x / Non Sq Epi: x / Bacteria: x

## 2023-12-14 NOTE — PROGRESS NOTE ADULT - ASSESSMENT
63M w/h/o uncontrolled  DM2 (A1C 11.4%)  > on DM  oral meds PTA. DM c/b Neuropathy, PAD> s/p toe amputation, retinopathy. ? nephropathy per EMR. Also h/o HLD, HTN. Here with severe hyperglycemia and L foot wound and found to have soft tissue gas at lateral hindfoot. 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 Also required insulin drip at time of admission. Endocrine consulted for hyperglycemia. Patient is high risk with high level decision making due to uncontrolled diabetes with A1C>10 which places patient at high risk for cardiovascular and cerebrovascular events. Patient with lability of glucose requiring close monitoring and insulin adjustments.       # Uncontrolled type 2 diabetes mellitus with hyperglycemia.   - Overnight patient noted to have BG of 68 which was treated with apple juice   - Fasting BG slightly above goal   - BG during the day today is at goal   Recommendations:   - Test BG ac and hs. Q6H for NPO status  - Fasting glucose slightly above goal- increase Lantus to 32 units nightly   - Continue Admelog 16 -16-14 units ac meals for now and will continue to monitor/titrate as needed. HOLD IF NOT EATING/while NPO  - Continue with moderate dose correctional insulin scale ac and changing to moderate dose correctional insulin scale for bedtime -- when NPO, change to moderate dose correctional insulin scale [id number]   - Goal -180  - Hypoglycemia protocol   - Carb Consistent Diet     #HTN  - Goal BP <130/80  - Management per primary team    #HLD  - Goal LDL <70 in the setting of diabetes  - Please check fasting lipid panel if not checked recently     Maria Esther Llanes, DO   Attending Physician   Department of Endocrinology, Diabetes and Metabolism     If before 9AM or after 5PM, or on weekends/holidays, please call the Endocrine answering service for assistance ([phone]).  For nonurgent matters, please email [email or web address] for assistance.      63M w/h/o uncontrolled  DM2 (A1C 11.4%)  > on DM  oral meds PTA. DM c/b Neuropathy, PAD> s/p toe amputation, retinopathy. ? nephropathy per EMR. Also h/o HLD, HTN. Here with severe hyperglycemia and L foot wound and found to have soft tissue gas at lateral hindfoot. 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 Also required insulin drip at time of admission. Endocrine consulted for hyperglycemia. Patient is high risk with high level decision making due to uncontrolled diabetes with A1C>10 which places patient at high risk for cardiovascular and cerebrovascular events. Patient with lability of glucose requiring close monitoring and insulin adjustments.       # Uncontrolled type 2 diabetes mellitus with hyperglycemia.   - Overnight patient noted to have BG of 68 which was treated with apple juice   - Fasting BG slightly above goal   - BG during the day today is at goal   Recommendations:   - Test BG ac and hs. Q6H for NPO status  - Fasting glucose slightly above goal- increase Lantus to 32 units nightly   - Continue Admelog 16 -16-14 units ac meals for now and will continue to monitor/titrate as needed. HOLD IF NOT EATING/while NPO  - Continue with moderate dose correctional insulin scale ac and changing to moderate dose correctional insulin scale for bedtime -- when NPO, change to moderate dose correctional insulin scale [id number]   - Goal -180  - Hypoglycemia protocol   - Carb Consistent Diet     #HTN  - Goal BP <130/80  - Management per primary team    #HLD  - Goal LDL <70 in the setting of diabetes  - Please check fasting lipid panel if not checked recently     Maria Esther Llanes, DO   Attending Physician   Department of Endocrinology, Diabetes and Metabolism     If before 9AM or after 5PM, or on weekends/holidays, please call the Endocrine answering service for assistance ([phone]).  For nonurgent matters, please email [email or web address] for assistance.

## 2023-12-14 NOTE — PROVIDER CONTACT NOTE (OTHER) - RECOMMENDATIONS
MD Lopez made aware
notified MD Mika Candelario
RN recommending straight cath and urinalysis, as pt was febrile to 102.8 in AM with no workup done and now also has new urinary retention with elevated WBC
No
Straight cath?

## 2023-12-14 NOTE — PROCEDURE NOTE - NSFINDINGS_GEN_A_CORE
positive return of urine in the collection bag/straw colored
positive return of urine in the collection bag

## 2023-12-14 NOTE — PROVIDER CONTACT NOTE (OTHER) - ACTION/TREATMENT ORDERED:
Hydralizine 10mg IV push given will repeat BP in 20 mins per DR. Lopez Recommendations
MD John Tyson ordered straight cath. 650 mL urine removed.
encourage patient to change positions slowly
PO tylenol 650mg
Per PA, will order straight cath. No UA necessary at this time, as ID is not recommending workup as patient is POD#1

## 2023-12-14 NOTE — PROGRESS NOTE ADULT - ATTENDING COMMENTS
Patient seen and examined. Chart with pertinent labs and imaging reviewed.  Low grade fever, otherwise feels ok  S/P crain catheter for urinary retention    General: Nontoxic-appearing Male in no acute distress.  HEENT: AT/NC. Anicteric. Conjunctiva pink and moist. Oropharynx clear.  Neck: Not rigid. No sense of mass.  Nodes: None palpable.  Lungs: Scant L base crackles  Heart: Regular rate and rhythm.  Abdomen: Bowel sounds present and normoactive. Soft. Nondistended. Nontender. Obese.   Extremities: No cyanosis or clubbing. No edema. BKA dressed. Minimal erythema proximally- nontender, no increased calor.   Skin: Warm. Dry. Good turgor. No rash. No vasculitic stigmata.  Psychiatric: Appropriate affect and mood for situation.     Benign abdomen  Not using incentive spirometer- retrieved from drawer and instructed in use.    Eduin Gutierrez MD  Attending Physician  [facility]  Division of Infectious Diseases  [phone] Patient seen and examined. Chart with pertinent labs and imaging reviewed.  Low grade fever, otherwise feels ok  S/P crain catheter for urinary retention    General: Nontoxic-appearing Male in no acute distress.  HEENT: AT/NC. Anicteric. Conjunctiva pink and moist. Oropharynx clear.  Neck: Not rigid. No sense of mass.  Nodes: None palpable.  Lungs: Scant L base crackles  Heart: Regular rate and rhythm.  Abdomen: Bowel sounds present and normoactive. Soft. Nondistended. Nontender. Obese.   Extremities: No cyanosis or clubbing. No edema. BKA dressed. Minimal erythema proximally- nontender, no increased calor.   Skin: Warm. Dry. Good turgor. No rash. No vasculitic stigmata.  Psychiatric: Appropriate affect and mood for situation.     Benign abdomen  Not using incentive spirometer- retrieved from drawer and instructed in use.    Eduin Gutierrez MD  Attending Physician  [facility]  Division of Infectious Diseases  [phone]

## 2023-12-14 NOTE — PROVIDER CONTACT NOTE (OTHER) - REASON
Patient retaining urine
Pt retaining 650 mL urine
High BP
Fever
pt tachycardic , orthostatic vitals showing pt /98 lying, 176/95 sitting, and 110/72 standing

## 2023-12-14 NOTE — PROGRESS NOTE ADULT - ASSESSMENT
63M PMHx DM, HLD, PAD, prior toe amputation, presents to [facility] ED w L foot wound. NSTI of non salvageable L foot. Septic in ED. LRINEC 9. XR/CT both w soft tissue gas. s/p 11/30 L BKA guillotine amp, downgraded from SICU and recovering appropriately on the floor. now S/P below the knee amputation revision on 12/4. Now s/p formalization 12/12.     - Contreras for rentention, placed by urology, follow up outpatient in the [facility] for removal and urodynamic studies   - Pain control as needed  - Lovenox for DVT ppx  - Activity as tolerated  - Endo consulted, appreciate recs- will continue to follow   - BP control  - Bowel regimen  - Daily dressing to begin POD3   - PM&R recommends AR on discharge     Vascular Surgery  [id number]   63M PMHx DM, HLD, PAD, prior toe amputation, presents to [facility] ED w L foot wound. NSTI of non salvageable L foot. Septic in ED. LRINEC 9. XR/CT both w soft tissue gas. s/p 11/30 L BKA guillotine amp, downgraded from SICU and recovering appropriately on the floor. now S/P below the knee amputation revision on 12/4. Now s/p formalization 12/12.     - Contreras for rentention, placed by urology, follow up outpatient in the [facility] for removal and urodynamic studies   - Pain control as needed  - Lovenox for DVT ppx  - Activity as tolerated  - Endo consulted, appreciate recs- will continue to follow   - BP control  - Bowel regimen  - Daily dressing to begin POD3   - PM&R recommends AR on discharge     Vascular Surgery  [id number]

## 2023-12-14 NOTE — PROGRESS NOTE ADULT - SUBJECTIVE AND OBJECTIVE BOX
Interval history:  Patient denies any complaints  Overnight, BG noted to go low and was treated with apple juice  Mentions that he had a good dinner last night, cannot guess why sugar could have gone low   Fasting glucose slightly above goal this AM  Pre-lunch sugar is 177    MEDICATIONS  (STANDING):  acetaminophen     Tablet .. 975 milliGRAM(s) Oral every 6 hours  dextrose 5%. 1000 milliLiter(s) (50 mL/Hr) IV Continuous <Continuous>  dextrose 5%. 1000 milliLiter(s) (100 mL/Hr) IV Continuous <Continuous>  dextrose 50% Injectable 25 Gram(s) IV Push once  dextrose 50% Injectable 25 Gram(s) IV Push once  dextrose 50% Injectable 12.5 Gram(s) IV Push once  enalapril 10 milliGRAM(s) Oral every 12 hours  enoxaparin Injectable 40 milliGRAM(s) SubCutaneous every 24 hours  gabapentin 300 milliGRAM(s) Oral three times a day  glucagon  Injectable 1 milliGRAM(s) IntraMuscular once  insulin glargine Injectable (LANTUS) 32 Unit(s) SubCutaneous at bedtime  insulin lispro (ADMELOG) corrective regimen sliding scale   SubCutaneous three times a day before meals  insulin lispro (ADMELOG) corrective regimen sliding scale   SubCutaneous at bedtime  insulin lispro Injectable (ADMELOG) 16 Unit(s) SubCutaneous before breakfast  insulin lispro Injectable (ADMELOG) 16 Unit(s) SubCutaneous before lunch  insulin lispro Injectable (ADMELOG) 14 Unit(s) SubCutaneous before dinner  polyethylene glycol 3350 17 Gram(s) Oral every 12 hours  senna 2 Tablet(s) Oral at bedtime  simvastatin 40 milliGRAM(s) Oral at bedtime  tamsulosin 0.4 milliGRAM(s) Oral at bedtime    MEDICATIONS  (PRN):  dextrose Oral Gel 15 Gram(s) Oral once PRN Blood Glucose LESS THAN 70 milliGRAM(s)/deciliter  guaiFENesin Oral Liquid (Sugar-Free) 100 milliGRAM(s) Oral every 6 hours PRN Cough  oxyCODONE    IR 10 milliGRAM(s) Oral every 6 hours PRN Severe Pain (7 - 10)  oxyCODONE    IR 5 milliGRAM(s) Oral every 6 hours PRN Moderate Pain (4 - 6)      Allergies    No Known Allergies    Intolerances      Review of Systems:  Constitutional: No fever  Eyes: No blurry vision  Neuro: No tremors  HEENT: No pain  Cardiovascular: No chest pain, palpitations  Respiratory: No SOB, no cough  GI: No nausea, vomiting, abdominal pain  : No dysuria  Skin: no rash  Psych: no depression  Endocrine: no polyuria, polydipsia  Hem/lymph: no swelling  Osteoporosis: no fractures    ALL OTHER SYSTEMS REVIEWED AND NEGATIVE    UNABLE TO OBTAIN    PHYSICAL EXAM:  VITALS: T(C): 37.1 (12-14-23 @ 13:16)  T(F): 98.7 (12-14-23 @ 13:16), Max: 100.2 (12-14-23 @ 06:08)  HR: 94 (12-14-23 @ 13:16) (82 - 102)  BP: 147/72 (12-14-23 @ 13:16) (114/71 - 200/96)  RR:  (17 - 18)  SpO2:  (92% - 96%)  Wt(kg): --  GENERAL: NAD, well-groomed, well-developed  EYES: No proptosis, no lid lag, anicteric  HEENT:  Atraumatic, Normocephalic, moist mucous membranes  THYROID: Normal size, no palpable nodules  RESPIRATORY: Clear to auscultation bilaterally; No rales, rhonchi, wheezing, or rubs  CARDIOVASCULAR: Regular rate and rhythm; No murmurs; no peripheral edema  GI: Soft, nontender, non distended, normal bowel sounds  SKIN: Dry, intact, No rashes or lesions  MUSCULOSKELETAL: L BKA  NEURO: sensation intact, extraocular movements intact, no tremor, normal reflexes  PSYCH: Alert and oriented x 3, normal affect, normal mood  CUSHING'S SIGNS: no striae    POCT Blood Glucose.: 177 mg/dL (12-14-23 @ 12:51)  POCT Blood Glucose.: 177 mg/dL (12-14-23 @ 10:00)  POCT Blood Glucose.: 260 mg/dL (12-13-23 @ 22:35)  POCT Blood Glucose.: 167 mg/dL (12-13-23 @ 20:20)  POCT Blood Glucose.: 126 mg/dL (12-13-23 @ 20:03)  POCT Blood Glucose.: 98 mg/dL (12-13-23 @ 19:43)  POCT Blood Glucose.: 69 mg/dL (12-13-23 @ 19:18)  POCT Blood Glucose.: 68 mg/dL (12-13-23 @ 19:16)  POCT Blood Glucose.: 83 mg/dL (12-13-23 @ 19:15)  POCT Blood Glucose.: 87 mg/dL (12-13-23 @ 18:04)  POCT Blood Glucose.: 210 mg/dL (12-13-23 @ 13:42)  POCT Blood Glucose.: 265 mg/dL (12-13-23 @ 10:05)  POCT Blood Glucose.: 211 mg/dL (12-12-23 @ 21:37)  POCT Blood Glucose.: 221 mg/dL (12-12-23 @ 18:38)  POCT Blood Glucose.: 207 mg/dL (12-12-23 @ 16:01)  POCT Blood Glucose.: 187 mg/dL (12-12-23 @ 12:08)  POCT Blood Glucose.: 153 mg/dL (12-12-23 @ 06:17)  POCT Blood Glucose.: 129 mg/dL (12-12-23 @ 00:15)  POCT Blood Glucose.: 191 mg/dL (12-11-23 @ 21:29)  POCT Blood Glucose.: 123 mg/dL (12-11-23 @ 19:18)      12-14    135  |  98  |  12  ----------------------------<  193<H>  4.5   |  31  |  0.87    eGFR: 97    Ca    8.4      12-14  Mg     1.9     12-14  Phos  2.2     12-14            Thyroid Function Tests:

## 2023-12-15 LAB
ANION GAP SERPL CALC-SCNC: 7 MMOL/L — SIGNIFICANT CHANGE UP (ref 5–17)
ANION GAP SERPL CALC-SCNC: 7 MMOL/L — SIGNIFICANT CHANGE UP (ref 5–17)
BUN SERPL-MCNC: 11 MG/DL — SIGNIFICANT CHANGE UP (ref 7–23)
BUN SERPL-MCNC: 11 MG/DL — SIGNIFICANT CHANGE UP (ref 7–23)
CALCIUM SERPL-MCNC: 8.7 MG/DL — SIGNIFICANT CHANGE UP (ref 8.4–10.5)
CALCIUM SERPL-MCNC: 8.7 MG/DL — SIGNIFICANT CHANGE UP (ref 8.4–10.5)
CHLORIDE SERPL-SCNC: 101 MMOL/L — SIGNIFICANT CHANGE UP (ref 96–108)
CHLORIDE SERPL-SCNC: 101 MMOL/L — SIGNIFICANT CHANGE UP (ref 96–108)
CO2 SERPL-SCNC: 28 MMOL/L — SIGNIFICANT CHANGE UP (ref 22–31)
CO2 SERPL-SCNC: 28 MMOL/L — SIGNIFICANT CHANGE UP (ref 22–31)
CREAT SERPL-MCNC: 0.73 MG/DL — SIGNIFICANT CHANGE UP (ref 0.5–1.3)
CREAT SERPL-MCNC: 0.73 MG/DL — SIGNIFICANT CHANGE UP (ref 0.5–1.3)
EGFR: 102 ML/MIN/1.73M2 — SIGNIFICANT CHANGE UP
EGFR: 102 ML/MIN/1.73M2 — SIGNIFICANT CHANGE UP
GLUCOSE SERPL-MCNC: 121 MG/DL — HIGH (ref 70–99)
GLUCOSE SERPL-MCNC: 121 MG/DL — HIGH (ref 70–99)
HCT VFR BLD CALC: 29.6 % — LOW (ref 39–50)
HCT VFR BLD CALC: 29.6 % — LOW (ref 39–50)
HGB BLD-MCNC: 9.3 G/DL — LOW (ref 13–17)
HGB BLD-MCNC: 9.3 G/DL — LOW (ref 13–17)
MAGNESIUM SERPL-MCNC: 1.9 MG/DL — SIGNIFICANT CHANGE UP (ref 1.6–2.6)
MAGNESIUM SERPL-MCNC: 1.9 MG/DL — SIGNIFICANT CHANGE UP (ref 1.6–2.6)
MCHC RBC-ENTMCNC: 26.5 PG — LOW (ref 27–34)
MCHC RBC-ENTMCNC: 26.5 PG — LOW (ref 27–34)
MCHC RBC-ENTMCNC: 31.4 GM/DL — LOW (ref 32–36)
MCHC RBC-ENTMCNC: 31.4 GM/DL — LOW (ref 32–36)
MCV RBC AUTO: 84.3 FL — SIGNIFICANT CHANGE UP (ref 80–100)
MCV RBC AUTO: 84.3 FL — SIGNIFICANT CHANGE UP (ref 80–100)
NRBC # BLD: 0 /100 WBCS — SIGNIFICANT CHANGE UP (ref 0–0)
NRBC # BLD: 0 /100 WBCS — SIGNIFICANT CHANGE UP (ref 0–0)
PHOSPHATE SERPL-MCNC: 2.6 MG/DL — SIGNIFICANT CHANGE UP (ref 2.5–4.5)
PHOSPHATE SERPL-MCNC: 2.6 MG/DL — SIGNIFICANT CHANGE UP (ref 2.5–4.5)
PLATELET # BLD AUTO: 283 K/UL — SIGNIFICANT CHANGE UP (ref 150–400)
PLATELET # BLD AUTO: 283 K/UL — SIGNIFICANT CHANGE UP (ref 150–400)
POTASSIUM SERPL-MCNC: 4.4 MMOL/L — SIGNIFICANT CHANGE UP (ref 3.5–5.3)
POTASSIUM SERPL-MCNC: 4.4 MMOL/L — SIGNIFICANT CHANGE UP (ref 3.5–5.3)
POTASSIUM SERPL-SCNC: 4.4 MMOL/L — SIGNIFICANT CHANGE UP (ref 3.5–5.3)
POTASSIUM SERPL-SCNC: 4.4 MMOL/L — SIGNIFICANT CHANGE UP (ref 3.5–5.3)
RBC # BLD: 3.51 M/UL — LOW (ref 4.2–5.8)
RBC # BLD: 3.51 M/UL — LOW (ref 4.2–5.8)
RBC # FLD: 15.7 % — HIGH (ref 10.3–14.5)
RBC # FLD: 15.7 % — HIGH (ref 10.3–14.5)
SODIUM SERPL-SCNC: 136 MMOL/L — SIGNIFICANT CHANGE UP (ref 135–145)
SODIUM SERPL-SCNC: 136 MMOL/L — SIGNIFICANT CHANGE UP (ref 135–145)
WBC # BLD: 10.78 K/UL — HIGH (ref 3.8–10.5)
WBC # BLD: 10.78 K/UL — HIGH (ref 3.8–10.5)
WBC # FLD AUTO: 10.78 K/UL — HIGH (ref 3.8–10.5)
WBC # FLD AUTO: 10.78 K/UL — HIGH (ref 3.8–10.5)

## 2023-12-15 PROCEDURE — 99233 SBSQ HOSP IP/OBS HIGH 50: CPT

## 2023-12-15 PROCEDURE — 99232 SBSQ HOSP IP/OBS MODERATE 35: CPT

## 2023-12-15 RX ORDER — INSULIN LISPRO 100/ML
16 VIAL (ML) SUBCUTANEOUS
Refills: 0 | Status: DISCONTINUED | OUTPATIENT
Start: 2023-12-17 | End: 2023-12-19

## 2023-12-15 RX ORDER — SIMVASTATIN 20 MG/1
40 TABLET, FILM COATED ORAL AT BEDTIME
Refills: 0 | Status: DISCONTINUED | OUTPATIENT
Start: 2023-12-16 | End: 2023-12-30

## 2023-12-15 RX ORDER — SENNA PLUS 8.6 MG/1
2 TABLET ORAL
Qty: 0 | Refills: 0 | DISCHARGE
Start: 2023-12-15

## 2023-12-15 RX ORDER — ENOXAPARIN SODIUM 100 MG/ML
40 INJECTION SUBCUTANEOUS
Qty: 0 | Refills: 0 | DISCHARGE
Start: 2023-12-15

## 2023-12-15 RX ORDER — SIMVASTATIN 20 MG/1
1 TABLET, FILM COATED ORAL
Qty: 0 | Refills: 0 | DISCHARGE
Start: 2023-12-15

## 2023-12-15 RX ORDER — OXYCODONE HYDROCHLORIDE 5 MG/1
1 TABLET ORAL
Qty: 0 | Refills: 0 | DISCHARGE
Start: 2023-12-15

## 2023-12-15 RX ORDER — INSULIN LISPRO 100/ML
16 VIAL (ML) SUBCUTANEOUS
Refills: 0 | Status: DISCONTINUED | OUTPATIENT
Start: 2023-12-15 | End: 2023-12-16

## 2023-12-15 RX ORDER — INSULIN LISPRO 100/ML
VIAL (ML) SUBCUTANEOUS
Refills: 0 | Status: DISCONTINUED | OUTPATIENT
Start: 2023-12-16 | End: 2023-12-30

## 2023-12-15 RX ORDER — TAMSULOSIN HYDROCHLORIDE 0.4 MG/1
0.4 CAPSULE ORAL AT BEDTIME
Refills: 0 | Status: DISCONTINUED | OUTPATIENT
Start: 2023-12-16 | End: 2023-12-18

## 2023-12-15 RX ORDER — RIVAROXABAN 15 MG-20MG
2.5 KIT ORAL
Refills: 0 | Status: DISCONTINUED | OUTPATIENT
Start: 2023-12-15 | End: 2023-12-15

## 2023-12-15 RX ORDER — INSULIN LISPRO 100/ML
16 VIAL (ML) SUBCUTANEOUS
Qty: 0 | Refills: 0 | DISCHARGE
Start: 2023-12-15

## 2023-12-15 RX ORDER — INSULIN GLARGINE 100 [IU]/ML
32 INJECTION, SOLUTION SUBCUTANEOUS AT BEDTIME
Refills: 0 | Status: DISCONTINUED | OUTPATIENT
Start: 2023-12-16 | End: 2023-12-17

## 2023-12-15 RX ORDER — OXYCODONE HYDROCHLORIDE 5 MG/1
10 TABLET ORAL EVERY 6 HOURS
Refills: 0 | Status: DISCONTINUED | OUTPATIENT
Start: 2023-12-16 | End: 2023-12-22

## 2023-12-15 RX ORDER — GABAPENTIN 400 MG/1
300 CAPSULE ORAL THREE TIMES A DAY
Refills: 0 | Status: DISCONTINUED | OUTPATIENT
Start: 2023-12-16 | End: 2023-12-30

## 2023-12-15 RX ORDER — INSULIN GLARGINE 100 [IU]/ML
32 INJECTION, SOLUTION SUBCUTANEOUS
Qty: 0 | Refills: 0 | DISCHARGE
Start: 2023-12-15

## 2023-12-15 RX ORDER — INSULIN LISPRO 100/ML
VIAL (ML) SUBCUTANEOUS AT BEDTIME
Refills: 0 | Status: DISCONTINUED | OUTPATIENT
Start: 2023-12-16 | End: 2023-12-30

## 2023-12-15 RX ORDER — TAMSULOSIN HYDROCHLORIDE 0.4 MG/1
1 CAPSULE ORAL
Qty: 0 | Refills: 0 | DISCHARGE
Start: 2023-12-15

## 2023-12-15 RX ORDER — ACETAMINOPHEN 500 MG
3 TABLET ORAL
Qty: 0 | Refills: 0 | DISCHARGE
Start: 2023-12-15

## 2023-12-15 RX ORDER — POLYETHYLENE GLYCOL 3350 17 G/17G
17 POWDER, FOR SOLUTION ORAL
Qty: 0 | Refills: 0 | DISCHARGE
Start: 2023-12-15

## 2023-12-15 RX ORDER — OXYCODONE HYDROCHLORIDE 5 MG/1
5 TABLET ORAL EVERY 6 HOURS
Refills: 0 | Status: DISCONTINUED | OUTPATIENT
Start: 2023-12-16 | End: 2023-12-16

## 2023-12-15 RX ORDER — GABAPENTIN 400 MG/1
1 CAPSULE ORAL
Qty: 0 | Refills: 0 | DISCHARGE
Start: 2023-12-15

## 2023-12-15 RX ORDER — METFORMIN HYDROCHLORIDE 850 MG/1
1 TABLET ORAL
Refills: 0 | DISCHARGE

## 2023-12-15 RX ADMIN — OXYCODONE HYDROCHLORIDE 10 MILLIGRAM(S): 5 TABLET ORAL at 13:31

## 2023-12-15 RX ADMIN — SIMVASTATIN 40 MILLIGRAM(S): 20 TABLET, FILM COATED ORAL at 21:19

## 2023-12-15 RX ADMIN — GABAPENTIN 300 MILLIGRAM(S): 400 CAPSULE ORAL at 05:44

## 2023-12-15 RX ADMIN — Medication 975 MILLIGRAM(S): at 05:44

## 2023-12-15 RX ADMIN — Medication 16 UNIT(S): at 10:14

## 2023-12-15 RX ADMIN — Medication 975 MILLIGRAM(S): at 18:07

## 2023-12-15 RX ADMIN — ENOXAPARIN SODIUM 40 MILLIGRAM(S): 100 INJECTION SUBCUTANEOUS at 05:44

## 2023-12-15 RX ADMIN — Medication 975 MILLIGRAM(S): at 06:44

## 2023-12-15 RX ADMIN — SENNA PLUS 2 TABLET(S): 8.6 TABLET ORAL at 21:19

## 2023-12-15 RX ADMIN — TAMSULOSIN HYDROCHLORIDE 0.4 MILLIGRAM(S): 0.4 CAPSULE ORAL at 21:19

## 2023-12-15 RX ADMIN — OXYCODONE HYDROCHLORIDE 10 MILLIGRAM(S): 5 TABLET ORAL at 12:31

## 2023-12-15 RX ADMIN — OXYCODONE HYDROCHLORIDE 10 MILLIGRAM(S): 5 TABLET ORAL at 20:28

## 2023-12-15 RX ADMIN — GABAPENTIN 300 MILLIGRAM(S): 400 CAPSULE ORAL at 21:19

## 2023-12-15 RX ADMIN — Medication 16 UNIT(S): at 19:07

## 2023-12-15 RX ADMIN — Medication 10 MILLIGRAM(S): at 17:07

## 2023-12-15 RX ADMIN — Medication 975 MILLIGRAM(S): at 11:21

## 2023-12-15 RX ADMIN — INSULIN GLARGINE 32 UNIT(S): 100 INJECTION, SOLUTION SUBCUTANEOUS at 22:33

## 2023-12-15 RX ADMIN — Medication 975 MILLIGRAM(S): at 00:04

## 2023-12-15 RX ADMIN — Medication 975 MILLIGRAM(S): at 23:11

## 2023-12-15 RX ADMIN — Medication 10 MILLIGRAM(S): at 05:44

## 2023-12-15 RX ADMIN — Medication 16 UNIT(S): at 13:50

## 2023-12-15 RX ADMIN — Medication 975 MILLIGRAM(S): at 12:20

## 2023-12-15 RX ADMIN — Medication 975 MILLIGRAM(S): at 17:07

## 2023-12-15 RX ADMIN — POLYETHYLENE GLYCOL 3350 17 GRAM(S): 17 POWDER, FOR SOLUTION ORAL at 21:19

## 2023-12-15 RX ADMIN — GABAPENTIN 300 MILLIGRAM(S): 400 CAPSULE ORAL at 13:08

## 2023-12-15 RX ADMIN — OXYCODONE HYDROCHLORIDE 10 MILLIGRAM(S): 5 TABLET ORAL at 19:28

## 2023-12-15 NOTE — PROGRESS NOTE ADULT - SUBJECTIVE AND OBJECTIVE BOX
ENDOCRINE FOLLOW UP     Chief Complaint: dm2    History: BG levels in range. Reports he is planned for discharge to acute rehab today. Says he is missing home and is eager to go to rehab and eventually back home. Eating well, tolerating PO intake. No other concerns or complaints reported.     MEDICATIONS  (STANDING):  acetaminophen     Tablet .. 975 milliGRAM(s) Oral every 6 hours  dextrose 5%. 1000 milliLiter(s) (50 mL/Hr) IV Continuous <Continuous>  dextrose 5%. 1000 milliLiter(s) (100 mL/Hr) IV Continuous <Continuous>  dextrose 50% Injectable 25 Gram(s) IV Push once  dextrose 50% Injectable 25 Gram(s) IV Push once  dextrose 50% Injectable 12.5 Gram(s) IV Push once  enalapril 10 milliGRAM(s) Oral every 12 hours  enoxaparin Injectable 40 milliGRAM(s) SubCutaneous every 24 hours  gabapentin 300 milliGRAM(s) Oral three times a day  glucagon  Injectable 1 milliGRAM(s) IntraMuscular once  insulin glargine Injectable (LANTUS) 32 Unit(s) SubCutaneous at bedtime  insulin lispro (ADMELOG) corrective regimen sliding scale   SubCutaneous three times a day before meals  insulin lispro (ADMELOG) corrective regimen sliding scale   SubCutaneous at bedtime  insulin lispro Injectable (ADMELOG) 16 Unit(s) SubCutaneous before breakfast  insulin lispro Injectable (ADMELOG) 16 Unit(s) SubCutaneous before lunch  insulin lispro Injectable (ADMELOG) 14 Unit(s) SubCutaneous before dinner  polyethylene glycol 3350 17 Gram(s) Oral every 12 hours  senna 2 Tablet(s) Oral at bedtime  simvastatin 40 milliGRAM(s) Oral at bedtime  tamsulosin 0.4 milliGRAM(s) Oral at bedtime    MEDICATIONS  (PRN):  dextrose Oral Gel 15 Gram(s) Oral once PRN Blood Glucose LESS THAN 70 milliGRAM(s)/deciliter  guaiFENesin Oral Liquid (Sugar-Free) 100 milliGRAM(s) Oral every 6 hours PRN Cough  oxyCODONE    IR 10 milliGRAM(s) Oral every 6 hours PRN Severe Pain (7 - 10)  oxyCODONE    IR 5 milliGRAM(s) Oral every 6 hours PRN Moderate Pain (4 - 6)      Allergies    No Known Allergies    Intolerances        ROS: All other systems reviewed and negative    PHYSICAL EXAM:  VITALS: T(C): 36.5 (12-15-23 @ 12:32)  T(F): 97.7 (12-15-23 @ 12:32), Max: 98.8 (12-14-23 @ 17:26)  HR: 91 (12-15-23 @ 12:32) (84 - 91)  BP: 112/71 (12-15-23 @ 12:32) (112/71 - 161/71)  RR:  (18 - 18)  SpO2:  (93% - 95%)  Wt(kg): --  GENERAL: NAD, resting comfortably   EYES: No proptosis,  anicteric  HEENT:  Atraumatic, Normocephalic, moist mucous membranes  RESPIRATORY: Nonlabored respirations on room air, normal rate/effort   NEURO: AOx3, moves all extremities spontaneously   PSYCH:  reactive affect, euthymic mood    POCT Blood Glucose.: 146 mg/dL (12-15-23 @ 13:43)  POCT Blood Glucose.: 112 mg/dL (12-15-23 @ 09:53)  POCT Blood Glucose.: 212 mg/dL (12-14-23 @ 22:02)  POCT Blood Glucose.: 182 mg/dL (12-14-23 @ 17:30)  POCT Blood Glucose.: 177 mg/dL (12-14-23 @ 12:51)  POCT Blood Glucose.: 177 mg/dL (12-14-23 @ 10:00)  POCT Blood Glucose.: 260 mg/dL (12-13-23 @ 22:35)  POCT Blood Glucose.: 167 mg/dL (12-13-23 @ 20:20)  POCT Blood Glucose.: 126 mg/dL (12-13-23 @ 20:03)  POCT Blood Glucose.: 98 mg/dL (12-13-23 @ 19:43)  POCT Blood Glucose.: 69 mg/dL (12-13-23 @ 19:18)  POCT Blood Glucose.: 68 mg/dL (12-13-23 @ 19:16)  POCT Blood Glucose.: 83 mg/dL (12-13-23 @ 19:15)  POCT Blood Glucose.: 87 mg/dL (12-13-23 @ 18:04)  POCT Blood Glucose.: 210 mg/dL (12-13-23 @ 13:42)  POCT Blood Glucose.: 265 mg/dL (12-13-23 @ 10:05)  POCT Blood Glucose.: 211 mg/dL (12-12-23 @ 21:37)  POCT Blood Glucose.: 221 mg/dL (12-12-23 @ 18:38)  POCT Blood Glucose.: 207 mg/dL (12-12-23 @ 16:01)      12-15    136  |  101  |  11  ----------------------------<  121<H>  4.4   |  28  |  0.73    eGFR: 102    Ca    8.7      12-15  Mg     1.9     12-15  Phos  2.6     12-15        A1C with Estimated Average Glucose Result: 11.4 % (12-01-23 @ 05:19)

## 2023-12-15 NOTE — PROGRESS NOTE ADULT - SUBJECTIVE AND OBJECTIVE BOX
[facility]  Division of Infectious Diseases  [phone]    Name: ELVIS VILLATORO  Age: 63y  Gender: Male  MRN: [id number]    Interval History--  Notes reviewed.     Past Medical History--  Non-Insulin Dependent Diabetes Mellitus    DM (Diabetes Mellitus)    HTN (Hypertension)    Dyslipidemia    Retinopathy    Diabetes mellitus type 2 in obese    Hyperlipidemia    Toe infection    Ruptured appendix    S/P appendectomy    Toe amputation status        For details regarding the patient's social history, family history, and other miscellaneous elements, please refer the initial infectious diseases consultation and/or the admitting history and physical examination for this admission.    Allergies    No Known Allergies    Intolerances        Medications--  Antibiotics:    Immunologic:    Other:  acetaminophen     Tablet ..  dextrose 5%.  dextrose 5%.  dextrose 50% Injectable  dextrose 50% Injectable  dextrose 50% Injectable  dextrose Oral Gel PRN  enalapril  enoxaparin Injectable  gabapentin  glucagon  Injectable  guaiFENesin Oral Liquid (Sugar-Free) PRN  insulin glargine Injectable (LANTUS)  insulin lispro (ADMELOG) corrective regimen sliding scale  insulin lispro (ADMELOG) corrective regimen sliding scale  insulin lispro Injectable (ADMELOG)  insulin lispro Injectable (ADMELOG)  insulin lispro Injectable (ADMELOG)  oxyCODONE    IR PRN  oxyCODONE    IR PRN  polyethylene glycol 3350  senna  simvastatin  tamsulosin      Review of Systems--  A 10-point review of systems was obtained.     Pertinent positives and negatives--  Constitutional: No fevers. No Chills. No Rigors.   Cardiovascular: No chest pain. No palpitations.  Respiratory: No shortness of breath. No cough.  Gastrointestinal: No nausea or vomiting. No diarrhea or constipation.   Psychiatric: Pleasant. Appropriate affect.    Review of systems otherwise negative except as previously noted.    Physical Examination--  Vital Signs: T(F): 98 (12-15-23 @ 09:03), Max: 98.8 (12-14-23 @ 17:26)  HR: 86 (12-15-23 @ 09:03)  BP: 155/84 (12-15-23 @ 09:03)  RR: 18 (12-15-23 @ 09:03)  SpO2: 95% (12-15-23 @ 09:03)  Wt(kg): --  General: Nontoxic-appearing Male in no acute distress.  HEENT: AT/NC. PERRL. EOMI. Anicteric. Conjunctiva pink and moist. Oropharynx clear. Dentition fair.  Neck: Not rigid. No sense of mass.  Nodes: None palpable.  Lungs: Clear bilaterally without rales, wheezing or rhonchi  Heart: Regular rate and rhythm. No Murmur. No rub. No gallop. No palpable thrill.  Abdomen: Bowel sounds present and normoactive. Soft. Nondistended. Nontender. No sense of mass. No organomegaly.  Back: No spinal tenderness. No costovertebral angle tenderness.   Extremities: No cyanosis or clubbing. No edema.   Skin: Warm. Dry. Good turgor. No rash. No vasculitic stigmata.  Psychiatric: Appropriate affect and mood for situation.         Laboratory Studies--  CBC                        9.3    10.78 )-----------( 283      ( 15 Dec 2023 09:27 )             29.6       Chemistries  12-15    136  |  101  |  11  ----------------------------<  121<H>  4.4   |  28  |  0.73    Ca    8.7      15 Dec 2023 09:26  Phos  2.6     12-15  Mg     1.9     12-15        Culture Data           [facility]  Division of Infectious Diseases  [phone]    Name: ELVIS VILLATORO  Age: 63y  Gender: Male  MRN: [id number]    Interval History--  Notes reviewed.     Past Medical History--  Non-Insulin Dependent Diabetes Mellitus    DM (Diabetes Mellitus)    HTN (Hypertension)    Dyslipidemia    Retinopathy    Diabetes mellitus type 2 in obese    Hyperlipidemia    Toe infection    Ruptured appendix    S/P appendectomy    Toe amputation status        For details regarding the patient's social history, family history, and other miscellaneous elements, please refer the initial infectious diseases consultation and/or the admitting history and physical examination for this admission.    Allergies    No Known Allergies    Intolerances        Medications--  Antibiotics:    Immunologic:    Other:  acetaminophen     Tablet ..  dextrose 5%.  dextrose 5%.  dextrose 50% Injectable  dextrose 50% Injectable  dextrose 50% Injectable  dextrose Oral Gel PRN  enalapril  enoxaparin Injectable  gabapentin  glucagon  Injectable  guaiFENesin Oral Liquid (Sugar-Free) PRN  insulin glargine Injectable (LANTUS)  insulin lispro (ADMELOG) corrective regimen sliding scale  insulin lispro (ADMELOG) corrective regimen sliding scale  insulin lispro Injectable (ADMELOG)  insulin lispro Injectable (ADMELOG)  insulin lispro Injectable (ADMELOG)  oxyCODONE    IR PRN  oxyCODONE    IR PRN  polyethylene glycol 3350  senna  simvastatin  tamsulosin      Review of Systems--  A 10-point review of systems was obtained.     Pertinent positives and negatives--  Constitutional: No fevers. No Chills. No Rigors.   Cardiovascular: No chest pain. No palpitations.  Respiratory: No shortness of breath. No cough.  Gastrointestinal: No nausea or vomiting. No diarrhea or constipation.   Psychiatric: Pleasant. Appropriate affect.    Review of systems otherwise negative except as previously noted.    Physical Examination--  Vital Signs: T(F): 98 (12-15-23 @ 09:03), Max: 98.8 (12-14-23 @ 17:26)  HR: 86 (12-15-23 @ 09:03)  BP: 155/84 (12-15-23 @ 09:03)  RR: 18 (12-15-23 @ 09:03)  SpO2: 95% (12-15-23 @ 09:03)  Wt(kg): --  General: Nontoxic-appearing Male in no acute distress.  HEENT: AT/NC. PERRL. EOMI. Anicteric. Conjunctiva pink and moist. Oropharynx clear. Dentition fair.  Neck: Not rigid. No sense of mass.  Nodes: None palpable.  Lungs: Clear bilaterally without rales, wheezing or rhonchi  Heart: Regular rate and rhythm. No Murmur. No rub. No gallop. No palpable thrill.  Abdomen: Bowel sounds present and normoactive. Soft. Nondistended. Nontender. No sense of mass. No organomegaly.  Back: No spinal tenderness. No costovertebral angle tenderness.   Extremities: No cyanosis or clubbing. No edema.   Skin: Warm. Dry. Good turgor. No rash. No vasculitic stigmata.  Psychiatric: Appropriate affect and mood for situation.         Laboratory Studies--  CBC                        9.3    10.78 )-----------( 283      ( 15 Dec 2023 09:27 )             29.6       Chemistries  12-15    136  |  101  |  11  ----------------------------<  121<H>  4.4   |  28  |  0.73    Ca    8.7      15 Dec 2023 09:26  Phos  2.6     12-15  Mg     1.9     12-15        Culture Data           [facility]  Division of Infectious Diseases  [phone]    Name: ELVIS VILLATORO  Age: 63y  Gender: Male  MRN: [id number]    Interval History--  Notes reviewed. Seen earlier today. No further fevers. Denies fevers, chills, or rigors. Appetite ok. Pain not an issue.       Past Medical History--  Non-Insulin Dependent Diabetes Mellitus    DM (Diabetes Mellitus)    HTN (Hypertension)    Dyslipidemia    Retinopathy    Diabetes mellitus type 2 in obese    Hyperlipidemia    Toe infection    Ruptured appendix    S/P appendectomy    Toe amputation status        For details regarding the patient's social history, family history, and other miscellaneous elements, please refer the initial infectious diseases consultation and/or the admitting history and physical examination for this admission.    Allergies    No Known Allergies    Intolerances        Medications--  Antibiotics:    Immunologic:    Other:  acetaminophen     Tablet ..  dextrose 5%.  dextrose 5%.  dextrose 50% Injectable  dextrose 50% Injectable  dextrose 50% Injectable  dextrose Oral Gel PRN  enalapril  enoxaparin Injectable  gabapentin  glucagon  Injectable  guaiFENesin Oral Liquid (Sugar-Free) PRN  insulin glargine Injectable (LANTUS)  insulin lispro (ADMELOG) corrective regimen sliding scale  insulin lispro (ADMELOG) corrective regimen sliding scale  insulin lispro Injectable (ADMELOG)  insulin lispro Injectable (ADMELOG)  insulin lispro Injectable (ADMELOG)  oxyCODONE    IR PRN  oxyCODONE    IR PRN  polyethylene glycol 3350  senna  simvastatin  tamsulosin      Review of Systems--  A 10-point review of systems was obtained.   Review of systems otherwise unchanged compared to prior visit except as previously noted.    Physical Examination--  Vital Signs: T(F): 98 (12-15-23 @ 09:03), Max: 98.8 (12-14-23 @ 17:26)  HR: 86 (12-15-23 @ 09:03)  BP: 155/84 (12-15-23 @ 09:03)  RR: 18 (12-15-23 @ 09:03)  SpO2: 95% (12-15-23 @ 09:03)  Wt(kg): --  General: Nontoxic-appearing Male in no acute distress.  HEENT: AT/NC. Anicteric. Conjunctiva pink and moist. Oropharynx clear.  Neck: Not rigid. No sense of mass.  Nodes: None palpable.  Lungs: Clear B  Heart: Regular rate and rhythm.  Abdomen: Bowel sounds present and normoactive. Soft. Nondistended. Nontender. Obese.   Extremities: No cyanosis or clubbing. No edema. BKA dressed. No erythema today.  Skin: Warm. Dry. Good turgor. No rash. No vasculitic stigmata.  Psychiatric: Appropriate affect and mood for situation.       Laboratory Studies--  CBC                        9.3    10.78 )-----------( 283      ( 15 Dec 2023 09:27 )             29.6       Chemistries  12-15    136  |  101  |  11  ----------------------------<  121<H>  4.4   |  28  |  0.73    Ca    8.7      15 Dec 2023 09:26  Phos  2.6     12-15  Mg     1.9     12-15        Culture Data  No new data         [facility]  Division of Infectious Diseases  [phone]    Name: ELVIS VILLATORO  Age: 63y  Gender: Male  MRN: [id number]    Interval History--  Notes reviewed. Seen earlier today. No further fevers. Denies fevers, chills, or rigors. Appetite ok. Pain not an issue.       Past Medical History--  Non-Insulin Dependent Diabetes Mellitus    DM (Diabetes Mellitus)    HTN (Hypertension)    Dyslipidemia    Retinopathy    Diabetes mellitus type 2 in obese    Hyperlipidemia    Toe infection    Ruptured appendix    S/P appendectomy    Toe amputation status        For details regarding the patient's social history, family history, and other miscellaneous elements, please refer the initial infectious diseases consultation and/or the admitting history and physical examination for this admission.    Allergies    No Known Allergies    Intolerances        Medications--  Antibiotics:    Immunologic:    Other:  acetaminophen     Tablet ..  dextrose 5%.  dextrose 5%.  dextrose 50% Injectable  dextrose 50% Injectable  dextrose 50% Injectable  dextrose Oral Gel PRN  enalapril  enoxaparin Injectable  gabapentin  glucagon  Injectable  guaiFENesin Oral Liquid (Sugar-Free) PRN  insulin glargine Injectable (LANTUS)  insulin lispro (ADMELOG) corrective regimen sliding scale  insulin lispro (ADMELOG) corrective regimen sliding scale  insulin lispro Injectable (ADMELOG)  insulin lispro Injectable (ADMELOG)  insulin lispro Injectable (ADMELOG)  oxyCODONE    IR PRN  oxyCODONE    IR PRN  polyethylene glycol 3350  senna  simvastatin  tamsulosin      Review of Systems--  A 10-point review of systems was obtained.   Review of systems otherwise unchanged compared to prior visit except as previously noted.    Physical Examination--  Vital Signs: T(F): 98 (12-15-23 @ 09:03), Max: 98.8 (12-14-23 @ 17:26)  HR: 86 (12-15-23 @ 09:03)  BP: 155/84 (12-15-23 @ 09:03)  RR: 18 (12-15-23 @ 09:03)  SpO2: 95% (12-15-23 @ 09:03)  Wt(kg): --  General: Nontoxic-appearing Male in no acute distress.  HEENT: AT/NC. Anicteric. Conjunctiva pink and moist. Oropharynx clear.  Neck: Not rigid. No sense of mass.  Nodes: None palpable.  Lungs: Clear B  Heart: Regular rate and rhythm.  Abdomen: Bowel sounds present and normoactive. Soft. Nondistended. Nontender. Obese.   Extremities: No cyanosis or clubbing. No edema. BKA dressed. No erythema today.  Skin: Warm. Dry. Good turgor. No rash. No vasculitic stigmata.  Psychiatric: Appropriate affect and mood for situation.       Laboratory Studies--  CBC                        9.3    10.78 )-----------( 283      ( 15 Dec 2023 09:27 )             29.6       Chemistries  12-15    136  |  101  |  11  ----------------------------<  121<H>  4.4   |  28  |  0.73    Ca    8.7      15 Dec 2023 09:26  Phos  2.6     12-15  Mg     1.9     12-15        Culture Data  No new data

## 2023-12-15 NOTE — PROGRESS NOTE ADULT - PROBLEM SELECTOR PROBLEM 1
Uncontrolled type 2 diabetes mellitus with hyperglycemia

## 2023-12-15 NOTE — PROGRESS NOTE ADULT - ASSESSMENT
63M PMHx DM, HLD, PAD, prior toe amputation, presents to [facility] ED w L foot wound. NSTI of non salvageable L foot. Septic in ED. LRINEC 9. XR/CT both w soft tissue gas. s/p 11/30 L BKA guillotine amp, downgraded from SICU and recovering appropriately on the floor. now S/P below the knee amputation revision on 12/4. now s/p formalization 12/12.     - Pain control as needed  - ID on board: on Unasyn   - Lovenox for DVT ppx  - Activity as tolerated  - Endo consulted, appreciate recs- will continue to follow   - BP control  - Bowel regimen  - Daily dressing changes  - PM&R recommends AR on discharge     Vascular Surgery  [id number]     63M PMHx DM, HLD, PAD, prior toe amputation, presents to [facility] ED w L foot wound. NSTI of non salvageable L foot. Septic in ED. LRINEC 9. XR/CT both w soft tissue gas. s/p 11/30 L BKA guillotine amp, downgraded from SICU and recovering appropriately on the floor. now S/P below the knee amputation revision on 12/4. now s/p formalization 12/12.     - Pain control as needed  - ID on board: on Unasyn   - Lovenox for DVT ppx  - Activity as tolerated  - Endo consulted, appreciate recs- will continue to follow   - BP control  - Bowel regimen  - Daily dressing changes  - PM&R recommends AR on discharge     Vascular Surgery  [id number]     Detail Level: Simple

## 2023-12-15 NOTE — PROGRESS NOTE ADULT - ASSESSMENT
No further fevers, suspect noninfectious in nature.  S/P crain catheter for urinary retention  No concern of uncontrolled infection on exam  No crackles today    Defer additional abx    I'll sign off at this time.   Thank you for the courtesy of this referral.    Eduin Gutierrez MD  Attending Physician  [facility]  Division of Infectous Diseases  [phone]   No further fevers, suspect noninfectious in nature.  S/P crain catheter for urinary retention  No concern of uncontrolled infection on exam  No crackles today    Defer additional abx    I'll sign off at this time.   Thank you for the courtesy of this referral.    Eduin Gutierrez MD  Attending Physician  [facility]  Division of Infectous Diseases  [phone]

## 2023-12-15 NOTE — PROGRESS NOTE ADULT - ASSESSMENT
63M w/h/o uncontrolled  DM2 (A1C 11.4%)  > on DM  oral meds PTA. DM c/b Neuropathy, PAD> s/p toe amputation, retinopathy. ? nephropathy per EMR. Also h/o HLD, HTN. Here with severe hyperglycemia and L foot wound and found to have soft tissue gas at lateral hindfoot. 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 Also required insulin drip at time of admission. Endocrine consulted for hyperglycemia. Patient is high risk with high level decision making due to uncontrolled diabetes with A1C>10 which places patient at high risk for cardiovascular and cerebrovascular events. Patient with lability of glucose requiring close monitoring and insulin adjustments.     # Uncontrolled type 2 diabetes mellitus with hyperglycemia.   - BG levels at present in goal range   - A1c 11.4%   Recommendations:   - Test BG ac and hs. Q6H for NPO status  - Continue Lantus 32 units nightly   - Adjust to Admelog 16 -16-16 units ac meals for now and will continue to monitor/titrate as needed. HOLD IF NOT EATING/while NPO. Can adjust dose if eating < 50% of his meal.   - Continue with moderate dose correctional insulin scale ac and moderate dose correctional insulin scale for bedtime -- if NPO, change to moderate dose correctional insulin scale [id number]   - Goal -180  - Hypoglycemia protocol   - Carb Consistent Diet   DC planning:   TBD BG/insulin needs/PO intake at time of discharge.   Likely Metformin plus basal/bolus insulin pens due To high insulin doses requirements and to promote wound healing   Please write Rxs for: Solo Star Insulin pen/Humalog Kwik insulin pen/Bren insulin pen needles/glucose meter/strips/lancets  Can follow up at endocrine faculty practice. [address] [facility] [address] Phone . Call for apt closer to discharge  Needs podiatry/vascular and optho f/u care.   May benefit from GLP1RA for cardiorenal protection and also weight control in addition to DM, control> will need close f/u with optho.    #HTN  - Goal BP <130/80  - Management per primary team  - currently not on any antihypertensives     #HLD  - Goal LDL <55 in the setting of diabetes/PAD   - Please check fasting lipid panel if not checked recently   - currently on simvastatin 40mg qhs     #Class 2 severe obesity with serious comorbidity in adult.   - Consider addition of GLP1RA as out pt if not contraindicated  - Consider referral to weigh control clinic  for assessment and treatment of obesity.      Leticia Denny DO   Attending Physician   Department of Endocrinology, Diabetes and Metabolism     If before 9AM or after 5PM, or on weekends/holidays, please call the Endocrine answering service for assistance ([phone]).  For nonurgent matters, please email [email or web address] for assistance.      63M w/h/o uncontrolled  DM2 (A1C 11.4%)  > on DM  oral meds PTA. DM c/b Neuropathy, PAD> s/p toe amputation, retinopathy. ? nephropathy per EMR. Also h/o HLD, HTN. Here with severe hyperglycemia and L foot wound and found to have soft tissue gas at lateral hindfoot. 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 Also required insulin drip at time of admission. Endocrine consulted for hyperglycemia. Patient is high risk with high level decision making due to uncontrolled diabetes with A1C>10 which places patient at high risk for cardiovascular and cerebrovascular events. Patient with lability of glucose requiring close monitoring and insulin adjustments.     # Uncontrolled type 2 diabetes mellitus with hyperglycemia.   - BG levels at present in goal range   - A1c 11.4%   Recommendations:   - Test BG ac and hs. Q6H for NPO status  - Continue Lantus 32 units nightly   - Adjust to Admelog 16 -16-16 units ac meals for now and will continue to monitor/titrate as needed. HOLD IF NOT EATING/while NPO. Can adjust dose if eating < 50% of his meal.   - Continue with moderate dose correctional insulin scale ac and moderate dose correctional insulin scale for bedtime -- if NPO, change to moderate dose correctional insulin scale [id number]   - Goal -180  - Hypoglycemia protocol   - Carb Consistent Diet   DC planning:   TBD BG/insulin needs/PO intake at time of discharge.   Likely Metformin plus basal/bolus insulin pens due To high insulin doses requirements and to promote wound healing   Please write Rxs for: Solo Star Insulin pen/Humalog Kwik insulin pen/Bren insulin pen needles/glucose meter/strips/lancets  Can follow up at endocrine faculty practice. [address] [facility] [address] Phone . Call for apt closer to discharge  Needs podiatry/vascular and optho f/u care.   May benefit from GLP1RA for cardiorenal protection and also weight control in addition to DM, control> will need close f/u with optho.    #HTN  - Goal BP <130/80  - Management per primary team  - currently not on any antihypertensives     #HLD  - Goal LDL <55 in the setting of diabetes/PAD   - Please check fasting lipid panel if not checked recently   - currently on simvastatin 40mg qhs     #Class 2 severe obesity with serious comorbidity in adult.   - Consider addition of GLP1RA as out pt if not contraindicated  - Consider referral to weigh control clinic  for assessment and treatment of obesity.      Leticia Denny DO   Attending Physician   Department of Endocrinology, Diabetes and Metabolism     If before 9AM or after 5PM, or on weekends/holidays, please call the Endocrine answering service for assistance ([phone]).  For nonurgent matters, please email [email or web address] for assistance.      63M w/h/o uncontrolled  DM2 (A1C 11.4%)  > on DM  oral meds PTA. DM c/b Neuropathy, PAD> s/p toe amputation, retinopathy. ? nephropathy per EMR. Also h/o HLD, HTN. Here with severe hyperglycemia and L foot wound and found to have soft tissue gas at lateral hindfoot. 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 Also required insulin drip at time of admission. Endocrine consulted for hyperglycemia. Patient is high risk with high level decision making due to uncontrolled diabetes with A1C>10 which places patient at high risk for cardiovascular and cerebrovascular events. Patient with lability of glucose requiring close monitoring and insulin adjustments.     # Uncontrolled type 2 diabetes mellitus with hyperglycemia.   - BG levels at present in goal range   - A1c 11.4%   Recommendations:   - Test BG ac and hs. Q6H for NPO status  - Continue Lantus 32 units nightly   - Adjust to Admelog 16 -16-16 units ac meals for now and will continue to monitor/titrate as needed. HOLD IF NOT EATING/while NPO. Can adjust dose if eating < 50% of his meal.   - Continue with moderate dose correctional insulin scale ac and low dose correctional insulin scale for bedtime -- if NPO, change to moderate dose correctional insulin scale [id number]   - Goal -180  - Hypoglycemia protocol   - Carb Consistent Diet   DC planning:   TBD BG/insulin needs/PO intake at time of discharge.   Likely Metformin plus basal/bolus insulin pens due To high insulin doses requirements and to promote wound healing   Please write Rxs for: Solo Star Insulin pen/Humalog Kwik insulin pen/Bren insulin pen needles/glucose meter/strips/lancets  Can follow up at endocrine faculty practice. [address] [facility] [address]. Phone . Call for apt closer to discharge  Needs podiatry/vascular and optho f/u care.   May benefit from GLP1RA for cardiorenal protection and also weight control in addition to DM, control> will need close f/u with optho.    #HTN  - Goal BP <130/80  - Management per primary team  - currently not on any antihypertensives     #HLD  - Goal LDL <55 in the setting of diabetes/PAD   - Please check fasting lipid panel if not checked recently   - currently on simvastatin 40mg qhs     #Class 2 severe obesity with serious comorbidity in adult.   - Consider addition of GLP1RA as out pt if not contraindicated  - Consider referral to weigh control clinic  for assessment and treatment of obesity.      Leticia Denny DO   Attending Physician   Department of Endocrinology, Diabetes and Metabolism     If before 9AM or after 5PM, or on weekends/holidays, please call the Endocrine answering service for assistance ([phone]).  For nonurgent matters, please email [email or web address] for assistance.      63M w/h/o uncontrolled  DM2 (A1C 11.4%)  > on DM  oral meds PTA. DM c/b Neuropathy, PAD> s/p toe amputation, retinopathy. ? nephropathy per EMR. Also h/o HLD, HTN. Here with severe hyperglycemia and L foot wound and found to have soft tissue gas at lateral hindfoot. 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 Also required insulin drip at time of admission. Endocrine consulted for hyperglycemia. Patient is high risk with high level decision making due to uncontrolled diabetes with A1C>10 which places patient at high risk for cardiovascular and cerebrovascular events. Patient with lability of glucose requiring close monitoring and insulin adjustments.     # Uncontrolled type 2 diabetes mellitus with hyperglycemia.   - BG levels at present in goal range   - A1c 11.4%   Recommendations:   - Test BG ac and hs. Q6H for NPO status  - Continue Lantus 32 units nightly   - Adjust to Admelog 16 -16-16 units ac meals for now and will continue to monitor/titrate as needed. HOLD IF NOT EATING/while NPO. Can adjust dose if eating < 50% of his meal.   - Continue with moderate dose correctional insulin scale ac and low dose correctional insulin scale for bedtime -- if NPO, change to moderate dose correctional insulin scale [id number]   - Goal -180  - Hypoglycemia protocol   - Carb Consistent Diet   DC planning:   TBD BG/insulin needs/PO intake at time of discharge.   Likely Metformin plus basal/bolus insulin pens due To high insulin doses requirements and to promote wound healing   Please write Rxs for: Solo Star Insulin pen/Humalog Kwik insulin pen/Bren insulin pen needles/glucose meter/strips/lancets  Can follow up at endocrine faculty practice. [address] [facility] [address]. Phone . Call for apt closer to discharge  Needs podiatry/vascular and optho f/u care.   May benefit from GLP1RA for cardiorenal protection and also weight control in addition to DM, control> will need close f/u with optho.    #HTN  - Goal BP <130/80  - Management per primary team  - currently not on any antihypertensives     #HLD  - Goal LDL <55 in the setting of diabetes/PAD   - Please check fasting lipid panel if not checked recently   - currently on simvastatin 40mg qhs     #Class 2 severe obesity with serious comorbidity in adult.   - Consider addition of GLP1RA as out pt if not contraindicated  - Consider referral to weigh control clinic  for assessment and treatment of obesity.      Leticia Denny DO   Attending Physician   Department of Endocrinology, Diabetes and Metabolism     If before 9AM or after 5PM, or on weekends/holidays, please call the Endocrine answering service for assistance ([phone]).  For nonurgent matters, please email [email or web address] for assistance.

## 2023-12-15 NOTE — PROGRESS NOTE ADULT - SUBJECTIVE AND OBJECTIVE BOX
VASCULAR SURGERY DAILY PROGRESS NOTE:     SUBJECTIVE/ROS: Patient seen and examined.       MEDICATIONS  (STANDING):  acetaminophen     Tablet .. 975 milliGRAM(s) Oral every 6 hours  dextrose 5%. 1000 milliLiter(s) (50 mL/Hr) IV Continuous <Continuous>  dextrose 5%. 1000 milliLiter(s) (100 mL/Hr) IV Continuous <Continuous>  dextrose 50% Injectable 25 Gram(s) IV Push once  dextrose 50% Injectable 12.5 Gram(s) IV Push once  dextrose 50% Injectable 25 Gram(s) IV Push once  enalapril 10 milliGRAM(s) Oral every 12 hours  enoxaparin Injectable 40 milliGRAM(s) SubCutaneous every 24 hours  gabapentin 300 milliGRAM(s) Oral three times a day  glucagon  Injectable 1 milliGRAM(s) IntraMuscular once  insulin glargine Injectable (LANTUS) 32 Unit(s) SubCutaneous at bedtime  insulin lispro (ADMELOG) corrective regimen sliding scale   SubCutaneous three times a day before meals  insulin lispro (ADMELOG) corrective regimen sliding scale   SubCutaneous at bedtime  insulin lispro Injectable (ADMELOG) 16 Unit(s) SubCutaneous before lunch  insulin lispro Injectable (ADMELOG) 14 Unit(s) SubCutaneous before dinner  insulin lispro Injectable (ADMELOG) 16 Unit(s) SubCutaneous before breakfast  polyethylene glycol 3350 17 Gram(s) Oral every 12 hours  senna 2 Tablet(s) Oral at bedtime  simvastatin 40 milliGRAM(s) Oral at bedtime  tamsulosin 0.4 milliGRAM(s) Oral at bedtime    MEDICATIONS  (PRN):  dextrose Oral Gel 15 Gram(s) Oral once PRN Blood Glucose LESS THAN 70 milliGRAM(s)/deciliter  guaiFENesin Oral Liquid (Sugar-Free) 100 milliGRAM(s) Oral every 6 hours PRN Cough  oxyCODONE    IR 10 milliGRAM(s) Oral every 6 hours PRN Severe Pain (7 - 10)  oxyCODONE    IR 5 milliGRAM(s) Oral every 6 hours PRN Moderate Pain (4 - 6)      OBJECTIVE:    Vital Signs Last 24 Hrs  T(C): 36.4 (15 Dec 2023 05:12), Max: 37.1 (14 Dec 2023 13:16)  T(F): 97.6 (15 Dec 2023 05:12), Max: 98.8 (14 Dec 2023 17:26)  HR: 90 (15 Dec 2023 05:12) (82 - 94)  BP: 161/71 (15 Dec 2023 05:12) (114/71 - 161/71)  BP(mean): --  RR: 18 (15 Dec 2023 05:12) (17 - 18)  SpO2: 93% (15 Dec 2023 05:12) (93% - 96%)    Parameters below as of 15 Dec 2023 05:12  Patient On (Oxygen Delivery Method): room air            I&O's Detail    14 Dec 2023 07:01  -  15 Dec 2023 07:00  --------------------------------------------------------  IN:    Oral Fluid: 1180 mL  Total IN: 1180 mL    OUT:    Indwelling Catheter - Urethral (mL): 625 mL    Intermittent Catheterization - Urethral (mL): 1100 mL  Total OUT: 1725 mL    Total NET: -545 mL          Daily     Daily     LABS:                        9.4    11.56 )-----------( 305      ( 14 Dec 2023 09:07 )             31.1     12-14    135  |  98  |  12  ----------------------------<  193<H>  4.5   |  31  |  0.87    Ca    8.4      14 Dec 2023 09:07  Phos  2.2     12-14  Mg     1.9     12-14        Urinalysis Basic - ( 14 Dec 2023 10:58 )    Color: Yellow / Appearance: Clear / S.024 / pH: x  Gluc: x / Ketone: Negative mg/dL  / Bili: Negative / Urobili: 0.2 mg/dL   Blood: x / Protein: 100 mg/dL / Nitrite: Negative   Leuk Esterase: Negative / RBC: 2 /HPF / WBC 1 /HPF   Sq Epi: x / Non Sq Epi: 0 /HPF / Bacteria: Negative /HPF      PHYSICAL EXAM:  General Appearance: Appears well, NAD  Neck: Supple  Chest: Equal expansion bilaterally  Abdomen: Soft, nontense  Extremities: L BKA dressed, no drainage

## 2023-12-15 NOTE — PROGRESS NOTE ADULT - PROBLEM SELECTOR PROBLEM 4
Class 2 severe obesity with serious comorbidity in adult

## 2023-12-15 NOTE — H&P ADULT - HISTORY OF PRESENT ILLNESS
Mr. Mcdaniel is a 63M PMHx DM, HLD, PAD, prior toe amputation, presents to [facility] ED w L foot wound. Reports wound has been present on hindfoot x 3 weeks. Started as small cut. Over last 24 hours, pt unable to ambulate d/t severe pain. Reports significant malodor. Denies subjective fevers, chills, purulent drainage, numbness/paresthesia.     In ED, patient is tachycardic to 123. BP w HTN. Febrile to 102.4 F. Labs w WBC 21. Hgb 11.4. Lac 2.4. XR L foot w soft tissue gas at lateral hindfoot. CT LLE non con w soft tissue gas tracking ~7cm above foot.   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 Patient admitted to the SICU pre and post op for hyperglycemia and insulin drip management. Endocrinology was consulted for the hyperglycemia.  Patient transferred out of the SICU when he was able to be weaned off the insulin drip. Internal medicine was consulted for malachi-operative clearance for formalization surgery. On 12/4 the patient was taken back to the operating room for a left lower extremity guillotine revision. Infectious disease was consulted for antibiotic management and recommended switching from Zosyn to Unasyn. Rehab medicine was consulted to evaluate for rehab needs and recommended acute rehab on discharge.  Mr. Mcdaniel is a 63M PMHx DM, HLD, PAD, prior toe amputation, presents to [facility] ED w L foot wound. Reports wound has been present on hindfoot x 3 weeks. Started as small cut. Over last 24 hours, pt unable to ambulate d/t severe pain. Reports significant malodor. Denies subjective fevers, chills, purulent drainage, numbness/paresthesia.     In ED, patient is tachycardic to 123. BP w HTN. Febrile to 102.4 F. Labs w WBC 21. Hgb 11.4. Lac 2.4. XR L foot w soft tissue gas at lateral hindfoot. CT LLE non con w soft tissue gas tracking ~7cm above foot.   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 Patient admitted to the SICU pre and post op for hyperglycemia and insulin drip management. Endocrinology was consulted for the hyperglycemia.  Patient transferred out of the SICU when he was able to be weaned off the insulin drip. Internal medicine was consulted for malachi-operative clearance for formalization surgery. On 12/4 the patient was taken back to the operating room for a left lower extremity guillotine revision. Infectious disease was consulted for antibiotic management and recommended switching from Zosyn to Unasyn. Rehab medicine was consulted to evaluate for rehab needs and recommended acute rehab on discharge.

## 2023-12-15 NOTE — H&P ADULT - ASSESSMENT
Assessment/Plan:  ELVIS VILLATORO is a 63y with a history of diabetes mellitus type 2, HTN, dyslipidemia, retinopathy who presented to [facility] on 11/29/23  with complaint of non-healing left foot wound and found to have osteomyelitis. Hospital course complicated by hyperglycemia and need for surgical revision. Now admitted to [facility] after for initiation of a multidisciplinary rehab program consisting focused on functional mobility, transfers and ADLs (activities of daily living).    Left BKA  Comprehensive Multidisciplinary Rehab Program:  - Start comprehensive rehab program, PT/OT/SLP 3 hours a day, 5 days a week.  Participation Restrictions/Precautions:  - Weight bearing status: NWB left lower extremity  - Precautions: falls  - Unasyn per ID at previous hospital     Rehab Diagnosis/Management:  Mood/Cognition:  - Neuropsychology consult PRN    Dyslipidemia  -Simvastatin    Diabetes mellitus type 2  -ISS  - Home meds: metformin 1000mg BID, glipizide   - glargine 32 units bedtime  - Lantus 16u tid w meals    HTN  -enalapril     Urinary Retention  -Tamsulosin     Sleep:   - Melatonin PRN to maximize participation in therapy during the day.     Pain Management:  - Tylenol PRN  - oxy5mg, 10mg mod and severe pain    GI/Bowel:  - Senna QHS, Miralax PRN Daily    /Bladder:   - At risk for incontinence and retention due to neurologic diagnosis and limited mobility  - Currently patient voids with crain    Skin/Pressure Injury:   - At risk for pressure injury due to neurologic diagnosis and relative immobility.  - Skin assessment on admission: ***  - Monitor Incisions: ***  - Soft heel protectors  - Skin barrier cream as needed  - Nursing to monitor skin Qshift    Diet/Dysphagia:  - Diet Consistency/Modifications: Consistent Carb  - Supplements per nutrition  - Aspiration Precautions  - SLP consult for swallow function evaluation and treatment  - Nutrition consult for assessment and recs    DVT ppx:  - Lovenox daily dvt ppx  ---------------  Code Status/Emergency Contact:    Outpatient Follow-up (Specialty/Name of physician):  Jose Francisco Dean  Vascular Surgery  [address] [id number]  [place] [address]  Phone: [phone]  Fax: [phone]  Follow Up Time: 2 weeks  [facility]  Endocrinology  [address] [place], [place] [address]  Phone: [phone]  Fax:   Follow Up Time: 2 weeks    [facility] [place]  Urology  [address] [place] [address]  Phone: [phone]  Follow Up Time: 1 week    --------------  Goals: Safe discharge to home  Estimated Length of Stay: 10-14 days  Rehab Potential: Good  Medical Prognosis: Good  Estimated Disposition: Home with Home Care  ---------------    PRESCREEN COMPARISON:  I have reviewed the prescreen information and I have found no relevant changes between the preadmission screening and my post admission evaluation.    RATIONALE FOR INPATIENT ADMISSION: Patient demonstrates the following:  [X] Medically appropriate for rehabilitation admission  [X] Has attainable rehab goals with an appropriate initial discharge plan  [X]Has rehabilitation potential (expected to make a significant improvement within a reasonable period of time)  [X] Requires close medical management by a rehab physician, rehab nursing care, Hospitalist and comprehensive interdisciplinary team (including PT, OT and/or SLP, Prosthetics and Orthotics)       Assessment/Plan:  ELVIS VILLATORO is a 63y with a history of diabetes mellitus type 2, HTN, dyslipidemia, retinopathy who presented to [facility] on 11/29/23  with complaint of non-healing left foot wound and found to have osteomyelitis. Hospital course complicated by hyperglycemia and need for surgical revision. Now admitted to [facility] after for initiation of a multidisciplinary rehab program consisting focused on functional mobility, transfers and ADLs (activities of daily living).    Left BKA  Comprehensive Multidisciplinary Rehab Program:  - Start comprehensive rehab program, PT/OT/SLP 3 hours a day, 5 days a week.  Participation Restrictions/Precautions:  - Weight bearing status: NWB left lower extremity  - Precautions: falls  - Unasyn per ID at previous hospital     Rehab Diagnosis/Management:  Mood/Cognition:  - Neuropsychology consult PRN    Dyslipidemia  -Simvastatin    Diabetes mellitus type 2  -ISS  - Home meds: metformin 1000mg BID, glipizide   - glargine 32 units bedtime  - Lantus 16u tid w meals    HTN  -enalapril     Urinary Retention  -Tamsulosin     Sleep:   - Melatonin PRN to maximize participation in therapy during the day.     Pain Management:  - Tylenol PRN  - oxy5mg, 10mg mod and severe pain    GI/Bowel:  - Senna QHS, Miralax PRN Daily    /Bladder:   - At risk for incontinence and retention due to neurologic diagnosis and limited mobility  - Currently patient voids with crain    Skin/Pressure Injury:   - At risk for pressure injury due to neurologic diagnosis and relative immobility.  - Skin assessment on admission: ***  - Monitor Incisions: ***  - Soft heel protectors  - Skin barrier cream as needed  - Nursing to monitor skin Qshift    Diet/Dysphagia:  - Diet Consistency/Modifications: Consistent Carb  - Supplements per nutrition  - Aspiration Precautions  - SLP consult for swallow function evaluation and treatment  - Nutrition consult for assessment and recs    DVT ppx:  - Lovenox daily dvt ppx  ---------------  Code Status/Emergency Contact:    Outpatient Follow-up (Specialty/Name of physician):  Jose Francisco Dean  Vascular Surgery  [address] [facility], [id number]  [place] [address]  Phone: [phone]  Fax: [phone]  Follow Up Time: 2 weeks  [facility] Endocrinology  Endocrinology  [address] [place], [place] [address]  Phone: [phone]  Fax:   Follow Up Time: 2 weeks    [facility] [place]  Urology  [address] [place] [address]  Phone: [phone]  Follow Up Time: 1 week    --------------  Goals: Safe discharge to home  Estimated Length of Stay: 10-14 days  Rehab Potential: Good  Medical Prognosis: Good  Estimated Disposition: Home with Home Care  ---------------    PRESCREEN COMPARISON:  I have reviewed the prescreen information and I have found no relevant changes between the preadmission screening and my post admission evaluation.    RATIONALE FOR INPATIENT ADMISSION: Patient demonstrates the following:  [X] Medically appropriate for rehabilitation admission  [X] Has attainable rehab goals with an appropriate initial discharge plan  [X]Has rehabilitation potential (expected to make a significant improvement within a reasonable period of time)  [X] Requires close medical management by a rehab physician, rehab nursing care, Hospitalist and comprehensive interdisciplinary team (including PT, OT and/or SLP, Prosthetics and Orthotics)

## 2023-12-16 ENCOUNTER — INPATIENT (INPATIENT)
Facility: HOSPITAL | Age: 63
LOS: 13 days | Discharge: HOME HEALTH | DRG: 501 | End: 2023-12-30
Attending: PHYSICAL MEDICINE & REHABILITATION | Admitting: PHYSICAL MEDICINE & REHABILITATION
Payer: COMMERCIAL

## 2023-12-16 ENCOUNTER — TRANSCRIPTION ENCOUNTER (OUTPATIENT)
Age: 63
End: 2023-12-16

## 2023-12-16 VITALS
RESPIRATION RATE: 18 BRPM | OXYGEN SATURATION: 94 % | HEART RATE: 91 BPM | DIASTOLIC BLOOD PRESSURE: 90 MMHG | TEMPERATURE: 98 F | HEIGHT: 69 IN | WEIGHT: 202.38 LBS | SYSTOLIC BLOOD PRESSURE: 187 MMHG

## 2023-12-16 VITALS
DIASTOLIC BLOOD PRESSURE: 79 MMHG | TEMPERATURE: 98 F | SYSTOLIC BLOOD PRESSURE: 173 MMHG | HEART RATE: 77 BPM | OXYGEN SATURATION: 96 % | RESPIRATION RATE: 18 BRPM

## 2023-12-16 DIAGNOSIS — Z89.519 ACQUIRED ABSENCE OF UNSPECIFIED LEG BELOW KNEE: ICD-10-CM

## 2023-12-16 DIAGNOSIS — Z89.429 ACQUIRED ABSENCE OF OTHER TOE(S), UNSPECIFIED SIDE: Chronic | ICD-10-CM

## 2023-12-16 DIAGNOSIS — Z98.89 OTHER SPECIFIED POSTPROCEDURAL STATES: Chronic | ICD-10-CM

## 2023-12-16 LAB
GLUCOSE BLDC GLUCOMTR-MCNC: 221 MG/DL — HIGH (ref 70–99)
GLUCOSE BLDC GLUCOMTR-MCNC: 221 MG/DL — HIGH (ref 70–99)

## 2023-12-16 PROCEDURE — 80053 COMPREHEN METABOLIC PANEL: CPT

## 2023-12-16 PROCEDURE — 83036 HEMOGLOBIN GLYCOSYLATED A1C: CPT

## 2023-12-16 PROCEDURE — 82803 BLOOD GASES ANY COMBINATION: CPT

## 2023-12-16 PROCEDURE — C1889: CPT

## 2023-12-16 PROCEDURE — 80202 ASSAY OF VANCOMYCIN: CPT

## 2023-12-16 PROCEDURE — 96374 THER/PROPH/DIAG INJ IV PUSH: CPT

## 2023-12-16 PROCEDURE — 96375 TX/PRO/DX INJ NEW DRUG ADDON: CPT

## 2023-12-16 PROCEDURE — 97535 SELF CARE MNGMENT TRAINING: CPT

## 2023-12-16 PROCEDURE — 97530 THERAPEUTIC ACTIVITIES: CPT

## 2023-12-16 PROCEDURE — 86900 BLOOD TYPING SEROLOGIC ABO: CPT

## 2023-12-16 PROCEDURE — 81001 URINALYSIS AUTO W/SCOPE: CPT

## 2023-12-16 PROCEDURE — 82330 ASSAY OF CALCIUM: CPT

## 2023-12-16 PROCEDURE — 84100 ASSAY OF PHOSPHORUS: CPT

## 2023-12-16 PROCEDURE — 82947 ASSAY GLUCOSE BLOOD QUANT: CPT

## 2023-12-16 PROCEDURE — 86140 C-REACTIVE PROTEIN: CPT

## 2023-12-16 PROCEDURE — 73630 X-RAY EXAM OF FOOT: CPT

## 2023-12-16 PROCEDURE — 88307 TISSUE EXAM BY PATHOLOGIST: CPT

## 2023-12-16 PROCEDURE — 85025 COMPLETE CBC W/AUTO DIFF WBC: CPT

## 2023-12-16 PROCEDURE — 85730 THROMBOPLASTIN TIME PARTIAL: CPT

## 2023-12-16 PROCEDURE — 87641 MR-STAPH DNA AMP PROBE: CPT

## 2023-12-16 PROCEDURE — 87040 BLOOD CULTURE FOR BACTERIA: CPT

## 2023-12-16 PROCEDURE — 99291 CRITICAL CARE FIRST HOUR: CPT | Mod: 25

## 2023-12-16 PROCEDURE — 97166 OT EVAL MOD COMPLEX 45 MIN: CPT

## 2023-12-16 PROCEDURE — 87070 CULTURE OTHR SPECIMN AEROBIC: CPT

## 2023-12-16 PROCEDURE — 84295 ASSAY OF SERUM SODIUM: CPT

## 2023-12-16 PROCEDURE — 97162 PT EVAL MOD COMPLEX 30 MIN: CPT

## 2023-12-16 PROCEDURE — 83735 ASSAY OF MAGNESIUM: CPT

## 2023-12-16 PROCEDURE — 85018 HEMOGLOBIN: CPT

## 2023-12-16 PROCEDURE — 87150 DNA/RNA AMPLIFIED PROBE: CPT

## 2023-12-16 PROCEDURE — 82962 GLUCOSE BLOOD TEST: CPT

## 2023-12-16 PROCEDURE — 80048 BASIC METABOLIC PNL TOTAL CA: CPT

## 2023-12-16 PROCEDURE — 73701 CT LOWER EXTREMITY W/DYE: CPT | Mod: MA

## 2023-12-16 PROCEDURE — 84145 PROCALCITONIN (PCT): CPT

## 2023-12-16 PROCEDURE — 86901 BLOOD TYPING SEROLOGIC RH(D): CPT

## 2023-12-16 PROCEDURE — 85014 HEMATOCRIT: CPT

## 2023-12-16 PROCEDURE — 87640 STAPH A DNA AMP PROBE: CPT

## 2023-12-16 PROCEDURE — 82435 ASSAY OF BLOOD CHLORIDE: CPT

## 2023-12-16 PROCEDURE — C9399: CPT

## 2023-12-16 PROCEDURE — 87077 CULTURE AEROBIC IDENTIFY: CPT

## 2023-12-16 PROCEDURE — 83605 ASSAY OF LACTIC ACID: CPT

## 2023-12-16 PROCEDURE — 86850 RBC ANTIBODY SCREEN: CPT

## 2023-12-16 PROCEDURE — 86923 COMPATIBILITY TEST ELECTRIC: CPT

## 2023-12-16 PROCEDURE — 85027 COMPLETE CBC AUTOMATED: CPT

## 2023-12-16 PROCEDURE — 36415 COLL VENOUS BLD VENIPUNCTURE: CPT

## 2023-12-16 PROCEDURE — 84132 ASSAY OF SERUM POTASSIUM: CPT

## 2023-12-16 PROCEDURE — 85610 PROTHROMBIN TIME: CPT

## 2023-12-16 PROCEDURE — 87186 SC STD MICRODIL/AGAR DIL: CPT

## 2023-12-16 PROCEDURE — 88311 DECALCIFY TISSUE: CPT

## 2023-12-16 PROCEDURE — 97116 GAIT TRAINING THERAPY: CPT

## 2023-12-16 PROCEDURE — 85652 RBC SED RATE AUTOMATED: CPT

## 2023-12-16 PROCEDURE — 97110 THERAPEUTIC EXERCISES: CPT

## 2023-12-16 RX ORDER — SODIUM CHLORIDE 9 MG/ML
1000 INJECTION, SOLUTION INTRAVENOUS
Refills: 0 | Status: DISCONTINUED | OUTPATIENT
Start: 2023-12-16 | End: 2023-12-30

## 2023-12-16 RX ORDER — DEXTROSE 50 % IN WATER 50 %
25 SYRINGE (ML) INTRAVENOUS ONCE
Refills: 0 | Status: DISCONTINUED | OUTPATIENT
Start: 2023-12-16 | End: 2023-12-30

## 2023-12-16 RX ORDER — ACETAMINOPHEN 500 MG
975 TABLET ORAL EVERY 6 HOURS
Refills: 0 | Status: ACTIVE | OUTPATIENT
Start: 2023-12-16 | End: 2024-11-12

## 2023-12-16 RX ORDER — DEXTROSE 50 % IN WATER 50 %
15 SYRINGE (ML) INTRAVENOUS ONCE
Refills: 0 | Status: DISCONTINUED | OUTPATIENT
Start: 2023-12-16 | End: 2023-12-30

## 2023-12-16 RX ORDER — POLYETHYLENE GLYCOL 3350 17 G/17G
17 POWDER, FOR SOLUTION ORAL
Refills: 0 | Status: DISCONTINUED | OUTPATIENT
Start: 2023-12-17 | End: 2023-12-30

## 2023-12-16 RX ORDER — PETROLATUM,WHITE
1 JELLY (GRAM) TOPICAL
Refills: 0 | Status: DISCONTINUED | OUTPATIENT
Start: 2023-12-16 | End: 2023-12-30

## 2023-12-16 RX ORDER — GLUCAGON INJECTION, SOLUTION 0.5 MG/.1ML
1 INJECTION, SOLUTION SUBCUTANEOUS ONCE
Refills: 0 | Status: DISCONTINUED | OUTPATIENT
Start: 2023-12-16 | End: 2023-12-30

## 2023-12-16 RX ORDER — DEXTROSE 50 % IN WATER 50 %
12.5 SYRINGE (ML) INTRAVENOUS ONCE
Refills: 0 | Status: DISCONTINUED | OUTPATIENT
Start: 2023-12-16 | End: 2023-12-30

## 2023-12-16 RX ORDER — ENOXAPARIN SODIUM 100 MG/ML
40 INJECTION SUBCUTANEOUS EVERY 24 HOURS
Refills: 0 | Status: DISCONTINUED | OUTPATIENT
Start: 2023-12-17 | End: 2023-12-30

## 2023-12-16 RX ORDER — SENNA PLUS 8.6 MG/1
2 TABLET ORAL AT BEDTIME
Refills: 0 | Status: DISCONTINUED | OUTPATIENT
Start: 2023-12-16 | End: 2023-12-30

## 2023-12-16 RX ADMIN — INSULIN GLARGINE 32 UNIT(S): 100 INJECTION, SOLUTION SUBCUTANEOUS at 22:38

## 2023-12-16 RX ADMIN — Medication 10 MILLIGRAM(S): at 18:06

## 2023-12-16 RX ADMIN — Medication 975 MILLIGRAM(S): at 05:59

## 2023-12-16 RX ADMIN — Medication 975 MILLIGRAM(S): at 11:34

## 2023-12-16 RX ADMIN — Medication 16 UNIT(S): at 13:21

## 2023-12-16 RX ADMIN — Medication 2: at 09:22

## 2023-12-16 RX ADMIN — GABAPENTIN 300 MILLIGRAM(S): 400 CAPSULE ORAL at 05:59

## 2023-12-16 RX ADMIN — TAMSULOSIN HYDROCHLORIDE 0.4 MILLIGRAM(S): 0.4 CAPSULE ORAL at 22:39

## 2023-12-16 RX ADMIN — ENOXAPARIN SODIUM 40 MILLIGRAM(S): 100 INJECTION SUBCUTANEOUS at 05:59

## 2023-12-16 RX ADMIN — Medication 975 MILLIGRAM(S): at 18:06

## 2023-12-16 RX ADMIN — Medication 16 UNIT(S): at 09:22

## 2023-12-16 RX ADMIN — GABAPENTIN 300 MILLIGRAM(S): 400 CAPSULE ORAL at 22:39

## 2023-12-16 RX ADMIN — Medication 10 MILLIGRAM(S): at 05:59

## 2023-12-16 RX ADMIN — SIMVASTATIN 40 MILLIGRAM(S): 20 TABLET, FILM COATED ORAL at 22:40

## 2023-12-16 RX ADMIN — GABAPENTIN 300 MILLIGRAM(S): 400 CAPSULE ORAL at 13:21

## 2023-12-16 RX ADMIN — Medication 975 MILLIGRAM(S): at 00:11

## 2023-12-16 NOTE — H&P ADULT - NSHPSOCIALHISTORY_GEN_ALL_CORE
ETOH- denies  Tobacco- denies    Lives: with spouse; Pt lives in a private home with wife there is 1 flight of stairs to bedroom, can stay on first floor if needed.    Prior Functional Level:  Independent in I/ADLs without DME    Current Functional Level:  Bed mobility min-mod-A  Sit-stand min-A.  Pt min A for sit to stand x2 trials. Pt min A to hop forward/backward, side hopping to HOB   Gait: minimum assist (75% patient effort);  1 person assist;  nonverbal cues (demo/gestures);  verbal cues;  LLE NWB;  rolling walker;  5 forward hops, 5 lateral hops
ETOH- denies  Tobacco- denies    Lives: with spouse; Pt lives in a private home with wife there is 1 flight of stairs to bedroom, can stay on first floor if needed.    Prior Functional Level:  Independent in I/ADLs without DME    Current Functional Level:  Bed mobility min-mod-A  Sit-stand min-A.  Pt min A for sit to stand x2 trials. Pt min A to hop forward/backward, side hopping to HOB   Gait: minimum assist (75% patient effort);  1 person assist;  nonverbal cues (demo/gestures);  verbal cues;  LLE NWB;  rolling walker;  5 forward hops, 5 lateral hops

## 2023-12-16 NOTE — PATIENT PROFILE ADULT - FALL HARM RISK - RISK INTERVENTIONS
98.7 Assistance OOB with selected safe patient handling equipment/Assistance with ambulation/Communicate Fall Risk and Risk Factors to all staff, patient, and family/Discuss with provider need for PT consult/Monitor gait and stability/Reinforce activity limits and safety measures with patient and family/Visual Cue: Yellow wristband/Bed in lowest position, wheels locked, appropriate side rails in place/Call bell, personal items and telephone in reach/Instruct patient to call for assistance before getting out of bed or chair/Non-slip footwear when patient is out of bed/[place] to call system/Physically safe environment - no spills, clutter or unnecessary equipment/Purposeful Proactive Rounding/Room/bathroom lighting operational, light cord in reach Assistance OOB with selected safe patient handling equipment/Assistance with ambulation/Communicate Fall Risk and Risk Factors to all staff, patient, and family/Discuss with provider need for PT consult/Monitor gait and stability/Reinforce activity limits and safety measures with patient and family/Visual Cue: Yellow wristband/Bed in lowest position, wheels locked, appropriate side rails in place/Call bell, personal items and telephone in reach/Instruct patient to call for assistance before getting out of bed or chair/Non-slip footwear when patient is out of bed/[place] to call system/Physically safe environment - no spills, clutter or unnecessary equipment/Purposeful Proactive Rounding/Room/bathroom lighting operational, light cord in reach

## 2023-12-16 NOTE — PROGRESS NOTE ADULT - PROVIDER SPECIALTY LIST ADULT
Endocrinology
Endocrinology
Infectious Disease
SICU
Vascular Surgery
SICU
Vascular Surgery
Infectious Disease
Rehab Medicine
Vascular Surgery
Infectious Disease
Vascular Surgery
Endocrinology

## 2023-12-16 NOTE — DISCHARGE NOTE NURSING/CASE MANAGEMENT/SOCIAL WORK - NSDCPEFALRISK_GEN_ALL_CORE
For information on Fall & Injury Prevention, visit: [email or web address] OR  [email or web address] OR  https://www.cdc.gov/steadi/patient.html For information on Fall & Injury Prevention, visit: [email or web address] OR  [email or web address]/[email or web address]-tips-to-avoid-injury OR  https://www.cdc.gov/steadi/patient.html

## 2023-12-16 NOTE — H&P ADULT - NSHPREVIEWOFSYSTEMS_GEN_ALL_CORE
REVIEW OF SYSTEMS  Constitutional: No fever, No Chills, No fatigue  HEENT: No eye pain, No visual disturbances, No difficulty hearing  Pulm: No cough,  No shortness of breath  Cardio: No chest pain, No palpitations  GI:  No abdominal pain, No nausea, No vomiting, No diarrhea, No constipation  : No dysuria, No frequency, No hematuria  Neuro: No headaches, No memory loss, No loss of strength, No numbness, No tremors, + generalized weakness  Skin: + itching around residual limb and groin, No rashes, No lesions   Endo: No temperature intolerance  MSK: + joint pain, No joint swelling, + muscle pain, No Neck or back pain  Psych:  No depression, + anxiety

## 2023-12-16 NOTE — PROGRESS NOTE ADULT - SUBJECTIVE AND OBJECTIVE BOX
SURGERY DAILY PROGRESS NOTE    STATUS POST:  Below the knee amputation, left s/p formalization 12/12    SUBJECTIVE: Patient seen and examined at bedside. Reports he is doing well, pain is well-controlled though notes some itching at his stump. Denies chest pain, SOB, palpitations, HA, fever, chills, N/V. Wife at bedside.      OBJECTIVE:  Vital Signs Last 24 Hrs  T(C): 36.7 (16 Dec 2023 10:36), Max: 37.4 (16 Dec 2023 05:57)  T(F): 98 (16 Dec 2023 10:36), Max: 99.4 (16 Dec 2023 05:57)  HR: 86 (16 Dec 2023 10:36) (82 - 93)  BP: 102/50 (16 Dec 2023 10:36) (102/50 - 178/84)  BP(mean): --  RR: 18 (16 Dec 2023 10:36) (17 - 18)  SpO2: 95% (16 Dec 2023 10:36) (93% - 96%)    Parameters below as of 16 Dec 2023 10:36  Patient On (Oxygen Delivery Method): room air        I&O's Summary    15 Dec 2023 07:01  -  16 Dec 2023 07:00  --------------------------------------------------------  IN: 580 mL / OUT: 1575 mL / NET: -995 mL        Physical Exam:  General Appearance: Appears well, NAD  Chest: Nonlabored breathing on RA  CV: Hemodynamically stable  Extremities: s/p L BKA, moves all extremities spontaneously  Vascular: BKA site C/D/I with staples, minimal erythema near incision. No bleeding or purulence noted    LABS:                        9.3    10.78 )-----------( 283      ( 15 Dec 2023 09:27 )             29.6     12-15    136  |  101  |  11  ----------------------------<  121<H>  4.4   |  28  |  0.73    Ca    8.7      15 Dec 2023 09:26  Phos  2.6     12-15  Mg     1.9     12-15        Urinalysis Basic - ( 15 Dec 2023 09:26 )    Color: x / Appearance: x / SG: x / pH: x  Gluc: 121 mg/dL / Ketone: x  / Bili: x / Urobili: x   Blood: x / Protein: x / Nitrite: x   Leuk Esterase: x / RBC: x / WBC x   Sq Epi: x / Non Sq Epi: x / Bacteria: x

## 2023-12-16 NOTE — H&P ADULT - ASSESSMENT
ELVIS VILLATORO is a 63y with a history of diabetes mellitus type 2, HTN, dyslipidemia, retinopathy who presented to [facility] on 11/29/23  with complaint of non-healing left foot wound and found to have osteomyelitis. Hospital course complicated by hyperglycemia and need for surgical revision. Now admitted to [facility] after for initiation of a multidisciplinary rehab program consisting focused on functional mobility, transfers and ADLs (activities of daily living).    Left BKA  Comprehensive Multidisciplinary Rehab Program:  - Start comprehensive rehab program, PT/OT/SLP 3 hours a day, 5 days a week.  Participation Restrictions/Precautions:  - Weight bearing status: NWB left lower extremity  - Precautions: falls  - Unasyn per ID at previous hospital     Rehab Diagnosis/Management:  Mood/Cognition:  - Neuropsychology consult PRN    Dyslipidemia  -Simvastatin    Diabetes mellitus type 2  -ISS  - Home meds: metformin 1000mg BID, glipizide   - glargine 32 units bedtime  - Lantus 16u tid w meals    HTN  -enalapril     Urinary Retention  -Tamsulosin     Sleep:   - Melatonin PRN to maximize participation in therapy during the day.     Pain Management:  - Tylenol PRN  - oxy5mg, 10mg mod and severe pain    GI/Bowel:  - Senna QHS, Miralax PRN Daily    /Bladder:   - At risk for incontinence and retention due to neurologic diagnosis and limited mobility  - Currently patient voids with crain    Skin/Pressure Injury:   - At risk for pressure injury due to neurologic diagnosis and relative immobility.  - Skin assessment on admission: ***  - Monitor Incisions: ***  - Soft heel protectors  - Skin barrier cream as needed  - Nursing to monitor skin Qshift    Diet/Dysphagia:  - Diet Consistency/Modifications: Consistent Carb  - Supplements per nutrition  - Aspiration Precautions  - SLP consult for swallow function evaluation and treatment  - Nutrition consult for assessment and recs    DVT ppx:  - Lovenox daily dvt ppx  ---------------  Code Status/Emergency Contact:    Outpatient Follow-up (Specialty/Name of physician):  Jose Francisco Dean  Vascular Surgery  [address] [place] [address], [address]  [place] [address]  Phone: [phone]  Fax: [phone]  Follow Up Time: 2 weeks  [facility] Endocrinology  Endocrinology  [address] [place] [address]  Phone: [phone]  Fax:   Follow Up Time: 2 weeks    [facility] [place]  Urology  [address] [place] [address]  Phone: [phone]  Follow Up Time: 1 week    --------------  Goals: Safe discharge to home  Estimated Length of Stay: 10-14 days  Rehab Potential: Good  Medical Prognosis: Good  Estimated Disposition: Home with Home Care  ---------------    PRESCREEN COMPARISON:  I have reviewed the prescreen information and I have found no relevant changes between the preadmission screening and my post admission evaluation.    RATIONALE FOR INPATIENT ADMISSION: Patient demonstrates the following:  [X] Medically appropriate for rehabilitation admission  [X] Has attainable rehab goals with an appropriate initial discharge plan  [X]Has rehabilitation potential (expected to make a significant improvement within a reasonable period of time)  [X] Requires close medical management by a rehab physician, rehab nursing care, Hospitalist and comprehensive interdisciplinary team (including PT, OT and/or SLP, Prosthetics and Orthotics)   ELVIS VILLATORO is a 63y with a history of diabetes mellitus type 2, HTN, dyslipidemia, retinopathy who presented to [facility] on 11/29/23  with complaint of non-healing left foot wound and found to have osteomyelitis. Hospital course complicated by hyperglycemia and need for surgical revision. Now admitted to [facility] after for initiation of a multidisciplinary rehab program consisting focused on functional mobility, transfers and ADLs (activities of daily living).    Left BKA  Comprehensive Multidisciplinary Rehab Program:  - Start comprehensive rehab program, PT/OT/SLP 3 hours a day, 5 days a week.  Participation Restrictions/Precautions:  - Weight bearing status: NWB left lower extremity  - Precautions: falls  - Unasyn per ID at previous hospital     Rehab Diagnosis/Management:  Mood/Cognition:  - Neuropsychology consult PRN    Dyslipidemia  -Simvastatin    Diabetes mellitus type 2  -ISS  - Home meds: metformin 1000mg BID, glipizide   - glargine 32 units bedtime  - Lantus 16u tid w meals    HTN  -enalapril     Urinary Retention  -Tamsulosin     Sleep:   - Melatonin PRN to maximize participation in therapy during the day.     Pain Management:  - Tylenol PRN  - oxy5mg, 10mg mod and severe pain    GI/Bowel:  - Senna QHS, Miralax PRN Daily    /Bladder:   - At risk for incontinence and retention due to neurologic diagnosis and limited mobility  - Currently patient voids with crain    Skin/Pressure Injury:   - At risk for pressure injury due to neurologic diagnosis and relative immobility.  - Skin assessment on admission: ***  - Monitor Incisions: ***  - Soft heel protectors  - Skin barrier cream as needed  - Nursing to monitor skin Qshift    Diet/Dysphagia:  - Diet Consistency/Modifications: Consistent Carb  - Supplements per nutrition  - Aspiration Precautions  - SLP consult for swallow function evaluation and treatment  - Nutrition consult for assessment and recs    DVT ppx:  - Lovenox daily dvt ppx  ---------------  Code Status/Emergency Contact:    Outpatient Follow-up (Specialty/Name of physician):  Jose Francisco Dean  Vascular Surgery  [address] [place] [address], [address]  [place] [address]  Phone: [phone]  Fax: [phone]  Follow Up Time: 2 weeks  [facility] Endocrinology  Endocrinology  [address] [place] [address]  Phone: [phone]  Fax:   Follow Up Time: 2 weeks    [facility] [place]  Urology  [address] [place] [address]  Phone: [phone]  Follow Up Time: 1 week    --------------  Goals: Safe discharge to home  Estimated Length of Stay: 10-14 days  Rehab Potential: Good  Medical Prognosis: Good  Estimated Disposition: Home with Home Care  ---------------    PRESCREEN COMPARISON:  I have reviewed the prescreen information and I have found no relevant changes between the preadmission screening and my post admission evaluation.    RATIONALE FOR INPATIENT ADMISSION: Patient demonstrates the following:  [X] Medically appropriate for rehabilitation admission  [X] Has attainable rehab goals with an appropriate initial discharge plan  [X]Has rehabilitation potential (expected to make a significant improvement within a reasonable period of time)  [X] Requires close medical management by a rehab physician, rehab nursing care, Hospitalist and comprehensive interdisciplinary team (including PT, OT and/or SLP, Prosthetics and Orthotics)   ELVIS VILLATORO is a 63y with a history of diabetes mellitus type 2, HTN, dyslipidemia, retinopathy who presented to [facility] on 11/29/23  with complaint of non-healing left foot wound and found to have osteomyelitis. Hospital course complicated by hyperglycemia and need for surgical revision. Now admitted to [facility] after for initiation of a multidisciplinary rehab program consisting focused on functional mobility, transfers and ADLs.     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   - s/p revision on 12/4 and formalization 12/12.   Comprehensive Multidisciplinary Rehab Program: Start comprehensive rehab program, PT/OT/SLP 3 hours a day, 5 days a week.  Participation Restrictions/Precautions:  - Weight bearing status: NWB left lower extremity  - Precautions: falls  - Unasyn per ID at previous hospital     Mood/Cognition:  - Neuropsychology consult PRN    HLD  -Simvastatin    Diabetes mellitus type 2  -ISS  - Home meds: metformin 1000mg BID, glipizide   - glargine 32 units bedtime  - Lantus 16u tid w meals    HTN  -enalapril     Urinary Retention  -Tamsulosin     Sleep:   - Melatonin PRN to maximize participation in therapy during the day.     Pain Management:  - Tylenol PRN  - oxy5mg, 10mg mod and severe pain  - gabapentin 300mg TID    GI/Bowel:  - Senna QHS, Miralax Daily    /Bladder:   - At risk for incontinence and retention due to neurologic diagnosis and limited mobility  - Currently patient voids with crain  - tamsulosin    Skin/Pressure Injury:   - At risk for pressure injury due to neurologic diagnosis and relative immobility.  - Skin assessment on admission: ***  - Monitor Incisions: ***  - Daily dressing changes    Diet:  - Diet Consistency/Modifications: Consistent Carb  - Aspiration Precautions  - SLP consult for swallow function evaluation and treatment  - Nutrition consult for assessment and recs    DVT ppx:  - Lovenox daily dvt ppx  ---------------  Code Status/Emergency Contact:    Outpatient Follow-up (Specialty/Name of physician):  Jose Francisco Dean  Vascular Surgery  [address] [facility], [id number]  [place] [address]  Phone: [phone]  Fax: [phone]  Follow Up Time: 2 weeks  [facility] Endocrinology  Endocrinology  [address] [place], [place] [address]  Phone: [phone]  Fax:   Follow Up Time: 2 weeks    [facility] [place]  Urology  [address] [place] [address]  Phone: [phone]  Follow Up Time: 1 week    --------------  Goals: Safe discharge to home  Estimated Length of Stay: 10-14 days  Rehab Potential: Good  Medical Prognosis: Good  Estimated Disposition: Home with Home Care  ---------------    PRESCREEN COMPARISON:  I have reviewed the prescreen information and I have found no relevant changes between the preadmission screening and my post admission evaluation.    RATIONALE FOR INPATIENT ADMISSION: Patient demonstrates the following:  [X] Medically appropriate for rehabilitation admission  [X] Has attainable rehab goals with an appropriate initial discharge plan  [X]Has rehabilitation potential (expected to make a significant improvement within a reasonable period of time)  [X] Requires close medical management by a rehab physician, rehab nursing care, Hospitalist and comprehensive interdisciplinary team (including PT, OT and/or SLP, Prosthetics and Orthotics)   ELVIS VILLATORO is a 63y with a history of diabetes mellitus type 2, HTN, dyslipidemia, retinopathy who presented to [facility] on 11/29/23  with complaint of non-healing left foot wound and found to have osteomyelitis. Hospital course complicated by hyperglycemia and need for surgical revision. Now admitted to [facility] after for initiation of a multidisciplinary rehab program consisting focused on functional mobility, transfers and ADLs.     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   - s/p revision on 12/4 and formalization 12/12.   Comprehensive Multidisciplinary Rehab Program: Start comprehensive rehab program, PT/OT/SLP 3 hours a day, 5 days a week.  Participation Restrictions/Precautions:  - Weight bearing status: NWB left lower extremity  - Precautions: falls  - Unasyn per ID at previous hospital     Mood/Cognition:  - Neuropsychology consult PRN    HLD  -Simvastatin    Diabetes mellitus type 2  -ISS  - Home meds: metformin 1000mg BID, glipizide   - glargine 32 units bedtime  - Lantus 16u tid w meals    HTN  -enalapril     Urinary Retention  -Tamsulosin     Sleep:   - Melatonin PRN to maximize participation in therapy during the day.     Pain Management:  - Tylenol PRN  - oxy5mg, 10mg mod and severe pain  - gabapentin 300mg TID    GI/Bowel:  - Senna QHS, Miralax Daily    /Bladder:   - At risk for incontinence and retention due to neurologic diagnosis and limited mobility  - Currently patient voids with crain  - tamsulosin    Skin/Pressure Injury:   - At risk for pressure injury due to neurologic diagnosis and relative immobility.  - Skin assessment on admission: ***  - Monitor Incisions: ***  - Daily dressing changes    Diet:  - Diet Consistency/Modifications: Consistent Carb  - Aspiration Precautions  - SLP consult for swallow function evaluation and treatment  - Nutrition consult for assessment and recs    DVT ppx:  - Lovenox daily dvt ppx  ---------------  Code Status/Emergency Contact:    Outpatient Follow-up (Specialty/Name of physician):  Jose Francisco Dean  Vascular Surgery  [address] [facility], [address]  [place] [address]  Phone: [phone]  Fax: [phone]  Follow Up Time: 2 weeks  [facility] Endocrinology  Endocrinology  [address] [place], [place] [address]  Phone: [phone]  Fax:   Follow Up Time: 2 weeks    [facility] [place]  Urology  [address] [place] [address]  Phone: [phone]  Follow Up Time: 1 week    --------------  Goals: Safe discharge to home  Estimated Length of Stay: 10-14 days  Rehab Potential: Good  Medical Prognosis: Good  Estimated Disposition: Home with Home Care  ---------------    PRESCREEN COMPARISON:  I have reviewed the prescreen information and I have found no relevant changes between the preadmission screening and my post admission evaluation.    RATIONALE FOR INPATIENT ADMISSION: Patient demonstrates the following:  [X] Medically appropriate for rehabilitation admission  [X] Has attainable rehab goals with an appropriate initial discharge plan  [X]Has rehabilitation potential (expected to make a significant improvement within a reasonable period of time)  [X] Requires close medical management by a rehab physician, rehab nursing care, Hospitalist and comprehensive interdisciplinary team (including PT, OT and/or SLP, Prosthetics and Orthotics)   ELVIS VILLATORO is a 63y with a history of diabetes mellitus type 2, HTN, dyslipidemia, retinopathy who presented to [facility] on 11/29/23  with complaint of non-healing left foot wound and found to have osteomyelitis. Hospital course complicated by hyperglycemia and need for surgical revision. Now admitted to [facility] after for initiation of a multidisciplinary rehab program consisting focused on functional mobility, transfers and ADLs.     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   - s/p revision on 12/4 and formalization 12/12.   Comprehensive Multidisciplinary Rehab Program: Start comprehensive rehab program, PT/OT/SLP 3 hours a day, 5 days a week.  Participation Restrictions/Precautions:  - Weight bearing status: NWB left lower extremity  - Precautions: falls  - Unasyn per ID at previous hospital     Mood/Cognition:  - Neuropsychology consult PRN    HLD  -Simvastatin    Diabetes mellitus type 2  -ISS  - Home meds: metformin 1000mg BID, glipizide   - glargine 32 units bedtime  - Lantus 16u tid w meals    HTN  -enalapril     Urinary Retention  -Tamsulosin     Sleep:   - Melatonin PRN to maximize participation in therapy during the day.     Pain Management:  - Tylenol PRN  - oxy5mg, 10mg mod and severe pain  - gabapentin 300mg TID    GI/Bowel:  - Senna QHS, Miralax Daily    /Bladder:   - At risk for incontinence and retention due to neurologic diagnosis and limited mobility  - Currently patient voids with crain  - tamsulosin    Skin/Pressure Injury:   - At risk for pressure injury due to neurologic diagnosis and relative immobility.  - Skin assessment on admission: ***  - Monitor Incisions: ***  - Daily dressing changes    Diet:  - Diet Consistency/Modifications: Consistent Carb  - Aspiration Precautions  - SLP consult for swallow function evaluation and treatment  - Nutrition consult for assessment and recs    DVT ppx:  - Lovenox daily dvt ppx  ---------------  Code Status/Emergency Contact:    Outpatient Follow-up (Specialty/Name of physician):  Jose Francisco Dean  Vascular Surgery  [address] [facility], [address]  [place] [address]  Phone: [phone]  Fax: [phone]  Follow Up Time: 2 weeks  [facility] Endocrinology  Endocrinology  [address] [place] [address]  Phone: [phone]  Fax:   Follow Up Time: 2 weeks    [facility] [place]  Urology  [address] [place] [address]  Phone: [phone]  Follow Up Time: 1 week    --------------  Goals: Safe discharge to home  Estimated Length of Stay: 10-14 days  Rehab Potential: Good  Medical Prognosis: Good  Estimated Disposition: Home with Home Care  ---------------    PRESCREEN COMPARISON:  I have reviewed the prescreen information and I have found no relevant changes between the preadmission screening and my post admission evaluation.    RATIONALE FOR INPATIENT ADMISSION: Patient demonstrates the following:  [X] Medically appropriate for rehabilitation admission  [X] Has attainable rehab goals with an appropriate initial discharge plan  [X]Has rehabilitation potential (expected to make a significant improvement within a reasonable period of time)  [X] Requires close medical management by a rehab physician, rehab nursing care, Hospitalist and comprehensive interdisciplinary team (including PT, OT and/or SLP, Prosthetics and Orthotics)   ELVIS VILLATORO is a 63y with a history of diabetes mellitus type 2, HTN, dyslipidemia, retinopathy who presented to [facility] on 11/29/23  with complaint of non-healing left foot wound and found to have osteomyelitis. Hospital course complicated by hyperglycemia and need for surgical revision. Now admitted to [facility] after for initiation of a multidisciplinary rehab program consisting focused on functional mobility, transfers and ADLs.     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   - s/p revision on 12/4 and formalization 12/12.   Comprehensive Multidisciplinary Rehab Program: Start comprehensive rehab program, PT/OT/SLP 3 hours a day, 5 days a week.  Participation Restrictions/Precautions:  - Weight bearing status: NWB left lower extremity  - Precautions: falls  - Unasyn per ID at previous hospital     Mood/Cognition:  - Neuropsychology consult PRN    HLD  -Simvastatin    Diabetes mellitus type 2  -ISS  - Home meds: metformin 1000mg BID, glipizide   - glargine 32 units bedtime  - Lantus 16u tid w meals    HTN  -enalapril     Urinary Retention  -Tamsulosin     Sleep:   - Melatonin PRN to maximize participation in therapy during the day.     Pain Management:  - Tylenol PRN  - oxy5mg, 10mg mod and severe pain  - gabapentin 300mg TID    GI/Bowel:  - Senna QHS, Miralax Daily    /Bladder:   - At risk for incontinence and retention due to neurologic diagnosis and limited mobility  - Currently patient voids with crain  - tamsulosin    Skin/Pressure Injury:   - At risk for pressure injury due to neurologic diagnosis and relative immobility.  - Skin assessment on admission: ***  - Monitor Incisions: ***  - Daily dressing changes    Diet:  - Diet Consistency/Modifications: Consistent Carb  - Aspiration Precautions  - SLP consult for swallow function evaluation and treatment  - Nutrition consult for assessment and recs    DVT ppx:  - Lovenox daily dvt ppx  ---------------  Code Status/Emergency Contact:    Outpatient Follow-up (Specialty/Name of physician):  Jose Francisco Dean  Vascular Surgery  [address] [facility]  [place] [address]  Phone: [phone]  Fax: [phone]  Follow Up Time: 2 weeks  [facility] Endocrinology  Endocrinology  [address] [place] [address]  Phone: [phone]  Fax:   Follow Up Time: 2 weeks    [facility] [place]  Urology  [address] [place] [address]  Phone: [phone]  Follow Up Time: 1 week    --------------  Goals: Safe discharge to home  Estimated Length of Stay: 10-14 days  Rehab Potential: Good  Medical Prognosis: Good  Estimated Disposition: Home with Home Care  ---------------    PRESCREEN COMPARISON:  I have reviewed the prescreen information and I have found no relevant changes between the preadmission screening and my post admission evaluation.    RATIONALE FOR INPATIENT ADMISSION: Patient demonstrates the following:  [X] Medically appropriate for rehabilitation admission  [X] Has attainable rehab goals with an appropriate initial discharge plan  [X]Has rehabilitation potential (expected to make a significant improvement within a reasonable period of time)  [X] Requires close medical management by a rehab physician, rehab nursing care, Hospitalist and comprehensive interdisciplinary team (including PT, OT and/or SLP, Prosthetics and Orthotics)   ELVIS VILLATORO is a 63y with a history of diabetes mellitus type 2, HTN, dyslipidemia, retinopathy who presented to [facility] on 11/29/23  with complaint of non-healing left foot wound and found to have osteomyelitis. Hospital course complicated by hyperglycemia and need for surgical revision. Now admitted to [facility] after for initiation of a multidisciplinary rehab program consisting focused on functional mobility, transfers and ADLs.     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   - s/p revision on 12/4 and formalization 12/12.   Comprehensive Multidisciplinary Rehab Program: Start comprehensive rehab program, PT/OT/SLP 3 hours a day, 5 days a week.  Participation Restrictions/Precautions:  - Weight bearing status: NWB left lower extremity  - Precautions: falls  - Unasyn per ID at previous hospital     Mood/Cognition:  - Neuropsychology consult PRN    HLD  -Simvastatin    Diabetes mellitus type 2  -ISS  - Home meds: metformin 1000mg BID, glipizide   - glargine 32 units bedtime  - Lantus 16u tid w meals    HTN  -enalapril     Urinary Retention  -Tamsulosin     Sleep:   - Melatonin PRN to maximize participation in therapy during the day.     Pain Management:  - Tylenol PRN  - oxy5mg, 10mg mod and severe pain  - gabapentin 300mg TID    GI/Bowel:  - Senna QHS, Miralax Daily    /Bladder:   - At risk for incontinence and retention due to neurologic diagnosis and limited mobility  - Currently patient voids with crain  - tamsulosin    Skin/Pressure Injury:   - At risk for pressure injury due to neurologic diagnosis and relative immobility.  - Skin assessment on admission: ***  - Monitor Incisions: ***  - Daily dressing changes    Diet:  - Diet Consistency/Modifications: Consistent Carb  - Aspiration Precautions  - SLP consult for swallow function evaluation and treatment  - Nutrition consult for assessment and recs    DVT ppx:  - Lovenox daily dvt ppx  ---------------  Code Status/Emergency Contact:    Outpatient Follow-up (Specialty/Name of physician):  Jose Francisco Dean  Vascular Surgery  [address] [facility], [address]  [place] [address]  Phone: [phone]  Fax: [phone]  Follow Up Time: 2 weeks  [facility] Endocrinology  Endocrinology  [address] [place] [address]  Phone: [phone]  Fax:   Follow Up Time: 2 weeks    [facility] [place]  Urology  [address] [place] [address]  Phone: [phone]  Follow Up Time: 1 week    Eduin Gutierrez MD  Attending Physician  [facility]  Division of Infectous Diseases  [phone]    Needs podiatry/vascular and optho f/u care.     Endocrine faculty practice.   [address] [facility] [address]. Phone .    --------------  Goals: Safe discharge to home  Estimated Length of Stay: 10-14 days  Rehab Potential: Good  Medical Prognosis: Good  Estimated Disposition: Home with Home Care  ---------------    PRESCREEN COMPARISON:  I have reviewed the prescreen information and I have found no relevant changes between the preadmission screening and my post admission evaluation.    RATIONALE FOR INPATIENT ADMISSION: Patient demonstrates the following:  [X] Medically appropriate for rehabilitation admission  [X] Has attainable rehab goals with an appropriate initial discharge plan  [X]Has rehabilitation potential (expected to make a significant improvement within a reasonable period of time)  [X] Requires close medical management by a rehab physician, rehab nursing care, Hospitalist and comprehensive interdisciplinary team (including PT, OT and/or SLP, Prosthetics and Orthotics)   ELVIS VILLATORO is a 63y with a history of diabetes mellitus type 2, HTN, dyslipidemia, retinopathy who presented to [facility] on 11/29/23  with complaint of non-healing left foot wound and found to have osteomyelitis. Hospital course complicated by hyperglycemia and need for surgical revision. Now admitted to [facility] after for initiation of a multidisciplinary rehab program consisting focused on functional mobility, transfers and ADLs.     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   - s/p revision on 12/4 and formalization 12/12.   Comprehensive Multidisciplinary Rehab Program: Start comprehensive rehab program, PT/OT/SLP 3 hours a day, 5 days a week.  Participation Restrictions/Precautions:  - Weight bearing status: NWB left lower extremity  - Precautions: falls  - Unasyn per ID at previous hospital     Mood/Cognition:  - Neuropsychology consult PRN    HLD  -Simvastatin    Diabetes mellitus type 2  -ISS  - Home meds: metformin 1000mg BID, glipizide   - glargine 32 units bedtime  - Lantus 16u tid w meals    HTN  -enalapril     Urinary Retention  -Tamsulosin     Sleep:   - Melatonin PRN to maximize participation in therapy during the day.     Pain Management:  - Tylenol PRN  - oxy5mg, 10mg mod and severe pain  - gabapentin 300mg TID    GI/Bowel:  - Senna QHS, Miralax Daily    /Bladder:   - At risk for incontinence and retention due to neurologic diagnosis and limited mobility  - Currently patient voids with crain  - tamsulosin    Skin/Pressure Injury:   - At risk for pressure injury due to neurologic diagnosis and relative immobility.  - Skin assessment on admission: ***  - Monitor Incisions: ***  - Daily dressing changes    Diet:  - Diet Consistency/Modifications: Consistent Carb  - Aspiration Precautions  - SLP consult for swallow function evaluation and treatment  - Nutrition consult for assessment and recs    DVT ppx:  - Lovenox daily dvt ppx  ---------------  Code Status/Emergency Contact:    Outpatient Follow-up (Specialty/Name of physician):  Jose Francisco Dean  Vascular Surgery  [address] [id number]  [place] [address]  Phone: [phone]  Fax: [phone]  Follow Up Time: 2 weeks  [facility]  Endocrinology  [address] [place] [address]  Phone: [phone]  Fax:   Follow Up Time: 2 weeks    [facility] [place]  Urology  [address] [place] [address]  Phone: [phone]  Follow Up Time: 1 week    Eduin Gutierrez MD  Attending Physician  [facility]  Division of Infectous Diseases  [phone]    Needs podiatry/vascular and optho f/u care.     Endocrine faculty practice.   [address] [facility] [address]. Phone .    --------------  Goals: Safe discharge to home  Estimated Length of Stay: 10-14 days  Rehab Potential: Good  Medical Prognosis: Good  Estimated Disposition: Home with Home Care  ---------------    PRESCREEN COMPARISON:  I have reviewed the prescreen information and I have found no relevant changes between the preadmission screening and my post admission evaluation.    RATIONALE FOR INPATIENT ADMISSION: Patient demonstrates the following:  [X] Medically appropriate for rehabilitation admission  [X] Has attainable rehab goals with an appropriate initial discharge plan  [X]Has rehabilitation potential (expected to make a significant improvement within a reasonable period of time)  [X] Requires close medical management by a rehab physician, rehab nursing care, Hospitalist and comprehensive interdisciplinary team (including PT, OT and/or SLP, Prosthetics and Orthotics)   ELVIS VILLATORO is a 63y with a history of diabetes mellitus type 2, HTN, dyslipidemia, retinopathy who presented to [facility] on 11/29/23  with complaint of non-healing left foot wound and found to have osteomyelitis. Hospital course complicated by hyperglycemia and need for surgical revision. Now admitted to [facility] after for initiation of a multidisciplinary rehab program consisting focused on functional mobility, transfers and ADLs.     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   - s/p revision on 12/4 and formalization 12/12.   Comprehensive Multidisciplinary Rehab Program: Start comprehensive rehab program, PT/OT/SLP 3 hours a day, 5 days a week.  Participation Restrictions/Precautions:  - Weight bearing status: NWB left lower extremity  - Precautions: falls  - Unasyn per ID at previous hospital     Mood/Cognition:  - Neuropsychology consult PRN    HLD  -Simvastatin    Diabetes mellitus type 2  -ISS  - Home meds: metformin 1000mg BID, glipizide   - glargine 32 units bedtime  - Lantus 16u tid w meals    HTN  -enalapril     Urinary Retention  -Tamsulosin   - presented with crain catheter  - will TOV on 12/17    Sleep:   - Melatonin PRN to maximize participation in therapy during the day.     Pain Management:  - Tylenol PRN  - oxy5mg, 10mg mod and severe pain  - gabapentin 300mg TID    GI/Bowel:  - patient reports regular BMs  - Senna QHS, Miralax Daily    /Bladder:   - At risk for incontinence and retention due to neurologic diagnosis and limited mobility  - Currently patient voids with crain  - tamsulosin  - will TOV tomorrow AM    Skin/Pressure Injury:   - At risk for pressure injury due to neurologic diagnosis and relative immobility.  - Skin assessment on admission: stage 1 sacral ulcer, tinea cruris present, scab of 4th digit R foot  - barrier cream for sacral wound  - butenafine daily for jock itch  - Monitor Incisions: LLE residual stump with sutures  - Daily dressing changes  - aquaphor for dry R foot.    Diet:  - Diet Consistency/Modifications: Consistent Carb  - Aspiration Precautions  - SLP consult for swallow function evaluation and treatment  - Nutrition consult for assessment and recs    DVT ppx:  - Lovenox daily dvt ppx  ---------------  Code Status/Emergency Contact:    Outpatient Follow-up (Specialty/Name of physician):  Jose Francisco Dean  Vascular Surgery  [address] [facility]  [place] [address]  Phone: [phone]  Fax: [phone]  Follow Up Time: 2 weeks  [facility]  Endocrinology  [address] [place] [address]  Phone: [phone]  Fax:   Follow Up Time: 2 weeks    [facility] [place]  Urology  [address] [place] [address]  Phone: [phone]  Follow Up Time: 1 week    Eduin Gutierrez MD  Attending Physician  [facility]  Division of Infectous Diseases  [phone]    Needs podiatry/vascular and optho f/u care.     Endocrine faculty practice.   [address] [facility] [address]. Phone .    --------------  Goals: Safe discharge to home  Estimated Length of Stay: 10-14 days  Rehab Potential: Good  Medical Prognosis: Good  Estimated Disposition: Home with Home Care  ---------------    PRESCREEN COMPARISON:  I have reviewed the prescreen information and I have found no relevant changes between the preadmission screening and my post admission evaluation.    RATIONALE FOR INPATIENT ADMISSION: Patient demonstrates the following:  [X] Medically appropriate for rehabilitation admission  [X] Has attainable rehab goals with an appropriate initial discharge plan  [X]Has rehabilitation potential (expected to make a significant improvement within a reasonable period of time)  [X] Requires close medical management by a rehab physician, rehab nursing care, Hospitalist and comprehensive interdisciplinary team (including PT, OT and/or SLP, Prosthetics and Orthotics)   ELVIS VILLATORO is a 63y with a history of diabetes mellitus type 2, HTN, dyslipidemia, retinopathy who presented to [facility] on 11/29/23  with complaint of non-healing left foot wound and found to have osteomyelitis. Hospital course complicated by hyperglycemia and need for surgical revision. Now admitted to [facility] after for initiation of a multidisciplinary rehab program consisting focused on functional mobility, transfers and ADLs.     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   - s/p revision on 12/4 and formalization 12/12.   Comprehensive Multidisciplinary Rehab Program: Start comprehensive rehab program, PT/OT/SLP 3 hours a day, 5 days a week.  Participation Restrictions/Precautions:  - Weight bearing status: NWB left lower extremity  - Precautions: falls  - Unasyn per ID at previous hospital     Mood/Cognition:  - Neuropsychology consult PRN    HLD  -Simvastatin    Diabetes mellitus type 2  -ISS  - Home meds: metformin 1000mg BID, glipizide   - glargine 32 units bedtime  - Lantus 16u tid w meals    HTN  -enalapril     Urinary Retention  -Tamsulosin   - presented with crain catheter  - will TOV on 12/17    Sleep:   - Melatonin PRN to maximize participation in therapy during the day.     Pain Management:  - Tylenol PRN  - oxy5mg, 10mg mod and severe pain  - gabapentin 300mg TID    GI/Bowel:  - patient reports regular BMs  - Senna QHS, Miralax Daily    /Bladder:   - At risk for incontinence and retention due to neurologic diagnosis and limited mobility  - Currently patient voids with crain  - tamsulosin  - will TOV tomorrow AM    Skin/Pressure Injury:   - At risk for pressure injury due to neurologic diagnosis and relative immobility.  - Skin assessment on admission: stage 1 sacral ulcer, tinea cruris present, scab of 4th digit R foot  - barrier cream for sacral wound  - butenafine daily for jock itch  - Monitor Incisions: LLE residual stump with sutures  - Daily dressing changes  - aquaphor for dry R foot.    Diet:  - Diet Consistency/Modifications: Consistent Carb  - Aspiration Precautions  - SLP consult for swallow function evaluation and treatment  - Nutrition consult for assessment and recs    DVT ppx:  - Lovenox daily dvt ppx  ---------------  Code Status/Emergency Contact:    Outpatient Follow-up (Specialty/Name of physician):  Jose Francisco Dean  Vascular Surgery  [address] [facility]  [place] [address]  Phone: [phone]  Fax: [phone]  Follow Up Time: 2 weeks  [facility]  Endocrinology  [address] [place] [address]  Phone: [phone]  Fax:   Follow Up Time: 2 weeks    [facility] [place]  Urology  [address] [place] [address]  Phone: [phone]  Follow Up Time: 1 week    Eduin Gutierrez MD  Attending Physician  [facility]  Division of Infectous Diseases  [phone]    Needs podiatry/vascular and optho f/u care.     Endocrine faculty practice.   [address] [facility] [address]. Phone .    --------------  Goals: Safe discharge to home  Estimated Length of Stay: 10-14 days  Rehab Potential: Good  Medical Prognosis: Good  Estimated Disposition: Home with Home Care  ---------------    PRESCREEN COMPARISON:  I have reviewed the prescreen information and I have found no relevant changes between the preadmission screening and my post admission evaluation.    RATIONALE FOR INPATIENT ADMISSION: Patient demonstrates the following:  [X] Medically appropriate for rehabilitation admission  [X] Has attainable rehab goals with an appropriate initial discharge plan  [X]Has rehabilitation potential (expected to make a significant improvement within a reasonable period of time)  [X] Requires close medical management by a rehab physician, rehab nursing care, Hospitalist and comprehensive interdisciplinary team (including PT, OT and/or SLP, Prosthetics and Orthotics)

## 2023-12-16 NOTE — PROGRESS NOTE ADULT - ASSESSMENT
63M PMHx DM, HLD, PAD, prior toe amputation, presents to [facility] ED w L foot wound. NSTI of non salvageable L foot. Septic in ED. LRINEC 9. XR/CT both w soft tissue gas. s/p 11/30 L BKA guillotine amp, downgraded from SICU and recovering appropriately on the floor. now S/P below the knee amputation revision on 12/4. now s/p formalization 12/12.     Plan:  - Dispo: AR per PM&R, discharge today  - Pain control as needed  - ID on board: afebrile off abx  - Lovenox for DVT ppx  - Activity as tolerated  - BP control  - Bowel regimen  - Daily dressing changes    Vascular Surgery  0967 63M PMHx DM, HLD, PAD, prior toe amputation, presents to [facility] ED w L foot wound. NSTI of non salvageable L foot. Septic in ED. LRINEC 9. XR/CT both w soft tissue gas. s/p 11/30 L BKA guillotine amp, downgraded from SICU and recovering appropriately on the floor. now S/P below the knee amputation revision on 12/4. now s/p formalization 12/12.     Plan:  - Dispo: AR per PM&R, discharge today  - Pain control as needed  - ID on board: afebrile off abx  - Lovenox for DVT ppx  - Activity as tolerated  - BP control  - Bowel regimen  - Daily dressing changes    Vascular Surgery  [id number]

## 2023-12-16 NOTE — DISCHARGE NOTE NURSING/CASE MANAGEMENT/SOCIAL WORK - PATIENT PORTAL LINK FT
You can access the FollowMyHealth Patient Portal offered by [facility] by registering at the following website: [email or web address]. By joining [facility] [employer]’s FollowMyHealth portal, you will also be able to view your health information using other applications (apps) compatible with our system. You can access the FollowMyHealth Patient Portal offered by [facility] by registering at the following website: [email or web address]. By joining [employer] [employer]’s FollowMyHealth portal, you will also be able to view your health information using other applications (apps) compatible with our system.

## 2023-12-16 NOTE — H&P ADULT - NSHPPHYSICALEXAM_GEN_ALL_CORE
Gen - NAD, Comfortable  HEENT - NCAT, EOMI, MMM  Neck - Supple, No limited ROM  Pulm - CTAB, No wheeze, No rhonchi, No crackles  Cardiovascular - RRR, S1S2, No murmurs  Abdomen - Soft, NT/ND, +BS  Extremities - LLE with recent BKA.   Uro - crain present for retention  Neuro-     Cognitive - AAOx3     Communication - Fluent, No dysarthria     Attention: Intact      Judgement: Good evidence of judgement     Memory: Recall 3 objects immediate and 3 min later         Cranial Nerves - CN 2-12 intact     Motor -                     LEFT    UE - ShAB 5/5, EF 5/5, EE 5/5, WE 5/5,  5/5                    RIGHT UE - ShAB 5/5, EF 5/5, EE 5/5, WE 5/5,  5/5                    LEFT    LE - HF 5/5, KE 5/5                    RIGHT LE - HF 5/5, KE 5/5, DF 5/5, PF 5/5        Sensory - Intact to LT     Reflexes - DTR Intact, No primitive reflexive     Coordination - FTN intact     Tone - normal  Psychiatric - Mood stable, Affect WNL  Skin:  incision site from BKA on the L residual limb. No drainage. C/d/i. skin well approximated and staples present. Stage 1 sacral decubitis ulcer present. Fungal rash of the bilateral inguinal creases  Wounds: Patient with scab of 4th digit of RLE.

## 2023-12-16 NOTE — H&P ADULT - NSICDXFAMILYHX_GEN_ALL_CORE_FT
FAMILY HISTORY:  Grandparent  Still living? Unknown  Family history of diabetes mellitus in grandmother, Age at diagnosis: Age Unknown    
FAMILY HISTORY:  Grandparent  Still living? Unknown  Family history of diabetes mellitus in grandmother, Age at diagnosis: Age Unknown

## 2023-12-16 NOTE — H&P ADULT - NSICDXPASTSURGICALHX_GEN_ALL_CORE_FT
PAST SURGICAL HISTORY:  S/P appendectomy     Toe amputation status left pinky toe    
PAST SURGICAL HISTORY:  S/P appendectomy     Toe amputation status left pinky toe

## 2023-12-16 NOTE — H&P ADULT - NSICDXPASTMEDICALHX_GEN_ALL_CORE_FT
PAST MEDICAL HISTORY:  Diabetes mellitus type 2 in obese     Hyperlipidemia     Retinopathy     Ruptured appendix     Toe infection s/p amputation    
PAST MEDICAL HISTORY:  Diabetes mellitus type 2 in obese     Hyperlipidemia     Retinopathy     Ruptured appendix     Toe infection s/p amputation

## 2023-12-16 NOTE — H&P ADULT - HISTORY OF PRESENT ILLNESS
Mr. Mcdaniel is a 63M PMHx DM, HLD, PAD, prior toe amputation, presents to [facility] ED w L foot wound. Reports wound has been present on hindfoot x 3 weeks. Started as small cut. Over last 24 hours, pt unable to ambulate d/t severe pain. Reports significant malodor. Denies subjective fevers, chills, purulent drainage, numbness/paresthesia.     In ED, patient is tachycardic to 123. BP w HTN. Febrile to 102.4 F. Labs w WBC 21. Hgb 11.4. Lac 2.4. XR L foot w soft tissue gas at lateral hindfoot. CT LLE non con w soft tissue gas tracking ~7cm above foot.   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 Patient admitted to the SICU pre and post op for hyperglycemia and insulin drip management. Endocrinology was consulted for the hyperglycemia.  Patient transferred out of the SICU when he was able to be weaned off the insulin drip. Internal medicine was consulted for malacih-operative clearance for formalization surgery. On 12/4 the patient was taken back to the operating room for a left lower extremity guillotine revision. Infectious disease was consulted for antibiotic management and recommended switching from Zosyn to Unasyn. Rehab medicine was consulted to evaluate for rehab needs and recommended acute rehab on discharge.    Mr. Mcdaniel is a 63M PMHx DM, HLD, PAD, prior toe amputation, presents to [facility] ED w L foot wound. Reports wound has been present on hindfoot x 3 weeks. Started as small cut. Over last 24 hours, pt unable to ambulate d/t severe pain. Reports significant malodor. Denies subjective fevers, chills, purulent drainage, numbness/paresthesia.     In ED, patient is tachycardic to 123. BP w HTN. Febrile to 102.4 F. Labs w WBC 21. Hgb 11.4. Lac 2.4. XR L foot w soft tissue gas at lateral hindfoot. CT LLE non con w soft tissue gas tracking ~7cm above foot.   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 Patient admitted to the SICU pre and post op for hyperglycemia and insulin drip management. Endocrinology was consulted for the hyperglycemia.  Patient transferred out of the SICU when he was able to be weaned off the insulin drip. Internal medicine was consulted for malachi-operative clearance for formalization surgery. On 12/4 the patient was taken back to the operating room for a left lower extremity guillotine revision. Infectious disease was consulted for antibiotic management and recommended switching from Zosyn to Unasyn. Rehab medicine was consulted to evaluate for rehab needs and recommended acute rehab on discharge.

## 2023-12-16 NOTE — PATIENT PROFILE ADULT - FUNCTIONAL ASSESSMENT - BASIC MOBILITY 6.
2-calculated by average/Not able to assess (calculate score using [facility] averaging method)  2-calculated by average/Not able to assess (calculate score using [facility] averaging method)

## 2023-12-17 LAB
ALBUMIN SERPL ELPH-MCNC: 1.8 G/DL — LOW (ref 3.3–5)
ALBUMIN SERPL ELPH-MCNC: 1.8 G/DL — LOW (ref 3.3–5)
ALP SERPL-CCNC: 125 U/L — HIGH (ref 40–120)
ALP SERPL-CCNC: 125 U/L — HIGH (ref 40–120)
ALT FLD-CCNC: 27 U/L — SIGNIFICANT CHANGE UP (ref 10–45)
ALT FLD-CCNC: 27 U/L — SIGNIFICANT CHANGE UP (ref 10–45)
ANION GAP SERPL CALC-SCNC: 10 MMOL/L — SIGNIFICANT CHANGE UP (ref 5–17)
ANION GAP SERPL CALC-SCNC: 10 MMOL/L — SIGNIFICANT CHANGE UP (ref 5–17)
AST SERPL-CCNC: 26 U/L — SIGNIFICANT CHANGE UP (ref 10–40)
AST SERPL-CCNC: 26 U/L — SIGNIFICANT CHANGE UP (ref 10–40)
BASOPHILS # BLD AUTO: 0.02 K/UL — SIGNIFICANT CHANGE UP (ref 0–0.2)
BASOPHILS # BLD AUTO: 0.02 K/UL — SIGNIFICANT CHANGE UP (ref 0–0.2)
BASOPHILS NFR BLD AUTO: 0.2 % — SIGNIFICANT CHANGE UP (ref 0–2)
BASOPHILS NFR BLD AUTO: 0.2 % — SIGNIFICANT CHANGE UP (ref 0–2)
BILIRUB SERPL-MCNC: 0.2 MG/DL — SIGNIFICANT CHANGE UP (ref 0.2–1.2)
BILIRUB SERPL-MCNC: 0.2 MG/DL — SIGNIFICANT CHANGE UP (ref 0.2–1.2)
BUN SERPL-MCNC: 13 MG/DL — SIGNIFICANT CHANGE UP (ref 7–23)
BUN SERPL-MCNC: 13 MG/DL — SIGNIFICANT CHANGE UP (ref 7–23)
CALCIUM SERPL-MCNC: 9.3 MG/DL — SIGNIFICANT CHANGE UP (ref 8.4–10.5)
CALCIUM SERPL-MCNC: 9.3 MG/DL — SIGNIFICANT CHANGE UP (ref 8.4–10.5)
CHLORIDE SERPL-SCNC: 98 MMOL/L — SIGNIFICANT CHANGE UP (ref 96–108)
CHLORIDE SERPL-SCNC: 98 MMOL/L — SIGNIFICANT CHANGE UP (ref 96–108)
CO2 SERPL-SCNC: 30 MMOL/L — SIGNIFICANT CHANGE UP (ref 22–31)
CO2 SERPL-SCNC: 30 MMOL/L — SIGNIFICANT CHANGE UP (ref 22–31)
CREAT SERPL-MCNC: 0.87 MG/DL — SIGNIFICANT CHANGE UP (ref 0.5–1.3)
CREAT SERPL-MCNC: 0.87 MG/DL — SIGNIFICANT CHANGE UP (ref 0.5–1.3)
EGFR: 97 ML/MIN/1.73M2 — SIGNIFICANT CHANGE UP
EGFR: 97 ML/MIN/1.73M2 — SIGNIFICANT CHANGE UP
EOSINOPHIL # BLD AUTO: 0.34 K/UL — SIGNIFICANT CHANGE UP (ref 0–0.5)
EOSINOPHIL # BLD AUTO: 0.34 K/UL — SIGNIFICANT CHANGE UP (ref 0–0.5)
EOSINOPHIL NFR BLD AUTO: 4.1 % — SIGNIFICANT CHANGE UP (ref 0–6)
EOSINOPHIL NFR BLD AUTO: 4.1 % — SIGNIFICANT CHANGE UP (ref 0–6)
GLUCOSE BLDC GLUCOMTR-MCNC: 145 MG/DL — HIGH (ref 70–99)
GLUCOSE BLDC GLUCOMTR-MCNC: 145 MG/DL — HIGH (ref 70–99)
GLUCOSE BLDC GLUCOMTR-MCNC: 190 MG/DL — HIGH (ref 70–99)
GLUCOSE BLDC GLUCOMTR-MCNC: 190 MG/DL — HIGH (ref 70–99)
GLUCOSE BLDC GLUCOMTR-MCNC: 226 MG/DL — HIGH (ref 70–99)
GLUCOSE BLDC GLUCOMTR-MCNC: 226 MG/DL — HIGH (ref 70–99)
GLUCOSE BLDC GLUCOMTR-MCNC: 241 MG/DL — HIGH (ref 70–99)
GLUCOSE BLDC GLUCOMTR-MCNC: 241 MG/DL — HIGH (ref 70–99)
GLUCOSE SERPL-MCNC: 283 MG/DL — HIGH (ref 70–99)
GLUCOSE SERPL-MCNC: 283 MG/DL — HIGH (ref 70–99)
HCT VFR BLD CALC: 30.6 % — LOW (ref 39–50)
HCT VFR BLD CALC: 30.6 % — LOW (ref 39–50)
HGB BLD-MCNC: 9.7 G/DL — LOW (ref 13–17)
HGB BLD-MCNC: 9.7 G/DL — LOW (ref 13–17)
IMM GRANULOCYTES NFR BLD AUTO: 0.4 % — SIGNIFICANT CHANGE UP (ref 0–0.9)
IMM GRANULOCYTES NFR BLD AUTO: 0.4 % — SIGNIFICANT CHANGE UP (ref 0–0.9)
LYMPHOCYTES # BLD AUTO: 1.57 K/UL — SIGNIFICANT CHANGE UP (ref 1–3.3)
LYMPHOCYTES # BLD AUTO: 1.57 K/UL — SIGNIFICANT CHANGE UP (ref 1–3.3)
LYMPHOCYTES # BLD AUTO: 18.8 % — SIGNIFICANT CHANGE UP (ref 13–44)
LYMPHOCYTES # BLD AUTO: 18.8 % — SIGNIFICANT CHANGE UP (ref 13–44)
MCHC RBC-ENTMCNC: 26.1 PG — LOW (ref 27–34)
MCHC RBC-ENTMCNC: 26.1 PG — LOW (ref 27–34)
MCHC RBC-ENTMCNC: 31.7 GM/DL — LOW (ref 32–36)
MCHC RBC-ENTMCNC: 31.7 GM/DL — LOW (ref 32–36)
MCV RBC AUTO: 82.3 FL — SIGNIFICANT CHANGE UP (ref 80–100)
MCV RBC AUTO: 82.3 FL — SIGNIFICANT CHANGE UP (ref 80–100)
MONOCYTES # BLD AUTO: 0.52 K/UL — SIGNIFICANT CHANGE UP (ref 0–0.9)
MONOCYTES # BLD AUTO: 0.52 K/UL — SIGNIFICANT CHANGE UP (ref 0–0.9)
MONOCYTES NFR BLD AUTO: 6.2 % — SIGNIFICANT CHANGE UP (ref 2–14)
MONOCYTES NFR BLD AUTO: 6.2 % — SIGNIFICANT CHANGE UP (ref 2–14)
NEUTROPHILS # BLD AUTO: 5.88 K/UL — SIGNIFICANT CHANGE UP (ref 1.8–7.4)
NEUTROPHILS # BLD AUTO: 5.88 K/UL — SIGNIFICANT CHANGE UP (ref 1.8–7.4)
NEUTROPHILS NFR BLD AUTO: 70.3 % — SIGNIFICANT CHANGE UP (ref 43–77)
NEUTROPHILS NFR BLD AUTO: 70.3 % — SIGNIFICANT CHANGE UP (ref 43–77)
NRBC # BLD: 0 /100 WBCS — SIGNIFICANT CHANGE UP (ref 0–0)
NRBC # BLD: 0 /100 WBCS — SIGNIFICANT CHANGE UP (ref 0–0)
PLATELET # BLD AUTO: 362 K/UL — SIGNIFICANT CHANGE UP (ref 150–400)
PLATELET # BLD AUTO: 362 K/UL — SIGNIFICANT CHANGE UP (ref 150–400)
POTASSIUM SERPL-MCNC: 4.6 MMOL/L — SIGNIFICANT CHANGE UP (ref 3.5–5.3)
POTASSIUM SERPL-MCNC: 4.6 MMOL/L — SIGNIFICANT CHANGE UP (ref 3.5–5.3)
POTASSIUM SERPL-SCNC: 4.6 MMOL/L — SIGNIFICANT CHANGE UP (ref 3.5–5.3)
POTASSIUM SERPL-SCNC: 4.6 MMOL/L — SIGNIFICANT CHANGE UP (ref 3.5–5.3)
PROT SERPL-MCNC: 7.4 G/DL — SIGNIFICANT CHANGE UP (ref 6–8.3)
PROT SERPL-MCNC: 7.4 G/DL — SIGNIFICANT CHANGE UP (ref 6–8.3)
RBC # BLD: 3.72 M/UL — LOW (ref 4.2–5.8)
RBC # BLD: 3.72 M/UL — LOW (ref 4.2–5.8)
RBC # FLD: 15.7 % — HIGH (ref 10.3–14.5)
RBC # FLD: 15.7 % — HIGH (ref 10.3–14.5)
SODIUM SERPL-SCNC: 138 MMOL/L — SIGNIFICANT CHANGE UP (ref 135–145)
SODIUM SERPL-SCNC: 138 MMOL/L — SIGNIFICANT CHANGE UP (ref 135–145)
WBC # BLD: 8.36 K/UL — SIGNIFICANT CHANGE UP (ref 3.8–10.5)
WBC # BLD: 8.36 K/UL — SIGNIFICANT CHANGE UP (ref 3.8–10.5)
WBC # FLD AUTO: 8.36 K/UL — SIGNIFICANT CHANGE UP (ref 3.8–10.5)
WBC # FLD AUTO: 8.36 K/UL — SIGNIFICANT CHANGE UP (ref 3.8–10.5)

## 2023-12-17 PROCEDURE — 99221 1ST HOSP IP/OBS SF/LOW 40: CPT

## 2023-12-17 PROCEDURE — 99223 1ST HOSP IP/OBS HIGH 75: CPT

## 2023-12-17 RX ORDER — INSULIN GLARGINE 100 [IU]/ML
25 INJECTION, SOLUTION SUBCUTANEOUS AT BEDTIME
Refills: 0 | Status: DISCONTINUED | OUTPATIENT
Start: 2023-12-17 | End: 2023-12-18

## 2023-12-17 RX ORDER — METFORMIN HYDROCHLORIDE 850 MG/1
1000 TABLET ORAL
Refills: 0 | Status: DISCONTINUED | OUTPATIENT
Start: 2023-12-17 | End: 2023-12-30

## 2023-12-17 RX ORDER — INSULIN LISPRO 100/ML
8 VIAL (ML) SUBCUTANEOUS
Refills: 0 | Status: COMPLETED | OUTPATIENT
Start: 2023-12-17 | End: 2023-12-17

## 2023-12-17 RX ADMIN — INSULIN GLARGINE 25 UNIT(S): 100 INJECTION, SOLUTION SUBCUTANEOUS at 22:47

## 2023-12-17 RX ADMIN — Medication 975 MILLIGRAM(S): at 17:40

## 2023-12-17 RX ADMIN — Medication 8 UNIT(S): at 18:25

## 2023-12-17 RX ADMIN — Medication 1 APPLICATION(S): at 17:41

## 2023-12-17 RX ADMIN — TAMSULOSIN HYDROCHLORIDE 0.4 MILLIGRAM(S): 0.4 CAPSULE ORAL at 22:35

## 2023-12-17 RX ADMIN — GABAPENTIN 300 MILLIGRAM(S): 400 CAPSULE ORAL at 06:54

## 2023-12-17 RX ADMIN — GABAPENTIN 300 MILLIGRAM(S): 400 CAPSULE ORAL at 22:38

## 2023-12-17 RX ADMIN — OXYCODONE HYDROCHLORIDE 10 MILLIGRAM(S): 5 TABLET ORAL at 16:11

## 2023-12-17 RX ADMIN — SIMVASTATIN 40 MILLIGRAM(S): 20 TABLET, FILM COATED ORAL at 22:38

## 2023-12-17 RX ADMIN — GABAPENTIN 300 MILLIGRAM(S): 400 CAPSULE ORAL at 13:42

## 2023-12-17 RX ADMIN — METFORMIN HYDROCHLORIDE 1000 MILLIGRAM(S): 850 TABLET ORAL at 17:43

## 2023-12-17 RX ADMIN — Medication 16 UNIT(S): at 08:20

## 2023-12-17 RX ADMIN — Medication 10 MILLIGRAM(S): at 06:54

## 2023-12-17 RX ADMIN — Medication 1 APPLICATION(S): at 06:54

## 2023-12-17 RX ADMIN — Medication 10 MILLIGRAM(S): at 17:39

## 2023-12-17 RX ADMIN — Medication 975 MILLIGRAM(S): at 07:32

## 2023-12-17 RX ADMIN — OXYCODONE HYDROCHLORIDE 10 MILLIGRAM(S): 5 TABLET ORAL at 17:10

## 2023-12-17 RX ADMIN — Medication 975 MILLIGRAM(S): at 14:40

## 2023-12-17 RX ADMIN — ENOXAPARIN SODIUM 40 MILLIGRAM(S): 100 INJECTION SUBCUTANEOUS at 06:54

## 2023-12-17 RX ADMIN — Medication 1 APPLICATION(S): at 17:42

## 2023-12-17 RX ADMIN — Medication 1 APPLICATION(S): at 06:55

## 2023-12-17 RX ADMIN — Medication 16 UNIT(S): at 12:54

## 2023-12-17 RX ADMIN — Medication 4: at 12:54

## 2023-12-17 RX ADMIN — Medication 4: at 08:21

## 2023-12-17 RX ADMIN — Medication 975 MILLIGRAM(S): at 13:43

## 2023-12-17 RX ADMIN — Medication 975 MILLIGRAM(S): at 06:53

## 2023-12-17 RX ADMIN — Medication 975 MILLIGRAM(S): at 18:40

## 2023-12-17 NOTE — PROGRESS NOTE ADULT - SUBJECTIVE AND OBJECTIVE BOX
History of Present Illness:   Mr. Mcdaniel is a 63M PMHx DM, HLD, PAD, prior toe amputation, presents to [facility] ED w L foot wound. Reports wound has been present on hindfoot x 3 weeks. Started as small cut. Over last 24 hours, pt unable to ambulate d/t severe pain. Reports significant malodor. Denies subjective fevers, chills, purulent drainage, numbness/paresthesia.     In ED, patient is tachycardic to 123. BP w HTN. Febrile to 102.4 F. Labs w WBC 21. Hgb 11.4. Lac 2.4. XR L foot w soft tissue gas at lateral hindfoot. CT LLE non con w soft tissue gas tracking ~7cm above foot.   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 Patient admitted to the SICU pre and post op for hyperglycemia and insulin drip management. Endocrinology was consulted for the hyperglycemia.  Patient transferred out of the SICU when he was able to be weaned off the insulin drip. Internal medicine was consulted for malachi-operative clearance for formalization surgery. On 12/4 the patient was taken back to the operating room for a left lower extremity guillotine revision. Infectious disease was consulted for antibiotic management and recommended switching from Zosyn to Unasyn. Rehab medicine was consulted to evaluate for rehab needs and recommended acute rehab on discharge.          Review of Systems/Subjective: Patient seen and examined at bedside this morning. Patient in bed in NAD, no SOB, no n/v, pain controlled  Constitutional: No fever, No Chills, No fatigue  HEENT: No difficulty hearing  Pulm: No cough,  No shortness of breath  Cardio: No chest pain  GI:  No abdominal pain  : No dysuria  Neuro: No headaches, + generalized weakness  MSK: No Neck or back pain  Psych:  No depression, + anxiety      Allergies and Intolerances:        Allergies:  	No Known Allergies:     .    Patient History:    Past Medical, Past Surgical, and Family History:  PAST MEDICAL HISTORY:  Diabetes mellitus type 2 in obese     Hyperlipidemia     Retinopathy     Ruptured appendix     Toe infection s/p amputation.     PAST SURGICAL HISTORY:  S/P appendectomy     Toe amputation status left pinky toe.     FAMILY HISTORY:  Grandparent  Still living? Unknown  Family history of diabetes mellitus in grandmother, Age at diagnosis: Age Unknown.     Social History:  · Substance use	No  · Social History (marital status, living situation, occupation, and sexual history)	ETOH- denies  Tobacco- denies    Lives: with spouse; Pt lives in a private home with wife there is 1 flight of stairs to bedroom, can stay on first floor if needed.          Physical Exam:     Vital Signs Last 24 Hrs  T(C): 36.7 (17 Dec 2023 08:30), Max: 36.8 (16 Dec 2023 18:12)  T(F): 98.1 (17 Dec 2023 08:30), Max: 98.3 (16 Dec 2023 18:12)  HR: 86 (17 Dec 2023 08:30) (77 - 91)  BP: 173/78 (17 Dec 2023 08:30) (102/50 - 187/90)  BP(mean): --  RR: 16 (17 Dec 2023 08:30) (16 - 18)  SpO2: 94% (17 Dec 2023 08:30) (94% - 96%)    Parameters below as of 17 Dec 2023 08:30  Patient On (Oxygen Delivery Method): room air      Physical Exam: Gen - NAD, Comfortable  HEENT - NCAT, MMM  Neck - Supple  Pulm - CTAB, No wheeze  Cardiovascular - RRR, S1S2  Abdomen - Soft, NT/ND, +BS  Extremities - LLE with recent BKA, dressed and in knee immobilizer  Uro - crain present for retention  Neuro-     Cognitive - AAOx3     Communication - Fluent, No dysarthria     Cranial Nerves - CN 2-12 intact     Motor -                     LEFT    UE - ShAB 5/5, EF 5/5, EE 5/5, WE 5/5,  5/5                    RIGHT UE - ShAB 5/5, EF 5/5, EE 5/5, WE 5/5,  5/5                    LEFT    LE - HF 5/5, KE 5/5                    RIGHT LE - HF 5/5, KE 5/5, DF 5/5, PF 5/5        Sensory - Intact to LT     Reflexes - 0 KJ (B)     Coordination - FTN intact     Tone - normal  Psychiatric - Mood stable, Affect WNL  Skin: admission- incision site from BKA on the L residual limb. No drainage. C/d/i. skin well approximated and staples present. Stage 1 sacral decubitis ulcer present. Fungal rash of the bilateral inguinal creases         Labs and Results:  LABS:                        9.7    8.36  )-----------( 362      ( 17 Dec 2023 06:00 )             30.6     17 Dec 2023 06:00    138    |  98     |  13     ----------------------------<  283    4.6     |  30     |  0.87     Ca    9.3        17 Dec 2023 06:00    TPro  7.4    /  Alb  1.8    /  TBili  0.2    /  DBili  x      /  AST  26     /  ALT  27     /  AlkPhos  125    17 Dec 2023 06:00                              9.3    10.78 )-----------( 283      ( 15 Dec 2023 09:27 )             29.6     12-15    136  |  101  |  11  ----------------------------<  121<H>  4.4   |  28  |  0.73    Ca    8.7      15 Dec 2023 09:26  Phos  2.6     12-15  Mg     1.9     12-15    Urinalysis Basic - ( 15 Dec 2023 09:26 )    Color: x / Appearance: x / SG: x / pH: x  Gluc: 121 mg/dL / Ketone: x  / Bili: x / Urobili: x   Blood: x / Protein: x / Nitrite: x   Leuk Esterase: x / RBC: x / WBC x   Sq Epi: x / Non Sq Epi: x / Bacteria: x    CAPILLARY BLOOD GLUCOSE    POCT Blood Glucose.: 146 mg/dL (15 Dec 2023 13:43)  POCT Blood Glucose.: 112 mg/dL (15 Dec 2023 09:53)  POCT Blood Glucose.: 212 mg/dL (14 Dec 2023 22:02)  POCT Blood Glucose.: 182 mg/dL (14 Dec 2023 17:30)      < from: Xray Foot AP + Lateral + Oblique, Left (11.29.23 @ 18:20) > Pre surgical.  IMPRESSION:  Multiple foci of subcutaneous emphysema involving the soft tissues of left foot, concerning for necrotizing soft tissue infection.    < from: CT Lower Extremity w/ IV Cont, Left (11.29.23 @ 21:05) >  Pre surgical.   IMPRESSION:  Soft tissue ulcerations seen along the plantar aspect of the first toe,   posterior aspect of the heel, and possibly along the lateral aspect of   the forefoot. Associated soft tissue gas tracking within the lateral soft   tissues along the fifth metatarsal, within the plantar soft tissues of   the midfoot/hindfoot, posterior aspect of the heel, and traveling   proximally along the full length of the Achilles tendon, which may   represent a gas-forming/necrotizing infection.  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  Status post amputation of the fifth ray to the level of the fifth   metatarsal neck. Amputation margin appears fairly well-corticated.   However, there is soft tissue gas abutting the lateral margin of the   fifth metatarsal. Underlying osteomyelitis of the fifth metatarsal cannot   be excluded and is within the differential.  Nonspecific subcutaneous edema about the visualized lower extremity,   which may be related to cellulitis andlower extremity edema. No   drainable fluid collection within the soft tissues.  Chronic osteochondral lesion of the lateral talar dome.      · Assessment	  ELVIS MCDANIEL is a 63y with a history of diabetes mellitus type 2, HTN, dyslipidemia, retinopathy who presented to [facility] on 11/29/23  with complaint of non-healing left foot wound and found to have osteomyelitis. Hospital course complicated by hyperglycemia and need for surgical revision. Now admitted to [facility] after for initiation of a multidisciplinary rehab program consisting focused on functional mobility, transfers and ADLs.     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   - s/p revision on 12/4 and formalization 12/12.   Comprehensive Multidisciplinary Rehab Program: Start comprehensive rehab program, PT/OT/SLP 3 hours a day, 5 days a week.  Participation Restrictions/Precautions:  - Weight bearing status: NWB left lower extremity  - Precautions: falls  - Unasyn per ID at previous hospital     Mood/Cognition:  - Neuropsychology consult PRN    HLD  -Simvastatin    HTN: (102/50 - 187/90) 12/17  -enalapril 10mg Q12  -Hospitalist consult  -monitor    Diabetes mellitus type 2:  VF=751 12/17  -ISS  - Home meds: metformin 1000mg BID, glipizide   - Lantus 32 units bedtime  - Admelog 16u tid w meals  - will start Metformin 1000mg BID  - Hospitalist consult      Urinary Retention  -Tamsulosin   - presented with crain catheter  - will TOV on 12/17    Sleep:   - Melatonin PRN to maximize participation in therapy during the day.     Pain Management:  - Tylenol PRN  - oxy5mg, 10mg mod and severe pain  - gabapentin 300mg TID    GI/Bowel:  - patient reports regular BMs  - Senna QHS, Miralax Daily    /Bladder:   - At risk for incontinence and retention due to neurologic diagnosis and limited mobility  - Currently patient voids with crain  - tamsulosin  - will TOV tomorrow AM    Skin/Pressure Injury:   - At risk for pressure injury due to neurologic diagnosis and relative immobility.  - Skin assessment on admission: stage 1 sacral ulcer, tinea cruris present, scab of 4th digit R foot  - barrier cream for sacral wound  - butenafine daily for jock itch  - Monitor Incisions: LLE residual stump with sutures  - Daily dressing changes  - aquaphor for dry R foot.    Diet:  - Diet Consistency/Modifications: Consistent Carb  - Aspiration Precautions  - SLP consult for swallow function evaluation and treatment  - Nutrition consult for assessment and recs    DVT ppx:  - Lovenox daily dvt ppx    Labs:  CBC, CMP 12/18      ---------------  Code Status/Emergency Contact:    Outpatient Follow-up (Specialty/Name of physician):  Jose Francisco Dean  Vascular Surgery  [address] [place] [address], [id number]  [place] [address]  Phone: [phone]  Fax: [phone]  Follow Up Time: 2 weeks  [facility]  Endocrinology  [address] [place] [address]  Phone: [phone]  Fax:   Follow Up Time: 2 weeks    [facility]  Urology  [address] [place] [address]  Phone: [phone]  Follow Up Time: 1 week    Eduin Gutierrez MD  Attending Physician  [facility]  Division of Infectous Diseases  [phone]    Needs podiatry/vascular and optho f/u care.     Endocrine faculty practice.   [address] [place] [address] [phone]. Phone .    --------------  Goals: Safe discharge to home  Estimated Length of Stay: 10-14 days  Rehab Potential: Good  Medical Prognosis: Good  Estimated Disposition: Home with Home Care     History of Present Illness:   Mr. Mcdaniel is a 63M PMHx DM, HLD, PAD, prior toe amputation, presents to [facility] ED w L foot wound. Reports wound has been present on hindfoot x 3 weeks. Started as small cut. Over last 24 hours, pt unable to ambulate d/t severe pain. Reports significant malodor. Denies subjective fevers, chills, purulent drainage, numbness/paresthesia.     In ED, patient is tachycardic to 123. BP w HTN. Febrile to 102.4 F. Labs w WBC 21. Hgb 11.4. Lac 2.4. XR L foot w soft tissue gas at lateral hindfoot. CT LLE non con w soft tissue gas tracking ~7cm above foot.   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 Patient admitted to the SICU pre and post op for hyperglycemia and insulin drip management. Endocrinology was consulted for the hyperglycemia.  Patient transferred out of the SICU when he was able to be weaned off the insulin drip. Internal medicine was consulted for malachi-operative clearance for formalization surgery. On 12/4 the patient was taken back to the operating room for a left lower extremity guillotine revision. Infectious disease was consulted for antibiotic management and recommended switching from Zosyn to Unasyn. Rehab medicine was consulted to evaluate for rehab needs and recommended acute rehab on discharge.          Review of Systems/Subjective: Patient seen and examined at bedside this morning. Patient in bed in NAD, no SOB, no n/v, pain controlled  Constitutional: No fever, No Chills, No fatigue  HEENT: No difficulty hearing  Pulm: No cough,  No shortness of breath  Cardio: No chest pain  GI:  No abdominal pain  : No dysuria  Neuro: No headaches, + generalized weakness  MSK: No Neck or back pain  Psych:  No depression, + anxiety      Allergies and Intolerances:        Allergies:  	No Known Allergies:     .    Patient History:    Past Medical, Past Surgical, and Family History:  PAST MEDICAL HISTORY:  Diabetes mellitus type 2 in obese     Hyperlipidemia     Retinopathy     Ruptured appendix     Toe infection s/p amputation.     PAST SURGICAL HISTORY:  S/P appendectomy     Toe amputation status left pinky toe.     FAMILY HISTORY:  Grandparent  Still living? Unknown  Family history of diabetes mellitus in grandmother, Age at diagnosis: Age Unknown.     Social History:  · Substance use	No  · Social History (marital status, living situation, occupation, and sexual history)	ETOH- denies  Tobacco- denies    Lives: with spouse; Pt lives in a private home with wife there is 1 flight of stairs to bedroom, can stay on first floor if needed.          Physical Exam:     Vital Signs Last 24 Hrs  T(C): 36.7 (17 Dec 2023 08:30), Max: 36.8 (16 Dec 2023 18:12)  T(F): 98.1 (17 Dec 2023 08:30), Max: 98.3 (16 Dec 2023 18:12)  HR: 86 (17 Dec 2023 08:30) (77 - 91)  BP: 173/78 (17 Dec 2023 08:30) (102/50 - 187/90)  BP(mean): --  RR: 16 (17 Dec 2023 08:30) (16 - 18)  SpO2: 94% (17 Dec 2023 08:30) (94% - 96%)    Parameters below as of 17 Dec 2023 08:30  Patient On (Oxygen Delivery Method): room air      Physical Exam: Gen - NAD, Comfortable  HEENT - NCAT, MMM  Neck - Supple  Pulm - CTAB, No wheeze  Cardiovascular - RRR, S1S2  Abdomen - Soft, NT/ND, +BS  Extremities - LLE with recent BKA, dressed and in knee immobilizer  Uro - crain present for retention  Neuro-     Cognitive - AAOx3     Communication - Fluent, No dysarthria     Cranial Nerves - CN 2-12 intact     Motor -                     LEFT    UE - ShAB 5/5, EF 5/5, EE 5/5, WE 5/5,  5/5                    RIGHT UE - ShAB 5/5, EF 5/5, EE 5/5, WE 5/5,  5/5                    LEFT    LE - HF 5/5, KE 5/5                    RIGHT LE - HF 5/5, KE 5/5, DF 5/5, PF 5/5        Sensory - Intact to LT     Reflexes - 0 KJ (B)     Coordination - FTN intact     Tone - normal  Psychiatric - Mood stable, Affect WNL  Skin: admission- incision site from BKA on the L residual limb. No drainage. C/d/i. skin well approximated and staples present. Stage 1 sacral decubitis ulcer present. Fungal rash of the bilateral inguinal creases         Labs and Results:  LABS:                        9.7    8.36  )-----------( 362      ( 17 Dec 2023 06:00 )             30.6     17 Dec 2023 06:00    138    |  98     |  13     ----------------------------<  283    4.6     |  30     |  0.87     Ca    9.3        17 Dec 2023 06:00    TPro  7.4    /  Alb  1.8    /  TBili  0.2    /  DBili  x      /  AST  26     /  ALT  27     /  AlkPhos  125    17 Dec 2023 06:00                              9.3    10.78 )-----------( 283      ( 15 Dec 2023 09:27 )             29.6     12-15    136  |  101  |  11  ----------------------------<  121<H>  4.4   |  28  |  0.73    Ca    8.7      15 Dec 2023 09:26  Phos  2.6     12-15  Mg     1.9     12-15    Urinalysis Basic - ( 15 Dec 2023 09:26 )    Color: x / Appearance: x / SG: x / pH: x  Gluc: 121 mg/dL / Ketone: x  / Bili: x / Urobili: x   Blood: x / Protein: x / Nitrite: x   Leuk Esterase: x / RBC: x / WBC x   Sq Epi: x / Non Sq Epi: x / Bacteria: x    CAPILLARY BLOOD GLUCOSE    POCT Blood Glucose.: 146 mg/dL (15 Dec 2023 13:43)  POCT Blood Glucose.: 112 mg/dL (15 Dec 2023 09:53)  POCT Blood Glucose.: 212 mg/dL (14 Dec 2023 22:02)  POCT Blood Glucose.: 182 mg/dL (14 Dec 2023 17:30)      < from: Xray Foot AP + Lateral + Oblique, Left (11.29.23 @ 18:20) > Pre surgical.  IMPRESSION:  Multiple foci of subcutaneous emphysema involving the soft tissues of left foot, concerning for necrotizing soft tissue infection.    < from: CT Lower Extremity w/ IV Cont, Left (11.29.23 @ 21:05) >  Pre surgical.   IMPRESSION:  Soft tissue ulcerations seen along the plantar aspect of the first toe,   posterior aspect of the heel, and possibly along the lateral aspect of   the forefoot. Associated soft tissue gas tracking within the lateral soft   tissues along the fifth metatarsal, within the plantar soft tissues of   the midfoot/hindfoot, posterior aspect of the heel, and traveling   proximally along the full length of the Achilles tendon, which may   represent a gas-forming/necrotizing infection.  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  Status post amputation of the fifth ray to the level of the fifth   metatarsal neck. Amputation margin appears fairly well-corticated.   However, there is soft tissue gas abutting the lateral margin of the   fifth metatarsal. Underlying osteomyelitis of the fifth metatarsal cannot   be excluded and is within the differential.  Nonspecific subcutaneous edema about the visualized lower extremity,   which may be related to cellulitis andlower extremity edema. No   drainable fluid collection within the soft tissues.  Chronic osteochondral lesion of the lateral talar dome.      · Assessment	  ELVIS MCDANIEL is a 63y with a history of diabetes mellitus type 2, HTN, dyslipidemia, retinopathy who presented to [facility] on 11/29/23  with complaint of non-healing left foot wound and found to have osteomyelitis. Hospital course complicated by hyperglycemia and need for surgical revision. Now admitted to [facility] after for initiation of a multidisciplinary rehab program consisting focused on functional mobility, transfers and ADLs.     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   - s/p revision on 12/4 and formalization 12/12.   Comprehensive Multidisciplinary Rehab Program: Start comprehensive rehab program, PT/OT/SLP 3 hours a day, 5 days a week.  Participation Restrictions/Precautions:  - Weight bearing status: NWB left lower extremity  - Precautions: falls  - Unasyn per ID at previous hospital     Mood/Cognition:  - Neuropsychology consult PRN    HLD  -Simvastatin    HTN: (102/50 - 187/90) 12/17  -enalapril 10mg Q12  -Hospitalist consult  -monitor    Diabetes mellitus type 2:  BU=753 12/17  -ISS  - Home meds: metformin 1000mg BID, glipizide   - Lantus 32 units bedtime  - Admelog 16u tid w meals  - will start Metformin 1000mg BID  - Hospitalist consult      Urinary Retention  -Tamsulosin   - presented with crain catheter  - will TOV on 12/17    Sleep:   - Melatonin PRN to maximize participation in therapy during the day.     Pain Management:  - Tylenol PRN  - oxy5mg, 10mg mod and severe pain  - gabapentin 300mg TID    GI/Bowel:  - patient reports regular BMs  - Senna QHS, Miralax Daily    /Bladder:   - At risk for incontinence and retention due to neurologic diagnosis and limited mobility  - Currently patient voids with crain  - tamsulosin  - will TOV tomorrow AM    Skin/Pressure Injury:   - At risk for pressure injury due to neurologic diagnosis and relative immobility.  - Skin assessment on admission: stage 1 sacral ulcer, tinea cruris present, scab of 4th digit R foot  - barrier cream for sacral wound  - butenafine daily for jock itch  - Monitor Incisions: LLE residual stump with sutures  - Daily dressing changes  - aquaphor for dry R foot.    Diet:  - Diet Consistency/Modifications: Consistent Carb  - Aspiration Precautions  - SLP consult for swallow function evaluation and treatment  - Nutrition consult for assessment and recs    DVT ppx:  - Lovenox daily dvt ppx    Labs:  CBC, CMP 12/18      ---------------  Code Status/Emergency Contact:    Outpatient Follow-up (Specialty/Name of physician):  Jose Francisco Dean  Vascular Surgery  [address] [place] [address], [id number]  [place] [address]  Phone: [phone]  Fax: [phone]  Follow Up Time: 2 weeks  [facility]  Endocrinology  [address] [place] [address]  Phone: [phone]  Fax:   Follow Up Time: 2 weeks    [facility] [place]  Urology  [address] [place] [address]  Phone: [phone]  Follow Up Time: 1 week    Eduin Gutierrez MD  Attending Physician  [facility]  Division of Infectous Diseases  [phone]    Needs podiatry/vascular and optho f/u care.     Endocrine faculty practice.   [address] [place] [address] 203. Phone .    --------------  Goals: Safe discharge to home  Estimated Length of Stay: 10-14 days  Rehab Potential: Good  Medical Prognosis: Good  Estimated Disposition: Home with Home Care

## 2023-12-17 NOTE — CONSULT NOTE ADULT - ASSESSMENT
ELVIS VILLATORO is a 63y with a history of diabetes mellitus type 2, HTN, dyslipidemia, retinopathy who presented to [facility] on 11/29/23  with complaint of non-healing left foot wound and found to have osteomyelitis. Hospital course complicated by hyperglycemia and need for surgical revision. Now admitted to [facility] after for initiation of a multidisciplinary rehab program consisting focused on functional mobility, transfers and ADLs.     left lower extremity Necrotizing fasciitis  -S/p guillotine amputation on 11/30  -S/p revision on 12/4 and formalization 12/12  - Weight bearing status: NWB left lower extremity  - Precautions: falls    HLD  -Simvastatin    Diabetes mellitus type 2  -Restart home Metformin 1000mg BID  -Continue premeal 16units  -Decrease Lantus from 32 to 25units QHS  -ISS    HTN  -enalapril     VTE ppx   Lovenox ELVIS VILLATORO is a 63y with a history of diabetes mellitus type 2, HTN, dyslipidemia, retinopathy who presented to [facility] on 11/29/23  with complaint of non-healing left foot wound and found to have osteomyelitis. Hospital course complicated by hyperglycemia and need for surgical revision. Now admitted to [facility] after for initiation of a multidisciplinary rehab program consisting focused on functional mobility, transfers and ADLs.     left lower extremity Necrotizing fasciitis  -S/p guillotine amputation on 11/30  -S/p revision on 12/4 and formalization 12/12  - Weight bearing status: NWB left lower extremity  - Precautions: falls    HLD  -Simvastatin    Diabetes mellitus type 2  -Restart home Metformin 1000mg BID  -Continue premeal 16units  -Decrease Lantus from 32 to 25units QHS  -ISS    HTN  -enalapril     VTE ppx   Lovenox

## 2023-12-17 NOTE — CONSULT NOTE ADULT - SUBJECTIVE AND OBJECTIVE BOX
HPI:  64yo male with hx of DM II, HLD, PAD, prior toe amputation, presented to [facility] for rehab from [facility] where he p/w L foot wound, noted to have sepsis secondary to acute infected wound. 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 Postop continued IV Abx, evaluated by Endocrine for glycemic control. Pt was taken back to the OR for L BKA revision. Pt deemed stable for discharge    Pt seen and examined at bedside.  No acute events overnight.  Pt denies cp, palpitations, sob, abd pain, N/V, fever, chills    Home Medications:  acetaminophen 325 mg oral tablet: 3 tab(s) orally every 6 hours (15 Dec 2023 15:47)  enalapril 10 mg oral tablet: 1 tab(s) orally every 12 hours (15 Dec 2023 15:47)  enoxaparin: 40 milligram(s) subcutaneous every 24 hours (15 Dec 2023 15:47)  gabapentin 300 mg oral capsule: 1 cap(s) orally 3 times a day (15 Dec 2023 15:47)  guaiFENesin 100 mg/5 mL oral liquid: 5 milliliter(s) orally every 6 hours As needed Cough (15 Dec 2023 15:47)  insulin glargine 100 units/mL subcutaneous solution: 32 unit(s) subcutaneous once a day (at bedtime) (15 Dec 2023 15:47)  insulin lispro 100 units/mL injectable solution: 16 unit(s) subcutaneous 3 times a day (before meals) (15 Dec 2023 16:29)  oxyCODONE 10 mg oral tablet: 1 tab(s) orally every 6 hours As needed Severe Pain (7 - 10) (15 Dec 2023 15:47)  oxyCODONE 5 mg oral tablet: 1 tab(s) orally every 6 hours As needed Moderate Pain (4 - 6) (15 Dec 2023 15:47)  polyethylene glycol 3350 oral powder for reconstitution: 17 gram(s) orally every 12 hours (15 Dec 2023 15:47)  senna leaf extract oral tablet: 2 tab(s) orally once a day (at bedtime) (15 Dec 2023 15:47)  simvastatin 40 mg oral tablet: 1 tab(s) orally once a day (at bedtime) (15 Dec 2023 15:47)  tamsulosin 0.4 mg oral capsule: 1 cap(s) orally once a day (at bedtime) (15 Dec 2023 15:47)      PAST MEDICAL & SURGICAL HISTORY:  Retinopathy      Diabetes mellitus type 2 in obese      Hyperlipidemia      Toe infection  s/p amputation      Ruptured appendix      S/P appendectomy      Toe amputation status  left pinky toe          Review of Systems:   CONSTITUTIONAL: No fever, weight loss, or fatigue  EYES: No eye pain, visual disturbances, or discharge  ENMT:  No difficulty hearing, tinnitus, vertigo; No sinus or throat pain  NECK: No pain or stiffness  BREASTS: No pain, masses, or nipple discharge  RESPIRATORY: No cough, wheezing, chills or hemoptysis; No shortness of breath  CARDIOVASCULAR: No chest pain, palpitations, dizziness, or leg swelling  GASTROINTESTINAL: No abdominal or epigastric pain. No nausea, vomiting, or hematemesis; No diarrhea or constipation. No melena or hematochezia.  GENITOURINARY: No dysuria, frequency, hematuria, or incontinence  NEUROLOGICAL: No headaches, memory loss, loss of strength, numbness, or tremors  SKIN: No itching, burning, rashes, or lesions   LYMPH NODES: No enlarged glands  ENDOCRINE: No heat or cold intolerance; No hair loss  MUSCULOSKELETAL: No joint pain or swelling; No muscle, back, or extremity pain  PSYCHIATRIC: No depression, anxiety, mood swings, or difficulty sleeping  HEME/LYMPH: No easy bruising, or bleeding gums  ALLERY AND IMMUNOLOGIC: No hives or eczema    Allergies    No Known Allergies    Intolerances        Social History:   Lives w/ wife  Former smoker and EtOH use    FAMILY HISTORY:  Family history of diabetes mellitus in grandmother (Grandparent)     Noncontributory    MEDICATIONS  (STANDING):  acetaminophen     Tablet .. 975 milliGRAM(s) Oral every 6 hours  AQUAPHOR (petrolatum Ointment) 1 Application(s) Topical two times a day  clotrimazole 1% Cream 1 Application(s) Topical two times a day  dextrose 5%. 1000 milliLiter(s) (50 mL/Hr) IV Continuous <Continuous>  dextrose 5%. 1000 milliLiter(s) (100 mL/Hr) IV Continuous <Continuous>  dextrose 50% Injectable 25 Gram(s) IV Push once  dextrose 50% Injectable 12.5 Gram(s) IV Push once  dextrose 50% Injectable 25 Gram(s) IV Push once  enalapril 10 milliGRAM(s) Oral every 12 hours  enoxaparin Injectable 40 milliGRAM(s) SubCutaneous every 24 hours  gabapentin 300 milliGRAM(s) Oral three times a day  glucagon  Injectable 1 milliGRAM(s) IntraMuscular once  insulin glargine Injectable (LANTUS) 32 Unit(s) SubCutaneous at bedtime  insulin lispro (ADMELOG) corrective regimen sliding scale   SubCutaneous at bedtime  insulin lispro (ADMELOG) corrective regimen sliding scale   SubCutaneous three times a day before meals  insulin lispro Injectable (ADMELOG) 16 Unit(s) SubCutaneous three times a day before meals  polyethylene glycol 3350 17 Gram(s) Oral two times a day  senna 2 Tablet(s) Oral at bedtime  simvastatin 40 milliGRAM(s) Oral at bedtime  tamsulosin 0.4 milliGRAM(s) Oral at bedtime    MEDICATIONS  (PRN):  dextrose Oral Gel 15 Gram(s) Oral once PRN Blood Glucose LESS THAN 70 milliGRAM(s)/deciliter  guaiFENesin Oral Liquid (Sugar-Free) 100 milliGRAM(s) Oral every 6 hours PRN Cough  oxyCODONE    IR 10 milliGRAM(s) Oral every 6 hours PRN Severe Pain (7 - 10)  oxyCODONE    IR 5 milliGRAM(s) Oral every 6 hours PRN Moderate Pain (4 - 6)      Vital Signs Last 24 Hrs  T(C): 36.7 (17 Dec 2023 08:30), Max: 36.8 (16 Dec 2023 18:12)  T(F): 98.1 (17 Dec 2023 08:30), Max: 98.3 (16 Dec 2023 18:12)  HR: 86 (17 Dec 2023 08:30) (77 - 91)  BP: 173/78 (17 Dec 2023 08:30) (138/64 - 187/90)  BP(mean): --  RR: 16 (17 Dec 2023 08:30) (16 - 18)  SpO2: 94% (17 Dec 2023 08:30) (94% - 96%)    Parameters below as of 17 Dec 2023 08:30  Patient On (Oxygen Delivery Method): room air      CAPILLARY BLOOD GLUCOSE      POCT Blood Glucose.: 241 mg/dL (17 Dec 2023 08:14)  POCT Blood Glucose.: 221 mg/dL (16 Dec 2023 22:34)  POCT Blood Glucose.: 146 mg/dL (16 Dec 2023 18:06)  POCT Blood Glucose.: 114 mg/dL (16 Dec 2023 13:11)        PHYSICAL EXAM:  GENERAL: NAD, overweight male   HEAD:  Atraumatic, Normocephalic  EYES: EOMI, PERRLA, conjunctiva and sclera clear  NECK: Supple, No JVD  CHEST/LUNG: Clear to auscultation bilaterally; No wheeze, nonlabored breathing  HEART: Regular rate and rhythm; No murmurs, rubs, or gallops  ABDOMEN: Soft, Nontender, Nondistended; Bowel sounds present  : + Contreras in place   EXTREMITIES:  No clubbing, cyanosis, or edema, LLE Sp BKA   NEUROLOGY: AAOx3, non-focal  PSYCH: calm, appropriate mood  SKIN: No rashes or lesions, warm intact    LABS:                        9.7    8.36  )-----------( 362      ( 17 Dec 2023 06:00 )             30.6     12-17    138  |  98  |  13  ----------------------------<  283<H>  4.6   |  30  |  0.87    Ca    9.3      17 Dec 2023 06:00    TPro  7.4  /  Alb  1.8<L>  /  TBili  0.2  /  DBili  x   /  AST  26  /  ALT  27  /  AlkPhos  125<H>  12-17          Urinalysis Basic - ( 17 Dec 2023 06:00 )    Color: x / Appearance: x / SG: x / pH: x  Gluc: 283 mg/dL / Ketone: x  / Bili: x / Urobili: x   Blood: x / Protein: x / Nitrite: x   Leuk Esterase: x / RBC: x / WBC x   Sq Epi: x / Non Sq Epi: x / Bacteria: x          RADIOLOGY & ADDITIONAL TESTS:    Imaging Personally Reviewed:    Consultant(s) Notes Reviewed:      Care Discussed with Consultants/Other Providers:   HPI:  62yo male with hx of DM II, HLD, PAD, prior toe amputation, presented to [facility] for rehab from [facility] where he p/w L foot wound, noted to have sepsis secondary to acute infected wound. 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 Postop continued IV Abx, evaluated by Endocrine for glycemic control. Pt was taken back to the OR for L BKA revision. Pt deemed stable for discharge    Pt seen and examined at bedside.  No acute events overnight.  Pt denies cp, palpitations, sob, abd pain, N/V, fever, chills    Home Medications:  acetaminophen 325 mg oral tablet: 3 tab(s) orally every 6 hours (15 Dec 2023 15:47)  enalapril 10 mg oral tablet: 1 tab(s) orally every 12 hours (15 Dec 2023 15:47)  enoxaparin: 40 milligram(s) subcutaneous every 24 hours (15 Dec 2023 15:47)  gabapentin 300 mg oral capsule: 1 cap(s) orally 3 times a day (15 Dec 2023 15:47)  guaiFENesin 100 mg/5 mL oral liquid: 5 milliliter(s) orally every 6 hours As needed Cough (15 Dec 2023 15:47)  insulin glargine 100 units/mL subcutaneous solution: 32 unit(s) subcutaneous once a day (at bedtime) (15 Dec 2023 15:47)  insulin lispro 100 units/mL injectable solution: 16 unit(s) subcutaneous 3 times a day (before meals) (15 Dec 2023 16:29)  oxyCODONE 10 mg oral tablet: 1 tab(s) orally every 6 hours As needed Severe Pain (7 - 10) (15 Dec 2023 15:47)  oxyCODONE 5 mg oral tablet: 1 tab(s) orally every 6 hours As needed Moderate Pain (4 - 6) (15 Dec 2023 15:47)  polyethylene glycol 3350 oral powder for reconstitution: 17 gram(s) orally every 12 hours (15 Dec 2023 15:47)  senna leaf extract oral tablet: 2 tab(s) orally once a day (at bedtime) (15 Dec 2023 15:47)  simvastatin 40 mg oral tablet: 1 tab(s) orally once a day (at bedtime) (15 Dec 2023 15:47)  tamsulosin 0.4 mg oral capsule: 1 cap(s) orally once a day (at bedtime) (15 Dec 2023 15:47)      PAST MEDICAL & SURGICAL HISTORY:  Retinopathy      Diabetes mellitus type 2 in obese      Hyperlipidemia      Toe infection  s/p amputation      Ruptured appendix      S/P appendectomy      Toe amputation status  left pinky toe          Review of Systems:   CONSTITUTIONAL: No fever, weight loss, or fatigue  EYES: No eye pain, visual disturbances, or discharge  ENMT:  No difficulty hearing, tinnitus, vertigo; No sinus or throat pain  NECK: No pain or stiffness  BREASTS: No pain, masses, or nipple discharge  RESPIRATORY: No cough, wheezing, chills or hemoptysis; No shortness of breath  CARDIOVASCULAR: No chest pain, palpitations, dizziness, or leg swelling  GASTROINTESTINAL: No abdominal or epigastric pain. No nausea, vomiting, or hematemesis; No diarrhea or constipation. No melena or hematochezia.  GENITOURINARY: No dysuria, frequency, hematuria, or incontinence  NEUROLOGICAL: No headaches, memory loss, loss of strength, numbness, or tremors  SKIN: No itching, burning, rashes, or lesions   LYMPH NODES: No enlarged glands  ENDOCRINE: No heat or cold intolerance; No hair loss  MUSCULOSKELETAL: No joint pain or swelling; No muscle, back, or extremity pain  PSYCHIATRIC: No depression, anxiety, mood swings, or difficulty sleeping  HEME/LYMPH: No easy bruising, or bleeding gums  ALLERY AND IMMUNOLOGIC: No hives or eczema    Allergies    No Known Allergies    Intolerances        Social History:   Lives w/ wife  Former smoker and EtOH use    FAMILY HISTORY:  Family history of diabetes mellitus in grandmother (Grandparent)     Noncontributory    MEDICATIONS  (STANDING):  acetaminophen     Tablet .. 975 milliGRAM(s) Oral every 6 hours  AQUAPHOR (petrolatum Ointment) 1 Application(s) Topical two times a day  clotrimazole 1% Cream 1 Application(s) Topical two times a day  dextrose 5%. 1000 milliLiter(s) (50 mL/Hr) IV Continuous <Continuous>  dextrose 5%. 1000 milliLiter(s) (100 mL/Hr) IV Continuous <Continuous>  dextrose 50% Injectable 25 Gram(s) IV Push once  dextrose 50% Injectable 12.5 Gram(s) IV Push once  dextrose 50% Injectable 25 Gram(s) IV Push once  enalapril 10 milliGRAM(s) Oral every 12 hours  enoxaparin Injectable 40 milliGRAM(s) SubCutaneous every 24 hours  gabapentin 300 milliGRAM(s) Oral three times a day  glucagon  Injectable 1 milliGRAM(s) IntraMuscular once  insulin glargine Injectable (LANTUS) 32 Unit(s) SubCutaneous at bedtime  insulin lispro (ADMELOG) corrective regimen sliding scale   SubCutaneous at bedtime  insulin lispro (ADMELOG) corrective regimen sliding scale   SubCutaneous three times a day before meals  insulin lispro Injectable (ADMELOG) 16 Unit(s) SubCutaneous three times a day before meals  polyethylene glycol 3350 17 Gram(s) Oral two times a day  senna 2 Tablet(s) Oral at bedtime  simvastatin 40 milliGRAM(s) Oral at bedtime  tamsulosin 0.4 milliGRAM(s) Oral at bedtime    MEDICATIONS  (PRN):  dextrose Oral Gel 15 Gram(s) Oral once PRN Blood Glucose LESS THAN 70 milliGRAM(s)/deciliter  guaiFENesin Oral Liquid (Sugar-Free) 100 milliGRAM(s) Oral every 6 hours PRN Cough  oxyCODONE    IR 10 milliGRAM(s) Oral every 6 hours PRN Severe Pain (7 - 10)  oxyCODONE    IR 5 milliGRAM(s) Oral every 6 hours PRN Moderate Pain (4 - 6)      Vital Signs Last 24 Hrs  T(C): 36.7 (17 Dec 2023 08:30), Max: 36.8 (16 Dec 2023 18:12)  T(F): 98.1 (17 Dec 2023 08:30), Max: 98.3 (16 Dec 2023 18:12)  HR: 86 (17 Dec 2023 08:30) (77 - 91)  BP: 173/78 (17 Dec 2023 08:30) (138/64 - 187/90)  BP(mean): --  RR: 16 (17 Dec 2023 08:30) (16 - 18)  SpO2: 94% (17 Dec 2023 08:30) (94% - 96%)    Parameters below as of 17 Dec 2023 08:30  Patient On (Oxygen Delivery Method): room air      CAPILLARY BLOOD GLUCOSE      POCT Blood Glucose.: 241 mg/dL (17 Dec 2023 08:14)  POCT Blood Glucose.: 221 mg/dL (16 Dec 2023 22:34)  POCT Blood Glucose.: 146 mg/dL (16 Dec 2023 18:06)  POCT Blood Glucose.: 114 mg/dL (16 Dec 2023 13:11)        PHYSICAL EXAM:  GENERAL: NAD, overweight male   HEAD:  Atraumatic, Normocephalic  EYES: EOMI, PERRLA, conjunctiva and sclera clear  NECK: Supple, No JVD  CHEST/LUNG: Clear to auscultation bilaterally; No wheeze, nonlabored breathing  HEART: Regular rate and rhythm; No murmurs, rubs, or gallops  ABDOMEN: Soft, Nontender, Nondistended; Bowel sounds present  : + Contreras in place   EXTREMITIES:  No clubbing, cyanosis, or edema, LLE Sp BKA   NEUROLOGY: AAOx3, non-focal  PSYCH: calm, appropriate mood  SKIN: No rashes or lesions, warm intact    LABS:                        9.7    8.36  )-----------( 362      ( 17 Dec 2023 06:00 )             30.6     12-17    138  |  98  |  13  ----------------------------<  283<H>  4.6   |  30  |  0.87    Ca    9.3      17 Dec 2023 06:00    TPro  7.4  /  Alb  1.8<L>  /  TBili  0.2  /  DBili  x   /  AST  26  /  ALT  27  /  AlkPhos  125<H>  12-17          Urinalysis Basic - ( 17 Dec 2023 06:00 )    Color: x / Appearance: x / SG: x / pH: x  Gluc: 283 mg/dL / Ketone: x  / Bili: x / Urobili: x   Blood: x / Protein: x / Nitrite: x   Leuk Esterase: x / RBC: x / WBC x   Sq Epi: x / Non Sq Epi: x / Bacteria: x          RADIOLOGY & ADDITIONAL TESTS:    Imaging Personally Reviewed:    Consultant(s) Notes Reviewed:      Care Discussed with Consultants/Other Providers:

## 2023-12-18 LAB
ALBUMIN SERPL ELPH-MCNC: 1.9 G/DL — LOW (ref 3.3–5)
ALBUMIN SERPL ELPH-MCNC: 1.9 G/DL — LOW (ref 3.3–5)
ALP SERPL-CCNC: 146 U/L — HIGH (ref 40–120)
ALP SERPL-CCNC: 146 U/L — HIGH (ref 40–120)
ALT FLD-CCNC: 35 U/L — SIGNIFICANT CHANGE UP (ref 10–45)
ALT FLD-CCNC: 35 U/L — SIGNIFICANT CHANGE UP (ref 10–45)
ANION GAP SERPL CALC-SCNC: 7 MMOL/L — SIGNIFICANT CHANGE UP (ref 5–17)
ANION GAP SERPL CALC-SCNC: 7 MMOL/L — SIGNIFICANT CHANGE UP (ref 5–17)
AST SERPL-CCNC: 36 U/L — SIGNIFICANT CHANGE UP (ref 10–40)
AST SERPL-CCNC: 36 U/L — SIGNIFICANT CHANGE UP (ref 10–40)
BILIRUB SERPL-MCNC: 0.3 MG/DL — SIGNIFICANT CHANGE UP (ref 0.2–1.2)
BILIRUB SERPL-MCNC: 0.3 MG/DL — SIGNIFICANT CHANGE UP (ref 0.2–1.2)
BUN SERPL-MCNC: 15 MG/DL — SIGNIFICANT CHANGE UP (ref 7–23)
BUN SERPL-MCNC: 15 MG/DL — SIGNIFICANT CHANGE UP (ref 7–23)
CALCIUM SERPL-MCNC: 9.2 MG/DL — SIGNIFICANT CHANGE UP (ref 8.4–10.5)
CALCIUM SERPL-MCNC: 9.2 MG/DL — SIGNIFICANT CHANGE UP (ref 8.4–10.5)
CHLORIDE SERPL-SCNC: 101 MMOL/L — SIGNIFICANT CHANGE UP (ref 96–108)
CHLORIDE SERPL-SCNC: 101 MMOL/L — SIGNIFICANT CHANGE UP (ref 96–108)
CO2 SERPL-SCNC: 29 MMOL/L — SIGNIFICANT CHANGE UP (ref 22–31)
CO2 SERPL-SCNC: 29 MMOL/L — SIGNIFICANT CHANGE UP (ref 22–31)
CREAT SERPL-MCNC: 0.86 MG/DL — SIGNIFICANT CHANGE UP (ref 0.5–1.3)
CREAT SERPL-MCNC: 0.86 MG/DL — SIGNIFICANT CHANGE UP (ref 0.5–1.3)
EGFR: 97 ML/MIN/1.73M2 — SIGNIFICANT CHANGE UP
EGFR: 97 ML/MIN/1.73M2 — SIGNIFICANT CHANGE UP
GLUCOSE BLDC GLUCOMTR-MCNC: 139 MG/DL — HIGH (ref 70–99)
GLUCOSE BLDC GLUCOMTR-MCNC: 139 MG/DL — HIGH (ref 70–99)
GLUCOSE BLDC GLUCOMTR-MCNC: 180 MG/DL — HIGH (ref 70–99)
GLUCOSE BLDC GLUCOMTR-MCNC: 180 MG/DL — HIGH (ref 70–99)
GLUCOSE BLDC GLUCOMTR-MCNC: 204 MG/DL — HIGH (ref 70–99)
GLUCOSE BLDC GLUCOMTR-MCNC: 204 MG/DL — HIGH (ref 70–99)
GLUCOSE BLDC GLUCOMTR-MCNC: 225 MG/DL — HIGH (ref 70–99)
GLUCOSE BLDC GLUCOMTR-MCNC: 225 MG/DL — HIGH (ref 70–99)
GLUCOSE BLDC GLUCOMTR-MCNC: 233 MG/DL — HIGH (ref 70–99)
GLUCOSE BLDC GLUCOMTR-MCNC: 233 MG/DL — HIGH (ref 70–99)
GLUCOSE SERPL-MCNC: 233 MG/DL — HIGH (ref 70–99)
GLUCOSE SERPL-MCNC: 233 MG/DL — HIGH (ref 70–99)
HCT VFR BLD CALC: 31.9 % — LOW (ref 39–50)
HCT VFR BLD CALC: 31.9 % — LOW (ref 39–50)
HGB BLD-MCNC: 10.1 G/DL — LOW (ref 13–17)
HGB BLD-MCNC: 10.1 G/DL — LOW (ref 13–17)
MAGNESIUM SERPL-MCNC: 1.5 MG/DL — LOW (ref 1.6–2.6)
MAGNESIUM SERPL-MCNC: 1.5 MG/DL — LOW (ref 1.6–2.6)
MCHC RBC-ENTMCNC: 26.3 PG — LOW (ref 27–34)
MCHC RBC-ENTMCNC: 26.3 PG — LOW (ref 27–34)
MCHC RBC-ENTMCNC: 31.7 GM/DL — LOW (ref 32–36)
MCHC RBC-ENTMCNC: 31.7 GM/DL — LOW (ref 32–36)
MCV RBC AUTO: 83.1 FL — SIGNIFICANT CHANGE UP (ref 80–100)
MCV RBC AUTO: 83.1 FL — SIGNIFICANT CHANGE UP (ref 80–100)
NRBC # BLD: 0 /100 WBCS — SIGNIFICANT CHANGE UP (ref 0–0)
NRBC # BLD: 0 /100 WBCS — SIGNIFICANT CHANGE UP (ref 0–0)
PHOSPHATE SERPL-MCNC: 3.2 MG/DL — SIGNIFICANT CHANGE UP (ref 2.5–4.5)
PHOSPHATE SERPL-MCNC: 3.2 MG/DL — SIGNIFICANT CHANGE UP (ref 2.5–4.5)
PLATELET # BLD AUTO: 430 K/UL — HIGH (ref 150–400)
PLATELET # BLD AUTO: 430 K/UL — HIGH (ref 150–400)
POTASSIUM SERPL-MCNC: 5 MMOL/L — SIGNIFICANT CHANGE UP (ref 3.5–5.3)
POTASSIUM SERPL-MCNC: 5 MMOL/L — SIGNIFICANT CHANGE UP (ref 3.5–5.3)
POTASSIUM SERPL-SCNC: 5 MMOL/L — SIGNIFICANT CHANGE UP (ref 3.5–5.3)
POTASSIUM SERPL-SCNC: 5 MMOL/L — SIGNIFICANT CHANGE UP (ref 3.5–5.3)
PROT SERPL-MCNC: 7.5 G/DL — SIGNIFICANT CHANGE UP (ref 6–8.3)
PROT SERPL-MCNC: 7.5 G/DL — SIGNIFICANT CHANGE UP (ref 6–8.3)
RBC # BLD: 3.84 M/UL — LOW (ref 4.2–5.8)
RBC # BLD: 3.84 M/UL — LOW (ref 4.2–5.8)
RBC # FLD: 15.7 % — HIGH (ref 10.3–14.5)
RBC # FLD: 15.7 % — HIGH (ref 10.3–14.5)
SODIUM SERPL-SCNC: 137 MMOL/L — SIGNIFICANT CHANGE UP (ref 135–145)
SODIUM SERPL-SCNC: 137 MMOL/L — SIGNIFICANT CHANGE UP (ref 135–145)
TROPONIN I, HIGH SENSITIVITY RESULT: 5.9 NG/L — SIGNIFICANT CHANGE UP
TROPONIN I, HIGH SENSITIVITY RESULT: 5.9 NG/L — SIGNIFICANT CHANGE UP
WBC # BLD: 9.82 K/UL — SIGNIFICANT CHANGE UP (ref 3.8–10.5)
WBC # BLD: 9.82 K/UL — SIGNIFICANT CHANGE UP (ref 3.8–10.5)
WBC # FLD AUTO: 9.82 K/UL — SIGNIFICANT CHANGE UP (ref 3.8–10.5)
WBC # FLD AUTO: 9.82 K/UL — SIGNIFICANT CHANGE UP (ref 3.8–10.5)

## 2023-12-18 PROCEDURE — 71045 X-RAY EXAM CHEST 1 VIEW: CPT | Mod: 26

## 2023-12-18 PROCEDURE — 99232 SBSQ HOSP IP/OBS MODERATE 35: CPT | Mod: GC

## 2023-12-18 PROCEDURE — 93010 ELECTROCARDIOGRAM REPORT: CPT

## 2023-12-18 PROCEDURE — 93306 TTE W/DOPPLER COMPLETE: CPT | Mod: 26

## 2023-12-18 PROCEDURE — 99233 SBSQ HOSP IP/OBS HIGH 50: CPT

## 2023-12-18 RX ORDER — TAMSULOSIN HYDROCHLORIDE 0.4 MG/1
0.4 CAPSULE ORAL DAILY
Refills: 0 | Status: DISCONTINUED | OUTPATIENT
Start: 2023-12-18 | End: 2023-12-19

## 2023-12-18 RX ORDER — MAGNESIUM SULFATE 500 MG/ML
1 VIAL (ML) INJECTION ONCE
Refills: 0 | Status: COMPLETED | OUTPATIENT
Start: 2023-12-18 | End: 2023-12-18

## 2023-12-18 RX ORDER — INSULIN LISPRO 100/ML
8 VIAL (ML) SUBCUTANEOUS ONCE
Refills: 0 | Status: COMPLETED | OUTPATIENT
Start: 2023-12-18 | End: 2023-12-18

## 2023-12-18 RX ORDER — SODIUM CHLORIDE 9 MG/ML
1000 INJECTION INTRAMUSCULAR; INTRAVENOUS; SUBCUTANEOUS
Refills: 0 | Status: DISCONTINUED | OUTPATIENT
Start: 2023-12-18 | End: 2023-12-18

## 2023-12-18 RX ORDER — SODIUM CHLORIDE 9 MG/ML
1000 INJECTION INTRAMUSCULAR; INTRAVENOUS; SUBCUTANEOUS
Refills: 0 | Status: DISCONTINUED | OUTPATIENT
Start: 2023-12-18 | End: 2023-12-30

## 2023-12-18 RX ORDER — MAGNESIUM OXIDE 400 MG ORAL TABLET 241.3 MG
400 TABLET ORAL
Refills: 0 | Status: DISCONTINUED | OUTPATIENT
Start: 2023-12-18 | End: 2023-12-30

## 2023-12-18 RX ORDER — INSULIN GLARGINE 100 [IU]/ML
32 INJECTION, SOLUTION SUBCUTANEOUS AT BEDTIME
Refills: 0 | Status: DISCONTINUED | OUTPATIENT
Start: 2023-12-18 | End: 2023-12-19

## 2023-12-18 RX ORDER — SODIUM CHLORIDE 9 MG/ML
1000 INJECTION INTRAMUSCULAR; INTRAVENOUS; SUBCUTANEOUS ONCE
Refills: 0 | Status: COMPLETED | OUTPATIENT
Start: 2023-12-18 | End: 2023-12-18

## 2023-12-18 RX ADMIN — SIMVASTATIN 40 MILLIGRAM(S): 20 TABLET, FILM COATED ORAL at 22:28

## 2023-12-18 RX ADMIN — Medication 4: at 12:18

## 2023-12-18 RX ADMIN — Medication 4: at 08:20

## 2023-12-18 RX ADMIN — Medication 1 APPLICATION(S): at 17:47

## 2023-12-18 RX ADMIN — Medication 8 UNIT(S): at 17:52

## 2023-12-18 RX ADMIN — Medication 975 MILLIGRAM(S): at 11:48

## 2023-12-18 RX ADMIN — SODIUM CHLORIDE 100 MILLILITER(S): 9 INJECTION INTRAMUSCULAR; INTRAVENOUS; SUBCUTANEOUS at 10:32

## 2023-12-18 RX ADMIN — GABAPENTIN 300 MILLIGRAM(S): 400 CAPSULE ORAL at 13:24

## 2023-12-18 RX ADMIN — SODIUM CHLORIDE 1000 MILLILITER(S): 9 INJECTION INTRAMUSCULAR; INTRAVENOUS; SUBCUTANEOUS at 11:47

## 2023-12-18 RX ADMIN — Medication 975 MILLIGRAM(S): at 06:38

## 2023-12-18 RX ADMIN — ENOXAPARIN SODIUM 40 MILLIGRAM(S): 100 INJECTION SUBCUTANEOUS at 06:39

## 2023-12-18 RX ADMIN — INSULIN GLARGINE 32 UNIT(S): 100 INJECTION, SOLUTION SUBCUTANEOUS at 22:28

## 2023-12-18 RX ADMIN — Medication 975 MILLIGRAM(S): at 12:30

## 2023-12-18 RX ADMIN — MAGNESIUM OXIDE 400 MG ORAL TABLET 400 MILLIGRAM(S): 241.3 TABLET ORAL at 12:18

## 2023-12-18 RX ADMIN — Medication 1 APPLICATION(S): at 06:46

## 2023-12-18 RX ADMIN — METFORMIN HYDROCHLORIDE 1000 MILLIGRAM(S): 850 TABLET ORAL at 06:38

## 2023-12-18 RX ADMIN — Medication 16 UNIT(S): at 12:18

## 2023-12-18 RX ADMIN — GABAPENTIN 300 MILLIGRAM(S): 400 CAPSULE ORAL at 06:38

## 2023-12-18 RX ADMIN — SODIUM CHLORIDE 1000 MILLILITER(S): 9 INJECTION INTRAMUSCULAR; INTRAVENOUS; SUBCUTANEOUS at 10:06

## 2023-12-18 RX ADMIN — SENNA PLUS 2 TABLET(S): 8.6 TABLET ORAL at 22:28

## 2023-12-18 RX ADMIN — Medication 100 GRAM(S): at 15:44

## 2023-12-18 RX ADMIN — METFORMIN HYDROCHLORIDE 1000 MILLIGRAM(S): 850 TABLET ORAL at 17:46

## 2023-12-18 RX ADMIN — Medication 975 MILLIGRAM(S): at 07:34

## 2023-12-18 RX ADMIN — TAMSULOSIN HYDROCHLORIDE 0.4 MILLIGRAM(S): 0.4 CAPSULE ORAL at 17:46

## 2023-12-18 RX ADMIN — Medication 1 APPLICATION(S): at 18:08

## 2023-12-18 RX ADMIN — Medication 1 APPLICATION(S): at 07:33

## 2023-12-18 RX ADMIN — Medication 975 MILLIGRAM(S): at 17:46

## 2023-12-18 RX ADMIN — MAGNESIUM OXIDE 400 MG ORAL TABLET 400 MILLIGRAM(S): 241.3 TABLET ORAL at 17:46

## 2023-12-18 RX ADMIN — GABAPENTIN 300 MILLIGRAM(S): 400 CAPSULE ORAL at 22:28

## 2023-12-18 RX ADMIN — Medication 975 MILLIGRAM(S): at 18:18

## 2023-12-18 RX ADMIN — Medication 16 UNIT(S): at 08:22

## 2023-12-18 RX ADMIN — Medication 10 MILLIGRAM(S): at 06:39

## 2023-12-18 NOTE — PROGRESS NOTE ADULT - ASSESSMENT
ELVIS VILLATORO is a 63y with a history of diabetes mellitus type 2, HTN, dyslipidemia, retinopathy who presented to [facility] on 11/29/23  with complaint of non-healing left foot wound and found to have osteomyelitis. Hospital course complicated by hyperglycemia and need for surgical revision. Now admitted to [facility] after for initiation of a multidisciplinary rehab program consisting focused on functional mobility, transfers and ADLs.     left lower extremity Necrotizing fasciitis  -S/p guillotine amputation on 11/30  -S/p revision on 12/4 and formalization 12/12  - Weight bearing status: NWB left lower extremity  - Precautions: falls    s/p RRT this am, due to unresponsiveness. pt was transferred to chair form bed when he became pale, sweaty, unresponsive for a min as soon as he sat in the chair, BP noted to be 93 in chair, regained consciousness spontaneously and able to remember vents- likely vasovagal episode  -IVF -BOLUS 2 L,  c/w IVF x 24 hrs at 100cc /hr   -decrease lisinopril to 5 qd at noon with hold parameters  -labs noted  -stat trop negative x 1,   - ordered holter monitor  -echo   -cxr now  -am labs    urine retention   -with Contreras TOV today  -reassess need for Flomax in the setting of vasovagal syncope    HLD  -Simvastatin    Diabetes mellitus type 2  - Metformin   -Continue pre meal 16units  - increase Lantus to 32 qhs 12/18/23  -ISS  -A1C with Estimated Average Glucose Result: 11.4 % (12.01.23 @ 05:19)    HTN  -enalapril   Transthoracic Echocardiogram w/ Doppler (08.11.10 @ 07:37) >left ventricular systolic function. Ejection Fraction: 60 %    VTE ppx   Lovenox    will follow  d/w rehab team dr. thornton     time spent in e/m visit- 50 min    ELVIS VILLATORO is a 63y with a history of diabetes mellitus type 2, HTN, dyslipidemia, retinopathy who presented to [facility] on 11/29/23  with complaint of non-healing left foot wound and found to have osteomyelitis. Hospital course complicated by hyperglycemia and need for surgical revision. Now admitted to [facility] after for initiation of a multidisciplinary rehab program consisting focused on functional mobility, transfers and ADLs.     left lower extremity Necrotizing fasciitis  -S/p guillotine amputation on 11/30  -S/p revision on 12/4 and formalization 12/12  - Weight bearing status: NWB left lower extremity  - Precautions: falls    s/p RRT this am, due to unresponsiveness. pt was transferred to chair form bed when he became pale, sweaty, unresponsive for a min as soon as he sat in the chair, BP noted to be 93 in chair, regained consciousness spontaneously and able to remember vents- likely vasovagal episode  -IVF -BOLUS 2 L,  c/w IVF x 24 hrs at 100cc /hr   -decrease lisinopril to 5 qd at noon with hold parameters  -labs noted  -stat trop negative x 1,   - ordered holter monitor  -echo   -cxr now  -am labs    urine retention   -with Contreras TOV today  -reassess need for Flomax in the setting of vasovagal syncope    HLD  -Simvastatin    Diabetes mellitus type 2  - Metformin   -Continue pre meal 16units  - increase Lantus to 32 qhs 12/18/23  -ISS  -A1C with Estimated Average Glucose Result: 11.4 % (12.01.23 @ 05:19)    HTN  -enalapril   Transthoracic Echocardiogram w/ Doppler (08.11.10 @ 07:37) >left ventricular systolic function. Ejection Fraction: 60 %    VTE ppx   Lovenox    will follow  d/w rehab team dr. thornton     time spent in e/m visit- 50 min    ELVIS VILLATORO is a 63y with a history of diabetes mellitus type 2, HTN, dyslipidemia, retinopathy who presented to [facility] on 11/29/23  with complaint of non-healing left foot wound and found to have osteomyelitis. Hospital course complicated by hyperglycemia and need for surgical revision. Now admitted to [facility] after for initiation of a multidisciplinary rehab program consisting focused on functional mobility, transfers and ADLs.     left lower extremity Necrotizing fasciitis  -S/p guillotine amputation on 11/30  -S/p revision on 12/4 and formalization 12/12  - Weight bearing status: NWB left lower extremity  - Precautions: falls    s/p RRT this am, due to unresponsiveness. pt was transferred to chair form bed when he became pale, sweaty, unresponsive for a min as soon as he sat in the chair, BP noted to be 93 in chair, regained consciousness spontaneously and able to remember vents- likely vasovagal episode  -IVF -BOLUS 2 L,  c/w IVF x 24 hrs at 100cc /hr   -decrease lisinopril to 5 qd at noon with hold parameters  -labs noted  -stat trop negative x 1,   - ordered holter monitor  -echo   -cxr now  -am labs    urine retention   -with Contreras TOV today  -reassess need for Flomax in the setting of vasovagal syncope    HLD  -Simvastatin    Diabetes mellitus type 2 with hyperglycemia  - Metformin   -Continue pre meal 16units  - increase Lantus to 32 qhs 12/18/23  -ISS  -A1C with Estimated Average Glucose Result: 11.4 % (12.01.23 @ 05:19)    HTN  -enalapril   Transthoracic Echocardiogram w/ Doppler (08.11.10 @ 07:37) >left ventricular systolic function. Ejection Fraction: 60 %    VTE ppx   Lovenox    will follow  d/w rehab team dr. thornton     time spent in e/m visit- 50 min    ELVIS VILLATORO is a 63y with a history of diabetes mellitus type 2, HTN, dyslipidemia, retinopathy who presented to [facility] on 11/29/23  with complaint of non-healing left foot wound and found to have osteomyelitis. Hospital course complicated by hyperglycemia and need for surgical revision. Now admitted to [facility] after for initiation of a multidisciplinary rehab program consisting focused on functional mobility, transfers and ADLs.     left lower extremity Necrotizing fasciitis  -S/p guillotine amputation on 11/30  -S/p revision on 12/4 and formalization 12/12  - Weight bearing status: NWB left lower extremity  - Precautions: falls    s/p RRT this am, due to unresponsiveness. pt was transferred to chair form bed when he became pale, sweaty, unresponsive for a min as soon as he sat in the chair, BP noted to be 93 in chair, regained consciousness spontaneously and able to remember vents- likely vasovagal episode  -IVF -BOLUS 2 L,  c/w IVF x 24 hrs at 100cc /hr   -decrease lisinopril to 5 qd at noon with hold parameters  -labs noted  -stat trop negative x 1,   - ordered holter monitor  -echo   -cxr now  -am labs    urine retention   -with Contreras TOV today  -reassess need for Flomax in the setting of vasovagal syncope    HLD  -Simvastatin    Diabetes mellitus type 2 with hyperglycemia  - Metformin   -Continue pre meal 16units  - increase Lantus to 32 qhs 12/18/23  -ISS  -A1C with Estimated Average Glucose Result: 11.4 % (12.01.23 @ 05:19)    HTN  -enalapril   Transthoracic Echocardiogram w/ Doppler (08.11.10 @ 07:37) >left ventricular systolic function. Ejection Fraction: 60 %    VTE ppx   Lovenox    will follow  d/w rehab team dr. thornton     time spent in e/m visit- 50 min

## 2023-12-18 NOTE — PROGRESS NOTE ADULT - NS ATTEND AMEND GEN_ALL_CORE FT
Rehab Attending- Patient seen and examined by me - Case discussed, above note reviewed by me with modifications made    patient seen and evaluated bedside  RRT this AM- diminished responsiveness, Syst BP 90s-   Given IV fluid- Enalapril decreased to 5mg daily  orthostatics ordered  Hospitalist ordered Trops- neg  Mg 1.5- Mg supplemented  Hospitalist ordered Echo/ Holter  Did well in afternoon Rehab session  Diane Dahl around noontime- No Void By 5 PM- On TOV  labs reviewed by me- stable  Left BK incision looks good - with staples no drainage-   to wear immobilizer In Bed - Can take off OOB  to continue intensive rehab program    Vital Signs Last 24 Hrs  T(C): 36.9 (18 Dec 2023 07:51), Max: 36.9 (18 Dec 2023 07:51)  T(F): 98.5 (18 Dec 2023 07:51), Max: 98.5 (18 Dec 2023 07:51)  HR: 83 (18 Dec 2023 09:48) (79 - 94)  BP: 93/54 (18 Dec 2023 09:48) (93/54 - 185/82)  BP(mean): --  RR: 16 (18 Dec 2023 07:51) (16 - 16)  SpO2: 94% (18 Dec 2023 09:35) (92% - 96%)    Parameters below as of 18 Dec 2023 09:35  Patient On (Oxygen Delivery Method): room air      Physical Exam: Gen - NAD, Comfortable  HEENT - NCAT, EOMI  Neck - Supple, No limited ROM  Pulm - CTAB, No wheeze, No rhonchi, No crackles  Cardiovascular - RRR, S1S2, No murmurs  Abdomen - Soft, NT/ND, +BS  Extremities - LLE with recent BKA.   Uro - crain present for retention  Neuro-     Cognitive - AAOx3     Communication - Fluent, No dysarthria     Attention: Intact      Judgement: Good evidence of judgement     Memory: Recall 3 objects immediate and 3 min later         Cranial Nerves - CN 2-12 intact     Motor -                     LEFT    UE - ShAB 5/5, EF 5/5, EE 5/5, WE 5/5,  5/5                    RIGHT UE - ShAB 5/5, EF 5/5, EE 5/5, WE 5/5,  5/5                    LEFT    LE - HF 5/5, KE 5/5                    RIGHT LE - HF 5/5, KE 5/5, DF 5/5, PF 5/5        Sensory - Intact to LT     Reflexes - DTR Intact, No primitive reflexive     Coordination - FTN intact     Tone - normal  Psychiatric - Mood stable, Affect WNL  Skin:  incision site from BKA on the L residual limb. No drainage. C/d/i. skin well approximated and staples present. Stage 1 sacral decubitis ulcer present. Fungal rash of the bilateral inguinal creases  Wounds: Patient with scab of 4th digit of RLE.

## 2023-12-18 NOTE — PROGRESS NOTE ADULT - SUBJECTIVE AND OBJECTIVE BOX
62yo male with hx of DM2, HLD, PAD, prior toe amputation, presented to [facility] for rehab from [facility] where he p/w L foot wound, noted to have sepsis secondary to acute infected wound. 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 Postop continued IV Abx, evaluated by Endocrine for glycemic control. Pt was taken back to the OR for L BKA revision.    Pt seen and examined at bedside.  No acute events overnight.  s/p RRT this am, due to unresponsiveness. pt was transferred to chair form bed when he became pale, sweaty, unresponsive for a min as soon as he sat in the chair, BP noted to be 93 in chair, regained consciousness spontaneously and able to remember vents.   Pt denies cp, palpitations, sob, abd pain, N/V, fever, chills      Vital Signs Last 24 Hrs  T(C): 36.9 (18 Dec 2023 07:51), Max: 36.9 (18 Dec 2023 07:51)  T(F): 98.5 (18 Dec 2023 07:51), Max: 98.5 (18 Dec 2023 07:51)  HR: 89 (18 Dec 2023 07:51) (79 - 94)  BP: 159/82 (18 Dec 2023 07:51) (126/63 - 185/82)  BP(mean): --  RR: 16 (18 Dec 2023 07:51) (16 - 16)  SpO2: 93% (18 Dec 2023 07:51) (92% - 96%)    Parameters below as of 18 Dec 2023 07:51  Patient On (Oxygen Delivery Method): room air    GENERAL- NAD  EAR/NOSE/MOUTH/THROAT -   MMM  EYES- ALEKSANDR, conjunctiva and Sclera clear  NECK- supple  RESPIRATORY-  clear to auscultation bilaterally, non laboured breathing  CARDIOVASCULAR - SIS2, RRR  GI - soft NT BS present  EXTREMITIES-  L BKA  NEUROLOGY- no gross focal deficits  PSYCHIATRY- AAO X 3                10.1                 137  | 29   | 15           9.82  >-----------< 430     ------------------------< 233                   31.9                 5.0  | 101  | 0.86                          ECG reviewed and interpreted: sinus at 88    CAPILLARY BLOOD GLUCOSE      POCT Blood Glucose.: 233 mg/dL (18 Dec 2023 09:26)  POCT Blood Glucose.: 225 mg/dL (18 Dec 2023 07:48)  POCT Blood Glucose.: 190 mg/dL (17 Dec 2023 22:34)  POCT Blood Glucose.: 145 mg/dL (17 Dec 2023 17:38)  POCT Blood Glucose.: 226 mg/dL (17 Dec 2023 12:10)      MEDICATIONS  (STANDING):  acetaminophen     Tablet .. 975 milliGRAM(s) Oral every 6 hours  AQUAPHOR (petrolatum Ointment) 1 Application(s) Topical two times a day  clotrimazole 1% Cream 1 Application(s) Topical two times a day  enalapril 5 milliGRAM(s) Oral daily   enoxaparin Injectable 40 milliGRAM(s) SubCutaneous every 24 hours  gabapentin 300 milliGRAM(s) Oral three times a day  glucagon  Injectable 1 milliGRAM(s) IntraMuscular once  insulin glargine Injectable (LANTUS) 32 Unit(s) SubCutaneous at bedtime  insulin lispro (ADMELOG) corrective regimen sliding scale   SubCutaneous three times a day before meals  insulin lispro (ADMELOG) corrective regimen sliding scale   SubCutaneous at bedtime  insulin lispro Injectable (ADMELOG) 16 Unit(s) SubCutaneous three times a day before meals  metFORMIN 1000 milliGRAM(s) Oral two times a day  polyethylene glycol 3350 17 Gram(s) Oral two times a day  senna 2 Tablet(s) Oral at bedtime  simvastatin 40 milliGRAM(s) Oral at bedtime  sodium chloride 0.9%. 1000 milliLiter(s) (1000 mL/Hr) IV Continuous <Continuous>  tamsulosin 0.4 milliGRAM(s) Oral at bedtime    MEDICATIONS  (PRN):  dextrose Oral Gel 15 Gram(s) Oral once PRN Blood Glucose LESS THAN 70 milliGRAM(s)/deciliter  guaiFENesin Oral Liquid (Sugar-Free) 100 milliGRAM(s) Oral every 6 hours PRN Cough  oxyCODONE    IR 10 milliGRAM(s) Oral every 6 hours PRN Severe Pain (7 - 10)  oxyCODONE    IR 5 milliGRAM(s) Oral every 6 hours PRN Moderate Pain (4 - 6)   64yo male with hx of DM2, HLD, PAD, prior toe amputation, presented to [facility] for rehab from [facility] where he p/w L foot wound, noted to have sepsis secondary to acute infected wound. 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 Postop continued IV Abx, evaluated by Endocrine for glycemic control. Pt was taken back to the OR for L BKA revision.    Pt seen and examined at bedside.  No acute events overnight.  s/p RRT this am, due to unresponsiveness. pt was transferred to chair form bed when he became pale, sweaty, unresponsive for a min as soon as he sat in the chair, BP noted to be 93 in chair, regained consciousness spontaneously and able to remember vents.   Pt denies cp, palpitations, sob, abd pain, N/V, fever, chills      Vital Signs Last 24 Hrs  T(C): 36.9 (18 Dec 2023 07:51), Max: 36.9 (18 Dec 2023 07:51)  T(F): 98.5 (18 Dec 2023 07:51), Max: 98.5 (18 Dec 2023 07:51)  HR: 89 (18 Dec 2023 07:51) (79 - 94)  BP: 159/82 (18 Dec 2023 07:51) (126/63 - 185/82)  BP(mean): --  RR: 16 (18 Dec 2023 07:51) (16 - 16)  SpO2: 93% (18 Dec 2023 07:51) (92% - 96%)    Parameters below as of 18 Dec 2023 07:51  Patient On (Oxygen Delivery Method): room air    GENERAL- NAD  EAR/NOSE/MOUTH/THROAT -   MMM  EYES- ALEKSANDR, conjunctiva and Sclera clear  NECK- supple  RESPIRATORY-  clear to auscultation bilaterally, non laboured breathing  CARDIOVASCULAR - SIS2, RRR  GI - soft NT BS present  EXTREMITIES-  L BKA  NEUROLOGY- no gross focal deficits  PSYCHIATRY- AAO X 3                10.1                 137  | 29   | 15           9.82  >-----------< 430     ------------------------< 233                   31.9                 5.0  | 101  | 0.86                          ECG reviewed and interpreted: sinus at 88    CAPILLARY BLOOD GLUCOSE      POCT Blood Glucose.: 233 mg/dL (18 Dec 2023 09:26)  POCT Blood Glucose.: 225 mg/dL (18 Dec 2023 07:48)  POCT Blood Glucose.: 190 mg/dL (17 Dec 2023 22:34)  POCT Blood Glucose.: 145 mg/dL (17 Dec 2023 17:38)  POCT Blood Glucose.: 226 mg/dL (17 Dec 2023 12:10)      MEDICATIONS  (STANDING):  acetaminophen     Tablet .. 975 milliGRAM(s) Oral every 6 hours  AQUAPHOR (petrolatum Ointment) 1 Application(s) Topical two times a day  clotrimazole 1% Cream 1 Application(s) Topical two times a day  enalapril 5 milliGRAM(s) Oral daily   enoxaparin Injectable 40 milliGRAM(s) SubCutaneous every 24 hours  gabapentin 300 milliGRAM(s) Oral three times a day  glucagon  Injectable 1 milliGRAM(s) IntraMuscular once  insulin glargine Injectable (LANTUS) 32 Unit(s) SubCutaneous at bedtime  insulin lispro (ADMELOG) corrective regimen sliding scale   SubCutaneous three times a day before meals  insulin lispro (ADMELOG) corrective regimen sliding scale   SubCutaneous at bedtime  insulin lispro Injectable (ADMELOG) 16 Unit(s) SubCutaneous three times a day before meals  metFORMIN 1000 milliGRAM(s) Oral two times a day  polyethylene glycol 3350 17 Gram(s) Oral two times a day  senna 2 Tablet(s) Oral at bedtime  simvastatin 40 milliGRAM(s) Oral at bedtime  sodium chloride 0.9%. 1000 milliLiter(s) (1000 mL/Hr) IV Continuous <Continuous>  tamsulosin 0.4 milliGRAM(s) Oral at bedtime    MEDICATIONS  (PRN):  dextrose Oral Gel 15 Gram(s) Oral once PRN Blood Glucose LESS THAN 70 milliGRAM(s)/deciliter  guaiFENesin Oral Liquid (Sugar-Free) 100 milliGRAM(s) Oral every 6 hours PRN Cough  oxyCODONE    IR 10 milliGRAM(s) Oral every 6 hours PRN Severe Pain (7 - 10)  oxyCODONE    IR 5 milliGRAM(s) Oral every 6 hours PRN Moderate Pain (4 - 6)

## 2023-12-18 NOTE — PROVIDER CONTACT NOTE (OTHER) - ASSESSMENT
pt diaphoretic, clammy. BP 96/65, HR 90 O2 sat 94%. pt placed back to bed- retook /62 HR 87 O2 sat 94%

## 2023-12-18 NOTE — PROGRESS NOTE ADULT - SUBJECTIVE AND OBJECTIVE BOX
HPI:  Mr. Mcdaniel is a 63M PMHx DM, HLD, PAD, prior toe amputation, presents to [facility] ED w L foot wound. Reports wound has been present on hindfoot x 3 weeks. Started as small cut. Over last 24 hours, pt unable to ambulate d/t severe pain. Reports significant malodor. Denies subjective fevers, chills, purulent drainage, numbness/paresthesia.     In ED, patient is tachycardic to 123. BP w HTN. Febrile to 102.4 F. Labs w WBC 21. Hgb 11.4. Lac 2.4. XR L foot w soft tissue gas at lateral hindfoot. CT LLE non con w soft tissue gas tracking ~7cm above foot.   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 Patient admitted to the SICU pre and post op for hyperglycemia and insulin drip management. Endocrinology was consulted for the hyperglycemia.  Patient transferred out of the SICU when he was able to be weaned off the insulin drip. Internal medicine was consulted for malachi-operative clearance for formalization surgery. On 12/4 the patient was taken back to the operating room for a left lower extremity guillotine revision. Infectious disease was consulted for antibiotic management and recommended switching from Zosyn to Unasyn. Rehab medicine was consulted to evaluate for rehab needs and recommended acute rehab on discharge.       Pt seen and examined at bedside.  No acute events overnight.  s/p RRT this am, due to unresponsiveness. pt was transferred to chair form bed when he became pale, sweaty, unresponsive, BP 90s in chair, regained consciousness spontaneously.  Pt denies cp, palpitations, sob, abd pain, N/V, fever, chills  BP regimen adjusted by hospitalist  pain well controlled-Tylenol this am only  TOV-remove crain      MEDICATIONS  (STANDING):  acetaminophen     Tablet .. 975 milliGRAM(s) Oral every 6 hours  AQUAPHOR (petrolatum Ointment) 1 Application(s) Topical two times a day  clotrimazole 1% Cream 1 Application(s) Topical two times a day  dextrose 5%. 1000 milliLiter(s) (100 mL/Hr) IV Continuous <Continuous>  dextrose 5%. 1000 milliLiter(s) (50 mL/Hr) IV Continuous <Continuous>  dextrose 50% Injectable 25 Gram(s) IV Push once  dextrose 50% Injectable 25 Gram(s) IV Push once  dextrose 50% Injectable 12.5 Gram(s) IV Push once  enoxaparin Injectable 40 milliGRAM(s) SubCutaneous every 24 hours  gabapentin 300 milliGRAM(s) Oral three times a day  glucagon  Injectable 1 milliGRAM(s) IntraMuscular once  insulin glargine Injectable (LANTUS) 32 Unit(s) SubCutaneous at bedtime  insulin lispro (ADMELOG) corrective regimen sliding scale   SubCutaneous three times a day before meals  insulin lispro (ADMELOG) corrective regimen sliding scale   SubCutaneous at bedtime  insulin lispro Injectable (ADMELOG) 16 Unit(s) SubCutaneous three times a day before meals  metFORMIN 1000 milliGRAM(s) Oral two times a day  polyethylene glycol 3350 17 Gram(s) Oral two times a day  senna 2 Tablet(s) Oral at bedtime  simvastatin 40 milliGRAM(s) Oral at bedtime  sodium chloride 0.9% Bolus 1000 milliLiter(s) IV Bolus once  sodium chloride 0.9%. 1000 milliLiter(s) (100 mL/Hr) IV Continuous <Continuous>  tamsulosin 0.4 milliGRAM(s) Oral at bedtime    MEDICATIONS  (PRN):  dextrose Oral Gel 15 Gram(s) Oral once PRN Blood Glucose LESS THAN 70 milliGRAM(s)/deciliter  guaiFENesin Oral Liquid (Sugar-Free) 100 milliGRAM(s) Oral every 6 hours PRN Cough  oxyCODONE    IR 5 milliGRAM(s) Oral every 6 hours PRN Moderate Pain (4 - 6)  oxyCODONE    IR 10 milliGRAM(s) Oral every 6 hours PRN Severe Pain (7 - 10)                            10.1   9.82  )-----------( 430      ( 18 Dec 2023 06:00 )             31.9     12-18    137  |  101  |  15  ----------------------------<  233<H>  5.0   |  29  |  0.86    Ca    9.2      18 Dec 2023 06:00    TPro  7.5  /  Alb  1.9<L>  /  TBili  0.3  /  DBili  x   /  AST  36  /  ALT  35  /  AlkPhos  146<H>  12-18    Urinalysis Basic - ( 18 Dec 2023 06:00 )    Color: x / Appearance: x / SG: x / pH: x  Gluc: 233 mg/dL / Ketone: x  / Bili: x / Urobili: x   Blood: x / Protein: x / Nitrite: x   Leuk Esterase: x / RBC: x / WBC x   Sq Epi: x / Non Sq Epi: x / Bacteria: x        CAPILLARY BLOOD GLUCOSE      POCT Blood Glucose.: 233 mg/dL (18 Dec 2023 09:26)  POCT Blood Glucose.: 225 mg/dL (18 Dec 2023 07:48)  POCT Blood Glucose.: 190 mg/dL (17 Dec 2023 22:34)  POCT Blood Glucose.: 145 mg/dL (17 Dec 2023 17:38)  POCT Blood Glucose.: 226 mg/dL (17 Dec 2023 12:10)      Vital Signs Last 24 Hrs  T(C): 36.9 (18 Dec 2023 07:51), Max: 36.9 (18 Dec 2023 07:51)  T(F): 98.5 (18 Dec 2023 07:51), Max: 98.5 (18 Dec 2023 07:51)  HR: 83 (18 Dec 2023 09:48) (79 - 94)  BP: 93/54 (18 Dec 2023 09:48) (93/54 - 185/82)  BP(mean): --  RR: 16 (18 Dec 2023 07:51) (16 - 16)  SpO2: 94% (18 Dec 2023 09:35) (92% - 96%)    Parameters below as of 18 Dec 2023 09:35  Patient On (Oxygen Delivery Method): room air      Physical Exam: Gen - NAD, Comfortable  HEENT - NCAT, EOMI  Neck - Supple, No limited ROM  Pulm - CTAB, No wheeze, No rhonchi, No crackles  Cardiovascular - RRR, S1S2, No murmurs  Abdomen - Soft, NT/ND, +BS  Extremities - LLE with recent BKA.   Uro - crain present for retention  Neuro-     Cognitive - AAOx3     Communication - Fluent, No dysarthria     Attention: Intact      Judgement: Good evidence of judgement     Memory: Recall 3 objects immediate and 3 min later         Cranial Nerves - CN 2-12 intact     Motor -                     LEFT    UE - ShAB 5/5, EF 5/5, EE 5/5, WE 5/5,  5/5                    RIGHT UE - ShAB 5/5, EF 5/5, EE 5/5, WE 5/5,  5/5                    LEFT    LE - HF 5/5, KE 5/5                    RIGHT LE - HF 5/5, KE 5/5, DF 5/5, PF 5/5        Sensory - Intact to LT     Reflexes - DTR Intact, No primitive reflexive     Coordination - FTN intact     Tone - normal  Psychiatric - Mood stable, Affect WNL  Skin:  incision site from BKA on the L residual limb. No drainage. C/d/i. skin well approximated and staples present. Stage 1 sacral decubitis ulcer present. Fungal rash of the bilateral inguinal creases  Wounds: Patient with scab of 4th digit of RLE.    Continue comprehensive acute rehab program consisting of 3hrs/day of OT/PT. HPI:  Mr. Mcdaniel is a 63M PMHx DM, HLD, PAD, prior toe amputation, presents to [facility] ED w L foot wound. Reports wound has been present on hindfoot x 3 weeks. Started as small cut. Over last 24 hours, pt unable to ambulate d/t severe pain. Reports significant malodor. Denies subjective fevers, chills, purulent drainage, numbness/paresthesia.     In ED, patient is tachycardic to 123. BP w HTN. Febrile to 102.4 F. Labs w WBC 21. Hgb 11.4. Lac 2.4. XR L foot w soft tissue gas at lateral hindfoot. CT LLE non con w soft tissue gas tracking ~7cm above foot.   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 Patient admitted to the SICU pre and post op for hyperglycemia and insulin drip management. Endocrinology was consulted for the hyperglycemia.  Patient transferred out of the SICU when he was able to be weaned off the insulin drip. Internal medicine was consulted for malachi-operative clearance for formalization surgery. On 12/4 the patient was taken back to the operating room for a left lower extremity guillotine revision. Infectious disease was consulted for antibiotic management and recommended switching from Zosyn to Unasyn. Rehab medicine was consulted to evaluate for rehab needs and recommended acute rehab on discharge.       Pt seen and examined at bedside.  No acute events overnight.  s/p RRT this am, due to unresponsiveness. pt was transferred to chair form bed when he became pale, sweaty, unresponsive, BP 90s in chair, regained consciousness spontaneously.  Pt denies cp, palpitations, sob, abd pain, N/V, fever, chills  BP regimen adjusted by hospitalist  pain well controlled-Tylenol this am only  TOV-remove crain      MEDICATIONS  (STANDING):  acetaminophen     Tablet .. 975 milliGRAM(s) Oral every 6 hours  AQUAPHOR (petrolatum Ointment) 1 Application(s) Topical two times a day  clotrimazole 1% Cream 1 Application(s) Topical two times a day  dextrose 5%. 1000 milliLiter(s) (100 mL/Hr) IV Continuous <Continuous>  dextrose 5%. 1000 milliLiter(s) (50 mL/Hr) IV Continuous <Continuous>  dextrose 50% Injectable 25 Gram(s) IV Push once  dextrose 50% Injectable 25 Gram(s) IV Push once  dextrose 50% Injectable 12.5 Gram(s) IV Push once  enoxaparin Injectable 40 milliGRAM(s) SubCutaneous every 24 hours  gabapentin 300 milliGRAM(s) Oral three times a day  glucagon  Injectable 1 milliGRAM(s) IntraMuscular once  insulin glargine Injectable (LANTUS) 32 Unit(s) SubCutaneous at bedtime  insulin lispro (ADMELOG) corrective regimen sliding scale   SubCutaneous three times a day before meals  insulin lispro (ADMELOG) corrective regimen sliding scale   SubCutaneous at bedtime  insulin lispro Injectable (ADMELOG) 16 Unit(s) SubCutaneous three times a day before meals  metFORMIN 1000 milliGRAM(s) Oral two times a day  polyethylene glycol 3350 17 Gram(s) Oral two times a day  senna 2 Tablet(s) Oral at bedtime  simvastatin 40 milliGRAM(s) Oral at bedtime  sodium chloride 0.9% Bolus 1000 milliLiter(s) IV Bolus once  sodium chloride 0.9%. 1000 milliLiter(s) (100 mL/Hr) IV Continuous <Continuous>  tamsulosin 0.4 milliGRAM(s) Oral at bedtime    MEDICATIONS  (PRN):  dextrose Oral Gel 15 Gram(s) Oral once PRN Blood Glucose LESS THAN 70 milliGRAM(s)/deciliter  guaiFENesin Oral Liquid (Sugar-Free) 100 milliGRAM(s) Oral every 6 hours PRN Cough  oxyCODONE    IR 5 milliGRAM(s) Oral every 6 hours PRN Moderate Pain (4 - 6)  oxyCODONE    IR 10 milliGRAM(s) Oral every 6 hours PRN Severe Pain (7 - 10)                            10.1   9.82  )-----------( 430      ( 18 Dec 2023 06:00 )             31.9     12-18    137  |  101  |  15  ----------------------------<  233<H>  5.0   |  29  |  0.86    Ca    9.2      18 Dec 2023 06:00    TPro  7.5  /  Alb  1.9<L>  /  TBili  0.3  /  DBili  x   /  AST  36  /  ALT  35  /  AlkPhos  146<H>  12-18    Urinalysis Basic - ( 18 Dec 2023 06:00 )    Color: x / Appearance: x / SG: x / pH: x  Gluc: 233 mg/dL / Ketone: x  / Bili: x / Urobili: x   Blood: x / Protein: x / Nitrite: x   Leuk Esterase: x / RBC: x / WBC x   Sq Epi: x / Non Sq Epi: x / Bacteria: x        CAPILLARY BLOOD GLUCOSE      POCT Blood Glucose.: 233 mg/dL (18 Dec 2023 09:26)  POCT Blood Glucose.: 225 mg/dL (18 Dec 2023 07:48)  POCT Blood Glucose.: 190 mg/dL (17 Dec 2023 22:34)  POCT Blood Glucose.: 145 mg/dL (17 Dec 2023 17:38)  POCT Blood Glucose.: 226 mg/dL (17 Dec 2023 12:10)      Vital Signs Last 24 Hrs  T(C): 36.9 (18 Dec 2023 07:51), Max: 36.9 (18 Dec 2023 07:51)  T(F): 98.5 (18 Dec 2023 07:51), Max: 98.5 (18 Dec 2023 07:51)  HR: 83 (18 Dec 2023 09:48) (79 - 94)  BP: 93/54 (18 Dec 2023 09:48) (93/54 - 185/82)  BP(mean): --  RR: 16 (18 Dec 2023 07:51) (16 - 16)  SpO2: 94% (18 Dec 2023 09:35) (92% - 96%)    Parameters below as of 18 Dec 2023 09:35  Patient On (Oxygen Delivery Method): room air      Physical Exam: Gen - NAD, Comfortable  HEENT - NCAT, EOMI  Neck - Supple, No limited ROM  Pulm - CTAB, No wheeze, No rhonchi, No crackles  Cardiovascular - RRR, S1S2, No murmurs  Abdomen - Soft, NT/ND, +BS  Extremities - LLE with recent BKA.   Uro - crain present for retention  Neuro-     Cognitive - AAOx3     Communication - Fluent, No dysarthria     Attention: Intact      Judgement: Good evidence of judgement     Memory: Recall 3 objects immediate and 3 min later         Cranial Nerves - CN 2-12 intact     Motor -                     LEFT    UE - ShAB 5/5, EF 5/5, EE 5/5, WE 5/5,  5/5                    RIGHT UE - ShAB 5/5, EF 5/5, EE 5/5, WE 5/5,  5/5                    LEFT    LE - HF 5/5, KE 5/5                    RIGHT LE - HF 5/5, KE 5/5, DF 5/5, PF 5/5        Sensory - Intact to LT     Reflexes - DTR Intact, No primitive reflexive     Coordination - FTN intact     Tone - normal  Psychiatric - Mood stable, Affect WNL  Skin:  incision site from BKA on the L residual limb. No drainage. C/d/i. skin well approximated and staples present. Stage 1 sacral decubitis ulcer present. Fungal rash of the bilateral inguinal creases  Wounds: Patient with scab of 4th digit of RLE.    Continue comprehensive acute rehab program consisting of 3hrs/day of OT/PT.

## 2023-12-18 NOTE — CHART NOTE - NSCHARTNOTEFT_GEN_A_CORE
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          COMORBIDITIES/COMPLICATING CONDITIONS IMPACTING REHABILITATION:  HEALTH ISSUES - PROBLEM Dx:  Anxiety  Hypotension  urinary retention        PAST MEDICAL & SURGICAL HISTORY:  Retinopathy      Diabetes mellitus type 2 in obese      Hyperlipidemia      Toe infection  s/p amputation      Ruptured appendix      S/P appendectomy      Toe amputation status  left pinky toe          Based upon consideration of the patient's impairments, functional status, complicating conditions and any other contributing factors and after information garnered from the assessments of all therapy disciplines involved in treating the patient and other pertinent clinicians:    INTERDISCIPLINARY REHABILITATION INTERVENTIONS:    [ X  ] Transfer Training  [ X  ] Bed Mobility  [ X  ] Therapeutic Exercise  [ X ] Balance/Coordination Exercises  [ X ] Locomotion retraining  [ X  ] Stairs  [  X ] Functional Transfer Training  [ x  ] Bowel/Bladder program  [  x ] Pain Management  [  x ] Skin/Wound Care  [   ] Visual/Perceptual Training  [   ] Therapeutic Recreation Activities  [   ] Neuromuscular Re-education  [ X  ] Activities of Daily Living  [   ] Speech Exercise  [   ] Swallowing Exercises  [   ] Vital Stim  [   ] Dietary Supplements  [   ] Calorie Count  [   ] Cognitive Exercises  [   ] Congnitive/Linguistic Treatment  [   ] Behavior Program  [   ] Neuropsych Therapy  [ X  ] Patient/Family Counseling  [ X ] Family Training  [ X  ] Community Re-entry  [   ] Orthotic Evaluation  [   ] Prosthetic Eval/Training    MEDICAL PROGNOSIS:  good    REHAB POTENTIAL:  good  EXPECTED DAILY THERAPY:         PT:2hr       OT:1hr       ST:       P&O:    EXPECTED INTENSITY OF PROGRAM:  3 hrs / Day    EXPECTED FREQUENCY OF PROGRAM: 5 Days/ Week    ESTIMATED LOS:  [  ] 5-7 Days  [  ] 7-10 Days  [ x ] 10- 14 Days  [  ] 14- 18 Days  [  ] 18- 21 Days    ESTIMATED DISPOSITION:  [  ] Home   [  ] Home with Outpatient Therapies  [x  ] Home with Home Therapies  [  ] Assisted Living  [  ] Nursing Home  [  ] Long Term Acute Care    INTERDISCIPLINARY FUNCTIONAL OUTCOMES/GOALS:         Gait/Mobility: mod I       Transfers: Mod I       ADLs: Mod I       Functional Transfers: Mod I       Medication Management: independent       Communication: NA       Cognitive: NA       Dysphagia: NA       Bladder independent       Bowel:independent     Functional Independent Measures:   7 = Independent  6 = Modified Independent  5 = Supervision  4 = Minimal Assist/ Contact Guard  3 = Moderate Assistance  2 = Maximum Assistance  1 = Total Assistance  0 = Unable to assess

## 2023-12-18 NOTE — PROGRESS NOTE ADULT - ASSESSMENT
ELVIS VILLATORO is a 63y with a history of diabetes mellitus type 2, HTN, dyslipidemia, retinopathy who presented to [facility] on 11/29/23  with complaint of non-healing left foot wound and found to have osteomyelitis. Hospital course complicated by hyperglycemia and need for surgical revision. Now admitted to [facility] after for initiation of a multidisciplinary rehab program consisting focused on functional mobility, transfers and ADLs.     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   - s/p revision on 12/4 and formalization 12/12.   Comprehensive Multidisciplinary Rehab Program:  comprehensive rehab program, PT/OT/SLP 3 hours a day, 5 days a week.  - NWB left lower extremity  - Precautions: falls  - completed Unasyn per ID at previous hospital-observe off abx. Afebrile, no leukocytosis     HLD  -Simvastatin    Diabetes mellitus type 2  -ISS  - Home meds: metformin 1000mg BID, glipizide   - glargine 32 units bedtime  -[id number] tid w meals  -hospitalist to adjust    HTN  -enalapril -adjusted, s/p syncope w/hypotension this am, s/p IVF  -hospitalist to follow    Urinary Retention  -Tamsulosin   - presented with crain catheter  - TOV today    Pain Management:  - Tylenol q6h  - oxy5mg, 10mg mod and severe pain  - gabapentin 300mg TID    GI/Bowel:  - patient reports regular BMs  - Senna QHS, Miralax Daily    Skin/Pressure Injury:   - At risk for pressure injury due to neurologic diagnosis and relative immobility.  - Skin assessment on admission: stage 1 sacral ulcer, tinea cruris present, scab of 4th digit R foot  - barrier cream for sacral wound  - butenafine daily for jock itch  - Monitor Incisions: LLE residual stump with sutures  - Daily dressing changes  - aquaphor for dry R foot.    Diet:  - Diet Consistency/Modifications: Consistent Carb  - Nutrition consult for assessment and recs    DVT ppx:  - Lovenox daily dvt ppx  ---------------  Code Status/Emergency Contact:    Outpatient Follow-up (Specialty/Name of physician):  Jose Francisco Dean  Vascular Surgery  [address] [id number]  [place] [address]  Phone: [phone]  Fax: [phone]  Follow Up Time: 2 weeks  [facility] End[facility]  Endocrinology  [address] [place] [address]  Phone: [phone]  Fax:   Follow Up Time: 2 weeks    [facility] [place]  Urology  [address] [place] [address]  Phone: [phone]  Follow Up Time: 1 week    Eduin Gutierrez MD  Attending Physician  [facility]  Division of Infectous Diseases  [phone]    Needs podiatry/vascular and optho f/u care.     Endocrine faculty practice.   [address] [facility] [address]. Phone .   ELVIS VILLATORO is a 63y with a history of diabetes mellitus type 2, HTN, dyslipidemia, retinopathy who presented to [facility] on 11/29/23  with complaint of non-healing left foot wound and found to have osteomyelitis. Hospital course complicated by hyperglycemia and need for surgical revision. Now admitted to [facility] after for initiation of a multidisciplinary rehab program consisting focused on functional mobility, transfers and ADLs.     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   - s/p revision on 12/4 and formalization 12/12.   Comprehensive Multidisciplinary Rehab Program:  comprehensive rehab program, PT/OT/SLP 3 hours a day, 5 days a week.  - NWB left lower extremity  - Precautions: falls  - completed Unasyn per ID at previous hospital-observe off abx. Afebrile, no leukocytosis     HLD  -Simvastatin    Diabetes mellitus type 2  -ISS  - Home meds: metformin 1000mg BID, glipizide   - glargine 32 units bedtime  -[id number] tid w meals  -hospitalist to adjust    HTN  -enalapril -adjusted, s/p syncope w/hypotension this am, s/p IVF  -hospitalist to follow    Urinary Retention  -Tamsulosin   - presented with crain catheter  - TOV today    Pain Management:  - Tylenol q6h  - oxy5mg, 10mg mod and severe pain  - gabapentin 300mg TID    GI/Bowel:  - patient reports regular BMs  - Senna QHS, Miralax Daily    Skin/Pressure Injury:   - At risk for pressure injury due to neurologic diagnosis and relative immobility.  - Skin assessment on admission: stage 1 sacral ulcer, tinea cruris present, scab of 4th digit R foot  - barrier cream for sacral wound  - butenafine daily for jock itch  - Monitor Incisions: LLE residual stump with sutures  - Daily dressing changes  - aquaphor for dry R foot.    Diet:  - Diet Consistency/Modifications: Consistent Carb  - Nutrition consult for assessment and recs    DVT ppx:  - Lovenox daily dvt ppx  ---------------  Code Status/Emergency Contact:    Outpatient Follow-up (Specialty/Name of physician):  Jose Francisco Dean  Vascular Surgery  [address] [id number]  [place] [address]  Phone: [phone]  Fax: [phone]  Follow Up Time: 2 weeks  [facility] Endocrinology  Endocrinology  [address] [place] [address]  Phone: [phone]  Fax:   Follow Up Time: 2 weeks    [facility] [place]  Urology  [address] [place] [address]  Phone: [phone]  Follow Up Time: 1 week    Eduin Gutierrez MD  Attending Physician  [facility]  Division of Infectous Diseases  [phone]    Needs podiatry/vascular and optho f/u care.     Endocrine faculty practice.   [address] [facility] [address]. Phone .

## 2023-12-19 ENCOUNTER — TRANSCRIPTION ENCOUNTER (OUTPATIENT)
Age: 63
End: 2023-12-19

## 2023-12-19 LAB
GLUCOSE BLDC GLUCOMTR-MCNC: 126 MG/DL — HIGH (ref 70–99)
GLUCOSE BLDC GLUCOMTR-MCNC: 126 MG/DL — HIGH (ref 70–99)
GLUCOSE BLDC GLUCOMTR-MCNC: 153 MG/DL — HIGH (ref 70–99)
GLUCOSE BLDC GLUCOMTR-MCNC: 153 MG/DL — HIGH (ref 70–99)
GLUCOSE BLDC GLUCOMTR-MCNC: 195 MG/DL — HIGH (ref 70–99)
GLUCOSE BLDC GLUCOMTR-MCNC: 195 MG/DL — HIGH (ref 70–99)
GLUCOSE BLDC GLUCOMTR-MCNC: 198 MG/DL — HIGH (ref 70–99)
GLUCOSE BLDC GLUCOMTR-MCNC: 198 MG/DL — HIGH (ref 70–99)
GLUCOSE BLDC GLUCOMTR-MCNC: 62 MG/DL — LOW (ref 70–99)
GLUCOSE BLDC GLUCOMTR-MCNC: 79 MG/DL — SIGNIFICANT CHANGE UP (ref 70–99)
GLUCOSE BLDC GLUCOMTR-MCNC: 79 MG/DL — SIGNIFICANT CHANGE UP (ref 70–99)
GLUCOSE BLDC GLUCOMTR-MCNC: 93 MG/DL — SIGNIFICANT CHANGE UP (ref 70–99)
GLUCOSE BLDC GLUCOMTR-MCNC: 93 MG/DL — SIGNIFICANT CHANGE UP (ref 70–99)
MAGNESIUM SERPL-MCNC: 1.6 MG/DL — SIGNIFICANT CHANGE UP (ref 1.6–2.6)
MAGNESIUM SERPL-MCNC: 1.6 MG/DL — SIGNIFICANT CHANGE UP (ref 1.6–2.6)

## 2023-12-19 PROCEDURE — 93225 XTRNL ECG REC<48 HRS REC: CPT

## 2023-12-19 PROCEDURE — 99233 SBSQ HOSP IP/OBS HIGH 50: CPT | Mod: GC

## 2023-12-19 PROCEDURE — 99233 SBSQ HOSP IP/OBS HIGH 50: CPT

## 2023-12-19 RX ORDER — INSULIN LISPRO 100/ML
16 VIAL (ML) SUBCUTANEOUS
Refills: 0 | Status: DISCONTINUED | OUTPATIENT
Start: 2023-12-20 | End: 2023-12-20

## 2023-12-19 RX ORDER — MAGNESIUM OXIDE 400 MG ORAL TABLET 241.3 MG
400 TABLET ORAL
Refills: 0 | Status: DISCONTINUED | OUTPATIENT
Start: 2023-12-19 | End: 2023-12-19

## 2023-12-19 RX ORDER — INSULIN GLARGINE 100 [IU]/ML
10 INJECTION, SOLUTION SUBCUTANEOUS AT BEDTIME
Refills: 0 | Status: DISCONTINUED | OUTPATIENT
Start: 2023-12-19 | End: 2023-12-20

## 2023-12-19 RX ADMIN — Medication 1 APPLICATION(S): at 18:40

## 2023-12-19 RX ADMIN — Medication 975 MILLIGRAM(S): at 06:18

## 2023-12-19 RX ADMIN — GABAPENTIN 300 MILLIGRAM(S): 400 CAPSULE ORAL at 21:13

## 2023-12-19 RX ADMIN — SENNA PLUS 2 TABLET(S): 8.6 TABLET ORAL at 21:14

## 2023-12-19 RX ADMIN — GABAPENTIN 300 MILLIGRAM(S): 400 CAPSULE ORAL at 06:18

## 2023-12-19 RX ADMIN — SIMVASTATIN 40 MILLIGRAM(S): 20 TABLET, FILM COATED ORAL at 21:14

## 2023-12-19 RX ADMIN — MAGNESIUM OXIDE 400 MG ORAL TABLET 400 MILLIGRAM(S): 241.3 TABLET ORAL at 08:23

## 2023-12-19 RX ADMIN — Medication 2: at 08:22

## 2023-12-19 RX ADMIN — INSULIN GLARGINE 10 UNIT(S): 100 INJECTION, SOLUTION SUBCUTANEOUS at 21:29

## 2023-12-19 RX ADMIN — GABAPENTIN 300 MILLIGRAM(S): 400 CAPSULE ORAL at 13:19

## 2023-12-19 RX ADMIN — Medication 16 UNIT(S): at 12:19

## 2023-12-19 RX ADMIN — Medication 975 MILLIGRAM(S): at 12:19

## 2023-12-19 RX ADMIN — MAGNESIUM OXIDE 400 MG ORAL TABLET 400 MILLIGRAM(S): 241.3 TABLET ORAL at 18:25

## 2023-12-19 RX ADMIN — Medication 975 MILLIGRAM(S): at 18:25

## 2023-12-19 RX ADMIN — METFORMIN HYDROCHLORIDE 1000 MILLIGRAM(S): 850 TABLET ORAL at 18:25

## 2023-12-19 RX ADMIN — METFORMIN HYDROCHLORIDE 1000 MILLIGRAM(S): 850 TABLET ORAL at 08:23

## 2023-12-19 RX ADMIN — Medication 1 APPLICATION(S): at 18:39

## 2023-12-19 RX ADMIN — Medication 16 UNIT(S): at 08:22

## 2023-12-19 RX ADMIN — Medication 2: at 12:19

## 2023-12-19 RX ADMIN — Medication 1 APPLICATION(S): at 06:17

## 2023-12-19 RX ADMIN — ENOXAPARIN SODIUM 40 MILLIGRAM(S): 100 INJECTION SUBCUTANEOUS at 06:18

## 2023-12-19 RX ADMIN — Medication 975 MILLIGRAM(S): at 13:00

## 2023-12-19 RX ADMIN — Medication 5 MILLIGRAM(S): at 12:31

## 2023-12-19 RX ADMIN — SODIUM CHLORIDE 100 MILLILITER(S): 9 INJECTION INTRAMUSCULAR; INTRAVENOUS; SUBCUTANEOUS at 02:28

## 2023-12-19 RX ADMIN — MAGNESIUM OXIDE 400 MG ORAL TABLET 400 MILLIGRAM(S): 241.3 TABLET ORAL at 12:20

## 2023-12-19 RX ADMIN — Medication 975 MILLIGRAM(S): at 06:22

## 2023-12-19 NOTE — DIETITIAN INITIAL EVALUATION ADULT - PERTINENT MEDS FT
MEDICATIONS  (STANDING):  acetaminophen     Tablet .. 975 milliGRAM(s) Oral every 6 hours  AQUAPHOR (petrolatum Ointment) 1 Application(s) Topical two times a day  clotrimazole 1% Cream 1 Application(s) Topical two times a day  dextrose 5%. 1000 milliLiter(s) (100 mL/Hr) IV Continuous <Continuous>  dextrose 5%. 1000 milliLiter(s) (50 mL/Hr) IV Continuous <Continuous>  dextrose 50% Injectable 25 Gram(s) IV Push once  dextrose 50% Injectable 12.5 Gram(s) IV Push once  dextrose 50% Injectable 25 Gram(s) IV Push once  enalapril 5 milliGRAM(s) Oral <User Schedule>  enoxaparin Injectable 40 milliGRAM(s) SubCutaneous every 24 hours  gabapentin 300 milliGRAM(s) Oral three times a day  glucagon  Injectable 1 milliGRAM(s) IntraMuscular once  insulin glargine Injectable (LANTUS) 32 Unit(s) SubCutaneous at bedtime  insulin lispro (ADMELOG) corrective regimen sliding scale   SubCutaneous three times a day before meals  insulin lispro (ADMELOG) corrective regimen sliding scale   SubCutaneous at bedtime  insulin lispro Injectable (ADMELOG) 16 Unit(s) SubCutaneous three times a day before meals  magnesium oxide 400 milliGRAM(s) Oral three times a day with meals  metFORMIN 1000 milliGRAM(s) Oral two times a day  polyethylene glycol 3350 17 Gram(s) Oral two times a day  senna 2 Tablet(s) Oral at bedtime  simvastatin 40 milliGRAM(s) Oral at bedtime  sodium chloride 0.9%. 1000 milliLiter(s) (100 mL/Hr) IV Continuous <Continuous>    MEDICATIONS  (PRN):  dextrose Oral Gel 15 Gram(s) Oral once PRN Blood Glucose LESS THAN 70 milliGRAM(s)/deciliter  guaiFENesin Oral Liquid (Sugar-Free) 100 milliGRAM(s) Oral every 6 hours PRN Cough  oxyCODONE    IR 5 milliGRAM(s) Oral every 6 hours PRN Moderate Pain (4 - 6)  oxyCODONE    IR 10 milliGRAM(s) Oral every 6 hours PRN Severe Pain (7 - 10)

## 2023-12-19 NOTE — PROGRESS NOTE ADULT - NS ATTEND AMEND GEN_ALL_CORE FT
Rehab Attending- Patient seen and examined by me - Case discussed, above note reviewed by me with modifications made    patient seen and evaluated bedside  RRT this AM- diminished responsiveness, Syst BP 90s-   Given IV fluid- Enalapril decreased to 5mg daily  orthostatics ordered  Hospitalist ordered Trops- neg  Mg 1.5- Mg supplemented  Hospitalist ordered Echo/ Holter  Did well in afternoon Rehab session  Diane Dahl around noontime- No Void By 5 PM- On TOV  labs reviewed by me- stable  Left BK incision looks good - with staples no drainage-   to wear immobilizer In Bed - Can take off OOB  to continue intensive rehab program    Vital Signs Last 24 Hrs  T(C): 36.9 (18 Dec 2023 07:51), Max: 36.9 (18 Dec 2023 07:51)  T(F): 98.5 (18 Dec 2023 07:51), Max: 98.5 (18 Dec 2023 07:51)  HR: 83 (18 Dec 2023 09:48) (79 - 94)  BP: 93/54 (18 Dec 2023 09:48) (93/54 - 185/82)  BP(mean): --  RR: 16 (18 Dec 2023 07:51) (16 - 16)  SpO2: 94% (18 Dec 2023 09:35) (92% - 96%)    Parameters below as of 18 Dec 2023 09:35  Patient On (Oxygen Delivery Method): room air      Physical Exam: Gen - NAD, Comfortable  HEENT - NCAT, EOMI  Neck - Supple, No limited ROM  Pulm - CTAB, No wheeze, No rhonchi, No crackles  Cardiovascular - RRR, S1S2, No murmurs  Abdomen - Soft, NT/ND, +BS  Extremities - LLE with recent BKA.   Uro - crain present for retention  Neuro-     Cognitive - AAOx3     Communication - Fluent, No dysarthria     Attention: Intact      Judgement: Good evidence of judgement     Memory: Recall 3 objects immediate and 3 min later         Cranial Nerves - CN 2-12 intact     Motor -                     LEFT    UE - ShAB 5/5, EF 5/5, EE 5/5, WE 5/5,  5/5                    RIGHT UE - ShAB 5/5, EF 5/5, EE 5/5, WE 5/5,  5/5                    LEFT    LE - HF 5/5, KE 5/5                    RIGHT LE - HF 5/5, KE 5/5, DF 5/5, PF 5/5        Sensory - Intact to LT     Reflexes - DTR Intact, No primitive reflexive     Coordination - FTN intact     Tone - normal  Psychiatric - Mood stable, Affect WNL  Skin:  incision site from BKA on the L residual limb. No drainage. C/d/i. skin well approximated and staples present. Stage 1 sacral decubitis ulcer present. Fungal rash of the bilateral inguinal creases  Wounds: Patient with scab of 4th digit of RLE. Rehab Attending- Patient seen and examined by me - Case discussed, above note reviewed by me with modifications made    patient seen and evaluated bedside  BPS still low on sitting/ standing- no syncope today  voiding well- residuals all < 200cc-  Vasotec decreased- have DCd Flomax- no issue with voiding PTA- NEED TO FOLLOW PVRS OFF FLOMAX  Hopefully Orthostatics resolve off flomax- ? need for midodrine if not improved  Hospitalist ordered Echo- EF 65-70%  to continue intensive rehab program    Vital Signs Last 24 Hrs  T(C): 37.2 (19 Dec 2023 07:34), Max: 37.2 (19 Dec 2023 07:34)  T(F): 99 (19 Dec 2023 07:34), Max: 99 (19 Dec 2023 07:34)  HR: 97 (19 Dec 2023 17:13) (71 - 97)  BP: 173/73 (19 Dec 2023 17:13) (83/50 - 173/82)  BP(mean): --  RR: 16 (19 Dec 2023 17:13) (16 - 16)  SpO2: 95% (19 Dec 2023 17:13) (93% - 95%)    Parameters below as of 19 Dec 2023 17:13  Patient On (Oxygen Delivery Method): room air    Physical Exam: Gen - NAD, Comfortable  HEENT - NCAT, EOMI  Neck - Supple, No limited ROM  Pulm - CTAB, No wheeze, No rhonchi, No crackles  Cardiovascular - RRR, S1S2, No murmurs  Abdomen - Soft, NT/ND, +BS  Extremities - LLE with recent BKA.   Uro - crain present for retention  Neuro-     Cognitive - AAOx3     Communication - Fluent, No dysarthria     Attention: Intact      Judgement: Good evidence of judgement     Memory: Recall 3 objects immediate and 3 min later         Cranial Nerves - CN 2-12 intact     Motor -                     LEFT    UE - ShAB 5/5, EF 5/5, EE 5/5, WE 5/5,  5/5                    RIGHT UE - ShAB 5/5, EF 5/5, EE 5/5, WE 5/5,  5/5                    LEFT    LE - HF 5/5, KE 5/5                    RIGHT LE - HF 5/5, KE 5/5, DF 5/5, PF 5/5        Sensory - Intact to LT     Reflexes - DTR Intact, No primitive reflexive     Coordination - FTN intact     Tone - normal  Psychiatric - Mood stable, Affect WNL  Skin:  incision site from BKA on the L residual limb. No drainage. C/d/i.   Wounds: Patient with scab of 4th digit of RLE.

## 2023-12-19 NOTE — DIETITIAN INITIAL EVALUATION ADULT - PERTINENT LABORATORY DATA
12-18    137  |  101  |  15  ----------------------------<  233<H>  5.0   |  29  |  0.86    Ca    9.2      18 Dec 2023 06:00  Phos  3.2     12-18  Mg     1.6     12-19    TPro  7.5  /  Alb  1.9<L>  /  TBili  0.3  /  DBili  x   /  AST  36  /  ALT  35  /  AlkPhos  146<H>  12-18  POCT Blood Glucose.: 195 mg/dL (12-19-23 @ 11:25)  A1C with Estimated Average Glucose Result: 11.4 % (12-01-23 @ 05:19)

## 2023-12-19 NOTE — DIETITIAN INITIAL EVALUATION ADULT - ADD RECOMMEND
1. Continue with Consistent CHO diet - add evening snack as pt is on insulin  - RD will continue to monitor POCT BG  - Encourage protein-intake  - Insulin per team  2. Continue to monitor lytes and replete prn  3. Appreciate updated weight and continued weight trends  - Preferably on standing scale when pt is able to stand again  4. Continue with current bowel regimen, prn  5. Ongoing diet education

## 2023-12-19 NOTE — PROGRESS NOTE ADULT - ASSESSMENT
ELVIS VILLATORO is a 63y with a history of diabetes mellitus type 2, HTN, dyslipidemia, retinopathy who presented to [facility] on 11/29/23  with complaint of non-healing left foot wound and found to have osteomyelitis. Hospital course complicated by hyperglycemia and need for surgical revision. Now admitted to [facility] after for initiation of a multidisciplinary rehab program consisting focused on functional mobility, transfers and ADLs- pt/ot/dv tppx     left lower extremity Necrotizing fasciitis  -S/p guillotine amputation on 11/30  -S/p revision on 12/4 and formalization 12/12  - Weight bearing status: NWB left lower extremity  - Precautions: falls    s/p RRT 12/18/23 due to unresponsiveness, likely vasovagal episode  - received iv fluids  -lisinopril  decreased to 5 qd at noon with hold parameters 12/18/23  - ordered holter monitor but pt refused  -echo -noted as above   - no further episodes dong well now with PT    OH-   lisinopril decreased to 5 qd  monitor orthostatic  encourage hydration     hypomagnesemia  replete   improved  monitor    urine retention   -admitted with Contreras TOV done- voiding    HLD  -Simvastatin    Diabetes mellitus type 2 with hyperglycemia  - Metformin   -Continue pre meal 16units  - increase Lantus to 32 qhs 12/18/23  -ISS  -A1C with Estimated Average Glucose Result: 11.4 % (12.01.23 @ 05:19)    HTN  -enalapril     VTE ppx   Lovenox    will follow  d/w rehab team dr. thornton     time spent in e/m visit- 50 min    ELVIS VILLATORO is a 63y with a history of diabetes mellitus type 2, HTN, dyslipidemia, retinopathy who presented to [facility] on 11/29/23  with complaint of non-healing left foot wound and found to have osteomyelitis. Hospital course complicated by hyperglycemia and need for surgical revision. Now admitted to [facility] after for initiation of a multidisciplinary rehab program consisting focused on functional mobility, transfers and ADLs- pt/ot/dv tppx     left lower extremity Necrotizing fasciitis  -S/p guillotine amputation on 11/30  -S/p revision on 12/4 and formalization 12/12  - Weight bearing status: NWB left lower extremity  - Precautions: falls    s/p RRT 12/18/23 due to unresponsiveness, likely vasovagal episode  - received iv fluids  -lisinopril  decreased to 5 qd at noon with hold parameters 12/18/23  - ordered holter monitor but pt refused  -echo -noted as above   - no further episodes dong well now with PT    OH-   lisinopril decreased to 5 qd  monitor orthostatic  encourage hydration     hypomagnesemia  replete   improved  monitor    urine retention   -admitted with Contreras TOV done- voiding    HLD  -Simvastatin    Diabetes mellitus type 2 with hyperglycemia  - Metformin   -Continue pre meal 16units  - increase Lantus to 32 qhs 12/18/23  -ISS  -A1C with Estimated Average Glucose Result: 11.4 % (12.01.23 @ 05:19)    HTN  -enalapril     VTE ppx   Lovenox    will follow  d/w rehab team dr. thornton     time spent in e/m visit- 50 min

## 2023-12-19 NOTE — DIETITIAN INITIAL EVALUATION ADULT - OTHER INFO
Mr. Mcdaniel is a 63M PMHx DM, HLD, PAD, prior toe amputation, presents to [facility] ED w L foot wound. Reports wound has been present on hindfoot x 3 weeks. Started as small cut. Over last 24 hours, pt unable to ambulate d/t severe pain. Reports significant malodor. Denies subjective fevers, chills, purulent drainage, numbness/paresthesia.     In ED, patient is tachycardic to 123. BP w HTN. Febrile to 102.4 F. Labs w WBC 21. Hgb 11.4. Lac 2.4. XR L foot w soft tissue gas at lateral hindfoot. CT LLE non con w soft tissue gas tracking ~7cm above foot.   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 Patient admitted to the SICU pre and post op for hyperglycemia and insulin drip management. Endocrinology was consulted for the hyperglycemia.  Patient transferred out of the SICU when he was able to be weaned off the insulin drip. Internal medicine was consulted for malachi-operative clearance for formalization surgery. On 12/4 the patient was taken back to the operating room for a left lower extremity guillotine revision. Infectious disease was consulted for antibiotic management and recommended switching from Zosyn to Unasyn. Rehab medicine was consulted to evaluate for rehab needs and recommended acute rehab on discharge.     Met with pt this AM at bedside. Pt was seated upright and able to articulate his nutrition hx well. NKFA nor food intolerances reported. Pt reported good appetite PTA, which persists. Discussed consistent CHO diet in setting of T2DM. Pt was accepting of education and asking appropriate questions. Pt denied mouth sores/dry mouth and chewing/swallowing difficulties. Pt denied N/V/D/C, stated last BM 12/18. Pt reported UBW ~235 lbs - ?current weight "there's no way I'm 202lbs". Weight hx per previous chart: 239 lbs (12/4), 212.3 lbs (12/16) - previous hx of edema/fluid accumulation, wt change may be 2/2 fluid fluctuations as L guillotine BKA was on 11/30. Appreciate updated weight and weight trends.     *Note: Pt with T2DM. A1c 11.4% (12/1)  POCT BG x 24 hours:  POCT Blood Glucose.: 195 mg/dL (19 Dec 2023 11:25)  POCT Blood Glucose.: 153 mg/dL (19 Dec 2023 08:00)  POCT Blood Glucose.: 180 mg/dL (18 Dec 2023 22:26)  POCT Blood Glucose.: 139 mg/dL (18 Dec 2023 17:18)  - Pt Mg 1.5 (12/18); addressed with Mg Oxide Mr. Mcdaniel is a 63M PMHx DM, HLD, PAD, prior toe amputation, presents to [facility] ED w L foot wound. Reports wound has been present on hindfoot x 3 weeks. Started as small cut. Over last 24 hours, pt unable to ambulate d/t severe pain. Reports significant malodor. Denies subjective fevers, chills, purulent drainage, numbness/paresthesia.     In ED, patient is tachycardic to 123. BP w HTN. Febrile to 102.4 F. Labs w WBC 21. Hgb 11.4. Lac 2.4. XR L foot w soft tissue gas at lateral hindfoot. CT LLE non con w soft tissue gas tracking ~7cm above foot.   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 Patient admitted to the SICU pre and post op for hyperglycemia and insulin drip management. Endocrinology was consulted for the hyperglycemia.  Patient transferred out of the SICU when he was able to be weaned off the insulin drip. Internal medicine was consulted for malachi-operative clearance for formalization surgery. On 12/4 the patient was taken back to the operating room for a left lower extremity guillotine revision. Infectious disease was consulted for antibiotic management and recommended switching from Zosyn to Unasyn. Rehab medicine was consulted to evaluate for rehab needs and recommended acute rehab on discharge.     Met with pt this AM at bedside. Pt was seated upright and able to articulate his nutrition hx well. NKFA nor food intolerances reported. Pt reported good appetite PTA, which persists. Discussed consistent CHO diet in setting of T2DM. Pt was accepting of education and asking appropriate questions. Pt denied mouth sores/dry mouth and chewing/swallowing difficulties. Pt denied N/V/D/C, stated last BM 12/18. Pt reported UBW ~235 lbs - ?current weight "there's no way I'm 202lbs". Weight hx per previous chart: 239 lbs (12/4), 212.3 lbs (12/16) - previous hx of edema/fluid accumulation, wt change may be 2/2 fluid fluctuations as L guillotine BKA was on 11/30. Appreciate updated weight and weight trends.     *Note: Pt with T2DM. A1c 11.4% (12/1)  POCT BG x 24 hours:  POCT Blood Glucose.: 195 mg/dL (19 Dec 2023 11:25)  POCT Blood Glucose.: 153 mg/dL (19 Dec 2023 08:00)  POCT Blood Glucose.: 180 mg/dL (18 Dec 2023 22:26)  POCT Blood Glucose.: 139 mg/dL (18 Dec 2023 17:18)  - Pt Mg 1.5 (12/18); addressed with Mg Oxide

## 2023-12-19 NOTE — PROGRESS NOTE ADULT - ASSESSMENT
ELVIS VILLATORO is a 63y with a history of diabetes mellitus type 2, HTN, dyslipidemia, retinopathy who presented to [facility] on 11/29/23  with complaint of non-healing left foot wound and found to have osteomyelitis. Hospital course complicated by hyperglycemia and need for surgical revision. Now admitted to [facility] after for initiation of a multidisciplinary rehab program consisting focused on functional mobility, transfers and ADLs.     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   - s/p revision on 12/4 and formalization 12/12.   Comprehensive Multidisciplinary Rehab Program:  comprehensive rehab program, PT/OT/SLP 3 hours a day, 5 days a week.  - NWB left lower extremity  - Precautions: falls  - completed Unasyn per ID at previous hospital-observe off abx. Afebrile, no leukocytosis     HLD  -Simvastatin    Diabetes mellitus type 2  -ISS  - Home meds: metformin 1000mg BID, glipizide   - glargine 32 units bedtime  -[id number] tid w meals  -hospitalist to adjust    HTN  -enalapril -adjusted, s/p syncope w/hypotension this am, s/p IVF  -hospitalist to follow    Urinary Retention  -Tamsulosin   - presented with crain catheter  - TOV today    Pain Management:  - Tylenol q6h  - oxy5mg, 10mg mod and severe pain  - gabapentin 300mg TID    GI/Bowel:  - patient reports regular BMs  - Senna QHS, Miralax Daily    Skin/Pressure Injury:   - At risk for pressure injury due to neurologic diagnosis and relative immobility.  - Skin assessment on admission: stage 1 sacral ulcer, tinea cruris present, scab of 4th digit R foot  - barrier cream for sacral wound  - butenafine daily for jock itch  - Monitor Incisions: LLE residual stump with sutures  - Daily dressing changes  - aquaphor for dry R foot.    Diet:  - Diet Consistency/Modifications: Consistent Carb  - Nutrition consult for assessment and recs    DVT ppx:  - Lovenox daily dvt ppx  ---------------  Code Status/Emergency Contact:    Outpatient Follow-up (Specialty/Name of physician):  Jose Francisco Dean  Vascular Surgery  [address] [facility]  [place] [address]  Phone: [phone]  Fax: [phone]  Follow Up Time: 2 weeks  [facility] Endocr[facility]  Endocrinology  [address] [place] [address]  Phone: [phone]  Fax:   Follow Up Time: 2 weeks    [facility] [place]  Urology  [address] [place] [address]  Phone: [phone]  Follow Up Time: 1 week    Eduin Gutierrez MD  Attending Physician  [facility]  Division of Infectous Diseases  [phone]    Needs podiatry/vascular and optho f/u care.     Endocrine faculty practice.   [address] [facility] 203. Phone .   ELVIS VILLATORO is a 63y with a history of diabetes mellitus type 2, HTN, dyslipidemia, retinopathy who presented to [facility] on 11/29/23  with complaint of non-healing left foot wound and found to have osteomyelitis. Hospital course complicated by hyperglycemia and need for surgical revision. Now admitted to [facility] after for initiation of a multidisciplinary rehab program consisting focused on functional mobility, transfers and ADLs.     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   - s/p revision on 12/4 and formalization 12/12.   Comprehensive Multidisciplinary Rehab Program:  comprehensive rehab program, PT/OT/SLP 3 hours a day, 5 days a week.  - NWB left lower extremity  - Precautions: falls  - completed Unasyn per ID at previous hospital-observe off abx. Afebrile, no leukocytosis     HLD  -Simvastatin    Diabetes mellitus type 2  -ISS  - Home meds: metformin 1000mg BID, glipizide   - glargine 32 units bedtime  -[id number] tid w meals  -hospitalist to adjust    HTN  -enalapril -adjusted, s/p syncope w/hypotension this am, s/p IVF  -hospitalist to follow    Urinary Retention  -Tamsulosin   - presented with crain catheter  - TOV today    Pain Management:  - Tylenol q6h  - oxy5mg, 10mg mod and severe pain  - gabapentin 300mg TID    GI/Bowel:  - patient reports regular BMs  - Senna QHS, Miralax Daily    Skin/Pressure Injury:   - At risk for pressure injury due to neurologic diagnosis and relative immobility.  - Skin assessment on admission: stage 1 sacral ulcer, tinea cruris present, scab of 4th digit R foot  - barrier cream for sacral wound  - butenafine daily for jock itch  - Monitor Incisions: LLE residual stump with sutures  - Daily dressing changes  - aquaphor for dry R foot.    Diet:  - Diet Consistency/Modifications: Consistent Carb  - Nutrition consult for assessment and recs    DVT ppx:  - Lovenox daily dvt ppx  ---------------  Code Status/Emergency Contact:    Outpatient Follow-up (Specialty/Name of physician):  Jose Francisco Dean  Vascular Surgery  [address] [facility], [address]  [place] [address]  Phone: [phone]  Fax: [phone]  Follow Up Time: 2 weeks  [facility] Endocrin[facility]  Endocrinology  [address] [place] [address]  Phone: [phone]  Fax:   Follow Up Time: 2 weeks    [facility] at [place]  Urology  [address] [place] [address]  Phone: [phone]  Follow Up Time: 1 week    Eduin Gutierrez MD  Attending Physician  [facility]  Division of Infectous Diseases  [phone]    Needs podiatry/vascular and optho f/u care.     Endocrine faculty practice.   [address] [facility] 203. Phone .   ELVIS VILLATORO is a 63y with a history of diabetes mellitus type 2, HTN, dyslipidemia, retinopathy who presented to [facility] on 11/29/23  with complaint of non-healing left foot wound and found to have osteomyelitis. Hospital course complicated by hyperglycemia and need for surgical revision. Now admitted to [facility] after for initiation of a multidisciplinary rehab program consisting focused on functional mobility, transfers and ADLs.     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   - s/p revision on 12/4 and formalization 12/12.   Comprehensive Multidisciplinary Rehab Program:  comprehensive rehab program, PT/OT/SLP 3 hours a day, 5 days a week.  - NWB left lower extremity  - Precautions: falls  - completed Unasyn per ID at previous hospital-observe off abx. Afebrile, no leukocytosis     HLD  -Simvastatin    Diabetes mellitus type 2  -ISS  - Home meds: metformin 1000mg BID, glipizide   - glargine 32 units bedtime  -[id number] tid w meals  -hospitalist to adjust    HTN  -enalapril -adjusted, s/p syncope w/hypotension yesterday  -bp 80s in chair this am again-will hold Flomax  -hospitalist to follow up    Urinary Retention  -Tamsulosin on hold  - presented with crain catheter  - TOV-voids freely    Pain Management:  - Tylenol q6h  - oxy5mg, 10mg mod and severe pain  - gabapentin 300mg TID    GI/Bowel:  - patient reports regular BMs  - Senna QHS, Miralax Daily    Skin/Pressure Injury:   - At risk for pressure injury due to neurologic diagnosis and relative immobility.  - Skin assessment on admission: stage 1 sacral ulcer, tinea cruris present, scab of 4th digit R foot  - barrier cream for sacral wound  - butenafine daily for jock itch  - Monitor Incisions: LLE residual stump with sutures  - Daily dressing changes  - aquaphor for dry R foot.    Diet:  - Diet Consistency/Modifications: Consistent Carb  - Nutrition consult for assessment and recs    DVT ppx:  - Lovenox daily dvt ppx  ---------------  Code Status/Emergency Contact:    Outpatient Follow-up (Specialty/Name of physician):  Jose Francisco Dean  Vascular Surgery  [address] [place] [address], [address]  [place] [address]  Phone: [phone]  Fax: [phone]  Follow Up Time: 2 weeks  [facility] Endocrinology  Endocrinology  [address] [place] [address]  Phone: [phone]  Fax:   Follow Up Time: 2 weeks    [facility] [place]  Urology  [address] [place] [address]  Phone: [phone]  Follow Up Time: 1 week    Eduin Gutierrez MD  Attending Physician  [facility]  Division of Infectous Diseases  [phone]    Needs podiatry/vascular and optho f/u care.     Endocrine faculty practice.   [address] [address]. Phone .   ELVIS VILLATORO is a 63y with a history of diabetes mellitus type 2, HTN, dyslipidemia, retinopathy who presented to [facility] on 11/29/23  with complaint of non-healing left foot wound and found to have osteomyelitis. Hospital course complicated by hyperglycemia and need for surgical revision. Now admitted to [facility] after for initiation of a multidisciplinary rehab program consisting focused on functional mobility, transfers and ADLs.     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   - s/p revision on 12/4 and formalization 12/12.   Comprehensive Multidisciplinary Rehab Program:  comprehensive rehab program, PT/OT/SLP 3 hours a day, 5 days a week.  - NWB left lower extremity  - Precautions: falls  - completed Unasyn per ID at previous hospital-observe off abx. Afebrile, no leukocytosis     HLD  -Simvastatin    Diabetes mellitus type 2  -ISS  - Home meds: metformin 1000mg BID, glipizide   - glargine 32 units bedtime  -[id number] tid w meals  -hospitalist to adjust    HTN  -enalapril -adjusted, s/p syncope w/hypotension yesterday  -bp 80s in chair this am again-will hold Flomax  -hospitalist to follow up    Urinary Retention  -Tamsulosin on hold  - presented with crain catheter  - TOV-voids freely    Pain Management:  - Tylenol q6h  - oxy5mg, 10mg mod and severe pain  - gabapentin 300mg TID    GI/Bowel:  - patient reports regular BMs  - Senna QHS, Miralax Daily    Skin/Pressure Injury:   - At risk for pressure injury due to neurologic diagnosis and relative immobility.  - Skin assessment on admission: stage 1 sacral ulcer, tinea cruris present, scab of 4th digit R foot  - barrier cream for sacral wound  - butenafine daily for jock itch  - Monitor Incisions: LLE residual stump with sutures  - Daily dressing changes  - aquaphor for dry R foot.    Diet:  - Diet Consistency/Modifications: Consistent Carb  - Nutrition consult for assessment and recs    DVT ppx:  - Lovenox daily dvt ppx  ---------------  Code Status/Emergency Contact:    Outpatient Follow-up (Specialty/Name of physician):  Jose Francisco Dean  Vascular Surgery  [address] [place] [address], [address]  [place] [address]  Phone: [phone]  Fax: [phone]  Follow Up Time: 2 weeks  [facility] Endocrinology  Endocrinology  [address] [place] [address]  Phone: [phone]  Fax:   Follow Up Time: 2 weeks    [facility] [place]  Urology  [address] [place] [address]  Phone: [phone]  Follow Up Time: 1 week    Eduin Gutierrez MD  Attending Physician  [facility]  Division of Infectous Diseases  [phone]    Needs podiatry/vascular and optho f/u care.     Endocrine faculty practice.   [address] [address]. Phone .   ELVIS VILLATORO is a 63y with a history of diabetes mellitus type 2, HTN, dyslipidemia, retinopathy who presented to [facility] on 11/29/23  with complaint of non-healing left foot wound and found to have osteomyelitis. Hospital course complicated by hyperglycemia and need for surgical revision. Now admitted to [facility] after for initiation of a multidisciplinary rehab program consisting focused on functional mobility, transfers and ADLs.     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   - s/p revision on 12/4 and formalization 12/12.   Comprehensive Multidisciplinary Rehab Program:  comprehensive rehab program, PT/OT/SLP 3 hours a day, 5 days a week.  - NWB left lower extremity  - Precautions: falls  - completed Unasyn per ID at previous hospital-observe off abx. Afebrile, no leukocytosis     HLD  -Simvastatin    Diabetes mellitus type 2  -ISS  - Home meds: metformin 1000mg BID, glipizide   - glargine 32 units bedtime  -[id number] tid w meals  -hospitalist to adjust    HTN  -enalapril -adjusted, s/p syncope w/hypotension yesterday  -bp 80s in chair this am again-will hold Flomax  -hospitalist to follow up    Urinary Retention  -Tamsulosin on hold  - presented with crain catheter  - TOV-voids freely    Pain Management:  - Tylenol q6h  - oxy5mg, 10mg mod and severe pain  - gabapentin 300mg TID    GI/Bowel:  - patient reports regular BMs  - Senna QHS, Miralax Daily    Skin/Pressure Injury:   - At risk for pressure injury due to neurologic diagnosis and relative immobility.  - Skin assessment on admission: stage 1 sacral ulcer, tinea cruris present, scab of 4th digit R foot  - barrier cream for sacral wound  - butenafine daily for jock itch  - Monitor Incisions: LLE residual stump with sutures  - Daily dressing changes  - aquaphor for dry R foot.    Diet:  - Diet Consistency/Modifications: Consistent Carb  - Nutrition consult for assessment and recs    DVT ppx:  - Lovenox daily dvt ppx  ---------------  Code Status/Emergency Contact:    Outpatient Follow-up (Specialty/Name of physician):  Jose Francisco Dean  Vascular Surgery  [address] [place] [address], [address]  [place] [address]  Phone: [phone]  Fax: [phone]  Follow Up Time: 2 weeks  [facility] Endocrinology  Endocrinology  [address] [place] [address]  Phone: [phone]  Fax:   Follow Up Time: 2 weeks    [facility] [place]  Urology  [address] [place] [address]  Phone: [phone]  Follow Up Time: 1 week    Eduin Gutierrez MD  Attending Physician  [facility]  Division of Infectous Diseases  [phone]    Needs podiatry/vascular and optho f/u care.     Endocrine faculty practice.   [address] [address]. Phone .   ELVIS VILLATORO is a 63y with a history of diabetes mellitus type 2, HTN, dyslipidemia, retinopathy who presented to [facility] on 11/29/23  with complaint of non-healing left foot wound and found to have osteomyelitis. Hospital course complicated by hyperglycemia and need for surgical revision. Now admitted to [facility] after for initiation of a multidisciplinary rehab program consisting focused on functional mobility, transfers and ADLs.     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   - s/p revision on 12/4 and formalization 12/12.   Comprehensive Multidisciplinary Rehab Program:  comprehensive rehab program, PT/OT/SLP 3 hours a day, 5 days a week.  - NWB left lower extremity  - Precautions: falls  - completed Unasyn per ID at previous hospital-observe off abx. Afebrile, no leukocytosis     HLD  -Simvastatin    Diabetes mellitus type 2  -ISS  - Home meds: metformin 1000mg BID, glipizide   - glargine 32 units bedtime  -[id number] tid w meals  -hospitalist to adjust    HTN  -enalapril -adjusted, s/p syncope w/hypotension yesterday  -bp 80s in chair this am again-will hold Flomax  -hospitalist to follow up    Urinary Retention  -Tamsulosin on hold  - presented with crain catheter  - TOV-voids freely    Pain Management:  - Tylenol q6h  - oxy5mg, 10mg mod and severe pain  - gabapentin 300mg TID    GI/Bowel:  - patient reports regular BMs  - Senna QHS, Miralax Daily    Skin/Pressure Injury:   - At risk for pressure injury due to neurologic diagnosis and relative immobility.  - Skin assessment on admission: stage 1 sacral ulcer, tinea cruris present, scab of 4th digit R foot  - barrier cream for sacral wound  - butenafine daily for jock itch  - Monitor Incisions: LLE residual stump with sutures  - Daily dressing changes  - aquaphor for dry R foot.    Diet:  - Diet Consistency/Modifications: Consistent Carb  - Nutrition consult for assessment and recs    DVT ppx:  - Lovenox daily dvt ppx  ---------------  Code Status/Emergency Contact:    Outpatient Follow-up (Specialty/Name of physician):  Jose Francisco Dean  Vascular Surgery  [address] [place] [address], [address]  [place] [address]  Phone: [phone]  Fax: [phone]  Follow Up Time: 2 weeks  [facility] Endocrinology  Endocrinology  [address] [place] [address]  Phone: [phone]  Fax:   Follow Up Time: 2 weeks    [facility] at [place]  Urology  [address] [place] [address]  Phone: [phone]  Follow Up Time: 1 week    Eduin Gutierrez MD  Attending Physician  [facility]  Division of Infectous Diseases  [phone]    Needs podiatry/vascular and optho f/u care.     Endocrine faculty practice.   [address] [address]. Phone .

## 2023-12-19 NOTE — PROVIDER CONTACT NOTE (HYPOGLYCEMIA EVENT) - NS PROVIDER CONTACT BACKGROUND-HYPO
Age: 63y    Gender: Male    POCT Blood Glucose:  126 mg/dL (12-19-23 @ 18:20)  93 mg/dL (12-19-23 @ 18:03)  79 mg/dL (12-19-23 @ 17:40)  62 mg/dL (12-19-23 @ 17:09)  62 mg/dL (12-19-23 @ 17:07)  195 mg/dL (12-19-23 @ 11:25)  153 mg/dL (12-19-23 @ 08:00)  180 mg/dL (12-18-23 @ 22:26)      eMAR:  insulin glargine Injectable (LANTUS)   32 Unit(s) SubCutaneous (12-18-23 @ 22:28)    insulin lispro (ADMELOG) corrective regimen sliding scale   2 Unit(s) SubCutaneous (12-19-23 @ 12:19)   2 Unit(s) SubCutaneous (12-19-23 @ 08:22)    insulin lispro Injectable (ADMELOG)   16 Unit(s) SubCutaneous (12-19-23 @ 12:19)   16 Unit(s) SubCutaneous (12-19-23 @ 08:22)    metFORMIN   1000 milliGRAM(s) Oral (12-19-23 @ 18:25)   1000 milliGRAM(s) Oral (12-19-23 @ 08:23)    simvastatin   40 milliGRAM(s) Oral (12-18-23 @ 22:28)

## 2023-12-19 NOTE — PROGRESS NOTE ADULT - SUBJECTIVE AND OBJECTIVE BOX
HPI:  Mr. Mcdaniel is a 63M PMHx DM, HLD, PAD, prior toe amputation, presents to [facility] ED w L foot wound. Reports wound has been present on hindfoot x 3 weeks. Started as small cut. Over last 24 hours, pt unable to ambulate d/t severe pain. Reports significant malodor. Denies subjective fevers, chills, purulent drainage, numbness/paresthesia.     In ED, patient is tachycardic to 123. BP w HTN. Febrile to 102.4 F. Labs w WBC 21. Hgb 11.4. Lac 2.4. XR L foot w soft tissue gas at lateral hindfoot. CT LLE non con w soft tissue gas tracking ~7cm above foot.   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 Patient admitted to the SICU pre and post op for hyperglycemia and insulin drip management. Endocrinology was consulted for the hyperglycemia.  Patient transferred out of the SICU when he was able to be weaned off the insulin drip. Internal medicine was consulted for malachi-operative clearance for formalization surgery. On 12/4 the patient was taken back to the operating room for a left lower extremity guillotine revision. Infectious disease was consulted for antibiotic management and recommended switching from Zosyn to Unasyn. Rehab medicine was consulted to evaluate for rehab needs and recommended acute rehab on discharge.      MEDICATIONS  (STANDING):  acetaminophen     Tablet .. 975 milliGRAM(s) Oral every 6 hours  AQUAPHOR (petrolatum Ointment) 1 Application(s) Topical two times a day  clotrimazole 1% Cream 1 Application(s) Topical two times a day  dextrose 5%. 1000 milliLiter(s) (100 mL/Hr) IV Continuous <Continuous>  dextrose 5%. 1000 milliLiter(s) (50 mL/Hr) IV Continuous <Continuous>  dextrose 50% Injectable 25 Gram(s) IV Push once  dextrose 50% Injectable 25 Gram(s) IV Push once  dextrose 50% Injectable 12.5 Gram(s) IV Push once  enalapril 5 milliGRAM(s) Oral <User Schedule>  enoxaparin Injectable 40 milliGRAM(s) SubCutaneous every 24 hours  gabapentin 300 milliGRAM(s) Oral three times a day  glucagon  Injectable 1 milliGRAM(s) IntraMuscular once  insulin glargine Injectable (LANTUS) 32 Unit(s) SubCutaneous at bedtime  insulin lispro (ADMELOG) corrective regimen sliding scale   SubCutaneous at bedtime  insulin lispro (ADMELOG) corrective regimen sliding scale   SubCutaneous three times a day before meals  insulin lispro Injectable (ADMELOG) 16 Unit(s) SubCutaneous three times a day before meals  magnesium oxide 400 milliGRAM(s) Oral three times a day with meals  metFORMIN 1000 milliGRAM(s) Oral two times a day  polyethylene glycol 3350 17 Gram(s) Oral two times a day  senna 2 Tablet(s) Oral at bedtime  simvastatin 40 milliGRAM(s) Oral at bedtime  sodium chloride 0.9%. 1000 milliLiter(s) (100 mL/Hr) IV Continuous <Continuous>    MEDICATIONS  (PRN):  dextrose Oral Gel 15 Gram(s) Oral once PRN Blood Glucose LESS THAN 70 milliGRAM(s)/deciliter  guaiFENesin Oral Liquid (Sugar-Free) 100 milliGRAM(s) Oral every 6 hours PRN Cough  oxyCODONE    IR 10 milliGRAM(s) Oral every 6 hours PRN Severe Pain (7 - 10)  oxyCODONE    IR 5 milliGRAM(s) Oral every 6 hours PRN Moderate Pain (4 - 6)                            10.1   9.82  )-----------( 430      ( 18 Dec 2023 06:00 )             31.9     12-18    137  |  101  |  15  ----------------------------<  233<H>  5.0   |  29  |  0.86    Ca    9.2      18 Dec 2023 06:00  Phos  3.2     12-18  Mg     1.6     12-19    TPro  7.5  /  Alb  1.9<L>  /  TBili  0.3  /  DBili  x   /  AST  36  /  ALT  35  /  AlkPhos  146<H>  12-18    Urinalysis Basic - ( 18 Dec 2023 06:00 )    Color: x / Appearance: x / SG: x / pH: x  Gluc: 233 mg/dL / Ketone: x  / Bili: x / Urobili: x   Blood: x / Protein: x / Nitrite: x   Leuk Esterase: x / RBC: x / WBC x   Sq Epi: x / Non Sq Epi: x / Bacteria: x        CAPILLARY BLOOD GLUCOSE      POCT Blood Glucose.: 153 mg/dL (19 Dec 2023 08:00)  POCT Blood Glucose.: 180 mg/dL (18 Dec 2023 22:26)  POCT Blood Glucose.: 139 mg/dL (18 Dec 2023 17:18)  POCT Blood Glucose.: 204 mg/dL (18 Dec 2023 11:46)      Vital Signs Last 24 Hrs  T(C): 37.2 (19 Dec 2023 07:34), Max: 37.2 (19 Dec 2023 07:34)  T(F): 99 (19 Dec 2023 07:34), Max: 99 (19 Dec 2023 07:34)  HR: 94 (19 Dec 2023 09:39) (71 - 94)  BP: 110/64 (19 Dec 2023 09:39) (83/50 - 172/77)  BP(mean): --  RR: 16 (19 Dec 2023 07:34) (16 - 16)  SpO2: 93% (19 Dec 2023 07:34) (93% - 93%)    Parameters below as of 19 Dec 2023 07:34  Patient On (Oxygen Delivery Method): room air                Continue comprehensive acute rehab program consisting of 3hrs/day of OT/PT and SLP. HPI:  Mr. Mcdaniel is a 63M PMHx DM, HLD, PAD, prior toe amputation, presents to [facility] ED w L foot wound. Reports wound has been present on hindfoot x 3 weeks. Started as small cut. Over last 24 hours, pt unable to ambulate d/t severe pain. Reports significant malodor. Denies subjective fevers, chills, purulent drainage, numbness/paresthesia.     In ED, patient is tachycardic to 123. BP w HTN. Febrile to 102.4 F. Labs w WBC 21. Hgb 11.4. Lac 2.4. XR L foot w soft tissue gas at lateral hindfoot. CT LLE non con w soft tissue gas tracking ~7cm above foot.   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 Patient admitted to the SICU pre and post op for hyperglycemia and insulin drip management. Endocrinology was consulted for the hyperglycemia.  Patient transferred out of the SICU when he was able to be weaned off the insulin drip. Internal medicine was consulted for malachi-operative clearance for formalization surgery. On 12/4 the patient was taken back to the operating room for a left lower extremity guillotine revision. Infectious disease was consulted for antibiotic management and recommended switching from Zosyn to Unasyn. Rehab medicine was consulted to evaluate for rehab needs and recommended acute rehab on discharge.      MEDICATIONS  (STANDING):  acetaminophen     Tablet .. 975 milliGRAM(s) Oral every 6 hours  AQUAPHOR (petrolatum Ointment) 1 Application(s) Topical two times a day  clotrimazole 1% Cream 1 Application(s) Topical two times a day  dextrose 5%. 1000 milliLiter(s) (100 mL/Hr) IV Continuous <Continuous>  dextrose 5%. 1000 milliLiter(s) (50 mL/Hr) IV Continuous <Continuous>  dextrose 50% Injectable 25 Gram(s) IV Push once  dextrose 50% Injectable 25 Gram(s) IV Push once  dextrose 50% Injectable 12.5 Gram(s) IV Push once  enalapril 5 milliGRAM(s) Oral <User Schedule>  enoxaparin Injectable 40 milliGRAM(s) SubCutaneous every 24 hours  gabapentin 300 milliGRAM(s) Oral three times a day  glucagon  Injectable 1 milliGRAM(s) IntraMuscular once  insulin glargine Injectable (LANTUS) 32 Unit(s) SubCutaneous at bedtime  insulin lispro (ADMELOG) corrective regimen sliding scale   SubCutaneous at bedtime  insulin lispro (ADMELOG) corrective regimen sliding scale   SubCutaneous three times a day before meals  insulin lispro Injectable (ADMELOG) 16 Unit(s) SubCutaneous three times a day before meals  magnesium oxide 400 milliGRAM(s) Oral three times a day with meals  metFORMIN 1000 milliGRAM(s) Oral two times a day  polyethylene glycol 3350 17 Gram(s) Oral two times a day  senna 2 Tablet(s) Oral at bedtime  simvastatin 40 milliGRAM(s) Oral at bedtime  sodium chloride 0.9%. 1000 milliLiter(s) (100 mL/Hr) IV Continuous <Continuous>    MEDICATIONS  (PRN):  dextrose Oral Gel 15 Gram(s) Oral once PRN Blood Glucose LESS THAN 70 milliGRAM(s)/deciliter  guaiFENesin Oral Liquid (Sugar-Free) 100 milliGRAM(s) Oral every 6 hours PRN Cough  oxyCODONE    IR 10 milliGRAM(s) Oral every 6 hours PRN Severe Pain (7 - 10)  oxyCODONE    IR 5 milliGRAM(s) Oral every 6 hours PRN Moderate Pain (4 - 6)                            10.1   9.82  )-----------( 430      ( 18 Dec 2023 06:00 )             31.9     12-18    137  |  101  |  15  ----------------------------<  233<H>  5.0   |  29  |  0.86    Ca    9.2      18 Dec 2023 06:00  Phos  3.2     12-18  Mg     1.6     12-19    TPro  7.5  /  Alb  1.9<L>  /  TBili  0.3  /  DBili  x   /  AST  36  /  ALT  35  /  AlkPhos  146<H>  12-18    Urinalysis Basic - ( 18 Dec 2023 06:00 )    Color: x / Appearance: x / SG: x / pH: x  Gluc: 233 mg/dL / Ketone: x  / Bili: x / Urobili: x   Blood: x / Protein: x / Nitrite: x   Leuk Esterase: x / RBC: x / WBC x   Sq Epi: x / Non Sq Epi: x / Bacteria: x        CAPILLARY BLOOD GLUCOSE      POCT Blood Glucose.: 153 mg/dL (19 Dec 2023 08:00)  POCT Blood Glucose.: 180 mg/dL (18 Dec 2023 22:26)  POCT Blood Glucose.: 139 mg/dL (18 Dec 2023 17:18)  POCT Blood Glucose.: 204 mg/dL (18 Dec 2023 11:46)      Vital Signs Last 24 Hrs  T(C): 37.2 (19 Dec 2023 07:34), Max: 37.2 (19 Dec 2023 07:34)  T(F): 99 (19 Dec 2023 07:34), Max: 99 (19 Dec 2023 07:34)  HR: 94 (19 Dec 2023 09:39) (71 - 94)  BP: 110/64 (19 Dec 2023 09:39) (83/50 - 172/77)  BP(mean): --  RR: 16 (19 Dec 2023 07:34) (16 - 16)  SpO2: 93% (19 Dec 2023 07:34) (93% - 93%)    Parameters below as of 19 Dec 2023 07:34  Patient On (Oxygen Delivery Method): room air                Continue comprehensive acute rehab program consisting of 3hrs/day of OT/PT and SLP. HPI:  Mr. Mcdaniel is a 63M PMHx DM, HLD, PAD, prior toe amputation, presents to [facility] ED w L foot wound. Reports wound has been present on hindfoot x 3 weeks. Started as small cut. Over last 24 hours, pt unable to ambulate d/t severe pain. Reports significant malodor. Denies subjective fevers, chills, purulent drainage, numbness/paresthesia.     In ED, patient is tachycardic to 123. BP w HTN. Febrile to 102.4 F. Labs w WBC 21. Hgb 11.4. Lac 2.4. XR L foot w soft tissue gas at lateral hindfoot. CT LLE non con w soft tissue gas tracking ~7cm above foot.       Patient taken to the OR for a left guillotine below the knee amputation on admission. Patient admitted to the SICU pre and post op for hyperglycemia and insulin drip management. Endocrinology was consulted for the hyperglycemia.  Patient transferred out of the SICU when he was able to be weaned off the insulin drip. Internal medicine was consulted for malachi-operative clearance for formalization surgery. On 12/4 the patient was taken back to the operating room for a left lower extremity guillotine revision. Infectious disease was consulted for antibiotic management and recommended switching from Zosyn to Unasyn. Rehab medicine was consulted to evaluate for rehab needs and recommended acute rehab on discharge.        Pt seen and examined at bedside.  No acute events overnight.  s/p RRT yesterday, due to unresponsiveness. Today pt was transferred to chair form bed when he became pale, sweaty, bp 80s reported in chair, recovered supine.   Pt denies cp, palpitations, sob, abd pain, N/V, fever, chills.   BP regimen adjusted by hospitalist yesterday.  pain well controlled-Tylenol this am only  TOV- crain removed-voids freely. Will hold Flomax due to orthostatic bp, use teds and binder-hospitalist to follow up      MEDICATIONS  (STANDING):  acetaminophen     Tablet .. 975 milliGRAM(s) Oral every 6 hours  AQUAPHOR (petrolatum Ointment) 1 Application(s) Topical two times a day  clotrimazole 1% Cream 1 Application(s) Topical two times a day  dextrose 5%. 1000 milliLiter(s) (100 mL/Hr) IV Continuous <Continuous>  dextrose 5%. 1000 milliLiter(s) (50 mL/Hr) IV Continuous <Continuous>  dextrose 50% Injectable 25 Gram(s) IV Push once  dextrose 50% Injectable 25 Gram(s) IV Push once  dextrose 50% Injectable 12.5 Gram(s) IV Push once  enalapril 5 milliGRAM(s) Oral <User Schedule>  enoxaparin Injectable 40 milliGRAM(s) SubCutaneous every 24 hours  gabapentin 300 milliGRAM(s) Oral three times a day  glucagon  Injectable 1 milliGRAM(s) IntraMuscular once  insulin glargine Injectable (LANTUS) 32 Unit(s) SubCutaneous at bedtime  insulin lispro (ADMELOG) corrective regimen sliding scale   SubCutaneous at bedtime  insulin lispro (ADMELOG) corrective regimen sliding scale   SubCutaneous three times a day before meals  insulin lispro Injectable (ADMELOG) 16 Unit(s) SubCutaneous three times a day before meals  magnesium oxide 400 milliGRAM(s) Oral three times a day with meals  metFORMIN 1000 milliGRAM(s) Oral two times a day  polyethylene glycol 3350 17 Gram(s) Oral two times a day  senna 2 Tablet(s) Oral at bedtime  simvastatin 40 milliGRAM(s) Oral at bedtime  sodium chloride 0.9%. 1000 milliLiter(s) (100 mL/Hr) IV Continuous <Continuous>    MEDICATIONS  (PRN):  dextrose Oral Gel 15 Gram(s) Oral once PRN Blood Glucose LESS THAN 70 milliGRAM(s)/deciliter  guaiFENesin Oral Liquid (Sugar-Free) 100 milliGRAM(s) Oral every 6 hours PRN Cough  oxyCODONE    IR 10 milliGRAM(s) Oral every 6 hours PRN Severe Pain (7 - 10)  oxyCODONE    IR 5 milliGRAM(s) Oral every 6 hours PRN Moderate Pain (4 - 6)                            10.1   9.82  )-----------( 430      ( 18 Dec 2023 06:00 )             31.9     12-18    137  |  101  |  15  ----------------------------<  233<H>  5.0   |  29  |  0.86    Ca    9.2      18 Dec 2023 06:00  Phos  3.2     12-18  Mg     1.6     12-19    TPro  7.5  /  Alb  1.9<L>  /  TBili  0.3  /  DBili  x   /  AST  36  /  ALT  35  /  AlkPhos  146<H>  12-18    Urinalysis Basic - ( 18 Dec 2023 06:00 )    Color: x / Appearance: x / SG: x / pH: x  Gluc: 233 mg/dL / Ketone: x  / Bili: x / Urobili: x   Blood: x / Protein: x / Nitrite: x   Leuk Esterase: x / RBC: x / WBC x   Sq Epi: x / Non Sq Epi: x / Bacteria: x        CAPILLARY BLOOD GLUCOSE      POCT Blood Glucose.: 153 mg/dL (19 Dec 2023 08:00)  POCT Blood Glucose.: 180 mg/dL (18 Dec 2023 22:26)  POCT Blood Glucose.: 139 mg/dL (18 Dec 2023 17:18)  POCT Blood Glucose.: 204 mg/dL (18 Dec 2023 11:46)      Vital Signs Last 24 Hrs  T(C): 37.2 (19 Dec 2023 07:34), Max: 37.2 (19 Dec 2023 07:34)  T(F): 99 (19 Dec 2023 07:34), Max: 99 (19 Dec 2023 07:34)  HR: 94 (19 Dec 2023 09:39) (71 - 94)  BP: 110/64 (19 Dec 2023 09:39) (83/50 - 172/77)  BP(mean): --  RR: 16 (19 Dec 2023 07:34) (16 - 16)  SpO2: 93% (19 Dec 2023 07:34) (93% - 93%)    Parameters below as of 19 Dec 2023 07:34  Patient On (Oxygen Delivery Method): room air    Physical Exam: Gen - NAD in bed this am  HEENT - NCAT, EOMI  Neck - Supple, No limited ROM  Pulm - CTAB, No wheeze, No rhonchi, No crackles  Cardiovascular - RRR, S1S2, No murmurs  Abdomen - Soft, NT/ND, +BS  Extremities - LLE with recent BKA.   Uro - crain present for retention  Neuro-     Cognitive - AAOx3     Communication - Fluent, No dysarthria     Attention: Intact      Judgement: Good evidence of judgement     Memory: Recall 3 objects immediate and 3 min later         Cranial Nerves - CN 2-12 intact     Motor -                     LEFT    UE - ShAB 5/5, EF 5/5, EE 5/5, WE 5/5,  5/5                    RIGHT UE - ShAB 5/5, EF 5/5, EE 5/5, WE 5/5,  5/5                    LEFT    LE - HF 5/5, KE 5/5                    RIGHT LE - HF 5/5, KE 5/5, DF 5/5, PF 5/5        Sensory - Intact to LT     Reflexes - DTR Intact, No primitive reflexive     Coordination - FTN intact     Tone - normal  Psychiatric - Mood stable, Affect WNL  Skin:  incision site from BKA on the L residual limb. No drainage. C/d/i. skin well approximated and staples present. Stage 1 sacral decubitis ulcer present. Fungal rash of the bilateral inguinal creases  Wounds: Patient with scab of 4th digit of RLE.      Continue comprehensive acute rehab program consisting of 3hrs/day of OT/PT HPI:  Mr. Mcdaniel is a 63M PMHx DM, HLD, PAD, prior toe amputation, presents to [facility] ED w L foot wound. Reports wound has been present on hindfoot x 3 weeks. Started as small cut. Over last 24 hours, pt unable to ambulate d/t severe pain. Reports significant malodor. Denies subjective fevers, chills, purulent drainage, numbness/paresthesia.     In ED, patient is tachycardic to 123. BP w HTN. Febrile to 102.4 F. Labs w WBC 21. Hgb 11.4. Lac 2.4. XR L foot w soft tissue gas at lateral hindfoot. CT LLE non con w soft tissue gas tracking ~7cm above foot.       Patient taken to the OR for a left guillotine below the knee amputation on admission. Patient admitted to the SICU pre and post op for hyperglycemia and insulin drip management. Endocrinology was consulted for the hyperglycemia.  Patient transferred out of the SICU when he was able to be weaned off the insulin drip. Internal medicine was consulted for malachi-operative clearance for formalization surgery. On 12/4 the patient was taken back to the operating room for a left lower extremity guillotine revision. Infectious disease was consulted for antibiotic management and recommended switching from Zosyn to Unasyn. Rehab medicine was consulted to evaluate for rehab needs and recommended acute rehab on discharge.        Pt seen and examined at bedside.  No acute events overnight.  s/p RRT yesterday, due to unresponsiveness. Today pt was transferred to chair form bed when he became pale, sweaty, bp 80s reported in chair, recovered supine.   Pt denies cp, palpitations, sob, abd pain, N/V, fever, chills.   BP regimen adjusted by hospitalist yesterday.  pain well controlled-Tylenol this am only  TOV- crain removed-voids freely. Will hold Flomax due to orthostatic bp, use teds and binder-hospitalist to follow up      MEDICATIONS  (STANDING):  acetaminophen     Tablet .. 975 milliGRAM(s) Oral every 6 hours  AQUAPHOR (petrolatum Ointment) 1 Application(s) Topical two times a day  clotrimazole 1% Cream 1 Application(s) Topical two times a day  dextrose 5%. 1000 milliLiter(s) (100 mL/Hr) IV Continuous <Continuous>  dextrose 5%. 1000 milliLiter(s) (50 mL/Hr) IV Continuous <Continuous>  dextrose 50% Injectable 25 Gram(s) IV Push once  dextrose 50% Injectable 25 Gram(s) IV Push once  dextrose 50% Injectable 12.5 Gram(s) IV Push once  enalapril 5 milliGRAM(s) Oral <User Schedule>  enoxaparin Injectable 40 milliGRAM(s) SubCutaneous every 24 hours  gabapentin 300 milliGRAM(s) Oral three times a day  glucagon  Injectable 1 milliGRAM(s) IntraMuscular once  insulin glargine Injectable (LANTUS) 32 Unit(s) SubCutaneous at bedtime  insulin lispro (ADMELOG) corrective regimen sliding scale   SubCutaneous at bedtime  insulin lispro (ADMELOG) corrective regimen sliding scale   SubCutaneous three times a day before meals  insulin lispro Injectable (ADMELOG) 16 Unit(s) SubCutaneous three times a day before meals  magnesium oxide 400 milliGRAM(s) Oral three times a day with meals  metFORMIN 1000 milliGRAM(s) Oral two times a day  polyethylene glycol 3350 17 Gram(s) Oral two times a day  senna 2 Tablet(s) Oral at bedtime  simvastatin 40 milliGRAM(s) Oral at bedtime  sodium chloride 0.9%. 1000 milliLiter(s) (100 mL/Hr) IV Continuous <Continuous>    MEDICATIONS  (PRN):  dextrose Oral Gel 15 Gram(s) Oral once PRN Blood Glucose LESS THAN 70 milliGRAM(s)/deciliter  guaiFENesin Oral Liquid (Sugar-Free) 100 milliGRAM(s) Oral every 6 hours PRN Cough  oxyCODONE    IR 10 milliGRAM(s) Oral every 6 hours PRN Severe Pain (7 - 10)  oxyCODONE    IR 5 milliGRAM(s) Oral every 6 hours PRN Moderate Pain (4 - 6)                            10.1   9.82  )-----------( 430      ( 18 Dec 2023 06:00 )             31.9     12-18    137  |  101  |  15  ----------------------------<  233<H>  5.0   |  29  |  0.86    Ca    9.2      18 Dec 2023 06:00  Phos  3.2     12-18  Mg     1.6     12-19    TPro  7.5  /  Alb  1.9<L>  /  TBili  0.3  /  DBili  x   /  AST  36  /  ALT  35  /  AlkPhos  146<H>  12-18    Urinalysis Basic - ( 18 Dec 2023 06:00 )    Color: x / Appearance: x / SG: x / pH: x  Gluc: 233 mg/dL / Ketone: x  / Bili: x / Urobili: x   Blood: x / Protein: x / Nitrite: x   Leuk Esterase: x / RBC: x / WBC x   Sq Epi: x / Non Sq Epi: x / Bacteria: x        CAPILLARY BLOOD GLUCOSE      POCT Blood Glucose.: 153 mg/dL (19 Dec 2023 08:00)  POCT Blood Glucose.: 180 mg/dL (18 Dec 2023 22:26)  POCT Blood Glucose.: 139 mg/dL (18 Dec 2023 17:18)  POCT Blood Glucose.: 204 mg/dL (18 Dec 2023 11:46)      Vital Signs Last 24 Hrs  T(C): 37.2 (19 Dec 2023 07:34), Max: 37.2 (19 Dec 2023 07:34)  T(F): 99 (19 Dec 2023 07:34), Max: 99 (19 Dec 2023 07:34)  HR: 94 (19 Dec 2023 09:39) (71 - 94)  BP: 110/64 (19 Dec 2023 09:39) (83/50 - 172/77)  BP(mean): --  RR: 16 (19 Dec 2023 07:34) (16 - 16)  SpO2: 93% (19 Dec 2023 07:34) (93% - 93%)    Parameters below as of 19 Dec 2023 07:34  Patient On (Oxygen Delivery Method): room air    Physical Exam: Gen - NAD in bed this am  HEENT - NCAT, EOMI  Neck - Supple, No limited ROM  Pulm - CTAB, No wheeze, No rhonchi, No crackles  Cardiovascular - RRR, S1S2, No murmurs  Abdomen - Soft, NT/ND, +BS  Extremities - LLE with recent BKA.   Uro - crain present for retention  Neuro-     Cognitive - AAOx3     Communication - Fluent, No dysarthria     Attention: Intact      Judgement: Good evidence of judgement     Memory: Recall 3 objects immediate and 3 min later         Cranial Nerves - CN 2-12 intact     Motor -                     LEFT    UE - ShAB 5/5, EF 5/5, EE 5/5, WE 5/5,  5/5                    RIGHT UE - ShAB 5/5, EF 5/5, EE 5/5, WE 5/5,  5/5                    LEFT    LE - HF 5/5, KE 5/5                    RIGHT LE - HF 5/5, KE 5/5, DF 5/5, PF 5/5        Sensory - Intact to LT     Reflexes - DTR Intact, No primitive reflexive     Coordination - FTN intact     Tone - normal  Psychiatric - Mood stable, Affect WNL  Skin:  incision site from BKA on the L residual limb. No drainage. C/d/i. skin well approximated and staples present. Stage 1 sacral decubitis ulcer present. Fungal rash of the bilateral inguinal creases  Wounds: Patient with scab of 4th digit of RLE.      Continue comprehensive acute rehab program consisting of 3hrs/day of OT/PT

## 2023-12-19 NOTE — PROGRESS NOTE ADULT - SUBJECTIVE AND OBJECTIVE BOX
62yo male with hx of DM2, HLD, PAD, prior toe amputation, presented to [facility] for rehab from [facility] where he p/w L foot wound, noted to have sepsis secondary to acute infected wound. 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 Postop continued IV Abx, evaluated by Endocrine for glycemic control. Pt was taken back to the OR for L BKA revision.    Pt seen and examined at bedside.  No acute events overnight.  no overnight issues,   Pt denies cp, palpitations, sob, abd pain, N/V, fever, chills    Vital Signs Last 24 Hrs  T(C): 37.2 (19 Dec 2023 07:34), Max: 37.2 (19 Dec 2023 07:34)  T(F): 99 (19 Dec 2023 07:34), Max: 99 (19 Dec 2023 07:34)  HR: 97 (19 Dec 2023 12:35) (71 - 97)  BP: 173/82 (19 Dec 2023 12:35) (83/50 - 173/82)  BP(mean): --  RR: 16 (19 Dec 2023 07:34) (16 - 16)  SpO2: 93% (19 Dec 2023 07:34) (93% - 93%)    Parameters below as of 19 Dec 2023 07:34  Patient On (Oxygen Delivery Method): room air      GENERAL- NAD  EAR/NOSE/MOUTH/THROAT -   MMM  EYES- ALEKSANDR, conjunctiva and Sclera clear  NECK- supple  RESPIRATORY-  clear to auscultation bilaterally, non laboured breathing  CARDIOVASCULAR - SIS2, RRR  GI - soft NT BS present  EXTREMITIES-  L BKA  NEUROLOGY- no gross focal deficits  PSYCHIATRY- AAO X 3                   x                    x    | x    | x            x     >-----------< x       ------------------------< x                     x                    x    | x    | x                                            Ca x     Mg 1.6   Ph x          POCT Blood Glucose.: 195 mg/dL (19 Dec 2023 11:25)  POCT Blood Glucose.: 153 mg/dL (19 Dec 2023 08:00)  POCT Blood Glucose.: 180 mg/dL (18 Dec 2023 22:26)  POCT Blood Glucose.: 139 mg/dL (18 Dec 2023 17:18)    Labs reviewed:     CXR personally reviewed:     < from: Xray Chest 1 View-PORTABLE IMMEDIATE (Xray Chest 1 View-PORTABLE IMMEDIATE .) (12.18.23 @ 10:37) >    IMPRESSION:  Clear lungs.    < end of copied text >      ECG reviewed and interpreted: < from: 12 Lead ECG (12.18.23 @ 09:36) >    Ventricular Rate 88 BPM    Atrial Rate 88 BPM    P-R Interval 138 ms    QRS Duration 96 ms    Q-T Interval 396 ms    QTC Calculation(Bazett) 479 ms    P Axis 58 degrees    R Axis -26 degrees    T Axis 19 degrees    Diagnosis Line Normal sinus rhythm  Incomplete right bundle branch block  Left axis deviation  No previous ECGs available    < end of copied text >    < from: TTE Echo Complete w/o Contrast w/ Doppler (12.18.23 @ 10:48) >  Summary:   1. Normal global left ventricular systolic function.   2. Left ventricular ejection fraction, by visual estimation, is 65 to   70%.   3. Normal left ventricular internal cavity size.   4. The right ventricle is not well visualized, appears normal in size   with normal systolic function.   5. The left atrium is normal in size.   6. The right atriumis normal in size.   7. Mild thickening and calcification of the anterior and posterior   mitral valve leaflets.   8. Moderate posterior mitral annular calcification.   9. Mild mitral valve regurgitation.  10. The aortic valve is not well visualized,appears to have   calcification. No aortic valve stenosis.  11. There is no evidence of pericardial effusion.    < end of copied text >    MEDICATIONS  (STANDING):  acetaminophen     Tablet .. 975 milliGRAM(s) Oral every 6 hours  AQUAPHOR (petrolatum Ointment) 1 Application(s) Topical two times a day  clotrimazole 1% Cream 1 Application(s) Topical two times a day  enalapril 5 milliGRAM(s) Oral <User Schedule>  enoxaparin Injectable 40 milliGRAM(s) SubCutaneous every 24 hours  gabapentin 300 milliGRAM(s) Oral three times a day  glucagon  Injectable 1 milliGRAM(s) IntraMuscular once  insulin glargine Injectable (LANTUS) 32 Unit(s) SubCutaneous at bedtime  insulin lispro (ADMELOG) corrective regimen sliding scale   SubCutaneous at bedtime  insulin lispro (ADMELOG) corrective regimen sliding scale   SubCutaneous three times a day before meals  insulin lispro Injectable (ADMELOG) 16 Unit(s) SubCutaneous three times a day before meals  magnesium oxide 400 milliGRAM(s) Oral three times a day with meals  metFORMIN 1000 milliGRAM(s) Oral two times a day  polyethylene glycol 3350 17 Gram(s) Oral two times a day  senna 2 Tablet(s) Oral at bedtime  simvastatin 40 milliGRAM(s) Oral at bedtime  sodium chloride 0.9%. 1000 milliLiter(s) (100 mL/Hr) IV Continuous <Continuous>    MEDICATIONS  (PRN):  dextrose Oral Gel 15 Gram(s) Oral once PRN Blood Glucose LESS THAN 70 milliGRAM(s)/deciliter  guaiFENesin Oral Liquid (Sugar-Free) 100 milliGRAM(s) Oral every 6 hours PRN Cough  oxyCODONE    IR 5 milliGRAM(s) Oral every 6 hours PRN Moderate Pain (4 - 6)  oxyCODONE    IR 10 milliGRAM(s) Oral every 6 hours PRN Severe Pain (7 - 10)   62yo male with hx of DM2, HLD, PAD, prior toe amputation, presented to [facility] for rehab from [facility] where he p/w L foot wound, noted to have sepsis secondary to acute infected wound. 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 Postop continued IV Abx, evaluated by Endocrine for glycemic control. Pt was taken back to the OR for L BKA revision.    Pt seen and examined at bedside.  No acute events overnight.  no overnight issues,   Pt denies cp, palpitations, sob, abd pain, N/V, fever, chills    Vital Signs Last 24 Hrs  T(C): 37.2 (19 Dec 2023 07:34), Max: 37.2 (19 Dec 2023 07:34)  T(F): 99 (19 Dec 2023 07:34), Max: 99 (19 Dec 2023 07:34)  HR: 97 (19 Dec 2023 12:35) (71 - 97)  BP: 173/82 (19 Dec 2023 12:35) (83/50 - 173/82)  BP(mean): --  RR: 16 (19 Dec 2023 07:34) (16 - 16)  SpO2: 93% (19 Dec 2023 07:34) (93% - 93%)    Parameters below as of 19 Dec 2023 07:34  Patient On (Oxygen Delivery Method): room air      GENERAL- NAD  EAR/NOSE/MOUTH/THROAT -   MMM  EYES- ALEKSANDR, conjunctiva and Sclera clear  NECK- supple  RESPIRATORY-  clear to auscultation bilaterally, non laboured breathing  CARDIOVASCULAR - SIS2, RRR  GI - soft NT BS present  EXTREMITIES-  L BKA  NEUROLOGY- no gross focal deficits  PSYCHIATRY- AAO X 3                   x                    x    | x    | x            x     >-----------< x       ------------------------< x                     x                    x    | x    | x                                            Ca x     Mg 1.6   Ph x          POCT Blood Glucose.: 195 mg/dL (19 Dec 2023 11:25)  POCT Blood Glucose.: 153 mg/dL (19 Dec 2023 08:00)  POCT Blood Glucose.: 180 mg/dL (18 Dec 2023 22:26)  POCT Blood Glucose.: 139 mg/dL (18 Dec 2023 17:18)    Labs reviewed:     CXR personally reviewed:     < from: Xray Chest 1 View-PORTABLE IMMEDIATE (Xray Chest 1 View-PORTABLE IMMEDIATE .) (12.18.23 @ 10:37) >    IMPRESSION:  Clear lungs.    < end of copied text >      ECG reviewed and interpreted: < from: 12 Lead ECG (12.18.23 @ 09:36) >    Ventricular Rate 88 BPM    Atrial Rate 88 BPM    P-R Interval 138 ms    QRS Duration 96 ms    Q-T Interval 396 ms    QTC Calculation(Bazett) 479 ms    P Axis 58 degrees    R Axis -26 degrees    T Axis 19 degrees    Diagnosis Line Normal sinus rhythm  Incomplete right bundle branch block  Left axis deviation  No previous ECGs available    < end of copied text >    < from: TTE Echo Complete w/o Contrast w/ Doppler (12.18.23 @ 10:48) >  Summary:   1. Normal global left ventricular systolic function.   2. Left ventricular ejection fraction, by visual estimation, is 65 to   70%.   3. Normal left ventricular internal cavity size.   4. The right ventricle is not well visualized, appears normal in size   with normal systolic function.   5. The left atrium is normal in size.   6. The right atriumis normal in size.   7. Mild thickening and calcification of the anterior and posterior   mitral valve leaflets.   8. Moderate posterior mitral annular calcification.   9. Mild mitral valve regurgitation.  10. The aortic valve is not well visualized,appears to have   calcification. No aortic valve stenosis.  11. There is no evidence of pericardial effusion.    < end of copied text >    MEDICATIONS  (STANDING):  acetaminophen     Tablet .. 975 milliGRAM(s) Oral every 6 hours  AQUAPHOR (petrolatum Ointment) 1 Application(s) Topical two times a day  clotrimazole 1% Cream 1 Application(s) Topical two times a day  enalapril 5 milliGRAM(s) Oral <User Schedule>  enoxaparin Injectable 40 milliGRAM(s) SubCutaneous every 24 hours  gabapentin 300 milliGRAM(s) Oral three times a day  glucagon  Injectable 1 milliGRAM(s) IntraMuscular once  insulin glargine Injectable (LANTUS) 32 Unit(s) SubCutaneous at bedtime  insulin lispro (ADMELOG) corrective regimen sliding scale   SubCutaneous at bedtime  insulin lispro (ADMELOG) corrective regimen sliding scale   SubCutaneous three times a day before meals  insulin lispro Injectable (ADMELOG) 16 Unit(s) SubCutaneous three times a day before meals  magnesium oxide 400 milliGRAM(s) Oral three times a day with meals  metFORMIN 1000 milliGRAM(s) Oral two times a day  polyethylene glycol 3350 17 Gram(s) Oral two times a day  senna 2 Tablet(s) Oral at bedtime  simvastatin 40 milliGRAM(s) Oral at bedtime  sodium chloride 0.9%. 1000 milliLiter(s) (100 mL/Hr) IV Continuous <Continuous>    MEDICATIONS  (PRN):  dextrose Oral Gel 15 Gram(s) Oral once PRN Blood Glucose LESS THAN 70 milliGRAM(s)/deciliter  guaiFENesin Oral Liquid (Sugar-Free) 100 milliGRAM(s) Oral every 6 hours PRN Cough  oxyCODONE    IR 5 milliGRAM(s) Oral every 6 hours PRN Moderate Pain (4 - 6)  oxyCODONE    IR 10 milliGRAM(s) Oral every 6 hours PRN Severe Pain (7 - 10)

## 2023-12-19 NOTE — PROVIDER CONTACT NOTE (HYPOGLYCEMIA EVENT) - NS PROVIDER CONTACT NOTE-TREATMENT-HYPO
1712 4oz apple juice given  1742 4oz apple juice given  1804 4 oz apple juice given/4 oz Fruit Juice (Specify quantity, date/time)

## 2023-12-20 LAB
GLUCOSE BLDC GLUCOMTR-MCNC: 184 MG/DL — HIGH (ref 70–99)
GLUCOSE BLDC GLUCOMTR-MCNC: 184 MG/DL — HIGH (ref 70–99)
GLUCOSE BLDC GLUCOMTR-MCNC: 188 MG/DL — HIGH (ref 70–99)
GLUCOSE BLDC GLUCOMTR-MCNC: 188 MG/DL — HIGH (ref 70–99)
GLUCOSE BLDC GLUCOMTR-MCNC: 238 MG/DL — HIGH (ref 70–99)
GLUCOSE BLDC GLUCOMTR-MCNC: 238 MG/DL — HIGH (ref 70–99)
GLUCOSE BLDC GLUCOMTR-MCNC: 312 MG/DL — HIGH (ref 70–99)
GLUCOSE BLDC GLUCOMTR-MCNC: 312 MG/DL — HIGH (ref 70–99)

## 2023-12-20 PROCEDURE — 70450 CT HEAD/BRAIN W/O DYE: CPT | Mod: 26

## 2023-12-20 PROCEDURE — 99233 SBSQ HOSP IP/OBS HIGH 50: CPT

## 2023-12-20 PROCEDURE — 99233 SBSQ HOSP IP/OBS HIGH 50: CPT | Mod: GC

## 2023-12-20 RX ORDER — BIMATOPROST 0.3 MG/ML
1 SOLUTION/ DROPS OPHTHALMIC AT BEDTIME
Refills: 0 | Status: DISCONTINUED | OUTPATIENT
Start: 2023-12-20 | End: 2023-12-30

## 2023-12-20 RX ORDER — INSULIN LISPRO 100/ML
4 VIAL (ML) SUBCUTANEOUS
Refills: 0 | Status: DISCONTINUED | OUTPATIENT
Start: 2023-12-20 | End: 2023-12-21

## 2023-12-20 RX ORDER — INSULIN GLARGINE 100 [IU]/ML
16 INJECTION, SOLUTION SUBCUTANEOUS AT BEDTIME
Refills: 0 | Status: DISCONTINUED | OUTPATIENT
Start: 2023-12-20 | End: 2023-12-21

## 2023-12-20 RX ADMIN — Medication 975 MILLIGRAM(S): at 13:00

## 2023-12-20 RX ADMIN — METFORMIN HYDROCHLORIDE 1000 MILLIGRAM(S): 850 TABLET ORAL at 17:51

## 2023-12-20 RX ADMIN — Medication 2: at 08:04

## 2023-12-20 RX ADMIN — METFORMIN HYDROCHLORIDE 1000 MILLIGRAM(S): 850 TABLET ORAL at 08:24

## 2023-12-20 RX ADMIN — GABAPENTIN 300 MILLIGRAM(S): 400 CAPSULE ORAL at 05:41

## 2023-12-20 RX ADMIN — Medication 8: at 12:08

## 2023-12-20 RX ADMIN — Medication 1 APPLICATION(S): at 05:43

## 2023-12-20 RX ADMIN — MAGNESIUM OXIDE 400 MG ORAL TABLET 400 MILLIGRAM(S): 241.3 TABLET ORAL at 17:50

## 2023-12-20 RX ADMIN — Medication 975 MILLIGRAM(S): at 05:41

## 2023-12-20 RX ADMIN — BIMATOPROST 1 DROP(S): 0.3 SOLUTION/ DROPS OPHTHALMIC at 21:20

## 2023-12-20 RX ADMIN — Medication 4 UNIT(S): at 17:49

## 2023-12-20 RX ADMIN — Medication 1 APPLICATION(S): at 18:33

## 2023-12-20 RX ADMIN — ENOXAPARIN SODIUM 40 MILLIGRAM(S): 100 INJECTION SUBCUTANEOUS at 05:41

## 2023-12-20 RX ADMIN — INSULIN GLARGINE 16 UNIT(S): 100 INJECTION, SOLUTION SUBCUTANEOUS at 21:28

## 2023-12-20 RX ADMIN — GABAPENTIN 300 MILLIGRAM(S): 400 CAPSULE ORAL at 21:18

## 2023-12-20 RX ADMIN — Medication 1 TABLET(S): at 17:50

## 2023-12-20 RX ADMIN — Medication 4: at 17:49

## 2023-12-20 RX ADMIN — GABAPENTIN 300 MILLIGRAM(S): 400 CAPSULE ORAL at 13:10

## 2023-12-20 RX ADMIN — SIMVASTATIN 40 MILLIGRAM(S): 20 TABLET, FILM COATED ORAL at 21:18

## 2023-12-20 RX ADMIN — Medication 975 MILLIGRAM(S): at 05:45

## 2023-12-20 RX ADMIN — Medication 5 MILLIGRAM(S): at 12:09

## 2023-12-20 RX ADMIN — MAGNESIUM OXIDE 400 MG ORAL TABLET 400 MILLIGRAM(S): 241.3 TABLET ORAL at 08:24

## 2023-12-20 RX ADMIN — MAGNESIUM OXIDE 400 MG ORAL TABLET 400 MILLIGRAM(S): 241.3 TABLET ORAL at 12:09

## 2023-12-20 RX ADMIN — Medication 975 MILLIGRAM(S): at 12:08

## 2023-12-20 RX ADMIN — Medication 1 APPLICATION(S): at 18:34

## 2023-12-20 RX ADMIN — Medication 1 TABLET(S): at 12:33

## 2023-12-20 NOTE — PROGRESS NOTE ADULT - ASSESSMENT
ELVIS VILLATORO is a 63y with a history of diabetes mellitus type 2, HTN, dyslipidemia, retinopathy who presented to [facility] on 11/29/23  with complaint of non-healing left foot wound and found to have osteomyelitis. Hospital course complicated by hyperglycemia and need for surgical revision. Now admitted to [facility] after for initiation of a multidisciplinary rehab program consisting focused on functional mobility, transfers and ADLs.     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   - s/p revision on 12/4 and formalization 12/12.   Comprehensive Multidisciplinary Rehab Program:  comprehensive rehab program, PT/OT/SLP 3 hours a day, 5 days a week.  - NWB left lower extremity  - Precautions: falls  - completed Unasyn per ID at previous hospital-observe off abx. Afebrile, no leukocytosis     HLD  -Simvastatin    Diabetes mellitus type 2  -ISS  - Home meds: metformin 1000mg BID, glipizide   - glargine 32 units bedtime  -[id number] tid w meals  -hospitalist to adjust    HTN  -enalapril -adjusted, s/p syncope w/hypotension yesterday  -bp 80s in chair this am again-will hold Flomax  -hospitalist to follow up    Urinary Retention  -Tamsulosin on hold  - presented with crain catheter  - TOV-voids freely    Pain Management:  - Tylenol q6h  - oxy5mg, 10mg mod and severe pain  - gabapentin 300mg TID    GI/Bowel:  - patient reports regular BMs  - Senna QHS, Miralax Daily    Skin/Pressure Injury:   - At risk for pressure injury due to neurologic diagnosis and relative immobility.  - Skin assessment on admission: stage 1 sacral ulcer, tinea cruris present, scab of 4th digit R foot  - barrier cream for sacral wound  - butenafine daily for jock itch  - Monitor Incisions: LLE residual stump with sutures  - Daily dressing changes  - aquaphor for dry R foot.    Diet:  - Diet Consistency/Modifications: Consistent Carb  - Nutrition consult for assessment and recs    DVT ppx:  - Lovenox daily dvt ppx  ---------------  Code Status/Emergency Contact:    Outpatient Follow-up (Specialty/Name of physician):  Jose Francisco Dean  Vascular Surgery  [address] [place] [address], [address]  [place] [address]  Phone: [phone]  Fax: [phone]  Follow Up Time: 2 weeks  [facility] Endocrinology  Endocrinology  [address] [place] [address]  Phone: [phone]  Fax:   Follow Up Time: 2 weeks    [facility] [place]  Urology  [address] [place] [address]  Phone: [phone]  Follow Up Time: 1 week    Eduin Gutierrez MD  Attending Physician  [facility]  Division of Infectous Diseases  [phone]    Needs podiatry/vascular and optho f/u care.     Endocrine faculty practice.   [address] [address]. Phone .   ELVIS VILLATORO is a 63y with a history of diabetes mellitus type 2, HTN, dyslipidemia, retinopathy who presented to [facility] on 11/29/23  with complaint of non-healing left foot wound and found to have osteomyelitis. Hospital course complicated by hyperglycemia and need for surgical revision. Now admitted to [facility] after for initiation of a multidisciplinary rehab program consisting focused on functional mobility, transfers and ADLs.     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   - s/p revision on 12/4 and formalization 12/12.   Comprehensive Multidisciplinary Rehab Program:  comprehensive rehab program, PT/OT/SLP 3 hours a day, 5 days a week.  - NWB left lower extremity  - Precautions: falls  - completed Unasyn per ID at previous hospital-observe off abx. Afebrile, no leukocytosis     HLD  -Simvastatin    Diabetes mellitus type 2  -ISS  - Home meds: metformin 1000mg BID, glipizide   - glargine 32 units bedtime  -[id number] tid w meals  -hospitalist to adjust    HTN  -enalapril -adjusted, s/p syncope w/hypotension yesterday  -bp 80s in chair this am again-will hold Flomax  -hospitalist to follow up    Urinary Retention  -Tamsulosin on hold  - presented with crain catheter  - TOV-voids freely    Pain Management:  - Tylenol q6h  - oxy5mg, 10mg mod and severe pain  - gabapentin 300mg TID    GI/Bowel:  - patient reports regular BMs  - Senna QHS, Miralax Daily    Skin/Pressure Injury:   - At risk for pressure injury due to neurologic diagnosis and relative immobility.  - Skin assessment on admission: stage 1 sacral ulcer, tinea cruris present, scab of 4th digit R foot  - barrier cream for sacral wound  - butenafine daily for jock itch  - Monitor Incisions: LLE residual stump with sutures  - Daily dressing changes  - aquaphor for dry R foot.    Diet:  - Diet Consistency/Modifications: Consistent Carb  - Nutrition consult for assessment and recs    DVT ppx:  - Lovenox daily dvt ppx  ---------------  Code Status/Emergency Contact:    Outpatient Follow-up (Specialty/Name of physician):  Jose Francisco Dean  Vascular Surgery  [address] [place] [address], [address]  [place] [address]  Phone: [phone]  Fax: [phone]  Follow Up Time: 2 weeks  [facility] Endocrinology  Endocrinology  [address] [place] [address]  Phone: [phone]  Fax:   Follow Up Time: 2 weeks    [facility] at [place]  Urology  [address] [place] [address]  Phone: [phone]  Follow Up Time: 1 week    Eduin Gutierrez MD  Attending Physician  [facility]  Division of Infectous Diseases  [phone]    Needs podiatry/vascular and optho f/u care.     Endocrine faculty practice.   [address] [place] [address] [address]. Phone .   ELVIS VILLATORO is a 63y with a history of diabetes mellitus type 2, HTN, dyslipidemia, retinopathy who presented to [facility] on 11/29/23  with complaint of non-healing left foot wound and found to have osteomyelitis. Hospital course complicated by hyperglycemia and need for surgical revision. Now admitted to [facility] after for initiation of a multidisciplinary rehab program consisting focused on functional mobility, transfers and ADLs.     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   - s/p revision on 12/4 and formalization 12/12.   Comprehensive Multidisciplinary Rehab Program:  comprehensive rehab program, PT/OT/SLP 3 hours a day, 5 days a week.  - NWB left lower extremity  - Precautions: falls  - completed Unasyn per ID at previous hospital-observe off abx. Afebrile, no leukocytosis     HLD  -Simvastatin    Diabetes mellitus type 2  -ISS  - Home meds: metformin 1000mg BID, glipizide   - Lantus  - Admelog TID  - hospitalist to adjust    HTN  -enalapril -adjusted, s/p syncope w/hypotension   -will hold Flomax for low BP  -hospitalist to follow up    Urinary Retention  -Tamsulosin on hold  - presented with crain catheter  - TOV-voids freely    Pain Management:  - Tylenol q6h  - oxy5mg, 10mg mod and severe pain  - gabapentin 300mg TID    GI/Bowel:  - patient reports regular BMs  - Senna QHS, Miralax Daily    Skin/Pressure Injury:   - At risk for pressure injury due to neurologic diagnosis and relative immobility.  - Skin assessment on admission: stage 1 sacral ulcer, tinea cruris present, scab of 4th digit R foot  - barrier cream for sacral wound  - butenafine daily for jock itch  - Monitor Incisions: LLE residual stump with sutures  - Daily dressing changes  - aquaphor for dry R foot.    Diet:  - Diet Consistency/Modifications: Consistent Carb  - Nutrition consult for assessment and recs    DVT ppx:  - Lovenox daily dvt ppx  ---------------  Code Status/Emergency Contact:    Outpatient Follow-up (Specialty/Name of physician):  Jose Francisco Dean  Vascular Surgery  [address] [place] [address], [address]  [place] [address]  Phone: [phone]  Fax: [phone]  Follow Up Time: 2 weeks  [facility] Endocrinology  Endocrinology  [address] [place] [address]  Phone: [phone]  Fax:   Follow Up Time: 2 weeks    [facility] [place]  Urology  [address] [place] [address]  Phone: [phone]  Follow Up Time: 1 week    Eduin Gutierrez MD  Attending Physician  [facility]  Division of Infectous Diseases  [phone]    Needs podiatry/vascular and optho f/u care.     Endocrine faculty practice.   [address] [facility] [address]. Phone .   ELVIS VILLATORO is a 63y with a history of diabetes mellitus type 2, HTN, dyslipidemia, retinopathy who presented to [facility] on 11/29/23  with complaint of non-healing left foot wound and found to have osteomyelitis. Hospital course complicated by hyperglycemia and need for surgical revision. Now admitted to [facility] after for initiation of a multidisciplinary rehab program consisting focused on functional mobility, transfers and ADLs.     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   - s/p revision on 12/4 and formalization 12/12.   Comprehensive Multidisciplinary Rehab Program:  comprehensive rehab program, PT/OT/SLP 3 hours a day, 5 days a week.  - NWB left lower extremity  - Precautions: falls  - completed Unasyn per ID at previous hospital-observe off abx. Afebrile, no leukocytosis     HLD  -Simvastatin    Diabetes mellitus type 2  -ISS  - Home meds: metformin 1000mg BID, glipizide   - Lantus  - Admelog TID  - hospitalist to adjust    HTN  -enalapril -adjusted, s/p syncope w/hypotension   -will hold Flomax for low BP  -hospitalist to follow up    Urinary Retention  -Tamsulosin on hold  - presented with crain catheter  - TOV-voids freely    Pain Management:  - Tylenol q6h  - oxy5mg, 10mg mod and severe pain  - gabapentin 300mg TID    GI/Bowel:  - patient reports regular BMs  - Senna QHS, Miralax Daily    Skin/Pressure Injury:   - At risk for pressure injury due to neurologic diagnosis and relative immobility.  - Skin assessment on admission: stage 1 sacral ulcer, tinea cruris present, scab of 4th digit R foot  - barrier cream for sacral wound  - butenafine daily for jock itch  - Monitor Incisions: LLE residual stump with sutures  - Daily dressing changes  - aquaphor for dry R foot.    Diet:  - Diet Consistency/Modifications: Consistent Carb  - Nutrition consult for assessment and recs    DVT ppx:  - Lovenox daily dvt ppx  ---------------  Code Status/Emergency Contact:    Outpatient Follow-up (Specialty/Name of physician):  Jose Francisco Dean  Vascular Surgery  [address] [place] [address], [address]  [place] [address]  Phone: [phone]  Fax: [phone]  Follow Up Time: 2 weeks  [facility] Endocrinology  Endocrinology  [address] [place] [address]  Phone: [phone]  Fax:   Follow Up Time: 2 weeks    [facility] [place]  Urology  [address] [place] [address]  Phone: [phone]  Follow Up Time: 1 week    Eduin Gutierrez MD  Attending Physician  [facility]  Division of Infectous Diseases  [phone]    Needs podiatry/vascular and optho f/u care.     Endocrine faculty practice.   [address] [facility] [address]. Phone .   ELVIS VILLATORO is a 63y with a history of diabetes mellitus type 2, HTN, dyslipidemia, retinopathy who presented to [facility] on 11/29/23  with complaint of non-healing left foot wound and found to have osteomyelitis. Hospital course complicated by hyperglycemia and need for surgical revision. Now admitted to [facility] after for initiation of a multidisciplinary rehab program consisting focused on functional mobility, transfers and ADLs.     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   - s/p revision on 12/4 and formalization 12/12.   Comprehensive Multidisciplinary Rehab Program:  comprehensive rehab program, PT/OT/SLP 3 hours a day, 5 days a week.  - NWB left lower extremity  - Precautions: falls  - completed Unasyn per ID at previous hospital-observe off abx. Afebrile, no leukocytosis   - s/p fall in OT-[facility] follow up  -plastics to see for right lid laceration    HLD  -Simvastatin    Diabetes mellitus type 2  -ISS  - Home meds: metformin 1000mg BID, glipizide   - Lantus  - Admelog TID  - hospitalist to adjust    HTN  -enalapril -adjusted, s/p syncope w/hypotension   -will hold Flomax for low BP  -hospitalist to follow up    Urinary Retention  -Tamsulosin on hold  - presented with crain catheter  - TOV-voids freely    Pain Management:  - Tylenol q6h  - oxy5mg, 10mg mod and severe pain  - gabapentin 300mg TID    GI/Bowel:  - patient reports regular BMs  - Senna QHS, Miralax Daily    Skin/Pressure Injury:   - At risk for pressure injury due to neurologic diagnosis and relative immobility.  - Skin assessment on admission: stage 1 sacral ulcer, tinea cruris present, scab of 4th digit R foot  - barrier cream for sacral wound  - butenafine daily for jock itch  - Monitor Incisions: LLE residual stump with sutures  - Daily dressing changes  - aquaphor for dry R foot.    Diet:  - Diet Consistency/Modifications: Consistent Carb  - Nutrition consult for assessment and recs    DVT ppx:  - Lovenox daily dvt ppx  ---------------  Code Status/Emergency Contact:    Outpatient Follow-up (Specialty/Name of physician):  Jose Francisco Dean  Vascular Surgery  [address] [place] [address], [address]  [place] [address]  Phone: [phone]  Fax: [phone]  Follow Up Time: 2 weeks  [facility] Endocrinology  Endocrinology  [address] [place] [address]  Phone: [phone]  Fax:   Follow Up Time: 2 weeks    [facility] [place]  Urology  [address] [place] [address], [address] [address]  Phone: [phone]  Follow Up Time: 1 week    Eduin Gutierrez MD  Attending Physician  [facility]  Division of Infectous Diseases  [phone]    Needs podiatry/vascular and optho f/u care.     Endocrine faculty practice.   [address] [place] [address]. Phone .   ELVIS VILLATORO is a 63y with a history of diabetes mellitus type 2, HTN, dyslipidemia, retinopathy who presented to [facility] on 11/29/23  with complaint of non-healing left foot wound and found to have osteomyelitis. Hospital course complicated by hyperglycemia and need for surgical revision. Now admitted to [facility] after for initiation of a multidisciplinary rehab program consisting focused on functional mobility, transfers and ADLs.     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   - s/p revision on 12/4 and formalization 12/12.   Comprehensive Multidisciplinary Rehab Program:  comprehensive rehab program, PT/OT/SLP 3 hours a day, 5 days a week.  - NWB left lower extremity  - Precautions: falls  - completed Unasyn per ID at previous hospital-observe off abx. Afebrile, no leukocytosis   - s/p fall in OT-[facility] follow up  -plastics to see for right lid laceration    HLD  -Simvastatin    Diabetes mellitus type 2  -ISS  - Home meds: metformin 1000mg BID, glipizide   - Lantus  - Admelog TID  - hospitalist to adjust    HTN  -enalapril -adjusted, s/p syncope w/hypotension   -will hold Flomax for low BP  -hospitalist to follow up    Urinary Retention  -Tamsulosin on hold  - presented with crain catheter  - TOV-voids freely    Pain Management:  - Tylenol q6h  - oxy5mg, 10mg mod and severe pain  - gabapentin 300mg TID    GI/Bowel:  - patient reports regular BMs  - Senna QHS, Miralax Daily    Skin/Pressure Injury:   - At risk for pressure injury due to neurologic diagnosis and relative immobility.  - Skin assessment on admission: stage 1 sacral ulcer, tinea cruris present, scab of 4th digit R foot  - barrier cream for sacral wound  - butenafine daily for jock itch  - Monitor Incisions: LLE residual stump with sutures  - Daily dressing changes  - aquaphor for dry R foot.    Diet:  - Diet Consistency/Modifications: Consistent Carb  - Nutrition consult for assessment and recs    DVT ppx:  - Lovenox daily dvt ppx  ---------------  Code Status/Emergency Contact:    Outpatient Follow-up (Specialty/Name of physician):  Jose Francisco Dean  Vascular Surgery  [address] [place] [address], [address]  [place] [address]  Phone: [phone]  Fax: [phone]  Follow Up Time: 2 weeks  [facility] Endocrinology  Endocrinology  [address] [place] [address]  Phone: [phone]  Fax:   Follow Up Time: 2 weeks    [facility] at [place]  Urology  [address] [place] [address], [address] [address]  Phone: [phone]  Follow Up Time: 1 week    Eduin Gutierrez MD  Attending Physician  [facility]  Division of Infectous Diseases  [phone]    Needs podiatry/vascular and optho f/u care.     Endocrine faculty practice.   [address] [place] [address]. Phone .

## 2023-12-20 NOTE — PROGRESS NOTE ADULT - ASSESSMENT
ELVIS VILLATORO is a 63y with a history of diabetes mellitus type 2, HTN, dyslipidemia, retinopathy who presented to [facility] on 11/29/23  with complaint of non-healing left foot wound and found to have osteomyelitis. Hospital course complicated by hyperglycemia and need for surgical revision. Now admitted to [facility] after for initiation of a multidisciplinary rehab program consisting focused on functional mobility, transfers and ADLs- pt/ot/dv tppx     left lower extremity Necrotizing fasciitis  -S/p guillotine amputation on 11/30  -S/p revision on 12/4 and formalization 12/12  - Weight bearing status: NWB left lower extremity  - Precautions: falls    # mechanical fall in OT this afternoon-  - order head ct  - fall precautions   - plastics consult for small right upper eyelid laceration    s/p RRT 12/18/23 due to unresponsiveness, likely vasovagal episode  - received iv fluids  - lisinopril  decreased to 5 qd at noon with hold parameters 12/18/23  - ordered holter monitor placed   -echo -noted as above     OH-   lisinopril decreased to 5 qd  monitor orthostatic  encourage hydration     hypomagnesemia  replete   improved  monitor    urine retention   -admitted with Contreras TOV done- voiding    HLD  -Simvastatin    Diabetes mellitus type 2 with hyperglycemia and hypoglycemia  - Metformin   - ISS, accuchecks   - received Lantus 10 last night  - will change Lantus to 16 qhs 12/19/23  -ISS  -A1C with Estimated Average Glucose Result: 11.4 % (12.01.23 @ 05:19)    HTN  -enalapril     VTE ppx   Lovenox    will follow  d/w rehab team dr. thornton     time spent in e/m visit- 50 min    ELVIS VILLATORO is a 63y with a history of diabetes mellitus type 2, HTN, dyslipidemia, retinopathy who presented to [facility] on 11/29/23  with complaint of non-healing left foot wound and found to have osteomyelitis. Hospital course complicated by hyperglycemia and need for surgical revision. Now admitted to [facility] after for initiation of a multidisciplinary rehab program consisting focused on functional mobility, transfers and ADLs- pt/ot/dv tppx     left lower extremity Necrotizing fasciitis  -S/p guillotine amputation on 11/30  -S/p revision on 12/4 and formalization 12/12  - Weight bearing status: NWB left lower extremity  - Precautions: falls    # mechanical fall in OT this afternoon-  - order head ct  - fall precautions   - plastics consult for small right upper eyelid laceration    s/p RRT 12/18/23 due to unresponsiveness, likely vasovagal episode  - received iv fluids  - lisinopril  decreased to 5 qd at noon with hold parameters 12/18/23  - ordered holter monitor placed   -echo -noted as above     OH-   lisinopril decreased to 5 qd  monitor orthostatic  encourage hydration     hypomagnesemia  replete   improved  monitor    urine retention   -admitted with Contreras TOV done- voiding    HLD  -Simvastatin    Diabetes mellitus type 2 with hyperglycemia and hypoglycemia  - Metformin   - ISS, accuchecks   - received Lantus 10 last night  - will change Lantus to 16 qhs 12/19/23  -ISS  -A1C with Estimated Average Glucose Result: 11.4 % (12.01.23 @ 05:19)    HTN  -enalapril     VTE ppx   Lovenox    will follow  d/w rehab team dr. thornton     time spent in e/m visit- 50 min    ELVIS VILLATORO is a 63y with a history of diabetes mellitus type 2, HTN, dyslipidemia, retinopathy who presented to [facility] on 11/29/23  with complaint of non-healing left foot wound and found to have osteomyelitis. Hospital course complicated by hyperglycemia and need for surgical revision. Now admitted to [facility] after for initiation of a multidisciplinary rehab program consisting focused on functional mobility, transfers and ADLs- pt/ot/dv tppx     left lower extremity Necrotizing fasciitis  -S/p guillotine amputation on 11/30  -S/p revision on 12/4 and formalization 12/12  - Weight bearing status: NWB left lower extremity  - Precautions: falls    # mechanical fall in OT this afternoon-  - order head ct  - fall precautions   - plastics consult for small right upper eyelid laceration    s/p RRT 12/18/23 due to unresponsiveness, likely vasovagal episode  - received iv fluids  - lisinopril  decreased to 5 qd at noon with hold parameters 12/18/23  - ordered holter monitor placed   -echo -noted as above     OH-   lisinopril decreased to 5 qd  monitor orthostatic  encourage hydration     hypomagnesemia  replete   improved  monitor    urine retention   -admitted with Contreras TOV done- voiding    HLD  -Simvastatin    Diabetes mellitus type 2 with hyperglycemia and hypoglycemia  - Metformin   - ISS, accuchecks   - received Lantus 10 last night  - will change Lantus to 16 qhs 12/19/23 and decrease premeal to 4 tid 12/19/23  - titrate as needed, reasses in am   -A1C with Estimated Average Glucose Result: 11.4 % (12.01.23 @ 05:19)    HTN  -enalapril     VTE ppx   Lovenox    will follow  d/w rehab team dr. thornton     time spent in e/m visit- 50 min    ELVIS VILLATORO is a 63y with a history of diabetes mellitus type 2, HTN, dyslipidemia, retinopathy who presented to [facility] on 11/29/23  with complaint of non-healing left foot wound and found to have osteomyelitis. Hospital course complicated by hyperglycemia and need for surgical revision. Now admitted to [facility] after for initiation of a multidisciplinary rehab program consisting focused on functional mobility, transfers and ADLs- pt/ot/dv tppx     left lower extremity Necrotizing fasciitis  -S/p guillotine amputation on 11/30  -S/p revision on 12/4 and formalization 12/12  - Weight bearing status: NWB left lower extremity  - Precautions: falls    # mechanical fall in OT this afternoon-  - order head ct  - fall precautions   - plastics consult for small right upper eyelid laceration    s/p RRT 12/18/23 due to unresponsiveness, likely vasovagal episode  - received iv fluids  - lisinopril  decreased to 5 qd at noon with hold parameters 12/18/23  - ordered holter monitor placed   -echo -noted as above     OH-   lisinopril decreased to 5 qd  monitor orthostatic  encourage hydration     hypomagnesemia  replete   improved  monitor    urine retention   -admitted with Conterras TOV done- voiding    HLD  -Simvastatin    Diabetes mellitus type 2 with hyperglycemia and hypoglycemia  - Metformin   - ISS, accuchecks   - received Lantus 10 last night  - will change Lantus to 16 qhs 12/19/23 and decrease premeal to 4 tid 12/19/23  - titrate as needed, reasses in am   -A1C with Estimated Average Glucose Result: 11.4 % (12.01.23 @ 05:19)    HTN  -enalapril     VTE ppx   Lovenox    will follow  d/w rehab team dr. thornton     time spent in e/m visit- 50 min

## 2023-12-20 NOTE — PROGRESS NOTE ADULT - SUBJECTIVE AND OBJECTIVE BOX
CHIEF COMPLAINT:      HISTORY OF PRESENT ILLNESS  HPI:  Mr. Mcdaniel is a 63M PMHx DM, HLD, PAD, prior toe amputation, presents to [facility] ED w L foot wound. Reports wound has been present on hindfoot x 3 weeks. Started as small cut. Over last 24 hours, pt unable to ambulate d/t severe pain. Reports significant malodor. Denies subjective fevers, chills, purulent drainage, numbness/paresthesia.     In ED, patient is tachycardic to 123. BP w HTN. Febrile to 102.4 F. Labs w WBC 21. Hgb 11.4. Lac 2.4. XR L foot w soft tissue gas at lateral hindfoot. CT LLE non con w soft tissue gas tracking ~7cm above foot.   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 Patient admitted to the SICU pre and post op for hyperglycemia and insulin drip management. Endocrinology was consulted for the hyperglycemia.  Patient transferred out of the SICU when he was able to be weaned off the insulin drip. Internal medicine was consulted for malachi-operative clearance for formalization surgery. On 12/4 the patient was taken back to the operating room for a left lower extremity guillotine revision. Infectious disease was consulted for antibiotic management and recommended switching from Zosyn to Unasyn. Rehab medicine was consulted to evaluate for rehab needs and recommended acute rehab on discharge.    (16 Dec 2023 13:35)      [facility]  PAST MEDICAL & SURGICAL HISTORY:  Retinopathy      Diabetes mellitus type 2 in obese      Hyperlipidemia      Toe infection  s/p amputation      Ruptured appendix      S/P appendectomy      Toe amputation status  left pinky toe      x -        REVIEW OF SYMPTOMS  [X] Constitutional WNL     [X] Cardio WNL            [X] Resp WNL           [X] GI WNL                          [X]  WNL                   [X] Heme WNL              [X] Endo WNL                     [X] Skin WNL                 [X] MSK WNL            [X] Neuro WNL                   [X] Cognitive WNL        [X] Psych WNL      VITALS  Vital Signs Last 24 Hrs  T(C): 37.2 (20 Dec 2023 07:23), Max: 37.2 (20 Dec 2023 07:23)  T(F): 99 (20 Dec 2023 07:23), Max: 99 (20 Dec 2023 07:23)  HR: 95 (20 Dec 2023 07:23) (80 - 97)  BP: 159/78 (20 Dec 2023 07:23) (150/68 - 173/82)  BP(mean): --  RR: 17 (20 Dec 2023 07:23) (16 - 17)  SpO2: 94% (20 Dec 2023 07:23) (94% - 97%)    Parameters below as of 20 Dec 2023 07:23  Patient On (Oxygen Delivery Method): room air          PHYSICAL EXAM  Constitutional - NAD, Comfortable  HEENT - NCAT, EOMI  Neck - Supple, No limited ROM  Chest - CTA bilaterally, No wheeze, No rhonchi, No crackles  Cardiovascular - RRR, S1S2, No murmurs  Abdomen - BS+, Soft, NTND  Extremities - No C/C/E, No calf tenderness   Skin-no rash  Woumds-      Neurologic Exam -                   HIF  - Awake, Alert, AAO to self, place, date, year, situation      No aphasia, normal attention, normal concentration, no apraxia, agnosia     Cranial Nerves - CN 2-12 intact     Motor - No focal deficits                            Sensory - Intact to LT,PP,Temprature,JPS, vibration                      Cortical sensory modalities intact     Reflexes - DTR Intact     Toes are flexor     Coordination/dysmetria - FTN intact             Balance - WNL Static and dynamic     Psychiatric - Mood stable, Affect WNL         FUNCTIONAL PROGRESS  Gait -   ADLs -   Transfers -  Functional transfer -     RECENT LABS      Mg     1.6     12-19                          RADIOLOGY/OTHER RESULTS      CURRENT MEDICATIONS  MEDICATIONS  (STANDING):  acetaminophen     Tablet .. 975 milliGRAM(s) Oral every 6 hours  AQUAPHOR (petrolatum Ointment) 1 Application(s) Topical two times a day  clotrimazole 1% Cream 1 Application(s) Topical two times a day  dextrose 5%. 1000 milliLiter(s) (50 mL/Hr) IV Continuous <Continuous>  dextrose 5%. 1000 milliLiter(s) (100 mL/Hr) IV Continuous <Continuous>  dextrose 50% Injectable 25 Gram(s) IV Push once  dextrose 50% Injectable 12.5 Gram(s) IV Push once  dextrose 50% Injectable 25 Gram(s) IV Push once  enalapril 5 milliGRAM(s) Oral <User Schedule>  enoxaparin Injectable 40 milliGRAM(s) SubCutaneous every 24 hours  gabapentin 300 milliGRAM(s) Oral three times a day  glucagon  Injectable 1 milliGRAM(s) IntraMuscular once  insulin glargine Injectable (LANTUS) 10 Unit(s) SubCutaneous at bedtime  insulin lispro (ADMELOG) corrective regimen sliding scale   SubCutaneous at bedtime  insulin lispro (ADMELOG) corrective regimen sliding scale   SubCutaneous three times a day before meals  magnesium oxide 400 milliGRAM(s) Oral three times a day with meals  metFORMIN 1000 milliGRAM(s) Oral two times a day  polyethylene glycol 3350 17 Gram(s) Oral two times a day  senna 2 Tablet(s) Oral at bedtime  simvastatin 40 milliGRAM(s) Oral at bedtime  sodium chloride 0.9%. 1000 milliLiter(s) (100 mL/Hr) IV Continuous <Continuous>    MEDICATIONS  (PRN):  dextrose Oral Gel 15 Gram(s) Oral once PRN Blood Glucose LESS THAN 70 milliGRAM(s)/deciliter  guaiFENesin Oral Liquid (Sugar-Free) 100 milliGRAM(s) Oral every 6 hours PRN Cough  oxyCODONE    IR 10 milliGRAM(s) Oral every 6 hours PRN Severe Pain (7 - 10)  oxyCODONE    IR 5 milliGRAM(s) Oral every 6 hours PRN Moderate Pain (4 - 6)      ASSESSMENT & PLAN          GI/Bowel Management - Dulcolax PRN, Fleet PRN   Management - Toilet Q2  Skin - Turn Q2  Pain - Tylenol PRN  DVT PPX - TEDs, SCDs  Diet -     Continue comprehensive acute rehab program consisting of 3hrs/day of OT/PT and SLP. CHIEF COMPLAINT:      HISTORY OF PRESENT ILLNESS  HPI:  Mr. Mcdaniel is a 63M PMHx DM, HLD, PAD, prior toe amputation, presents to [facility] ED w L foot wound. Reports wound has been present on hindfoot x 3 weeks. Started as small cut. Over last 24 hours, pt unable to ambulate d/t severe pain. Reports significant malodor. Denies subjective fevers, chills, purulent drainage, numbness/paresthesia.     In ED, patient is tachycardic to 123. BP w HTN. Febrile to 102.4 F. Labs w WBC 21. Hgb 11.4. Lac 2.4. XR L foot w soft tissue gas at lateral hindfoot. CT LLE non con w soft tissue gas tracking ~7cm above foot.   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 Patient admitted to the SICU pre and post op for hyperglycemia and insulin drip management. Endocrinology was consulted for the hyperglycemia.  Patient transferred out of the SICU when he was able to be weaned off the insulin drip. Internal medicine was consulted for malachi-operative clearance for formalization surgery. On 12/4 the patient was taken back to the operating room for a left lower extremity guillotine revision. Infectious disease was consulted for antibiotic management and recommended switching from Zosyn to Unasyn. Rehab medicine was consulted to evaluate for rehab needs and recommended acute rehab on discharge.    (16 Dec 2023 13:35)      [facility]  PAST MEDICAL & SURGICAL HISTORY:  Retinopathy      Diabetes mellitus type 2 in obese      Hyperlipidemia      Toe infection  s/p amputation      Ruptured appendix      S/P appendectomy      Toe amputation status  left pinky toe      x -        REVIEW OF SYMPTOMS  [X] Constitutional WNL     [X] Cardio WNL            [X] Resp WNL           [X] GI WNL                          [X]  WNL                   [X] Heme WNL              [X] Endo WNL                     [X] Skin WNL                 [X] MSK WNL            [X] Neuro WNL                   [X] Cognitive WNL        [X] Psych WNL      VITALS  Vital Signs Last 24 Hrs  T(C): 37.2 (20 Dec 2023 07:23), Max: 37.2 (20 Dec 2023 07:23)  T(F): 99 (20 Dec 2023 07:23), Max: 99 (20 Dec 2023 07:23)  HR: 95 (20 Dec 2023 07:23) (80 - 97)  BP: 159/78 (20 Dec 2023 07:23) (150/68 - 173/82)  BP(mean): --  RR: 17 (20 Dec 2023 07:23) (16 - 17)  SpO2: 94% (20 Dec 2023 07:23) (94% - 97%)    Parameters below as of 20 Dec 2023 07:23  Patient On (Oxygen Delivery Method): room air          PHYSICAL EXAM  Constitutional - NAD, Comfortable  HEENT - NCAT, EOMI  Neck - Supple, No limited ROM  Chest - CTA bilaterally, No wheeze, No rhonchi, No crackles  Cardiovascular - RRR, S1S2, No murmurs  Abdomen - BS+, Soft, NTND  Extremities - No C/C/E, No calf tenderness   Skin-no rash  Woumds-      Neurologic Exam -                   HIF  - Awake, Alert, AAO to self, place, date, year, situation      No aphasia, normal attention, normal concentration, no apraxia, agnosia     Cranial Nerves - CN 2-12 intact     Motor - No focal deficits                            Sensory - Intact to LT,PP,Temprature,JPS, vibration                      Cortical sensory modalities intact     Reflexes - DTR Intact     Toes are flexor     Coordination/dysmetria - FTN intact             Balance - WNL Static and dynamic     Psychiatric - Mood stable, Affect WNL         FUNCTIONAL PROGRESS  Gait -   ADLs -   Transfers -  Functional transfer -     RECENT LABS      Mg     1.6     12-19                          RADIOLOGY/OTHER RESULTS      CURRENT MEDICATIONS  MEDICATIONS  (STANDING):  acetaminophen     Tablet .. 975 milliGRAM(s) Oral every 6 hours  AQUAPHOR (petrolatum Ointment) 1 Application(s) Topical two times a day  clotrimazole 1% Cream 1 Application(s) Topical two times a day  dextrose 5%. 1000 milliLiter(s) (50 mL/Hr) IV Continuous <Continuous>  dextrose 5%. 1000 milliLiter(s) (100 mL/Hr) IV Continuous <Continuous>  dextrose 50% Injectable 25 Gram(s) IV Push once  dextrose 50% Injectable 12.5 Gram(s) IV Push once  dextrose 50% Injectable 25 Gram(s) IV Push once  enalapril 5 milliGRAM(s) Oral <User Schedule>  enoxaparin Injectable 40 milliGRAM(s) SubCutaneous every 24 hours  gabapentin 300 milliGRAM(s) Oral three times a day  glucagon  Injectable 1 milliGRAM(s) IntraMuscular once  insulin glargine Injectable (LANTUS) 10 Unit(s) SubCutaneous at bedtime  insulin lispro (ADMELOG) corrective regimen sliding scale   SubCutaneous at bedtime  insulin lispro (ADMELOG) corrective regimen sliding scale   SubCutaneous three times a day before meals  magnesium oxide 400 milliGRAM(s) Oral three times a day with meals  metFORMIN 1000 milliGRAM(s) Oral two times a day  polyethylene glycol 3350 17 Gram(s) Oral two times a day  senna 2 Tablet(s) Oral at bedtime  simvastatin 40 milliGRAM(s) Oral at bedtime  sodium chloride 0.9%. 1000 milliLiter(s) (100 mL/Hr) IV Continuous <Continuous>    MEDICATIONS  (PRN):  dextrose Oral Gel 15 Gram(s) Oral once PRN Blood Glucose LESS THAN 70 milliGRAM(s)/deciliter  guaiFENesin Oral Liquid (Sugar-Free) 100 milliGRAM(s) Oral every 6 hours PRN Cough  oxyCODONE    IR 10 milliGRAM(s) Oral every 6 hours PRN Severe Pain (7 - 10)  oxyCODONE    IR 5 milliGRAM(s) Oral every 6 hours PRN Moderate Pain (4 - 6)      ASSESSMENT & PLAN          GI/Bowel Management - Dulcolax PRN, Fleet PRN   Management - Toilet Q2  Skin - Turn Q2  Pain - Tylenol PRN  DVT PPX - TEDs, SCDs  Diet -     Continue comprehensive acute rehab program consisting of 3hrs/day of OT/PT and SLP. HPI:  Mr. Mcdaniel is a 63M PMHx DM, HLD, PAD, prior toe amputation, presents to [facility] ED w L foot wound. Reports wound has been present on hindfoot x 3 weeks. Started as small cut. Over last 24 hours, pt unable to ambulate d/t severe pain. Reports significant malodor. Denies subjective fevers, chills, purulent drainage, numbness/paresthesia.     In ED, patient is tachycardic to 123. BP w HTN. Febrile to 102.4 F. Labs w WBC 21. Hgb 11.4. Lac 2.4. XR L foot w soft tissue gas at lateral hindfoot. CT LLE non con w soft tissue gas tracking ~7cm above foot.   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 Patient admitted to the SICU pre and post op for hyperglycemia and insulin drip management. Endocrinology was consulted for the hyperglycemia.  Patient transferred out of the SICU when he was able to be weaned off the insulin drip. Internal medicine was consulted for malachi-operative clearance for formalization surgery. On 12/4 the patient was taken back to the operating room for a left lower extremity guillotine revision. Infectious disease was consulted for antibiotic management and recommended switching from Zosyn to Unasyn. Rehab medicine was consulted to evaluate for rehab needs and recommended acute rehab on discharge.       Pt seen and examined at bedside.  No acute events overnight.  s/p RRT yesterday, due to unresponsiveness. Today pt was transferred to chair form bed when he became pale, sweaty, bp 80s reported in chair, recovered supine.   Pt denies cp, palpitations, sob, abd pain, N/V, fever, chills.   BP regimen adjusted by hospitalist yesterday.  pain well controlled-Tylenol this am only  TOV- crain removed-voids freely. Will hold Flomax due to orthostatic bp, use teds and binder-hospitalist to follow up      MEDICATIONS  (STANDING):  acetaminophen     Tablet .. 975 milliGRAM(s) Oral every 6 hours  AQUAPHOR (petrolatum Ointment) 1 Application(s) Topical two times a day  clotrimazole 1% Cream 1 Application(s) Topical two times a day  dextrose 5%. 1000 milliLiter(s) (100 mL/Hr) IV Continuous <Continuous>  dextrose 5%. 1000 milliLiter(s) (50 mL/Hr) IV Continuous <Continuous>  dextrose 50% Injectable 25 Gram(s) IV Push once  dextrose 50% Injectable 25 Gram(s) IV Push once  dextrose 50% Injectable 12.5 Gram(s) IV Push once  enalapril 5 milliGRAM(s) Oral <User Schedule>  enoxaparin Injectable 40 milliGRAM(s) SubCutaneous every 24 hours  gabapentin 300 milliGRAM(s) Oral three times a day  glucagon  Injectable 1 milliGRAM(s) IntraMuscular once  insulin glargine Injectable (LANTUS) 10 Unit(s) SubCutaneous at bedtime  insulin lispro (ADMELOG) corrective regimen sliding scale   SubCutaneous three times a day before meals  insulin lispro (ADMELOG) corrective regimen sliding scale   SubCutaneous at bedtime  magnesium oxide 400 milliGRAM(s) Oral three times a day with meals  metFORMIN 1000 milliGRAM(s) Oral two times a day  polyethylene glycol 3350 17 Gram(s) Oral two times a day  senna 2 Tablet(s) Oral at bedtime  simvastatin 40 milliGRAM(s) Oral at bedtime  sodium chloride 0.9%. 1000 milliLiter(s) (100 mL/Hr) IV Continuous <Continuous>    MEDICATIONS  (PRN):  dextrose Oral Gel 15 Gram(s) Oral once PRN Blood Glucose LESS THAN 70 milliGRAM(s)/deciliter  guaiFENesin Oral Liquid (Sugar-Free) 100 milliGRAM(s) Oral every 6 hours PRN Cough  oxyCODONE    IR 10 milliGRAM(s) Oral every 6 hours PRN Severe Pain (7 - 10)  oxyCODONE    IR 5 milliGRAM(s) Oral every 6 hours PRN Moderate Pain (4 - 6)          Mg     1.6     12-19          CAPILLARY BLOOD GLUCOSE      POCT Blood Glucose.: 184 mg/dL (20 Dec 2023 07:26)  POCT Blood Glucose.: 198 mg/dL (19 Dec 2023 21:20)  POCT Blood Glucose.: 126 mg/dL (19 Dec 2023 18:20)  POCT Blood Glucose.: 93 mg/dL (19 Dec 2023 18:03)  POCT Blood Glucose.: 79 mg/dL (19 Dec 2023 17:40)  POCT Blood Glucose.: 62 mg/dL (19 Dec 2023 17:09)  POCT Blood Glucose.: 62 mg/dL (19 Dec 2023 17:07)  POCT Blood Glucose.: 195 mg/dL (19 Dec 2023 11:25)      Vital Signs Last 24 Hrs  T(C): 37.2 (20 Dec 2023 07:23), Max: 37.2 (20 Dec 2023 07:23)  T(F): 99 (20 Dec 2023 07:23), Max: 99 (20 Dec 2023 07:23)  HR: 95 (20 Dec 2023 07:23) (80 - 97)  BP: 159/78 (20 Dec 2023 07:23) (150/68 - 173/82)  BP(mean): --  RR: 17 (20 Dec 2023 07:23) (16 - 17)  SpO2: 94% (20 Dec 2023 07:23) (94% - 97%)    Parameters below as of 20 Dec 2023 07:23  Patient On (Oxygen Delivery Method): room air        Physical Exam: Gen - NAD in bed this am  HEENT - NCAT, EOMI  Neck - Supple, No limited ROM  Pulm - CTAB, No wheeze, No rhonchi, No crackles  Cardiovascular - RRR, S1S2, No murmurs  Abdomen - Soft, NT/ND, +BS  Extremities - LLE with recent BKA.   Uro - crain present for retention  Neuro-     Cognitive - AAOx3     Communication - Fluent, No dysarthria     Attention: Intact      Judgement: Good evidence of judgement     Memory: Recall 3 objects immediate and 3 min later         Cranial Nerves - CN 2-12 intact     Motor -                     LEFT    UE - ShAB 5/5, EF 5/5, EE 5/5, WE 5/5,  5/5                    RIGHT UE - ShAB 5/5, EF 5/5, EE 5/5, WE 5/5,  5/5                    LEFT    LE - HF 5/5, KE 5/5                    RIGHT LE - HF 5/5, KE 5/5, DF 5/5, PF 5/5        Sensory - Intact to LT     Reflexes - DTR Intact, No primitive reflexive     Coordination - FTN intact     Tone - normal  Psychiatric - Mood stable, Affect WNL  Skin:  incision site from BKA on the L residual limb. No drainage. C/d/i. skin well approximated and staples present. Stage 1 sacral decubitis ulcer present. Fungal rash of the bilateral inguinal creases  Wounds: Patient with scab of 4th digit of RLE.                Continue comprehensive acute rehab program consisting of 3hrs/day of OT/PT and SLP. HPI:  Mr. Mcdaniel is a 63M PMHx DM, HLD, PAD, prior toe amputation, presents to [facility] ED w L foot wound. Reports wound has been present on hindfoot x 3 weeks. Started as small cut. Over last 24 hours, pt unable to ambulate d/t severe pain. Reports significant malodor. Denies subjective fevers, chills, purulent drainage, numbness/paresthesia.     In ED, patient is tachycardic to 123. BP w HTN. Febrile to 102.4 F. Labs w WBC 21. Hgb 11.4. Lac 2.4. XR L foot w soft tissue gas at lateral hindfoot. CT LLE non con w soft tissue gas tracking ~7cm above foot.   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 Patient admitted to the SICU pre and post op for hyperglycemia and insulin drip management. Endocrinology was consulted for the hyperglycemia.  Patient transferred out of the SICU when he was able to be weaned off the insulin drip. Internal medicine was consulted for malachi-operative clearance for formalization surgery. On 12/4 the patient was taken back to the operating room for a left lower extremity guillotine revision. Infectious disease was consulted for antibiotic management and recommended switching from Zosyn to Unasyn. Rehab medicine was consulted to evaluate for rehab needs and recommended acute rehab on discharge.       Pt seen and examined at bedside.  No acute events overnight.  s/p RRT yesterday, due to unresponsiveness. Today pt was transferred to chair form bed when he became pale, sweaty, bp 80s reported in chair, recovered supine.   Pt denies cp, palpitations, sob, abd pain, N/V, fever, chills.   BP regimen adjusted by hospitalist yesterday.  pain well controlled-Tylenol this am only  TOV- crain removed-voids freely. Will hold Flomax due to orthostatic bp, use teds and binder-hospitalist to follow up      MEDICATIONS  (STANDING):  acetaminophen     Tablet .. 975 milliGRAM(s) Oral every 6 hours  AQUAPHOR (petrolatum Ointment) 1 Application(s) Topical two times a day  clotrimazole 1% Cream 1 Application(s) Topical two times a day  dextrose 5%. 1000 milliLiter(s) (100 mL/Hr) IV Continuous <Continuous>  dextrose 5%. 1000 milliLiter(s) (50 mL/Hr) IV Continuous <Continuous>  dextrose 50% Injectable 25 Gram(s) IV Push once  dextrose 50% Injectable 25 Gram(s) IV Push once  dextrose 50% Injectable 12.5 Gram(s) IV Push once  enalapril 5 milliGRAM(s) Oral <User Schedule>  enoxaparin Injectable 40 milliGRAM(s) SubCutaneous every 24 hours  gabapentin 300 milliGRAM(s) Oral three times a day  glucagon  Injectable 1 milliGRAM(s) IntraMuscular once  insulin glargine Injectable (LANTUS) 10 Unit(s) SubCutaneous at bedtime  insulin lispro (ADMELOG) corrective regimen sliding scale   SubCutaneous three times a day before meals  insulin lispro (ADMELOG) corrective regimen sliding scale   SubCutaneous at bedtime  magnesium oxide 400 milliGRAM(s) Oral three times a day with meals  metFORMIN 1000 milliGRAM(s) Oral two times a day  polyethylene glycol 3350 17 Gram(s) Oral two times a day  senna 2 Tablet(s) Oral at bedtime  simvastatin 40 milliGRAM(s) Oral at bedtime  sodium chloride 0.9%. 1000 milliLiter(s) (100 mL/Hr) IV Continuous <Continuous>    MEDICATIONS  (PRN):  dextrose Oral Gel 15 Gram(s) Oral once PRN Blood Glucose LESS THAN 70 milliGRAM(s)/deciliter  guaiFENesin Oral Liquid (Sugar-Free) 100 milliGRAM(s) Oral every 6 hours PRN Cough  oxyCODONE    IR 10 milliGRAM(s) Oral every 6 hours PRN Severe Pain (7 - 10)  oxyCODONE    IR 5 milliGRAM(s) Oral every 6 hours PRN Moderate Pain (4 - 6)          Mg     1.6     12-19          CAPILLARY BLOOD GLUCOSE      POCT Blood Glucose.: 184 mg/dL (20 Dec 2023 07:26)  POCT Blood Glucose.: 198 mg/dL (19 Dec 2023 21:20)  POCT Blood Glucose.: 126 mg/dL (19 Dec 2023 18:20)  POCT Blood Glucose.: 93 mg/dL (19 Dec 2023 18:03)  POCT Blood Glucose.: 79 mg/dL (19 Dec 2023 17:40)  POCT Blood Glucose.: 62 mg/dL (19 Dec 2023 17:09)  POCT Blood Glucose.: 62 mg/dL (19 Dec 2023 17:07)  POCT Blood Glucose.: 195 mg/dL (19 Dec 2023 11:25)      Vital Signs Last 24 Hrs  T(C): 37.2 (20 Dec 2023 07:23), Max: 37.2 (20 Dec 2023 07:23)  T(F): 99 (20 Dec 2023 07:23), Max: 99 (20 Dec 2023 07:23)  HR: 95 (20 Dec 2023 07:23) (80 - 97)  BP: 159/78 (20 Dec 2023 07:23) (150/68 - 173/82)  BP(mean): --  RR: 17 (20 Dec 2023 07:23) (16 - 17)  SpO2: 94% (20 Dec 2023 07:23) (94% - 97%)    Parameters below as of 20 Dec 2023 07:23  Patient On (Oxygen Delivery Method): room air        Physical Exam: Gen - NAD in bed this am  HEENT - NCAT, EOMI  Neck - Supple, No limited ROM  Pulm - CTAB, No wheeze, No rhonchi, No crackles  Cardiovascular - RRR, S1S2, No murmurs  Abdomen - Soft, NT/ND, +BS  Extremities - LLE with recent BKA.   Uro - crain present for retention  Neuro-     Cognitive - AAOx3     Communication - Fluent, No dysarthria     Attention: Intact      Judgement: Good evidence of judgement     Memory: Recall 3 objects immediate and 3 min later         Cranial Nerves - CN 2-12 intact     Motor -                     LEFT    UE - ShAB 5/5, EF 5/5, EE 5/5, WE 5/5,  5/5                    RIGHT UE - ShAB 5/5, EF 5/5, EE 5/5, WE 5/5,  5/5                    LEFT    LE - HF 5/5, KE 5/5                    RIGHT LE - HF 5/5, KE 5/5, DF 5/5, PF 5/5        Sensory - Intact to LT     Reflexes - DTR Intact, No primitive reflexive     Coordination - FTN intact     Tone - normal  Psychiatric - Mood stable, Affect WNL  Skin:  incision site from BKA on the L residual limb. No drainage. C/d/i. skin well approximated and staples present. Stage 1 sacral decubitis ulcer present. Fungal rash of the bilateral inguinal creases  Wounds: Patient with scab of 4th digit of RLE.                Continue comprehensive acute rehab program consisting of 3hrs/day of OT/PT and SLP. HPI:  Mr. Mcdaniel is a 63M PMHx DM, HLD, PAD, prior toe amputation, presents to [facility] ED w L foot wound. Reports wound has been present on hindfoot x 3 weeks. Started as small cut. Over last 24 hours, pt unable to ambulate d/t severe pain. Reports significant malodor. Denies subjective fevers, chills, purulent drainage, numbness/paresthesia.     In ED, patient is tachycardic to 123. BP w HTN. Febrile to 102.4 F. Labs w WBC 21. Hgb 11.4. Lac 2.4. XR L foot w soft tissue gas at lateral hindfoot. CT LLE non con w soft tissue gas tracking ~7cm above foot.   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 Patient admitted to the SICU pre and post op for hyperglycemia and insulin drip management. Endocrinology was consulted for the hyperglycemia.  Patient transferred out of the SICU when he was able to be weaned off the insulin drip. Internal medicine was consulted for malachi-operative clearance for formalization surgery. On 12/4 the patient was taken back to the operating room for a left lower extremity guillotine revision. Infectious disease was consulted for antibiotic management and recommended switching from Zosyn to Unasyn. Rehab medicine was consulted to evaluate for rehab needs and recommended acute rehab on discharge.       Pt seen and examined at bedside. No acute events overnight. Slipped off toilet in OT this am, hospitalist to examine laceration. CT follow up.   Pt denies cp, palpitations, sob, abd pain, N/V, fever, chills.   BP regimen adjusted by hospitalist yesterday.  pain well controlled-Tylenol this am only  TOV- crain removed-voids freely. Will hold Flomax due to orthostatic bp, use teds and binder-hospitalist to follow up  Insulin adjusted by hospitalist-FS low      MEDICATIONS  (STANDING):  acetaminophen     Tablet .. 975 milliGRAM(s) Oral every 6 hours  AQUAPHOR (petrolatum Ointment) 1 Application(s) Topical two times a day  clotrimazole 1% Cream 1 Application(s) Topical two times a day  dextrose 5%. 1000 milliLiter(s) (100 mL/Hr) IV Continuous <Continuous>  dextrose 5%. 1000 milliLiter(s) (50 mL/Hr) IV Continuous <Continuous>  dextrose 50% Injectable 25 Gram(s) IV Push once  dextrose 50% Injectable 25 Gram(s) IV Push once  dextrose 50% Injectable 12.5 Gram(s) IV Push once  enalapril 5 milliGRAM(s) Oral <User Schedule>  enoxaparin Injectable 40 milliGRAM(s) SubCutaneous every 24 hours  gabapentin 300 milliGRAM(s) Oral three times a day  glucagon  Injectable 1 milliGRAM(s) IntraMuscular once  insulin glargine Injectable (LANTUS) 10 Unit(s) SubCutaneous at bedtime  insulin lispro (ADMELOG) corrective regimen sliding scale   SubCutaneous three times a day before meals  insulin lispro (ADMELOG) corrective regimen sliding scale   SubCutaneous at bedtime  magnesium oxide 400 milliGRAM(s) Oral three times a day with meals  metFORMIN 1000 milliGRAM(s) Oral two times a day  polyethylene glycol 3350 17 Gram(s) Oral two times a day  senna 2 Tablet(s) Oral at bedtime  simvastatin 40 milliGRAM(s) Oral at bedtime  sodium chloride 0.9%. 1000 milliLiter(s) (100 mL/Hr) IV Continuous <Continuous>    MEDICATIONS  (PRN):  dextrose Oral Gel 15 Gram(s) Oral once PRN Blood Glucose LESS THAN 70 milliGRAM(s)/deciliter  guaiFENesin Oral Liquid (Sugar-Free) 100 milliGRAM(s) Oral every 6 hours PRN Cough  oxyCODONE    IR 10 milliGRAM(s) Oral every 6 hours PRN Severe Pain (7 - 10)  oxyCODONE    IR 5 milliGRAM(s) Oral every 6 hours PRN Moderate Pain (4 - 6)          Mg     1.6     12-19          CAPILLARY BLOOD GLUCOSE      POCT Blood Glucose.: 184 mg/dL (20 Dec 2023 07:26)  POCT Blood Glucose.: 198 mg/dL (19 Dec 2023 21:20)  POCT Blood Glucose.: 126 mg/dL (19 Dec 2023 18:20)  POCT Blood Glucose.: 93 mg/dL (19 Dec 2023 18:03)  POCT Blood Glucose.: 79 mg/dL (19 Dec 2023 17:40)  POCT Blood Glucose.: 62 mg/dL (19 Dec 2023 17:09)  POCT Blood Glucose.: 62 mg/dL (19 Dec 2023 17:07)  POCT Blood Glucose.: 195 mg/dL (19 Dec 2023 11:25)      Vital Signs Last 24 Hrs  T(C): 37.2 (20 Dec 2023 07:23), Max: 37.2 (20 Dec 2023 07:23)  T(F): 99 (20 Dec 2023 07:23), Max: 99 (20 Dec 2023 07:23)  HR: 95 (20 Dec 2023 07:23) (80 - 97)  BP: 159/78 (20 Dec 2023 07:23) (150/68 - 173/82)  BP(mean): --  RR: 17 (20 Dec 2023 07:23) (16 - 17)  SpO2: 94% (20 Dec 2023 07:23) (94% - 97%)    Parameters below as of 20 Dec 2023 07:23  Patient On (Oxygen Delivery Method): room air        Physical Exam: Gen - NAD in bed this am  HEENT - NCAT, EOMI  Neck - Supple, No limited ROM  Pulm - CTAB, No wheeze, No rhonchi, No crackles  Cardiovascular - RRR, S1S2, No murmurs  Abdomen - Soft, NT/ND, +BS  Extremities - LLE with recent BKA.   Uro - crain present for retention  Neuro-     Cognitive - AAOx3     Communication - Fluent, No dysarthria     Attention: Intact      Judgement: Good evidence of judgement     Memory: Recall 3 objects immediate and 3 min later         Cranial Nerves - CN 2-12 intact     Motor -                     LEFT    UE - ShAB 5/5, EF 5/5, EE 5/5, WE 5/5,  5/5                    RIGHT UE - ShAB 5/5, EF 5/5, EE 5/5, WE 5/5,  5/5                    LEFT    LE - HF 5/5, KE 5/5                    RIGHT LE - HF 5/5, KE 5/5, DF 5/5, PF 5/5        Sensory - Intact to LT     Reflexes - DTR Intact, No primitive reflexive     Coordination - FTN intact     Tone - normal  Psychiatric - Mood stable, Affect WNL  Skin:  incision site from BKA on the L residual limb. No drainage. C/d/i. skin well approximated and staples present. Stage 1 sacral decubitis ulcer present. Fungal rash of the bilateral inguinal creases  Wounds: Patient with scab of 4th digit of RLE.        Continue comprehensive acute rehab program consisting of 3hrs/day of OT/PT. HPI:  Mr. Mcdaniel is a 63M PMHx DM, HLD, PAD, prior toe amputation, presents to [facility] ED w L foot wound. Reports wound has been present on hindfoot x 3 weeks. Started as small cut. Over last 24 hours, pt unable to ambulate d/t severe pain. Reports significant malodor. Denies subjective fevers, chills, purulent drainage, numbness/paresthesia.     In ED, patient is tachycardic to 123. BP w HTN. Febrile to 102.4 F. Labs w WBC 21. Hgb 11.4. Lac 2.4. XR L foot w soft tissue gas at lateral hindfoot. CT LLE non con w soft tissue gas tracking ~7cm above foot.   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 Patient admitted to the SICU pre and post op for hyperglycemia and insulin drip management. Endocrinology was consulted for the hyperglycemia.  Patient transferred out of the SICU when he was able to be weaned off the insulin drip. Internal medicine was consulted for malachi-operative clearance for formalization surgery. On 12/4 the patient was taken back to the operating room for a left lower extremity guillotine revision. Infectious disease was consulted for antibiotic management and recommended switching from Zosyn to Unasyn. Rehab medicine was consulted to evaluate for rehab needs and recommended acute rehab on discharge.       Pt seen and examined at bedside. No acute events overnight. Slipped off toilet in OT this am, hospitalist to examine laceration. CT follow up.   Pt denies cp, palpitations, sob, abd pain, N/V, fever, chills.   BP regimen adjusted by hospitalist yesterday.  pain well controlled-Tylenol this am only  TOV- crain removed-voids freely. Will hold Flomax due to orthostatic bp, use teds and binder-hospitalist to follow up  Insulin adjusted by hospitalist-FS low      MEDICATIONS  (STANDING):  acetaminophen     Tablet .. 975 milliGRAM(s) Oral every 6 hours  AQUAPHOR (petrolatum Ointment) 1 Application(s) Topical two times a day  clotrimazole 1% Cream 1 Application(s) Topical two times a day  dextrose 5%. 1000 milliLiter(s) (100 mL/Hr) IV Continuous <Continuous>  dextrose 5%. 1000 milliLiter(s) (50 mL/Hr) IV Continuous <Continuous>  dextrose 50% Injectable 25 Gram(s) IV Push once  dextrose 50% Injectable 25 Gram(s) IV Push once  dextrose 50% Injectable 12.5 Gram(s) IV Push once  enalapril 5 milliGRAM(s) Oral <User Schedule>  enoxaparin Injectable 40 milliGRAM(s) SubCutaneous every 24 hours  gabapentin 300 milliGRAM(s) Oral three times a day  glucagon  Injectable 1 milliGRAM(s) IntraMuscular once  insulin glargine Injectable (LANTUS) 10 Unit(s) SubCutaneous at bedtime  insulin lispro (ADMELOG) corrective regimen sliding scale   SubCutaneous three times a day before meals  insulin lispro (ADMELOG) corrective regimen sliding scale   SubCutaneous at bedtime  magnesium oxide 400 milliGRAM(s) Oral three times a day with meals  metFORMIN 1000 milliGRAM(s) Oral two times a day  polyethylene glycol 3350 17 Gram(s) Oral two times a day  senna 2 Tablet(s) Oral at bedtime  simvastatin 40 milliGRAM(s) Oral at bedtime  sodium chloride 0.9%. 1000 milliLiter(s) (100 mL/Hr) IV Continuous <Continuous>    MEDICATIONS  (PRN):  dextrose Oral Gel 15 Gram(s) Oral once PRN Blood Glucose LESS THAN 70 milliGRAM(s)/deciliter  guaiFENesin Oral Liquid (Sugar-Free) 100 milliGRAM(s) Oral every 6 hours PRN Cough  oxyCODONE    IR 10 milliGRAM(s) Oral every 6 hours PRN Severe Pain (7 - 10)  oxyCODONE    IR 5 milliGRAM(s) Oral every 6 hours PRN Moderate Pain (4 - 6)          Mg     1.6     12-19          CAPILLARY BLOOD GLUCOSE      POCT Blood Glucose.: 184 mg/dL (20 Dec 2023 07:26)  POCT Blood Glucose.: 198 mg/dL (19 Dec 2023 21:20)  POCT Blood Glucose.: 126 mg/dL (19 Dec 2023 18:20)  POCT Blood Glucose.: 93 mg/dL (19 Dec 2023 18:03)  POCT Blood Glucose.: 79 mg/dL (19 Dec 2023 17:40)  POCT Blood Glucose.: 62 mg/dL (19 Dec 2023 17:09)  POCT Blood Glucose.: 62 mg/dL (19 Dec 2023 17:07)  POCT Blood Glucose.: 195 mg/dL (19 Dec 2023 11:25)      Vital Signs Last 24 Hrs  T(C): 37.2 (20 Dec 2023 07:23), Max: 37.2 (20 Dec 2023 07:23)  T(F): 99 (20 Dec 2023 07:23), Max: 99 (20 Dec 2023 07:23)  HR: 95 (20 Dec 2023 07:23) (80 - 97)  BP: 159/78 (20 Dec 2023 07:23) (150/68 - 173/82)  BP(mean): --  RR: 17 (20 Dec 2023 07:23) (16 - 17)  SpO2: 94% (20 Dec 2023 07:23) (94% - 97%)    Parameters below as of 20 Dec 2023 07:23  Patient On (Oxygen Delivery Method): room air        Physical Exam: Gen - NAD in bed this am  HEENT - NCAT, EOMI  Neck - Supple, No limited ROM  Pulm - CTAB, No wheeze, No rhonchi, No crackles  Cardiovascular - RRR, S1S2, No murmurs  Abdomen - Soft, NT/ND, +BS  Extremities - LLE with recent BKA.   Uro - crain present for retention  Neuro-     Cognitive - AAOx3     Communication - Fluent, No dysarthria     Attention: Intact      Judgement: Good evidence of judgement     Memory: Recall 3 objects immediate and 3 min later         Cranial Nerves - CN 2-12 intact     Motor -                     LEFT    UE - ShAB 5/5, EF 5/5, EE 5/5, WE 5/5,  5/5                    RIGHT UE - ShAB 5/5, EF 5/5, EE 5/5, WE 5/5,  5/5                    LEFT    LE - HF 5/5, KE 5/5                    RIGHT LE - HF 5/5, KE 5/5, DF 5/5, PF 5/5        Sensory - Intact to LT     Reflexes - DTR Intact, No primitive reflexive     Coordination - FTN intact     Tone - normal  Psychiatric - Mood stable, Affect WNL  Skin:  incision site from BKA on the L residual limb. No drainage. C/d/i. skin well approximated and staples present. Stage 1 sacral decubitis ulcer present. Fungal rash of the bilateral inguinal creases  Wounds: Patient with scab of 4th digit of RLE.        Continue comprehensive acute rehab program consisting of 3hrs/day of OT/PT. HPI:  Mr. Mcdaniel is a 63M PMHx DM, HLD, PAD, prior toe amputation, presents to [facility] ED w L foot wound. Reports wound has been present on hindfoot x 3 weeks. Started as small cut. Over last 24 hours, pt unable to ambulate d/t severe pain. Reports significant malodor. Denies subjective fevers, chills, purulent drainage, numbness/paresthesia.     In ED, patient is tachycardic to 123. BP w HTN. Febrile to 102.4 F. Labs w WBC 21. Hgb 11.4. Lac 2.4. XR L foot w soft tissue gas at lateral hindfoot. CT LLE non con w soft tissue gas tracking ~7cm above foot.   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 Patient admitted to the SICU pre and post op for hyperglycemia and insulin drip management. Endocrinology was consulted for the hyperglycemia.  Patient transferred out of the SICU when he was able to be weaned off the insulin drip. Internal medicine was consulted for malachi-operative clearance for formalization surgery. On 12/4 the patient was taken back to the operating room for a left lower extremity guillotine revision. Infectious disease was consulted for antibiotic management and recommended switching from Zosyn to Unasyn. Rehab medicine was consulted to evaluate for rehab needs and recommended acute rehab on discharge.       Pt seen and examined at bedside. No acute events overnight. Slipped off toilet in OT this am, hospitalist to examine laceration. CT follow up.   Pt denies cp, palpitations, sob, abd pain, N/V, fever, chills.   BP regimen adjusted by hospitalist yesterday.  pain well controlled-Tylenol this am only  TOV- crain removed-voids freely. Will hold Flomax due to orthostatic bp, use teds and binder-hospitalist to follow up  Insulin adjusted by hospitalist-FS low      MEDICATIONS  (STANDING):  acetaminophen     Tablet .. 975 milliGRAM(s) Oral every 6 hours  AQUAPHOR (petrolatum Ointment) 1 Application(s) Topical two times a day  clotrimazole 1% Cream 1 Application(s) Topical two times a day  dextrose 5%. 1000 milliLiter(s) (100 mL/Hr) IV Continuous <Continuous>  dextrose 5%. 1000 milliLiter(s) (50 mL/Hr) IV Continuous <Continuous>  dextrose 50% Injectable 25 Gram(s) IV Push once  dextrose 50% Injectable 25 Gram(s) IV Push once  dextrose 50% Injectable 12.5 Gram(s) IV Push once  enalapril 5 milliGRAM(s) Oral <User Schedule>  enoxaparin Injectable 40 milliGRAM(s) SubCutaneous every 24 hours  gabapentin 300 milliGRAM(s) Oral three times a day  glucagon  Injectable 1 milliGRAM(s) IntraMuscular once  insulin glargine Injectable (LANTUS) 10 Unit(s) SubCutaneous at bedtime  insulin lispro (ADMELOG) corrective regimen sliding scale   SubCutaneous three times a day before meals  insulin lispro (ADMELOG) corrective regimen sliding scale   SubCutaneous at bedtime  magnesium oxide 400 milliGRAM(s) Oral three times a day with meals  metFORMIN 1000 milliGRAM(s) Oral two times a day  polyethylene glycol 3350 17 Gram(s) Oral two times a day  senna 2 Tablet(s) Oral at bedtime  simvastatin 40 milliGRAM(s) Oral at bedtime  sodium chloride 0.9%. 1000 milliLiter(s) (100 mL/Hr) IV Continuous <Continuous>    MEDICATIONS  (PRN):  dextrose Oral Gel 15 Gram(s) Oral once PRN Blood Glucose LESS THAN 70 milliGRAM(s)/deciliter  guaiFENesin Oral Liquid (Sugar-Free) 100 milliGRAM(s) Oral every 6 hours PRN Cough  oxyCODONE    IR 10 milliGRAM(s) Oral every 6 hours PRN Severe Pain (7 - 10)  oxyCODONE    IR 5 milliGRAM(s) Oral every 6 hours PRN Moderate Pain (4 - 6)          Mg     1.6     12-19          CAPILLARY BLOOD GLUCOSE      POCT Blood Glucose.: 184 mg/dL (20 Dec 2023 07:26)  POCT Blood Glucose.: 198 mg/dL (19 Dec 2023 21:20)  POCT Blood Glucose.: 126 mg/dL (19 Dec 2023 18:20)  POCT Blood Glucose.: 93 mg/dL (19 Dec 2023 18:03)  POCT Blood Glucose.: 79 mg/dL (19 Dec 2023 17:40)  POCT Blood Glucose.: 62 mg/dL (19 Dec 2023 17:09)  POCT Blood Glucose.: 62 mg/dL (19 Dec 2023 17:07)  POCT Blood Glucose.: 195 mg/dL (19 Dec 2023 11:25)      Vital Signs Last 24 Hrs  T(C): 37.2 (20 Dec 2023 07:23), Max: 37.2 (20 Dec 2023 07:23)  T(F): 99 (20 Dec 2023 07:23), Max: 99 (20 Dec 2023 07:23)  HR: 95 (20 Dec 2023 07:23) (80 - 97)  BP: 159/78 (20 Dec 2023 07:23) (150/68 - 173/82)  BP(mean): --  RR: 17 (20 Dec 2023 07:23) (16 - 17)  SpO2: 94% (20 Dec 2023 07:23) (94% - 97%)    Parameters below as of 20 Dec 2023 07:23  Patient On (Oxygen Delivery Method): room air        Physical Exam: Gen - NAD in bed this am  HEENT - NCAT, EOMI  Neck - Supple, No limited ROM  Pulm - CTAB, No wheeze, No rhonchi, No crackles  Cardiovascular - RRR, S1S2, No murmurs  Abdomen - Soft, NT/ND, +BS  Extremities - LLE with recent BKA.   Uro - crain present for retention  Neuro-     Cognitive - AAOx3     Communication - Fluent, No dysarthria     Attention: Intact      Judgement: Good evidence of judgement     Memory: Recall 3 objects immediate and 3 min later         Cranial Nerves - CN 2-12 intact     Motor -                     LEFT    UE - ShAB 5/5, EF 5/5, EE 5/5, WE 5/5,  5/5                    RIGHT UE - ShAB 5/5, EF 5/5, EE 5/5, WE 5/5,  5/5                    LEFT    LE - HF 5/5, KE 5/5                    RIGHT LE - HF 5/5, KE 5/5, DF 5/5, PF 5/5        Sensory - Intact to LT     Reflexes - DTR Intact, No primitive reflexive     Coordination - FTN intact     Tone - normal  Psychiatric - Mood stable, Affect WNL  Skin:  incision site from BKA on the L residual limb. No drainage. C/d/i. skin well approximated and staples present. Stage 1 sacral decubitis ulcer present. Fungal rash of the bilateral inguinal creases  Wounds: Patient with scab of 4th digit of RLE.    IDT meeting on 12/20    SW: PH, 3STE, wife    OT:  eating set  grooming det  UBD/LBD cg/min  toileting min   tub/shower min  bathing    PT:  amb  min A  transfers min A  stairs    SLP    tentative dc 12/30 HC      Continue comprehensive acute rehab program consisting of 3hrs/day of OT/PT. HPI:  Mr. Mcdaniel is a 63M PMHx DM, HLD, PAD, prior toe amputation, presents to [facility] ED w L foot wound. Reports wound has been present on hindfoot x 3 weeks. Started as small cut. Over last 24 hours, pt unable to ambulate d/t severe pain. Reports significant malodor. Denies subjective fevers, chills, purulent drainage, numbness/paresthesia.     In ED, patient is tachycardic to 123. BP w HTN. Febrile to 102.4 F. Labs w WBC 21. Hgb 11.4. Lac 2.4. XR L foot w soft tissue gas at lateral hindfoot. CT LLE non con w soft tissue gas tracking ~7cm above foot.   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 Patient admitted to the SICU pre and post op for hyperglycemia and insulin drip management. Endocrinology was consulted for the hyperglycemia.  Patient transferred out of the SICU when he was able to be weaned off the insulin drip. Internal medicine was consulted for malachi-operative clearance for formalization surgery. On 12/4 the patient was taken back to the operating room for a left lower extremity guillotine revision. Infectious disease was consulted for antibiotic management and recommended switching from Zosyn to Unasyn. Rehab medicine was consulted to evaluate for rehab needs and recommended acute rehab on discharge.       Pt seen and examined at bedside. No acute events overnight. Slipped off toilet in OT this am, hospitalist to examine laceration. CT follow up.   Pt denies cp, palpitations, sob, abd pain, N/V, fever, chills.   BP regimen adjusted by hospitalist yesterday.  pain well controlled-Tylenol this am only  TOV- crain removed-voids freely. Will hold Flomax due to orthostatic bp, use teds and binder-hospitalist to follow up  Insulin adjusted by hospitalist-FS low      MEDICATIONS  (STANDING):  acetaminophen     Tablet .. 975 milliGRAM(s) Oral every 6 hours  AQUAPHOR (petrolatum Ointment) 1 Application(s) Topical two times a day  clotrimazole 1% Cream 1 Application(s) Topical two times a day  dextrose 5%. 1000 milliLiter(s) (100 mL/Hr) IV Continuous <Continuous>  dextrose 5%. 1000 milliLiter(s) (50 mL/Hr) IV Continuous <Continuous>  dextrose 50% Injectable 25 Gram(s) IV Push once  dextrose 50% Injectable 25 Gram(s) IV Push once  dextrose 50% Injectable 12.5 Gram(s) IV Push once  enalapril 5 milliGRAM(s) Oral <User Schedule>  enoxaparin Injectable 40 milliGRAM(s) SubCutaneous every 24 hours  gabapentin 300 milliGRAM(s) Oral three times a day  glucagon  Injectable 1 milliGRAM(s) IntraMuscular once  insulin glargine Injectable (LANTUS) 10 Unit(s) SubCutaneous at bedtime  insulin lispro (ADMELOG) corrective regimen sliding scale   SubCutaneous three times a day before meals  insulin lispro (ADMELOG) corrective regimen sliding scale   SubCutaneous at bedtime  magnesium oxide 400 milliGRAM(s) Oral three times a day with meals  metFORMIN 1000 milliGRAM(s) Oral two times a day  polyethylene glycol 3350 17 Gram(s) Oral two times a day  senna 2 Tablet(s) Oral at bedtime  simvastatin 40 milliGRAM(s) Oral at bedtime  sodium chloride 0.9%. 1000 milliLiter(s) (100 mL/Hr) IV Continuous <Continuous>    MEDICATIONS  (PRN):  dextrose Oral Gel 15 Gram(s) Oral once PRN Blood Glucose LESS THAN 70 milliGRAM(s)/deciliter  guaiFENesin Oral Liquid (Sugar-Free) 100 milliGRAM(s) Oral every 6 hours PRN Cough  oxyCODONE    IR 10 milliGRAM(s) Oral every 6 hours PRN Severe Pain (7 - 10)  oxyCODONE    IR 5 milliGRAM(s) Oral every 6 hours PRN Moderate Pain (4 - 6)          Mg     1.6     12-19          CAPILLARY BLOOD GLUCOSE      POCT Blood Glucose.: 184 mg/dL (20 Dec 2023 07:26)  POCT Blood Glucose.: 198 mg/dL (19 Dec 2023 21:20)  POCT Blood Glucose.: 126 mg/dL (19 Dec 2023 18:20)  POCT Blood Glucose.: 93 mg/dL (19 Dec 2023 18:03)  POCT Blood Glucose.: 79 mg/dL (19 Dec 2023 17:40)  POCT Blood Glucose.: 62 mg/dL (19 Dec 2023 17:09)  POCT Blood Glucose.: 62 mg/dL (19 Dec 2023 17:07)  POCT Blood Glucose.: 195 mg/dL (19 Dec 2023 11:25)      Vital Signs Last 24 Hrs  T(C): 37.2 (20 Dec 2023 07:23), Max: 37.2 (20 Dec 2023 07:23)  T(F): 99 (20 Dec 2023 07:23), Max: 99 (20 Dec 2023 07:23)  HR: 95 (20 Dec 2023 07:23) (80 - 97)  BP: 159/78 (20 Dec 2023 07:23) (150/68 - 173/82)  BP(mean): --  RR: 17 (20 Dec 2023 07:23) (16 - 17)  SpO2: 94% (20 Dec 2023 07:23) (94% - 97%)    Parameters below as of 20 Dec 2023 07:23  Patient On (Oxygen Delivery Method): room air        Physical Exam: Gen - NAD in bed this am  HEENT - NCAT, EOMI  Neck - Supple, No limited ROM  Pulm - CTAB, No wheeze, No rhonchi, No crackles  Cardiovascular - RRR, S1S2, No murmurs  Abdomen - Soft, NT/ND, +BS  Extremities - LLE with recent BKA.   Uro - crain present for retention  Neuro-     Cognitive - AAOx3     Communication - Fluent, No dysarthria     Attention: Intact      Judgement: Good evidence of judgement     Memory: Recall 3 objects immediate and 3 min later         Cranial Nerves - CN 2-12 intact     Motor -                     LEFT    UE - ShAB 5/5, EF 5/5, EE 5/5, WE 5/5,  5/5                    RIGHT UE - ShAB 5/5, EF 5/5, EE 5/5, WE 5/5,  5/5                    LEFT    LE - HF 5/5, KE 5/5                    RIGHT LE - HF 5/5, KE 5/5, DF 5/5, PF 5/5        Sensory - Intact to LT     Reflexes - DTR Intact, No primitive reflexive     Coordination - FTN intact     Tone - normal  Psychiatric - Mood stable, Affect WNL  Skin:  incision site from BKA on the L residual limb. No drainage. C/d/i. skin well approximated and staples present. Stage 1 sacral decubitis ulcer present. Fungal rash of the bilateral inguinal creases  Wounds: Patient with scab of 4th digit of RLE.    IDT meeting on 12/20    SW: PH, 3STE, wife    OT:  eating set  grooming det  UBD/LBD cg/min  toileting min   tub/shower min  bathing    PT:  amb  min A  transfers min A  stairs    SLP    tentative dc 12/30 HC      Continue comprehensive acute rehab program consisting of 3hrs/day of OT/PT. HPI:  Mr. Mcdaniel is a 63M PMHx DM, HLD, PAD, prior toe amputation, presents to [facility] ED w L foot wound. Reports wound has been present on hindfoot x 3 weeks. Started as small cut. Over last 24 hours, pt unable to ambulate d/t severe pain. Reports significant malodor. Denies subjective fevers, chills, purulent drainage, numbness/paresthesia.     In ED, patient is tachycardic to 123. BP w HTN. Febrile to 102.4 F. Labs w WBC 21. Hgb 11.4. Lac 2.4. XR L foot w soft tissue gas at lateral hindfoot. CT LLE non con w soft tissue gas tracking ~7cm above foot.   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 Patient admitted to the SICU pre and post op for hyperglycemia and insulin drip management. Endocrinology was consulted for the hyperglycemia.  Patient transferred out of the SICU when he was able to be weaned off the insulin drip. Internal medicine was consulted for malachi-operative clearance for formalization surgery. On 12/4 the patient was taken back to the operating room for a left lower extremity guillotine revision. Infectious disease was consulted for antibiotic management and recommended switching from Zosyn to Unasyn. Rehab medicine was consulted to evaluate for rehab needs and recommended acute rehab on discharge.       Pt seen and examined at bedside. No acute events overnight. Slipped off toilet in OT this am, hospitalist to examine laceration. CT follow up.   Pt denies cp, palpitations, sob, abd pain, N/V, fever, chills.   BP regimen adjusted by hospitalist yesterday.  pain well controlled-Tylenol this am only  TOV- crain removed-voids freely. Will hold Flomax due to orthostatic bp, use teds and binder-hospitalist to follow up  Insulin adjusted by hospitalist-FS low      MEDICATIONS  (STANDING):  acetaminophen     Tablet .. 975 milliGRAM(s) Oral every 6 hours  AQUAPHOR (petrolatum Ointment) 1 Application(s) Topical two times a day  clotrimazole 1% Cream 1 Application(s) Topical two times a day  dextrose 5%. 1000 milliLiter(s) (100 mL/Hr) IV Continuous <Continuous>  dextrose 5%. 1000 milliLiter(s) (50 mL/Hr) IV Continuous <Continuous>  dextrose 50% Injectable 25 Gram(s) IV Push once  dextrose 50% Injectable 25 Gram(s) IV Push once  dextrose 50% Injectable 12.5 Gram(s) IV Push once  enalapril 5 milliGRAM(s) Oral <User Schedule>  enoxaparin Injectable 40 milliGRAM(s) SubCutaneous every 24 hours  gabapentin 300 milliGRAM(s) Oral three times a day  glucagon  Injectable 1 milliGRAM(s) IntraMuscular once  insulin glargine Injectable (LANTUS) 10 Unit(s) SubCutaneous at bedtime  insulin lispro (ADMELOG) corrective regimen sliding scale   SubCutaneous three times a day before meals  insulin lispro (ADMELOG) corrective regimen sliding scale   SubCutaneous at bedtime  magnesium oxide 400 milliGRAM(s) Oral three times a day with meals  metFORMIN 1000 milliGRAM(s) Oral two times a day  polyethylene glycol 3350 17 Gram(s) Oral two times a day  senna 2 Tablet(s) Oral at bedtime  simvastatin 40 milliGRAM(s) Oral at bedtime  sodium chloride 0.9%. 1000 milliLiter(s) (100 mL/Hr) IV Continuous <Continuous>    MEDICATIONS  (PRN):  dextrose Oral Gel 15 Gram(s) Oral once PRN Blood Glucose LESS THAN 70 milliGRAM(s)/deciliter  guaiFENesin Oral Liquid (Sugar-Free) 100 milliGRAM(s) Oral every 6 hours PRN Cough  oxyCODONE    IR 10 milliGRAM(s) Oral every 6 hours PRN Severe Pain (7 - 10)  oxyCODONE    IR 5 milliGRAM(s) Oral every 6 hours PRN Moderate Pain (4 - 6)    < from: CT Head No Cont (12.20.23 @ 12:03) >    ACC: [id number] EXAM:  CT BRAIN   ORDERED BY: TONY MCKEON     PROCEDURE DATE:  12/20/2023          INTERPRETATION:  Clinical Indication: Fall, below-knee amputation,   concussion    5mm axial sections of the brain were obtained from base to vertex,  without the intravenous administration of contrast material. Coronal and   sagittal computer generated reconstructed views are available.    No prior brain imaging is available for comparison.    There is moderate ventricular and sulcal prominence for the patient's age   consistent with moderate atrophy. Small vessel white matter ischemic   changes are noted. There is no hemorrhage or mass. There is no midline   shift. Bone window examination is remarkable for right sphenoid and   bilateral maxillary sinus inflammatory changes.    IMPRESSION: Moderate atrophy and small vessel white matter ischemic   changes for the patient's age. No hemorrhage.    < end of copied text >        Mg     1.6     12-19          CAPILLARY BLOOD GLUCOSE      POCT Blood Glucose.: 184 mg/dL (20 Dec 2023 07:26)  POCT Blood Glucose.: 198 mg/dL (19 Dec 2023 21:20)  POCT Blood Glucose.: 126 mg/dL (19 Dec 2023 18:20)  POCT Blood Glucose.: 93 mg/dL (19 Dec 2023 18:03)  POCT Blood Glucose.: 79 mg/dL (19 Dec 2023 17:40)  POCT Blood Glucose.: 62 mg/dL (19 Dec 2023 17:09)  POCT Blood Glucose.: 62 mg/dL (19 Dec 2023 17:07)  POCT Blood Glucose.: 195 mg/dL (19 Dec 2023 11:25)      Vital Signs Last 24 Hrs  T(C): 37.2 (20 Dec 2023 07:23), Max: 37.2 (20 Dec 2023 07:23)  T(F): 99 (20 Dec 2023 07:23), Max: 99 (20 Dec 2023 07:23)  HR: 95 (20 Dec 2023 07:23) (80 - 97)  BP: 159/78 (20 Dec 2023 07:23) (150/68 - 173/82)  BP(mean): --  RR: 17 (20 Dec 2023 07:23) (16 - 17)  SpO2: 94% (20 Dec 2023 07:23) (94% - 97%)    Parameters below as of 20 Dec 2023 07:23  Patient On (Oxygen Delivery Method): room air        Physical Exam: Gen - NAD in bed this am  HEENT - NCAT, EOMI  Neck - Supple, No limited ROM  Pulm - CTAB, No wheeze, No rhonchi, No crackles  Cardiovascular - RRR, S1S2, No murmurs  Abdomen - Soft, NT/ND, +BS  Extremities - LLE with recent BKA.   Uro - crain present for retention  Neuro-     Cognitive - AAOx3     Communication - Fluent, No dysarthria     Attention: Intact      Judgement: Good evidence of judgement     Memory: Recall 3 objects immediate and 3 min later         Cranial Nerves - CN 2-12 intact     Motor -                     LEFT    UE - ShAB 5/5, EF 5/5, EE 5/5, WE 5/5,  5/5                    RIGHT UE - ShAB 5/5, EF 5/5, EE 5/5, WE 5/5,  5/5                    LEFT    LE - HF 5/5, KE 5/5                    RIGHT LE - HF 5/5, KE 5/5, DF 5/5, PF 5/5        Sensory - Intact to LT     Reflexes - DTR Intact, No primitive reflexive     Coordination - FTN intact     Tone - normal  Psychiatric - Mood stable, Affect WNL  Skin:  incision site from BKA on the L residual limb. No drainage. C/d/i. skin well approximated and staples present. Stage 1 sacral decubitis ulcer present. Fungal rash of the bilateral inguinal creases  Wounds: Patient with scab of 4th digit of RLE.    IDT meeting on 12/20    SW: PH, 3STE, wife    OT:  eating set  grooming det  UBD/LBD cg/min  toileting min   tub/shower min  bathing    PT:  amb  min A  transfers min A  stairs    SLP    tentative dc 12/30 HC      Continue comprehensive acute rehab program consisting of 3hrs/day of OT/PT. HPI:  Mr. Mcdaniel is a 63M PMHx DM, HLD, PAD, prior toe amputation, presents to [facility] ED w L foot wound. Reports wound has been present on hindfoot x 3 weeks. Started as small cut. Over last 24 hours, pt unable to ambulate d/t severe pain. Reports significant malodor. Denies subjective fevers, chills, purulent drainage, numbness/paresthesia.     In ED, patient is tachycardic to 123. BP w HTN. Febrile to 102.4 F. Labs w WBC 21. Hgb 11.4. Lac 2.4. XR L foot w soft tissue gas at lateral hindfoot. CT LLE non con w soft tissue gas tracking ~7cm above foot.   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 Patient admitted to the SICU pre and post op for hyperglycemia and insulin drip management. Endocrinology was consulted for the hyperglycemia.  Patient transferred out of the SICU when he was able to be weaned off the insulin drip. Internal medicine was consulted for malachi-operative clearance for formalization surgery. On 12/4 the patient was taken back to the operating room for a left lower extremity guillotine revision. Infectious disease was consulted for antibiotic management and recommended switching from Zosyn to Unasyn. Rehab medicine was consulted to evaluate for rehab needs and recommended acute rehab on discharge.       Pt seen and examined at bedside. No acute events overnight. Slipped off toilet in OT this am, hospitalist to examine laceration. CT follow up.   Pt denies cp, palpitations, sob, abd pain, N/V, fever, chills.   BP regimen adjusted by hospitalist yesterday.  pain well controlled-Tylenol this am only  TOV- crain removed-voids freely. Will hold Flomax due to orthostatic bp, use teds and binder-hospitalist to follow up  Insulin adjusted by hospitalist-FS low      MEDICATIONS  (STANDING):  acetaminophen     Tablet .. 975 milliGRAM(s) Oral every 6 hours  AQUAPHOR (petrolatum Ointment) 1 Application(s) Topical two times a day  clotrimazole 1% Cream 1 Application(s) Topical two times a day  dextrose 5%. 1000 milliLiter(s) (100 mL/Hr) IV Continuous <Continuous>  dextrose 5%. 1000 milliLiter(s) (50 mL/Hr) IV Continuous <Continuous>  dextrose 50% Injectable 25 Gram(s) IV Push once  dextrose 50% Injectable 25 Gram(s) IV Push once  dextrose 50% Injectable 12.5 Gram(s) IV Push once  enalapril 5 milliGRAM(s) Oral <User Schedule>  enoxaparin Injectable 40 milliGRAM(s) SubCutaneous every 24 hours  gabapentin 300 milliGRAM(s) Oral three times a day  glucagon  Injectable 1 milliGRAM(s) IntraMuscular once  insulin glargine Injectable (LANTUS) 10 Unit(s) SubCutaneous at bedtime  insulin lispro (ADMELOG) corrective regimen sliding scale   SubCutaneous three times a day before meals  insulin lispro (ADMELOG) corrective regimen sliding scale   SubCutaneous at bedtime  magnesium oxide 400 milliGRAM(s) Oral three times a day with meals  metFORMIN 1000 milliGRAM(s) Oral two times a day  polyethylene glycol 3350 17 Gram(s) Oral two times a day  senna 2 Tablet(s) Oral at bedtime  simvastatin 40 milliGRAM(s) Oral at bedtime  sodium chloride 0.9%. 1000 milliLiter(s) (100 mL/Hr) IV Continuous <Continuous>    MEDICATIONS  (PRN):  dextrose Oral Gel 15 Gram(s) Oral once PRN Blood Glucose LESS THAN 70 milliGRAM(s)/deciliter  guaiFENesin Oral Liquid (Sugar-Free) 100 milliGRAM(s) Oral every 6 hours PRN Cough  oxyCODONE    IR 10 milliGRAM(s) Oral every 6 hours PRN Severe Pain (7 - 10)  oxyCODONE    IR 5 milliGRAM(s) Oral every 6 hours PRN Moderate Pain (4 - 6)    < from: CT Head No Cont (12.20.23 @ 12:03) >    ACC: [id number] EXAM:  CT BRAIN   ORDERED BY: TONY MCKEON     PROCEDURE DATE:  12/20/2023          INTERPRETATION:  Clinical Indication: Fall, below-knee amputation,   concussion    5mm axial sections of the brain were obtained from base to vertex,  without the intravenous administration of contrast material. Coronal and   sagittal computer generated reconstructed views are available.    No prior brain imaging is available for comparison.    There is moderate ventricular and sulcal prominence for the patient's age   consistent with moderate atrophy. Small vessel white matter ischemic   changes are noted. There is no hemorrhage or mass. There is no midline   shift. Bone window examination is remarkable for right sphenoid and   bilateral maxillary sinus inflammatory changes.    IMPRESSION: Moderate atrophy and small vessel white matter ischemic   changes for the patient's age. No hemorrhage.    < end of copied text >        Mg     1.6     12-19          CAPILLARY BLOOD GLUCOSE      POCT Blood Glucose.: 184 mg/dL (20 Dec 2023 07:26)  POCT Blood Glucose.: 198 mg/dL (19 Dec 2023 21:20)  POCT Blood Glucose.: 126 mg/dL (19 Dec 2023 18:20)  POCT Blood Glucose.: 93 mg/dL (19 Dec 2023 18:03)  POCT Blood Glucose.: 79 mg/dL (19 Dec 2023 17:40)  POCT Blood Glucose.: 62 mg/dL (19 Dec 2023 17:09)  POCT Blood Glucose.: 62 mg/dL (19 Dec 2023 17:07)  POCT Blood Glucose.: 195 mg/dL (19 Dec 2023 11:25)      Vital Signs Last 24 Hrs  T(C): 37.2 (20 Dec 2023 07:23), Max: 37.2 (20 Dec 2023 07:23)  T(F): 99 (20 Dec 2023 07:23), Max: 99 (20 Dec 2023 07:23)  HR: 95 (20 Dec 2023 07:23) (80 - 97)  BP: 159/78 (20 Dec 2023 07:23) (150/68 - 173/82)  BP(mean): --  RR: 17 (20 Dec 2023 07:23) (16 - 17)  SpO2: 94% (20 Dec 2023 07:23) (94% - 97%)    Parameters below as of 20 Dec 2023 07:23  Patient On (Oxygen Delivery Method): room air        Physical Exam: Gen - NAD in bed this am  HEENT - NCAT, EOMI  Neck - Supple, No limited ROM  Pulm - CTAB, No wheeze, No rhonchi, No crackles  Cardiovascular - RRR, S1S2, No murmurs  Abdomen - Soft, NT/ND, +BS  Extremities - LLE with recent BKA.   Uro - crain present for retention  Neuro-     Cognitive - AAOx3     Communication - Fluent, No dysarthria     Attention: Intact      Judgement: Good evidence of judgement     Memory: Recall 3 objects immediate and 3 min later         Cranial Nerves - CN 2-12 intact     Motor -                     LEFT    UE - ShAB 5/5, EF 5/5, EE 5/5, WE 5/5,  5/5                    RIGHT UE - ShAB 5/5, EF 5/5, EE 5/5, WE 5/5,  5/5                    LEFT    LE - HF 5/5, KE 5/5                    RIGHT LE - HF 5/5, KE 5/5, DF 5/5, PF 5/5        Sensory - Intact to LT     Reflexes - DTR Intact, No primitive reflexive     Coordination - FTN intact     Tone - normal  Psychiatric - Mood stable, Affect WNL  Skin:  incision site from BKA on the L residual limb. No drainage. C/d/i. skin well approximated and staples present. Stage 1 sacral decubitis ulcer present. Fungal rash of the bilateral inguinal creases  Wounds: Patient with scab of 4th digit of RLE.    IDT meeting on 12/20    SW: PH, 3STE, wife    OT:  eating set  grooming det  UBD/LBD cg/min  toileting min   tub/shower min  bathing    PT:  amb  min A  transfers min A  stairs    SLP    tentative dc 12/30 HC      Continue comprehensive acute rehab program consisting of 3hrs/day of OT/PT.

## 2023-12-20 NOTE — PROGRESS NOTE ADULT - SUBJECTIVE AND OBJECTIVE BOX
64yo male with hx of DM2, HLD, PAD, prior toe amputation, [facility] where he p/w L foot wound, noted to have sepsis secondary to acute infected wound. 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 Postop continued IV Abx, evaluated by Endocrine for glycemic control. Pt was taken back to the OR for L BKA revision.    Pt seen and examined at bedside.  s/p mechanical fall in OT while trying to get to the walker, sustained small right upper eyelid laceration, no headaches, no LOC,  Pt denies cp, palpitations, sob, abd pain, N/V, fever, chills    also noted low BS last evening    Vital Signs Last 24 Hrs  T(C): 37.2 (20 Dec 2023 07:23), Max: 37.2 (20 Dec 2023 07:23)  T(F): 99 (20 Dec 2023 07:23), Max: 99 (20 Dec 2023 07:23)  HR: 96 (20 Dec 2023 11:08) (80 - 97)  BP: 138/70 (20 Dec 2023 11:08) (138/70 - 173/82)  BP(mean): --  RR: 17 (20 Dec 2023 07:23) (16 - 17)  SpO2: 93% (20 Dec 2023 11:08) (93% - 97%)    Parameters below as of 20 Dec 2023 11:08  Patient On (Oxygen Delivery Method): room air      GENERAL- NAD  EAR/NOSE/MOUTH/THROAT -   MMM  EYES- ALEKSANDR, conjunctiva and Sclera clear  NECK- supple  RESPIRATORY-  clear to auscultation bilaterally, non laboured breathing  CARDIOVASCULAR - SIS2, RRR  GI - soft NT BS present  EXTREMITIES-  L BKA  NEUROLOGY- no gross focal deficits  PSYCHIATRY- AAO X 3                x                    x    | x    | x            x     >-----------< x       ------------------------< x                     x                    x    | x    | x                                            Ca x     Mg 1.6   Ph x            CAPILLARY BLOOD GLUCOSE  CAPILLARY BLOOD GLUCOSE  CAPILLARY BLOOD GLUCOSE      POCT Blood Glucose.: 312 mg/dL (20 Dec 2023 12:03)  POCT Blood Glucose.: 184 mg/dL (20 Dec 2023 07:26)  POCT Blood Glucose.: 198 mg/dL (19 Dec 2023 21:20)  POCT Blood Glucose.: 126 mg/dL (19 Dec 2023 18:20)  POCT Blood Glucose.: 93 mg/dL (19 Dec 2023 18:03)  POCT Blood Glucose.: 79 mg/dL (19 Dec 2023 17:40)  POCT Blood Glucose.: 62 mg/dL (19 Dec 2023 17:09)  POCT Blood Glucose.: 62 mg/dL (19 Dec 2023 17:07)        MEDICATIONS  (STANDING):  acetaminophen     Tablet .. 975 milliGRAM(s) Oral every 6 hours  amoxicillin  875 milliGRAM(s)/clavulanate 1 Tablet(s) Oral once  AQUAPHOR (petrolatum Ointment) 1 Application(s) Topical two times a day  clotrimazole 1% Cream 1 Application(s) Topical two times a day  enalapril 5 milliGRAM(s) Oral <User Schedule>  enoxaparin Injectable 40 milliGRAM(s) SubCutaneous every 24 hours  gabapentin 300 milliGRAM(s) Oral three times a day  glucagon  Injectable 1 milliGRAM(s) IntraMuscular once  insulin glargine Injectable (LANTUS) 10 Unit(s) SubCutaneous at bedtime  insulin lispro (ADMELOG) corrective regimen sliding scale   SubCutaneous at bedtime  insulin lispro (ADMELOG) corrective regimen sliding scale   SubCutaneous three times a day before meals  magnesium oxide 400 milliGRAM(s) Oral three times a day with meals  metFORMIN 1000 milliGRAM(s) Oral two times a day  polyethylene glycol 3350 17 Gram(s) Oral two times a day  senna 2 Tablet(s) Oral at bedtime  simvastatin 40 milliGRAM(s) Oral at bedtime  sodium chloride 0.9%. 1000 milliLiter(s) (100 mL/Hr) IV Continuous <Continuous>    MEDICATIONS  (PRN):  dextrose Oral Gel 15 Gram(s) Oral once PRN Blood Glucose LESS THAN 70 milliGRAM(s)/deciliter  guaiFENesin Oral Liquid (Sugar-Free) 100 milliGRAM(s) Oral every 6 hours PRN Cough  oxyCODONE    IR 10 milliGRAM(s) Oral every 6 hours PRN Severe Pain (7 - 10)  oxyCODONE    IR 5 milliGRAM(s) Oral every 6 hours PRN Moderate Pain (4 - 6)   62yo male with hx of DM2, HLD, PAD, prior toe amputation, presented to [facility] for rehab from [facility] where he p/w L foot wound, noted to have sepsis secondary to acute infected wound. 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 Postop continued IV Abx, evaluated by Endocrine for glycemic control. Pt was taken back to the OR for L BKA revision.    Pt seen and examined at bedside.  s/p mechanical fall in OT while trying to get to the walker, sustained small right upper eyelid laceration, no headaches, no LOC,  Pt denies cp, palpitations, sob, abd pain, N/V, fever, chills    also noted low BS last evening    Vital Signs Last 24 Hrs  T(C): 37.2 (20 Dec 2023 07:23), Max: 37.2 (20 Dec 2023 07:23)  T(F): 99 (20 Dec 2023 07:23), Max: 99 (20 Dec 2023 07:23)  HR: 96 (20 Dec 2023 11:08) (80 - 97)  BP: 138/70 (20 Dec 2023 11:08) (138/70 - 173/82)  BP(mean): --  RR: 17 (20 Dec 2023 07:23) (16 - 17)  SpO2: 93% (20 Dec 2023 11:08) (93% - 97%)    Parameters below as of 20 Dec 2023 11:08  Patient On (Oxygen Delivery Method): room air      GENERAL- NAD  EAR/NOSE/MOUTH/THROAT -   MMM  EYES- ALEKSANDR, conjunctiva and Sclera clear  NECK- supple  RESPIRATORY-  clear to auscultation bilaterally, non laboured breathing  CARDIOVASCULAR - SIS2, RRR  GI - soft NT BS present  EXTREMITIES-  L BKA  NEUROLOGY- no gross focal deficits  PSYCHIATRY- AAO X 3                x                    x    | x    | x            x     >-----------< x       ------------------------< x                     x                    x    | x    | x                                            Ca x     Mg 1.6   Ph x            CAPILLARY BLOOD GLUCOSE  CAPILLARY BLOOD GLUCOSE  CAPILLARY BLOOD GLUCOSE      POCT Blood Glucose.: 312 mg/dL (20 Dec 2023 12:03)  POCT Blood Glucose.: 184 mg/dL (20 Dec 2023 07:26)  POCT Blood Glucose.: 198 mg/dL (19 Dec 2023 21:20)  POCT Blood Glucose.: 126 mg/dL (19 Dec 2023 18:20)  POCT Blood Glucose.: 93 mg/dL (19 Dec 2023 18:03)  POCT Blood Glucose.: 79 mg/dL (19 Dec 2023 17:40)  POCT Blood Glucose.: 62 mg/dL (19 Dec 2023 17:09)  POCT Blood Glucose.: 62 mg/dL (19 Dec 2023 17:07)        MEDICATIONS  (STANDING):  acetaminophen     Tablet .. 975 milliGRAM(s) Oral every 6 hours  amoxicillin  875 milliGRAM(s)/clavulanate 1 Tablet(s) Oral once  AQUAPHOR (petrolatum Ointment) 1 Application(s) Topical two times a day  clotrimazole 1% Cream 1 Application(s) Topical two times a day  enalapril 5 milliGRAM(s) Oral <User Schedule>  enoxaparin Injectable 40 milliGRAM(s) SubCutaneous every 24 hours  gabapentin 300 milliGRAM(s) Oral three times a day  glucagon  Injectable 1 milliGRAM(s) IntraMuscular once  insulin glargine Injectable (LANTUS) 10 Unit(s) SubCutaneous at bedtime  insulin lispro (ADMELOG) corrective regimen sliding scale   SubCutaneous at bedtime  insulin lispro (ADMELOG) corrective regimen sliding scale   SubCutaneous three times a day before meals  magnesium oxide 400 milliGRAM(s) Oral three times a day with meals  metFORMIN 1000 milliGRAM(s) Oral two times a day  polyethylene glycol 3350 17 Gram(s) Oral two times a day  senna 2 Tablet(s) Oral at bedtime  simvastatin 40 milliGRAM(s) Oral at bedtime  sodium chloride 0.9%. 1000 milliLiter(s) (100 mL/Hr) IV Continuous <Continuous>    MEDICATIONS  (PRN):  dextrose Oral Gel 15 Gram(s) Oral once PRN Blood Glucose LESS THAN 70 milliGRAM(s)/deciliter  guaiFENesin Oral Liquid (Sugar-Free) 100 milliGRAM(s) Oral every 6 hours PRN Cough  oxyCODONE    IR 10 milliGRAM(s) Oral every 6 hours PRN Severe Pain (7 - 10)  oxyCODONE    IR 5 milliGRAM(s) Oral every 6 hours PRN Moderate Pain (4 - 6)

## 2023-12-20 NOTE — CONSULT NOTE ADULT - SUBJECTIVE AND OBJECTIVE BOX
See dictated note.  Written informed consent obtained.  Tolerated repair of right upper eyelid.  Augmentin for 3 days.  F/u as outpt after discharged from rehab.  Discussed w pt and family at bedside and reassured.

## 2023-12-20 NOTE — PROGRESS NOTE ADULT - NS ATTEND AMEND GEN_ALL_CORE FT
Rehab Attending- Patient seen and examined by me - Case discussed, above note reviewed by me with modifications made    patient seen and evaluated bedside  BPS still low on sitting/ standing- no syncope today  voiding well- residuals all < 200cc-  Vasotec decreased- have DCd Flomax- no issue with voiding PTA- NEED TO FOLLOW PVRS OFF FLOMAX  Hopefully Orthostatics resolve off flomax- ? need for midodrine if not improved  Hospitalist ordered Echo- EF 65-70%  to continue intensive rehab program    Vital Signs Last 24 Hrs  T(C): 37.2 (19 Dec 2023 07:34), Max: 37.2 (19 Dec 2023 07:34)  T(F): 99 (19 Dec 2023 07:34), Max: 99 (19 Dec 2023 07:34)  HR: 97 (19 Dec 2023 17:13) (71 - 97)  BP: 173/73 (19 Dec 2023 17:13) (83/50 - 173/82)  BP(mean): --  RR: 16 (19 Dec 2023 17:13) (16 - 16)  SpO2: 95% (19 Dec 2023 17:13) (93% - 95%)    Parameters below as of 19 Dec 2023 17:13  Patient On (Oxygen Delivery Method): room air    Physical Exam: Gen - NAD, Comfortable  HEENT - NCAT, EOMI  Neck - Supple, No limited ROM  Pulm - CTAB, No wheeze, No rhonchi, No crackles  Cardiovascular - RRR, S1S2, No murmurs  Abdomen - Soft, NT/ND, +BS  Extremities - LLE with recent BKA.   Uro - crain present for retention  Neuro-     Cognitive - AAOx3     Communication - Fluent, No dysarthria     Attention: Intact      Judgement: Good evidence of judgement     Memory: Recall 3 objects immediate and 3 min later         Cranial Nerves - CN 2-12 intact     Motor -                     LEFT    UE - ShAB 5/5, EF 5/5, EE 5/5, WE 5/5,  5/5                    RIGHT UE - ShAB 5/5, EF 5/5, EE 5/5, WE 5/5,  5/5                    LEFT    LE - HF 5/5, KE 5/5                    RIGHT LE - HF 5/5, KE 5/5, DF 5/5, PF 5/5        Sensory - Intact to LT     Reflexes - DTR Intact, No primitive reflexive     Coordination - FTN intact     Tone - normal  Psychiatric - Mood stable, Affect WNL  Skin:  incision site from BKA on the L residual limb. No drainage. C/d/i.   Wounds: Patient with scab of 4th digit of RLE. Rehab Attending- Patient seen and examined by me - Case discussed, above note reviewed by me with modifications made    patient seen and evaluated in OT  Fell off toilet attempting to get up with walker  Hit Left knee  laceration Right Upper lid  seen By plastics and lid sutured  Head CT done- neg  Off Flomax - residuals 200cc  Left Knee immobilizer to be worn at all times secondary to risk of falling  to continue intensive rehab program    20 additional minutes spent today on coordination of care including team conference as well as conversation with patient's wife Mabel and sister Nikole - I updated them on plan of care- All questions were answered by me as well.        Vital Signs Last 24 Hrs  T(C): 37.2 (19 Dec 2023 07:34), Max: 37.2 (19 Dec 2023 07:34)  T(F): 99 (19 Dec 2023 07:34), Max: 99 (19 Dec 2023 07:34)  HR: 97 (19 Dec 2023 17:13) (71 - 97)  BP: 173/73 (19 Dec 2023 17:13) (83/50 - 173/82)  BP(mean): --  RR: 16 (19 Dec 2023 17:13) (16 - 16)  SpO2: 95% (19 Dec 2023 17:13) (93% - 95%)    Parameters below as of 19 Dec 2023 17:13  Patient On (Oxygen Delivery Method): room air    Physical Exam: Gen - NAD, Comfortable  HEENT - NCAT, EOMI  Neck - Supple, No limited ROM  Pulm - CTAB, No wheeze, No rhonchi, No crackles  Cardiovascular - RRR, S1S2, No murmurs  Abdomen - Soft, NT/ND, +BS  Extremities - LLE with recent BKA.   Uro - crain present for retention  Neuro-     Cognitive - AAOx3     Communication - Fluent, No dysarthria     Attention: Intact      Judgement: Good evidence of judgement     Memory: Recall 3 objects immediate and 3 min later         Cranial Nerves - CN 2-12 intact Right Eye laceration sutured/ with steri     Motor -                     LEFT    UE - ShAB 5/5, EF 5/5, EE 5/5, WE 5/5,  5/5                    RIGHT UE - ShAB 5/5, EF 5/5, EE 5/5, WE 5/5,  5/5                    LEFT    LE - HF 5/5, KE 5/5                    RIGHT LE - HF 5/5, KE 5/5, DF 5/5, PF 5/5        Sensory - Intact to LT     Reflexes - DTR Intact, No primitive reflexive     Coordination - FTN intact     Tone - normal  Psychiatric - Mood stable, Affect WNL  Skin:  incision site from BKA on the L residual limb. No drainage. C/d/i. - Left BK incision intact- some erythema/ bruising over patella and patella tendon  Wounds: Patient with scab of 4th digit of RLE.

## 2023-12-21 ENCOUNTER — TRANSCRIPTION ENCOUNTER (OUTPATIENT)
Age: 63
End: 2023-12-21

## 2023-12-21 LAB
ALBUMIN SERPL ELPH-MCNC: 2.1 G/DL — LOW (ref 3.3–5)
ALBUMIN SERPL ELPH-MCNC: 2.1 G/DL — LOW (ref 3.3–5)
ALP SERPL-CCNC: 138 U/L — HIGH (ref 40–120)
ALP SERPL-CCNC: 138 U/L — HIGH (ref 40–120)
ALT FLD-CCNC: 54 U/L — HIGH (ref 10–45)
ALT FLD-CCNC: 54 U/L — HIGH (ref 10–45)
ANION GAP SERPL CALC-SCNC: 10 MMOL/L — SIGNIFICANT CHANGE UP (ref 5–17)
ANION GAP SERPL CALC-SCNC: 10 MMOL/L — SIGNIFICANT CHANGE UP (ref 5–17)
APPEARANCE UR: ABNORMAL
APPEARANCE UR: ABNORMAL
AST SERPL-CCNC: 32 U/L — SIGNIFICANT CHANGE UP (ref 10–40)
AST SERPL-CCNC: 32 U/L — SIGNIFICANT CHANGE UP (ref 10–40)
BACTERIA # UR AUTO: ABNORMAL /HPF
BACTERIA # UR AUTO: ABNORMAL /HPF
BILIRUB SERPL-MCNC: 0.3 MG/DL — SIGNIFICANT CHANGE UP (ref 0.2–1.2)
BILIRUB SERPL-MCNC: 0.3 MG/DL — SIGNIFICANT CHANGE UP (ref 0.2–1.2)
BILIRUB UR-MCNC: NEGATIVE — SIGNIFICANT CHANGE UP
BILIRUB UR-MCNC: NEGATIVE — SIGNIFICANT CHANGE UP
BUN SERPL-MCNC: 15 MG/DL — SIGNIFICANT CHANGE UP (ref 7–23)
BUN SERPL-MCNC: 15 MG/DL — SIGNIFICANT CHANGE UP (ref 7–23)
CALCIUM SERPL-MCNC: 9.4 MG/DL — SIGNIFICANT CHANGE UP (ref 8.4–10.5)
CALCIUM SERPL-MCNC: 9.4 MG/DL — SIGNIFICANT CHANGE UP (ref 8.4–10.5)
CHLORIDE SERPL-SCNC: 100 MMOL/L — SIGNIFICANT CHANGE UP (ref 96–108)
CHLORIDE SERPL-SCNC: 100 MMOL/L — SIGNIFICANT CHANGE UP (ref 96–108)
CO2 SERPL-SCNC: 27 MMOL/L — SIGNIFICANT CHANGE UP (ref 22–31)
CO2 SERPL-SCNC: 27 MMOL/L — SIGNIFICANT CHANGE UP (ref 22–31)
COLOR SPEC: YELLOW — SIGNIFICANT CHANGE UP
COLOR SPEC: YELLOW — SIGNIFICANT CHANGE UP
CREAT SERPL-MCNC: 0.86 MG/DL — SIGNIFICANT CHANGE UP (ref 0.5–1.3)
CREAT SERPL-MCNC: 0.86 MG/DL — SIGNIFICANT CHANGE UP (ref 0.5–1.3)
DIFF PNL FLD: ABNORMAL
DIFF PNL FLD: ABNORMAL
EGFR: 97 ML/MIN/1.73M2 — SIGNIFICANT CHANGE UP
EGFR: 97 ML/MIN/1.73M2 — SIGNIFICANT CHANGE UP
GLUCOSE BLDC GLUCOMTR-MCNC: 139 MG/DL — HIGH (ref 70–99)
GLUCOSE BLDC GLUCOMTR-MCNC: 139 MG/DL — HIGH (ref 70–99)
GLUCOSE BLDC GLUCOMTR-MCNC: 152 MG/DL — HIGH (ref 70–99)
GLUCOSE BLDC GLUCOMTR-MCNC: 152 MG/DL — HIGH (ref 70–99)
GLUCOSE BLDC GLUCOMTR-MCNC: 188 MG/DL — HIGH (ref 70–99)
GLUCOSE BLDC GLUCOMTR-MCNC: 188 MG/DL — HIGH (ref 70–99)
GLUCOSE BLDC GLUCOMTR-MCNC: 236 MG/DL — HIGH (ref 70–99)
GLUCOSE BLDC GLUCOMTR-MCNC: 236 MG/DL — HIGH (ref 70–99)
GLUCOSE SERPL-MCNC: 240 MG/DL — HIGH (ref 70–99)
GLUCOSE SERPL-MCNC: 240 MG/DL — HIGH (ref 70–99)
GLUCOSE UR QL: 250 MG/DL
GLUCOSE UR QL: 250 MG/DL
HCT VFR BLD CALC: 32.7 % — LOW (ref 39–50)
HCT VFR BLD CALC: 32.7 % — LOW (ref 39–50)
HCT VFR BLD CALC: 33 % — LOW (ref 39–50)
HCT VFR BLD CALC: 33 % — LOW (ref 39–50)
HGB BLD-MCNC: 10.3 G/DL — LOW (ref 13–17)
HGB BLD-MCNC: 10.3 G/DL — LOW (ref 13–17)
HGB BLD-MCNC: 10.4 G/DL — LOW (ref 13–17)
HGB BLD-MCNC: 10.4 G/DL — LOW (ref 13–17)
KETONES UR-MCNC: ABNORMAL MG/DL
KETONES UR-MCNC: ABNORMAL MG/DL
LEUKOCYTE ESTERASE UR-ACNC: ABNORMAL
LEUKOCYTE ESTERASE UR-ACNC: ABNORMAL
MCHC RBC-ENTMCNC: 26 PG — LOW (ref 27–34)
MCHC RBC-ENTMCNC: 26 PG — LOW (ref 27–34)
MCHC RBC-ENTMCNC: 26.3 PG — LOW (ref 27–34)
MCHC RBC-ENTMCNC: 26.3 PG — LOW (ref 27–34)
MCHC RBC-ENTMCNC: 31.5 GM/DL — LOW (ref 32–36)
MCV RBC AUTO: 82.6 FL — SIGNIFICANT CHANGE UP (ref 80–100)
MCV RBC AUTO: 82.6 FL — SIGNIFICANT CHANGE UP (ref 80–100)
MCV RBC AUTO: 83.3 FL — SIGNIFICANT CHANGE UP (ref 80–100)
MCV RBC AUTO: 83.3 FL — SIGNIFICANT CHANGE UP (ref 80–100)
NITRITE UR-MCNC: POSITIVE
NITRITE UR-MCNC: POSITIVE
NRBC # BLD: 0 /100 WBCS — SIGNIFICANT CHANGE UP (ref 0–0)
PH UR: 5.5 — SIGNIFICANT CHANGE UP (ref 5–8)
PH UR: 5.5 — SIGNIFICANT CHANGE UP (ref 5–8)
PLATELET # BLD AUTO: 437 K/UL — HIGH (ref 150–400)
PLATELET # BLD AUTO: 437 K/UL — HIGH (ref 150–400)
PLATELET # BLD AUTO: 444 K/UL — HIGH (ref 150–400)
PLATELET # BLD AUTO: 444 K/UL — HIGH (ref 150–400)
POTASSIUM SERPL-MCNC: 4.3 MMOL/L — SIGNIFICANT CHANGE UP (ref 3.5–5.3)
POTASSIUM SERPL-MCNC: 4.3 MMOL/L — SIGNIFICANT CHANGE UP (ref 3.5–5.3)
POTASSIUM SERPL-SCNC: 4.3 MMOL/L — SIGNIFICANT CHANGE UP (ref 3.5–5.3)
POTASSIUM SERPL-SCNC: 4.3 MMOL/L — SIGNIFICANT CHANGE UP (ref 3.5–5.3)
PROCALCITONIN SERPL-MCNC: 0.55 NG/ML — HIGH
PROCALCITONIN SERPL-MCNC: 0.55 NG/ML — HIGH
PROCALCITONIN SERPL-MCNC: 0.6 NG/ML — HIGH
PROCALCITONIN SERPL-MCNC: 0.6 NG/ML — HIGH
PROT SERPL-MCNC: 7.9 G/DL — SIGNIFICANT CHANGE UP (ref 6–8.3)
PROT SERPL-MCNC: 7.9 G/DL — SIGNIFICANT CHANGE UP (ref 6–8.3)
PROT UR-MCNC: 100 MG/DL
PROT UR-MCNC: 100 MG/DL
RBC # BLD: 3.96 M/UL — LOW (ref 4.2–5.8)
RBC # FLD: 16 % — HIGH (ref 10.3–14.5)
RBC CASTS # UR COMP ASSIST: 7 /HPF — HIGH (ref 0–4)
RBC CASTS # UR COMP ASSIST: 7 /HPF — HIGH (ref 0–4)
SODIUM SERPL-SCNC: 137 MMOL/L — SIGNIFICANT CHANGE UP (ref 135–145)
SODIUM SERPL-SCNC: 137 MMOL/L — SIGNIFICANT CHANGE UP (ref 135–145)
SP GR SPEC: 1.03 — SIGNIFICANT CHANGE UP (ref 1–1.03)
SP GR SPEC: 1.03 — SIGNIFICANT CHANGE UP (ref 1–1.03)
UROBILINOGEN FLD QL: 0.2 MG/DL — SIGNIFICANT CHANGE UP (ref 0.2–1)
UROBILINOGEN FLD QL: 0.2 MG/DL — SIGNIFICANT CHANGE UP (ref 0.2–1)
WBC # BLD: 19.9 K/UL — HIGH (ref 3.8–10.5)
WBC # BLD: 19.9 K/UL — HIGH (ref 3.8–10.5)
WBC # BLD: 20.63 K/UL — HIGH (ref 3.8–10.5)
WBC # BLD: 20.63 K/UL — HIGH (ref 3.8–10.5)
WBC # FLD AUTO: 19.9 K/UL — HIGH (ref 3.8–10.5)
WBC # FLD AUTO: 19.9 K/UL — HIGH (ref 3.8–10.5)
WBC # FLD AUTO: 20.63 K/UL — HIGH (ref 3.8–10.5)
WBC # FLD AUTO: 20.63 K/UL — HIGH (ref 3.8–10.5)
WBC UR QL: >50 /HPF — HIGH (ref 0–5)
WBC UR QL: >50 /HPF — HIGH (ref 0–5)

## 2023-12-21 PROCEDURE — 99232 SBSQ HOSP IP/OBS MODERATE 35: CPT | Mod: GC

## 2023-12-21 PROCEDURE — 99233 SBSQ HOSP IP/OBS HIGH 50: CPT

## 2023-12-21 RX ORDER — INSULIN GLARGINE 100 [IU]/ML
20 INJECTION, SOLUTION SUBCUTANEOUS AT BEDTIME
Refills: 0 | Status: DISCONTINUED | OUTPATIENT
Start: 2023-12-21 | End: 2023-12-25

## 2023-12-21 RX ORDER — INSULIN GLARGINE 100 [IU]/ML
24 INJECTION, SOLUTION SUBCUTANEOUS AT BEDTIME
Refills: 0 | Status: DISCONTINUED | OUTPATIENT
Start: 2023-12-21 | End: 2023-12-21

## 2023-12-21 RX ORDER — LINAGLIPTIN 5 MG/1
5 TABLET, FILM COATED ORAL
Refills: 0 | Status: DISCONTINUED | OUTPATIENT
Start: 2023-12-21 | End: 2023-12-21

## 2023-12-21 RX ORDER — INSULIN LISPRO 100/ML
8 VIAL (ML) SUBCUTANEOUS ONCE
Refills: 0 | Status: COMPLETED | OUTPATIENT
Start: 2023-12-21 | End: 2023-12-21

## 2023-12-21 RX ORDER — INSULIN LISPRO 100/ML
4 VIAL (ML) SUBCUTANEOUS
Refills: 0 | Status: DISCONTINUED | OUTPATIENT
Start: 2023-12-21 | End: 2023-12-30

## 2023-12-21 RX ORDER — INSULIN LISPRO 100/ML
8 VIAL (ML) SUBCUTANEOUS
Refills: 0 | Status: DISCONTINUED | OUTPATIENT
Start: 2023-12-21 | End: 2023-12-21

## 2023-12-21 RX ORDER — LINAGLIPTIN 5 MG/1
5 TABLET, FILM COATED ORAL AT BEDTIME
Refills: 0 | Status: DISCONTINUED | OUTPATIENT
Start: 2023-12-21 | End: 2023-12-30

## 2023-12-21 RX ADMIN — Medication 4: at 08:23

## 2023-12-21 RX ADMIN — Medication 4 UNIT(S): at 08:24

## 2023-12-21 RX ADMIN — OXYCODONE HYDROCHLORIDE 10 MILLIGRAM(S): 5 TABLET ORAL at 17:51

## 2023-12-21 RX ADMIN — BIMATOPROST 1 DROP(S): 0.3 SOLUTION/ DROPS OPHTHALMIC at 21:25

## 2023-12-21 RX ADMIN — Medication 975 MILLIGRAM(S): at 17:19

## 2023-12-21 RX ADMIN — Medication 975 MILLIGRAM(S): at 05:42

## 2023-12-21 RX ADMIN — Medication 975 MILLIGRAM(S): at 06:48

## 2023-12-21 RX ADMIN — Medication 975 MILLIGRAM(S): at 17:18

## 2023-12-21 RX ADMIN — Medication 1 APPLICATION(S): at 06:48

## 2023-12-21 RX ADMIN — GABAPENTIN 300 MILLIGRAM(S): 400 CAPSULE ORAL at 05:42

## 2023-12-21 RX ADMIN — OXYCODONE HYDROCHLORIDE 10 MILLIGRAM(S): 5 TABLET ORAL at 17:19

## 2023-12-21 RX ADMIN — Medication 975 MILLIGRAM(S): at 12:17

## 2023-12-21 RX ADMIN — GABAPENTIN 300 MILLIGRAM(S): 400 CAPSULE ORAL at 21:26

## 2023-12-21 RX ADMIN — Medication 1 TABLET(S): at 05:42

## 2023-12-21 RX ADMIN — Medication 4 UNIT(S): at 17:22

## 2023-12-21 RX ADMIN — LINAGLIPTIN 5 MILLIGRAM(S): 5 TABLET, FILM COATED ORAL at 21:23

## 2023-12-21 RX ADMIN — METFORMIN HYDROCHLORIDE 1000 MILLIGRAM(S): 850 TABLET ORAL at 17:18

## 2023-12-21 RX ADMIN — Medication 975 MILLIGRAM(S): at 12:16

## 2023-12-21 RX ADMIN — MAGNESIUM OXIDE 400 MG ORAL TABLET 400 MILLIGRAM(S): 241.3 TABLET ORAL at 17:18

## 2023-12-21 RX ADMIN — Medication 8 UNIT(S): at 12:17

## 2023-12-21 RX ADMIN — ENOXAPARIN SODIUM 40 MILLIGRAM(S): 100 INJECTION SUBCUTANEOUS at 05:42

## 2023-12-21 RX ADMIN — Medication 1 APPLICATION(S): at 05:48

## 2023-12-21 RX ADMIN — SIMVASTATIN 40 MILLIGRAM(S): 20 TABLET, FILM COATED ORAL at 21:25

## 2023-12-21 RX ADMIN — MAGNESIUM OXIDE 400 MG ORAL TABLET 400 MILLIGRAM(S): 241.3 TABLET ORAL at 08:23

## 2023-12-21 RX ADMIN — MAGNESIUM OXIDE 400 MG ORAL TABLET 400 MILLIGRAM(S): 241.3 TABLET ORAL at 12:16

## 2023-12-21 RX ADMIN — Medication 10 MILLIGRAM(S): at 15:03

## 2023-12-21 RX ADMIN — Medication 1 TABLET(S): at 17:18

## 2023-12-21 RX ADMIN — Medication 1 APPLICATION(S): at 21:43

## 2023-12-21 RX ADMIN — METFORMIN HYDROCHLORIDE 1000 MILLIGRAM(S): 850 TABLET ORAL at 08:23

## 2023-12-21 RX ADMIN — Medication 2: at 12:17

## 2023-12-21 RX ADMIN — Medication 1 APPLICATION(S): at 21:42

## 2023-12-21 RX ADMIN — GABAPENTIN 300 MILLIGRAM(S): 400 CAPSULE ORAL at 15:03

## 2023-12-21 RX ADMIN — INSULIN GLARGINE 20 UNIT(S): 100 INJECTION, SOLUTION SUBCUTANEOUS at 21:42

## 2023-12-21 NOTE — DISCHARGE NOTE NURSING/CASE MANAGEMENT/SOCIAL WORK - NSDCCRNAME_GEN_ALL_CORE_FT
Transportation resources:  1.) Private ambulette companies/Out of pocket cost  2.) [facility] Access a ride: Can call [phone] to schedule process with their MD for assessment  3.) [facility] [phone] 4.) [employer] [phone] Transportation resources:  1.) Private ambulette companies/Out of pocket cost  2.) [facility] Access a ride: Can call [phone] to schedule process with their MD for assessment  3.) [facility]/[place] [phone] 4.) [employer] [phone]

## 2023-12-21 NOTE — DISCHARGE NOTE NURSING/CASE MANAGEMENT/SOCIAL WORK - NSSCCARECORD_GEN_ALL_CORE
Home Care Agency/Durable Medical Equipment Agency Home Care Agency/Durable Medical Equipment Agency/Community [facility] Home Care Agency/Durable Medical Equipment Agency/Community [facility]

## 2023-12-21 NOTE — PROGRESS NOTE ADULT - ASSESSMENT
ELVIS VILLATORO is a 63y with a history of diabetes mellitus type 2, HTN, dyslipidemia, retinopathy who presented to [facility] on 11/29/23  with complaint of non-healing left foot wound and found to have osteomyelitis. Hospital course complicated by hyperglycemia and need for surgical revision. Now admitted to [facility] after for initiation of a multidisciplinary rehab program consisting focused on functional mobility, transfers and ADLs.     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   - s/p revision on 12/4 and formalization 12/12.   Comprehensive Multidisciplinary Rehab Program:  comprehensive rehab program, PT/OT/SLP 3 hours a day, 5 days a week.  - NWB left lower extremity  - Precautions: falls  - completed Unasyn per ID at previous hospital-observe off abx. Afebrile, no leukocytosis   - s/p fall in OT-[facility]H follow up  -plastics to see for right lid laceration    HLD  -Simvastatin    Diabetes mellitus type 2  -ISS  - Home meds: metformin 1000mg BID, glipizide   - Lantus  - Admelog TID  - hospitalist to adjust  Diabetic nursing to see and advise    HTN  -enalapril increased back to 10mg  12PM daily-by hospitalist   -will hold Flomax for low BP  to Follow BPS  repeat orthostatic BPs in AM    Urinary Retention  -Tamsulosin on hold  - presented with crain catheter  - TOV-voids freely  + UA - C&S sent- + Leukocytosis  Hospitalist to adjust antibiotics- On augmentin after trauma to right eyelid yesterday per plastics    Pain Management:  - Tylenol q6h  - oxy5mg, 10mg mod and severe pain  - gabapentin 300mg TID    GI/Bowel:  - patient reports regular BMs  - Senna QHS, Miralax Daily    Skin/Pressure Injury:   - At risk for pressure injury due to neurologic diagnosis and relative immobility.  - Skin assessment on admission: stage 1 sacral ulcer, tinea cruris present, scab of 4th digit R foot  - barrier cream for sacral wound  - butenafine daily for jock itch  - Monitor Incisions: LLE residual stump with sutures  - Daily dressing changes  - aquaphor for dry R foot.    Diet:  - Diet Consistency/Modifications: Consistent Carb  - Nutrition consult for assessment and recs    DVT ppx:  - Lovenox daily dvt ppx  ---------------  Code Status/Emergency Contact:    Outpatient Follow-up (Specialty/Name of physician):  Jose Francisco Dean  Vascular Surgery  [address] [id number]  [place] [address]  Phone: [phone]  Fax: [phone]  Follow Up Time: 2 weeks  [facility]  Endocrinology  [address] [place] [address]  Phone: [phone]  Fax:   Follow Up Time: 2 weeks    [facility] [place]  Urology  [address] [place] [address]  Phone: [phone]  Follow Up Time: 1 week    Eduin Gutierrez MD  Attending Physician  [facility]  Division of Infectous Diseases  [phone]    Needs podiatry/vascular and optho f/u care.     Endocrine faculty practice.   [address] [place] [address]. Phone .   ELVIS VILLATORO is a 63y with a history of diabetes mellitus type 2, HTN, dyslipidemia, retinopathy who presented to [facility] on 11/29/23  with complaint of non-healing left foot wound and found to have osteomyelitis. Hospital course complicated by hyperglycemia and need for surgical revision. Now admitted to [facility] after for initiation of a multidisciplinary rehab program consisting focused on functional mobility, transfers and ADLs.     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   - s/p revision on 12/4 and formalization 12/12.   Comprehensive Multidisciplinary Rehab Program:  comprehensive rehab program, PT/OT/SLP 3 hours a day, 5 days a week.  - NWB left lower extremity  - Precautions: falls  - completed Unasyn per ID at previous hospital-observe off abx. Afebrile, no leukocytosis   - s/p fall in OT-CTH follow up  -plastics to see for right lid laceration    HLD  -Simvastatin    Diabetes mellitus type 2  -ISS  - Home meds: metformin 1000mg BID, glipizide   - Lantus  - Admelog TID  - hospitalist to adjust  Diabetic nursing to see and advise    HTN  -enalapril increased back to 10mg  12PM daily-by hospitalist   -will hold Flomax for low BP  to Follow BPS  repeat orthostatic BPs in AM    Urinary Retention  -Tamsulosin on hold  - presented with crain catheter  - TOV-voids freely  + UA - C&S sent- + Leukocytosis  Hospitalist to adjust antibiotics- On augmentin after trauma to right eyelid yesterday per plastics    Pain Management:  - Tylenol q6h  - oxy5mg, 10mg mod and severe pain  - gabapentin 300mg TID    GI/Bowel:  - patient reports regular BMs  - Senna QHS, Miralax Daily    Skin/Pressure Injury:   - At risk for pressure injury due to neurologic diagnosis and relative immobility.  - Skin assessment on admission: stage 1 sacral ulcer, tinea cruris present, scab of 4th digit R foot  - barrier cream for sacral wound  - butenafine daily for jock itch  - Monitor Incisions: LLE residual stump with sutures  - Daily dressing changes  - aquaphor for dry R foot.    Diet:  - Diet Consistency/Modifications: Consistent Carb  - Nutrition consult for assessment and recs    DVT ppx:  - Lovenox daily dvt ppx  ---------------  Code Status/Emergency Contact:    Outpatient Follow-up (Specialty/Name of physician):  Jose Francisco Dean  Vascular Surgery  [address] [id number]  [place] [address]  Phone: [phone]  Fax: [phone]  Follow Up Time: 2 weeks  [facility]  Endocrinology  [address] [place] [address]  Phone: [phone]  Fax:   Follow Up Time: 2 weeks    [facility] [place]  Urology  [address] [place] [address]  Phone: [phone]  Follow Up Time: 1 week    Eduin Gutierrez MD  Attending Physician  [facility]  Division of Infectous Diseases  [phone]    Needs podiatry/vascular and optho f/u care.     Endocrine faculty practice.   [address] [place] [address]. Phone .

## 2023-12-21 NOTE — ADVANCED PRACTICE NURSE CONSULT - RECOMMEDATIONS
1.	BG Goal 100- 180 mg/dL  2.	Increase insulin glargine Injectable (LANTUS) from 16 to 20 Unit(s) SubCutaneous at bedtime  3.	C/W insulin lispro (ADMELOG) moderate corrective regimen sliding scale   SubCutaneous three times a day before meals  4.	C/W insulin lispro (ADMELOG) moderate corrective regimen sliding scale   SubCutaneous at bedtime  5.	C/W insulin lispro Injectable (ADMELOG) 4 Unit(s) SubCutaneous three times a day before meals  6.	C/W metFORMIN 1000 milliGRAM(s) Oral two times a day  7.	Add Linagliptin 5 mg orally once a day by mouth    Discharge Recommendations:  1.	Tresiba flextouch [id number] units/ml take 20 units subcutaneously daily- dose per needs at time of discharge  2.	Humalog kwikpen 100 units/ml SubCutaneous 4  before meals, three times/day (on favorite list) dose per needs at time of discharge- may not need  3.	 Insulin pen needles 4 mm- marla- (on favorite list)  4.	Alcohol swabs- (on favorite list)  5.	BG meter per Insurance- (on favorite list)  6.	 BG test strips per insurance ( on favorite list)  7.	Lancets- per insurance -(on favorite list)  8.	Follow up with PCP  9.	Follow up with [facility] [address] [place] [address] and Diabetes Educator  10.	Metformin 1000 mg tablet take 1 tablets twice a day with food  11.	Linagliptin 5 mg orally once a day- may  substitute Januvia 100 mg orally daily   1.	BG Goal 100- 180 mg/dL  2.	Increase insulin glargine Injectable (LANTUS) from 16 to 20 Unit(s) SubCutaneous at bedtime  3.	C/W insulin lispro (ADMELOG) moderate corrective regimen sliding scale   SubCutaneous three times a day before meals  4.	C/W insulin lispro (ADMELOG) moderate corrective regimen sliding scale   SubCutaneous at bedtime  5.	C/W insulin lispro Injectable (ADMELOG) 4 Unit(s) SubCutaneous three times a day before meals  6.	C/W metFORMIN 1000 milliGRAM(s) Oral two times a day  7.	Add Linagliptin 5 mg orally once a day by mouth    Discharge Recommendations:  1.	Tresiba flextouch [id number] units/ml take 20 units subcutaneously daily- dose per needs at time of discharge  2.	Humalog kwikpen 100 units/ml SubCutaneous 4  before meals, three times/day (on favorite list) dose per needs at time of discharge- may not need  3.	 Insulin pen needles 4 mm- marla- (on favorite list)  4.	Alcohol swabs- (on favorite list)  5.	BG meter per Insurance- (on favorite list)  6.	 BG test strips per insurance ( on favorite list)  7.	Lancets- per insurance -(on favorite list)  8.	Follow up with PCP  9.	Follow up with [facility] [address] [place] [address] and Diabetes Educator  10.	Metformin 1000 mg tablet take 1 tablets twice a day with food  11.	Linagliptin 5 mg orally once a day- may  substitute Januvia 100 mg orally daily

## 2023-12-21 NOTE — PROGRESS NOTE ADULT - SUBJECTIVE AND OBJECTIVE BOX
Medicine Progress Note    Patient is a 63y old  Male who presents with a chief complaint of Left BKA (20 Dec 2023 13:26)      SUBJECTIVE / OVERNIGHT EVENTS:  seen and examined  Chart reviewed  No overnight events  Limb weakness improving with therapy  BM+  No pain  No complaints  wbc trending up    ADDITIONAL REVIEW OF SYSTEMS:  denied fever/chills/CP/SOB/cough/palpitation/dizziness/abdominal pian/nausea/vomiting/diarrhoea/constipation/dysuria/leg or calf pain/headaches.all other ROS neg    MEDICATIONS  (STANDING):  acetaminophen     Tablet .. 975 milliGRAM(s) Oral every 6 hours  amoxicillin  875 milliGRAM(s)/clavulanate 1 Tablet(s) Oral two times a day  AQUAPHOR (petrolatum Ointment) 1 Application(s) Topical two times a day  bimatoprost 0.01% Ophthalmic Solution 1 Drop(s) Both EYES at bedtime  clotrimazole 1% Cream 1 Application(s) Topical two times a day  dextrose 5%. 1000 milliLiter(s) (100 mL/Hr) IV Continuous <Continuous>  dextrose 5%. 1000 milliLiter(s) (50 mL/Hr) IV Continuous <Continuous>  dextrose 50% Injectable 25 Gram(s) IV Push once  dextrose 50% Injectable 12.5 Gram(s) IV Push once  dextrose 50% Injectable 25 Gram(s) IV Push once  enalapril 10 milliGRAM(s) Oral <User Schedule>  enoxaparin Injectable 40 milliGRAM(s) SubCutaneous every 24 hours  gabapentin 300 milliGRAM(s) Oral three times a day  glucagon  Injectable 1 milliGRAM(s) IntraMuscular once  insulin glargine Injectable (LANTUS) 16 Unit(s) SubCutaneous at bedtime  insulin lispro (ADMELOG) corrective regimen sliding scale   SubCutaneous three times a day before meals  insulin lispro (ADMELOG) corrective regimen sliding scale   SubCutaneous at bedtime  insulin lispro Injectable (ADMELOG) 4 Unit(s) SubCutaneous three times a day before meals  magnesium oxide 400 milliGRAM(s) Oral three times a day with meals  metFORMIN 1000 milliGRAM(s) Oral two times a day  polyethylene glycol 3350 17 Gram(s) Oral two times a day  senna 2 Tablet(s) Oral at bedtime  simvastatin 40 milliGRAM(s) Oral at bedtime  sodium chloride 0.9%. 1000 milliLiter(s) (100 mL/Hr) IV Continuous <Continuous>    MEDICATIONS  (PRN):  dextrose Oral Gel 15 Gram(s) Oral once PRN Blood Glucose LESS THAN 70 milliGRAM(s)/deciliter  guaiFENesin Oral Liquid (Sugar-Free) 100 milliGRAM(s) Oral every 6 hours PRN Cough  oxyCODONE    IR 5 milliGRAM(s) Oral every 6 hours PRN Moderate Pain (4 - 6)  oxyCODONE    IR 10 milliGRAM(s) Oral every 6 hours PRN Severe Pain (7 - 10)    CAPILLARY BLOOD GLUCOSE      POCT Blood Glucose.: 236 mg/dL (21 Dec 2023 07:47)  POCT Blood Glucose.: 188 mg/dL (20 Dec 2023 21:27)  POCT Blood Glucose.: 238 mg/dL (20 Dec 2023 17:22)  POCT Blood Glucose.: 312 mg/dL (20 Dec 2023 12:03)    I&O's Summary      PHYSICAL EXAM:  Vital Signs Last 24 Hrs  T(C): 36.6 (21 Dec 2023 07:57), Max: 36.9 (20 Dec 2023 20:14)  T(F): 97.9 (21 Dec 2023 07:57), Max: 98.4 (20 Dec 2023 20:14)  HR: 88 (21 Dec 2023 07:57) (88 - 94)  BP: 177/87 (21 Dec 2023 07:57) (161/75 - 177/87)  BP(mean): --  RR: 14 (21 Dec 2023 07:57) (14 - 16)  SpO2: 95% (21 Dec 2023 07:57) (95% - 95%)    Parameters below as of 21 Dec 2023 07:57  Patient On (Oxygen Delivery Method): room air    GENERAL: Not in distress. Alert    HEENT: clear conjuctiva, MMM. no pallor or icterus  CARDIOVASCULAR: RRR S1, S2. No murmur/rubs/gallop  LUNGS: BLAE+, no rales, no wheezing, no rhonchi.    ABDOMEN: ND. Soft,  NT, no guarding / rebound / rigidity. BS normoactive  BACK: No spine tenderness.  EXTREMITIES: no edema. no leg or calf TP. left BKA  SKIN: warm and dry/ Steri-strips intact over left eyelid  PSYCHIATRIC: Calm.  No agitation.  CNS: AAO. moves limbs, follows commands    LABS:                        10.4   20.63 )-----------( 444      ( 21 Dec 2023 10:48 )             33.0     12-21    137  |  100  |  15  ----------------------------<  240<H>  4.3   |  27  |  0.86    Ca    9.4      21 Dec 2023 06:02    TPro  7.9  /  Alb  2.1<L>  /  TBili  0.3  /  DBili  x   /  AST  32  /  ALT  54<H>  /  AlkPhos  138<H>  12-21          Urinalysis Basic - ( 21 Dec 2023 06:02 )    Color: x / Appearance: x / SG: x / pH: x  Gluc: 240 mg/dL / Ketone: x  / Bili: x / Urobili: x   Blood: x / Protein: x / Nitrite: x   Leuk Esterase: x / RBC: x / WBC x   Sq Epi: x / Non Sq Epi: x / Bacteria: x            RADIOLOGY & ADDITIONAL TESTS:  Imaging from Last 24 Hours:  < from: CT Head No Cont (12.20.23 @ 12:03) >    IMPRESSION: Moderate atrophy and small vessel white matter ischemic   changes for the patient's age. No hemorrhage.    < end of copied text >    < from: TTE Echo Complete w/o Contrast w/ Doppler (12.18.23 @ 10:48) >    Summary:   1. Normal global left ventricular systolic function.   2. Left ventricular ejection fraction, by visual estimation, is 65 to   70%.   3. Normal left ventricular internal cavity size.   4. The right ventricle is not well visualized, appears normal in size   with normal systolic function.   5. The left atrium is normal in size.   6. The right atriumis normal in size.   7. Mild thickening and calcification of the anterior and posterior   mitral valve leaflets.   8. Moderate posterior mitral annular calcification.   9. Mild mitral valve regurgitation.  10. The aortic valve is not well visualized,appears to have   calcification. No aortic valve stenosis.  11. There is no evidence of pericardial effusion.    [id number] Di Clayton MD Electronically signed on   12/18/2023 at 1:16:30 PM    < end of copied text >      Electrocardiogram/QTc Interval:    COORDINATION OF CARE:  Care Discussed with Consultants/Other Providers:   Medicine Progress Note    Patient is a 63y old  Male who presents with a chief complaint of Left BKA (20 Dec 2023 13:26)      SUBJECTIVE / OVERNIGHT EVENTS:  seen and examined  Chart reviewed  No overnight events  Limb weakness improving with therapy  BM+  No pain  No complaints  wbc trending up    ADDITIONAL REVIEW OF SYSTEMS:  denied fever/chills/CP/SOB/cough/palpitation/dizziness/abdominal pian/nausea/vomiting/diarrhoea/constipation/dysuria/leg or calf pain/headaches.all other ROS neg    MEDICATIONS  (STANDING):  acetaminophen     Tablet .. 975 milliGRAM(s) Oral every 6 hours  amoxicillin  875 milliGRAM(s)/clavulanate 1 Tablet(s) Oral two times a day  AQUAPHOR (petrolatum Ointment) 1 Application(s) Topical two times a day  bimatoprost 0.01% Ophthalmic Solution 1 Drop(s) Both EYES at bedtime  clotrimazole 1% Cream 1 Application(s) Topical two times a day  dextrose 5%. 1000 milliLiter(s) (100 mL/Hr) IV Continuous <Continuous>  dextrose 5%. 1000 milliLiter(s) (50 mL/Hr) IV Continuous <Continuous>  dextrose 50% Injectable 25 Gram(s) IV Push once  dextrose 50% Injectable 12.5 Gram(s) IV Push once  dextrose 50% Injectable 25 Gram(s) IV Push once  enalapril 10 milliGRAM(s) Oral <User Schedule>  enoxaparin Injectable 40 milliGRAM(s) SubCutaneous every 24 hours  gabapentin 300 milliGRAM(s) Oral three times a day  glucagon  Injectable 1 milliGRAM(s) IntraMuscular once  insulin glargine Injectable (LANTUS) 16 Unit(s) SubCutaneous at bedtime  insulin lispro (ADMELOG) corrective regimen sliding scale   SubCutaneous three times a day before meals  insulin lispro (ADMELOG) corrective regimen sliding scale   SubCutaneous at bedtime  insulin lispro Injectable (ADMELOG) 4 Unit(s) SubCutaneous three times a day before meals  magnesium oxide 400 milliGRAM(s) Oral three times a day with meals  metFORMIN 1000 milliGRAM(s) Oral two times a day  polyethylene glycol 3350 17 Gram(s) Oral two times a day  senna 2 Tablet(s) Oral at bedtime  simvastatin 40 milliGRAM(s) Oral at bedtime  sodium chloride 0.9%. 1000 milliLiter(s) (100 mL/Hr) IV Continuous <Continuous>    MEDICATIONS  (PRN):  dextrose Oral Gel 15 Gram(s) Oral once PRN Blood Glucose LESS THAN 70 milliGRAM(s)/deciliter  guaiFENesin Oral Liquid (Sugar-Free) 100 milliGRAM(s) Oral every 6 hours PRN Cough  oxyCODONE    IR 5 milliGRAM(s) Oral every 6 hours PRN Moderate Pain (4 - 6)  oxyCODONE    IR 10 milliGRAM(s) Oral every 6 hours PRN Severe Pain (7 - 10)    CAPILLARY BLOOD GLUCOSE      POCT Blood Glucose.: 236 mg/dL (21 Dec 2023 07:47)  POCT Blood Glucose.: 188 mg/dL (20 Dec 2023 21:27)  POCT Blood Glucose.: 238 mg/dL (20 Dec 2023 17:22)  POCT Blood Glucose.: 312 mg/dL (20 Dec 2023 12:03)    I&O's Summary      PHYSICAL EXAM:  Vital Signs Last 24 Hrs  T(C): 36.6 (21 Dec 2023 07:57), Max: 36.9 (20 Dec 2023 20:14)  T(F): 97.9 (21 Dec 2023 07:57), Max: 98.4 (20 Dec 2023 20:14)  HR: 88 (21 Dec 2023 07:57) (88 - 94)  BP: 177/87 (21 Dec 2023 07:57) (161/75 - 177/87)  BP(mean): --  RR: 14 (21 Dec 2023 07:57) (14 - 16)  SpO2: 95% (21 Dec 2023 07:57) (95% - 95%)    Parameters below as of 21 Dec 2023 07:57  Patient On (Oxygen Delivery Method): room air    GENERAL: Not in distress. Alert    HEENT: clear conjuctiva, MMM. no pallor or icterus  CARDIOVASCULAR: RRR S1, S2. No murmur/rubs/gallop  LUNGS: BLAE+, no rales, no wheezing, no rhonchi.    ABDOMEN: ND. Soft,  NT, no guarding / rebound / rigidity. BS normoactive  BACK: No spine tenderness.  EXTREMITIES: no edema. no leg or calf TP. left BKA  SKIN: warm and dry/ Steri-strips intact over left eyelid  PSYCHIATRIC: Calm.  No agitation.  CNS: AAO. moves limbs, follows commands    LABS:                        10.4   20.63 )-----------( 444      ( 21 Dec 2023 10:48 )             33.0     12-21    137  |  100  |  15  ----------------------------<  240<H>  4.3   |  27  |  0.86    Ca    9.4      21 Dec 2023 06:02    TPro  7.9  /  Alb  2.1<L>  /  TBili  0.3  /  DBili  x   /  AST  32  /  ALT  54<H>  /  AlkPhos  138<H>  12-21          Urinalysis Basic - ( 21 Dec 2023 06:02 )    Color: x / Appearance: x / SG: x / pH: x  Gluc: 240 mg/dL / Ketone: x  / Bili: x / Urobili: x   Blood: x / Protein: x / Nitrite: x   Leuk Esterase: x / RBC: x / WBC x   Sq Epi: x / Non Sq Epi: x / Bacteria: x            RADIOLOGY & ADDITIONAL TESTS:  Imaging from Last 24 Hours:  < from: CT Head No Cont (12.20.23 @ 12:03) >    IMPRESSION: Moderate atrophy and small vessel white matter ischemic   changes for the patient's age. No hemorrhage.    < end of copied text >    < from: TTE Echo Complete w/o Contrast w/ Doppler (12.18.23 @ 10:48) >    Summary:   1. Normal global left ventricular systolic function.   2. Left ventricular ejection fraction, by visual estimation, is 65 to   70%.   3. Normal left ventricular internal cavity size.   4. The right ventricle is not well visualized, appears normal in size   with normal systolic function.   5. The left atrium is normal in size.   6. The right atriumis normal in size.   7. Mild thickening and calcification of the anterior and posterior   mitral valve leaflets.   8. Moderate posterior mitral annular calcification.   9. Mild mitral valve regurgitation.  10. The aortic valve is not well visualized,appears to have   calcification. No aortic valve stenosis.  11. There is no evidence of pericardial effusion.    [id number] Di Clayton MD Electronically signed on   12/18/2023 at 1:16:30 PM    < end of copied text >      Electrocardiogram/QTc Interval:    COORDINATION OF CARE:  Care Discussed with Consultants/Other Providers:

## 2023-12-21 NOTE — DISCHARGE NOTE NURSING/CASE MANAGEMENT/SOCIAL WORK - NSDCFUADDAPPT_GEN_ALL_CORE_FT
DUC coordinated new PCP appointment:  Eder Quiroz  [address] [place], [place]  Monday January 8, 2024  3:45PM  [phone]    Appt coordinated through [facility] who states if pt can be seen sooner, they will notify pt/family.   DUC coordinated new PCP appointment:  Eder Quiroz  [address] [place], [place]  Monday January 8, 2024  3:45PM  [phone]    Appt coordinated through [facility] who states if pt can be seen sooner, they will notify pt/family.

## 2023-12-21 NOTE — PROGRESS NOTE ADULT - ASSESSMENT
ELVIS VILLATORO is a 63y with a history of diabetes mellitus type 2, HTN, dyslipidemia, retinopathy who presented to [facility] on 11/29/23  with complaint of non-healing left foot wound and found to have osteomyelitis. Hospital course complicated by hyperglycemia and need for surgical revision. Now admitted to [facility] after for initiation of a multidisciplinary rehab program consisting focused on functional mobility, transfers and ADLs- pt/ot/dv tppx    # leucocytosis  - wbc trending up  - afebrile  - no focal ssx of infection. wound check by primary/plastic   - f/u urinalysis sent by primary. however patient did not report any ssx of UTI to me  - added procal to am lab  - monitor cbc, temp. 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 follow up OP after DC, with Angeles Vegas)    s/p RRT 12/18/23 due to unresponsiveness, likely vasovagal episode  OH  HTN  - s/p iv fluids  - patient was on Enalapril 10 mg BID which was reduced to enalapril 5 mg at 12 pm on 12/18 after syncope. now BP higher side. increased Enalapril to 10 mg at 12 pm with holding if SBP<120 mm of hg  - check Orthostasis and adjut meds  - f/u holter monitor result  -echo on 12/18: normal LV and RV functions.   - encouarged PO hydration      hypomagnesemia  improved  monitor    urine retention   -admitted with Contreras TOV done- voiding well    HLD  -Simvastatin    Diabetes mellitus type 2 with hyperglycemia   Hypoglycemia on 12/19 when patient was on lantus 32 unit at h/s and admelog 16 unit TIDAC  - A1C with Estimated Average Glucose Result: 11.4 % (12.01.23 @ 05:19)  - Target BS: 120-180  - Home meds: 	Kazano 12.5 mg-1000 mg oral tablet (Alogliptin and metformin): 1 tab(s) orally 2 times a day  - ISS, accuchecks   - Lantus 10 was increased to 16 qhs 12/19/23 and decrease premeal to 4 tid 12/19/23. increased lantus to 24 unit at h/s and admelog to 8 unit TIDAC 12/21  - continue Metformin 1000 mg BID  - add linagplin 5 mg at h/s 12/21  - DM educator cx requested. informed Faith [ A1c >8 and new to insulin]. needs Endo cx OP. can call while in rehab if BS not controlled  - monitor FSBS and adjust insulin. watch for hypoglycemia  - nutrition eval ongoing       VTE ppx   Lovenox    will follow    d/w dr. thornton     ELVIS VILLATORO is a 63y with a history of diabetes mellitus type 2, HTN, dyslipidemia, retinopathy who presented to [facility] on 11/29/23  with complaint of non-healing left foot wound and found to have osteomyelitis. Hospital course complicated by hyperglycemia and need for surgical revision. Now admitted to [facility] after for initiation of a multidisciplinary rehab program consisting focused on functional mobility, transfers and ADLs- pt/ot/dv tppx    # leucocytosis  - wbc trending up  - afebrile  - no focal ssx of infection. wound check by primary/plastic   - f/u urinalysis sent by primary. however patient did not report any ssx of UTI to me  - added procal to am lab  - monitor cbc, temp. 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 follow up OP after DC, with Angeles Vegas)    s/p RRT 12/18/23 due to unresponsiveness, likely vasovagal episode  OH  HTN  - s/p iv fluids  - patient was on Enalapril 10 mg BID which was reduced to enalapril 5 mg at 12 pm on 12/18 after syncope. now BP higher side. increased Enalapril to 10 mg at 12 pm with holding if SBP<120 mm of hg  - check Orthostasis and adjut meds  - f/u holter monitor result  -echo on 12/18: normal LV and RV functions.   - encouarged PO hydration      hypomagnesemia  improved  monitor    urine retention   -admitted with Contreras TOV done- voiding well    HLD  -Simvastatin    Diabetes mellitus type 2 with hyperglycemia   Hypoglycemia on 12/19 when patient was on lantus 32 unit at h/s and admelog 16 unit TIDAC  - A1C with Estimated Average Glucose Result: 11.4 % (12.01.23 @ 05:19)  - Target BS: 120-180  - Home meds: 	Kazano 12.5 mg-1000 mg oral tablet (Alogliptin and metformin): 1 tab(s) orally 2 times a day  - ISS, accuchecks   - Lantus 10 was increased to 16 qhs 12/19/23 and decrease premeal to 4 tid 12/19/23. increased lantus to 24 unit at h/s and admelog to 8 unit TIDAC 12/21  - continue Metformin 1000 mg BID  - add linagplin 5 mg at h/s 12/21  - DM educator cx requested. informed Faith [ A1c >8 and new to insulin]. needs Endo cx OP. can call while in rehab if BS not controlled  - monitor FSBS and adjust insulin. watch for hypoglycemia  - nutrition eval ongoing       VTE ppx   Lovenox    will follow    d/w dr. thornton     ELVIS VILLATORO is a 63y with a history of diabetes mellitus type 2, HTN, dyslipidemia, retinopathy who presented to [facility] on 11/29/23  with complaint of non-healing left foot wound and found to have osteomyelitis. Hospital course complicated by hyperglycemia and need for surgical revision. Now admitted to [facility] after for initiation of a multidisciplinary rehab program consisting focused on functional mobility, transfers and ADLs- pt/ot/dv tppx    # leucocytosis  - wbc trending up  - afebrile  - no focal ssx of infection. wound check by primary/plastic   - f/u urinalysis sent by primary. however patient did not report any ssx of UTI to me  - added procal to am lab  - monitor cbc, temp. 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 follow up OP after DC, with Angeles Vegas)    s/p RRT 12/18/23 due to unresponsiveness, likely vasovagal episode  OH  HTN  - s/p iv fluids  - patient was on Enalapril 10 mg BID which was reduced to enalapril 5 mg at 12 pm on 12/18 after syncope. now BP higher side. increased Enalapril to 10 mg at 12 pm with holding if SBP<120 mm of hg  - check Orthostasis and adjut meds  - f/u holter monitor result  -echo on 12/18: normal LV and RV functions.   - encouarged PO hydration      hypomagnesemia  improved  monitor    urine retention   -admitted with Contreras TOV done- voiding well    HLD  -Simvastatin    Diabetes mellitus type 2 with hyperglycemia   Hypoglycemia on 12/19 when patient was on lantus 32 unit at h/s and admelog 16 unit TIDAC  - A1C with Estimated Average Glucose Result: 11.4 % (12.01.23 @ 05:19)  - Target BS: 120-180  - Home meds: 	Kazano 12.5 mg-1000 mg oral tablet (Alogliptin and metformin): 1 tab(s) orally 2 times a day  - ISS, accuchecks   - Lantus 10 was increased to 16 qhs 12/19/23 and decrease premeal to 4 tid 12/19/23. s/p one dose of admelog 8 unit at 12 pm today.  increased lantus to 20 unit at h/s. continued admelog 4  unit TIDAC 12/21 per DM team.  - continue Metformin 1000 mg BID  - add linagplin 5 mg at h/s 12/21  - f/u DM educator cx. needs Endo cx OP.   - monitor FSBS and adjust insulin. watch for hypoglycemia  - nutrition eval ongoing       VTE ppx   Lovenox    will follow    d/w dr. thornton. Faith [ DM educator]     ELVIS VILLATORO is a 63y with a history of diabetes mellitus type 2, HTN, dyslipidemia, retinopathy who presented to [facility] on 11/29/23  with complaint of non-healing left foot wound and found to have osteomyelitis. Hospital course complicated by hyperglycemia and need for surgical revision. Now admitted to [facility] after for initiation of a multidisciplinary rehab program consisting focused on functional mobility, transfers and ADLs- pt/ot/dv tppx    # leucocytosis  - wbc trending up  - afebrile  - no focal ssx of infection. wound check by primary/plastic   - f/u urinalysis sent by primary. however patient did not report any ssx of UTI to me  - added procal to am lab  - monitor cbc, temp. 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 follow up OP after DC, with Angeles [place])    s/p RRT 12/18/23 due to unresponsiveness, likely vasovagal episode  OH  HTN  - s/p iv fluids  - patient was on Enalapril 10 mg BID which was reduced to enalapril 5 mg at 12 pm on 12/18 after syncope. now BP higher side. increased Enalapril to 10 mg at 12 pm with holding if SBP<120 mm of hg  - check Orthostasis and adjut meds  - f/u holter monitor result  -echo on 12/18: normal LV and RV functions.   - encouarged PO hydration      hypomagnesemia  improved  monitor    urine retention   -admitted with Contreras TOV done- voiding well    HLD  -Simvastatin    Diabetes mellitus type 2 with hyperglycemia   Hypoglycemia on 12/19 when patient was on lantus 32 unit at h/s and admelog 16 unit TIDAC  - A1C with Estimated Average Glucose Result: 11.4 % (12.01.23 @ 05:19)  - Target BS: 120-180  - Home meds: 	Kazano 12.5 mg-1000 mg oral tablet (Alogliptin and metformin): 1 tab(s) orally 2 times a day  - ISS, accuchecks   - Lantus 10 was increased to 16 qhs 12/19/23 and decrease premeal to 4 tid 12/19/23. s/p one dose of admelog 8 unit at 12 pm today.  increased lantus to 20 unit at h/s. continued admelog 4  unit TIDAC 12/21 per DM team.  - continue Metformin 1000 mg BID  - add linagplin 5 mg at h/s 12/21  - f/u DM educator cx. needs Endo cx OP.   - monitor FSBS and adjust insulin. watch for hypoglycemia  - nutrition eval ongoing       VTE ppx   Lovenox    will follow    d/w dr. thornton. Faith [ DM educator]     ELVIS VILLATORO is a 63y with a history of diabetes mellitus type 2, HTN, dyslipidemia, retinopathy who presented to [facility] on 11/29/23  with complaint of non-healing left foot wound and found to have osteomyelitis. Hospital course complicated by hyperglycemia and need for surgical revision. Now admitted to [facility] after for initiation of a multidisciplinary rehab program consisting focused on functional mobility, transfers and ADLs- pt/ot/dv tppx    # leucocytosis  - wbc trending up. likely reactive to eyebrow repair on 12/20  - afebrile  - no focal ssx of infection. wound check by primary/plastic   - UA+. however doubt UTI as patient did not report any ssx of UTI to me. on Augmentin for eye repair. would continue same. f/u UC  - Procal 0.6  - monitor cbc, temp. 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 follow up OP after DC, with Angeles [place])    s/p RRT 12/18/23 due to unresponsiveness, likely vasovagal episode  OH  HTN  - s/p iv fluids  - patient was on Enalapril 10 mg BID which was reduced to enalapril 5 mg at 12 pm on 12/18 after syncope. now BP higher side. increased Enalapril to 10 mg at 12 pm with holding if SBP<120 mm of hg  - check Orthostasis and adjut meds  - f/u holter monitor result  -echo on 12/18: normal LV and RV functions.   - encouarged PO hydration      hypomagnesemia  improved  monitor    urine retention   -admitted with Contreras TOV done- voiding well    HLD  -Simvastatin    Diabetes mellitus type 2 with hyperglycemia   Hypoglycemia on 12/19 when patient was on lantus 32 unit at h/s and admelog 16 unit TIDAC  - A1C with Estimated Average Glucose Result: 11.4 % (12.01.23 @ 05:19)  - Target BS: 120-180  - Home meds: 	Kazano 12.5 mg-1000 mg oral tablet (Alogliptin and metformin): 1 tab(s) orally 2 times a day  - ISS, accuchecks   - Lantus 10 was increased to 16 qhs 12/19/23 and decrease premeal to 4 tid 12/19/23. s/p one dose of admelog 8 unit at 12 pm today.  increased lantus to 20 unit at h/s. continued admelog 4  unit TIDAC 12/21 per DM team.  - continue Metformin 1000 mg BID  - add linagplin 5 mg at h/s 12/21  - f/u DM educator cx. needs Endo cx OP.   - monitor FSBS and adjust insulin. watch for hypoglycemia  - nutrition eval ongoing       VTE ppx   Lovenox    will follow    d/w dr. thornton. Faith [ DM educator]     ELVIS VILLATORO is a 63y with a history of diabetes mellitus type 2, HTN, dyslipidemia, retinopathy who presented to [facility] on 11/29/23  with complaint of non-healing left foot wound and found to have osteomyelitis. Hospital course complicated by hyperglycemia and need for surgical revision. Now admitted to [facility] after for initiation of a multidisciplinary rehab program consisting focused on functional mobility, transfers and ADLs- pt/ot/dv tppx    # leucocytosis  - wbc trending up. likely reactive to eyebrow repair on 12/20  - afebrile  - no focal ssx of infection. wound check by primary/plastic   - UA+. however doubt UTI as patient did not report any ssx of UTI to me. on Augmentin for eye repair. would continue same. f/u UC  - Procal 0.6  - monitor cbc, temp. 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 follow up OP after DC, with [place])    s/p RRT 12/18/23 due to unresponsiveness, likely vasovagal episode  OH  HTN  - s/p iv fluids  - patient was on Enalapril 10 mg BID which was reduced to enalapril 5 mg at 12 pm on 12/18 after syncope. now BP higher side. increased Enalapril to 10 mg at 12 pm with holding if SBP<120 mm of hg  - check Orthostasis and adjut meds  - f/u holter monitor result  -echo on 12/18: normal LV and RV functions.   - encouarged PO hydration      hypomagnesemia  improved  monitor    urine retention   -admitted with Contreras TOV done- voiding well    HLD  -Simvastatin    Diabetes mellitus type 2 with hyperglycemia   Hypoglycemia on 12/19 when patient was on lantus 32 unit at h/s and admelog 16 unit TIDAC  - A1C with Estimated Average Glucose Result: 11.4 % (12.01.23 @ 05:19)  - Target BS: 120-180  - Home meds: 	Kazano 12.5 mg-1000 mg oral tablet (Alogliptin and metformin): 1 tab(s) orally 2 times a day  - ISS, accuchecks   - Lantus 10 was increased to 16 qhs 12/19/23 and decrease premeal to 4 tid 12/19/23. s/p one dose of admelog 8 unit at 12 pm today.  increased lantus to 20 unit at h/s. continued admelog 4  unit TIDAC 12/21 per DM team.  - continue Metformin 1000 mg BID  - add linagplin 5 mg at h/s 12/21  - f/u DM educator cx. needs Endo cx OP.   - monitor FSBS and adjust insulin. watch for hypoglycemia  - nutrition eval ongoing       VTE ppx   Lovenox    will follow    d/w dr. thornton. Faith [ DM educator]     ELVIS VILLATORO is a 63y with a history of diabetes mellitus type 2, HTN, dyslipidemia, retinopathy who presented to [facility] on 11/29/23  with complaint of non-healing left foot wound and found to have osteomyelitis. Hospital course complicated by hyperglycemia and need for surgical revision. Now admitted to [facility] after for initiation of a multidisciplinary rehab program consisting focused on functional mobility, transfers and ADLs- pt/ot/dv tppx    # leucocytosis  - wbc trending up. likely reactive to eyebrow repair on 12/20  - afebrile  - no focal ssx of infection. wound check by primary/plastic   - UA+. however doubt UTI as patient did not report any ssx of UTI to me. on Augmentin for eye repair. would continue same. f/u UC  - Procal 0.6  - monitor cbc, temp. 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 follow up OP after DC, with [place])    s/p RRT 12/18/23 due to unresponsiveness, likely vasovagal episode  OH  HTN  - s/p iv fluids  - patient was on Enalapril 10 mg BID which was reduced to enalapril 5 mg at 12 pm on 12/18 after syncope. now BP higher side. increased Enalapril to 10 mg at 12 pm with holding if SBP<120 mm of hg  - check Orthostasis and adjut meds  -echo on 12/18: normal LV and RV functions.   - encouarged PO hydration      hypomagnesemia  improved  monitor    urine retention   -admitted with Contreras TOV done- voiding well    HLD  -Simvastatin    Diabetes mellitus type 2 with hyperglycemia   Hypoglycemia on 12/19 when patient was on lantus 32 unit at h/s and admelog 16 unit TIDAC  - A1C with Estimated Average Glucose Result: 11.4 % (12.01.23 @ 05:19)  - Target BS: 120-180  - Home meds: 	Kazano 12.5 mg-1000 mg oral tablet (Alogliptin and metformin): 1 tab(s) orally 2 times a day  - ISS, accuchecks   - Lantus 10 was increased to 16 qhs 12/19/23 and decrease premeal to 4 tid 12/19/23. s/p one dose of admelog 8 unit at 12 pm today.  increased lantus to 20 unit at h/s. continued admelog 4  unit TIDAC 12/21 per DM team.  - continue Metformin 1000 mg BID  - add linagplin 5 mg at h/s 12/21  - f/u DM educator cx. needs Endo cx OP.   - monitor FSBS and adjust insulin. watch for hypoglycemia  - nutrition eval ongoing       VTE ppx   Lovenox    will follow    d/w dr. thornton. Faith [ DM educator]     ELVIS VILLATORO is a 63y with a history of diabetes mellitus type 2, HTN, dyslipidemia, retinopathy who presented to [facility] on 11/29/23  with complaint of non-healing left foot wound and found to have osteomyelitis. Hospital course complicated by hyperglycemia and need for surgical revision. Now admitted to [facility] after for initiation of a multidisciplinary rehab program consisting focused on functional mobility, transfers and ADLs- pt/ot/dv tppx    # leucocytosis  - wbc trending up. likely reactive to eyebrow repair on 12/20  - afebrile  - no focal ssx of infection. wound check by primary/plastic   - UA+. however doubt UTI as patient did not report any ssx of UTI to me. on Augmentin for eye repair. would continue same. f/u UC  - Procal 0.6  - monitor cbc, temp. 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 follow up OP after DC, with [place])    s/p RRT 12/18/23 due to unresponsiveness, likely vasovagal episode  OH  HTN  - s/p iv fluids  - patient was on Enalapril 10 mg BID which was reduced to enalapril 5 mg at 12 pm on 12/18 after syncope. now BP higher side. increased Enalapril to 10 mg at 12 pm with holding if SBP<120 mm of hg  - check Orthostasis and adjut meds  -echo on 12/18: normal LV and RV functions.   - encouarged PO hydration      hypomagnesemia  improved  monitor    urine retention   -admitted with Contreras TOV done- voiding well    HLD  -Simvastatin    Diabetes mellitus type 2 with hyperglycemia   Hypoglycemia on 12/19 when patient was on lantus 32 unit at h/s and admelog 16 unit TIDAC  - A1C with Estimated Average Glucose Result: 11.4 % (12.01.23 @ 05:19)  - Target BS: 120-180  - Home meds: 	Kazano 12.5 mg-1000 mg oral tablet (Alogliptin and metformin): 1 tab(s) orally 2 times a day  - ISS, accuchecks   - Lantus 10 was increased to 16 qhs 12/19/23 and decrease premeal to 4 tid 12/19/23. s/p one dose of admelog 8 unit at 12 pm today.  increased lantus to 20 unit at h/s. continued admelog 4  unit TIDAC 12/21 per DM team.  - continue Metformin 1000 mg BID  - add linagplin 5 mg at h/s 12/21  - f/u DM educator cx. needs Endo cx OP.   - monitor FSBS and adjust insulin. watch for hypoglycemia  - nutrition eval ongoing       VTE ppx   Lovenox    will follow    d/w dr. thornton. Faith [ DM educator]

## 2023-12-21 NOTE — DISCHARGE NOTE NURSING/CASE MANAGEMENT/SOCIAL WORK - NSDCDMETYPESERV_GEN_ALL_CORE_FT
Drop arm commode, tub bench, wheelchair Drop arm commode, tub bench, wheelchair, rolling walker.  NOTE: Please call them directly with payment for grab bars and tub bench.

## 2023-12-21 NOTE — ADVANCED PRACTICE NURSE CONSULT - ASSESSMENT
HPI 63y with a history of diabetes mellitus type 2, HTN, dyslipidemia, retinopathy who presented to [facility] on 11/29/23  with complaint of non-healing left foot wound and found to have osteomyelitis. Hospital course complicated by hyperglycemia and need for surgical revision. Now admitted to [facility] after for initiation of a multidisciplinary rehab program consisting focused on functional mobility, transfers and ADLs- pt/ot/dv tppx  HgbA1c- 11.4 (12/1/23)  eGFR-  97 (12/21/23)    POCT Blood Glucose.: 236 mg/dL (21 Dec 2023 07:47)  POCT Blood Glucose.: 188 mg/dL (20 Dec 2023 21:27)  POCT Blood Glucose.: 238 mg/dL (20 Dec 2023 17:22)  POCT Blood Glucose.: 312 mg/dL (20 Dec 2023 12:03)    Current In-patient Diabetes Regimen-  insulin glargine Injectable (LANTUS) 16 Unit(s) SubCutaneous at bedtime  insulin lispro (ADMELOG) corrective regimen sliding scale   SubCutaneous three times a day before meals  insulin lispro (ADMELOG) corrective regimen sliding scale   SubCutaneous at bedtime  insulin lispro Injectable (ADMELOG) 4 Unit(s) SubCutaneous three times a day before meals  metFORMIN 1000 milliGRAM(s) Oral two times a day    Home Diabetes medication Regimen-  Kanzano 12.5 mg/1000 mg 1 tablet twice a day  Glipizide 5 mg twice a day orally    PCP- Dr Hughes     Endocrinology- None- referred to [facility] – [address] [place] [address]    Pt is alert and oriented times 4. Educated pt on Type 2 Diabetes disease process, progression, management including medication, healthy eating, as well as consequences of persistent hyperglycemia. Educated on A1c goal- 7.0- 7.5. BG goals (100- 140), when to contact healthcare provider, when to check BG and keeping BG log- provided BG Log.   Educated pt on s/s of hyperglycemia and Tx and S/S of hypoglycemia and tx per 15/15 rule. Pt validated education via teach back.    Pt does know how to check BG and use BG meter   Pt refused to learn how use of insulin pen or administer own insulin. Pt states his wife will be giving him his insulin. Will try to meet with wife for insulin pen training. Primary RN can provide insulin pen traing to wife.   Educated pt on the benefits of Endocrinology and Diabetes Educator follow up upon discharge.  Provide [facility] contact information and advised pt that his PCP will and can manage diabetes until pt sees Endocrinologist.    Provided written resources of Leaving the hospital with Diabetes, A1c goal, BG Goals, BG Monitoring, Insulin pen instruction, Hyper and Hypoglycemia s/s and tx, sick day management, and Preventing complications due to diabetes.  Educated pt on how to access diabetes education Videos. Pt states he has difficulty listening and would like education provided to his wife.   HPI 63y with a history of diabetes mellitus type 2, HTN, dyslipidemia, retinopathy who presented to [facility] on 11/29/23  with complaint of non-healing left foot wound and found to have osteomyelitis. Hospital course complicated by hyperglycemia and need for surgical revision. Now admitted to [facility] after for initiation of a multidisciplinary rehab program consisting focused on functional mobility, transfers and ADLs- pt/ot/dv tppx  HgbA1c- 11.4 (12/1/23)  eGFR-  97 (12/21/23)    POCT Blood Glucose.: 236 mg/dL (21 Dec 2023 07:47)  POCT Blood Glucose.: 188 mg/dL (20 Dec 2023 21:27)  POCT Blood Glucose.: 238 mg/dL (20 Dec 2023 17:22)  POCT Blood Glucose.: 312 mg/dL (20 Dec 2023 12:03)    Current In-patient Diabetes Regimen-  insulin glargine Injectable (LANTUS) 16 Unit(s) SubCutaneous at bedtime  insulin lispro (ADMELOG) corrective regimen sliding scale   SubCutaneous three times a day before meals  insulin lispro (ADMELOG) corrective regimen sliding scale   SubCutaneous at bedtime  insulin lispro Injectable (ADMELOG) 4 Unit(s) SubCutaneous three times a day before meals  metFORMIN 1000 milliGRAM(s) Oral two times a day    Home Diabetes medication Regimen-  Kanzano 12.5 mg/1000 mg 1 tablet twice a day  Glipizide 5 mg twice a day orally    PCP- Dr Hughes     Endocrinology- None- referred to [facility] – [address] [place] [address]    Pt is alert and oriented times 4. Educated pt on Type 2 Diabetes disease process, progression, management including medication, healthy eating, as well as consequences of persistent hyperglycemia. Educated on A1c goal- 7.0- 7.5. BG goals (100- 140), when to contact healthcare provider, when to check BG and keeping BG log- provided BG Log.   Educated pt on s/s of hyperglycemia and Tx and S/S of hypoglycemia and tx per 15/15 rule. Pt validated education via teach back.    Pt does know how to check BG and use BG meter   Pt refused to learn how use of insulin pen or administer own insulin. Pt states his wife will be giving him his insulin. Will try to meet with wife for insulin pen training. Primary RN can provide insulin pen traing to wife.   Educated pt on the benefits of Endocrinology and Diabetes Educator follow up upon discharge.  Provide [facility] contact information and advised pt that his PCP will and can manage diabetes until pt sees Endocrinologist.    Provided written resources of Leaving the hospital with Diabetes, A1c goal, BG Goals, BG Monitoring, Insulin pen instruction, Hyper and Hypoglycemia s/s and tx, sick day management, and Preventing complications due to diabetes.  Educated pt on how to access diabetes education Videos. Pt states he has difficulty listening and would like education provided to his wife.

## 2023-12-21 NOTE — PROGRESS NOTE ADULT - SUBJECTIVE AND OBJECTIVE BOX
HPI:  Mr. Mcdaniel is a 63M PMHx DM, HLD, PAD, prior toe amputation, presents to [facility] ED w L foot wound. Reports wound has been present on hindfoot x 3 weeks. Started as small cut. Over last 24 hours, pt unable to ambulate d/t severe pain. Reports significant malodor. Denies subjective fevers, chills, purulent drainage, numbness/paresthesia.     In ED, patient is tachycardic to 123. BP w HTN. Febrile to 102.4 F. Labs w WBC 21. Hgb 11.4. Lac 2.4. XR L foot w soft tissue gas at lateral hindfoot. CT LLE non con w soft tissue gas tracking ~7cm above foot.   (2023 20:52)    Patient taken to the OR for a left guillotine below the knee amputation on admission. Patient admitted to the SICU pre and post op for hyperglycemia and insulin drip management. Endocrinology was consulted for the hyperglycemia.  Patient transferred out of the SICU when he was able to be weaned off the insulin drip. Internal medicine was consulted for malachi-operative clearance for formalization surgery. On  the patient was taken back to the operating room for a left lower extremity guillotine revision. Infectious disease was consulted for antibiotic management and recommended switching from Zosyn to Unasyn. Rehab medicine was consulted to evaluate for rehab needs and recommended acute rehab on discharge.     ROS/subjective:  Pt seen and examined at bedside.   No acute events overnight  Lid laceration sutured with Steris  Moving Bowels - formed  -200s- no burning no urgency- claims to be voiding frequently  Pt denies cp, palpitations, sob, abd pain, N/V, fever, chills.   BP regimen adjusted by hospitalist   pain well controlled-Tylenol this am only  diabetic nursing to assess patient      MEDICATIONS  (STANDING):  acetaminophen     Tablet .. 975 milliGRAM(s) Oral every 6 hours  amoxicillin  875 milliGRAM(s)/clavulanate 1 Tablet(s) Oral two times a day  AQUAPHOR (petrolatum Ointment) 1 Application(s) Topical two times a day  bimatoprost 0.01% Ophthalmic Solution 1 Drop(s) Both EYES at bedtime  clotrimazole 1% Cream 1 Application(s) Topical two times a day  dextrose 5%. 1000 milliLiter(s) (50 mL/Hr) IV Continuous <Continuous>  dextrose 5%. 1000 milliLiter(s) (100 mL/Hr) IV Continuous <Continuous>  dextrose 50% Injectable 25 Gram(s) IV Push once  dextrose 50% Injectable 12.5 Gram(s) IV Push once  dextrose 50% Injectable 25 Gram(s) IV Push once  enalapril 10 milliGRAM(s) Oral <User Schedule>  enoxaparin Injectable 40 milliGRAM(s) SubCutaneous every 24 hours  gabapentin 300 milliGRAM(s) Oral three times a day  glucagon  Injectable 1 milliGRAM(s) IntraMuscular once  insulin glargine Injectable (LANTUS) 20 Unit(s) SubCutaneous at bedtime  insulin lispro (ADMELOG) corrective regimen sliding scale   SubCutaneous three times a day before meals  insulin lispro (ADMELOG) corrective regimen sliding scale   SubCutaneous at bedtime  insulin lispro Injectable (ADMELOG) 4 Unit(s) SubCutaneous three times a day before meals  linagliptin 5 milliGRAM(s) Oral at bedtime  magnesium oxide 400 milliGRAM(s) Oral three times a day with meals  metFORMIN 1000 milliGRAM(s) Oral two times a day  polyethylene glycol 3350 17 Gram(s) Oral two times a day  senna 2 Tablet(s) Oral at bedtime  simvastatin 40 milliGRAM(s) Oral at bedtime  sodium chloride 0.9%. 1000 milliLiter(s) (100 mL/Hr) IV Continuous <Continuous>    MEDICATIONS  (PRN):  dextrose Oral Gel 15 Gram(s) Oral once PRN Blood Glucose LESS THAN 70 milliGRAM(s)/deciliter  guaiFENesin Oral Liquid (Sugar-Free) 100 milliGRAM(s) Oral every 6 hours PRN Cough  oxyCODONE    IR 5 milliGRAM(s) Oral every 6 hours PRN Moderate Pain (4 - 6)  oxyCODONE    IR 10 milliGRAM(s) Oral every 6 hours PRN Severe Pain (7 - 10)                            10.4   20.63 )-----------( 444      ( 21 Dec 2023 10:48 )             33.0     12-21    137  |  100  |  15  ----------------------------<  240<H>  4.3   |  27  |  0.86    Ca    9.4      21 Dec 2023 06:02    TPro  7.9  /  Alb  2.1<L>  /  TBili  0.3  /  DBili  x   /  AST  32  /  ALT  54<H>  /  AlkPhos  138<H>  12-    Urinalysis Basic - ( 21 Dec 2023 12:55 )    Color: Yellow / Appearance: Cloudy / S.027 / pH: x  Gluc: x / Ketone: Trace mg/dL  / Bili: Negative / Urobili: 0.2 mg/dL   Blood: x / Protein: 100 mg/dL / Nitrite: Positive   Leuk Esterase: Moderate / RBC: 7 /HPF / WBC >50 /HPF   Sq Epi: x / Non Sq Epi: x / Bacteria: Few /HPF        CAPILLARY BLOOD GLUCOSE      POCT Blood Glucose.: 152 mg/dL (21 Dec 2023 12:13)  POCT Blood Glucose.: 236 mg/dL (21 Dec 2023 07:47)  POCT Blood Glucose.: 188 mg/dL (20 Dec 2023 21:27)  POCT Blood Glucose.: 238 mg/dL (20 Dec 2023 17:22)      Vital Signs Last 24 Hrs  T(C): 36.6 (21 Dec 2023 07:57), Max: 36.9 (20 Dec 2023 20:14)  T(F): 97.9 (21 Dec 2023 07:57), Max: 98.4 (20 Dec 2023 20:14)  HR: 88 (21 Dec 2023 07:57) (88 - 91)  BP: 177/87 (21 Dec 2023 07:57) (161/75 - 177/87)  BP(mean): --  RR: 14 (21 Dec 2023 07:57) (14 - 16)  SpO2: 95% (21 Dec 2023 07:57) (95% - 95%)    Parameters below as of 21 Dec 2023 07:57  Patient On (Oxygen Delivery Method): room air        Physical Exam: Gen - NAD in bed this am  HEENT - NCAT, EOMI  Neck - Supple, No limited ROM  Pulm - CTAB, No wheeze, No rhonchi, No crackles  Cardiovascular - RRR, S1S2, No murmurs  Abdomen - Soft, NT/ND, +BS  Extremities - LLE with recent BKA.   Uro - crain present for retention  Neuro-     Cognitive - AAOx3     Communication - Fluent, No dysarthria     Attention: Intact      Judgement: Good evidence of judgement     Memory: Recall 3 objects immediate and 3 min later         Cranial Nerves - CN 2-12 intact     Motor -                     LEFT    UE - ShAB 5/5, EF 5/5, EE 5/5, WE 5/5,  5/5                    RIGHT UE - ShAB 5/5, EF 5/5, EE 5/5, WE 5/5,  5/5                    LEFT    LE - HF 5/5, KE 5/5                    RIGHT LE - HF 5/5, KE 5/5, DF 5/5, PF 5/5        Sensory - Intact to LT     Reflexes - DTR Intact, No primitive reflexive     Coordination - FTN intact     Tone - normal  Psychiatric - Mood stable, Affect WNL  Skin:  incision site from BKA on the L residual limb. No drainage. C/d/i. skin well approximated and staples present. Stage 1 sacral decubitis ulcer present. Fungal rash of the bilateral inguinal creases- some erythema noted today over Patella tendon - padded with DSD  Wounds: Patient with scab of 4th digit of RLE.    IDT meeting on     SW: PH, 3STE, wife    OT:  eating set  grooming det  UBD/LBD cg/min  toileting min   tub/shower min  bathing    PT:  amb  min A  transfers min A  stairs    SLP    tentative dc  HC      Continue comprehensive acute rehab program consisting of 3hrs/day of OT/PT. HPI:  Mr. Mcdaniel is a 63M PMHx DM, HLD, PAD, prior toe amputation, presents to [facility] ED w L foot wound. Reports wound has been present on hindfoot x 3 weeks. Started as small cut. Over last 24 hours, pt unable to ambulate d/t severe pain. Reports significant malodor. Denies subjective fevers, chills, purulent drainage, numbness/paresthesia.     In ED, patient is tachycardic to 123. BP w HTN. Febrile to 102.4 F. Labs w WBC 21. Hgb 11.4. Lac 2.4. XR L foot w soft tissue gas at lateral hindfoot. CT LLE non con w soft tissue gas tracking ~7cm above foot.   (2023 20:52)    Patient taken to the OR for a left guillotine below the knee amputation on admission. Patient admitted to the SICU pre and post op for hyperglycemia and insulin drip management. Endocrinology was consulted for the hyperglycemia.  Patient transferred out of the SICU when he was able to be weaned off the insulin drip. Internal medicine was consulted for malachi-operative clearance for formalization surgery. On  the patient was taken back to the operating room for a left lower extremity guillotine revision. Infectious disease was consulted for antibiotic management and recommended switching from Zosyn to Unasyn. Rehab medicine was consulted to evaluate for rehab needs and recommended acute rehab on discharge.     ROS/subjective:  Pt seen and examined at bedside.   No acute events overnight  Lid laceration sutured with Steris  Moving Bowels - formed  -200s- no burning no urgency- claims to be voiding frequently  Pt denies cp, palpitations, sob, abd pain, N/V, fever, chills.   BP regimen adjusted by hospitalist   pain well controlled-Tylenol this am only  diabetic nursing to assess patient      MEDICATIONS  (STANDING):  acetaminophen     Tablet .. 975 milliGRAM(s) Oral every 6 hours  amoxicillin  875 milliGRAM(s)/clavulanate 1 Tablet(s) Oral two times a day  AQUAPHOR (petrolatum Ointment) 1 Application(s) Topical two times a day  bimatoprost 0.01% Ophthalmic Solution 1 Drop(s) Both EYES at bedtime  clotrimazole 1% Cream 1 Application(s) Topical two times a day  dextrose 5%. 1000 milliLiter(s) (50 mL/Hr) IV Continuous <Continuous>  dextrose 5%. 1000 milliLiter(s) (100 mL/Hr) IV Continuous <Continuous>  dextrose 50% Injectable 25 Gram(s) IV Push once  dextrose 50% Injectable 12.5 Gram(s) IV Push once  dextrose 50% Injectable 25 Gram(s) IV Push once  enalapril 10 milliGRAM(s) Oral <User Schedule>  enoxaparin Injectable 40 milliGRAM(s) SubCutaneous every 24 hours  gabapentin 300 milliGRAM(s) Oral three times a day  glucagon  Injectable 1 milliGRAM(s) IntraMuscular once  insulin glargine Injectable (LANTUS) 20 Unit(s) SubCutaneous at bedtime  insulin lispro (ADMELOG) corrective regimen sliding scale   SubCutaneous three times a day before meals  insulin lispro (ADMELOG) corrective regimen sliding scale   SubCutaneous at bedtime  insulin lispro Injectable (ADMELOG) 4 Unit(s) SubCutaneous three times a day before meals  linagliptin 5 milliGRAM(s) Oral at bedtime  magnesium oxide 400 milliGRAM(s) Oral three times a day with meals  metFORMIN 1000 milliGRAM(s) Oral two times a day  polyethylene glycol 3350 17 Gram(s) Oral two times a day  senna 2 Tablet(s) Oral at bedtime  simvastatin 40 milliGRAM(s) Oral at bedtime  sodium chloride 0.9%. 1000 milliLiter(s) (100 mL/Hr) IV Continuous <Continuous>    MEDICATIONS  (PRN):  dextrose Oral Gel 15 Gram(s) Oral once PRN Blood Glucose LESS THAN 70 milliGRAM(s)/deciliter  guaiFENesin Oral Liquid (Sugar-Free) 100 milliGRAM(s) Oral every 6 hours PRN Cough  oxyCODONE    IR 5 milliGRAM(s) Oral every 6 hours PRN Moderate Pain (4 - 6)  oxyCODONE    IR 10 milliGRAM(s) Oral every 6 hours PRN Severe Pain (7 - 10)                            10.4   20.63 )-----------( 444      ( 21 Dec 2023 10:48 )             33.0     12-21    137  |  100  |  15  ----------------------------<  240<H>  4.3   |  27  |  0.86    Ca    9.4      21 Dec 2023 06:02    TPro  7.9  /  Alb  2.1<L>  /  TBili  0.3  /  DBili  x   /  AST  32  /  ALT  54<H>  /  AlkPhos  138<H>  12-    Urinalysis Basic - ( 21 Dec 2023 12:55 )    Color: Yellow / Appearance: Cloudy / S.027 / pH: x  Gluc: x / Ketone: Trace mg/dL  / Bili: Negative / Urobili: 0.2 mg/dL   Blood: x / Protein: 100 mg/dL / Nitrite: Positive   Leuk Esterase: Moderate / RBC: 7 /HPF / WBC >50 /HPF   Sq Epi: x / Non Sq Epi: x / Bacteria: Few /HPF        CAPILLARY BLOOD GLUCOSE      POCT Blood Glucose.: 152 mg/dL (21 Dec 2023 12:13)  POCT Blood Glucose.: 236 mg/dL (21 Dec 2023 07:47)  POCT Blood Glucose.: 188 mg/dL (20 Dec 2023 21:27)  POCT Blood Glucose.: 238 mg/dL (20 Dec 2023 17:22)      Vital Signs Last 24 Hrs  T(C): 36.6 (21 Dec 2023 07:57), Max: 36.9 (20 Dec 2023 20:14)  T(F): 97.9 (21 Dec 2023 07:57), Max: 98.4 (20 Dec 2023 20:14)  HR: 88 (21 Dec 2023 07:57) (88 - 91)  BP: 177/87 (21 Dec 2023 07:57) (161/75 - 177/87)  BP(mean): --  RR: 14 (21 Dec 2023 07:57) (14 - 16)  SpO2: 95% (21 Dec 2023 07:57) (95% - 95%)    Parameters below as of 21 Dec 2023 07:57  Patient On (Oxygen Delivery Method): room air        Physical Exam: Gen - NAD in bed this am  HEENT - NCAT, EOMI  Neck - Supple, No limited ROM  Pulm - CTAB, No wheeze, No rhonchi, No crackles  Cardiovascular - RRR, S1S2, No murmurs  Abdomen - Soft, NT/ND, +BS  Extremities - LLE with recent BKA.   Uro - crain present for retention  Neuro-     Cognitive - AAOx3     Communication - Fluent, No dysarthria     Attention: Intact      Judgement: Good evidence of judgement     Memory: Recall 3 objects immediate and 3 min later         Cranial Nerves - CN 2-12 intact     Motor -                     LEFT    UE - ShAB 5/5, EF 5/5, EE 5/5, WE 5/5,  5/5                    RIGHT UE - ShAB 5/5, EF 5/5, EE 5/5, WE 5/5,  5/5                    LEFT    LE - HF 5/5, KE 5/5                    RIGHT LE - HF 5/5, KE 5/5, DF 5/5, PF 5/5        Sensory - Intact to LT     Reflexes - DTR Intact, No primitive reflexive     Coordination - FTN intact     Tone - normal  Psychiatric - Mood stable, Affect WNL  Skin:  incision site from BKA on the L residual limb. No drainage. C/d/i. skin well approximated and staples present. Stage 1 sacral decubitis ulcer present. Fungal rash of the bilateral inguinal creases- some erythema noted today over Patella tendon - padded with DSD  Wounds: Patient with scab of 4th digit of RLE.    IDT meeting on     SW: PH, 3STE, wife    OT:  eating set  grooming det  UBD/LBD cg/min  toileting min   tub/shower min  bathing    PT:  amb  min A  transfers min A  stairs    SLP    tentative dc  HC      Continue comprehensive acute rehab program consisting of 3hrs/day of OT/PT.

## 2023-12-21 NOTE — DISCHARGE NOTE NURSING/CASE MANAGEMENT/SOCIAL WORK - NSDCPEFALRISK_GEN_ALL_CORE
For information on Fall & Injury Prevention, visit: [email or web address] OR  [email or web address] OR  https://www.cdc.gov/steadi/patient.html For information on Fall & Injury Prevention, visit: [email or web address] OR  [email or web address] OR  https://www.cdc.gov/steadi/patient.html

## 2023-12-21 NOTE — DISCHARGE NOTE NURSING/CASE MANAGEMENT/SOCIAL WORK - PATIENT PORTAL LINK FT
You can access the FollowMyHealth Patient Portal offered by [facility] by registering at the following website: [email or web address]. By joining [employer]’s FollowMyHealth portal, you will also be able to view your health information using other applications (apps) compatible with our system. You can access the FollowMyHealth Patient Portal offered by [facility] by registering at the following website: [email or web address]. By joining [email or web address]’s FollowMyHealth portal, you will also be able to view your health information using other applications (apps) compatible with our system.

## 2023-12-21 NOTE — DISCHARGE NOTE NURSING/CASE MANAGEMENT/SOCIAL WORK - NSSCNAMETXT_GEN_ALL_CORE
[facility] Phone: [phone]  [facility] Phone: [phone]

## 2023-12-22 LAB
ALBUMIN SERPL ELPH-MCNC: 2.1 G/DL — LOW (ref 3.3–5)
ALBUMIN SERPL ELPH-MCNC: 2.1 G/DL — LOW (ref 3.3–5)
ALP SERPL-CCNC: 149 U/L — HIGH (ref 40–120)
ALP SERPL-CCNC: 149 U/L — HIGH (ref 40–120)
ALT FLD-CCNC: 61 U/L — HIGH (ref 10–45)
ALT FLD-CCNC: 61 U/L — HIGH (ref 10–45)
ANION GAP SERPL CALC-SCNC: 7 MMOL/L — SIGNIFICANT CHANGE UP (ref 5–17)
ANION GAP SERPL CALC-SCNC: 7 MMOL/L — SIGNIFICANT CHANGE UP (ref 5–17)
AST SERPL-CCNC: 33 U/L — SIGNIFICANT CHANGE UP (ref 10–40)
AST SERPL-CCNC: 33 U/L — SIGNIFICANT CHANGE UP (ref 10–40)
BASOPHILS # BLD AUTO: 0.03 K/UL — SIGNIFICANT CHANGE UP (ref 0–0.2)
BASOPHILS # BLD AUTO: 0.03 K/UL — SIGNIFICANT CHANGE UP (ref 0–0.2)
BASOPHILS NFR BLD AUTO: 0.2 % — SIGNIFICANT CHANGE UP (ref 0–2)
BASOPHILS NFR BLD AUTO: 0.2 % — SIGNIFICANT CHANGE UP (ref 0–2)
BILIRUB SERPL-MCNC: 0.3 MG/DL — SIGNIFICANT CHANGE UP (ref 0.2–1.2)
BILIRUB SERPL-MCNC: 0.3 MG/DL — SIGNIFICANT CHANGE UP (ref 0.2–1.2)
BUN SERPL-MCNC: 15 MG/DL — SIGNIFICANT CHANGE UP (ref 7–23)
BUN SERPL-MCNC: 15 MG/DL — SIGNIFICANT CHANGE UP (ref 7–23)
CALCIUM SERPL-MCNC: 9.1 MG/DL — SIGNIFICANT CHANGE UP (ref 8.4–10.5)
CALCIUM SERPL-MCNC: 9.1 MG/DL — SIGNIFICANT CHANGE UP (ref 8.4–10.5)
CHLORIDE SERPL-SCNC: 97 MMOL/L — SIGNIFICANT CHANGE UP (ref 96–108)
CHLORIDE SERPL-SCNC: 97 MMOL/L — SIGNIFICANT CHANGE UP (ref 96–108)
CO2 SERPL-SCNC: 32 MMOL/L — HIGH (ref 22–31)
CO2 SERPL-SCNC: 32 MMOL/L — HIGH (ref 22–31)
CREAT SERPL-MCNC: 0.98 MG/DL — SIGNIFICANT CHANGE UP (ref 0.5–1.3)
CREAT SERPL-MCNC: 0.98 MG/DL — SIGNIFICANT CHANGE UP (ref 0.5–1.3)
EGFR: 86 ML/MIN/1.73M2 — SIGNIFICANT CHANGE UP
EGFR: 86 ML/MIN/1.73M2 — SIGNIFICANT CHANGE UP
EOSINOPHIL # BLD AUTO: 0.28 K/UL — SIGNIFICANT CHANGE UP (ref 0–0.5)
EOSINOPHIL # BLD AUTO: 0.28 K/UL — SIGNIFICANT CHANGE UP (ref 0–0.5)
EOSINOPHIL NFR BLD AUTO: 2.2 % — SIGNIFICANT CHANGE UP (ref 0–6)
EOSINOPHIL NFR BLD AUTO: 2.2 % — SIGNIFICANT CHANGE UP (ref 0–6)
GLUCOSE BLDC GLUCOMTR-MCNC: 106 MG/DL — HIGH (ref 70–99)
GLUCOSE BLDC GLUCOMTR-MCNC: 106 MG/DL — HIGH (ref 70–99)
GLUCOSE BLDC GLUCOMTR-MCNC: 164 MG/DL — HIGH (ref 70–99)
GLUCOSE BLDC GLUCOMTR-MCNC: 164 MG/DL — HIGH (ref 70–99)
GLUCOSE BLDC GLUCOMTR-MCNC: 194 MG/DL — HIGH (ref 70–99)
GLUCOSE BLDC GLUCOMTR-MCNC: 194 MG/DL — HIGH (ref 70–99)
GLUCOSE BLDC GLUCOMTR-MCNC: 225 MG/DL — HIGH (ref 70–99)
GLUCOSE BLDC GLUCOMTR-MCNC: 225 MG/DL — HIGH (ref 70–99)
GLUCOSE SERPL-MCNC: 212 MG/DL — HIGH (ref 70–99)
GLUCOSE SERPL-MCNC: 212 MG/DL — HIGH (ref 70–99)
HCT VFR BLD CALC: 32.7 % — LOW (ref 39–50)
HCT VFR BLD CALC: 32.7 % — LOW (ref 39–50)
HGB BLD-MCNC: 10.2 G/DL — LOW (ref 13–17)
HGB BLD-MCNC: 10.2 G/DL — LOW (ref 13–17)
IMM GRANULOCYTES NFR BLD AUTO: 0.6 % — SIGNIFICANT CHANGE UP (ref 0–0.9)
IMM GRANULOCYTES NFR BLD AUTO: 0.6 % — SIGNIFICANT CHANGE UP (ref 0–0.9)
LYMPHOCYTES # BLD AUTO: 1.29 K/UL — SIGNIFICANT CHANGE UP (ref 1–3.3)
LYMPHOCYTES # BLD AUTO: 1.29 K/UL — SIGNIFICANT CHANGE UP (ref 1–3.3)
LYMPHOCYTES # BLD AUTO: 10.2 % — LOW (ref 13–44)
LYMPHOCYTES # BLD AUTO: 10.2 % — LOW (ref 13–44)
MAGNESIUM SERPL-MCNC: 1.6 MG/DL — SIGNIFICANT CHANGE UP (ref 1.6–2.6)
MAGNESIUM SERPL-MCNC: 1.6 MG/DL — SIGNIFICANT CHANGE UP (ref 1.6–2.6)
MCHC RBC-ENTMCNC: 26.2 PG — LOW (ref 27–34)
MCHC RBC-ENTMCNC: 26.2 PG — LOW (ref 27–34)
MCHC RBC-ENTMCNC: 31.2 GM/DL — LOW (ref 32–36)
MCHC RBC-ENTMCNC: 31.2 GM/DL — LOW (ref 32–36)
MCV RBC AUTO: 84.1 FL — SIGNIFICANT CHANGE UP (ref 80–100)
MCV RBC AUTO: 84.1 FL — SIGNIFICANT CHANGE UP (ref 80–100)
MONOCYTES # BLD AUTO: 0.86 K/UL — SIGNIFICANT CHANGE UP (ref 0–0.9)
MONOCYTES # BLD AUTO: 0.86 K/UL — SIGNIFICANT CHANGE UP (ref 0–0.9)
MONOCYTES NFR BLD AUTO: 6.8 % — SIGNIFICANT CHANGE UP (ref 2–14)
MONOCYTES NFR BLD AUTO: 6.8 % — SIGNIFICANT CHANGE UP (ref 2–14)
NEUTROPHILS # BLD AUTO: 10.07 K/UL — HIGH (ref 1.8–7.4)
NEUTROPHILS # BLD AUTO: 10.07 K/UL — HIGH (ref 1.8–7.4)
NEUTROPHILS NFR BLD AUTO: 80 % — HIGH (ref 43–77)
NEUTROPHILS NFR BLD AUTO: 80 % — HIGH (ref 43–77)
NRBC # BLD: 0 /100 WBCS — SIGNIFICANT CHANGE UP (ref 0–0)
NRBC # BLD: 0 /100 WBCS — SIGNIFICANT CHANGE UP (ref 0–0)
PHOSPHATE SERPL-MCNC: 3.8 MG/DL — SIGNIFICANT CHANGE UP (ref 2.5–4.5)
PHOSPHATE SERPL-MCNC: 3.8 MG/DL — SIGNIFICANT CHANGE UP (ref 2.5–4.5)
PLATELET # BLD AUTO: 390 K/UL — SIGNIFICANT CHANGE UP (ref 150–400)
PLATELET # BLD AUTO: 390 K/UL — SIGNIFICANT CHANGE UP (ref 150–400)
POTASSIUM SERPL-MCNC: 4.6 MMOL/L — SIGNIFICANT CHANGE UP (ref 3.5–5.3)
POTASSIUM SERPL-MCNC: 4.6 MMOL/L — SIGNIFICANT CHANGE UP (ref 3.5–5.3)
POTASSIUM SERPL-SCNC: 4.6 MMOL/L — SIGNIFICANT CHANGE UP (ref 3.5–5.3)
POTASSIUM SERPL-SCNC: 4.6 MMOL/L — SIGNIFICANT CHANGE UP (ref 3.5–5.3)
PROT SERPL-MCNC: 7.8 G/DL — SIGNIFICANT CHANGE UP (ref 6–8.3)
PROT SERPL-MCNC: 7.8 G/DL — SIGNIFICANT CHANGE UP (ref 6–8.3)
RBC # BLD: 3.89 M/UL — LOW (ref 4.2–5.8)
RBC # BLD: 3.89 M/UL — LOW (ref 4.2–5.8)
RBC # FLD: 15.9 % — HIGH (ref 10.3–14.5)
RBC # FLD: 15.9 % — HIGH (ref 10.3–14.5)
SODIUM SERPL-SCNC: 136 MMOL/L — SIGNIFICANT CHANGE UP (ref 135–145)
SODIUM SERPL-SCNC: 136 MMOL/L — SIGNIFICANT CHANGE UP (ref 135–145)
WBC # BLD: 12.6 K/UL — HIGH (ref 3.8–10.5)
WBC # BLD: 12.6 K/UL — HIGH (ref 3.8–10.5)
WBC # FLD AUTO: 12.6 K/UL — HIGH (ref 3.8–10.5)
WBC # FLD AUTO: 12.6 K/UL — HIGH (ref 3.8–10.5)

## 2023-12-22 PROCEDURE — 99232 SBSQ HOSP IP/OBS MODERATE 35: CPT | Mod: GC

## 2023-12-22 PROCEDURE — 99232 SBSQ HOSP IP/OBS MODERATE 35: CPT

## 2023-12-22 RX ADMIN — Medication 975 MILLIGRAM(S): at 05:36

## 2023-12-22 RX ADMIN — Medication 1 APPLICATION(S): at 05:36

## 2023-12-22 RX ADMIN — METFORMIN HYDROCHLORIDE 1000 MILLIGRAM(S): 850 TABLET ORAL at 08:05

## 2023-12-22 RX ADMIN — METFORMIN HYDROCHLORIDE 1000 MILLIGRAM(S): 850 TABLET ORAL at 17:03

## 2023-12-22 RX ADMIN — Medication 1 APPLICATION(S): at 21:31

## 2023-12-22 RX ADMIN — Medication 975 MILLIGRAM(S): at 22:18

## 2023-12-22 RX ADMIN — Medication 4 UNIT(S): at 12:16

## 2023-12-22 RX ADMIN — INSULIN GLARGINE 20 UNIT(S): 100 INJECTION, SOLUTION SUBCUTANEOUS at 21:28

## 2023-12-22 RX ADMIN — GABAPENTIN 300 MILLIGRAM(S): 400 CAPSULE ORAL at 05:36

## 2023-12-22 RX ADMIN — MAGNESIUM OXIDE 400 MG ORAL TABLET 400 MILLIGRAM(S): 241.3 TABLET ORAL at 08:05

## 2023-12-22 RX ADMIN — GABAPENTIN 300 MILLIGRAM(S): 400 CAPSULE ORAL at 15:15

## 2023-12-22 RX ADMIN — Medication 975 MILLIGRAM(S): at 12:15

## 2023-12-22 RX ADMIN — Medication 10 MILLIGRAM(S): at 12:15

## 2023-12-22 RX ADMIN — OXYCODONE HYDROCHLORIDE 10 MILLIGRAM(S): 5 TABLET ORAL at 15:18

## 2023-12-22 RX ADMIN — Medication 975 MILLIGRAM(S): at 17:03

## 2023-12-22 RX ADMIN — BIMATOPROST 1 DROP(S): 0.3 SOLUTION/ DROPS OPHTHALMIC at 21:29

## 2023-12-22 RX ADMIN — Medication 2: at 12:16

## 2023-12-22 RX ADMIN — Medication 975 MILLIGRAM(S): at 06:12

## 2023-12-22 RX ADMIN — MAGNESIUM OXIDE 400 MG ORAL TABLET 400 MILLIGRAM(S): 241.3 TABLET ORAL at 12:15

## 2023-12-22 RX ADMIN — Medication 1 TABLET(S): at 05:36

## 2023-12-22 RX ADMIN — ENOXAPARIN SODIUM 40 MILLIGRAM(S): 100 INJECTION SUBCUTANEOUS at 05:37

## 2023-12-22 RX ADMIN — Medication 1 APPLICATION(S): at 21:32

## 2023-12-22 RX ADMIN — GABAPENTIN 300 MILLIGRAM(S): 400 CAPSULE ORAL at 21:28

## 2023-12-22 RX ADMIN — MAGNESIUM OXIDE 400 MG ORAL TABLET 400 MILLIGRAM(S): 241.3 TABLET ORAL at 17:03

## 2023-12-22 RX ADMIN — Medication 975 MILLIGRAM(S): at 21:28

## 2023-12-22 RX ADMIN — LINAGLIPTIN 5 MILLIGRAM(S): 5 TABLET, FILM COATED ORAL at 21:29

## 2023-12-22 RX ADMIN — SIMVASTATIN 40 MILLIGRAM(S): 20 TABLET, FILM COATED ORAL at 21:29

## 2023-12-22 RX ADMIN — OXYCODONE HYDROCHLORIDE 10 MILLIGRAM(S): 5 TABLET ORAL at 17:02

## 2023-12-22 RX ADMIN — Medication 4 UNIT(S): at 08:06

## 2023-12-22 RX ADMIN — Medication 2: at 08:05

## 2023-12-22 RX ADMIN — Medication 975 MILLIGRAM(S): at 15:13

## 2023-12-22 RX ADMIN — Medication 1 TABLET(S): at 17:03

## 2023-12-22 NOTE — PROGRESS NOTE ADULT - ASSESSMENT
ELVIS VILLATORO is a 63y with a history of diabetes mellitus type 2, HTN, dyslipidemia, retinopathy who presented to [facility] on 11/29/23  with complaint of non-healing left foot wound and found to have osteomyelitis. Hospital course complicated by hyperglycemia and need for surgical revision. Now admitted to [facility] after for initiation of a multidisciplinary rehab program consisting focused on functional mobility, transfers and ADLs.     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   - s/p revision on 12/4 and formalization 12/12.   Comprehensive Multidisciplinary Rehab Program:  comprehensive rehab program, PT/OT/SLP 3 hours a day, 5 days a week.  - NWB left lower extremity  - Precautions: falls  - completed Unasyn per ID at previous hospital- on PO abx s/p laceration of eye lid/+UTI. Afebrile, no leukocytosis   - s/p fall in OT-CTH follow up neg new findings  -plastics appreciated      #UTI  -on PO Augmanetin, CS pending  -leukocytosis improved, afebrile, PVR 200cc  -monitor closely    HLD  -Simvastatin    Diabetes mellitus type 2  -ISS  - Home meds: metformin 1000mg BID, glipizide   - Lantus  - Admelog TID  - hospitalist in  Diabetic nursing to see and advise    HTN  -enalapril increased back to 10mg daily-by hospitalist   -will hold Flomax for low BP  to Follow BPS, 120 in chair today and tolerating therapy    Urinary Retention  -Tamsulosin on hold  - TOV, pvr 200cc  + UA - C&S sent- + Leukocytosis- now improved  Hospitalist to adjust antibiotics- On augmentin after trauma to right eyelid yesterday per plastics    Pain Management:  - Tylenol q6h  - oxy5mg, 10mg mod and severe pain  - gabapentin 300mg TID    GI/Bowel:  - patient reports regular BMs  - Senna QHS, Miralax Daily    Skin/Pressure Injury:   - At risk for pressure injury due to neurologic diagnosis and relative immobility.  - Skin assessment on admission: stage 1 sacral ulcer, tinea cruris present, scab of 4th digit R foot  - barrier cream for sacral wound  - butenafine daily for jock itch  - Monitor Incisions: LLE residual stump with sutures  - Daily dressing changes  - aquaphor for dry R foot.    Diet:  - Consistent Carb  - Nutrition consult for assessment and recs    DVT ppx:  - Lovenox daily dvt ppx  ---------------  Code Status/Emergency Contact:    Outpatient Follow-up (Specialty/Name of physician):  Jose Francisco Dean  Vascular Surgery  [address] [facility]  [place] [address]  Phone: [phone]  Fax: [phone]  Follow Up Time: 2 weeks  Health system Endocrinology  Endocrinology  [address] [place] [address]  Phone: [phone]  Fax:   Follow Up Time: 2 weeks    [facility] [place]  Urology  [address] [place] [address]  Phone: [phone]  Follow Up Time: 1 week    Eduin Gutierrez MD  Attending Physician  Health system  Division of Infectous Diseases  [phone]    Needs podiatry/vascular and optho f/u care.     Endocrine faculty practice.   [address] [facility] [address]. Phone .   ELVIS VILLATORO is a 63y with a history of diabetes mellitus type 2, HTN, dyslipidemia, retinopathy who presented to [facility] on 11/29/23  with complaint of non-healing left foot wound and found to have osteomyelitis. Hospital course complicated by hyperglycemia and need for surgical revision. Now admitted to [facility] after for initiation of a multidisciplinary rehab program consisting focused on functional mobility, transfers and ADLs.     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   - s/p revision on 12/4 and formalization 12/12.   Comprehensive Multidisciplinary Rehab Program:  comprehensive rehab program, PT/OT/SLP 3 hours a day, 5 days a week.  - NWB left lower extremity  - Precautions: falls  - completed Unasyn per ID at previous hospital- on PO abx s/p laceration of eye lid/+UTI. Afebrile, no leukocytosis   - s/p fall in OT-CTH follow up neg new findings  -plastics appreciated      #UTI  -on PO Augmanetin, CS pending  -leukocytosis improved, afebrile, PVR 200cc  -monitor closely    HLD  -Simvastatin    Diabetes mellitus type 2  -ISS  - Home meds: metformin 1000mg BID, glipizide   - Lantus  - Admelog TID  - hospitalist in  Diabetic nursing to see and advise    HTN  -enalapril increased back to 10mg daily-by hospitalist   -will hold Flomax for low BP  to Follow BPS, 120 in chair today and tolerating therapy    Urinary Retention  -Tamsulosin on hold  - TOV, pvr 200cc  + UA - C&S sent- + Leukocytosis- now improved  Hospitalist to adjust antibiotics- On augmentin after trauma to right eyelid yesterday per plastics    Pain Management:  - Tylenol q6h  - oxy5mg, 10mg mod and severe pain  - gabapentin 300mg TID    GI/Bowel:  - patient reports regular BMs  - Senna QHS, Miralax Daily    Skin/Pressure Injury:   - At risk for pressure injury due to neurologic diagnosis and relative immobility.  - Skin assessment on admission: stage 1 sacral ulcer, tinea cruris present, scab of 4th digit R foot  - barrier cream for sacral wound  - butenafine daily for jock itch  - Monitor Incisions: LLE residual stump with sutures  - Daily dressing changes  - aquaphor for dry R foot.    Diet:  - Consistent Carb  - Nutrition consult for assessment and recs    DVT ppx:  - Lovenox daily dvt ppx  ---------------  Code Status/Emergency Contact:    Outpatient Follow-up (Specialty/Name of physician):  Jose Francisco Dean  Vascular Surgery  [address] [facility]  [place] [address]  Phone: [phone]  Fax: [phone]  Follow Up Time: 2 weeks  [facility] Endocrinology  Endocrinology  [address] [place] [address]  Phone: [phone]  Fax:   Follow Up Time: 2 weeks    [facility] [place]  Urology  [address] [place] [address]  Phone: [phone]  Follow Up Time: 1 week    Eduin Gutierrez MD  Attending Physician  [facility]  Division of Infectous Diseases  [phone]    Needs podiatry/vascular and optho f/u care.     Endocrine faculty practice.   [address] [facility] 203. Phone .   ELVIS VILLATORO is a 63y with a history of diabetes mellitus type 2, HTN, dyslipidemia, retinopathy who presented to [facility] on 11/29/23  with complaint of non-healing left foot wound and found to have osteomyelitis. Hospital course complicated by hyperglycemia and need for surgical revision. Now admitted to [facility] after for initiation of a multidisciplinary rehab program consisting focused on functional mobility, transfers and ADLs.     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   - s/p revision on 12/4 and formalization 12/12.   Comprehensive Multidisciplinary Rehab Program:  comprehensive rehab program, PT/OT/SLP 3 hours a day, 5 days a week.  - NWB left lower extremity  - Precautions: falls  - completed Unasyn per ID at previous hospital- on PO abx s/p laceration of eye lid/+UTI. Afebrile, no leukocytosis   - s/p fall in OT-CTH follow up neg new findings  -plastics appreciated      #UTI  -on PO Augmentin, CS pending  -leukocytosis improved, afebrile, PVR 200cc  -monitor closely    HLD  -Simvastatin    Diabetes mellitus type 2  -ISS  - Home meds: metformin 1000mg BID, glipizide   - Lantus  - Admelog TID  - hospitalist in  Diabetic nursing to see and advise    HTN  -enalapril increased back to 10mg daily-by hospitalist   -will hold Flomax for low BP  to Follow BPS, 120 in chair today and tolerating therapy    Urinary Retention  -Tamsulosin on hold  - TOV, pvr 200cc  + UA - C&S sent- + Leukocytosis- now improved  Hospitalist to adjust antibiotics- On augmentin after trauma to right eyelid yesterday per plastics    Pain Management:  - Tylenol q6h  - oxy5mg, 10mg mod and severe pain  - gabapentin 300mg TID    GI/Bowel:  - patient reports regular BMs  - Senna QHS, Miralax Daily    Skin/Pressure Injury:   - At risk for pressure injury due to neurologic diagnosis and relative immobility.  - Skin assessment on admission: stage 1 sacral ulcer, tinea cruris present, scab of 4th digit R foot  - barrier cream for sacral wound  - butenafine daily for jock itch  - Monitor Incisions: LLE residual stump with sutures  - Daily dressing changes  - aquaphor for dry R foot.    Diet:  - Consistent Carb  - Nutrition consult for assessment and recs    DVT ppx:  - Lovenox daily dvt ppx  ---------------  Code Status/Emergency Contact:    Outpatient Follow-up (Specialty/Name of physician):  Jose Francisco Dean  Vascular Surgery  [address] [facility]  [place] [address]  Phone: [phone]  Fax: [phone]  Follow Up Time: 2 weeks  [facility] Endocrinology  Endocrinology  [address] [place] [address]  Phone: [phone]  Fax:   Follow Up Time: 2 weeks    [facility] [place]  Urology  [address] [place] [address]  Phone: [phone]  Follow Up Time: 1 week    Eduin Gutierrez MD  Attending Physician  [facility]  Division of Infectous Diseases  [phone]    Needs podiatry/vascular and optho f/u care.     Endocrine faculty practice.   [address] [place] [address] [address]. Phone .   ELVIS VILLATORO is a 63y with a history of diabetes mellitus type 2, HTN, dyslipidemia, retinopathy who presented to [facility] on 11/29/23  with complaint of non-healing left foot wound and found to have osteomyelitis. Hospital course complicated by hyperglycemia and need for surgical revision. Now admitted to [facility] after for initiation of a multidisciplinary rehab program consisting focused on functional mobility, transfers and ADLs.     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   - s/p revision on 12/4 and formalization 12/12.   Comprehensive Multidisciplinary Rehab Program:  comprehensive rehab program, PT/OT/SLP 3 hours a day, 5 days a week.  - NWB left lower extremity  - Precautions: falls  - completed Unasyn per ID at previous hospital- on PO abx s/p laceration of eye lid/+UTI. Afebrile, no leukocytosis   - s/p fall in OT-CTH follow up neg new findings  -plastics appreciated      #UTI  -on PO Augmentin, CS pending  -leukocytosis improved, afebrile, PVR 200cc  -monitor closely    HLD  -Simvastatin    Diabetes mellitus type 2  -ISS  - Home meds: metformin 1000mg BID, glipizide   - Lantus  - Admelog TID  - hospitalist in  Diabetic nursing to see and advise    HTN  -enalapril increased back to 10mg daily-by hospitalist   -will hold Flomax for low BP  to Follow BPS, 120 in chair today and tolerating therapy    Urinary Retention  -Tamsulosin on hold  - TOV, pvr 200cc  + UA - C&S sent- + Leukocytosis- now improved  Hospitalist to adjust antibiotics- On augmentin after trauma to right eyelid yesterday per plastics    Pain Management:  - Tylenol q6h  - oxy5mg, 10mg mod and severe pain  - gabapentin 300mg TID    GI/Bowel:  - patient reports regular BMs  - Senna QHS, Miralax Daily    Skin/Pressure Injury:   - At risk for pressure injury due to neurologic diagnosis and relative immobility.  - Skin assessment on admission: stage 1 sacral ulcer, tinea cruris present, scab of 4th digit R foot  - barrier cream for sacral wound  - butenafine daily for jock itch  - Monitor Incisions: LLE residual stump with sutures  - Daily dressing changes  - aquaphor for dry R foot.    Diet:  - Consistent Carb  - Nutrition consult for assessment and recs    DVT ppx:  - Lovenox daily dvt ppx  ---------------  Code Status/Emergency Contact:    Outpatient Follow-up (Specialty/Name of physician):  Jose Francisco Dean  Vascular Surgery  [address] [facility]  [place] [address]  Phone: [phone]  Fax: [phone]  Follow Up Time: 2 weeks  [facility] Endocrinology  Endocrinology  [address] [place] [address]  Phone: [phone]  Fax:   Follow Up Time: 2 weeks    [facility] at [place]  Urology  [address] [place] [address]  Phone: [phone]  Follow Up Time: 1 week    Eduin Gutierrez MD  Attending Physician  [facility]  Division of Infectous Diseases  [phone]    Needs podiatry/vascular and optho f/u care.     Endocrine faculty practice.   [address] [place] [address] [address]. Phone .

## 2023-12-22 NOTE — PROGRESS NOTE ADULT - ASSESSMENT
ELVIS VILLATORO is a 63y with a history of diabetes mellitus type 2, HTN, dyslipidemia, retinopathy who presented to [facility] on 11/29/23  with complaint of non-healing left foot wound and found to have osteomyelitis. Hospital course complicated by hyperglycemia and need for surgical revision. Now admitted to [facility] after for initiation of a multidisciplinary rehab program consisting focused on functional mobility, transfers and ADLs- pt/ot/dv tppx    # leucocytosis  - Downtrending   - no focal ssx of infection. wound check by primary/plastic   - monitor cbc, temp. 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 follow up OP after DC, with Angeles Vegas (MD)    OH  HTN  - s/p iv fluids  - patient was on Enalapril 10 mg BID which was reduced to enalapril 5 mg at 12 pm on 12/18 after syncope. now BP higher side. increased Enalapril to 10 mg at 12 pm with holding if SBP<120 mm of hg  - check Orthostasis and adjut meds  -echo on 12/18: normal LV and RV functions.   - encouarged PO hydration    HLD  -Simvastatin    Diabetes mellitus type 2 with hyperglycemia   Hypoglycemia on 12/19 when patient was on lantus 32 unit at h/s and admelog 16 unit TIDAC  - A1C with Estimated Average Glucose Result: 11.4 % (12.01.23 @ 05:19)  - Target BS: 120-180  - Home meds: 	Kazano 12.5 mg-1000 mg oral tablet (Alogliptin and metformin): 1 tab(s) orally 2 times a day  - ISS, accuchecks   - Lantus 20 unit at h/s. admelog 4  unit TIDAC  - continue Metformin 1000 mg BID  - Linagplin 5 mg daily  - monitor FSBS and adjust insulin. watch for hypoglycemia     VTE ppx   Lovenox     ELVIS VILLATORO is a 63y with a history of diabetes mellitus type 2, HTN, dyslipidemia, retinopathy who presented to [facility] on 11/29/23  with complaint of non-healing left foot wound and found to have osteomyelitis. Hospital course complicated by hyperglycemia and need for surgical revision. Now admitted to [facility] after for initiation of a multidisciplinary rehab program consisting focused on functional mobility, transfers and ADLs- pt/ot/dv tppx    # leucocytosis  - Downtrending   - no focal ssx of infection. wound check by primary/plastic   - monitor cbc, temp. 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 follow up OP after DC, with Angeles Vegas (MD)    OH  HTN  - s/p iv fluids  - patient was on Enalapril 10 mg BID which was reduced to enalapril 5 mg at 12 pm on 12/18 after syncope. now BP higher side. increased Enalapril to 10 mg at 12 pm with holding if SBP<120 mm of hg  - check Orthostasis and adjut meds  -echo on 12/18: normal LV and RV functions.   - encouarged PO hydration    HLD  -Simvastatin    Diabetes mellitus type 2 with hyperglycemia   Hypoglycemia on 12/19 when patient was on lantus 32 unit at h/s and admelog 16 unit TIDAC  - A1C with Estimated Average Glucose Result: 11.4 % (12.01.23 @ 05:19)  - Target BS: 120-180  - Home meds: 	Kazano 12.5 mg-1000 mg oral tablet (Alogliptin and metformin): 1 tab(s) orally 2 times a day  - ISS, accuchecks   - Lantus 20 unit at h/s. admelog 4  unit TIDAC  - continue Metformin 1000 mg BID  - Linagplin 5 mg daily  - monitor FSBS and adjust insulin. watch for hypoglycemia     VTE ppx   Lovenox

## 2023-12-22 NOTE — PROGRESS NOTE ADULT - SUBJECTIVE AND OBJECTIVE BOX
Patient is a 63y old  Male who presents with a chief complaint of Left BKA (22 Dec 2023 12:33)      SUBJECTIVE / OVERNIGHT EVENTS:  Pt seen and examined at bedside. No acute events overnight.  Pt denies cp, palpitations, sob, abd pain, N/V, fever, chills.    ROS:  All other review of systems negative    Allergies    No Known Allergies    Intolerances        MEDICATIONS  (STANDING):  acetaminophen     Tablet .. 975 milliGRAM(s) Oral every 6 hours  amoxicillin  875 milliGRAM(s)/clavulanate 1 Tablet(s) Oral two times a day  AQUAPHOR (petrolatum Ointment) 1 Application(s) Topical two times a day  bimatoprost 0.01% Ophthalmic Solution 1 Drop(s) Both EYES at bedtime  clotrimazole 1% Cream 1 Application(s) Topical two times a day  dextrose 5%. 1000 milliLiter(s) (50 mL/Hr) IV Continuous <Continuous>  dextrose 5%. 1000 milliLiter(s) (100 mL/Hr) IV Continuous <Continuous>  dextrose 50% Injectable 25 Gram(s) IV Push once  dextrose 50% Injectable 25 Gram(s) IV Push once  dextrose 50% Injectable 12.5 Gram(s) IV Push once  enalapril 10 milliGRAM(s) Oral <User Schedule>  enoxaparin Injectable 40 milliGRAM(s) SubCutaneous every 24 hours  gabapentin 300 milliGRAM(s) Oral three times a day  glucagon  Injectable 1 milliGRAM(s) IntraMuscular once  insulin glargine Injectable (LANTUS) 20 Unit(s) SubCutaneous at bedtime  insulin lispro (ADMELOG) corrective regimen sliding scale   SubCutaneous three times a day before meals  insulin lispro (ADMELOG) corrective regimen sliding scale   SubCutaneous at bedtime  insulin lispro Injectable (ADMELOG) 4 Unit(s) SubCutaneous three times a day before meals  linagliptin 5 milliGRAM(s) Oral at bedtime  magnesium oxide 400 milliGRAM(s) Oral three times a day with meals  metFORMIN 1000 milliGRAM(s) Oral two times a day  polyethylene glycol 3350 17 Gram(s) Oral two times a day  senna 2 Tablet(s) Oral at bedtime  simvastatin 40 milliGRAM(s) Oral at bedtime  sodium chloride 0.9%. 1000 milliLiter(s) (100 mL/Hr) IV Continuous <Continuous>    MEDICATIONS  (PRN):  dextrose Oral Gel 15 Gram(s) Oral once PRN Blood Glucose LESS THAN 70 milliGRAM(s)/deciliter  guaiFENesin Oral Liquid (Sugar-Free) 100 milliGRAM(s) Oral every 6 hours PRN Cough  oxyCODONE    IR 10 milliGRAM(s) Oral every 6 hours PRN Severe Pain (7 - 10)  oxyCODONE    IR 5 milliGRAM(s) Oral every 6 hours PRN Moderate Pain (4 - 6)      Vital Signs Last 24 Hrs  T(C): 36.8 (22 Dec 2023 07:39), Max: 36.8 (21 Dec 2023 21:19)  T(F): 98.2 (22 Dec 2023 07:39), Max: 98.2 (21 Dec 2023 21:19)  HR: 90 (22 Dec 2023 07:39) (80 - 90)  BP: 152/79 (22 Dec 2023 07:39) (149/81 - 152/79)  BP(mean): --  RR: 14 (22 Dec 2023 07:39) (14 - 16)  SpO2: 95% (22 Dec 2023 07:39) (95% - 96%)    Parameters below as of 22 Dec 2023 07:39  Patient On (Oxygen Delivery Method): room air      CAPILLARY BLOOD GLUCOSE      POCT Blood Glucose.: 164 mg/dL (22 Dec 2023 12:14)  POCT Blood Glucose.: 194 mg/dL (22 Dec 2023 07:59)  POCT Blood Glucose.: 188 mg/dL (21 Dec 2023 21:35)  POCT Blood Glucose.: 139 mg/dL (21 Dec 2023 17:06)    I&O's Summary      PHYSICAL EXAM:  GENERAL: NAD, overweight male   HEAD:  Atraumatic, Normocephalic  NECK: Supple, No JVD  CHEST/LUNG: Clear to auscultation bilaterally; No wheeze, nonlabored breathing  HEART: Regular rate and rhythm; No murmurs, rubs, or gallops  ABDOMEN: Soft, Nontender, Nondistended; Bowel sounds present  EXTREMITIES:  No clubbing, cyanosis, or edema, LLE Sp BKA   PSYCH: calm, appropriate mood    LABS:                        10.2   12.60 )-----------( 390      ( 22 Dec 2023 06:00 )             32.7     12-22    136  |  97  |  15  ----------------------------<  212<H>  4.6   |  32<H>  |  0.98    Ca    9.1      22 Dec 2023 06:00  Phos  3.8     12-22  Mg     1.6     12-22    TPro  7.8  /  Alb  2.1<L>  /  TBili  0.3  /  DBili  x   /  AST  33  /  ALT  61<H>  /  AlkPhos  149<H>  12-22          Urinalysis Basic - ( 22 Dec 2023 06:00 )    Color: x / Appearance: x / SG: x / pH: x  Gluc: 212 mg/dL / Ketone: x  / Bili: x / Urobili: x   Blood: x / Protein: x / Nitrite: x   Leuk Esterase: x / RBC: x / WBC x   Sq Epi: x / Non Sq Epi: x / Bacteria: x        RADIOLOGY & ADDITIONAL TESTS:  Results Reviewed:   Imaging Personally Reviewed:  Electrocardiogram Personally Reviewed:    COORDINATION OF CARE:  Care Discussed with Consultants/Other Providers [Y/N]:  Prior or Outpatient Records Reviewed [Y/N]:

## 2023-12-22 NOTE — PROGRESS NOTE ADULT - SUBJECTIVE AND OBJECTIVE BOX
HPI:  Mr. Mcdaniel is a 63M PMHx DM, HLD, PAD, prior toe amputation, presents to [facility] ED w L foot wound. Reports wound has been present on hindfoot x 3 weeks. Started as small cut. Over last 24 hours, pt unable to ambulate d/t severe pain. Reports significant malodor. Denies subjective fevers, chills, purulent drainage, numbness/paresthesia.     In ED, patient is tachycardic to 123. BP w HTN. Febrile to 102.4 F. Labs w WBC 21. Hgb 11.4. Lac 2.4. XR L foot w soft tissue gas at lateral hindfoot. CT LLE non con w soft tissue gas tracking ~7cm above foot.   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 Patient admitted to the SICU pre and post op for hyperglycemia and insulin drip management. Endocrinology was consulted for the hyperglycemia.  Patient transferred out of the SICU when he was able to be weaned off the insulin drip. Internal medicine was consulted for malachi-operative clearance for formalization surgery. On 12/4 the patient was taken back to the operating room for a left lower extremity guillotine revision. Infectious disease was consulted for antibiotic management and recommended switching from Zosyn to Unasyn. Rehab medicine was consulted to evaluate for rehab needs and recommended acute rehab on discharge.       ROS/subjective:  Pt seen and examined in chair, no lightheadedness, off Flomax  No acute events overnight  Lid laceration sutured with Steris  Moving Bowels - formed  PVRs approx 200cc no burning no urgency-on abx-extend course to 5 days-f/up CS  Pt denies cp, palpitations, sob, abd pain, N/V, fever, chills.   BP regimen per hospitalist   pain well controlled-Tylenol/Neurontin  diabetic nursing to assess patient      MEDICATIONS  (STANDING):  acetaminophen     Tablet .. 975 milliGRAM(s) Oral every 6 hours  amoxicillin  875 milliGRAM(s)/clavulanate 1 Tablet(s) Oral two times a day  AQUAPHOR (petrolatum Ointment) 1 Application(s) Topical two times a day  bimatoprost 0.01% Ophthalmic Solution 1 Drop(s) Both EYES at bedtime  clotrimazole 1% Cream 1 Application(s) Topical two times a day  dextrose 5%. 1000 milliLiter(s) (100 mL/Hr) IV Continuous <Continuous>  dextrose 5%. 1000 milliLiter(s) (50 mL/Hr) IV Continuous <Continuous>  dextrose 50% Injectable 25 Gram(s) IV Push once  dextrose 50% Injectable 12.5 Gram(s) IV Push once  dextrose 50% Injectable 25 Gram(s) IV Push once  enalapril 10 milliGRAM(s) Oral <User Schedule>  enoxaparin Injectable 40 milliGRAM(s) SubCutaneous every 24 hours  gabapentin 300 milliGRAM(s) Oral three times a day  glucagon  Injectable 1 milliGRAM(s) IntraMuscular once  insulin glargine Injectable (LANTUS) 20 Unit(s) SubCutaneous at bedtime  insulin lispro (ADMELOG) corrective regimen sliding scale   SubCutaneous three times a day before meals  insulin lispro (ADMELOG) corrective regimen sliding scale   SubCutaneous at bedtime  insulin lispro Injectable (ADMELOG) 4 Unit(s) SubCutaneous three times a day before meals  linagliptin 5 milliGRAM(s) Oral at bedtime  magnesium oxide 400 milliGRAM(s) Oral three times a day with meals  metFORMIN 1000 milliGRAM(s) Oral two times a day  polyethylene glycol 3350 17 Gram(s) Oral two times a day  senna 2 Tablet(s) Oral at bedtime  simvastatin 40 milliGRAM(s) Oral at bedtime  sodium chloride 0.9%. 1000 milliLiter(s) (100 mL/Hr) IV Continuous <Continuous>    MEDICATIONS  (PRN):  dextrose Oral Gel 15 Gram(s) Oral once PRN Blood Glucose LESS THAN 70 milliGRAM(s)/deciliter  guaiFENesin Oral Liquid (Sugar-Free) 100 milliGRAM(s) Oral every 6 hours PRN Cough  oxyCODONE    IR 5 milliGRAM(s) Oral every 6 hours PRN Moderate Pain (4 - 6)  oxyCODONE    IR 10 milliGRAM(s) Oral every 6 hours PRN Severe Pain (7 - 10)                            10.2   12.60 )-----------( 390      ( 22 Dec 2023 06:00 )             32.7     12-22    136  |  97  |  15  ----------------------------<  212<H>  4.6   |  32<H>  |  0.98    Ca    9.1      22 Dec 2023 06:00  Phos  3.8     12-22  Mg     1.6     12-22    TPro  7.8  /  Alb  2.1<L>  /  TBili  0.3  /  DBili  x   /  AST  33  /  ALT  61<H>  /  AlkPhos  149<H>  12-22    Urinalysis Basic - ( 22 Dec 2023 06:00 )    Color: x / Appearance: x / SG: x / pH: x  Gluc: 212 mg/dL / Ketone: x  / Bili: x / Urobili: x   Blood: x / Protein: x / Nitrite: x   Leuk Esterase: x / RBC: x / WBC x   Sq Epi: x / Non Sq Epi: x / Bacteria: x        CAPILLARY BLOOD GLUCOSE      POCT Blood Glucose.: 164 mg/dL (22 Dec 2023 12:14)  POCT Blood Glucose.: 194 mg/dL (22 Dec 2023 07:59)  POCT Blood Glucose.: 188 mg/dL (21 Dec 2023 21:35)  POCT Blood Glucose.: 139 mg/dL (21 Dec 2023 17:06)      Vital Signs Last 24 Hrs  T(C): 36.8 (22 Dec 2023 07:39), Max: 36.8 (21 Dec 2023 21:19)  T(F): 98.2 (22 Dec 2023 07:39), Max: 98.2 (21 Dec 2023 21:19)  HR: 90 (22 Dec 2023 07:39) (80 - 90)  BP: 152/79 (22 Dec 2023 07:39) (149/81 - 152/79)  BP(mean): --  RR: 14 (22 Dec 2023 07:39) (14 - 16)  SpO2: 95% (22 Dec 2023 07:39) (95% - 96%)    Parameters below as of 22 Dec 2023 07:39  Patient On (Oxygen Delivery Method): room air      Physical Exam: Gen - NAD in chair  HEENT - NCAT, EOMI  Neck - Supple, No limited ROM  Pulm - CTAB, No wheeze, No rhonchi, No crackles  Cardiovascular - RRR, S1S2, No murmurs  Abdomen - Soft, NT/ND, +BS  Extremities - LLE with recent BKA.   Uro - crain present for retention  Neuro-     Cognitive - AAOx3       Motor -                     LEFT    UE - ShAB 5/5, EF 5/5, EE 5/5, WE 5/5,  5/5                    RIGHT UE - ShAB 5/5, EF 5/5, EE 5/5, WE 5/5,  5/5                    LEFT    LE - HF 5/5, KE 5/5                    RIGHT LE - HF 5/5, KE 5/5, DF 5/5, PF 5/5        Sensory - Intact to LT     Tone - normal  Psychiatric - Mood stable, Affect WNL  Skin:  incision site from BKA on the L residual limb. No drainage. C/d/i. skin well approximated and staples present. Stage 1 sacral decubitis ulcer present. Fungal rash of the bilateral inguinal creases- some erythema noted today over Patella tendon - padded with DSD  Wounds: Patient with scab of 4th digit of RLE.    IDT meeting on 12/20    SW: PH, 3STE, wife    OT:  eating set  grooming det  UBD/LBD cg/min  toileting min   tub/shower min  bathing    PT:  amb  min A  transfers min A  stairs    SLP    tentative dc 12/30 HC        Continue comprehensive acute rehab program consisting of 3hrs/day of OT/PT. HPI:  Mr. Mcdaniel is a 63M PMHx DM, HLD, PAD, prior toe amputation, presents to [facility] ED w L foot wound. Reports wound has been present on hindfoot x 3 weeks. Started as small cut. Over last 24 hours, pt unable to ambulate d/t severe pain. Reports significant malodor. Denies subjective fevers, chills, purulent drainage, numbness/paresthesia.     In ED, patient is tachycardic to 123. BP w HTN. Febrile to 102.4 F. Labs w WBC 21. Hgb 11.4. Lac 2.4. XR L foot w soft tissue gas at lateral hindfoot. CT LLE non con w soft tissue gas tracking ~7cm above foot.   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 Patient admitted to the SICU pre and post op for hyperglycemia and insulin drip management. Endocrinology was consulted for the hyperglycemia.  Patient transferred out of the SICU when he was able to be weaned off the insulin drip. Internal medicine was consulted for malachi-operative clearance for formalization surgery. On 12/4 the patient was taken back to the operating room for a left lower extremity guillotine revision. Infectious disease was consulted for antibiotic management and recommended switching from Zosyn to Unasyn. Rehab medicine was consulted to evaluate for rehab needs and recommended acute rehab on discharge.       ROS/subjective:  Pt seen and examined in chair, no lightheadedness, off Flomax  No acute events overnight  Lid laceration sutured with Steris  Moving Bowels - formed  PVRs approx 200cc no burning no urgency-on abx-extend course to 5 days-f/up CS  Pt denies cp, palpitations, sob, abd pain, N/V, fever, chills.   BP regimen per hospitalist   pain well controlled-Tylenol/Neurontin  diabetic nursing to assess patient      MEDICATIONS  (STANDING):  acetaminophen     Tablet .. 975 milliGRAM(s) Oral every 6 hours  amoxicillin  875 milliGRAM(s)/clavulanate 1 Tablet(s) Oral two times a day  AQUAPHOR (petrolatum Ointment) 1 Application(s) Topical two times a day  bimatoprost 0.01% Ophthalmic Solution 1 Drop(s) Both EYES at bedtime  clotrimazole 1% Cream 1 Application(s) Topical two times a day  dextrose 5%. 1000 milliLiter(s) (100 mL/Hr) IV Continuous <Continuous>  dextrose 5%. 1000 milliLiter(s) (50 mL/Hr) IV Continuous <Continuous>  dextrose 50% Injectable 25 Gram(s) IV Push once  dextrose 50% Injectable 12.5 Gram(s) IV Push once  dextrose 50% Injectable 25 Gram(s) IV Push once  enalapril 10 milliGRAM(s) Oral <User Schedule>  enoxaparin Injectable 40 milliGRAM(s) SubCutaneous every 24 hours  gabapentin 300 milliGRAM(s) Oral three times a day  glucagon  Injectable 1 milliGRAM(s) IntraMuscular once  insulin glargine Injectable (LANTUS) 20 Unit(s) SubCutaneous at bedtime  insulin lispro (ADMELOG) corrective regimen sliding scale   SubCutaneous three times a day before meals  insulin lispro (ADMELOG) corrective regimen sliding scale   SubCutaneous at bedtime  insulin lispro Injectable (ADMELOG) 4 Unit(s) SubCutaneous three times a day before meals  linagliptin 5 milliGRAM(s) Oral at bedtime  magnesium oxide 400 milliGRAM(s) Oral three times a day with meals  metFORMIN 1000 milliGRAM(s) Oral two times a day  polyethylene glycol 3350 17 Gram(s) Oral two times a day  senna 2 Tablet(s) Oral at bedtime  simvastatin 40 milliGRAM(s) Oral at bedtime  sodium chloride 0.9%. 1000 milliLiter(s) (100 mL/Hr) IV Continuous <Continuous>    MEDICATIONS  (PRN):  dextrose Oral Gel 15 Gram(s) Oral once PRN Blood Glucose LESS THAN 70 milliGRAM(s)/deciliter  guaiFENesin Oral Liquid (Sugar-Free) 100 milliGRAM(s) Oral every 6 hours PRN Cough  oxyCODONE    IR 5 milliGRAM(s) Oral every 6 hours PRN Moderate Pain (4 - 6)  oxyCODONE    IR 10 milliGRAM(s) Oral every 6 hours PRN Severe Pain (7 - 10)                            10.2   12.60 )-----------( 390      ( 22 Dec 2023 06:00 )             32.7     12-22    136  |  97  |  15  ----------------------------<  212<H>  4.6   |  32<H>  |  0.98    Ca    9.1      22 Dec 2023 06:00  Phos  3.8     12-22  Mg     1.6     12-22    TPro  7.8  /  Alb  2.1<L>  /  TBili  0.3  /  DBili  x   /  AST  33  /  ALT  61<H>  /  AlkPhos  149<H>  12-22    Urinalysis Basic - ( 22 Dec 2023 06:00 )    Color: x / Appearance: x / SG: x / pH: x  Gluc: 212 mg/dL / Ketone: x  / Bili: x / Urobili: x   Blood: x / Protein: x / Nitrite: x   Leuk Esterase: x / RBC: x / WBC x   Sq Epi: x / Non Sq Epi: x / Bacteria: x        CAPILLARY BLOOD GLUCOSE      POCT Blood Glucose.: 164 mg/dL (22 Dec 2023 12:14)  POCT Blood Glucose.: 194 mg/dL (22 Dec 2023 07:59)  POCT Blood Glucose.: 188 mg/dL (21 Dec 2023 21:35)  POCT Blood Glucose.: 139 mg/dL (21 Dec 2023 17:06)      Vital Signs Last 24 Hrs  T(C): 36.8 (22 Dec 2023 07:39), Max: 36.8 (21 Dec 2023 21:19)  T(F): 98.2 (22 Dec 2023 07:39), Max: 98.2 (21 Dec 2023 21:19)  HR: 90 (22 Dec 2023 07:39) (80 - 90)  BP: 152/79 (22 Dec 2023 07:39) (149/81 - 152/79)  BP(mean): --  RR: 14 (22 Dec 2023 07:39) (14 - 16)  SpO2: 95% (22 Dec 2023 07:39) (95% - 96%)    Parameters below as of 22 Dec 2023 07:39  Patient On (Oxygen Delivery Method): room air      Physical Exam: Gen - NAD in chair  HEENT - NCAT, EOMI  Neck - Supple, No limited ROM  Pulm - CTAB, No wheeze, No rhonchi, No crackles  Cardiovascular - RRR, S1S2, No murmurs  Abdomen - Soft, NT/ND, +BS  Extremities - LLE with recent BKA.   Uro - crain present for retention  Neuro-     Cognitive - AAOx3       Motor -                     LEFT    UE - ShAB 5/5, EF 5/5, EE 5/5, WE 5/5,  5/5                    RIGHT UE - ShAB 5/5, EF 5/5, EE 5/5, WE 5/5,  5/5                    LEFT    LE - HF 5/5, KE 5/5                    RIGHT LE - HF 5/5, KE 5/5, DF 5/5, PF 5/5        Sensory - Intact to LT     Tone - normal  Psychiatric - Mood stable, Affect WNL  Skin:  incision site from BKA on the L residual limb. No drainage. C/d/i. skin well approximated and staples present. Stage 1 sacral decubitis ulcer present. Fungal rash of the bilateral inguinal creases- some erythema noted today over Patella tendon - padded with DSD  Wounds: Patient with scab of 4th digit of RLE.    IDT meeting on 12/20    SW: PH, 3STE, wife    OT:  eating set  grooming det  UBD/LBD cg/min  toileting min   tub/shower min  bathing    PT:  amb  min A  transfers min A  stairs    SLP    tentative dc 12/30 HC        Continue comprehensive acute rehab program consisting of 3hrs/day of OT/PT. HPI:  Mr. Mcdaniel is a 63M PMHx DM, HLD, PAD, prior toe amputation, presents to [facility] ED w L foot wound. Reports wound has been present on hindfoot x 3 weeks. Started as small cut. Over last 24 hours, pt unable to ambulate d/t severe pain. Reports significant malodor. Denies subjective fevers, chills, purulent drainage, numbness/paresthesia.     In ED, patient is tachycardic to 123. BP w HTN. Febrile to 102.4 F. Labs w WBC 21. Hgb 11.4. Lac 2.4. XR L foot w soft tissue gas at lateral hindfoot. CT LLE non con w soft tissue gas tracking ~7cm above foot.   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 Patient admitted to the SICU pre and post op for hyperglycemia and insulin drip management. Endocrinology was consulted for the hyperglycemia.  Patient transferred out of the SICU when he was able to be weaned off the insulin drip. Internal medicine was consulted for malachi-operative clearance for formalization surgery. On 12/4 the patient was taken back to the operating room for a left lower extremity guillotine revision. Infectious disease was consulted for antibiotic management and recommended switching from Zosyn to Unasyn. Rehab medicine was consulted to evaluate for rehab needs and recommended acute rehab on discharge.       ROS/subjective:  Pt seen and examined in chair, no lightheadedness, off Flomax  No acute events overnight  Lid laceration sutured with Steris  Moving Bowels - formed  PVRs approx 200cc no burning no urgency-on abx-extend course to 5 days-f/up CS  Pt denies cp, palpitations, sob, abd pain, N/V, fever, chills.   BP regimen per hospitalist   pain well controlled-Tylenol/Neurontin  diabetic nursing to assess patient      MEDICATIONS  (STANDING):  acetaminophen     Tablet .. 975 milliGRAM(s) Oral every 6 hours  amoxicillin  875 milliGRAM(s)/clavulanate 1 Tablet(s) Oral two times a day  AQUAPHOR (petrolatum Ointment) 1 Application(s) Topical two times a day  bimatoprost 0.01% Ophthalmic Solution 1 Drop(s) Both EYES at bedtime  clotrimazole 1% Cream 1 Application(s) Topical two times a day  dextrose 5%. 1000 milliLiter(s) (100 mL/Hr) IV Continuous <Continuous>  dextrose 5%. 1000 milliLiter(s) (50 mL/Hr) IV Continuous <Continuous>  dextrose 50% Injectable 25 Gram(s) IV Push once  dextrose 50% Injectable 12.5 Gram(s) IV Push once  dextrose 50% Injectable 25 Gram(s) IV Push once  enalapril 10 milliGRAM(s) Oral <User Schedule>  enoxaparin Injectable 40 milliGRAM(s) SubCutaneous every 24 hours  gabapentin 300 milliGRAM(s) Oral three times a day  glucagon  Injectable 1 milliGRAM(s) IntraMuscular once  insulin glargine Injectable (LANTUS) 20 Unit(s) SubCutaneous at bedtime  insulin lispro (ADMELOG) corrective regimen sliding scale   SubCutaneous three times a day before meals  insulin lispro (ADMELOG) corrective regimen sliding scale   SubCutaneous at bedtime  insulin lispro Injectable (ADMELOG) 4 Unit(s) SubCutaneous three times a day before meals  linagliptin 5 milliGRAM(s) Oral at bedtime  magnesium oxide 400 milliGRAM(s) Oral three times a day with meals  metFORMIN 1000 milliGRAM(s) Oral two times a day  polyethylene glycol 3350 17 Gram(s) Oral two times a day  senna 2 Tablet(s) Oral at bedtime  simvastatin 40 milliGRAM(s) Oral at bedtime  sodium chloride 0.9%. 1000 milliLiter(s) (100 mL/Hr) IV Continuous <Continuous>    MEDICATIONS  (PRN):  dextrose Oral Gel 15 Gram(s) Oral once PRN Blood Glucose LESS THAN 70 milliGRAM(s)/deciliter  guaiFENesin Oral Liquid (Sugar-Free) 100 milliGRAM(s) Oral every 6 hours PRN Cough  oxyCODONE    IR 5 milliGRAM(s) Oral every 6 hours PRN Moderate Pain (4 - 6)  oxyCODONE    IR 10 milliGRAM(s) Oral every 6 hours PRN Severe Pain (7 - 10)                            10.2   12.60 )-----------( 390      ( 22 Dec 2023 06:00 )             32.7     12-22    136  |  97  |  15  ----------------------------<  212<H>  4.6   |  32<H>  |  0.98    Ca    9.1      22 Dec 2023 06:00  Phos  3.8     12-22  Mg     1.6     12-22    TPro  7.8  /  Alb  2.1<L>  /  TBili  0.3  /  DBili  x   /  AST  33  /  ALT  61<H>  /  AlkPhos  149<H>  12-22    Urinalysis Basic - ( 22 Dec 2023 06:00 )    Color: x / Appearance: x / SG: x / pH: x  Gluc: 212 mg/dL / Ketone: x  / Bili: x / Urobili: x   Blood: x / Protein: x / Nitrite: x   Leuk Esterase: x / RBC: x / WBC x   Sq Epi: x / Non Sq Epi: x / Bacteria: x        CAPILLARY BLOOD GLUCOSE      POCT Blood Glucose.: 164 mg/dL (22 Dec 2023 12:14)  POCT Blood Glucose.: 194 mg/dL (22 Dec 2023 07:59)  POCT Blood Glucose.: 188 mg/dL (21 Dec 2023 21:35)  POCT Blood Glucose.: 139 mg/dL (21 Dec 2023 17:06)      Vital Signs Last 24 Hrs  T(C): 36.8 (22 Dec 2023 07:39), Max: 36.8 (21 Dec 2023 21:19)  T(F): 98.2 (22 Dec 2023 07:39), Max: 98.2 (21 Dec 2023 21:19)  HR: 90 (22 Dec 2023 07:39) (80 - 90)  BP: 152/79 (22 Dec 2023 07:39) (149/81 - 152/79)  BP(mean): --  RR: 14 (22 Dec 2023 07:39) (14 - 16)  SpO2: 95% (22 Dec 2023 07:39) (95% - 96%)    Parameters below as of 22 Dec 2023 07:39  Patient On (Oxygen Delivery Method): room air      Physical Exam: Gen - NAD in chair  HEENT - NCAT, EOMI  Neck - Supple, No limited ROM  Pulm - CTAB, No wheeze, No rhonchi, No crackles  Cardiovascular - RRR, S1S2, No murmurs  Abdomen - Soft, NT/ND, +BS  Extremities - LLE with recent BKA.   Uro - crain present for retention  Neuro-     Cognitive - AAOx3       Motor -                     LEFT    UE - ShAB 5/5, EF 5/5, EE 5/5, WE 5/5,  5/5                    RIGHT UE - ShAB 5/5, EF 5/5, EE 5/5, WE 5/5,  5/5                    LEFT    LE - HF 5/5, KE 5/5                    RIGHT LE - HF 5/5, KE 5/5, DF 5/5, PF 5/5        Sensory - Intact to LT     Tone - normal  Psychiatric - Mood stable, Affect WNL  Skin:  incision site from BKA on the L residual limb. No drainage. C/d/i. skin well approximated and staples present. Stage 1 sacral decubitis ulcer present. Fungal rash of the bilateral inguinal creases- some erythema noted over Patella tendon - padded with DSD  Wounds: Patient with scab of 4th digit of RLE.    IDT meeting on 12/20    SW: PH, 3STE, wife    OT:  eating set  grooming det  UBD/LBD cg/min  toileting min   tub/shower min  bathing    PT:  amb  min A  transfers min A  stairs    SLP    tentative dc 12/30 HC        Continue comprehensive acute rehab program consisting of 3hrs/day of OT/PT. HPI:  Mr. Mcdaniel is a 63M PMHx DM, HLD, PAD, prior toe amputation, presents to [facility] ED w L foot wound. Reports wound has been present on hindfoot x 3 weeks. Started as small cut. Over last 24 hours, pt unable to ambulate d/t severe pain. Reports significant malodor. Denies subjective fevers, chills, purulent drainage, numbness/paresthesia.     In ED, patient is tachycardic to 123. BP w HTN. Febrile to 102.4 F. Labs w WBC 21. Hgb 11.4. Lac 2.4. XR L foot w soft tissue gas at lateral hindfoot. CT LLE non con w soft tissue gas tracking ~7cm above foot.   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 Patient admitted to the SICU pre and post op for hyperglycemia and insulin drip management. Endocrinology was consulted for the hyperglycemia.  Patient transferred out of the SICU when he was able to be weaned off the insulin drip. Internal medicine was consulted for malachi-operative clearance for formalization surgery. On 12/4 the patient was taken back to the operating room for a left lower extremity guillotine revision. Infectious disease was consulted for antibiotic management and recommended switching from Zosyn to Unasyn. Rehab medicine was consulted to evaluate for rehab needs and recommended acute rehab on discharge.       ROS/subjective:  Pt seen and examined in chair, no lightheadedness, off Flomax  No acute events overnight  Lid laceration sutured with Steris  Moving Bowels - formed  PVRs approx 200cc no burning no urgency-on abx-extend course to 5 days-f/up CS  Pt denies cp, palpitations, sob, abd pain, N/V, fever, chills.   BP regimen per hospitalist   pain well controlled-Tylenol/Neurontin  diabetic nursing to assess patient      MEDICATIONS  (STANDING):  acetaminophen     Tablet .. 975 milliGRAM(s) Oral every 6 hours  amoxicillin  875 milliGRAM(s)/clavulanate 1 Tablet(s) Oral two times a day  AQUAPHOR (petrolatum Ointment) 1 Application(s) Topical two times a day  bimatoprost 0.01% Ophthalmic Solution 1 Drop(s) Both EYES at bedtime  clotrimazole 1% Cream 1 Application(s) Topical two times a day  dextrose 5%. 1000 milliLiter(s) (100 mL/Hr) IV Continuous <Continuous>  dextrose 5%. 1000 milliLiter(s) (50 mL/Hr) IV Continuous <Continuous>  dextrose 50% Injectable 25 Gram(s) IV Push once  dextrose 50% Injectable 12.5 Gram(s) IV Push once  dextrose 50% Injectable 25 Gram(s) IV Push once  enalapril 10 milliGRAM(s) Oral <User Schedule>  enoxaparin Injectable 40 milliGRAM(s) SubCutaneous every 24 hours  gabapentin 300 milliGRAM(s) Oral three times a day  glucagon  Injectable 1 milliGRAM(s) IntraMuscular once  insulin glargine Injectable (LANTUS) 20 Unit(s) SubCutaneous at bedtime  insulin lispro (ADMELOG) corrective regimen sliding scale   SubCutaneous three times a day before meals  insulin lispro (ADMELOG) corrective regimen sliding scale   SubCutaneous at bedtime  insulin lispro Injectable (ADMELOG) 4 Unit(s) SubCutaneous three times a day before meals  linagliptin 5 milliGRAM(s) Oral at bedtime  magnesium oxide 400 milliGRAM(s) Oral three times a day with meals  metFORMIN 1000 milliGRAM(s) Oral two times a day  polyethylene glycol 3350 17 Gram(s) Oral two times a day  senna 2 Tablet(s) Oral at bedtime  simvastatin 40 milliGRAM(s) Oral at bedtime  sodium chloride 0.9%. 1000 milliLiter(s) (100 mL/Hr) IV Continuous <Continuous>    MEDICATIONS  (PRN):  dextrose Oral Gel 15 Gram(s) Oral once PRN Blood Glucose LESS THAN 70 milliGRAM(s)/deciliter  guaiFENesin Oral Liquid (Sugar-Free) 100 milliGRAM(s) Oral every 6 hours PRN Cough  oxyCODONE    IR 5 milliGRAM(s) Oral every 6 hours PRN Moderate Pain (4 - 6)  oxyCODONE    IR 10 milliGRAM(s) Oral every 6 hours PRN Severe Pain (7 - 10)                            10.2   12.60 )-----------( 390      ( 22 Dec 2023 06:00 )             32.7     12-22    136  |  97  |  15  ----------------------------<  212<H>  4.6   |  32<H>  |  0.98    Ca    9.1      22 Dec 2023 06:00  Phos  3.8     12-22  Mg     1.6     12-22    TPro  7.8  /  Alb  2.1<L>  /  TBili  0.3  /  DBili  x   /  AST  33  /  ALT  61<H>  /  AlkPhos  149<H>  12-22    Urinalysis Basic - ( 22 Dec 2023 06:00 )    Color: x / Appearance: x / SG: x / pH: x  Gluc: 212 mg/dL / Ketone: x  / Bili: x / Urobili: x   Blood: x / Protein: x / Nitrite: x   Leuk Esterase: x / RBC: x / WBC x   Sq Epi: x / Non Sq Epi: x / Bacteria: x        CAPILLARY BLOOD GLUCOSE      POCT Blood Glucose.: 164 mg/dL (22 Dec 2023 12:14)  POCT Blood Glucose.: 194 mg/dL (22 Dec 2023 07:59)  POCT Blood Glucose.: 188 mg/dL (21 Dec 2023 21:35)  POCT Blood Glucose.: 139 mg/dL (21 Dec 2023 17:06)      Vital Signs Last 24 Hrs  T(C): 36.8 (22 Dec 2023 07:39), Max: 36.8 (21 Dec 2023 21:19)  T(F): 98.2 (22 Dec 2023 07:39), Max: 98.2 (21 Dec 2023 21:19)  HR: 90 (22 Dec 2023 07:39) (80 - 90)  BP: 152/79 (22 Dec 2023 07:39) (149/81 - 152/79)  BP(mean): --  RR: 14 (22 Dec 2023 07:39) (14 - 16)  SpO2: 95% (22 Dec 2023 07:39) (95% - 96%)    Parameters below as of 22 Dec 2023 07:39  Patient On (Oxygen Delivery Method): room air      Physical Exam: Gen - NAD in chair  HEENT - NCAT, EOMI  Neck - Supple, No limited ROM  Pulm - CTAB, No wheeze, No rhonchi, No crackles  Cardiovascular - RRR, S1S2, No murmurs  Abdomen - Soft, NT/ND, +BS  Extremities - LLE with recent BKA.   Uro - crain present for retention  Neuro-     Cognitive - AAOx3       Motor -                     LEFT    UE - ShAB 5/5, EF 5/5, EE 5/5, WE 5/5,  5/5                    RIGHT UE - ShAB 5/5, EF 5/5, EE 5/5, WE 5/5,  5/5                    LEFT    LE - HF 5/5, KE 5/5                    RIGHT LE - HF 5/5, KE 5/5, DF 5/5, PF 5/5        Sensory - Intact to LT     Tone - normal  Psychiatric - Mood stable, Affect WNL  Skin:  incision site from BKA on the L residual limb. No drainage. C/d/i. skin well approximated and staples present. Stage 1 sacral decubitis ulcer present. Fungal rash of the bilateral inguinal creases- some erythema noted over Patella tendon - padded with DSD  Wounds: Patient with scab of 4th digit of RLE.    IDT meeting on 12/20    SW: PH, 3STE, wife    OT:  eating set  grooming det  UBD/LBD cg/min  toileting min   tub/shower min  bathing    PT:  amb  min A  transfers min A  stairs    SLP    tentative dc 12/30 HC        Continue comprehensive acute rehab program consisting of 3hrs/day of OT/PT.

## 2023-12-22 NOTE — PROGRESS NOTE ADULT - NS ATTEND AMEND GEN_ALL_CORE FT
Rehab Attending- Patient seen and examined by me - Case discussed, above note reviewed by me with modifications made    patient seen and evaluated in OT  Gym  still with difficulty maintaining one legged standing- doing sit pivot to WC transfers  WBC Better- PVR this AM 200cc  BPs Stable  follow WBC in AM- continue Augmentin X 5 days  if residuals increase - need to consider restarting Flomax or Urology Eval as well  to continue intensive rehab program    Vital Signs Last 24 Hrs  T(C): 36.8 (22 Dec 2023 07:39), Max: 36.8 (21 Dec 2023 21:19)  T(F): 98.2 (22 Dec 2023 07:39), Max: 98.2 (21 Dec 2023 21:19)  HR: 90 (22 Dec 2023 07:39) (80 - 90)  BP: 152/79 (22 Dec 2023 07:39) (149/81 - 152/79)  BP(mean): --  RR: 14 (22 Dec 2023 07:39) (14 - 16)  SpO2: 95% (22 Dec 2023 07:39) (95% - 96%)    Parameters below as of 22 Dec 2023 07:39  Patient On (Oxygen Delivery Method): room air      Physical Exam: Gen - NAD in chair  HEENT - NCAT, EOMI  Neck - Supple, No limited ROM  Pulm - CTAB, No wheeze, No rhonchi, No crackles  Cardiovascular - RRR, S1S2, No murmurs  Abdomen - Soft, NT/ND, +BS  Extremities - LLE with recent BKA.   Uro - crain present for retention  Neuro-     Cognitive - AAOx3       Motor -                     LEFT    UE - ShAB 5/5, EF 5/5, EE 5/5, WE 5/5,  5/5                    RIGHT UE - ShAB 5/5, EF 5/5, EE 5/5, WE 5/5,  5/5                    LEFT    LE - HF 5/5, KE 5/5                    RIGHT LE - HF 5/5, KE 5/5, DF 5/5, PF 5/5        Sensory - Intact to LT     Tone - normal  Psychiatric - Mood stable, Affect WNL  Skin:  incision site from BKA on the L residual limb. No drainage. C/d/i. skin well approximated and staples present. Stage 1 sacral decubitis ulcer present. Fungal rash of the bilateral inguinal creases- some erythema noted over Patella tendon - padded with DSD  Wounds: Patient with scab of 4th digit of RLE.

## 2023-12-23 LAB
GLUCOSE BLDC GLUCOMTR-MCNC: 146 MG/DL — HIGH (ref 70–99)
GLUCOSE BLDC GLUCOMTR-MCNC: 146 MG/DL — HIGH (ref 70–99)
GLUCOSE BLDC GLUCOMTR-MCNC: 156 MG/DL — HIGH (ref 70–99)
GLUCOSE BLDC GLUCOMTR-MCNC: 156 MG/DL — HIGH (ref 70–99)
GLUCOSE BLDC GLUCOMTR-MCNC: 168 MG/DL — HIGH (ref 70–99)
GLUCOSE BLDC GLUCOMTR-MCNC: 168 MG/DL — HIGH (ref 70–99)
GLUCOSE BLDC GLUCOMTR-MCNC: 169 MG/DL — HIGH (ref 70–99)
GLUCOSE BLDC GLUCOMTR-MCNC: 169 MG/DL — HIGH (ref 70–99)
HCT VFR BLD CALC: 33.4 % — LOW (ref 39–50)
HCT VFR BLD CALC: 33.4 % — LOW (ref 39–50)
HGB BLD-MCNC: 10.2 G/DL — LOW (ref 13–17)
HGB BLD-MCNC: 10.2 G/DL — LOW (ref 13–17)
MCHC RBC-ENTMCNC: 25.8 PG — LOW (ref 27–34)
MCHC RBC-ENTMCNC: 25.8 PG — LOW (ref 27–34)
MCHC RBC-ENTMCNC: 30.5 GM/DL — LOW (ref 32–36)
MCHC RBC-ENTMCNC: 30.5 GM/DL — LOW (ref 32–36)
MCV RBC AUTO: 84.6 FL — SIGNIFICANT CHANGE UP (ref 80–100)
MCV RBC AUTO: 84.6 FL — SIGNIFICANT CHANGE UP (ref 80–100)
NRBC # BLD: 0 /100 WBCS — SIGNIFICANT CHANGE UP (ref 0–0)
NRBC # BLD: 0 /100 WBCS — SIGNIFICANT CHANGE UP (ref 0–0)
PLATELET # BLD AUTO: 409 K/UL — HIGH (ref 150–400)
PLATELET # BLD AUTO: 409 K/UL — HIGH (ref 150–400)
RBC # BLD: 3.95 M/UL — LOW (ref 4.2–5.8)
RBC # BLD: 3.95 M/UL — LOW (ref 4.2–5.8)
RBC # FLD: 15.8 % — HIGH (ref 10.3–14.5)
RBC # FLD: 15.8 % — HIGH (ref 10.3–14.5)
WBC # BLD: 7.42 K/UL — SIGNIFICANT CHANGE UP (ref 3.8–10.5)
WBC # BLD: 7.42 K/UL — SIGNIFICANT CHANGE UP (ref 3.8–10.5)
WBC # FLD AUTO: 7.42 K/UL — SIGNIFICANT CHANGE UP (ref 3.8–10.5)
WBC # FLD AUTO: 7.42 K/UL — SIGNIFICANT CHANGE UP (ref 3.8–10.5)

## 2023-12-23 PROCEDURE — 99232 SBSQ HOSP IP/OBS MODERATE 35: CPT

## 2023-12-23 RX ADMIN — MAGNESIUM OXIDE 400 MG ORAL TABLET 400 MILLIGRAM(S): 241.3 TABLET ORAL at 12:11

## 2023-12-23 RX ADMIN — Medication 975 MILLIGRAM(S): at 21:13

## 2023-12-23 RX ADMIN — Medication 1 APPLICATION(S): at 17:09

## 2023-12-23 RX ADMIN — Medication 1 TABLET(S): at 05:32

## 2023-12-23 RX ADMIN — GABAPENTIN 300 MILLIGRAM(S): 400 CAPSULE ORAL at 21:13

## 2023-12-23 RX ADMIN — INSULIN GLARGINE 20 UNIT(S): 100 INJECTION, SOLUTION SUBCUTANEOUS at 21:14

## 2023-12-23 RX ADMIN — Medication 975 MILLIGRAM(S): at 05:31

## 2023-12-23 RX ADMIN — Medication 975 MILLIGRAM(S): at 12:12

## 2023-12-23 RX ADMIN — MAGNESIUM OXIDE 400 MG ORAL TABLET 400 MILLIGRAM(S): 241.3 TABLET ORAL at 08:15

## 2023-12-23 RX ADMIN — Medication 975 MILLIGRAM(S): at 12:40

## 2023-12-23 RX ADMIN — Medication 2: at 08:15

## 2023-12-23 RX ADMIN — Medication 10 MILLIGRAM(S): at 12:11

## 2023-12-23 RX ADMIN — LINAGLIPTIN 5 MILLIGRAM(S): 5 TABLET, FILM COATED ORAL at 21:13

## 2023-12-23 RX ADMIN — GABAPENTIN 300 MILLIGRAM(S): 400 CAPSULE ORAL at 05:31

## 2023-12-23 RX ADMIN — Medication 975 MILLIGRAM(S): at 17:08

## 2023-12-23 RX ADMIN — Medication 975 MILLIGRAM(S): at 06:11

## 2023-12-23 RX ADMIN — Medication 1 APPLICATION(S): at 05:36

## 2023-12-23 RX ADMIN — Medication 1 TABLET(S): at 17:08

## 2023-12-23 RX ADMIN — METFORMIN HYDROCHLORIDE 1000 MILLIGRAM(S): 850 TABLET ORAL at 05:32

## 2023-12-23 RX ADMIN — Medication 2: at 12:12

## 2023-12-23 RX ADMIN — Medication 4 UNIT(S): at 17:09

## 2023-12-23 RX ADMIN — MAGNESIUM OXIDE 400 MG ORAL TABLET 400 MILLIGRAM(S): 241.3 TABLET ORAL at 17:08

## 2023-12-23 RX ADMIN — Medication 4 UNIT(S): at 08:16

## 2023-12-23 RX ADMIN — Medication 975 MILLIGRAM(S): at 17:57

## 2023-12-23 RX ADMIN — METFORMIN HYDROCHLORIDE 1000 MILLIGRAM(S): 850 TABLET ORAL at 17:08

## 2023-12-23 RX ADMIN — ENOXAPARIN SODIUM 40 MILLIGRAM(S): 100 INJECTION SUBCUTANEOUS at 05:32

## 2023-12-23 RX ADMIN — GABAPENTIN 300 MILLIGRAM(S): 400 CAPSULE ORAL at 14:57

## 2023-12-23 RX ADMIN — BIMATOPROST 1 DROP(S): 0.3 SOLUTION/ DROPS OPHTHALMIC at 21:14

## 2023-12-23 RX ADMIN — Medication 975 MILLIGRAM(S): at 22:26

## 2023-12-23 RX ADMIN — Medication 4 UNIT(S): at 12:12

## 2023-12-23 RX ADMIN — SIMVASTATIN 40 MILLIGRAM(S): 20 TABLET, FILM COATED ORAL at 21:13

## 2023-12-23 NOTE — PROGRESS NOTE ADULT - SUBJECTIVE AND OBJECTIVE BOX
No overnight events.  pain controlled     REVIEW OF SYSTEMS  Constitutional - No fever,  No fatigue  Neurological - No headaches, No loss of strength  Musculoskeletal - No joint pain, No joint swelling, No muscle pain    VITALS  T(C): 36.4 (12-23-23 @ 07:53), Max: 36.7 (12-22-23 @ 19:29)  HR: 93 (12-23-23 @ 07:53) (87 - 93)  BP: 157/80 (12-23-23 @ 07:53) (134/71 - 157/80)  RR: 15 (12-23-23 @ 07:53) (15 - 15)  SpO2: 95% (12-23-23 @ 07:53) (95% - 95%)  Wt(kg): --       MEDICATIONS   acetaminophen     Tablet .. 975 milliGRAM(s) every 6 hours  amoxicillin  875 milliGRAM(s)/clavulanate 1 Tablet(s) two times a day  AQUAPHOR (petrolatum Ointment) 1 Application(s) two times a day  bimatoprost 0.01% Ophthalmic Solution 1 Drop(s) at bedtime  clotrimazole 1% Cream 1 Application(s) two times a day  dextrose 5%. 1000 milliLiter(s) <Continuous>  dextrose 5%. 1000 milliLiter(s) <Continuous>  dextrose 50% Injectable 25 Gram(s) once  dextrose 50% Injectable 25 Gram(s) once  dextrose 50% Injectable 12.5 Gram(s) once  dextrose Oral Gel 15 Gram(s) once PRN  enalapril 10 milliGRAM(s) <User Schedule>  enoxaparin Injectable 40 milliGRAM(s) every 24 hours  gabapentin 300 milliGRAM(s) three times a day  glucagon  Injectable 1 milliGRAM(s) once  guaiFENesin Oral Liquid (Sugar-Free) 100 milliGRAM(s) every 6 hours PRN  insulin glargine Injectable (LANTUS) 20 Unit(s) at bedtime  insulin lispro (ADMELOG) corrective regimen sliding scale   at bedtime  insulin lispro (ADMELOG) corrective regimen sliding scale   three times a day before meals  insulin lispro Injectable (ADMELOG) 4 Unit(s) three times a day before meals  linagliptin 5 milliGRAM(s) at bedtime  magnesium oxide 400 milliGRAM(s) three times a day with meals  metFORMIN 1000 milliGRAM(s) two times a day  polyethylene glycol 3350 17 Gram(s) two times a day  senna 2 Tablet(s) at bedtime  simvastatin 40 milliGRAM(s) at bedtime  sodium chloride 0.9%. 1000 milliLiter(s) <Continuous>      RECENT LABS/IMAGING                        10.2   7.42  )-----------( 409      ( 23 Dec 2023 06:01 )             33.4     12-22    136  |  97  |  15  ----------------------------<  212<H>  4.6   |  32<H>  |  0.98    Ca    9.1      22 Dec 2023 06:00  Phos  3.8     12-22  Mg     1.6     12-22    TPro  7.8  /  Alb  2.1<L>  /  TBili  0.3  /  DBili  x   /  AST  33  /  ALT  61<H>  /  AlkPhos  149<H>  12-22      Urinalysis Basic - ( 22 Dec 2023 06:00 )    Color: x / Appearance: x / SG: x / pH: x  Gluc: 212 mg/dL / Ketone: x  / Bili: x / Urobili: x   Blood: x / Protein: x / Nitrite: x   Leuk Esterase: x / RBC: x / WBC x   Sq Epi: x / Non Sq Epi: x / Bacteria: x              POCT Blood Glucose.: 156 mg/dL (12-23-23 @ 12:10)  POCT Blood Glucose.: 169 mg/dL (12-23-23 @ 07:27)  POCT Blood Glucose.: 225 mg/dL (12-22-23 @ 21:26)  POCT Blood Glucose.: 106 mg/dL (12-22-23 @ 16:48)    ---------  PHYSICAL EXAM  Constitutional - NAD, Comfortable, in bed   Pulm - Breathing comfortably, room air   Abd - Soft, NTND  Extremities - Lt BKA   Neurologic Exam -                    Cognitive - Awake, Alert, oriented x 3     Communication - Fluent     Motor - moves all ext, 5/5      Sensory - Intact to LT  Psychiatric - Mood WNL, Affect WNL    ASSESSMENT/PLAN  63y Male h/o HTN, DM with functional deficits after osteomyelitis, Lt BKA  UTI on augmentin, WBC improved today   Continue current medical management  Pain - Tylenol PRN, gabapentin, oxycodone   DVT PPX - lovenox     Continue 3hrs a day of comprehensive rehab program.

## 2023-12-24 LAB
-  AMOXICILLIN/CLAVULANIC ACID: SIGNIFICANT CHANGE UP
-  AMOXICILLIN/CLAVULANIC ACID: SIGNIFICANT CHANGE UP
-  AMPICILLIN/SULBACTAM: SIGNIFICANT CHANGE UP
-  AMPICILLIN/SULBACTAM: SIGNIFICANT CHANGE UP
-  AMPICILLIN: SIGNIFICANT CHANGE UP
-  AMPICILLIN: SIGNIFICANT CHANGE UP
-  AZTREONAM: SIGNIFICANT CHANGE UP
-  AZTREONAM: SIGNIFICANT CHANGE UP
-  CEFAZOLIN: SIGNIFICANT CHANGE UP
-  CEFAZOLIN: SIGNIFICANT CHANGE UP
-  CEFEPIME: SIGNIFICANT CHANGE UP
-  CEFEPIME: SIGNIFICANT CHANGE UP
-  CEFOXITIN: SIGNIFICANT CHANGE UP
-  CEFOXITIN: SIGNIFICANT CHANGE UP
-  CEFTRIAXONE: SIGNIFICANT CHANGE UP
-  CEFTRIAXONE: SIGNIFICANT CHANGE UP
-  CEFUROXIME: SIGNIFICANT CHANGE UP
-  CEFUROXIME: SIGNIFICANT CHANGE UP
-  CIPROFLOXACIN: SIGNIFICANT CHANGE UP
-  CIPROFLOXACIN: SIGNIFICANT CHANGE UP
-  ERTAPENEM: SIGNIFICANT CHANGE UP
-  ERTAPENEM: SIGNIFICANT CHANGE UP
-  GENTAMICIN: SIGNIFICANT CHANGE UP
-  GENTAMICIN: SIGNIFICANT CHANGE UP
-  IMIPENEM: SIGNIFICANT CHANGE UP
-  IMIPENEM: SIGNIFICANT CHANGE UP
-  LEVOFLOXACIN: SIGNIFICANT CHANGE UP
-  LEVOFLOXACIN: SIGNIFICANT CHANGE UP
-  MEROPENEM: SIGNIFICANT CHANGE UP
-  MEROPENEM: SIGNIFICANT CHANGE UP
-  NITROFURANTOIN: SIGNIFICANT CHANGE UP
-  NITROFURANTOIN: SIGNIFICANT CHANGE UP
-  PIPERACILLIN/TAZOBACTAM: SIGNIFICANT CHANGE UP
-  PIPERACILLIN/TAZOBACTAM: SIGNIFICANT CHANGE UP
-  TOBRAMYCIN: SIGNIFICANT CHANGE UP
-  TOBRAMYCIN: SIGNIFICANT CHANGE UP
-  TRIMETHOPRIM/SULFAMETHOXAZOLE: SIGNIFICANT CHANGE UP
-  TRIMETHOPRIM/SULFAMETHOXAZOLE: SIGNIFICANT CHANGE UP
CULTURE RESULTS: ABNORMAL
CULTURE RESULTS: ABNORMAL
GLUCOSE BLDC GLUCOMTR-MCNC: 144 MG/DL — HIGH (ref 70–99)
GLUCOSE BLDC GLUCOMTR-MCNC: 144 MG/DL — HIGH (ref 70–99)
GLUCOSE BLDC GLUCOMTR-MCNC: 154 MG/DL — HIGH (ref 70–99)
GLUCOSE BLDC GLUCOMTR-MCNC: 154 MG/DL — HIGH (ref 70–99)
GLUCOSE BLDC GLUCOMTR-MCNC: 188 MG/DL — HIGH (ref 70–99)
METHOD TYPE: SIGNIFICANT CHANGE UP
METHOD TYPE: SIGNIFICANT CHANGE UP
ORGANISM # SPEC MICROSCOPIC CNT: ABNORMAL
ORGANISM # SPEC MICROSCOPIC CNT: ABNORMAL
ORGANISM # SPEC MICROSCOPIC CNT: SIGNIFICANT CHANGE UP
ORGANISM # SPEC MICROSCOPIC CNT: SIGNIFICANT CHANGE UP
SPECIMEN SOURCE: SIGNIFICANT CHANGE UP
SPECIMEN SOURCE: SIGNIFICANT CHANGE UP

## 2023-12-24 PROCEDURE — 99232 SBSQ HOSP IP/OBS MODERATE 35: CPT

## 2023-12-24 RX ORDER — HYDROCORTISONE 1 %
1 OINTMENT (GRAM) TOPICAL
Refills: 0 | Status: DISCONTINUED | OUTPATIENT
Start: 2023-12-24 | End: 2023-12-30

## 2023-12-24 RX ORDER — NITROFURANTOIN MACROCRYSTAL 50 MG
100 CAPSULE ORAL
Refills: 0 | Status: DISCONTINUED | OUTPATIENT
Start: 2023-12-24 | End: 2023-12-30

## 2023-12-24 RX ADMIN — Medication 975 MILLIGRAM(S): at 05:55

## 2023-12-24 RX ADMIN — Medication 1 TABLET(S): at 05:55

## 2023-12-24 RX ADMIN — Medication 1 APPLICATION(S): at 06:37

## 2023-12-24 RX ADMIN — METFORMIN HYDROCHLORIDE 1000 MILLIGRAM(S): 850 TABLET ORAL at 17:13

## 2023-12-24 RX ADMIN — Medication 1 APPLICATION(S): at 06:36

## 2023-12-24 RX ADMIN — Medication 1 APPLICATION(S): at 17:14

## 2023-12-24 RX ADMIN — Medication 4 UNIT(S): at 11:53

## 2023-12-24 RX ADMIN — Medication 975 MILLIGRAM(S): at 11:52

## 2023-12-24 RX ADMIN — SENNA PLUS 2 TABLET(S): 8.6 TABLET ORAL at 21:11

## 2023-12-24 RX ADMIN — Medication 4 UNIT(S): at 08:31

## 2023-12-24 RX ADMIN — INSULIN GLARGINE 20 UNIT(S): 100 INJECTION, SOLUTION SUBCUTANEOUS at 21:17

## 2023-12-24 RX ADMIN — MAGNESIUM OXIDE 400 MG ORAL TABLET 400 MILLIGRAM(S): 241.3 TABLET ORAL at 11:52

## 2023-12-24 RX ADMIN — Medication 10 MILLIGRAM(S): at 11:52

## 2023-12-24 RX ADMIN — BIMATOPROST 1 DROP(S): 0.3 SOLUTION/ DROPS OPHTHALMIC at 21:12

## 2023-12-24 RX ADMIN — MAGNESIUM OXIDE 400 MG ORAL TABLET 400 MILLIGRAM(S): 241.3 TABLET ORAL at 17:13

## 2023-12-24 RX ADMIN — GABAPENTIN 300 MILLIGRAM(S): 400 CAPSULE ORAL at 15:04

## 2023-12-24 RX ADMIN — Medication 2: at 08:31

## 2023-12-24 RX ADMIN — GABAPENTIN 300 MILLIGRAM(S): 400 CAPSULE ORAL at 05:55

## 2023-12-24 RX ADMIN — Medication 975 MILLIGRAM(S): at 17:45

## 2023-12-24 RX ADMIN — LINAGLIPTIN 5 MILLIGRAM(S): 5 TABLET, FILM COATED ORAL at 21:12

## 2023-12-24 RX ADMIN — Medication 4 UNIT(S): at 17:14

## 2023-12-24 RX ADMIN — Medication 975 MILLIGRAM(S): at 06:37

## 2023-12-24 RX ADMIN — ENOXAPARIN SODIUM 40 MILLIGRAM(S): 100 INJECTION SUBCUTANEOUS at 05:55

## 2023-12-24 RX ADMIN — SIMVASTATIN 40 MILLIGRAM(S): 20 TABLET, FILM COATED ORAL at 21:12

## 2023-12-24 RX ADMIN — Medication 1 APPLICATION(S): at 17:13

## 2023-12-24 RX ADMIN — GABAPENTIN 300 MILLIGRAM(S): 400 CAPSULE ORAL at 21:11

## 2023-12-24 RX ADMIN — METFORMIN HYDROCHLORIDE 1000 MILLIGRAM(S): 850 TABLET ORAL at 05:55

## 2023-12-24 RX ADMIN — Medication 1 TABLET(S): at 17:13

## 2023-12-24 RX ADMIN — Medication 975 MILLIGRAM(S): at 17:13

## 2023-12-24 RX ADMIN — Medication 2: at 11:53

## 2023-12-24 RX ADMIN — MAGNESIUM OXIDE 400 MG ORAL TABLET 400 MILLIGRAM(S): 241.3 TABLET ORAL at 08:30

## 2023-12-24 RX ADMIN — Medication 975 MILLIGRAM(S): at 12:20

## 2023-12-24 NOTE — PROGRESS NOTE ADULT - SUBJECTIVE AND OBJECTIVE BOX
No overnight events.      REVIEW OF SYSTEMS  Constitutional - No fever,  No fatigue  Neurological - No headaches, No loss of strength  Musculoskeletal - No joint pain, No joint swelling, No muscle pain    VITALS  T(C): 36.9 (12-23-23 @ 19:24), Max: 36.9 (12-23-23 @ 19:24)  HR: 89 (12-23-23 @ 19:24) (89 - 89)  BP: 155/83 (12-23-23 @ 19:24) (155/83 - 155/83)  RR: 15 (12-23-23 @ 19:24) (15 - 15)  SpO2: 95% (12-23-23 @ 19:24) (95% - 95%)  Wt(kg): --       MEDICATIONS   acetaminophen     Tablet .. 975 milliGRAM(s) every 6 hours  amoxicillin  875 milliGRAM(s)/clavulanate 1 Tablet(s) two times a day  AQUAPHOR (petrolatum Ointment) 1 Application(s) two times a day  bimatoprost 0.01% Ophthalmic Solution 1 Drop(s) at bedtime  clotrimazole 1% Cream 1 Application(s) two times a day  dextrose 5%. 1000 milliLiter(s) <Continuous>  dextrose 5%. 1000 milliLiter(s) <Continuous>  dextrose 50% Injectable 25 Gram(s) once  dextrose 50% Injectable 12.5 Gram(s) once  dextrose 50% Injectable 25 Gram(s) once  dextrose Oral Gel 15 Gram(s) once PRN  enalapril 10 milliGRAM(s) <User Schedule>  enoxaparin Injectable 40 milliGRAM(s) every 24 hours  gabapentin 300 milliGRAM(s) three times a day  glucagon  Injectable 1 milliGRAM(s) once  guaiFENesin Oral Liquid (Sugar-Free) 100 milliGRAM(s) every 6 hours PRN  insulin glargine Injectable (LANTUS) 20 Unit(s) at bedtime  insulin lispro (ADMELOG) corrective regimen sliding scale   three times a day before meals  insulin lispro (ADMELOG) corrective regimen sliding scale   at bedtime  insulin lispro Injectable (ADMELOG) 4 Unit(s) three times a day before meals  linagliptin 5 milliGRAM(s) at bedtime  magnesium oxide 400 milliGRAM(s) three times a day with meals  metFORMIN 1000 milliGRAM(s) two times a day  polyethylene glycol 3350 17 Gram(s) two times a day  senna 2 Tablet(s) at bedtime  simvastatin 40 milliGRAM(s) at bedtime  sodium chloride 0.9%. 1000 milliLiter(s) <Continuous>      RECENT LABS/IMAGING                        10.2   7.42  )-----------( 409      ( 23 Dec 2023 06:01 )             33.4                       POCT Blood Glucose.: 188 mg/dL (12-24-23 @ 08:19)  POCT Blood Glucose.: 168 mg/dL (12-23-23 @ 21:12)  POCT Blood Glucose.: 146 mg/dL (12-23-23 @ 17:06)  POCT Blood Glucose.: 156 mg/dL (12-23-23 @ 12:10)    --------  ---------  PHYSICAL EXAM  Constitutional - NAD, Comfortable, in bed   Pulm - Breathing comfortably, room air   Abd - Soft, NTND  Extremities - Lt BKA   Neurologic Exam -                    Cognitive - Awake, Alert, oriented x 3     Communication - Fluent     Motor - moves all ext, 5/5      Sensory - Intact to LT  Psychiatric - Mood WNL, Affect WNL    ASSESSMENT/PLAN  63y Male h/o HTN, DM with functional deficits after osteomyelitis, Lt BKA  UTI on augmentin, WBC improved 12/23, culture +Ecoli, <50,000  Continue current medical management  Pain - Tylenol PRN, gabapentin, oxycodone   DVT PPX - lovenox     Continue 3hrs a day of comprehensive rehab program.

## 2023-12-25 LAB
ALBUMIN SERPL ELPH-MCNC: 2.3 G/DL — LOW (ref 3.3–5)
ALBUMIN SERPL ELPH-MCNC: 2.3 G/DL — LOW (ref 3.3–5)
ALP SERPL-CCNC: 135 U/L — HIGH (ref 40–120)
ALP SERPL-CCNC: 135 U/L — HIGH (ref 40–120)
ALT FLD-CCNC: 47 U/L — HIGH (ref 10–45)
ALT FLD-CCNC: 47 U/L — HIGH (ref 10–45)
ANION GAP SERPL CALC-SCNC: 6 MMOL/L — SIGNIFICANT CHANGE UP (ref 5–17)
ANION GAP SERPL CALC-SCNC: 6 MMOL/L — SIGNIFICANT CHANGE UP (ref 5–17)
AST SERPL-CCNC: 32 U/L — SIGNIFICANT CHANGE UP (ref 10–40)
AST SERPL-CCNC: 32 U/L — SIGNIFICANT CHANGE UP (ref 10–40)
BILIRUB SERPL-MCNC: 0.2 MG/DL — SIGNIFICANT CHANGE UP (ref 0.2–1.2)
BILIRUB SERPL-MCNC: 0.2 MG/DL — SIGNIFICANT CHANGE UP (ref 0.2–1.2)
BUN SERPL-MCNC: 19 MG/DL — SIGNIFICANT CHANGE UP (ref 7–23)
BUN SERPL-MCNC: 19 MG/DL — SIGNIFICANT CHANGE UP (ref 7–23)
CALCIUM SERPL-MCNC: 9.4 MG/DL — SIGNIFICANT CHANGE UP (ref 8.4–10.5)
CALCIUM SERPL-MCNC: 9.4 MG/DL — SIGNIFICANT CHANGE UP (ref 8.4–10.5)
CHLORIDE SERPL-SCNC: 100 MMOL/L — SIGNIFICANT CHANGE UP (ref 96–108)
CHLORIDE SERPL-SCNC: 100 MMOL/L — SIGNIFICANT CHANGE UP (ref 96–108)
CO2 SERPL-SCNC: 29 MMOL/L — SIGNIFICANT CHANGE UP (ref 22–31)
CO2 SERPL-SCNC: 29 MMOL/L — SIGNIFICANT CHANGE UP (ref 22–31)
CREAT SERPL-MCNC: 0.93 MG/DL — SIGNIFICANT CHANGE UP (ref 0.5–1.3)
CREAT SERPL-MCNC: 0.93 MG/DL — SIGNIFICANT CHANGE UP (ref 0.5–1.3)
EGFR: 92 ML/MIN/1.73M2 — SIGNIFICANT CHANGE UP
EGFR: 92 ML/MIN/1.73M2 — SIGNIFICANT CHANGE UP
GLUCOSE BLDC GLUCOMTR-MCNC: 120 MG/DL — HIGH (ref 70–99)
GLUCOSE BLDC GLUCOMTR-MCNC: 120 MG/DL — HIGH (ref 70–99)
GLUCOSE BLDC GLUCOMTR-MCNC: 182 MG/DL — HIGH (ref 70–99)
GLUCOSE BLDC GLUCOMTR-MCNC: 182 MG/DL — HIGH (ref 70–99)
GLUCOSE BLDC GLUCOMTR-MCNC: 205 MG/DL — HIGH (ref 70–99)
GLUCOSE BLDC GLUCOMTR-MCNC: 205 MG/DL — HIGH (ref 70–99)
GLUCOSE BLDC GLUCOMTR-MCNC: 207 MG/DL — HIGH (ref 70–99)
GLUCOSE BLDC GLUCOMTR-MCNC: 207 MG/DL — HIGH (ref 70–99)
GLUCOSE BLDC GLUCOMTR-MCNC: 231 MG/DL — HIGH (ref 70–99)
GLUCOSE BLDC GLUCOMTR-MCNC: 231 MG/DL — HIGH (ref 70–99)
GLUCOSE SERPL-MCNC: 226 MG/DL — HIGH (ref 70–99)
GLUCOSE SERPL-MCNC: 226 MG/DL — HIGH (ref 70–99)
HCT VFR BLD CALC: 34.3 % — LOW (ref 39–50)
HCT VFR BLD CALC: 34.3 % — LOW (ref 39–50)
HGB BLD-MCNC: 10.7 G/DL — LOW (ref 13–17)
HGB BLD-MCNC: 10.7 G/DL — LOW (ref 13–17)
MCHC RBC-ENTMCNC: 25.8 PG — LOW (ref 27–34)
MCHC RBC-ENTMCNC: 25.8 PG — LOW (ref 27–34)
MCHC RBC-ENTMCNC: 31.2 GM/DL — LOW (ref 32–36)
MCHC RBC-ENTMCNC: 31.2 GM/DL — LOW (ref 32–36)
MCV RBC AUTO: 82.7 FL — SIGNIFICANT CHANGE UP (ref 80–100)
MCV RBC AUTO: 82.7 FL — SIGNIFICANT CHANGE UP (ref 80–100)
NRBC # BLD: 0 /100 WBCS — SIGNIFICANT CHANGE UP (ref 0–0)
NRBC # BLD: 0 /100 WBCS — SIGNIFICANT CHANGE UP (ref 0–0)
PLATELET # BLD AUTO: 433 K/UL — HIGH (ref 150–400)
PLATELET # BLD AUTO: 433 K/UL — HIGH (ref 150–400)
POTASSIUM SERPL-MCNC: 4.5 MMOL/L — SIGNIFICANT CHANGE UP (ref 3.5–5.3)
POTASSIUM SERPL-MCNC: 4.5 MMOL/L — SIGNIFICANT CHANGE UP (ref 3.5–5.3)
POTASSIUM SERPL-SCNC: 4.5 MMOL/L — SIGNIFICANT CHANGE UP (ref 3.5–5.3)
POTASSIUM SERPL-SCNC: 4.5 MMOL/L — SIGNIFICANT CHANGE UP (ref 3.5–5.3)
PROT SERPL-MCNC: 7.7 G/DL — SIGNIFICANT CHANGE UP (ref 6–8.3)
PROT SERPL-MCNC: 7.7 G/DL — SIGNIFICANT CHANGE UP (ref 6–8.3)
RBC # BLD: 4.15 M/UL — LOW (ref 4.2–5.8)
RBC # BLD: 4.15 M/UL — LOW (ref 4.2–5.8)
RBC # FLD: 15.7 % — HIGH (ref 10.3–14.5)
RBC # FLD: 15.7 % — HIGH (ref 10.3–14.5)
SODIUM SERPL-SCNC: 135 MMOL/L — SIGNIFICANT CHANGE UP (ref 135–145)
SODIUM SERPL-SCNC: 135 MMOL/L — SIGNIFICANT CHANGE UP (ref 135–145)
WBC # BLD: 9.76 K/UL — SIGNIFICANT CHANGE UP (ref 3.8–10.5)
WBC # BLD: 9.76 K/UL — SIGNIFICANT CHANGE UP (ref 3.8–10.5)
WBC # FLD AUTO: 9.76 K/UL — SIGNIFICANT CHANGE UP (ref 3.8–10.5)
WBC # FLD AUTO: 9.76 K/UL — SIGNIFICANT CHANGE UP (ref 3.8–10.5)

## 2023-12-25 PROCEDURE — 99232 SBSQ HOSP IP/OBS MODERATE 35: CPT

## 2023-12-25 RX ORDER — INSULIN GLARGINE 100 [IU]/ML
24 INJECTION, SOLUTION SUBCUTANEOUS AT BEDTIME
Refills: 0 | Status: DISCONTINUED | OUTPATIENT
Start: 2023-12-25 | End: 2023-12-28

## 2023-12-25 RX ADMIN — INSULIN GLARGINE 24 UNIT(S): 100 INJECTION, SOLUTION SUBCUTANEOUS at 21:59

## 2023-12-25 RX ADMIN — Medication 975 MILLIGRAM(S): at 05:19

## 2023-12-25 RX ADMIN — Medication 975 MILLIGRAM(S): at 13:14

## 2023-12-25 RX ADMIN — Medication 1 APPLICATION(S): at 17:40

## 2023-12-25 RX ADMIN — Medication 1 APPLICATION(S): at 05:23

## 2023-12-25 RX ADMIN — LINAGLIPTIN 5 MILLIGRAM(S): 5 TABLET, FILM COATED ORAL at 22:00

## 2023-12-25 RX ADMIN — GABAPENTIN 300 MILLIGRAM(S): 400 CAPSULE ORAL at 05:18

## 2023-12-25 RX ADMIN — MAGNESIUM OXIDE 400 MG ORAL TABLET 400 MILLIGRAM(S): 241.3 TABLET ORAL at 13:19

## 2023-12-25 RX ADMIN — METFORMIN HYDROCHLORIDE 1000 MILLIGRAM(S): 850 TABLET ORAL at 17:31

## 2023-12-25 RX ADMIN — MAGNESIUM OXIDE 400 MG ORAL TABLET 400 MILLIGRAM(S): 241.3 TABLET ORAL at 09:31

## 2023-12-25 RX ADMIN — Medication 975 MILLIGRAM(S): at 17:31

## 2023-12-25 RX ADMIN — Medication 4 UNIT(S): at 08:32

## 2023-12-25 RX ADMIN — METFORMIN HYDROCHLORIDE 1000 MILLIGRAM(S): 850 TABLET ORAL at 09:31

## 2023-12-25 RX ADMIN — Medication 975 MILLIGRAM(S): at 06:00

## 2023-12-25 RX ADMIN — Medication 975 MILLIGRAM(S): at 18:01

## 2023-12-25 RX ADMIN — SENNA PLUS 2 TABLET(S): 8.6 TABLET ORAL at 22:00

## 2023-12-25 RX ADMIN — SIMVASTATIN 40 MILLIGRAM(S): 20 TABLET, FILM COATED ORAL at 21:59

## 2023-12-25 RX ADMIN — Medication 975 MILLIGRAM(S): at 13:44

## 2023-12-25 RX ADMIN — GABAPENTIN 300 MILLIGRAM(S): 400 CAPSULE ORAL at 21:59

## 2023-12-25 RX ADMIN — ENOXAPARIN SODIUM 40 MILLIGRAM(S): 100 INJECTION SUBCUTANEOUS at 05:19

## 2023-12-25 RX ADMIN — Medication 1 APPLICATION(S): at 17:38

## 2023-12-25 RX ADMIN — Medication 1 TABLET(S): at 05:19

## 2023-12-25 RX ADMIN — GABAPENTIN 300 MILLIGRAM(S): 400 CAPSULE ORAL at 13:13

## 2023-12-25 RX ADMIN — Medication 4 UNIT(S): at 17:30

## 2023-12-25 RX ADMIN — Medication 100 MILLIGRAM(S): at 05:19

## 2023-12-25 RX ADMIN — Medication 4: at 08:32

## 2023-12-25 RX ADMIN — MAGNESIUM OXIDE 400 MG ORAL TABLET 400 MILLIGRAM(S): 241.3 TABLET ORAL at 17:32

## 2023-12-25 RX ADMIN — Medication 4 UNIT(S): at 12:28

## 2023-12-25 RX ADMIN — Medication 100 MILLIGRAM(S): at 17:32

## 2023-12-25 RX ADMIN — Medication 10 MILLIGRAM(S): at 13:13

## 2023-12-25 RX ADMIN — BIMATOPROST 1 DROP(S): 0.3 SOLUTION/ DROPS OPHTHALMIC at 21:59

## 2023-12-25 RX ADMIN — Medication 4: at 12:28

## 2023-12-25 NOTE — PROGRESS NOTE ADULT - SUBJECTIVE AND OBJECTIVE BOX
No overnight events.      REVIEW OF SYSTEMS  Constitutional - No fever,  No fatigue  Neurological - No headaches, No loss of strength  Musculoskeletal - No joint pain, No joint swelling, No muscle pain    VITALS  T(C): 36.7 (12-24-23 @ 19:45), Max: 36.7 (12-24-23 @ 19:45)  HR: 86 (12-24-23 @ 19:45) (86 - 86)  BP: 138/69 (12-24-23 @ 19:45) (138/69 - 138/69)  RR: 16 (12-24-23 @ 19:45) (16 - 16)  SpO2: 95% (12-24-23 @ 19:45) (95% - 95%)  Wt(kg): --       MEDICATIONS   acetaminophen     Tablet .. 975 milliGRAM(s) every 6 hours  AQUAPHOR (petrolatum Ointment) 1 Application(s) two times a day  bimatoprost 0.01% Ophthalmic Solution 1 Drop(s) at bedtime  clotrimazole 1% Cream 1 Application(s) two times a day  dextrose 5%. 1000 milliLiter(s) <Continuous>  dextrose 5%. 1000 milliLiter(s) <Continuous>  dextrose 50% Injectable 25 Gram(s) once  dextrose 50% Injectable 12.5 Gram(s) once  dextrose 50% Injectable 25 Gram(s) once  dextrose Oral Gel 15 Gram(s) once PRN  enalapril 10 milliGRAM(s) <User Schedule>  enoxaparin Injectable 40 milliGRAM(s) every 24 hours  gabapentin 300 milliGRAM(s) three times a day  glucagon  Injectable 1 milliGRAM(s) once  guaiFENesin Oral Liquid (Sugar-Free) 100 milliGRAM(s) every 6 hours PRN  hydrocortisone 1% Cream 1 Application(s) two times a day  insulin glargine Injectable (LANTUS) 20 Unit(s) at bedtime  insulin lispro (ADMELOG) corrective regimen sliding scale   at bedtime  insulin lispro (ADMELOG) corrective regimen sliding scale   three times a day before meals  insulin lispro Injectable (ADMELOG) 4 Unit(s) three times a day before meals  linagliptin 5 milliGRAM(s) at bedtime  magnesium oxide 400 milliGRAM(s) three times a day with meals  metFORMIN 1000 milliGRAM(s) two times a day  nitrofurantoin monohydrate/macrocrystals (MACROBID) 100 milliGRAM(s) two times a day  polyethylene glycol 3350 17 Gram(s) two times a day  senna 2 Tablet(s) at bedtime  simvastatin 40 milliGRAM(s) at bedtime  sodium chloride 0.9%. 1000 milliLiter(s) <Continuous>      RECENT LABS/IMAGING                        10.7   9.76  )-----------( 433      ( 25 Dec 2023 06:52 )             34.3     12-25    135  |  100  |  19  ----------------------------<  226<H>  4.5   |  29  |  0.93    Ca    9.4      25 Dec 2023 06:52    TPro  7.7  /  Alb  2.3<L>  /  TBili  0.2  /  DBili  x   /  AST  32  /  ALT  47<H>  /  AlkPhos  135<H>  12-25      Urinalysis Basic - ( 25 Dec 2023 06:52 )    Color: x / Appearance: x / SG: x / pH: x  Gluc: 226 mg/dL / Ketone: x  / Bili: x / Urobili: x   Blood: x / Protein: x / Nitrite: x   Leuk Esterase: x / RBC: x / WBC x   Sq Epi: x / Non Sq Epi: x / Bacteria: x              POCT Blood Glucose.: 207 mg/dL (12-25-23 @ 08:29)  POCT Blood Glucose.: 231 mg/dL (12-25-23 @ 08:22)  POCT Blood Glucose.: 154 mg/dL (12-24-23 @ 21:16)  POCT Blood Glucose.: 144 mg/dL (12-24-23 @ 17:11)  POCT Blood Glucose.: 188 mg/dL (12-24-23 @ 11:51)    -------------  ---------  PHYSICAL EXAM  Constitutional - NAD, Comfortable, in bed   Pulm - Breathing comfortably, room air   Abd - Soft, NTND  Extremities - Lt BKA   Neurologic Exam -                    Cognitive - Awake, Alert, oriented x 3     Communication - Fluent     Motor - moves all ext, 5/5      Sensory - Intact to LT  Psychiatric - Mood WNL, Affect WNL    ASSESSMENT/PLAN  63y Male h/o HTN, DM with functional deficits after osteomyelitis, Lt BKA  UTI on augmentin, WBC improved 12/23, culture +Ecoli, <50,000  Continue current medical management  Pain - Tylenol PRN, gabapentin, oxycodone   DVT PPX - lovenox     Continue 3hrs a day of comprehensive rehab program.

## 2023-12-26 LAB
GLUCOSE BLDC GLUCOMTR-MCNC: 163 MG/DL — HIGH (ref 70–99)
GLUCOSE BLDC GLUCOMTR-MCNC: 197 MG/DL — HIGH (ref 70–99)
GLUCOSE BLDC GLUCOMTR-MCNC: 197 MG/DL — HIGH (ref 70–99)
GLUCOSE BLDC GLUCOMTR-MCNC: 205 MG/DL — HIGH (ref 70–99)
GLUCOSE BLDC GLUCOMTR-MCNC: 205 MG/DL — HIGH (ref 70–99)
GLUCOSE BLDC GLUCOMTR-MCNC: 207 MG/DL — HIGH (ref 70–99)
GLUCOSE BLDC GLUCOMTR-MCNC: 207 MG/DL — HIGH (ref 70–99)

## 2023-12-26 PROCEDURE — 99232 SBSQ HOSP IP/OBS MODERATE 35: CPT

## 2023-12-26 RX ADMIN — Medication 100 MILLIGRAM(S): at 05:51

## 2023-12-26 RX ADMIN — Medication 1 APPLICATION(S): at 18:00

## 2023-12-26 RX ADMIN — Medication 975 MILLIGRAM(S): at 18:38

## 2023-12-26 RX ADMIN — MAGNESIUM OXIDE 400 MG ORAL TABLET 400 MILLIGRAM(S): 241.3 TABLET ORAL at 12:21

## 2023-12-26 RX ADMIN — Medication 10 MILLIGRAM(S): at 12:21

## 2023-12-26 RX ADMIN — BIMATOPROST 1 DROP(S): 0.3 SOLUTION/ DROPS OPHTHALMIC at 21:15

## 2023-12-26 RX ADMIN — Medication 975 MILLIGRAM(S): at 22:30

## 2023-12-26 RX ADMIN — Medication 975 MILLIGRAM(S): at 12:22

## 2023-12-26 RX ADMIN — ENOXAPARIN SODIUM 40 MILLIGRAM(S): 100 INJECTION SUBCUTANEOUS at 05:50

## 2023-12-26 RX ADMIN — Medication 975 MILLIGRAM(S): at 17:21

## 2023-12-26 RX ADMIN — Medication 2: at 12:22

## 2023-12-26 RX ADMIN — Medication 2: at 08:12

## 2023-12-26 RX ADMIN — INSULIN GLARGINE 24 UNIT(S): 100 INJECTION, SOLUTION SUBCUTANEOUS at 21:16

## 2023-12-26 RX ADMIN — MAGNESIUM OXIDE 400 MG ORAL TABLET 400 MILLIGRAM(S): 241.3 TABLET ORAL at 08:12

## 2023-12-26 RX ADMIN — Medication 975 MILLIGRAM(S): at 05:50

## 2023-12-26 RX ADMIN — METFORMIN HYDROCHLORIDE 1000 MILLIGRAM(S): 850 TABLET ORAL at 17:21

## 2023-12-26 RX ADMIN — GABAPENTIN 300 MILLIGRAM(S): 400 CAPSULE ORAL at 21:14

## 2023-12-26 RX ADMIN — SIMVASTATIN 40 MILLIGRAM(S): 20 TABLET, FILM COATED ORAL at 21:14

## 2023-12-26 RX ADMIN — GABAPENTIN 300 MILLIGRAM(S): 400 CAPSULE ORAL at 05:50

## 2023-12-26 RX ADMIN — Medication 975 MILLIGRAM(S): at 13:00

## 2023-12-26 RX ADMIN — MAGNESIUM OXIDE 400 MG ORAL TABLET 400 MILLIGRAM(S): 241.3 TABLET ORAL at 17:21

## 2023-12-26 RX ADMIN — Medication 4: at 17:22

## 2023-12-26 RX ADMIN — Medication 4 UNIT(S): at 12:22

## 2023-12-26 RX ADMIN — METFORMIN HYDROCHLORIDE 1000 MILLIGRAM(S): 850 TABLET ORAL at 08:13

## 2023-12-26 RX ADMIN — Medication 1 APPLICATION(S): at 05:51

## 2023-12-26 RX ADMIN — Medication 100 MILLIGRAM(S): at 17:21

## 2023-12-26 RX ADMIN — LINAGLIPTIN 5 MILLIGRAM(S): 5 TABLET, FILM COATED ORAL at 21:15

## 2023-12-26 RX ADMIN — Medication 4 UNIT(S): at 17:23

## 2023-12-26 RX ADMIN — GABAPENTIN 300 MILLIGRAM(S): 400 CAPSULE ORAL at 13:24

## 2023-12-26 RX ADMIN — Medication 975 MILLIGRAM(S): at 21:15

## 2023-12-26 RX ADMIN — Medication 4 UNIT(S): at 08:12

## 2023-12-26 RX ADMIN — Medication 1 APPLICATION(S): at 05:52

## 2023-12-26 NOTE — CHART NOTE - NSCHARTNOTEFT_GEN_A_CORE
NUTRITION FOLLOW UP    SOURCE: Patient [X]   Family [ ]    Medical Record [X]    DIET: Diet, Consistent Carbohydrate w/Evening Snack (12-19-23 @ 14:07) [Active]  ALLERGIES: NKFA    IMPRESSION:  Met with pt during lunch at bedside. Pt was seated upright and was able to articulate his nutrition hx well. Pt reported good appetite and taking adequate POs. Pt denied N/V/D/C, stated last BM this AM 12/26. Reviewed Carbohydrate Consistent diet including sources of carbohydrates, portion sizes, pairing protein with carbohydrates, and the importance of consistent eating pattern to help optimize glycemic control. Pt was accepting to education and asking appropriate questions.    *POCT BG x 24 hours:  POCT Blood Glucose.: 197 mg/dL (26 Dec 2023 12:21)  POCT Blood Glucose.: 207 mg/dL (26 Dec 2023 12:18)  POCT Blood Glucose.: 163 mg/dL (26 Dec 2023 08:08)  POCT Blood Glucose.: 182 mg/dL (25 Dec 2023 21:58)  POCT Blood Glucose.: 120 mg/dL (25 Dec 2023 17:19)    PATIENT REPORT: [X] other: no GI distress    PO INTAKE: % per RN flowsheets    CURRENT WEIGHT: 202.3 lbs (12/16)  WEIGHT HX: N/A    PERTINENT MEDS:   Pertinent Medications: MEDICATIONS  (STANDING):  acetaminophen     Tablet .. 975 milliGRAM(s) Oral every 6 hours  AQUAPHOR (petrolatum Ointment) 1 Application(s) Topical two times a day  bimatoprost 0.01% Ophthalmic Solution 1 Drop(s) Both EYES at bedtime  clotrimazole 1% Cream 1 Application(s) Topical two times a day  dextrose 5%. 1000 milliLiter(s) (50 mL/Hr) IV Continuous <Continuous>  dextrose 5%. 1000 milliLiter(s) (100 mL/Hr) IV Continuous <Continuous>  dextrose 50% Injectable 25 Gram(s) IV Push once  dextrose 50% Injectable 25 Gram(s) IV Push once  dextrose 50% Injectable 12.5 Gram(s) IV Push once  enalapril 10 milliGRAM(s) Oral <User Schedule>  enoxaparin Injectable 40 milliGRAM(s) SubCutaneous every 24 hours  gabapentin 300 milliGRAM(s) Oral three times a day  glucagon  Injectable 1 milliGRAM(s) IntraMuscular once  hydrocortisone 1% Cream 1 Application(s) Topical two times a day  insulin glargine Injectable (LANTUS) 24 Unit(s) SubCutaneous at bedtime  insulin lispro (ADMELOG) corrective regimen sliding scale   SubCutaneous at bedtime  insulin lispro (ADMELOG) corrective regimen sliding scale   SubCutaneous three times a day before meals  insulin lispro Injectable (ADMELOG) 4 Unit(s) SubCutaneous three times a day before meals  linagliptin 5 milliGRAM(s) Oral at bedtime  magnesium oxide 400 milliGRAM(s) Oral three times a day with meals  metFORMIN 1000 milliGRAM(s) Oral two times a day  nitrofurantoin monohydrate/macrocrystals (MACROBID) 100 milliGRAM(s) Oral two times a day  polyethylene glycol 3350 17 Gram(s) Oral two times a day  senna 2 Tablet(s) Oral at bedtime  simvastatin 40 milliGRAM(s) Oral at bedtime  sodium chloride 0.9%. 1000 milliLiter(s) (100 mL/Hr) IV Continuous <Continuous>    MEDICATIONS  (PRN):  dextrose Oral Gel 15 Gram(s) Oral once PRN Blood Glucose LESS THAN 70 milliGRAM(s)/deciliter  guaiFENesin Oral Liquid (Sugar-Free) 100 milliGRAM(s) Oral every 6 hours PRN Cough    PERTINENT LABS:  12-25 Na135 mmol/L Glu 226 mg/dL<H> K+ 4.5 mmol/L Cr  0.93 mg/dL BUN 19 mg/dL 12-22 Phos 3.8 mg/dL 12-25 Alb 2.3 g/dL<L>    SKIN: stage I intragluteal cleft    EDEMA: N/A    ESTIMATED NEEDS:   [X] no change since previous assessment     PREVIOUS NUTRITION DIAGNOSIS: Altered nutrition related lab values (A1C)    NUTRITION DIAGNOSIS is: [X] Ongoing    NEW NUTRITION DIAGNOSIS: N/A    MONITORING AND EVALUATION:   Current diet order is appropriate and is well tolerated, will continue to monitor:  - Food and nutrient intake/POs  - Nutrition related lab value, specifically POCT BG   - Weight/weight trends  - GI functions    NUTRITION RECOMMENDATIONS:   1. Continue with Consistent CHO with evening snack  - RD will continue to monitor POCT BG   - Insulin per team  - Melba snack: berries + greek yogurt (RD will note in kardex)  2. Ongoing T2DM diet education   3. Appreciate weekly weight trends  4. Continue with current bowel regimen, prn    Luiza Campos RDN also available via TEAMS

## 2023-12-26 NOTE — PROGRESS NOTE ADULT - ASSESSMENT
ELVIS VILLATORO is a 63y with a history of diabetes mellitus type 2, HTN, dyslipidemia, retinopathy who presented to [facility] on 11/29/23  with complaint of non-healing left foot wound and found to have osteomyelitis. Hospital course complicated by hyperglycemia and need for surgical revision. Now admitted to [facility] after for initiation of a multidisciplinary rehab program consisting focused on functional mobility, transfers and ADLs.     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   - s/p revision on 12/4 and formalization 12/12.   Comprehensive Multidisciplinary Rehab Program:  comprehensive rehab program, PT/OT/SLP 3 hours a day, 5 days a week.  - NWB left lower extremity  - Precautions: falls  - completed Unasyn per ID at previous hospital- on PO abx s/p laceration of eye lid/+UTI. Afebrile, no leukocytosis   - s/p fall in OT-[facility]H follow up neg new findings  -plastics appreciated      #UTI  - abx per CS  -leukocytosis improved, afebrile, PVRs low  -monitor closely    HLD  -Simvastatin    Diabetes mellitus type 2  -ISS  - Home meds: metformin 1000mg BID, glipizide   - Lantus  - Admelog TID  - hospitalist in  Diabetic nursing to see    HTN  -enalapril increased back to 10mg daily-by hospitalist   -will hold Flomax for low BP    Urinary Retention  -Tamsulosin on hold  + UA - C&S+ on abx, Leukocytosis- now improved  Hospitalist in    Pain Management:  - Tylenol q6h  - oxy5mg, 10mg mod and severe pain-dc  - gabapentin 300mg TID    GI/Bowel:  - patient reports regular BMs  - Senna QHS, Miralax Daily    Skin/Pressure Injury:   - At risk for pressure injury due to neurologic diagnosis and relative immobility.  - Skin assessment on admission: stage 1 sacral ulcer, tinea cruris present, scab of 4th digit R foot  - barrier cream for sacral wound  - butenafine daily for jock itch  - Monitor Incisions: LLE residual stump with sutures  - Daily dressing changes  - aquaphor for dry R foot.    Diet:  - Consistent Carb  - Nutrition consult for assessment and recs    DVT ppx:  - Lovenox daily dvt ppx  ---------------  Code Status/Emergency Contact:    Outpatient Follow-up (Specialty/Name of physician):  Jose Francisco Dean  Vascular Surgery  [address] [place] [address], [id number]  [place] [address]  Phone: [phone]  Fax: [phone]  Follow Up Time: 2 weeks  [facility]  Endocrinology  [address] [place] [address]  Phone: [phone]  Fax:   Follow Up Time: 2 weeks    [facility] [place]  Urology  [address] [place] [address]  Phone: [phone]  Follow Up Time: 1 week    Eduin Gutierrez MD  Attending Physician  [facility]  Division of Infectous Diseases  [phone]    Needs podiatry/vascular and optho f/u care.     Endocrine faculty practice.   [address] [address]. Phone .   ELVIS VILLATORO is a 63y with a history of diabetes mellitus type 2, HTN, dyslipidemia, retinopathy who presented to [facility] on 11/29/23  with complaint of non-healing left foot wound and found to have osteomyelitis. Hospital course complicated by hyperglycemia and need for surgical revision. Now admitted to [facility] after for initiation of a multidisciplinary rehab program consisting focused on functional mobility, transfers and ADLs.     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   - s/p revision on 12/4 and formalization 12/12.   Comprehensive Multidisciplinary Rehab Program:  comprehensive rehab program, PT/OT/SLP 3 hours a day, 5 days a week.  - NWB left lower extremity  - Precautions: falls  - completed Unasyn per ID at previous hospital- on PO abx s/p laceration of eye lid/+UTI. Afebrile, no leukocytosis   - s/p fall in OT-CTH follow up neg new findings  -plastics appreciated      #UTI  - abx per CS  -leukocytosis improved, afebrile, PVRs low  -monitor closely    HLD  -Simvastatin    Diabetes mellitus type 2  -ISS  - Home meds: metformin 1000mg BID, glipizide   - Lantus  - Admelog TID  - hospitalist in  Diabetic nursing to see    HTN  -enalapril increased back to 10mg daily-by hospitalist   -will hold Flomax for low BP    Urinary Retention  -Tamsulosin on hold  + UA - C&S+ on abx, Leukocytosis- now improved  Hospitalist in    Pain Management:  - Tylenol q6h  - oxy5mg, 10mg mod and severe pain-dc  - gabapentin 300mg TID    GI/Bowel:  - patient reports regular BMs  - Senna QHS, Miralax Daily    Skin/Pressure Injury:   - At risk for pressure injury due to neurologic diagnosis and relative immobility.  - Skin assessment on admission: stage 1 sacral ulcer, tinea cruris present, scab of 4th digit R foot  - barrier cream for sacral wound  - butenafine daily for jock itch  - Monitor Incisions: LLE residual stump with sutures  - Daily dressing changes  - aquaphor for dry R foot.    Diet:  - Consistent Carb  - Nutrition consult for assessment and recs    DVT ppx:  - Lovenox daily dvt ppx  ---------------  Code Status/Emergency Contact:    Outpatient Follow-up (Specialty/Name of physician):  Jose Francisco Dean  Vascular Surgery  [address] [place] [address], [id number]  [place] [address]  Phone: [phone]  Fax: [phone]  Follow Up Time: 2 weeks  [facility] End[facility]  Endocrinology  [address] [place] [address]  Phone: [phone]  Fax:   Follow Up Time: 2 weeks    [facility] [place]  Urology  [address] [place] [address]  Phone: [phone]  Follow Up Time: 1 week    Eduin Gutierrez MD  Attending Physician  [facility]  Division of Infectous Diseases  [phone]    Needs podiatry/vascular and optho f/u care.     Endocrine faculty practice.   [address] [address]. Phone .

## 2023-12-26 NOTE — PROGRESS NOTE ADULT - SUBJECTIVE AND OBJECTIVE BOX
HPI:  Mr. Mcdaniel is a 63M PMHx DM, HLD, PAD, prior toe amputation, presents to [facility] ED w L foot wound. Reports wound has been present on hindfoot x 3 weeks. Started as small cut. Over last 24 hours, pt unable to ambulate d/t severe pain. Reports significant malodor. Denies subjective fevers, chills, purulent drainage, numbness/paresthesia.     In ED, patient is tachycardic to 123. BP w HTN. Febrile to 102.4 F. Labs w WBC 21. Hgb 11.4. Lac 2.4. XR L foot w soft tissue gas at lateral hindfoot. CT LLE non con w soft tissue gas tracking ~7cm above foot.   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 Patient admitted to the SICU pre and post op for hyperglycemia and insulin drip management. Endocrinology was consulted for the hyperglycemia.  Patient transferred out of the SICU when he was able to be weaned off the insulin drip. Internal medicine was consulted for malachi-operative clearance for formalization surgery. On 12/4 the patient was taken back to the operating room for a left lower extremity guillotine revision. Infectious disease was consulted for antibiotic management and recommended switching from Zosyn to Unasyn. Rehab medicine was consulted to evaluate for rehab needs and recommended acute rehab on discharge.       ROS/subjective:  Pt seen and examined in chair, no lightheadedness, off Flomax  No acute events overnight  Lid laceration sutured with Steris-appear to heal well  Moving Bowels - formed  - no burning no urgency-on abx  Pt denies cp, palpitations, sob, abd pain, N/V, fever, chills.   pain well controlled-Tylenol/Neurontin      MEDICATIONS  (STANDING):  acetaminophen     Tablet .. 975 milliGRAM(s) Oral every 6 hours  AQUAPHOR (petrolatum Ointment) 1 Application(s) Topical two times a day  bimatoprost 0.01% Ophthalmic Solution 1 Drop(s) Both EYES at bedtime  clotrimazole 1% Cream 1 Application(s) Topical two times a day  dextrose 5%. 1000 milliLiter(s) (50 mL/Hr) IV Continuous <Continuous>  dextrose 5%. 1000 milliLiter(s) (100 mL/Hr) IV Continuous <Continuous>  dextrose 50% Injectable 25 Gram(s) IV Push once  dextrose 50% Injectable 25 Gram(s) IV Push once  dextrose 50% Injectable 12.5 Gram(s) IV Push once  enalapril 10 milliGRAM(s) Oral <User Schedule>  enoxaparin Injectable 40 milliGRAM(s) SubCutaneous every 24 hours  gabapentin 300 milliGRAM(s) Oral three times a day  glucagon  Injectable 1 milliGRAM(s) IntraMuscular once  hydrocortisone 1% Cream 1 Application(s) Topical two times a day  insulin glargine Injectable (LANTUS) 24 Unit(s) SubCutaneous at bedtime  insulin lispro (ADMELOG) corrective regimen sliding scale   SubCutaneous at bedtime  insulin lispro (ADMELOG) corrective regimen sliding scale   SubCutaneous three times a day before meals  insulin lispro Injectable (ADMELOG) 4 Unit(s) SubCutaneous three times a day before meals  linagliptin 5 milliGRAM(s) Oral at bedtime  magnesium oxide 400 milliGRAM(s) Oral three times a day with meals  metFORMIN 1000 milliGRAM(s) Oral two times a day  nitrofurantoin monohydrate/macrocrystals (MACROBID) 100 milliGRAM(s) Oral two times a day  polyethylene glycol 3350 17 Gram(s) Oral two times a day  senna 2 Tablet(s) Oral at bedtime  simvastatin 40 milliGRAM(s) Oral at bedtime  sodium chloride 0.9%. 1000 milliLiter(s) (100 mL/Hr) IV Continuous <Continuous>    MEDICATIONS  (PRN):  dextrose Oral Gel 15 Gram(s) Oral once PRN Blood Glucose LESS THAN 70 milliGRAM(s)/deciliter  guaiFENesin Oral Liquid (Sugar-Free) 100 milliGRAM(s) Oral every 6 hours PRN Cough                            10.7   9.76  )-----------( 433      ( 25 Dec 2023 06:52 )             34.3     12-25    135  |  100  |  19  ----------------------------<  226<H>  4.5   |  29  |  0.93    Ca    9.4      25 Dec 2023 06:52    TPro  7.7  /  Alb  2.3<L>  /  TBili  0.2  /  DBili  x   /  AST  32  /  ALT  47<H>  /  AlkPhos  135<H>  12-25    Urinalysis Basic - ( 25 Dec 2023 06:52 )    Color: x / Appearance: x / SG: x / pH: x  Gluc: 226 mg/dL / Ketone: x  / Bili: x / Urobili: x   Blood: x / Protein: x / Nitrite: x   Leuk Esterase: x / RBC: x / WBC x   Sq Epi: x / Non Sq Epi: x / Bacteria: x        CAPILLARY BLOOD GLUCOSE      POCT Blood Glucose.: 197 mg/dL (26 Dec 2023 12:21)  POCT Blood Glucose.: 207 mg/dL (26 Dec 2023 12:18)  POCT Blood Glucose.: 163 mg/dL (26 Dec 2023 08:08)  POCT Blood Glucose.: 182 mg/dL (25 Dec 2023 21:58)  POCT Blood Glucose.: 120 mg/dL (25 Dec 2023 17:19)      Vital Signs Last 24 Hrs  T(C): 36.5 (25 Dec 2023 22:55), Max: 36.5 (25 Dec 2023 22:55)  T(F): 97.7 (25 Dec 2023 22:55), Max: 97.7 (25 Dec 2023 22:55)  HR: 78 (25 Dec 2023 22:55) (78 - 78)  BP: 143/78 (25 Dec 2023 22:55) (143/78 - 143/78)  BP(mean): --  RR: 17 (25 Dec 2023 22:55) (17 - 17)  SpO2: 97% (25 Dec 2023 22:55) (97% - 97%)    Parameters below as of 25 Dec 2023 22:55  Patient On (Oxygen Delivery Method): room air      Physical Exam: Gen - NAD in chair  HEENT - NCAT, EOMI, steri to right lid  Neck - Supple, No limited ROM  Pulm - CTAB, No wheeze, No rhonchi, No crackles  Cardiovascular - RRR, S1S2, No murmurs  Abdomen - Soft, NT/ND, +BS  Extremities - LLE with recent BKA.   Uro - crain present for retention  Neuro-     Cognitive - AAOx3       Motor -                     LEFT    UE - ShAB 5/5, EF 5/5, EE 5/5, WE 5/5,  5/5                    RIGHT UE - ShAB 5/5, EF 5/5, EE 5/5, WE 5/5,  5/5                    LEFT    LE - HF 5/5, KE 5/5                    RIGHT LE - HF 5/5, KE 5/5, DF 5/5, PF 5/5        Sensory - Intact to LT     Tone - normal  Psychiatric - Mood stable, Affect WNL  Skin:  incision site from BKA on the L residual limb. No drainage. C/d/i. skin well approximated and staples present. Stage 1 sacral decubitis ulcer present. Fungal rash of the bilateral inguinal creases- some erythema noted over Patella tendon - padded with DSD  Wounds: Patient with scab of 4th digit of RLE.      IDT meeting on 12/20    SW: PH, 3STE, wife    OT:  eating set  grooming det  UBD/LBD cg/min  toileting min   tub/shower min  bathing    PT:  amb  min A  transfers min A  stairs    SLP    tentative dc 12/30 HC        Continue comprehensive acute rehab program consisting of 3hrs/day of OT/PT. HPI:  Mr. Mcdaniel is a 63M PMHx DM, HLD, PAD, prior toe amputation, presents to [facility] ED w L foot wound. Reports wound has been present on hindfoot x 3 weeks. Started as small cut. Over last 24 hours, pt unable to ambulate d/t severe pain. Reports significant malodor. Denies subjective fevers, chills, purulent drainage, numbness/paresthesia.     In ED, patient is tachycardic to 123. BP w HTN. Febrile to 102.4 F. Labs w WBC 21. Hgb 11.4. Lac 2.4. XR L foot w soft tissue gas at lateral hindfoot. CT LLE non con w soft tissue gas tracking ~7cm above foot.   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 Patient admitted to the SICU pre and post op for hyperglycemia and insulin drip management. Endocrinology was consulted for the hyperglycemia.  Patient transferred out of the SICU when he was able to be weaned off the insulin drip. Internal medicine was consulted for malachi-operative clearance for formalization surgery. On 12/4 the patient was taken back to the operating room for a left lower extremity guillotine revision. Infectious disease was consulted for antibiotic management and recommended switching from Zosyn to Unasyn. Rehab medicine was consulted to evaluate for rehab needs and recommended acute rehab on discharge.       ROS/subjective:  Pt seen and examined in chair, no lightheadedness, off Flomax  No acute events overnight  Lid laceration sutured with Steris-appear to heal well  Moving Bowels - formed  - no burning no urgency-on abx  Pt denies cp, palpitations, sob, abd pain, N/V, fever, chills.   pain well controlled-Tylenol/Neurontin      MEDICATIONS  (STANDING):  acetaminophen     Tablet .. 975 milliGRAM(s) Oral every 6 hours  AQUAPHOR (petrolatum Ointment) 1 Application(s) Topical two times a day  bimatoprost 0.01% Ophthalmic Solution 1 Drop(s) Both EYES at bedtime  clotrimazole 1% Cream 1 Application(s) Topical two times a day  dextrose 5%. 1000 milliLiter(s) (50 mL/Hr) IV Continuous <Continuous>  dextrose 5%. 1000 milliLiter(s) (100 mL/Hr) IV Continuous <Continuous>  dextrose 50% Injectable 25 Gram(s) IV Push once  dextrose 50% Injectable 25 Gram(s) IV Push once  dextrose 50% Injectable 12.5 Gram(s) IV Push once  enalapril 10 milliGRAM(s) Oral <User Schedule>  enoxaparin Injectable 40 milliGRAM(s) SubCutaneous every 24 hours  gabapentin 300 milliGRAM(s) Oral three times a day  glucagon  Injectable 1 milliGRAM(s) IntraMuscular once  hydrocortisone 1% Cream 1 Application(s) Topical two times a day  insulin glargine Injectable (LANTUS) 24 Unit(s) SubCutaneous at bedtime  insulin lispro (ADMELOG) corrective regimen sliding scale   SubCutaneous at bedtime  insulin lispro (ADMELOG) corrective regimen sliding scale   SubCutaneous three times a day before meals  insulin lispro Injectable (ADMELOG) 4 Unit(s) SubCutaneous three times a day before meals  linagliptin 5 milliGRAM(s) Oral at bedtime  magnesium oxide 400 milliGRAM(s) Oral three times a day with meals  metFORMIN 1000 milliGRAM(s) Oral two times a day  nitrofurantoin monohydrate/macrocrystals (MACROBID) 100 milliGRAM(s) Oral two times a day  polyethylene glycol 3350 17 Gram(s) Oral two times a day  senna 2 Tablet(s) Oral at bedtime  simvastatin 40 milliGRAM(s) Oral at bedtime  sodium chloride 0.9%. 1000 milliLiter(s) (100 mL/Hr) IV Continuous <Continuous>    MEDICATIONS  (PRN):  dextrose Oral Gel 15 Gram(s) Oral once PRN Blood Glucose LESS THAN 70 milliGRAM(s)/deciliter  guaiFENesin Oral Liquid (Sugar-Free) 100 milliGRAM(s) Oral every 6 hours PRN Cough                            10.7   9.76  )-----------( 433      ( 25 Dec 2023 06:52 )             34.3     12-25    135  |  100  |  19  ----------------------------<  226<H>  4.5   |  29  |  0.93    Ca    9.4      25 Dec 2023 06:52    TPro  7.7  /  Alb  2.3<L>  /  TBili  0.2  /  DBili  x   /  AST  32  /  ALT  47<H>  /  AlkPhos  135<H>  12-25    Urinalysis Basic - ( 25 Dec 2023 06:52 )    Color: x / Appearance: x / SG: x / pH: x  Gluc: 226 mg/dL / Ketone: x  / Bili: x / Urobili: x   Blood: x / Protein: x / Nitrite: x   Leuk Esterase: x / RBC: x / WBC x   Sq Epi: x / Non Sq Epi: x / Bacteria: x        CAPILLARY BLOOD GLUCOSE      POCT Blood Glucose.: 197 mg/dL (26 Dec 2023 12:21)  POCT Blood Glucose.: 207 mg/dL (26 Dec 2023 12:18)  POCT Blood Glucose.: 163 mg/dL (26 Dec 2023 08:08)  POCT Blood Glucose.: 182 mg/dL (25 Dec 2023 21:58)  POCT Blood Glucose.: 120 mg/dL (25 Dec 2023 17:19)      Vital Signs Last 24 Hrs  T(C): 36.5 (25 Dec 2023 22:55), Max: 36.5 (25 Dec 2023 22:55)  T(F): 97.7 (25 Dec 2023 22:55), Max: 97.7 (25 Dec 2023 22:55)  HR: 78 (25 Dec 2023 22:55) (78 - 78)  BP: 143/78 (25 Dec 2023 22:55) (143/78 - 143/78)  BP(mean): --  RR: 17 (25 Dec 2023 22:55) (17 - 17)  SpO2: 97% (25 Dec 2023 22:55) (97% - 97%)    Parameters below as of 25 Dec 2023 22:55  Patient On (Oxygen Delivery Method): room air      Physical Exam: Gen - NAD in chair  HEENT - NCAT, EOMI, steri to right lid  Neck - Supple, No limited ROM  Pulm - CTAB, No wheeze, No rhonchi, No crackles  Cardiovascular - RRR, S1S2, No murmurs  Abdomen - Soft, NT/ND, +BS  Extremities - LLE with recent BKA.   Uro - crain present for retention  Neuro-     Cognitive - AAOx3       Motor -                     LEFT    UE - ShAB 5/5, EF 5/5, EE 5/5, WE 5/5,  5/5                    RIGHT UE - ShAB 5/5, EF 5/5, EE 5/5, WE 5/5,  5/5                    LEFT    LE - HF 5/5, KE 5/5                    RIGHT LE - HF 5/5, KE 5/5, DF 5/5, PF 5/5        Sensory - Intact to LT     Tone - normal  Psychiatric - Mood stable, Affect WNL  Skin:  incision site from BKA on the L residual limb. No drainage. C/d/i. skin well approximated and staples present. Stage 1 sacral decubitis ulcer present. Fungal rash of the bilateral inguinal creases- some erythema noted over Patella tendon - padded with DSD  Wounds: Patient with scab of 4th digit of RLE.      IDT meeting on 12/20    SW: PH, 3STE, wife    OT:  eating set  grooming det  UBD/LBD cg/min  toileting min   tub/shower min  bathing    PT:  amb  min A  transfers min A  stairs    SLP    tentative dc 12/30 HC        Continue comprehensive acute rehab program consisting of 3hrs/day of OT/PT. HPI:  Mr. Mcdaniel is a 63M PMHx DM, HLD, PAD, prior toe amputation, presents to [facility] ED w L foot wound. Reports wound has been present on hindfoot x 3 weeks. Started as small cut. Over last 24 hours, pt unable to ambulate d/t severe pain. Reports significant malodor. Denies subjective fevers, chills, purulent drainage, numbness/paresthesia.     In ED, patient is tachycardic to 123. BP w HTN. Febrile to 102.4 F. Labs w WBC 21. Hgb 11.4. Lac 2.4. XR L foot w soft tissue gas at lateral hindfoot. CT LLE non con w soft tissue gas tracking ~7cm above foot.   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 Patient admitted to the SICU pre and post op for hyperglycemia and insulin drip management. Endocrinology was consulted for the hyperglycemia.  Patient transferred out of the SICU when he was able to be weaned off the insulin drip. Internal medicine was consulted for malachi-operative clearance for formalization surgery. On 12/4 the patient was taken back to the operating room for a left lower extremity guillotine revision. Infectious disease was consulted for antibiotic management and recommended switching from Zosyn to Unasyn. Rehab medicine was consulted to evaluate for rehab needs and recommended acute rehab on discharge.       ROS/subjective:  Pt seen and examined in chair, no lightheadedness, off Flomax  No acute events overnight  Lid laceration sutured with Steris-appear to heal well  Moving Bowels - formed  - no burning no urgency-on abx  Pt denies cp, palpitations, sob, abd pain, N/V, fever, chills.   pain well controlled-Tylenol/Neurontin      MEDICATIONS  (STANDING):  acetaminophen     Tablet .. 975 milliGRAM(s) Oral every 6 hours  AQUAPHOR (petrolatum Ointment) 1 Application(s) Topical two times a day  bimatoprost 0.01% Ophthalmic Solution 1 Drop(s) Both EYES at bedtime  clotrimazole 1% Cream 1 Application(s) Topical two times a day  dextrose 5%. 1000 milliLiter(s) (50 mL/Hr) IV Continuous <Continuous>  dextrose 5%. 1000 milliLiter(s) (100 mL/Hr) IV Continuous <Continuous>  dextrose 50% Injectable 25 Gram(s) IV Push once  dextrose 50% Injectable 25 Gram(s) IV Push once  dextrose 50% Injectable 12.5 Gram(s) IV Push once  enalapril 10 milliGRAM(s) Oral <User Schedule>  enoxaparin Injectable 40 milliGRAM(s) SubCutaneous every 24 hours  gabapentin 300 milliGRAM(s) Oral three times a day  glucagon  Injectable 1 milliGRAM(s) IntraMuscular once  hydrocortisone 1% Cream 1 Application(s) Topical two times a day  insulin glargine Injectable (LANTUS) 24 Unit(s) SubCutaneous at bedtime  insulin lispro (ADMELOG) corrective regimen sliding scale   SubCutaneous at bedtime  insulin lispro (ADMELOG) corrective regimen sliding scale   SubCutaneous three times a day before meals  insulin lispro Injectable (ADMELOG) 4 Unit(s) SubCutaneous three times a day before meals  linagliptin 5 milliGRAM(s) Oral at bedtime  magnesium oxide 400 milliGRAM(s) Oral three times a day with meals  metFORMIN 1000 milliGRAM(s) Oral two times a day  nitrofurantoin monohydrate/macrocrystals (MACROBID) 100 milliGRAM(s) Oral two times a day  polyethylene glycol 3350 17 Gram(s) Oral two times a day  senna 2 Tablet(s) Oral at bedtime  simvastatin 40 milliGRAM(s) Oral at bedtime  sodium chloride 0.9%. 1000 milliLiter(s) (100 mL/Hr) IV Continuous <Continuous>    MEDICATIONS  (PRN):  dextrose Oral Gel 15 Gram(s) Oral once PRN Blood Glucose LESS THAN 70 milliGRAM(s)/deciliter  guaiFENesin Oral Liquid (Sugar-Free) 100 milliGRAM(s) Oral every 6 hours PRN Cough                            10.7   9.76  )-----------( 433      ( 25 Dec 2023 06:52 )             34.3     12-25    135  |  100  |  19  ----------------------------<  226<H>  4.5   |  29  |  0.93    Ca    9.4      25 Dec 2023 06:52    TPro  7.7  /  Alb  2.3<L>  /  TBili  0.2  /  DBili  x   /  AST  32  /  ALT  47<H>  /  AlkPhos  135<H>  12-25    Urinalysis Basic - ( 25 Dec 2023 06:52 )    Color: x / Appearance: x / SG: x / pH: x  Gluc: 226 mg/dL / Ketone: x  / Bili: x / Urobili: x   Blood: x / Protein: x / Nitrite: x   Leuk Esterase: x / RBC: x / WBC x   Sq Epi: x / Non Sq Epi: x / Bacteria: x        CAPILLARY BLOOD GLUCOSE      POCT Blood Glucose.: 197 mg/dL (26 Dec 2023 12:21)  POCT Blood Glucose.: 207 mg/dL (26 Dec 2023 12:18)  POCT Blood Glucose.: 163 mg/dL (26 Dec 2023 08:08)  POCT Blood Glucose.: 182 mg/dL (25 Dec 2023 21:58)  POCT Blood Glucose.: 120 mg/dL (25 Dec 2023 17:19)      Vital Signs Last 24 Hrs  T(C): 36.5 (25 Dec 2023 22:55), Max: 36.5 (25 Dec 2023 22:55)  T(F): 97.7 (25 Dec 2023 22:55), Max: 97.7 (25 Dec 2023 22:55)  HR: 78 (25 Dec 2023 22:55) (78 - 78)  BP: 143/78 (25 Dec 2023 22:55) (143/78 - 143/78)  BP(mean): --  RR: 17 (25 Dec 2023 22:55) (17 - 17)  SpO2: 97% (25 Dec 2023 22:55) (97% - 97%)    Parameters below as of 25 Dec 2023 22:55  Patient On (Oxygen Delivery Method): room air      Physical Exam: Gen - NAD in chair  HEENT - NCAT, EOMI, steri to right lid  Neck - Supple, No limited ROM  Pulm - CTAB, No wheeze, No rhonchi, No crackles  Cardiovascular - RRR, S1S2, No murmurs  Abdomen - Soft, NT/ND, +BS  Extremities - LLE with recent BKA.   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 No drainage. C/d/i. skin well approximated and staples present. Stage 1 sacral decubitis ulcer present. Fungal rash of the bilateral inguinal creases- some erythema noted over Patella tendon - padded with DSD  Wounds: Patient with scab of 4th digit of RLE.      IDT meeting on 12/20    SW: PH, 3STE, wife    OT:  eating set  grooming det  UBD/LBD cg/min  toileting min   tub/shower min  bathing    PT:  amb  min A  transfers min A  stairs    SLP    tentative dc 12/30 HC        Continue comprehensive acute rehab program consisting of 3hrs/day of OT/PT. HPI:  Mr. Mcdaniel is a 63M PMHx DM, HLD, PAD, prior toe amputation, presents to [facility] ED w L foot wound. Reports wound has been present on hindfoot x 3 weeks. Started as small cut. Over last 24 hours, pt unable to ambulate d/t severe pain. Reports significant malodor. Denies subjective fevers, chills, purulent drainage, numbness/paresthesia.     In ED, patient is tachycardic to 123. BP w HTN. Febrile to 102.4 F. Labs w WBC 21. Hgb 11.4. Lac 2.4. XR L foot w soft tissue gas at lateral hindfoot. CT LLE non con w soft tissue gas tracking ~7cm above foot.   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 Patient admitted to the SICU pre and post op for hyperglycemia and insulin drip management. Endocrinology was consulted for the hyperglycemia.  Patient transferred out of the SICU when he was able to be weaned off the insulin drip. Internal medicine was consulted for malachi-operative clearance for formalization surgery. On 12/4 the patient was taken back to the operating room for a left lower extremity guillotine revision. Infectious disease was consulted for antibiotic management and recommended switching from Zosyn to Unasyn. Rehab medicine was consulted to evaluate for rehab needs and recommended acute rehab on discharge.       ROS/subjective:  Pt seen and examined in chair, no lightheadedness, off Flomax  No acute events overnight  Lid laceration sutured with Steris-appear to heal well  Moving Bowels - formed  - no burning no urgency-on abx  Pt denies cp, palpitations, sob, abd pain, N/V, fever, chills.   pain well controlled-Tylenol/Neurontin      MEDICATIONS  (STANDING):  acetaminophen     Tablet .. 975 milliGRAM(s) Oral every 6 hours  AQUAPHOR (petrolatum Ointment) 1 Application(s) Topical two times a day  bimatoprost 0.01% Ophthalmic Solution 1 Drop(s) Both EYES at bedtime  clotrimazole 1% Cream 1 Application(s) Topical two times a day  dextrose 5%. 1000 milliLiter(s) (50 mL/Hr) IV Continuous <Continuous>  dextrose 5%. 1000 milliLiter(s) (100 mL/Hr) IV Continuous <Continuous>  dextrose 50% Injectable 25 Gram(s) IV Push once  dextrose 50% Injectable 25 Gram(s) IV Push once  dextrose 50% Injectable 12.5 Gram(s) IV Push once  enalapril 10 milliGRAM(s) Oral <User Schedule>  enoxaparin Injectable 40 milliGRAM(s) SubCutaneous every 24 hours  gabapentin 300 milliGRAM(s) Oral three times a day  glucagon  Injectable 1 milliGRAM(s) IntraMuscular once  hydrocortisone 1% Cream 1 Application(s) Topical two times a day  insulin glargine Injectable (LANTUS) 24 Unit(s) SubCutaneous at bedtime  insulin lispro (ADMELOG) corrective regimen sliding scale   SubCutaneous at bedtime  insulin lispro (ADMELOG) corrective regimen sliding scale   SubCutaneous three times a day before meals  insulin lispro Injectable (ADMELOG) 4 Unit(s) SubCutaneous three times a day before meals  linagliptin 5 milliGRAM(s) Oral at bedtime  magnesium oxide 400 milliGRAM(s) Oral three times a day with meals  metFORMIN 1000 milliGRAM(s) Oral two times a day  nitrofurantoin monohydrate/macrocrystals (MACROBID) 100 milliGRAM(s) Oral two times a day  polyethylene glycol 3350 17 Gram(s) Oral two times a day  senna 2 Tablet(s) Oral at bedtime  simvastatin 40 milliGRAM(s) Oral at bedtime  sodium chloride 0.9%. 1000 milliLiter(s) (100 mL/Hr) IV Continuous <Continuous>    MEDICATIONS  (PRN):  dextrose Oral Gel 15 Gram(s) Oral once PRN Blood Glucose LESS THAN 70 milliGRAM(s)/deciliter  guaiFENesin Oral Liquid (Sugar-Free) 100 milliGRAM(s) Oral every 6 hours PRN Cough                            10.7   9.76  )-----------( 433      ( 25 Dec 2023 06:52 )             34.3     12-25    135  |  100  |  19  ----------------------------<  226<H>  4.5   |  29  |  0.93    Ca    9.4      25 Dec 2023 06:52    TPro  7.7  /  Alb  2.3<L>  /  TBili  0.2  /  DBili  x   /  AST  32  /  ALT  47<H>  /  AlkPhos  135<H>  12-25    Urinalysis Basic - ( 25 Dec 2023 06:52 )    Color: x / Appearance: x / SG: x / pH: x  Gluc: 226 mg/dL / Ketone: x  / Bili: x / Urobili: x   Blood: x / Protein: x / Nitrite: x   Leuk Esterase: x / RBC: x / WBC x   Sq Epi: x / Non Sq Epi: x / Bacteria: x        CAPILLARY BLOOD GLUCOSE      POCT Blood Glucose.: 197 mg/dL (26 Dec 2023 12:21)  POCT Blood Glucose.: 207 mg/dL (26 Dec 2023 12:18)  POCT Blood Glucose.: 163 mg/dL (26 Dec 2023 08:08)  POCT Blood Glucose.: 182 mg/dL (25 Dec 2023 21:58)  POCT Blood Glucose.: 120 mg/dL (25 Dec 2023 17:19)      Vital Signs Last 24 Hrs  T(C): 36.5 (25 Dec 2023 22:55), Max: 36.5 (25 Dec 2023 22:55)  T(F): 97.7 (25 Dec 2023 22:55), Max: 97.7 (25 Dec 2023 22:55)  HR: 78 (25 Dec 2023 22:55) (78 - 78)  BP: 143/78 (25 Dec 2023 22:55) (143/78 - 143/78)  BP(mean): --  RR: 17 (25 Dec 2023 22:55) (17 - 17)  SpO2: 97% (25 Dec 2023 22:55) (97% - 97%)    Parameters below as of 25 Dec 2023 22:55  Patient On (Oxygen Delivery Method): room air      Physical Exam: Gen - NAD in chair  HEENT - NCAT, EOMI, steri to right lid  Neck - Supple, No limited ROM  Pulm - CTAB, No wheeze, No rhonchi, No crackles  Cardiovascular - RRR, S1S2, No murmurs  Abdomen - Soft, NT/ND, +BS  Extremities - LLE with recent BKA.   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 No drainage. C/d/i. skin well approximated and staples present. Stage 1 sacral decubitis ulcer present. Fungal rash of the bilateral inguinal creases- some erythema noted over Patella tendon - padded with DSD  Wounds: Patient with scab of 4th digit of RLE.      IDT meeting on 12/20    SW: PH, 3STE, wife    OT:  eating set  grooming det  UBD/LBD cg/min  toileting min   tub/shower min  bathing    PT:  amb  min A  transfers min A  stairs    SLP    tentative dc 12/30 HC        Continue comprehensive acute rehab program consisting of 3hrs/day of OT/PT.

## 2023-12-27 LAB
GLUCOSE BLDC GLUCOMTR-MCNC: 142 MG/DL — HIGH (ref 70–99)
GLUCOSE BLDC GLUCOMTR-MCNC: 142 MG/DL — HIGH (ref 70–99)
GLUCOSE BLDC GLUCOMTR-MCNC: 162 MG/DL — HIGH (ref 70–99)
GLUCOSE BLDC GLUCOMTR-MCNC: 162 MG/DL — HIGH (ref 70–99)
GLUCOSE BLDC GLUCOMTR-MCNC: 184 MG/DL — HIGH (ref 70–99)
GLUCOSE BLDC GLUCOMTR-MCNC: 184 MG/DL — HIGH (ref 70–99)
GLUCOSE BLDC GLUCOMTR-MCNC: 234 MG/DL — HIGH (ref 70–99)
GLUCOSE BLDC GLUCOMTR-MCNC: 234 MG/DL — HIGH (ref 70–99)

## 2023-12-27 PROCEDURE — 99233 SBSQ HOSP IP/OBS HIGH 50: CPT | Mod: GC

## 2023-12-27 RX ADMIN — GABAPENTIN 300 MILLIGRAM(S): 400 CAPSULE ORAL at 05:48

## 2023-12-27 RX ADMIN — Medication 975 MILLIGRAM(S): at 06:35

## 2023-12-27 RX ADMIN — Medication 975 MILLIGRAM(S): at 13:03

## 2023-12-27 RX ADMIN — Medication 4 UNIT(S): at 12:25

## 2023-12-27 RX ADMIN — LINAGLIPTIN 5 MILLIGRAM(S): 5 TABLET, FILM COATED ORAL at 21:14

## 2023-12-27 RX ADMIN — Medication 1 APPLICATION(S): at 17:32

## 2023-12-27 RX ADMIN — GABAPENTIN 300 MILLIGRAM(S): 400 CAPSULE ORAL at 15:26

## 2023-12-27 RX ADMIN — Medication 975 MILLIGRAM(S): at 18:02

## 2023-12-27 RX ADMIN — Medication 2: at 08:09

## 2023-12-27 RX ADMIN — SIMVASTATIN 40 MILLIGRAM(S): 20 TABLET, FILM COATED ORAL at 21:14

## 2023-12-27 RX ADMIN — Medication 100 MILLIGRAM(S): at 05:49

## 2023-12-27 RX ADMIN — Medication 4 UNIT(S): at 17:31

## 2023-12-27 RX ADMIN — Medication 1 APPLICATION(S): at 05:49

## 2023-12-27 RX ADMIN — MAGNESIUM OXIDE 400 MG ORAL TABLET 400 MILLIGRAM(S): 241.3 TABLET ORAL at 17:33

## 2023-12-27 RX ADMIN — BIMATOPROST 1 DROP(S): 0.3 SOLUTION/ DROPS OPHTHALMIC at 21:14

## 2023-12-27 RX ADMIN — Medication 975 MILLIGRAM(S): at 22:55

## 2023-12-27 RX ADMIN — METFORMIN HYDROCHLORIDE 1000 MILLIGRAM(S): 850 TABLET ORAL at 17:31

## 2023-12-27 RX ADMIN — Medication 975 MILLIGRAM(S): at 13:33

## 2023-12-27 RX ADMIN — Medication 975 MILLIGRAM(S): at 17:32

## 2023-12-27 RX ADMIN — Medication 1 APPLICATION(S): at 17:37

## 2023-12-27 RX ADMIN — Medication 100 MILLIGRAM(S): at 17:31

## 2023-12-27 RX ADMIN — MAGNESIUM OXIDE 400 MG ORAL TABLET 400 MILLIGRAM(S): 241.3 TABLET ORAL at 13:03

## 2023-12-27 RX ADMIN — GABAPENTIN 300 MILLIGRAM(S): 400 CAPSULE ORAL at 21:13

## 2023-12-27 RX ADMIN — Medication 4 UNIT(S): at 08:09

## 2023-12-27 RX ADMIN — ENOXAPARIN SODIUM 40 MILLIGRAM(S): 100 INJECTION SUBCUTANEOUS at 05:48

## 2023-12-27 RX ADMIN — Medication 975 MILLIGRAM(S): at 05:48

## 2023-12-27 RX ADMIN — Medication 4: at 12:19

## 2023-12-27 RX ADMIN — METFORMIN HYDROCHLORIDE 1000 MILLIGRAM(S): 850 TABLET ORAL at 05:48

## 2023-12-27 RX ADMIN — MAGNESIUM OXIDE 400 MG ORAL TABLET 400 MILLIGRAM(S): 241.3 TABLET ORAL at 08:12

## 2023-12-27 RX ADMIN — Medication 975 MILLIGRAM(S): at 21:14

## 2023-12-27 RX ADMIN — INSULIN GLARGINE 24 UNIT(S): 100 INJECTION, SOLUTION SUBCUTANEOUS at 21:14

## 2023-12-27 NOTE — PROGRESS NOTE ADULT - NS ATTEND AMEND GEN_ALL_CORE FT
Rehab Attending- Patient seen and examined by me - Case discussed, above note reviewed by me with modifications made    patient seen and evaluated bedside  voiding well- on macrobid for UTI  reports refusal for insulin on Dc- will discuss with diabetic nursing  try to have nurses train in administration of Insulin  Left BKA inspected- suture line intact- sl erythema?DTI ovver patella tendon- to protect with 4x4  to continue intensive rehab program        20 additional minutes spent today on coordination of care including team conference as well as conversation with patient's wife Mabel - I updated her on plan of care- All questions were answered by me as well.    Vital Signs Last 24 Hrs  T(C): 36.4 (27 Dec 2023 08:59), Max: 36.7 (26 Dec 2023 19:32)  T(F): 97.6 (27 Dec 2023 08:59), Max: 98.1 (26 Dec 2023 19:32)  HR: 90 (27 Dec 2023 08:59) (85 - 90)  BP: 115/72 (27 Dec 2023 08:59) (115/72 - 126/72)  BP(mean): --  RR: 16 (27 Dec 2023 08:59) (16 - 16)  SpO2: 96% (27 Dec 2023 08:59) (94% - 96%)    Parameters below as of 27 Dec 2023 08:59  Patient On (Oxygen Delivery Method): room air        Physical Exam: Gen - NAD in chair  HEENT - NCAT, EOMI, steri to right lid  Neck - Supple, No limited ROM  Pulm - CTAB, No wheeze, No rhonchi, No crackles  Cardiovascular - RRR, S1S2, No murmurs  Abdomen - Soft, NT/ND, +BS  Extremities - LLE with recent BKA.   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 No drainage. C/d/i. skin well approximated and staples present. Stage 1 sacral decubitis ulcer present. erythema noted over Patella tendon - padded with DSD  Wounds: Patient with scab of 4th digit of RLE.

## 2023-12-27 NOTE — ADVANCED PRACTICE NURSE CONSULT - ASSESSMENT
BG trending above goal 75 % of time in last 24 hrs on Lantus 24 units daily, ADMELOG 4 units before meals, metfromin 1000 mg twice a day, and linagliptin 5 mg daily. Pt had 8 units of correction in past 24 hrs.

## 2023-12-27 NOTE — ADVANCED PRACTICE NURSE CONSULT - RECOMMEDATIONS
1.	BG Goal 100- 180 mg/dL  2.	Increase insulin glargine Injectable (LANTUS) from 24 to 28 Unit(s) SubCutaneous at bedtime  3.	C/W insulin lispro (ADMELOG) moderate corrective regimen sliding scale   SubCutaneous three times a day before meals  4.	C/W insulin lispro (ADMELOG) moderate corrective regimen sliding scale   SubCutaneous at bedtime  5.	C/W insulin lispro Injectable (ADMELOG) 4 Unit(s) SubCutaneous three times a day before meals  6.	C/W metFORMIN 1000 milliGRAM(s) Oral two times a day  7.	C/W Linagliptin 5 mg orally once a day by mouth    Discharge Recommendations:  1.	Tresiba flextouch [id number] units/ml take 28 units subcutaneously daily- dose per needs at time of discharge  2.	Humalog kwikpen 100 units/ml SubCutaneous 4  before meals, three times/day (on favorite list) dose per needs at time of discharge- may not need  3.	 Insulin pen needles 4 mm- marla- (on favorite list)  4.	Alcohol swabs- (on favorite list)  5.	BG meter per Insurance- (on favorite list)  6.	 BG test strips per insurance ( on favorite list)  7.	Lancets- per insurance -(on favorite list)  8.	Follow up with PCP  9.	Follow up with [facility] [address] [place] [address] and Diabetes Educator  10.	Metformin 1000 mg tablet take 1 tablets twice a day with food  11.	Linagliptin 5 mg orally once a day- may  substitute Januvia 100 mg orally daily   1.	BG Goal 100- 180 mg/dL  2.	Increase insulin glargine Injectable (LANTUS) from 24 to 28 Unit(s) SubCutaneous at bedtime  3.	C/W insulin lispro (ADMELOG) moderate corrective regimen sliding scale   SubCutaneous three times a day before meals  4.	C/W insulin lispro (ADMELOG) moderate corrective regimen sliding scale   SubCutaneous at bedtime  5.	C/W insulin lispro Injectable (ADMELOG) 4 Unit(s) SubCutaneous three times a day before meals  6.	C/W metFORMIN 1000 milliGRAM(s) Oral two times a day  7.	C/W Linagliptin 5 mg orally once a day by mouth    Discharge Recommendations:  1.	Tresiba flextouch [id number] units/ml take 28 units subcutaneously daily- dose per needs at time of discharge  2.	Humalog kwikpen 100 units/ml SubCutaneous 4  before meals, three times/day (on favorite list) dose per needs at time of discharge- may not need  3.	 Insulin pen needles 4 mm- marla- (on favorite list)  4.	Alcohol swabs- (on favorite list)  5.	BG meter per Insurance- (on favorite list)  6.	 BG test strips per insurance ( on favorite list)  7.	Lancets- per insurance -(on favorite list)  8.	Follow up with PCP  9.	Follow up with [facility] [address] [place] [address] and Diabetes Educator  10.	Metformin 1000 mg tablet take 1 tablets twice a day with food  11.	Linagliptin 5 mg orally once a day- may  substitute Januvia 100 mg orally daily

## 2023-12-27 NOTE — PROGRESS NOTE ADULT - SUBJECTIVE AND OBJECTIVE BOX
HPI:  Mr. Mcdaniel is a 63M PMHx DM, HLD, PAD, prior toe amputation, presents to [facility] ED w L foot wound. Reports wound has been present on hindfoot x 3 weeks. Started as small cut. Over last 24 hours, pt unable to ambulate d/t severe pain. Reports significant malodor. Denies subjective fevers, chills, purulent drainage, numbness/paresthesia.     In ED, patient is tachycardic to 123. BP w HTN. Febrile to 102.4 F. Labs w WBC 21. Hgb 11.4. Lac 2.4. XR L foot w soft tissue gas at lateral hindfoot. CT LLE non con w soft tissue gas tracking ~7cm above foot.   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 Patient admitted to the SICU pre and post op for hyperglycemia and insulin drip management. Endocrinology was consulted for the hyperglycemia.  Patient transferred out of the SICU when he was able to be weaned off the insulin drip. Internal medicine was consulted for malachi-operative clearance for formalization surgery. On 12/4 the patient was taken back to the operating room for a left lower extremity guillotine revision. Infectious disease was consulted for antibiotic management and recommended switching from Zosyn to Unasyn. Rehab medicine was consulted to evaluate for rehab needs and recommended acute rehab on discharge.       ROS/subjective:  Pt seen and examined in chair, no lightheadedness, off Flomax-voids freely  No acute events overnight  Lid laceration sutured with Steris-appear to heal well  Moving Bowels - formed  - no burning no urgency-on abx  Pt denies cp, palpitations, sob, abd pain, N/V, fever, chills.   pain well controlled-Tylenol/Neurontin  DM2 educator appreciated-will follow recs  stump healing well-staples in place    MEDICATIONS  (STANDING):  acetaminophen     Tablet .. 975 milliGRAM(s) Oral every 6 hours  AQUAPHOR (petrolatum Ointment) 1 Application(s) Topical two times a day  bimatoprost 0.01% Ophthalmic Solution 1 Drop(s) Both EYES at bedtime  clotrimazole 1% Cream 1 Application(s) Topical two times a day  dextrose 5%. 1000 milliLiter(s) (100 mL/Hr) IV Continuous <Continuous>  dextrose 5%. 1000 milliLiter(s) (50 mL/Hr) IV Continuous <Continuous>  dextrose 50% Injectable 25 Gram(s) IV Push once  dextrose 50% Injectable 25 Gram(s) IV Push once  dextrose 50% Injectable 12.5 Gram(s) IV Push once  enalapril 10 milliGRAM(s) Oral <User Schedule>  enoxaparin Injectable 40 milliGRAM(s) SubCutaneous every 24 hours  gabapentin 300 milliGRAM(s) Oral three times a day  glucagon  Injectable 1 milliGRAM(s) IntraMuscular once  hydrocortisone 1% Cream 1 Application(s) Topical two times a day  insulin glargine Injectable (LANTUS) 24 Unit(s) SubCutaneous at bedtime  insulin lispro (ADMELOG) corrective regimen sliding scale   SubCutaneous at bedtime  insulin lispro (ADMELOG) corrective regimen sliding scale   SubCutaneous three times a day before meals  insulin lispro Injectable (ADMELOG) 4 Unit(s) SubCutaneous three times a day before meals  linagliptin 5 milliGRAM(s) Oral at bedtime  magnesium oxide 400 milliGRAM(s) Oral three times a day with meals  metFORMIN 1000 milliGRAM(s) Oral two times a day  nitrofurantoin monohydrate/macrocrystals (MACROBID) 100 milliGRAM(s) Oral two times a day  polyethylene glycol 3350 17 Gram(s) Oral two times a day  senna 2 Tablet(s) Oral at bedtime  simvastatin 40 milliGRAM(s) Oral at bedtime  sodium chloride 0.9%. 1000 milliLiter(s) (100 mL/Hr) IV Continuous <Continuous>    MEDICATIONS  (PRN):  dextrose Oral Gel 15 Gram(s) Oral once PRN Blood Glucose LESS THAN 70 milliGRAM(s)/deciliter  guaiFENesin Oral Liquid (Sugar-Free) 100 milliGRAM(s) Oral every 6 hours PRN Cough    CAPILLARY BLOOD GLUCOSE      POCT Blood Glucose.: 184 mg/dL (27 Dec 2023 08:06)  POCT Blood Glucose.: 163 mg/dL (26 Dec 2023 21:13)  POCT Blood Glucose.: 205 mg/dL (26 Dec 2023 17:19)  POCT Blood Glucose.: 197 mg/dL (26 Dec 2023 12:21)  POCT Blood Glucose.: 207 mg/dL (26 Dec 2023 12:18)      Vital Signs Last 24 Hrs  T(C): 36.4 (27 Dec 2023 08:59), Max: 36.7 (26 Dec 2023 19:32)  T(F): 97.6 (27 Dec 2023 08:59), Max: 98.1 (26 Dec 2023 19:32)  HR: 90 (27 Dec 2023 08:59) (85 - 90)  BP: 115/72 (27 Dec 2023 08:59) (115/72 - 126/72)  BP(mean): --  RR: 16 (27 Dec 2023 08:59) (16 - 16)  SpO2: 96% (27 Dec 2023 08:59) (94% - 96%)    Parameters below as of 27 Dec 2023 08:59  Patient On (Oxygen Delivery Method): room air        Physical Exam: Gen - NAD in chair  HEENT - NCAT, EOMI, steri to right lid  Neck - Supple, No limited ROM  Pulm - CTAB, No wheeze, No rhonchi, No crackles  Cardiovascular - RRR, S1S2, No murmurs  Abdomen - Soft, NT/ND, +BS  Extremities - LLE with recent BKA.   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 No drainage. C/d/i. skin well approximated and staples present. Stage 1 sacral decubitis ulcer present. erythema noted over Patella tendon - padded with DSD  Wounds: Patient with scab of 4th digit of RLE.      IDT meeting on 12/20    SW: PH, 3STE, wife    OT:  eating set  grooming det  UBD/LBD cg/min  toileting min   tub/shower min  bathing    PT:  amb  min A  transfers min A  stairs    SLP    tentative dc 12/30 HC        Continue comprehensive acute rehab program consisting of 3hrs/day of OT/PT. HPI:  Mr. Mcdaniel is a 63M PMHx DM, HLD, PAD, prior toe amputation, presents to [facility] ED w L foot wound. Reports wound has been present on hindfoot x 3 weeks. Started as small cut. Over last 24 hours, pt unable to ambulate d/t severe pain. Reports significant malodor. Denies subjective fevers, chills, purulent drainage, numbness/paresthesia.     In ED, patient is tachycardic to 123. BP w HTN. Febrile to 102.4 F. Labs w WBC 21. Hgb 11.4. Lac 2.4. XR L foot w soft tissue gas at lateral hindfoot. CT LLE non con w soft tissue gas tracking ~7cm above foot.   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 Patient admitted to the SICU pre and post op for hyperglycemia and insulin drip management. Endocrinology was consulted for the hyperglycemia.  Patient transferred out of the SICU when he was able to be weaned off the insulin drip. Internal medicine was consulted for malachi-operative clearance for formalization surgery. On 12/4 the patient was taken back to the operating room for a left lower extremity guillotine revision. Infectious disease was consulted for antibiotic management and recommended switching from Zosyn to Unasyn. Rehab medicine was consulted to evaluate for rehab needs and recommended acute rehab on discharge.       ROS/subjective:  Pt seen and examined in chair, no lightheadedness, off Flomax-voids freely  No acute events overnight  Lid laceration sutured with Steris-appear to heal well  Moving Bowels - formed  - no burning no urgency-on abx  Pt denies cp, palpitations, sob, abd pain, N/V, fever, chills.   pain well controlled-Tylenol/Neurontin  DM2 educator appreciated-will follow recs  stump healing well-staples in place    MEDICATIONS  (STANDING):  acetaminophen     Tablet .. 975 milliGRAM(s) Oral every 6 hours  AQUAPHOR (petrolatum Ointment) 1 Application(s) Topical two times a day  bimatoprost 0.01% Ophthalmic Solution 1 Drop(s) Both EYES at bedtime  clotrimazole 1% Cream 1 Application(s) Topical two times a day  dextrose 5%. 1000 milliLiter(s) (100 mL/Hr) IV Continuous <Continuous>  dextrose 5%. 1000 milliLiter(s) (50 mL/Hr) IV Continuous <Continuous>  dextrose 50% Injectable 25 Gram(s) IV Push once  dextrose 50% Injectable 25 Gram(s) IV Push once  dextrose 50% Injectable 12.5 Gram(s) IV Push once  enalapril 10 milliGRAM(s) Oral <User Schedule>  enoxaparin Injectable 40 milliGRAM(s) SubCutaneous every 24 hours  gabapentin 300 milliGRAM(s) Oral three times a day  glucagon  Injectable 1 milliGRAM(s) IntraMuscular once  hydrocortisone 1% Cream 1 Application(s) Topical two times a day  insulin glargine Injectable (LANTUS) 24 Unit(s) SubCutaneous at bedtime  insulin lispro (ADMELOG) corrective regimen sliding scale   SubCutaneous at bedtime  insulin lispro (ADMELOG) corrective regimen sliding scale   SubCutaneous three times a day before meals  insulin lispro Injectable (ADMELOG) 4 Unit(s) SubCutaneous three times a day before meals  linagliptin 5 milliGRAM(s) Oral at bedtime  magnesium oxide 400 milliGRAM(s) Oral three times a day with meals  metFORMIN 1000 milliGRAM(s) Oral two times a day  nitrofurantoin monohydrate/macrocrystals (MACROBID) 100 milliGRAM(s) Oral two times a day  polyethylene glycol 3350 17 Gram(s) Oral two times a day  senna 2 Tablet(s) Oral at bedtime  simvastatin 40 milliGRAM(s) Oral at bedtime  sodium chloride 0.9%. 1000 milliLiter(s) (100 mL/Hr) IV Continuous <Continuous>    MEDICATIONS  (PRN):  dextrose Oral Gel 15 Gram(s) Oral once PRN Blood Glucose LESS THAN 70 milliGRAM(s)/deciliter  guaiFENesin Oral Liquid (Sugar-Free) 100 milliGRAM(s) Oral every 6 hours PRN Cough    CAPILLARY BLOOD GLUCOSE      POCT Blood Glucose.: 184 mg/dL (27 Dec 2023 08:06)  POCT Blood Glucose.: 163 mg/dL (26 Dec 2023 21:13)  POCT Blood Glucose.: 205 mg/dL (26 Dec 2023 17:19)  POCT Blood Glucose.: 197 mg/dL (26 Dec 2023 12:21)  POCT Blood Glucose.: 207 mg/dL (26 Dec 2023 12:18)      Vital Signs Last 24 Hrs  T(C): 36.4 (27 Dec 2023 08:59), Max: 36.7 (26 Dec 2023 19:32)  T(F): 97.6 (27 Dec 2023 08:59), Max: 98.1 (26 Dec 2023 19:32)  HR: 90 (27 Dec 2023 08:59) (85 - 90)  BP: 115/72 (27 Dec 2023 08:59) (115/72 - 126/72)  BP(mean): --  RR: 16 (27 Dec 2023 08:59) (16 - 16)  SpO2: 96% (27 Dec 2023 08:59) (94% - 96%)    Parameters below as of 27 Dec 2023 08:59  Patient On (Oxygen Delivery Method): room air        Physical Exam: Gen - NAD in chair  HEENT - NCAT, EOMI, steri to right lid  Neck - Supple, No limited ROM  Pulm - CTAB, No wheeze, No rhonchi, No crackles  Cardiovascular - RRR, S1S2, No murmurs  Abdomen - Soft, NT/ND, +BS  Extremities - LLE with recent BKA.   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 No drainage. C/d/i. skin well approximated and staples present. Stage 1 sacral decubitis ulcer present. erythema noted over Patella tendon - padded with DSD  Wounds: Patient with scab of 4th digit of RLE.      IDT meeting on 12/20    SW: PH, 3STE, wife    OT:  eating set  grooming det  UBD/LBD cg/min  toileting min   tub/shower min  bathing    PT:  amb  min A  transfers min A  stairs    SLP    tentative dc 12/30 HC        Continue comprehensive acute rehab program consisting of 3hrs/day of OT/PT. HPI:  Mr. Mcdaniel is a 63M PMHx DM, HLD, PAD, prior toe amputation, presents to [facility] ED w L foot wound. Reports wound has been present on hindfoot x 3 weeks. Started as small cut. Over last 24 hours, pt unable to ambulate d/t severe pain. Reports significant malodor. Denies subjective fevers, chills, purulent drainage, numbness/paresthesia.     In ED, patient is tachycardic to 123. BP w HTN. Febrile to 102.4 F. Labs w WBC 21. Hgb 11.4. Lac 2.4. XR L foot w soft tissue gas at lateral hindfoot. CT LLE non con w soft tissue gas tracking ~7cm above foot.   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 Patient admitted to the SICU pre and post op for hyperglycemia and insulin drip management. Endocrinology was consulted for the hyperglycemia.  Patient transferred out of the SICU when he was able to be weaned off the insulin drip. Internal medicine was consulted for malachi-operative clearance for formalization surgery. On 12/4 the patient was taken back to the operating room for a left lower extremity guillotine revision. Infectious disease was consulted for antibiotic management and recommended switching from Zosyn to Unasyn. Rehab medicine was consulted to evaluate for rehab needs and recommended acute rehab on discharge.       ROS/subjective:  Pt seen and examined in chair, no lightheadedness, off Flomax-voids freely  No acute events overnight  Lid laceration sutured with Steris-appear to heal well  Moving Bowels - formed  - no burning no urgency-on abx  Pt denies cp, palpitations, sob, abd pain, N/V, fever, chills.   pain well controlled-Tylenol/Neurontin  DM2 educator appreciated-will follow recs  stump healing well-staples in place    MEDICATIONS  (STANDING):  acetaminophen     Tablet .. 975 milliGRAM(s) Oral every 6 hours  AQUAPHOR (petrolatum Ointment) 1 Application(s) Topical two times a day  bimatoprost 0.01% Ophthalmic Solution 1 Drop(s) Both EYES at bedtime  clotrimazole 1% Cream 1 Application(s) Topical two times a day  dextrose 5%. 1000 milliLiter(s) (100 mL/Hr) IV Continuous <Continuous>  dextrose 5%. 1000 milliLiter(s) (50 mL/Hr) IV Continuous <Continuous>  dextrose 50% Injectable 25 Gram(s) IV Push once  dextrose 50% Injectable 25 Gram(s) IV Push once  dextrose 50% Injectable 12.5 Gram(s) IV Push once  enalapril 10 milliGRAM(s) Oral <User Schedule>  enoxaparin Injectable 40 milliGRAM(s) SubCutaneous every 24 hours  gabapentin 300 milliGRAM(s) Oral three times a day  glucagon  Injectable 1 milliGRAM(s) IntraMuscular once  hydrocortisone 1% Cream 1 Application(s) Topical two times a day  insulin glargine Injectable (LANTUS) 24 Unit(s) SubCutaneous at bedtime  insulin lispro (ADMELOG) corrective regimen sliding scale   SubCutaneous at bedtime  insulin lispro (ADMELOG) corrective regimen sliding scale   SubCutaneous three times a day before meals  insulin lispro Injectable (ADMELOG) 4 Unit(s) SubCutaneous three times a day before meals  linagliptin 5 milliGRAM(s) Oral at bedtime  magnesium oxide 400 milliGRAM(s) Oral three times a day with meals  metFORMIN 1000 milliGRAM(s) Oral two times a day  nitrofurantoin monohydrate/macrocrystals (MACROBID) 100 milliGRAM(s) Oral two times a day  polyethylene glycol 3350 17 Gram(s) Oral two times a day  senna 2 Tablet(s) Oral at bedtime  simvastatin 40 milliGRAM(s) Oral at bedtime  sodium chloride 0.9%. 1000 milliLiter(s) (100 mL/Hr) IV Continuous <Continuous>    MEDICATIONS  (PRN):  dextrose Oral Gel 15 Gram(s) Oral once PRN Blood Glucose LESS THAN 70 milliGRAM(s)/deciliter  guaiFENesin Oral Liquid (Sugar-Free) 100 milliGRAM(s) Oral every 6 hours PRN Cough    CAPILLARY BLOOD GLUCOSE      POCT Blood Glucose.: 184 mg/dL (27 Dec 2023 08:06)  POCT Blood Glucose.: 163 mg/dL (26 Dec 2023 21:13)  POCT Blood Glucose.: 205 mg/dL (26 Dec 2023 17:19)  POCT Blood Glucose.: 197 mg/dL (26 Dec 2023 12:21)  POCT Blood Glucose.: 207 mg/dL (26 Dec 2023 12:18)      Vital Signs Last 24 Hrs  T(C): 36.4 (27 Dec 2023 08:59), Max: 36.7 (26 Dec 2023 19:32)  T(F): 97.6 (27 Dec 2023 08:59), Max: 98.1 (26 Dec 2023 19:32)  HR: 90 (27 Dec 2023 08:59) (85 - 90)  BP: 115/72 (27 Dec 2023 08:59) (115/72 - 126/72)  BP(mean): --  RR: 16 (27 Dec 2023 08:59) (16 - 16)  SpO2: 96% (27 Dec 2023 08:59) (94% - 96%)    Parameters below as of 27 Dec 2023 08:59  Patient On (Oxygen Delivery Method): room air        Physical Exam: Gen - NAD in chair  HEENT - NCAT, EOMI, steri to right lid  Neck - Supple, No limited ROM  Pulm - CTAB, No wheeze, No rhonchi, No crackles  Cardiovascular - RRR, S1S2, No murmurs  Abdomen - Soft, NT/ND, +BS  Extremities - LLE with recent BKA.   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 No drainage. C/d/i. skin well approximated and staples present. Stage 1 sacral decubitis ulcer present. erythema noted over Patella tendon - padded with DSD  Wounds: Patient with scab of 4th digit of RLE.      IDT meeting on 12/27    SW: PH, 3STE, wife    OT:  eating set  grooming det  UBD/LBD cg/min  toileting min /cg  tub/shower min/cg  bathing cg    PT:  amb cs  transfers cg  stairs unable    SLP    tentative dc 12/30 HC        Continue comprehensive acute rehab program consisting of 3hrs/day of OT/PT. HPI:  Mr. Mcdaniel is a 63M PMHx DM, HLD, PAD, prior toe amputation, presents to [facility] ED w L foot wound. Reports wound has been present on hindfoot x 3 weeks. Started as small cut. Over last 24 hours, pt unable to ambulate d/t severe pain. Reports significant malodor. Denies subjective fevers, chills, purulent drainage, numbness/paresthesia.     In ED, patient is tachycardic to 123. BP w HTN. Febrile to 102.4 F. Labs w WBC 21. Hgb 11.4. Lac 2.4. XR L foot w soft tissue gas at lateral hindfoot. CT LLE non con w soft tissue gas tracking ~7cm above foot.   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 Patient admitted to the SICU pre and post op for hyperglycemia and insulin drip management. Endocrinology was consulted for the hyperglycemia.  Patient transferred out of the SICU when he was able to be weaned off the insulin drip. Internal medicine was consulted for malachi-operative clearance for formalization surgery. On 12/4 the patient was taken back to the operating room for a left lower extremity guillotine revision. Infectious disease was consulted for antibiotic management and recommended switching from Zosyn to Unasyn. Rehab medicine was consulted to evaluate for rehab needs and recommended acute rehab on discharge.       ROS/subjective:  Pt seen and examined in chair, no lightheadedness, off Flomax-voids freely  No acute events overnight  Lid laceration sutured with Steris-appear to heal well  Moving Bowels - formed  - no burning no urgency-on abx  Pt denies cp, palpitations, sob, abd pain, N/V, fever, chills.   pain well controlled-Tylenol/Neurontin  DM2 educator appreciated-will follow recs  stump healing well-staples in place    MEDICATIONS  (STANDING):  acetaminophen     Tablet .. 975 milliGRAM(s) Oral every 6 hours  AQUAPHOR (petrolatum Ointment) 1 Application(s) Topical two times a day  bimatoprost 0.01% Ophthalmic Solution 1 Drop(s) Both EYES at bedtime  clotrimazole 1% Cream 1 Application(s) Topical two times a day  dextrose 5%. 1000 milliLiter(s) (100 mL/Hr) IV Continuous <Continuous>  dextrose 5%. 1000 milliLiter(s) (50 mL/Hr) IV Continuous <Continuous>  dextrose 50% Injectable 25 Gram(s) IV Push once  dextrose 50% Injectable 25 Gram(s) IV Push once  dextrose 50% Injectable 12.5 Gram(s) IV Push once  enalapril 10 milliGRAM(s) Oral <User Schedule>  enoxaparin Injectable 40 milliGRAM(s) SubCutaneous every 24 hours  gabapentin 300 milliGRAM(s) Oral three times a day  glucagon  Injectable 1 milliGRAM(s) IntraMuscular once  hydrocortisone 1% Cream 1 Application(s) Topical two times a day  insulin glargine Injectable (LANTUS) 24 Unit(s) SubCutaneous at bedtime  insulin lispro (ADMELOG) corrective regimen sliding scale   SubCutaneous at bedtime  insulin lispro (ADMELOG) corrective regimen sliding scale   SubCutaneous three times a day before meals  insulin lispro Injectable (ADMELOG) 4 Unit(s) SubCutaneous three times a day before meals  linagliptin 5 milliGRAM(s) Oral at bedtime  magnesium oxide 400 milliGRAM(s) Oral three times a day with meals  metFORMIN 1000 milliGRAM(s) Oral two times a day  nitrofurantoin monohydrate/macrocrystals (MACROBID) 100 milliGRAM(s) Oral two times a day  polyethylene glycol 3350 17 Gram(s) Oral two times a day  senna 2 Tablet(s) Oral at bedtime  simvastatin 40 milliGRAM(s) Oral at bedtime  sodium chloride 0.9%. 1000 milliLiter(s) (100 mL/Hr) IV Continuous <Continuous>    MEDICATIONS  (PRN):  dextrose Oral Gel 15 Gram(s) Oral once PRN Blood Glucose LESS THAN 70 milliGRAM(s)/deciliter  guaiFENesin Oral Liquid (Sugar-Free) 100 milliGRAM(s) Oral every 6 hours PRN Cough    CAPILLARY BLOOD GLUCOSE      POCT Blood Glucose.: 184 mg/dL (27 Dec 2023 08:06)  POCT Blood Glucose.: 163 mg/dL (26 Dec 2023 21:13)  POCT Blood Glucose.: 205 mg/dL (26 Dec 2023 17:19)  POCT Blood Glucose.: 197 mg/dL (26 Dec 2023 12:21)  POCT Blood Glucose.: 207 mg/dL (26 Dec 2023 12:18)      Vital Signs Last 24 Hrs  T(C): 36.4 (27 Dec 2023 08:59), Max: 36.7 (26 Dec 2023 19:32)  T(F): 97.6 (27 Dec 2023 08:59), Max: 98.1 (26 Dec 2023 19:32)  HR: 90 (27 Dec 2023 08:59) (85 - 90)  BP: 115/72 (27 Dec 2023 08:59) (115/72 - 126/72)  BP(mean): --  RR: 16 (27 Dec 2023 08:59) (16 - 16)  SpO2: 96% (27 Dec 2023 08:59) (94% - 96%)    Parameters below as of 27 Dec 2023 08:59  Patient On (Oxygen Delivery Method): room air        Physical Exam: Gen - NAD in chair  HEENT - NCAT, EOMI, steri to right lid  Neck - Supple, No limited ROM  Pulm - CTAB, No wheeze, No rhonchi, No crackles  Cardiovascular - RRR, S1S2, No murmurs  Abdomen - Soft, NT/ND, +BS  Extremities - LLE with recent BKA.   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 No drainage. C/d/i. skin well approximated and staples present. Stage 1 sacral decubitis ulcer present. erythema noted over Patella tendon - padded with DSD  Wounds: Patient with scab of 4th digit of RLE.      IDT meeting on 12/27    SW: PH, 3STE, wife    OT:  eating set  grooming det  UBD/LBD cg/min  toileting min /cg  tub/shower min/cg  bathing cg    PT:  amb cs  transfers cg  stairs unable    SLP    tentative dc 12/30 HC        Continue comprehensive acute rehab program consisting of 3hrs/day of OT/PT.

## 2023-12-27 NOTE — PROGRESS NOTE ADULT - ASSESSMENT
ELVIS VILLATORO is a 63y with a history of diabetes mellitus type 2, HTN, dyslipidemia, retinopathy who presented to [facility] on 11/29/23  with complaint of non-healing left foot wound and found to have osteomyelitis. Hospital course complicated by hyperglycemia and need for surgical revision. Now admitted to [facility] after for initiation of a multidisciplinary rehab program consisting focused on functional mobility, transfers and ADLs.     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   - s/p revision on 12/4 and formalization 12/12.   Comprehensive Multidisciplinary Rehab Program:  comprehensive rehab program, PT/OT/SLP 3 hours a day, 5 days a week.  - NWB left lower extremity  - Precautions: falls  - completed Unasyn per ID at previous hospital- on PO abx s/p laceration of eye lid/+UTI. Afebrile, no leukocytosis   - s/p fall in OT-[facility] follow up neg new findings  -plastics appreciated-lid healing well      #UTI  - abx per CS  -leukocytosis improved, afebrile, PVRs low  -monitor closely    HLD  -Simvastatin    Diabetes mellitus type 2  -ISS  - Home meds: metformin 1000mg BID, glipizide   - Lantus  - Admelog TID  - hospitalist in  Diabetic nursing appreciated-Home on Tresiba, Tradjenta and Metformin    HTN  -enalapril increased back to 10mg daily-by hospitalist   -will hold Flomax for low BP/hx syncope/orthostasis    Urinary Retention  -Tamsulosin on hold  + UA - C&S+ on abx, Leukocytosis- now improved  Hospitalist in    Pain Management:  - Tylenol q6h  - oxy5mg, 10mg mod and severe pain-dc  - gabapentin 300mg TID    GI/Bowel:  - patient reports regular BMs  - Senna QHS, Miralax Daily    Skin/Pressure Injury:   - At risk for pressure injury due to neurologic diagnosis and relative immobility.  - Skin assessment on admission: stage 1 sacral ulcer, tinea cruris present, scab of 4th digit R foot  - barrier cream for sacral wound  - butenafine daily for jock itch  - Monitor Incisions: LLE residual stump with sutures  - Daily dressing changes  - aquaphor for dry R foot.    Diet:  - Consistent Carb  - Nutrition consult for assessment and recs    DVT ppx:  - Lovenox daily dvt ppx  ---------------  Code Status/Emergency Contact:    Outpatient Follow-up (Specialty/Name of physician):  Jose Francisco Dean  Vascular Surgery  [address] [place] [address], [id number]  [place] [address]  Phone: [phone]  Fax: [phone]  Follow Up Time: 2 weeks  [facility]  Endocrinology  [address] [place] [address]  Phone: [phone]  Fax:   Follow Up Time: 2 weeks    [facility] [place]  Urology  [address] [place] [address]  Phone: [phone]  Follow Up Time: 1 week    Eduin Gutierrez MD  Attending Physician  [facility]  Division of Infectous Diseases  [phone]    Endocrine faculty practice.   [address] [place] [address]. Phone .   ELVIS VILLATORO is a 63y with a history of diabetes mellitus type 2, HTN, dyslipidemia, retinopathy who presented to [facility] on 11/29/23  with complaint of non-healing left foot wound and found to have osteomyelitis. Hospital course complicated by hyperglycemia and need for surgical revision. Now admitted to [facility] after for initiation of a multidisciplinary rehab program consisting focused on functional mobility, transfers and ADLs.     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   - s/p revision on 12/4 and formalization 12/12.   Comprehensive Multidisciplinary Rehab Program:  comprehensive rehab program, PT/OT/SLP 3 hours a day, 5 days a week.  - NWB left lower extremity  - Precautions: falls  - completed Unasyn per ID at previous hospital- on PO abx s/p laceration of eye lid/+UTI. Afebrile, no leukocytosis   - s/p fall in OT-[facility] follow up neg new findings  -plastics appreciated-lid healing well      #UTI  - abx per CS  -leukocytosis improved, afebrile, PVRs low  -monitor closely    HLD  -Simvastatin    Diabetes mellitus type 2  -ISS  - Home meds: metformin 1000mg BID, glipizide   - Lantus  - Admelog TID  - hospitalist in  Diabetic nursing appreciated-Home on Tresiba, Tradjenta and Metformin    HTN  -enalapril increased back to 10mg daily-by hospitalist   -will hold Flomax for low BP/hx syncope/orthostasis    Urinary Retention  -Tamsulosin on hold  + UA - C&S+ on abx, Leukocytosis- now improved  Hospitalist in    Pain Management:  - Tylenol q6h  - oxy5mg, 10mg mod and severe pain-dc  - gabapentin 300mg TID    GI/Bowel:  - patient reports regular BMs  - Senna QHS, Miralax Daily    Skin/Pressure Injury:   - At risk for pressure injury due to neurologic diagnosis and relative immobility.  - Skin assessment on admission: stage 1 sacral ulcer, tinea cruris present, scab of 4th digit R foot  - barrier cream for sacral wound  - butenafine daily for jock itch  - Monitor Incisions: LLE residual stump with sutures  - Daily dressing changes  - aquaphor for dry R foot.    Diet:  - Consistent Carb  - Nutrition consult for assessment and recs    DVT ppx:  - Lovenox daily dvt ppx  ---------------  Code Status/Emergency Contact:    Outpatient Follow-up (Specialty/Name of physician):  Jose Francisco Dean  Vascular Surgery  [address] [place] [address], [id number]  [place] [address]  Phone: [phone]  Fax: [phone]  Follow Up Time: 2 weeks  [facility] Endocr[facility]  Endocrinology  [address] [place] [address]  Phone: [phone]  Fax:   Follow Up Time: 2 weeks    [facility] [place]  Urology  [address] [place] [address]  Phone: [phone]  Follow Up Time: 1 week    Eduin Gutierrez MD  Attending Physician  [facility]  Division of Infectous Diseases  [phone]    Endocrine faculty practice.   [address] [place] [address]. Phone .

## 2023-12-28 ENCOUNTER — TRANSCRIPTION ENCOUNTER (OUTPATIENT)
Age: 63
End: 2023-12-28

## 2023-12-28 LAB
ALBUMIN SERPL ELPH-MCNC: 2.5 G/DL — LOW (ref 3.3–5)
ALBUMIN SERPL ELPH-MCNC: 2.5 G/DL — LOW (ref 3.3–5)
ALP SERPL-CCNC: 120 U/L — SIGNIFICANT CHANGE UP (ref 40–120)
ALP SERPL-CCNC: 120 U/L — SIGNIFICANT CHANGE UP (ref 40–120)
ALT FLD-CCNC: 85 U/L — HIGH (ref 10–45)
ALT FLD-CCNC: 85 U/L — HIGH (ref 10–45)
ANION GAP SERPL CALC-SCNC: 8 MMOL/L — SIGNIFICANT CHANGE UP (ref 5–17)
ANION GAP SERPL CALC-SCNC: 8 MMOL/L — SIGNIFICANT CHANGE UP (ref 5–17)
AST SERPL-CCNC: 44 U/L — HIGH (ref 10–40)
AST SERPL-CCNC: 44 U/L — HIGH (ref 10–40)
BILIRUB SERPL-MCNC: 0.2 MG/DL — SIGNIFICANT CHANGE UP (ref 0.2–1.2)
BILIRUB SERPL-MCNC: 0.2 MG/DL — SIGNIFICANT CHANGE UP (ref 0.2–1.2)
BUN SERPL-MCNC: 18 MG/DL — SIGNIFICANT CHANGE UP (ref 7–23)
BUN SERPL-MCNC: 18 MG/DL — SIGNIFICANT CHANGE UP (ref 7–23)
CALCIUM SERPL-MCNC: 9.2 MG/DL — SIGNIFICANT CHANGE UP (ref 8.4–10.5)
CALCIUM SERPL-MCNC: 9.2 MG/DL — SIGNIFICANT CHANGE UP (ref 8.4–10.5)
CHLORIDE SERPL-SCNC: 103 MMOL/L — SIGNIFICANT CHANGE UP (ref 96–108)
CHLORIDE SERPL-SCNC: 103 MMOL/L — SIGNIFICANT CHANGE UP (ref 96–108)
CO2 SERPL-SCNC: 28 MMOL/L — SIGNIFICANT CHANGE UP (ref 22–31)
CO2 SERPL-SCNC: 28 MMOL/L — SIGNIFICANT CHANGE UP (ref 22–31)
CREAT SERPL-MCNC: 0.89 MG/DL — SIGNIFICANT CHANGE UP (ref 0.5–1.3)
CREAT SERPL-MCNC: 0.89 MG/DL — SIGNIFICANT CHANGE UP (ref 0.5–1.3)
EGFR: 96 ML/MIN/1.73M2 — SIGNIFICANT CHANGE UP
EGFR: 96 ML/MIN/1.73M2 — SIGNIFICANT CHANGE UP
GLUCOSE BLDC GLUCOMTR-MCNC: 155 MG/DL — HIGH (ref 70–99)
GLUCOSE BLDC GLUCOMTR-MCNC: 155 MG/DL — HIGH (ref 70–99)
GLUCOSE BLDC GLUCOMTR-MCNC: 168 MG/DL — HIGH (ref 70–99)
GLUCOSE BLDC GLUCOMTR-MCNC: 168 MG/DL — HIGH (ref 70–99)
GLUCOSE BLDC GLUCOMTR-MCNC: 185 MG/DL — HIGH (ref 70–99)
GLUCOSE BLDC GLUCOMTR-MCNC: 185 MG/DL — HIGH (ref 70–99)
GLUCOSE BLDC GLUCOMTR-MCNC: 202 MG/DL — HIGH (ref 70–99)
GLUCOSE BLDC GLUCOMTR-MCNC: 202 MG/DL — HIGH (ref 70–99)
GLUCOSE SERPL-MCNC: 165 MG/DL — HIGH (ref 70–99)
GLUCOSE SERPL-MCNC: 165 MG/DL — HIGH (ref 70–99)
HCT VFR BLD CALC: 36.2 % — LOW (ref 39–50)
HCT VFR BLD CALC: 36.2 % — LOW (ref 39–50)
HGB BLD-MCNC: 11.2 G/DL — LOW (ref 13–17)
HGB BLD-MCNC: 11.2 G/DL — LOW (ref 13–17)
MCHC RBC-ENTMCNC: 25.6 PG — LOW (ref 27–34)
MCHC RBC-ENTMCNC: 25.6 PG — LOW (ref 27–34)
MCHC RBC-ENTMCNC: 30.9 GM/DL — LOW (ref 32–36)
MCHC RBC-ENTMCNC: 30.9 GM/DL — LOW (ref 32–36)
MCV RBC AUTO: 82.6 FL — SIGNIFICANT CHANGE UP (ref 80–100)
MCV RBC AUTO: 82.6 FL — SIGNIFICANT CHANGE UP (ref 80–100)
NRBC # BLD: 0 /100 WBCS — SIGNIFICANT CHANGE UP (ref 0–0)
NRBC # BLD: 0 /100 WBCS — SIGNIFICANT CHANGE UP (ref 0–0)
PLATELET # BLD AUTO: 410 K/UL — HIGH (ref 150–400)
PLATELET # BLD AUTO: 410 K/UL — HIGH (ref 150–400)
POTASSIUM SERPL-MCNC: 5.2 MMOL/L — SIGNIFICANT CHANGE UP (ref 3.5–5.3)
POTASSIUM SERPL-MCNC: 5.2 MMOL/L — SIGNIFICANT CHANGE UP (ref 3.5–5.3)
POTASSIUM SERPL-SCNC: 5.2 MMOL/L — SIGNIFICANT CHANGE UP (ref 3.5–5.3)
POTASSIUM SERPL-SCNC: 5.2 MMOL/L — SIGNIFICANT CHANGE UP (ref 3.5–5.3)
PROT SERPL-MCNC: 7.5 G/DL — SIGNIFICANT CHANGE UP (ref 6–8.3)
PROT SERPL-MCNC: 7.5 G/DL — SIGNIFICANT CHANGE UP (ref 6–8.3)
RBC # BLD: 4.38 M/UL — SIGNIFICANT CHANGE UP (ref 4.2–5.8)
RBC # BLD: 4.38 M/UL — SIGNIFICANT CHANGE UP (ref 4.2–5.8)
RBC # FLD: 16.1 % — HIGH (ref 10.3–14.5)
RBC # FLD: 16.1 % — HIGH (ref 10.3–14.5)
SODIUM SERPL-SCNC: 139 MMOL/L — SIGNIFICANT CHANGE UP (ref 135–145)
SODIUM SERPL-SCNC: 139 MMOL/L — SIGNIFICANT CHANGE UP (ref 135–145)
WBC # BLD: 9.91 K/UL — SIGNIFICANT CHANGE UP (ref 3.8–10.5)
WBC # BLD: 9.91 K/UL — SIGNIFICANT CHANGE UP (ref 3.8–10.5)
WBC # FLD AUTO: 9.91 K/UL — SIGNIFICANT CHANGE UP (ref 3.8–10.5)
WBC # FLD AUTO: 9.91 K/UL — SIGNIFICANT CHANGE UP (ref 3.8–10.5)

## 2023-12-28 PROCEDURE — 99232 SBSQ HOSP IP/OBS MODERATE 35: CPT | Mod: GC

## 2023-12-28 PROCEDURE — 99232 SBSQ HOSP IP/OBS MODERATE 35: CPT

## 2023-12-28 RX ORDER — ACETAMINOPHEN 500 MG
650 TABLET ORAL EVERY 6 HOURS
Refills: 0 | Status: DISCONTINUED | OUTPATIENT
Start: 2023-12-28 | End: 2023-12-30

## 2023-12-28 RX ORDER — INSULIN GLARGINE 100 [IU]/ML
28 INJECTION, SOLUTION SUBCUTANEOUS AT BEDTIME
Refills: 0 | Status: DISCONTINUED | OUTPATIENT
Start: 2023-12-28 | End: 2023-12-30

## 2023-12-28 RX ORDER — MAGNESIUM OXIDE 400 MG ORAL TABLET 241.3 MG
1 TABLET ORAL
Qty: 60 | Refills: 0
Start: 2023-12-28 | End: 2024-01-26

## 2023-12-28 RX ORDER — INSULIN DEGLUDEC 100 U/ML
28 INJECTION, SOLUTION SUBCUTANEOUS
Qty: 1 | Refills: 0
Start: 2023-12-28 | End: 2024-01-26

## 2023-12-28 RX ORDER — BIMATOPROST 0.3 MG/ML
1 SOLUTION/ DROPS OPHTHALMIC
Qty: 0 | Refills: 0 | DISCHARGE
Start: 2023-12-28

## 2023-12-28 RX ORDER — GABAPENTIN 400 MG/1
1 CAPSULE ORAL
Qty: 90 | Refills: 0
Start: 2023-12-28 | End: 2024-01-26

## 2023-12-28 RX ORDER — ACETAMINOPHEN 500 MG
2 TABLET ORAL
Qty: 0 | Refills: 0 | DISCHARGE
Start: 2023-12-28

## 2023-12-28 RX ORDER — SIMVASTATIN 20 MG/1
1 TABLET, FILM COATED ORAL
Qty: 30 | Refills: 0
Start: 2023-12-28 | End: 2024-01-26

## 2023-12-28 RX ORDER — METFORMIN HYDROCHLORIDE 850 MG/1
1 TABLET ORAL
Qty: 60 | Refills: 0
Start: 2023-12-28 | End: 2024-01-26

## 2023-12-28 RX ORDER — LINAGLIPTIN 5 MG/1
1 TABLET, FILM COATED ORAL
Qty: 30 | Refills: 0
Start: 2023-12-28 | End: 2024-01-26

## 2023-12-28 RX ORDER — ISOPROPYL ALCOHOL, BENZOCAINE .7; .06 ML/ML; ML/ML
0 SWAB TOPICAL
Qty: 100 | Refills: 1
Start: 2023-12-28

## 2023-12-28 RX ADMIN — ENOXAPARIN SODIUM 40 MILLIGRAM(S): 100 INJECTION SUBCUTANEOUS at 06:21

## 2023-12-28 RX ADMIN — Medication 1 APPLICATION(S): at 17:55

## 2023-12-28 RX ADMIN — Medication 1 APPLICATION(S): at 06:24

## 2023-12-28 RX ADMIN — Medication 10 MILLIGRAM(S): at 12:22

## 2023-12-28 RX ADMIN — Medication 4 UNIT(S): at 08:42

## 2023-12-28 RX ADMIN — MAGNESIUM OXIDE 400 MG ORAL TABLET 400 MILLIGRAM(S): 241.3 TABLET ORAL at 17:53

## 2023-12-28 RX ADMIN — INSULIN GLARGINE 28 UNIT(S): 100 INJECTION, SOLUTION SUBCUTANEOUS at 21:23

## 2023-12-28 RX ADMIN — GABAPENTIN 300 MILLIGRAM(S): 400 CAPSULE ORAL at 06:21

## 2023-12-28 RX ADMIN — MAGNESIUM OXIDE 400 MG ORAL TABLET 400 MILLIGRAM(S): 241.3 TABLET ORAL at 12:22

## 2023-12-28 RX ADMIN — Medication 975 MILLIGRAM(S): at 07:11

## 2023-12-28 RX ADMIN — Medication 975 MILLIGRAM(S): at 13:40

## 2023-12-28 RX ADMIN — Medication 2: at 17:50

## 2023-12-28 RX ADMIN — Medication 4 UNIT(S): at 17:51

## 2023-12-28 RX ADMIN — SIMVASTATIN 40 MILLIGRAM(S): 20 TABLET, FILM COATED ORAL at 21:23

## 2023-12-28 RX ADMIN — METFORMIN HYDROCHLORIDE 1000 MILLIGRAM(S): 850 TABLET ORAL at 17:53

## 2023-12-28 RX ADMIN — LINAGLIPTIN 5 MILLIGRAM(S): 5 TABLET, FILM COATED ORAL at 21:23

## 2023-12-28 RX ADMIN — Medication 100 MILLIGRAM(S): at 17:52

## 2023-12-28 RX ADMIN — MAGNESIUM OXIDE 400 MG ORAL TABLET 400 MILLIGRAM(S): 241.3 TABLET ORAL at 08:45

## 2023-12-28 RX ADMIN — Medication 100 MILLIGRAM(S): at 06:21

## 2023-12-28 RX ADMIN — GABAPENTIN 300 MILLIGRAM(S): 400 CAPSULE ORAL at 14:42

## 2023-12-28 RX ADMIN — Medication 2: at 08:43

## 2023-12-28 RX ADMIN — Medication 975 MILLIGRAM(S): at 12:23

## 2023-12-28 RX ADMIN — BIMATOPROST 1 DROP(S): 0.3 SOLUTION/ DROPS OPHTHALMIC at 21:23

## 2023-12-28 RX ADMIN — Medication 4 UNIT(S): at 12:27

## 2023-12-28 RX ADMIN — GABAPENTIN 300 MILLIGRAM(S): 400 CAPSULE ORAL at 21:23

## 2023-12-28 RX ADMIN — Medication 2: at 12:26

## 2023-12-28 RX ADMIN — METFORMIN HYDROCHLORIDE 1000 MILLIGRAM(S): 850 TABLET ORAL at 06:21

## 2023-12-28 RX ADMIN — Medication 975 MILLIGRAM(S): at 06:21

## 2023-12-28 NOTE — PROGRESS NOTE ADULT - ASSESSMENT
ELVIS VILLATORO is a 63y with a history of diabetes mellitus type 2, HTN, dyslipidemia, retinopathy who presented to [facility] on 11/29/23  with complaint of non-healing left foot wound and found to have osteomyelitis. Hospital course complicated by hyperglycemia and need for surgical revision. Now admitted to [facility] after for initiation of a multidisciplinary rehab program consisting focused on functional mobility, transfers and ADLs- pt/ot/dv tppx    #UTI  - UA reviewed from 12/21, placed on nitrofurantoin for +Ecoli since 12/24  - continue for total 7 days until 12/31.     # leucocytosis - resolved.  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  -echo on 12/18: normal LV and RV functions.   - encouarged PO hydration    HLD  -Simvastatin    Diabetes mellitus type 2 with hyperglycemia   - increased lantus from 24 to 28 units per diabetic educatior discussion. continue ISS and lispro 4 units TID before meals.   - continue Metformin 1000 mg BID  - Linagplin 5 mg daily  - monitor FSBS and adjust insulin. watch for hypoglycemia     VTE ppx   Lovenox     ELVIS VILLATORO is a 63y with a history of diabetes mellitus type 2, HTN, dyslipidemia, retinopathy who presented to [facility] on 11/29/23  with complaint of non-healing left foot wound and found to have osteomyelitis. Hospital course complicated by hyperglycemia and need for surgical revision. Now admitted to [facility] after for initiation of a multidisciplinary rehab program consisting focused on functional mobility, transfers and ADLs- pt/ot/dv tppx    #UTI  - UA reviewed from 12/21, placed on nitrofurantoin for +Ecoli since 12/24  - continue for total 7 days until 12/31.     # leucocytosis - resolved.  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  -echo on 12/18: normal LV and RV functions.   - encouarged PO hydration    HLD  -Simvastatin    Diabetes mellitus type 2 with hyperglycemia   - increased lantus from 24 to 28 units per diabetic educatior discussion. continue ISS and lispro 4 units TID before meals.   - continue Metformin 1000 mg BID  - Linagplin 5 mg daily  - monitor FSBS and adjust insulin. watch for hypoglycemia     VTE ppx   Lovenox

## 2023-12-28 NOTE — PROGRESS NOTE ADULT - NS ATTEND AMEND GEN_ALL_CORE FT
Rehab Attending- Patient seen and examined by me - Case discussed, above note reviewed by me with modifications made    patient seen and evaluated bedside  voiding well- on macrobid for UTI  reports refusal for insulin on Dc- will discuss with diabetic nursing  try to have nurses train in administration of Insulin  Left BKA inspected- suture line intact- sl erythema?DTI ovver patella tendon- to protect with 4x4  to continue intensive rehab program        20 additional minutes spent today on coordination of care including team conference as well as conversation with patient's wife Mabel - I updated her on plan of care- All questions were answered by me as well.    Vital Signs Last 24 Hrs  T(C): 36.4 (27 Dec 2023 08:59), Max: 36.7 (26 Dec 2023 19:32)  T(F): 97.6 (27 Dec 2023 08:59), Max: 98.1 (26 Dec 2023 19:32)  HR: 90 (27 Dec 2023 08:59) (85 - 90)  BP: 115/72 (27 Dec 2023 08:59) (115/72 - 126/72)  BP(mean): --  RR: 16 (27 Dec 2023 08:59) (16 - 16)  SpO2: 96% (27 Dec 2023 08:59) (94% - 96%)    Parameters below as of 27 Dec 2023 08:59  Patient On (Oxygen Delivery Method): room air        Physical Exam: Gen - NAD in chair  HEENT - NCAT, EOMI, steri to right lid  Neck - Supple, No limited ROM  Pulm - CTAB, No wheeze, No rhonchi, No crackles  Cardiovascular - RRR, S1S2, No murmurs  Abdomen - Soft, NT/ND, +BS  Extremities - LLE with recent BKA.   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 No drainage. C/d/i. skin well approximated and staples present. Stage 1 sacral decubitis ulcer present. erythema noted over Patella tendon - padded with DSD  Wounds: Patient with scab of 4th digit of RLE. Rehab Attending- Patient seen and examined by me - Case discussed, above note reviewed by me with modifications made    patient seen and evaluated bedside  voiding well- on macrobid for UTI till DC  to train wife for Tresiba administration  labs reviewed by me- stable  Left BKA inspected- suture line intact- less erythema?DTI over patella tendon- to protect with 4x4  to continue intensive rehab program    Vital Signs Last 24 Hrs  T(C): 36.8 (28 Dec 2023 09:03), Max: 36.8 (27 Dec 2023 19:39)  T(F): 98.2 (28 Dec 2023 09:03), Max: 98.3 (27 Dec 2023 19:39)  HR: 93 (28 Dec 2023 12:20) (87 - 93)  BP: 133/85 (28 Dec 2023 12:20) (133/85 - 151/91)  BP(mean): --  RR: 16 (28 Dec 2023 09:03) (16 - 16)  SpO2: 95% (28 Dec 2023 09:03) (95% - 96%)    Parameters below as of 28 Dec 2023 09:03  Patient On (Oxygen Delivery Method): room air      Physical Exam: Gen - NAD in chair  HEENT - NCAT, EOMI, steri to right lid  Neck - Supple, No limited ROM  Pulm - CTAB, No wheeze, No rhonchi, No crackles  Cardiovascular - RRR, S1S2, No murmurs  Abdomen - Soft, NT/ND, +BS  Extremities - LLE with recent BKA.   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 No drainage. C/d/i. skin well approximated and staples present. Stage 1 sacral decubitis ulcer present. erythema noted over Patella tendon - padded with DSD  Wounds: Patient with scab of 4th digit of RLE.

## 2023-12-28 NOTE — DISCHARGE NOTE PROVIDER - NSDCCPCAREPLAN_GEN_ALL_CORE_FT
PRINCIPAL DISCHARGE DIAGNOSIS  Diagnosis: S/P BKA (below knee amputation), left  Assessment and Plan of Treatment:       SECONDARY DISCHARGE DIAGNOSES  Diagnosis: DM2 (diabetes mellitus, type 2)  Assessment and Plan of Treatment:     Diagnosis: HTN (hypertension)  Assessment and Plan of Treatment:      PRINCIPAL DISCHARGE DIAGNOSIS  Diagnosis: S/P BKA (below knee amputation), left  Assessment and Plan of Treatment: follow with vascular next week to take out staples at amputation site and clear for prsthetic prescrition  ACE wrap BKA daily- keep immobilizer on  in bed for protection      SECONDARY DISCHARGE DIAGNOSES  Diagnosis: DM2 (diabetes mellitus, type 2)  Assessment and Plan of Treatment: follow with primary medical doctor to adjust regimen    Diagnosis: HTN (hypertension)  Assessment and Plan of Treatment: follow BPs - Primary med doctor to adjust meds    Diagnosis: Hypomagnesemia  Assessment and Plan of Treatment: continue magnesium supplementation as ordered- follow with your primary med doctor to check magnesium levels

## 2023-12-28 NOTE — DISCHARGE NOTE PROVIDER - CARE PROVIDER_API CALL
Jose Francisco Dean  Vascular Surgery  [address] [facility], [address]  [place] [address]  Phone: [phone]  Fax: [phone]  Follow Up Time:     Hector Lopez  Physical/Rehab Medicine  [address] [place] [address]  Phone: [phone]  Fax: [phone]  Follow Up Time:    Jose Francisco Dean  Vascular Surgery  [address] [facility], [address]  [place] [address]  Phone: [phone]  Fax: [phone]  Follow Up Time:     Hector Lopez  Physical/Rehab Medicine  [address] [place] [address]  Phone: [phone]  Fax: [phone]  Follow Up Time:

## 2023-12-28 NOTE — PROGRESS NOTE ADULT - SUBJECTIVE AND OBJECTIVE BOX
HPI:  Mr. Mcdaniel is a 63M PMHx DM, HLD, PAD, prior toe amputation, presents to [facility] ED w L foot wound. Reports wound has been present on hindfoot x 3 weeks. Started as small cut. Over last 24 hours, pt unable to ambulate d/t severe pain. Reports significant malodor. Denies subjective fevers, chills, purulent drainage, numbness/paresthesia.     In ED, patient is tachycardic to 123. BP w HTN. Febrile to 102.4 F. Labs w WBC 21. Hgb 11.4. Lac 2.4. XR L foot w soft tissue gas at lateral hindfoot. CT LLE non con w soft tissue gas tracking ~7cm above foot.   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 Patient admitted to the SICU pre and post op for hyperglycemia and insulin drip management. Endocrinology was consulted for the hyperglycemia.  Patient transferred out of the SICU when he was able to be weaned off the insulin drip. Internal medicine was consulted for malachi-operative clearance for formalization surgery. On 12/4 the patient was taken back to the operating room for a left lower extremity guillotine revision. Infectious disease was consulted for antibiotic management and recommended switching from Zosyn to Unasyn. Rehab medicine was consulted to evaluate for rehab needs and recommended acute rehab on discharge.       ROS/subjective:  Pt seen and examined in chair, no lightheadedness, off Flomax-voids freely  No acute events overnight  Lid laceration appear to heal well  Moving Bowels - formed  - no burning no urgency-on abx per hospitalist  Pt denies cp, palpitations, sob, abd pain, N/V, fever, chills.   pain well controlled-Tylenol/Neurontin  DM2 educator appreciated-will follow recs  stump healing well-staples in place        MEDICATIONS  (STANDING):  acetaminophen     Tablet .. 975 milliGRAM(s) Oral every 6 hours  AQUAPHOR (petrolatum Ointment) 1 Application(s) Topical two times a day  bimatoprost 0.01% Ophthalmic Solution 1 Drop(s) Both EYES at bedtime  clotrimazole 1% Cream 1 Application(s) Topical two times a day  dextrose 5%. 1000 milliLiter(s) (50 mL/Hr) IV Continuous <Continuous>  dextrose 5%. 1000 milliLiter(s) (100 mL/Hr) IV Continuous <Continuous>  dextrose 50% Injectable 25 Gram(s) IV Push once  dextrose 50% Injectable 25 Gram(s) IV Push once  dextrose 50% Injectable 12.5 Gram(s) IV Push once  enalapril 10 milliGRAM(s) Oral <User Schedule>  enoxaparin Injectable 40 milliGRAM(s) SubCutaneous every 24 hours  gabapentin 300 milliGRAM(s) Oral three times a day  glucagon  Injectable 1 milliGRAM(s) IntraMuscular once  hydrocortisone 1% Cream 1 Application(s) Topical two times a day  insulin glargine Injectable (LANTUS) 28 Unit(s) SubCutaneous at bedtime  insulin lispro (ADMELOG) corrective regimen sliding scale   SubCutaneous at bedtime  insulin lispro (ADMELOG) corrective regimen sliding scale   SubCutaneous three times a day before meals  insulin lispro Injectable (ADMELOG) 4 Unit(s) SubCutaneous three times a day before meals  linagliptin 5 milliGRAM(s) Oral at bedtime  magnesium oxide 400 milliGRAM(s) Oral three times a day with meals  metFORMIN 1000 milliGRAM(s) Oral two times a day  nitrofurantoin monohydrate/macrocrystals (MACROBID) 100 milliGRAM(s) Oral two times a day  polyethylene glycol 3350 17 Gram(s) Oral two times a day  senna 2 Tablet(s) Oral at bedtime  simvastatin 40 milliGRAM(s) Oral at bedtime  sodium chloride 0.9%. 1000 milliLiter(s) (100 mL/Hr) IV Continuous <Continuous>    MEDICATIONS  (PRN):  dextrose Oral Gel 15 Gram(s) Oral once PRN Blood Glucose LESS THAN 70 milliGRAM(s)/deciliter  guaiFENesin Oral Liquid (Sugar-Free) 100 milliGRAM(s) Oral every 6 hours PRN Cough                            11.2   9.91  )-----------( 410      ( 28 Dec 2023 06:06 )             36.2     12-28    139  |  103  |  18  ----------------------------<  165<H>  5.2   |  28  |  0.89    Ca    9.2      28 Dec 2023 06:06    TPro  7.5  /  Alb  2.5<L>  /  TBili  0.2  /  DBili  x   /  AST  44<H>  /  ALT  85<H>  /  AlkPhos  120  12-28    Urinalysis Basic - ( 28 Dec 2023 06:06 )    Color: x / Appearance: x / SG: x / pH: x  Gluc: 165 mg/dL / Ketone: x  / Bili: x / Urobili: x   Blood: x / Protein: x / Nitrite: x   Leuk Esterase: x / RBC: x / WBC x   Sq Epi: x / Non Sq Epi: x / Bacteria: x        CAPILLARY BLOOD GLUCOSE      POCT Blood Glucose.: 168 mg/dL (28 Dec 2023 08:21)  POCT Blood Glucose.: 162 mg/dL (27 Dec 2023 21:12)  POCT Blood Glucose.: 142 mg/dL (27 Dec 2023 17:11)  POCT Blood Glucose.: 234 mg/dL (27 Dec 2023 12:18)      Vital Signs Last 24 Hrs  T(C): 36.8 (28 Dec 2023 09:03), Max: 36.8 (27 Dec 2023 19:39)  T(F): 98.2 (28 Dec 2023 09:03), Max: 98.3 (27 Dec 2023 19:39)  HR: 87 (28 Dec 2023 09:03) (87 - 91)  BP: 151/91 (28 Dec 2023 09:03) (118/69 - 151/91)  BP(mean): --  RR: 16 (28 Dec 2023 09:03) (16 - 16)  SpO2: 95% (28 Dec 2023 09:03) (95% - 96%)    Parameters below as of 28 Dec 2023 09:03  Patient On (Oxygen Delivery Method): room air        Physical Exam: Gen - NAD in chair  HEENT - NCAT, EOMI, steri to right lid  Neck - Supple, No limited ROM  Pulm - CTAB, No wheeze, No rhonchi, No crackles  Cardiovascular - RRR, S1S2, No murmurs  Abdomen - Soft, NT/ND, +BS  Extremities - LLE with recent BKA.   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 No drainage. C/d/i. skin well approximated and staples present. Stage 1 sacral decubitis ulcer present. erythema noted over Patella tendon - padded with DSD  Wounds: Patient with scab of 4th digit of RLE.      IDT meeting on 12/27    SW: PH, 3STE, wife    OT:  eating set  grooming det  UBD/LBD cg/min  toileting min /cg  tub/shower min/cg  bathing cg    PT:  amb cs  transfers cg  stairs unable    SLP    tentative dc 12/30 HC        Continue comprehensive acute rehab program consisting of 3hrs/day of OT/PT. HPI:  Mr. Mcdaniel is a 63M PMHx DM, HLD, PAD, prior toe amputation, presents to [facility] ED w L foot wound. Reports wound has been present on hindfoot x 3 weeks. Started as small cut. Over last 24 hours, pt unable to ambulate d/t severe pain. Reports significant malodor. Denies subjective fevers, chills, purulent drainage, numbness/paresthesia.     In ED, patient is tachycardic to 123. BP w HTN. Febrile to 102.4 F. Labs w WBC 21. Hgb 11.4. Lac 2.4. XR L foot w soft tissue gas at lateral hindfoot. CT LLE non con w soft tissue gas tracking ~7cm above foot.   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 Patient admitted to the SICU pre and post op for hyperglycemia and insulin drip management. Endocrinology was consulted for the hyperglycemia.  Patient transferred out of the SICU when he was able to be weaned off the insulin drip. Internal medicine was consulted for malachi-operative clearance for formalization surgery. On 12/4 the patient was taken back to the operating room for a left lower extremity guillotine revision. Infectious disease was consulted for antibiotic management and recommended switching from Zosyn to Unasyn. Rehab medicine was consulted to evaluate for rehab needs and recommended acute rehab on discharge.       ROS/subjective:  Pt seen and examined in chair, no lightheadedness, off Flomax-voids freely  No acute events overnight  Lid laceration appear to heal well  Moving Bowels - formed  - no burning no urgency-on abx per hospitalist  Pt denies cp, palpitations, sob, abd pain, N/V, fever, chills.   pain well controlled-Tylenol/Neurontin  DM2 educator appreciated-will follow recs  stump healing well-staples in place        MEDICATIONS  (STANDING):  acetaminophen     Tablet .. 975 milliGRAM(s) Oral every 6 hours  AQUAPHOR (petrolatum Ointment) 1 Application(s) Topical two times a day  bimatoprost 0.01% Ophthalmic Solution 1 Drop(s) Both EYES at bedtime  clotrimazole 1% Cream 1 Application(s) Topical two times a day  dextrose 5%. 1000 milliLiter(s) (50 mL/Hr) IV Continuous <Continuous>  dextrose 5%. 1000 milliLiter(s) (100 mL/Hr) IV Continuous <Continuous>  dextrose 50% Injectable 25 Gram(s) IV Push once  dextrose 50% Injectable 25 Gram(s) IV Push once  dextrose 50% Injectable 12.5 Gram(s) IV Push once  enalapril 10 milliGRAM(s) Oral <User Schedule>  enoxaparin Injectable 40 milliGRAM(s) SubCutaneous every 24 hours  gabapentin 300 milliGRAM(s) Oral three times a day  glucagon  Injectable 1 milliGRAM(s) IntraMuscular once  hydrocortisone 1% Cream 1 Application(s) Topical two times a day  insulin glargine Injectable (LANTUS) 28 Unit(s) SubCutaneous at bedtime  insulin lispro (ADMELOG) corrective regimen sliding scale   SubCutaneous at bedtime  insulin lispro (ADMELOG) corrective regimen sliding scale   SubCutaneous three times a day before meals  insulin lispro Injectable (ADMELOG) 4 Unit(s) SubCutaneous three times a day before meals  linagliptin 5 milliGRAM(s) Oral at bedtime  magnesium oxide 400 milliGRAM(s) Oral three times a day with meals  metFORMIN 1000 milliGRAM(s) Oral two times a day  nitrofurantoin monohydrate/macrocrystals (MACROBID) 100 milliGRAM(s) Oral two times a day  polyethylene glycol 3350 17 Gram(s) Oral two times a day  senna 2 Tablet(s) Oral at bedtime  simvastatin 40 milliGRAM(s) Oral at bedtime  sodium chloride 0.9%. 1000 milliLiter(s) (100 mL/Hr) IV Continuous <Continuous>    MEDICATIONS  (PRN):  dextrose Oral Gel 15 Gram(s) Oral once PRN Blood Glucose LESS THAN 70 milliGRAM(s)/deciliter  guaiFENesin Oral Liquid (Sugar-Free) 100 milliGRAM(s) Oral every 6 hours PRN Cough                            11.2   9.91  )-----------( 410      ( 28 Dec 2023 06:06 )             36.2     12-28    139  |  103  |  18  ----------------------------<  165<H>  5.2   |  28  |  0.89    Ca    9.2      28 Dec 2023 06:06    TPro  7.5  /  Alb  2.5<L>  /  TBili  0.2  /  DBili  x   /  AST  44<H>  /  ALT  85<H>  /  AlkPhos  120  12-28    Urinalysis Basic - ( 28 Dec 2023 06:06 )    Color: x / Appearance: x / SG: x / pH: x  Gluc: 165 mg/dL / Ketone: x  / Bili: x / Urobili: x   Blood: x / Protein: x / Nitrite: x   Leuk Esterase: x / RBC: x / WBC x   Sq Epi: x / Non Sq Epi: x / Bacteria: x        CAPILLARY BLOOD GLUCOSE      POCT Blood Glucose.: 168 mg/dL (28 Dec 2023 08:21)  POCT Blood Glucose.: 162 mg/dL (27 Dec 2023 21:12)  POCT Blood Glucose.: 142 mg/dL (27 Dec 2023 17:11)  POCT Blood Glucose.: 234 mg/dL (27 Dec 2023 12:18)      Vital Signs Last 24 Hrs  T(C): 36.8 (28 Dec 2023 09:03), Max: 36.8 (27 Dec 2023 19:39)  T(F): 98.2 (28 Dec 2023 09:03), Max: 98.3 (27 Dec 2023 19:39)  HR: 87 (28 Dec 2023 09:03) (87 - 91)  BP: 151/91 (28 Dec 2023 09:03) (118/69 - 151/91)  BP(mean): --  RR: 16 (28 Dec 2023 09:03) (16 - 16)  SpO2: 95% (28 Dec 2023 09:03) (95% - 96%)    Parameters below as of 28 Dec 2023 09:03  Patient On (Oxygen Delivery Method): room air        Physical Exam: Gen - NAD in chair  HEENT - NCAT, EOMI, steri to right lid  Neck - Supple, No limited ROM  Pulm - CTAB, No wheeze, No rhonchi, No crackles  Cardiovascular - RRR, S1S2, No murmurs  Abdomen - Soft, NT/ND, +BS  Extremities - LLE with recent BKA.   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 No drainage. C/d/i. skin well approximated and staples present. Stage 1 sacral decubitis ulcer present. erythema noted over Patella tendon - padded with DSD  Wounds: Patient with scab of 4th digit of RLE.      IDT meeting on 12/27    SW: PH, 3STE, wife    OT:  eating set  grooming det  UBD/LBD cg/min  toileting min /cg  tub/shower min/cg  bathing cg    PT:  amb cs  transfers cg  stairs unable    SLP    tentative dc 12/30 HC        Continue comprehensive acute rehab program consisting of 3hrs/day of OT/PT. HPI:  Mr. Mcdaniel is a 63M PMHx DM, HLD, PAD, prior toe amputation, presents to [facility] ED w L foot wound. Reports wound has been present on hindfoot x 3 weeks. Started as small cut. Over last 24 hours, pt unable to ambulate d/t severe pain. Reports significant malodor. Denies subjective fevers, chills, purulent drainage, numbness/paresthesia.     In ED, patient is tachycardic to 123. BP w HTN. Febrile to 102.4 F. Labs w WBC 21. Hgb 11.4. Lac 2.4. XR L foot w soft tissue gas at lateral hindfoot. CT LLE non con w soft tissue gas tracking ~7cm above foot.   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 Patient admitted to the SICU pre and post op for hyperglycemia and insulin drip management. Endocrinology was consulted for the hyperglycemia.  Patient transferred out of the SICU when he was able to be weaned off the insulin drip. Internal medicine was consulted for malachi-operative clearance for formalization surgery. On 12/4 the patient was taken back to the operating room for a left lower extremity guillotine revision. Infectious disease was consulted for antibiotic management and recommended switching from Zosyn to Unasyn. Rehab medicine was consulted to evaluate for rehab needs and recommended acute rehab on discharge.       ROS/subjective:  Pt seen and examined in chair, no lightheadedness, off Flomax-voids freely  No acute events overnight  Lid laceration appear to heal well  Moving Bowels - formed  - no burning no urgency-on abx per hospitalist  Pt denies cp, palpitations, sob, abd pain, N/V, fever, chills.   pain well controlled-Tylenol/Neurontin  DM2 educator appreciated-will follow recs-wife to be trained in subcut administration of insulin  stump healing well-staples in place        MEDICATIONS  (STANDING):  acetaminophen     Tablet .. 975 milliGRAM(s) Oral every 6 hours  AQUAPHOR (petrolatum Ointment) 1 Application(s) Topical two times a day  bimatoprost 0.01% Ophthalmic Solution 1 Drop(s) Both EYES at bedtime  clotrimazole 1% Cream 1 Application(s) Topical two times a day  dextrose 5%. 1000 milliLiter(s) (50 mL/Hr) IV Continuous <Continuous>  dextrose 5%. 1000 milliLiter(s) (100 mL/Hr) IV Continuous <Continuous>  dextrose 50% Injectable 25 Gram(s) IV Push once  dextrose 50% Injectable 25 Gram(s) IV Push once  dextrose 50% Injectable 12.5 Gram(s) IV Push once  enalapril 10 milliGRAM(s) Oral <User Schedule>  enoxaparin Injectable 40 milliGRAM(s) SubCutaneous every 24 hours  gabapentin 300 milliGRAM(s) Oral three times a day  glucagon  Injectable 1 milliGRAM(s) IntraMuscular once  hydrocortisone 1% Cream 1 Application(s) Topical two times a day  insulin glargine Injectable (LANTUS) 28 Unit(s) SubCutaneous at bedtime  insulin lispro (ADMELOG) corrective regimen sliding scale   SubCutaneous at bedtime  insulin lispro (ADMELOG) corrective regimen sliding scale   SubCutaneous three times a day before meals  insulin lispro Injectable (ADMELOG) 4 Unit(s) SubCutaneous three times a day before meals  linagliptin 5 milliGRAM(s) Oral at bedtime  magnesium oxide 400 milliGRAM(s) Oral three times a day with meals  metFORMIN 1000 milliGRAM(s) Oral two times a day  nitrofurantoin monohydrate/macrocrystals (MACROBID) 100 milliGRAM(s) Oral two times a day  polyethylene glycol 3350 17 Gram(s) Oral two times a day  senna 2 Tablet(s) Oral at bedtime  simvastatin 40 milliGRAM(s) Oral at bedtime  sodium chloride 0.9%. 1000 milliLiter(s) (100 mL/Hr) IV Continuous <Continuous>    MEDICATIONS  (PRN):  dextrose Oral Gel 15 Gram(s) Oral once PRN Blood Glucose LESS THAN 70 milliGRAM(s)/deciliter  guaiFENesin Oral Liquid (Sugar-Free) 100 milliGRAM(s) Oral every 6 hours PRN Cough                            11.2   9.91  )-----------( 410      ( 28 Dec 2023 06:06 )             36.2     12-28    139  |  103  |  18  ----------------------------<  165<H>  5.2   |  28  |  0.89    Ca    9.2      28 Dec 2023 06:06    TPro  7.5  /  Alb  2.5<L>  /  TBili  0.2  /  DBili  x   /  AST  44<H>  /  ALT  85<H>  /  AlkPhos  120  12-28    Urinalysis Basic - ( 28 Dec 2023 06:06 )    Color: x / Appearance: x / SG: x / pH: x  Gluc: 165 mg/dL / Ketone: x  / Bili: x / Urobili: x   Blood: x / Protein: x / Nitrite: x   Leuk Esterase: x / RBC: x / WBC x   Sq Epi: x / Non Sq Epi: x / Bacteria: x        CAPILLARY BLOOD GLUCOSE      POCT Blood Glucose.: 168 mg/dL (28 Dec 2023 08:21)  POCT Blood Glucose.: 162 mg/dL (27 Dec 2023 21:12)  POCT Blood Glucose.: 142 mg/dL (27 Dec 2023 17:11)  POCT Blood Glucose.: 234 mg/dL (27 Dec 2023 12:18)      Vital Signs Last 24 Hrs  T(C): 36.8 (28 Dec 2023 09:03), Max: 36.8 (27 Dec 2023 19:39)  T(F): 98.2 (28 Dec 2023 09:03), Max: 98.3 (27 Dec 2023 19:39)  HR: 87 (28 Dec 2023 09:03) (87 - 91)  BP: 151/91 (28 Dec 2023 09:03) (118/69 - 151/91)  BP(mean): --  RR: 16 (28 Dec 2023 09:03) (16 - 16)  SpO2: 95% (28 Dec 2023 09:03) (95% - 96%)    Parameters below as of 28 Dec 2023 09:03  Patient On (Oxygen Delivery Method): room air        Physical Exam: Gen - NAD in chair  HEENT - NCAT, EOMI, steri to right lid  Neck - Supple, No limited ROM  Pulm - CTAB, No wheeze, No rhonchi, No crackles  Cardiovascular - RRR, S1S2, No murmurs  Abdomen - Soft, NT/ND, +BS  Extremities - LLE with recent BKA.   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 No drainage. C/d/i. skin well approximated and staples present. Stage 1 sacral decubitis ulcer present. less erythema noted over Patella tendon - padded with DSD  Wounds: Patient with scab of 4th digit of RLE.      IDT meeting on 12/27    SW: PH, 3STE, wife    OT:  eating set  grooming det  UBD/LBD cg/min  toileting min /cg  tub/shower min/cg  bathing cg    PT:  amb cs  transfers cg  stairs unable    SLP    tentative dc 12/30 HC        Continue comprehensive acute rehab program consisting of 3hrs/day of OT/PT. HPI:  Mr. Mcdaniel is a 63M PMHx DM, HLD, PAD, prior toe amputation, presents to [facility] ED w L foot wound. Reports wound has been present on hindfoot x 3 weeks. Started as small cut. Over last 24 hours, pt unable to ambulate d/t severe pain. Reports significant malodor. Denies subjective fevers, chills, purulent drainage, numbness/paresthesia.     In ED, patient is tachycardic to 123. BP w HTN. Febrile to 102.4 F. Labs w WBC 21. Hgb 11.4. Lac 2.4. XR L foot w soft tissue gas at lateral hindfoot. CT LLE non con w soft tissue gas tracking ~7cm above foot.   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 Patient admitted to the SICU pre and post op for hyperglycemia and insulin drip management. Endocrinology was consulted for the hyperglycemia.  Patient transferred out of the SICU when he was able to be weaned off the insulin drip. Internal medicine was consulted for malachi-operative clearance for formalization surgery. On 12/4 the patient was taken back to the operating room for a left lower extremity guillotine revision. Infectious disease was consulted for antibiotic management and recommended switching from Zosyn to Unasyn. Rehab medicine was consulted to evaluate for rehab needs and recommended acute rehab on discharge.       ROS/subjective:  Pt seen and examined in chair, no lightheadedness, off Flomax-voids freely  No acute events overnight  Lid laceration appear to heal well  Moving Bowels - formed  - no burning no urgency-on abx per hospitalist  Pt denies cp, palpitations, sob, abd pain, N/V, fever, chills.   pain well controlled-Tylenol/Neurontin  DM2 educator appreciated-will follow recs-wife to be trained in subcut administration of insulin  stump healing well-staples in place        MEDICATIONS  (STANDING):  acetaminophen     Tablet .. 975 milliGRAM(s) Oral every 6 hours  AQUAPHOR (petrolatum Ointment) 1 Application(s) Topical two times a day  bimatoprost 0.01% Ophthalmic Solution 1 Drop(s) Both EYES at bedtime  clotrimazole 1% Cream 1 Application(s) Topical two times a day  dextrose 5%. 1000 milliLiter(s) (50 mL/Hr) IV Continuous <Continuous>  dextrose 5%. 1000 milliLiter(s) (100 mL/Hr) IV Continuous <Continuous>  dextrose 50% Injectable 25 Gram(s) IV Push once  dextrose 50% Injectable 25 Gram(s) IV Push once  dextrose 50% Injectable 12.5 Gram(s) IV Push once  enalapril 10 milliGRAM(s) Oral <User Schedule>  enoxaparin Injectable 40 milliGRAM(s) SubCutaneous every 24 hours  gabapentin 300 milliGRAM(s) Oral three times a day  glucagon  Injectable 1 milliGRAM(s) IntraMuscular once  hydrocortisone 1% Cream 1 Application(s) Topical two times a day  insulin glargine Injectable (LANTUS) 28 Unit(s) SubCutaneous at bedtime  insulin lispro (ADMELOG) corrective regimen sliding scale   SubCutaneous at bedtime  insulin lispro (ADMELOG) corrective regimen sliding scale   SubCutaneous three times a day before meals  insulin lispro Injectable (ADMELOG) 4 Unit(s) SubCutaneous three times a day before meals  linagliptin 5 milliGRAM(s) Oral at bedtime  magnesium oxide 400 milliGRAM(s) Oral three times a day with meals  metFORMIN 1000 milliGRAM(s) Oral two times a day  nitrofurantoin monohydrate/macrocrystals (MACROBID) 100 milliGRAM(s) Oral two times a day  polyethylene glycol 3350 17 Gram(s) Oral two times a day  senna 2 Tablet(s) Oral at bedtime  simvastatin 40 milliGRAM(s) Oral at bedtime  sodium chloride 0.9%. 1000 milliLiter(s) (100 mL/Hr) IV Continuous <Continuous>    MEDICATIONS  (PRN):  dextrose Oral Gel 15 Gram(s) Oral once PRN Blood Glucose LESS THAN 70 milliGRAM(s)/deciliter  guaiFENesin Oral Liquid (Sugar-Free) 100 milliGRAM(s) Oral every 6 hours PRN Cough                            11.2   9.91  )-----------( 410      ( 28 Dec 2023 06:06 )             36.2     12-28    139  |  103  |  18  ----------------------------<  165<H>  5.2   |  28  |  0.89    Ca    9.2      28 Dec 2023 06:06    TPro  7.5  /  Alb  2.5<L>  /  TBili  0.2  /  DBili  x   /  AST  44<H>  /  ALT  85<H>  /  AlkPhos  120  12-28    Urinalysis Basic - ( 28 Dec 2023 06:06 )    Color: x / Appearance: x / SG: x / pH: x  Gluc: 165 mg/dL / Ketone: x  / Bili: x / Urobili: x   Blood: x / Protein: x / Nitrite: x   Leuk Esterase: x / RBC: x / WBC x   Sq Epi: x / Non Sq Epi: x / Bacteria: x        CAPILLARY BLOOD GLUCOSE      POCT Blood Glucose.: 168 mg/dL (28 Dec 2023 08:21)  POCT Blood Glucose.: 162 mg/dL (27 Dec 2023 21:12)  POCT Blood Glucose.: 142 mg/dL (27 Dec 2023 17:11)  POCT Blood Glucose.: 234 mg/dL (27 Dec 2023 12:18)      Vital Signs Last 24 Hrs  T(C): 36.8 (28 Dec 2023 09:03), Max: 36.8 (27 Dec 2023 19:39)  T(F): 98.2 (28 Dec 2023 09:03), Max: 98.3 (27 Dec 2023 19:39)  HR: 87 (28 Dec 2023 09:03) (87 - 91)  BP: 151/91 (28 Dec 2023 09:03) (118/69 - 151/91)  BP(mean): --  RR: 16 (28 Dec 2023 09:03) (16 - 16)  SpO2: 95% (28 Dec 2023 09:03) (95% - 96%)    Parameters below as of 28 Dec 2023 09:03  Patient On (Oxygen Delivery Method): room air        Physical Exam: Gen - NAD in chair  HEENT - NCAT, EOMI, steri to right lid  Neck - Supple, No limited ROM  Pulm - CTAB, No wheeze, No rhonchi, No crackles  Cardiovascular - RRR, S1S2, No murmurs  Abdomen - Soft, NT/ND, +BS  Extremities - LLE with recent BKA.   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 No drainage. C/d/i. skin well approximated and staples present. Stage 1 sacral decubitis ulcer present. less erythema noted over Patella tendon - padded with DSD  Wounds: Patient with scab of 4th digit of RLE.      IDT meeting on 12/27    SW: PH, 3STE, wife    OT:  eating set  grooming det  UBD/LBD cg/min  toileting min /cg  tub/shower min/cg  bathing cg    PT:  amb cs  transfers cg  stairs unable    SLP    tentative dc 12/30 HC        Continue comprehensive acute rehab program consisting of 3hrs/day of OT/PT.

## 2023-12-28 NOTE — DISCHARGE NOTE PROVIDER - CARE PROVIDERS DIRECT ADDRESSES
,[email or web address],DirectAddress_Unknown ,[email or web address],DirectAddress_Unknown

## 2023-12-28 NOTE — PROGRESS NOTE ADULT - SUBJECTIVE AND OBJECTIVE BOX
no acute events overnight  no pain in the lid laceration       PHYSICAL EXAM:    Vital Signs Last 24 Hrs  T(F): 98.2 (28 Dec 2023 09:03), Max: 98.3 (27 Dec 2023 19:39)  HR: 93 (28 Dec 2023 12:20) (87 - 93)  BP: 133/85 (28 Dec 2023 12:20) (133/85 - 151/91)  RR: 16 (28 Dec 2023 09:03) (16 - 16)  SpO2: 95% (28 Dec 2023 09:03) (95% - 96%)  I&O's Summary      GENERAL: NAD  HEENT: NCAT  CHEST/LUNG: No increased WOB, Clear to percussion bilaterally; No rales, rhonchi, wheezing  HEART: Regular rate and rhythm; No murmurs  ABDOMEN: Soft, Nontender, Nondistended; Bowel sounds present  MUSCULOSKELETAL/EXTREMITIES: + LEFT BKA  PSYCH: Appropriate affect, Alert & Oriented to person place and time  Skin: LEFT BKA dressing C/D/I, dry wound of the 4th toe of the right foot.    LABS:                        11.2   9.91  )-----------( 410      ( 28 Dec 2023 06:06 )             36.2       12-28    139  |  103  |  18  ----------------------------<  165  5.2   |  28  |  0.89    Ca    9.2      28 Dec 2023 06:06    TPro  7.5  /  Alb  2.5  /  TBili  0.2  /  DBili  x   /  AST  44  /  ALT  85  /  AlkPhos  120  12-28                              POCT Blood Glucose.: 185 mg/dL (28 Dec 2023 12:18)  POCT Blood Glucose.: 168 mg/dL (28 Dec 2023 08:21)  POCT Blood Glucose.: 162 mg/dL (27 Dec 2023 21:12)  POCT Blood Glucose.: 142 mg/dL (27 Dec 2023 17:11)      Urinalysis Basic - ( 28 Dec 2023 06:06 )    Color: x / Appearance: x / SG: x / pH: x  Gluc: 165 mg/dL / Ketone: x  / Bili: x / Urobili: x   Blood: x / Protein: x / Nitrite: x   Leuk Esterase: x / RBC: x / WBC x   Sq Epi: x / Non Sq Epi: x / Bacteria: x            MEDICATIONS  (STANDING):  AQUAPHOR (petrolatum Ointment) 1 Application(s) Topical two times a day  bimatoprost 0.01% Ophthalmic Solution 1 Drop(s) Both EYES at bedtime  clotrimazole 1% Cream 1 Application(s) Topical two times a day  dextrose 5%. 1000 milliLiter(s) (50 mL/Hr) IV Continuous <Continuous>  dextrose 5%. 1000 milliLiter(s) (100 mL/Hr) IV Continuous <Continuous>  dextrose 50% Injectable 25 Gram(s) IV Push once  dextrose 50% Injectable 25 Gram(s) IV Push once  dextrose 50% Injectable 12.5 Gram(s) IV Push once  enalapril 10 milliGRAM(s) Oral <User Schedule>  enoxaparin Injectable 40 milliGRAM(s) SubCutaneous every 24 hours  gabapentin 300 milliGRAM(s) Oral three times a day  glucagon  Injectable 1 milliGRAM(s) IntraMuscular once  hydrocortisone 1% Cream 1 Application(s) Topical two times a day  insulin glargine Injectable (LANTUS) 28 Unit(s) SubCutaneous at bedtime  insulin lispro (ADMELOG) corrective regimen sliding scale   SubCutaneous three times a day before meals  insulin lispro (ADMELOG) corrective regimen sliding scale   SubCutaneous at bedtime  insulin lispro Injectable (ADMELOG) 4 Unit(s) SubCutaneous three times a day before meals  linagliptin 5 milliGRAM(s) Oral at bedtime  magnesium oxide 400 milliGRAM(s) Oral three times a day with meals  metFORMIN 1000 milliGRAM(s) Oral two times a day  nitrofurantoin monohydrate/macrocrystals (MACROBID) 100 milliGRAM(s) Oral two times a day  polyethylene glycol 3350 17 Gram(s) Oral two times a day  senna 2 Tablet(s) Oral at bedtime  simvastatin 40 milliGRAM(s) Oral at bedtime  sodium chloride 0.9%. 1000 milliLiter(s) (100 mL/Hr) IV Continuous <Continuous>    MEDICATIONS  (PRN):  acetaminophen     Tablet .. 650 milliGRAM(s) Oral every 6 hours PRN Moderate Pain (4 - 6)  dextrose Oral Gel 15 Gram(s) Oral once PRN Blood Glucose LESS THAN 70 milliGRAM(s)/deciliter  guaiFENesin Oral Liquid (Sugar-Free) 100 milliGRAM(s) Oral every 6 hours PRN Cough      Care Discussed with Consultants/Other Providers: Yes

## 2023-12-28 NOTE — DISCHARGE NOTE PROVIDER - NSDCFUADDAPPT_GEN_ALL_CORE_FT
DUC coordinated new PCP appointment:  Eder Quiroz  [address], [place]  Monday January 8, 2024  3:45PM  [phone]    Appt coordinated through [facility] who states if pt can be seen sooner, they will notify pt/family.   DUC coordinated new PCP appointment:  Eder Quiroz  [address] [place], [place]  Monday January 8, 2024  3:45PM  [phone]    Appt coordinated through [facility] who states if pt can be seen sooner, they will notify pt/family.

## 2023-12-28 NOTE — DISCHARGE NOTE PROVIDER - PROVIDER TOKENS
PROVIDER:[TOKEN:[[id number]]],PROVIDER:[TOKEN:[[id number]]] PROVIDER:[TOKEN:[[id number]]],PROVIDER:[TOKEN:[[id number]]]

## 2023-12-28 NOTE — DISCHARGE NOTE PROVIDER - NSDCMRMEDTOKEN_GEN_ALL_CORE_FT
acetaminophen 325 mg oral tablet: 2 tab(s) orally every 6 hours As needed Moderate Pain (4 - 6)  alcohol swabs: Apply topically to affected area 4 times a day  bimatoprost 0.01% ophthalmic solution: 1 drop(s) to each affected eye once a day (at bedtime)  enalapril 10 mg oral tablet: 1 tab(s) orally once a day  gabapentin 300 mg oral capsule: 1 cap(s) orally 3 times a day  glucometer (per patient&#x27;s insurance): Test blood sugars four times a day. Dispense #1 glucometer.  Insulin Pen Needles, 4mm: 1 application subcutaneously 4 times a day. ** Use with insulin pen **  lancets: 1 application subcutaneously 4 times a day  linagliptin 5 mg oral tablet: 1 tab(s) orally once a day (at bedtime)  magnesium oxide 400 mg oral tablet: 1 tab(s) orally 2 times a day  metFORMIN 1000 mg oral tablet: 1 tab(s) orally 2 times a day  senna leaf extract oral tablet: 2 tab(s) orally once a day (at bedtime)  simvastatin 40 mg oral tablet: 1 tab(s) orally once a day (at bedtime)  test strips (per patient&#x27;s insurance): 1 application subcutaneously 4 times a day. ** Compatible with patient&#x27;s glucometer **  Tresiba FlexTouch 100 units/mL subcutaneous solution: 28 unit(s) subcutaneous once a day (at bedtime)   acetaminophen 325 mg oral tablet: 2 tab(s) orally every 6 hours As needed Moderate Pain (4 - 6)  bimatoprost 0.01% ophthalmic solution: 1 drop(s) to each affected eye once a day (at bedtime)  enalapril 10 mg oral tablet: 1 tab(s) orally once a day  gabapentin 300 mg oral capsule: 1 cap(s) orally 3 times a day  Lantus Solostar Pen 100 units/mL subcutaneous solution: 28 unit(s) subcutaneous once a day (at bedtime)  linagliptin 5 mg oral tablet: 1 tab(s) orally once a day (at bedtime)  magnesium oxide 400 mg oral tablet: 1 tab(s) orally 2 times a day  metFORMIN 1000 mg oral tablet: 1 tab(s) orally 2 times a day  senna leaf extract oral tablet: 2 tab(s) orally once a day (at bedtime)  simvastatin 40 mg oral tablet: 1 tab(s) orally once a day (at bedtime)

## 2023-12-28 NOTE — DISCHARGE NOTE PROVIDER - NSDCFUADDINST_GEN_ALL_CORE_FT
VN- Cover Left BK  incision with abd pad then use 4 inch ACE figure of eight wrap till staples out  once staples out please see Lucas at [facility] for stump  and casting for prosthesis VN- Cover Left BK  incision with abd pad then use 4 inch ACE figure of eight wrap till staples out  once staples out please see Lucas at [facility] for stump  and casting for prosthesis   LANCETS, ALCOHOL WIPES, GLUCOMETER, INSULIN PEN NEEDLES and TEST STRIPS SENT TO PHARMACY

## 2023-12-28 NOTE — DISCHARGE NOTE PROVIDER - NSDCFUSCHEDAPPT_GEN_ALL_CORE_FT
Jose Francisco Dean  [facility]  VASCULAR [address]  Scheduled Appointment: 01/05/2024     Jose Francisco Dean  [facility]  VASCULAR [address]  Scheduled Appointment: 01/05/2024

## 2023-12-28 NOTE — PROGRESS NOTE ADULT - ASSESSMENT
ELVIS VILLATORO is a 63y with a history of diabetes mellitus type 2, HTN, dyslipidemia, retinopathy who presented to [facility] on 11/29/23  with complaint of non-healing left foot wound and found to have osteomyelitis. Hospital course complicated by hyperglycemia and need for surgical revision. Now admitted to [facility] after for initiation of a multidisciplinary rehab program consisting focused on functional mobility, transfers and ADLs.     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   - s/p revision on 12/4 and formalization 12/12.   Comprehensive Multidisciplinary Rehab Program:  comprehensive rehab program, PT/OT/SLP 3 hours a day, 5 days a week.  - NWB left lower extremity  - Precautions: falls  - completed Unasyn per ID at previous hospital- on PO abx s/p laceration of eye lid/+UTI. Afebrile, no leukocytosis   - s/p fall in OT-[facility]H follow up neg new findings  -plastics appreciated-lid healing well      #UTI  - abx per CS  -leukocytosis improved, afebrile, PVRs low  -monitor closely    HLD  -Simvastatin    Diabetes mellitus type 2  -ISS  - Home meds: metformin 1000mg BID, glipizide   - Lantus  - Admelog TID  - hospitalist in  Diabetic nursing appreciated-Home on Tresiba, Tradjenta and Metformin    HTN  -enalapril per hospitalist   -will hold Flomax for low BP/hx syncope/orthostasis    Urinary Retention  -Tamsulosin on hold  + UA - C&S+ on abx, Leukocytosis- now improved  Hospitalist in    Pain Management:  - Tylenol q6h  - oxy5mg, 10mg mod and severe pain-dc  - gabapentin 300mg TID    GI/Bowel:  - patient reports regular BMs  - Senna QHS, Miralax Daily    Skin/Pressure Injury:   - At risk for pressure injury due to neurologic diagnosis and relative immobility.  - Skin assessment on admission: stage 1 sacral ulcer, tinea cruris present, scab of 4th digit R foot  - barrier cream for sacral wound  - butenafine daily for jock itch  - Monitor Incisions: LLE residual stump with sutures  - Daily dressing changes  - aquaphor for dry R foot.    Diet:  - Consistent Carb  - Nutrition consult for assessment and recs    DVT ppx:  - Lovenox daily dvt ppx  ---------------  Code Status/Emergency Contact:    Outpatient Follow-up (Specialty/Name of physician):  Jose Francisco Dean  Vascular Surgery  [address] [place] [address], [id number]  [place] [address]  Phone: [phone]  Fax: [phone]  Follow Up Time: 2 weeks  [facility]  Endocrinology  [address] [place] [address]  Phone: [phone]  Fax:   Follow Up Time: 2 weeks    [facility]  Urology  [address] [place] [address]  Phone: [phone]  Follow Up Time: 1 week    Eduin Gutierrez MD  Attending Physician  [facility]  Division of Infectous Diseases  [phone]    Endocrine faculty practice.   [address] [place] [address]. Phone .   ELVIS VILLATORO is a 63y with a history of diabetes mellitus type 2, HTN, dyslipidemia, retinopathy who presented to [facility] on 11/29/23  with complaint of non-healing left foot wound and found to have osteomyelitis. Hospital course complicated by hyperglycemia and need for surgical revision. Now admitted to [facility] after for initiation of a multidisciplinary rehab program consisting focused on functional mobility, transfers and ADLs.     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   - s/p revision on 12/4 and formalization 12/12.   Comprehensive Multidisciplinary Rehab Program:  comprehensive rehab program, PT/OT/SLP 3 hours a day, 5 days a week.  - NWB left lower extremity  - Precautions: falls  - completed Unasyn per ID at previous hospital- on PO abx s/p laceration of eye lid/+UTI. Afebrile, no leukocytosis   - s/p fall in OT-[facility] follow up neg new findings  -plastics appreciated-lid healing well      #UTI  - abx per CS  -leukocytosis improved, afebrile, PVRs low  -monitor closely    HLD  -Simvastatin    Diabetes mellitus type 2  -ISS  - Home meds: metformin 1000mg BID, glipizide   - Lantus  - Admelog TID  - hospitalist in  Diabetic nursing appreciated-Home on Tresiba, Tradjenta and Metformin    HTN  -enalapril per hospitalist   -will hold Flomax for low BP/hx syncope/orthostasis    Urinary Retention  -Tamsulosin on hold  + UA - C&S+ on abx, Leukocytosis- now improved  Hospitalist in    Pain Management:  - Tylenol q6h  - oxy5mg, 10mg mod and severe pain-dc  - gabapentin 300mg TID    GI/Bowel:  - patient reports regular BMs  - Senna QHS, Miralax Daily    Skin/Pressure Injury:   - At risk for pressure injury due to neurologic diagnosis and relative immobility.  - Skin assessment on admission: stage 1 sacral ulcer, tinea cruris present, scab of 4th digit R foot  - barrier cream for sacral wound  - butenafine daily for jock itch  - Monitor Incisions: LLE residual stump with sutures  - Daily dressing changes  - aquaphor for dry R foot.    Diet:  - Consistent Carb  - Nutrition consult for assessment and recs    DVT ppx:  - Lovenox daily dvt ppx  ---------------  Code Status/Emergency Contact:    Outpatient Follow-up (Specialty/Name of physician):  Jose Francisco Dean  Vascular Surgery  [address] [place] [address], [id number]  [place] [address]  Phone: [phone]  Fax: [phone]  Follow Up Time: 2 weeks  [facility]  Endocrinology  [address] [place] [address]  Phone: [phone]  Fax:   Follow Up Time: 2 weeks    [facility] [place]  Urology  [address] [place] [address]  Phone: [phone]  Follow Up Time: 1 week    Eduin Gutierrez MD  Attending Physician  [facility]  Division of Infectous Diseases  [phone]    Endocrine faculty practice.   [address] [place] [address]. Phone .

## 2023-12-28 NOTE — DISCHARGE NOTE PROVIDER - HOSPITAL COURSE
63M PMHx DM, HLD, PAD, prior toe amputation, presents to [facility] ED w L foot wound. Reports wound has been present on hindfoot x 3 weeks. Started as small cut. Over last 24 hours, pt unable to ambulate d/t severe pain. Reports significant malodor. Denies subjective fevers, chills, purulent drainage, numbness/paresthesia.     In ED, patient is tachycardic to 123. BP w HTN. Febrile to 102.4 F. Labs w WBC 21. Hgb 11.4. Lac 2.4. XR L foot w soft tissue gas at lateral hindfoot. CT LLE non con w soft tissue gas tracking ~7cm above foot.   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 Patient admitted to the SICU pre and post op for hyperglycemia and insulin drip management. Endocrinology was consulted for the hyperglycemia.  Patient transferred out of the SICU when he was able to be weaned off the insulin drip. Internal medicine was consulted for malachi-operative clearance for formalization surgery. On 12/4 the patient was taken back to the operating room for a left lower extremity guillotine revision. Infectious disease was consulted for antibiotic management and recommended switching from Zosyn to Unasyn. Rehab medicine was consulted to evaluate for rehab needs and recommended acute rehab on discharge.   At [facility] rehab patient completed comprehensive PT OT program and reached his rehab goals on 12/30. While at rehab patient evaluated by medicine. Flomax dc for hypotension, s/p IVF. DM2 regimen adjusted by diabetic educator, jacklyn,luciano training completed. Completed antibiotics for UTI. Right eye lid laceration repaired by plastics-healing well. [facility] neg. Cleared for dc home on 12/30.       63M PMHx DM, HLD, PAD, prior toe amputation, presents to [facility] ED w L foot wound. Reports wound has been present on hindfoot x 3 weeks. Started as small cut. Over last 24 hours, pt unable to ambulate d/t severe pain. Reports significant malodor. Denies subjective fevers, chills, purulent drainage, numbness/paresthesia.     In ED, patient is tachycardic to 123. BP w HTN. Febrile to 102.4 F. Labs w WBC 21. Hgb 11.4. Lac 2.4. XR L foot w soft tissue gas at lateral hindfoot. CT LLE non con w soft tissue gas tracking ~7cm above foot.   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 Patient admitted to the SICU pre and post op for hyperglycemia and insulin drip management. Endocrinology was consulted for the hyperglycemia.  Patient transferred out of the SICU when he was able to be weaned off the insulin drip. Internal medicine was consulted for malachi-operative clearance for formalization surgery. On 12/4 the patient was taken back to the operating room for a left lower extremity guillotine revision. Infectious disease was consulted for antibiotic management and recommended switching from Zosyn to Unasyn. Rehab medicine was consulted to evaluate for rehab needs and recommended acute rehab on discharge.   At [facility] rehab patient completed comprehensive PT OT program and reached his rehab goals on 12/30. While at rehab patient evaluated by medicine. Flomax dc for hypotension, s/p IVF. DM2 regimen adjusted by diabetic educator, jacklyn,luciano training completed. Completed antibiotics for UTI. Right eye lid laceration repaired by plastics-healing well. [facility] neg. Cleared for dc home on 12/30.

## 2023-12-29 LAB
GLUCOSE BLDC GLUCOMTR-MCNC: 113 MG/DL — HIGH (ref 70–99)
GLUCOSE BLDC GLUCOMTR-MCNC: 113 MG/DL — HIGH (ref 70–99)
GLUCOSE BLDC GLUCOMTR-MCNC: 171 MG/DL — HIGH (ref 70–99)
GLUCOSE BLDC GLUCOMTR-MCNC: 171 MG/DL — HIGH (ref 70–99)
GLUCOSE BLDC GLUCOMTR-MCNC: 181 MG/DL — HIGH (ref 70–99)
GLUCOSE BLDC GLUCOMTR-MCNC: 181 MG/DL — HIGH (ref 70–99)
GLUCOSE BLDC GLUCOMTR-MCNC: 234 MG/DL — HIGH (ref 70–99)
GLUCOSE BLDC GLUCOMTR-MCNC: 234 MG/DL — HIGH (ref 70–99)
GLUCOSE BLDC GLUCOMTR-MCNC: 241 MG/DL — HIGH (ref 70–99)
GLUCOSE BLDC GLUCOMTR-MCNC: 241 MG/DL — HIGH (ref 70–99)
GLUCOSE BLDC GLUCOMTR-MCNC: 263 MG/DL — HIGH (ref 70–99)
GLUCOSE BLDC GLUCOMTR-MCNC: 263 MG/DL — HIGH (ref 70–99)

## 2023-12-29 PROCEDURE — 99232 SBSQ HOSP IP/OBS MODERATE 35: CPT | Mod: GC

## 2023-12-29 RX ORDER — INSULIN GLARGINE 100 [IU]/ML
28 INJECTION, SOLUTION SUBCUTANEOUS
Qty: 1 | Refills: 1
Start: 2023-12-29 | End: 2024-02-26

## 2023-12-29 RX ORDER — INSULIN GLARGINE 100 [IU]/ML
28 INJECTION, SOLUTION SUBCUTANEOUS
Qty: 1 | Refills: 0
Start: 2023-12-29 | End: 2024-01-27

## 2023-12-29 RX ADMIN — Medication 4: at 12:12

## 2023-12-29 RX ADMIN — Medication 1 APPLICATION(S): at 18:59

## 2023-12-29 RX ADMIN — METFORMIN HYDROCHLORIDE 1000 MILLIGRAM(S): 850 TABLET ORAL at 18:12

## 2023-12-29 RX ADMIN — Medication 2: at 08:31

## 2023-12-29 RX ADMIN — Medication 4 UNIT(S): at 08:32

## 2023-12-29 RX ADMIN — ENOXAPARIN SODIUM 40 MILLIGRAM(S): 100 INJECTION SUBCUTANEOUS at 05:33

## 2023-12-29 RX ADMIN — Medication 4 UNIT(S): at 17:29

## 2023-12-29 RX ADMIN — MAGNESIUM OXIDE 400 MG ORAL TABLET 400 MILLIGRAM(S): 241.3 TABLET ORAL at 08:30

## 2023-12-29 RX ADMIN — SIMVASTATIN 40 MILLIGRAM(S): 20 TABLET, FILM COATED ORAL at 21:58

## 2023-12-29 RX ADMIN — Medication 100 MILLIGRAM(S): at 17:42

## 2023-12-29 RX ADMIN — Medication 1 APPLICATION(S): at 19:00

## 2023-12-29 RX ADMIN — BIMATOPROST 1 DROP(S): 0.3 SOLUTION/ DROPS OPHTHALMIC at 21:59

## 2023-12-29 RX ADMIN — Medication 100 MILLIGRAM(S): at 05:33

## 2023-12-29 RX ADMIN — MAGNESIUM OXIDE 400 MG ORAL TABLET 400 MILLIGRAM(S): 241.3 TABLET ORAL at 13:10

## 2023-12-29 RX ADMIN — MAGNESIUM OXIDE 400 MG ORAL TABLET 400 MILLIGRAM(S): 241.3 TABLET ORAL at 17:42

## 2023-12-29 RX ADMIN — GABAPENTIN 300 MILLIGRAM(S): 400 CAPSULE ORAL at 05:33

## 2023-12-29 RX ADMIN — Medication 4 UNIT(S): at 12:13

## 2023-12-29 RX ADMIN — Medication 2: at 17:28

## 2023-12-29 RX ADMIN — INSULIN GLARGINE 28 UNIT(S): 100 INJECTION, SOLUTION SUBCUTANEOUS at 22:07

## 2023-12-29 RX ADMIN — Medication 1 APPLICATION(S): at 05:41

## 2023-12-29 RX ADMIN — LINAGLIPTIN 5 MILLIGRAM(S): 5 TABLET, FILM COATED ORAL at 21:59

## 2023-12-29 RX ADMIN — Medication 1 APPLICATION(S): at 17:41

## 2023-12-29 RX ADMIN — METFORMIN HYDROCHLORIDE 1000 MILLIGRAM(S): 850 TABLET ORAL at 05:33

## 2023-12-29 RX ADMIN — GABAPENTIN 300 MILLIGRAM(S): 400 CAPSULE ORAL at 21:55

## 2023-12-29 RX ADMIN — GABAPENTIN 300 MILLIGRAM(S): 400 CAPSULE ORAL at 14:07

## 2023-12-29 RX ADMIN — Medication 10 MILLIGRAM(S): at 12:15

## 2023-12-29 NOTE — PROGRESS NOTE ADULT - SUBJECTIVE AND OBJECTIVE BOX
HPI:  Mr. Mcdaniel is a 63M PMHx DM, HLD, PAD, prior toe amputation, presents to [facility] ED w L foot wound. Reports wound has been present on hindfoot x 3 weeks. Started as small cut. Over last 24 hours, pt unable to ambulate d/t severe pain. Reports significant malodor. Denies subjective fevers, chills, purulent drainage, numbness/paresthesia.     In ED, patient is tachycardic to 123. BP w HTN. Febrile to 102.4 F. Labs w WBC 21. Hgb 11.4. Lac 2.4. XR L foot w soft tissue gas at lateral hindfoot. CT LLE non con w soft tissue gas tracking ~7cm above foot.   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 Patient admitted to the SICU pre and post op for hyperglycemia and insulin drip management. Endocrinology was consulted for the hyperglycemia.  Patient transferred out of the SICU when he was able to be weaned off the insulin drip. Internal medicine was consulted for malachi-operative clearance for formalization surgery. On 12/4 the patient was taken back to the operating room for a left lower extremity guillotine revision. Infectious disease was consulted for antibiotic management and recommended switching from Zosyn to Unasyn. Rehab medicine was consulted to evaluate for rehab needs and recommended acute rehab on discharge.       ROS/subjective:  Pt seen and examined in chair, no lightheadedness, off Flomax-voids freely  No acute events overnight  Lid laceration appear to heal well  Moving Bowels - formed  - no burning no urgency-on abx per hospitalist  Pt denies cp, palpitations, sob, abd pain, N/V, fever, chills.   pain well controlled-Tylenol/Neurontin  DM2 educator appreciated-will follow recs-wife to be trained in subcut administration of insulin  stump healing well-staples in place      MEDICATIONS  (STANDING):  AQUAPHOR (petrolatum Ointment) 1 Application(s) Topical two times a day  bimatoprost 0.01% Ophthalmic Solution 1 Drop(s) Both EYES at bedtime  clotrimazole 1% Cream 1 Application(s) Topical two times a day  dextrose 5%. 1000 milliLiter(s) (50 mL/Hr) IV Continuous <Continuous>  dextrose 5%. 1000 milliLiter(s) (100 mL/Hr) IV Continuous <Continuous>  dextrose 50% Injectable 25 Gram(s) IV Push once  dextrose 50% Injectable 25 Gram(s) IV Push once  dextrose 50% Injectable 12.5 Gram(s) IV Push once  enalapril 10 milliGRAM(s) Oral <User Schedule>  enoxaparin Injectable 40 milliGRAM(s) SubCutaneous every 24 hours  gabapentin 300 milliGRAM(s) Oral three times a day  glucagon  Injectable 1 milliGRAM(s) IntraMuscular once  hydrocortisone 1% Cream 1 Application(s) Topical two times a day  insulin glargine Injectable (LANTUS) 28 Unit(s) SubCutaneous at bedtime  insulin lispro (ADMELOG) corrective regimen sliding scale   SubCutaneous at bedtime  insulin lispro (ADMELOG) corrective regimen sliding scale   SubCutaneous three times a day before meals  insulin lispro Injectable (ADMELOG) 4 Unit(s) SubCutaneous three times a day before meals  linagliptin 5 milliGRAM(s) Oral at bedtime  magnesium oxide 400 milliGRAM(s) Oral three times a day with meals  metFORMIN 1000 milliGRAM(s) Oral two times a day  nitrofurantoin monohydrate/macrocrystals (MACROBID) 100 milliGRAM(s) Oral two times a day  polyethylene glycol 3350 17 Gram(s) Oral two times a day  senna 2 Tablet(s) Oral at bedtime  simvastatin 40 milliGRAM(s) Oral at bedtime  sodium chloride 0.9%. 1000 milliLiter(s) (100 mL/Hr) IV Continuous <Continuous>    MEDICATIONS  (PRN):  acetaminophen     Tablet .. 650 milliGRAM(s) Oral every 6 hours PRN Moderate Pain (4 - 6)  dextrose Oral Gel 15 Gram(s) Oral once PRN Blood Glucose LESS THAN 70 milliGRAM(s)/deciliter  guaiFENesin Oral Liquid (Sugar-Free) 100 milliGRAM(s) Oral every 6 hours PRN Cough                            11.2   9.91  )-----------( 410      ( 28 Dec 2023 06:06 )             36.2     12-28    139  |  103  |  18  ----------------------------<  165<H>  5.2   |  28  |  0.89    Ca    9.2      28 Dec 2023 06:06    TPro  7.5  /  Alb  2.5<L>  /  TBili  0.2  /  DBili  x   /  AST  44<H>  /  ALT  85<H>  /  AlkPhos  120  12-28    Urinalysis Basic - ( 28 Dec 2023 06:06 )    Color: x / Appearance: x / SG: x / pH: x  Gluc: 165 mg/dL / Ketone: x  / Bili: x / Urobili: x   Blood: x / Protein: x / Nitrite: x   Leuk Esterase: x / RBC: x / WBC x   Sq Epi: x / Non Sq Epi: x / Bacteria: x        CAPILLARY BLOOD GLUCOSE      POCT Blood Glucose.: 171 mg/dL (29 Dec 2023 07:45)  POCT Blood Glucose.: 202 mg/dL (28 Dec 2023 21:22)  POCT Blood Glucose.: 155 mg/dL (28 Dec 2023 16:53)  POCT Blood Glucose.: 185 mg/dL (28 Dec 2023 12:18)      Vital Signs Last 24 Hrs  T(C): 36.9 (29 Dec 2023 08:04), Max: 36.9 (29 Dec 2023 08:04)  T(F): 98.4 (29 Dec 2023 08:04), Max: 98.4 (29 Dec 2023 08:04)  HR: 88 (29 Dec 2023 08:04) (85 - 93)  BP: 104/65 (29 Dec 2023 08:04) (104/65 - 138/75)  BP(mean): --  RR: 16 (29 Dec 2023 08:04) (16 - 16)  SpO2: 96% (29 Dec 2023 08:04) (94% - 96%)    Parameters below as of 29 Dec 2023 08:04  Patient On (Oxygen Delivery Method): room air      Physical Exam: Gen - NAD in chair  HEENT - NCAT, EOMI, steri to right lid  Neck - Supple, No limited ROM  Pulm - CTAB, No wheeze, No rhonchi, No crackles  Cardiovascular - RRR, S1S2, No murmurs  Abdomen - Soft, NT/ND, +BS  Extremities - LLE with recent BKA.   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 No drainage. C/d/i. skin well approximated and staples present. Stage 1 sacral decubitis ulcer present. less erythema noted over Patella tendon - padded with DSD  Wounds: Patient with scab of 4th digit of RLE.      IDT meeting on 12/27    SW: PH, 3STE, wife    OT:  eating set  grooming det  UBD/LBD cg/min  toileting min /cg  tub/shower min/cg  bathing cg    PT:  amb cs  transfers cg  stairs unable    SLP    tentative dc 12/30 HC          Continue comprehensive acute rehab program consisting of 3hrs/day of OT/PT. HPI:  Mr. Mcdaniel is a 63M PMHx DM, HLD, PAD, prior toe amputation, presents to [facility] ED w L foot wound. Reports wound has been present on hindfoot x 3 weeks. Started as small cut. Over last 24 hours, pt unable to ambulate d/t severe pain. Reports significant malodor. Denies subjective fevers, chills, purulent drainage, numbness/paresthesia.     In ED, patient is tachycardic to 123. BP w HTN. Febrile to 102.4 F. Labs w WBC 21. Hgb 11.4. Lac 2.4. XR L foot w soft tissue gas at lateral hindfoot. CT LLE non con w soft tissue gas tracking ~7cm above foot.   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 Patient admitted to the SICU pre and post op for hyperglycemia and insulin drip management. Endocrinology was consulted for the hyperglycemia.  Patient transferred out of the SICU when he was able to be weaned off the insulin drip. Internal medicine was consulted for malachi-operative clearance for formalization surgery. On 12/4 the patient was taken back to the operating room for a left lower extremity guillotine revision. Infectious disease was consulted for antibiotic management and recommended switching from Zosyn to Unasyn. Rehab medicine was consulted to evaluate for rehab needs and recommended acute rehab on discharge.       ROS/subjective:  Pt seen and examined in chair, no lightheadedness, off Flomax-voids freely  No acute events overnight  Lid laceration appear to heal well  Moving Bowels - formed  - no burning no urgency-on abx per hospitalist  Pt denies cp, palpitations, sob, abd pain, N/V, fever, chills.   pain well controlled-Tylenol/Neurontin  DM2 educator appreciated-will follow recs-wife to be trained in subcut administration of insulin  stump healing well-staples in place      MEDICATIONS  (STANDING):  AQUAPHOR (petrolatum Ointment) 1 Application(s) Topical two times a day  bimatoprost 0.01% Ophthalmic Solution 1 Drop(s) Both EYES at bedtime  clotrimazole 1% Cream 1 Application(s) Topical two times a day  dextrose 5%. 1000 milliLiter(s) (50 mL/Hr) IV Continuous <Continuous>  dextrose 5%. 1000 milliLiter(s) (100 mL/Hr) IV Continuous <Continuous>  dextrose 50% Injectable 25 Gram(s) IV Push once  dextrose 50% Injectable 25 Gram(s) IV Push once  dextrose 50% Injectable 12.5 Gram(s) IV Push once  enalapril 10 milliGRAM(s) Oral <User Schedule>  enoxaparin Injectable 40 milliGRAM(s) SubCutaneous every 24 hours  gabapentin 300 milliGRAM(s) Oral three times a day  glucagon  Injectable 1 milliGRAM(s) IntraMuscular once  hydrocortisone 1% Cream 1 Application(s) Topical two times a day  insulin glargine Injectable (LANTUS) 28 Unit(s) SubCutaneous at bedtime  insulin lispro (ADMELOG) corrective regimen sliding scale   SubCutaneous at bedtime  insulin lispro (ADMELOG) corrective regimen sliding scale   SubCutaneous three times a day before meals  insulin lispro Injectable (ADMELOG) 4 Unit(s) SubCutaneous three times a day before meals  linagliptin 5 milliGRAM(s) Oral at bedtime  magnesium oxide 400 milliGRAM(s) Oral three times a day with meals  metFORMIN 1000 milliGRAM(s) Oral two times a day  nitrofurantoin monohydrate/macrocrystals (MACROBID) 100 milliGRAM(s) Oral two times a day  polyethylene glycol 3350 17 Gram(s) Oral two times a day  senna 2 Tablet(s) Oral at bedtime  simvastatin 40 milliGRAM(s) Oral at bedtime  sodium chloride 0.9%. 1000 milliLiter(s) (100 mL/Hr) IV Continuous <Continuous>    MEDICATIONS  (PRN):  acetaminophen     Tablet .. 650 milliGRAM(s) Oral every 6 hours PRN Moderate Pain (4 - 6)  dextrose Oral Gel 15 Gram(s) Oral once PRN Blood Glucose LESS THAN 70 milliGRAM(s)/deciliter  guaiFENesin Oral Liquid (Sugar-Free) 100 milliGRAM(s) Oral every 6 hours PRN Cough                            11.2   9.91  )-----------( 410      ( 28 Dec 2023 06:06 )             36.2     12-28    139  |  103  |  18  ----------------------------<  165<H>  5.2   |  28  |  0.89    Ca    9.2      28 Dec 2023 06:06    TPro  7.5  /  Alb  2.5<L>  /  TBili  0.2  /  DBili  x   /  AST  44<H>  /  ALT  85<H>  /  AlkPhos  120  12-28    Urinalysis Basic - ( 28 Dec 2023 06:06 )    Color: x / Appearance: x / SG: x / pH: x  Gluc: 165 mg/dL / Ketone: x  / Bili: x / Urobili: x   Blood: x / Protein: x / Nitrite: x   Leuk Esterase: x / RBC: x / WBC x   Sq Epi: x / Non Sq Epi: x / Bacteria: x        CAPILLARY BLOOD GLUCOSE      POCT Blood Glucose.: 171 mg/dL (29 Dec 2023 07:45)  POCT Blood Glucose.: 202 mg/dL (28 Dec 2023 21:22)  POCT Blood Glucose.: 155 mg/dL (28 Dec 2023 16:53)  POCT Blood Glucose.: 185 mg/dL (28 Dec 2023 12:18)      Vital Signs Last 24 Hrs  T(C): 36.9 (29 Dec 2023 08:04), Max: 36.9 (29 Dec 2023 08:04)  T(F): 98.4 (29 Dec 2023 08:04), Max: 98.4 (29 Dec 2023 08:04)  HR: 88 (29 Dec 2023 08:04) (85 - 93)  BP: 104/65 (29 Dec 2023 08:04) (104/65 - 138/75)  BP(mean): --  RR: 16 (29 Dec 2023 08:04) (16 - 16)  SpO2: 96% (29 Dec 2023 08:04) (94% - 96%)    Parameters below as of 29 Dec 2023 08:04  Patient On (Oxygen Delivery Method): room air      Physical Exam: Gen - NAD in chair  HEENT - NCAT, EOMI, steri to right lid  Neck - Supple, No limited ROM  Pulm - CTAB, No wheeze, No rhonchi, No crackles  Cardiovascular - RRR, S1S2, No murmurs  Abdomen - Soft, NT/ND, +BS  Extremities - LLE with recent BKA.   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 No drainage. C/d/i. skin well approximated and staples present. Stage 1 sacral decubitis ulcer present. less erythema noted over Patella tendon - padded with DSD  Wounds: Patient with scab of 4th digit of RLE.      IDT meeting on 12/27    SW: PH, 3STE, wife    OT:  eating set  grooming det  UBD/LBD cg/min  toileting min /cg  tub/shower min/cg  bathing cg    PT:  amb cs  transfers cg  stairs unable    SLP    tentative dc 12/30 HC          Continue comprehensive acute rehab program consisting of 3hrs/day of OT/PT. HPI:  Mr. Mcdaniel is a 63M PMHx DM, HLD, PAD, prior toe amputation, presents to [facility] ED w L foot wound. Reports wound has been present on hindfoot x 3 weeks. Started as small cut. Over last 24 hours, pt unable to ambulate d/t severe pain. Reports significant malodor. Denies subjective fevers, chills, purulent drainage, numbness/paresthesia.     In ED, patient is tachycardic to 123. BP w HTN. Febrile to 102.4 F. Labs w WBC 21. Hgb 11.4. Lac 2.4. XR L foot w soft tissue gas at lateral hindfoot. CT LLE non con w soft tissue gas tracking ~7cm above foot.   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 Patient admitted to the SICU pre and post op for hyperglycemia and insulin drip management. Endocrinology was consulted for the hyperglycemia.  Patient transferred out of the SICU when he was able to be weaned off the insulin drip. Internal medicine was consulted for malachi-operative clearance for formalization surgery. On 12/4 the patient was taken back to the operating room for a left lower extremity guillotine revision. Infectious disease was consulted for antibiotic management and recommended switching from Zosyn to Unasyn. Rehab medicine was consulted to evaluate for rehab needs and recommended acute rehab on discharge.       ROS/subjective:  Pt seen and examined in chair, no lightheadedness, off Flomax-voids freely  No acute events overnight  Lid laceration appear to heal well  Moving Bowels - formed  - no burning no urgency-on abx per hospitalist  Pt denies cp, palpitations, sob, abd pain, N/V, fever, chills.   pain well controlled-Tylenol/Neurontin  DM2 educator appreciated-will follow recs-wife to be trained in subcut administration of insulin  stump healing well-staples in place      MEDICATIONS  (STANDING):  AQUAPHOR (petrolatum Ointment) 1 Application(s) Topical two times a day  bimatoprost 0.01% Ophthalmic Solution 1 Drop(s) Both EYES at bedtime  clotrimazole 1% Cream 1 Application(s) Topical two times a day  dextrose 5%. 1000 milliLiter(s) (50 mL/Hr) IV Continuous <Continuous>  dextrose 5%. 1000 milliLiter(s) (100 mL/Hr) IV Continuous <Continuous>  dextrose 50% Injectable 25 Gram(s) IV Push once  dextrose 50% Injectable 25 Gram(s) IV Push once  dextrose 50% Injectable 12.5 Gram(s) IV Push once  enalapril 10 milliGRAM(s) Oral <User Schedule>  enoxaparin Injectable 40 milliGRAM(s) SubCutaneous every 24 hours  gabapentin 300 milliGRAM(s) Oral three times a day  glucagon  Injectable 1 milliGRAM(s) IntraMuscular once  hydrocortisone 1% Cream 1 Application(s) Topical two times a day  insulin glargine Injectable (LANTUS) 28 Unit(s) SubCutaneous at bedtime  insulin lispro (ADMELOG) corrective regimen sliding scale   SubCutaneous at bedtime  insulin lispro (ADMELOG) corrective regimen sliding scale   SubCutaneous three times a day before meals  insulin lispro Injectable (ADMELOG) 4 Unit(s) SubCutaneous three times a day before meals  linagliptin 5 milliGRAM(s) Oral at bedtime  magnesium oxide 400 milliGRAM(s) Oral three times a day with meals  metFORMIN 1000 milliGRAM(s) Oral two times a day  nitrofurantoin monohydrate/macrocrystals (MACROBID) 100 milliGRAM(s) Oral two times a day  polyethylene glycol 3350 17 Gram(s) Oral two times a day  senna 2 Tablet(s) Oral at bedtime  simvastatin 40 milliGRAM(s) Oral at bedtime  sodium chloride 0.9%. 1000 milliLiter(s) (100 mL/Hr) IV Continuous <Continuous>    MEDICATIONS  (PRN):  acetaminophen     Tablet .. 650 milliGRAM(s) Oral every 6 hours PRN Moderate Pain (4 - 6)  dextrose Oral Gel 15 Gram(s) Oral once PRN Blood Glucose LESS THAN 70 milliGRAM(s)/deciliter  guaiFENesin Oral Liquid (Sugar-Free) 100 milliGRAM(s) Oral every 6 hours PRN Cough                            11.2   9.91  )-----------( 410      ( 28 Dec 2023 06:06 )             36.2     12-28    139  |  103  |  18  ----------------------------<  165<H>  5.2   |  28  |  0.89    Ca    9.2      28 Dec 2023 06:06    TPro  7.5  /  Alb  2.5<L>  /  TBili  0.2  /  DBili  x   /  AST  44<H>  /  ALT  85<H>  /  AlkPhos  120  12-28    Urinalysis Basic - ( 28 Dec 2023 06:06 )    Color: x / Appearance: x / SG: x / pH: x  Gluc: 165 mg/dL / Ketone: x  / Bili: x / Urobili: x   Blood: x / Protein: x / Nitrite: x   Leuk Esterase: x / RBC: x / WBC x   Sq Epi: x / Non Sq Epi: x / Bacteria: x        CAPILLARY BLOOD GLUCOSE      POCT Blood Glucose.: 171 mg/dL (29 Dec 2023 07:45)  POCT Blood Glucose.: 202 mg/dL (28 Dec 2023 21:22)  POCT Blood Glucose.: 155 mg/dL (28 Dec 2023 16:53)  POCT Blood Glucose.: 185 mg/dL (28 Dec 2023 12:18)      Vital Signs Last 24 Hrs  T(C): 36.9 (29 Dec 2023 08:04), Max: 36.9 (29 Dec 2023 08:04)  T(F): 98.4 (29 Dec 2023 08:04), Max: 98.4 (29 Dec 2023 08:04)  HR: 88 (29 Dec 2023 08:04) (85 - 93)  BP: 104/65 (29 Dec 2023 08:04) (104/65 - 138/75)  BP(mean): --  RR: 16 (29 Dec 2023 08:04) (16 - 16)  SpO2: 96% (29 Dec 2023 08:04) (94% - 96%)    Parameters below as of 29 Dec 2023 08:04  Patient On (Oxygen Delivery Method): room air      Physical Exam: Gen - NAD in bed  HEENT - NCAT, EOMI, healing right lid  Neck - Supple, No limited ROM  Pulm - CTAB, No wheeze, No rhonchi, No crackles  Cardiovascular - RRR, S1S2, No murmurs  Abdomen - Soft, NT/ND, +BS  Extremities - LLE with recent BKA.   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 No drainage. C/d/i. skin well approximated and staples present. Stage 1 sacral decubitis ulcer present. less erythema noted over Patella tendon - padded with DSD  Wounds: Patient with scab of 4th digit of RLE.      IDT meeting on 12/27    SW: PH, 3STE, wife    OT:  eating set  grooming det  UBD/LBD cg/min  toileting min /cg  tub/shower min/cg  bathing cg    PT:  amb cs  transfers cg  stairs unable    SLP    tentative dc 12/30 HC          Continue comprehensive acute rehab program consisting of 3hrs/day of OT/PT. HPI:  Mr. Mcdaniel is a 63M PMHx DM, HLD, PAD, prior toe amputation, presents to [facility] ED w L foot wound. Reports wound has been present on hindfoot x 3 weeks. Started as small cut. Over last 24 hours, pt unable to ambulate d/t severe pain. Reports significant malodor. Denies subjective fevers, chills, purulent drainage, numbness/paresthesia.     In ED, patient is tachycardic to 123. BP w HTN. Febrile to 102.4 F. Labs w WBC 21. Hgb 11.4. Lac 2.4. XR L foot w soft tissue gas at lateral hindfoot. CT LLE non con w soft tissue gas tracking ~7cm above foot.   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 Patient admitted to the SICU pre and post op for hyperglycemia and insulin drip management. Endocrinology was consulted for the hyperglycemia.  Patient transferred out of the SICU when he was able to be weaned off the insulin drip. Internal medicine was consulted for malachi-operative clearance for formalization surgery. On 12/4 the patient was taken back to the operating room for a left lower extremity guillotine revision. Infectious disease was consulted for antibiotic management and recommended switching from Zosyn to Unasyn. Rehab medicine was consulted to evaluate for rehab needs and recommended acute rehab on discharge.       ROS/subjective:  Pt seen and examined in chair, no lightheadedness, off Flomax-voids freely  No acute events overnight  Lid laceration appear to heal well  Moving Bowels - formed  - no burning no urgency-on abx per hospitalist  Pt denies cp, palpitations, sob, abd pain, N/V, fever, chills.   pain well controlled-Tylenol/Neurontin  DM2 educator appreciated-will follow recs-wife to be trained in subcut administration of insulin  stump healing well-staples in place      MEDICATIONS  (STANDING):  AQUAPHOR (petrolatum Ointment) 1 Application(s) Topical two times a day  bimatoprost 0.01% Ophthalmic Solution 1 Drop(s) Both EYES at bedtime  clotrimazole 1% Cream 1 Application(s) Topical two times a day  dextrose 5%. 1000 milliLiter(s) (50 mL/Hr) IV Continuous <Continuous>  dextrose 5%. 1000 milliLiter(s) (100 mL/Hr) IV Continuous <Continuous>  dextrose 50% Injectable 25 Gram(s) IV Push once  dextrose 50% Injectable 25 Gram(s) IV Push once  dextrose 50% Injectable 12.5 Gram(s) IV Push once  enalapril 10 milliGRAM(s) Oral <User Schedule>  enoxaparin Injectable 40 milliGRAM(s) SubCutaneous every 24 hours  gabapentin 300 milliGRAM(s) Oral three times a day  glucagon  Injectable 1 milliGRAM(s) IntraMuscular once  hydrocortisone 1% Cream 1 Application(s) Topical two times a day  insulin glargine Injectable (LANTUS) 28 Unit(s) SubCutaneous at bedtime  insulin lispro (ADMELOG) corrective regimen sliding scale   SubCutaneous at bedtime  insulin lispro (ADMELOG) corrective regimen sliding scale   SubCutaneous three times a day before meals  insulin lispro Injectable (ADMELOG) 4 Unit(s) SubCutaneous three times a day before meals  linagliptin 5 milliGRAM(s) Oral at bedtime  magnesium oxide 400 milliGRAM(s) Oral three times a day with meals  metFORMIN 1000 milliGRAM(s) Oral two times a day  nitrofurantoin monohydrate/macrocrystals (MACROBID) 100 milliGRAM(s) Oral two times a day  polyethylene glycol 3350 17 Gram(s) Oral two times a day  senna 2 Tablet(s) Oral at bedtime  simvastatin 40 milliGRAM(s) Oral at bedtime  sodium chloride 0.9%. 1000 milliLiter(s) (100 mL/Hr) IV Continuous <Continuous>    MEDICATIONS  (PRN):  acetaminophen     Tablet .. 650 milliGRAM(s) Oral every 6 hours PRN Moderate Pain (4 - 6)  dextrose Oral Gel 15 Gram(s) Oral once PRN Blood Glucose LESS THAN 70 milliGRAM(s)/deciliter  guaiFENesin Oral Liquid (Sugar-Free) 100 milliGRAM(s) Oral every 6 hours PRN Cough                            11.2   9.91  )-----------( 410      ( 28 Dec 2023 06:06 )             36.2     12-28    139  |  103  |  18  ----------------------------<  165<H>  5.2   |  28  |  0.89    Ca    9.2      28 Dec 2023 06:06    TPro  7.5  /  Alb  2.5<L>  /  TBili  0.2  /  DBili  x   /  AST  44<H>  /  ALT  85<H>  /  AlkPhos  120  12-28    Urinalysis Basic - ( 28 Dec 2023 06:06 )    Color: x / Appearance: x / SG: x / pH: x  Gluc: 165 mg/dL / Ketone: x  / Bili: x / Urobili: x   Blood: x / Protein: x / Nitrite: x   Leuk Esterase: x / RBC: x / WBC x   Sq Epi: x / Non Sq Epi: x / Bacteria: x        CAPILLARY BLOOD GLUCOSE      POCT Blood Glucose.: 171 mg/dL (29 Dec 2023 07:45)  POCT Blood Glucose.: 202 mg/dL (28 Dec 2023 21:22)  POCT Blood Glucose.: 155 mg/dL (28 Dec 2023 16:53)  POCT Blood Glucose.: 185 mg/dL (28 Dec 2023 12:18)      Vital Signs Last 24 Hrs  T(C): 36.9 (29 Dec 2023 08:04), Max: 36.9 (29 Dec 2023 08:04)  T(F): 98.4 (29 Dec 2023 08:04), Max: 98.4 (29 Dec 2023 08:04)  HR: 88 (29 Dec 2023 08:04) (85 - 93)  BP: 104/65 (29 Dec 2023 08:04) (104/65 - 138/75)  BP(mean): --  RR: 16 (29 Dec 2023 08:04) (16 - 16)  SpO2: 96% (29 Dec 2023 08:04) (94% - 96%)    Parameters below as of 29 Dec 2023 08:04  Patient On (Oxygen Delivery Method): room air      Physical Exam: Gen - NAD in bed  HEENT - NCAT, EOMI, healing right lid  Neck - Supple, No limited ROM  Pulm - CTAB, No wheeze, No rhonchi, No crackles  Cardiovascular - RRR, S1S2, No murmurs  Abdomen - Soft, NT/ND, +BS  Extremities - LLE with recent BKA.   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 No drainage. C/d/i. skin well approximated and staples present. Stage 1 sacral decubitis ulcer present. less erythema noted over Patella tendon - padded with DSD  Wounds: Patient with scab of 4th digit of RLE.      IDT meeting on 12/27    SW: PH, 3STE, wife    OT:  eating set  grooming det  UBD/LBD cg/min  toileting min /cg  tub/shower min/cg  bathing cg    PT:  amb cs  transfers cg  stairs unable    SLP    tentative dc 12/30 HC          Continue comprehensive acute rehab program consisting of 3hrs/day of OT/PT.

## 2023-12-29 NOTE — CHART NOTE - NSCHARTNOTEFT_GEN_A_CORE
NUTRITION FOLLOW UP    SOURCE: Patient [X]   Family [X]    Medical Record [X]    DIET: Diet, Consistent Carbohydrate w/Evening Snack (12-19-23 @ 14:07) [Active]  ALLERGIES: NKFA    IMPRESSION:  Met with pt and wife this afternoon at bedside. Pt was seated upright and able to articulate his nutrition hx well. Pt reported good appetite and taking adequate POs. Pt denied N/V/D/C, stated last BM this AM 12/29. Completed basic DM education including the association of BG/CHO, CHO control, avoidance of concentrated sweets, "balanced plate" concept, general healthy-eating guidelines and benefit of achieving a healthy wt with regards to DM management. Reviewed Carbohydrate Consistent diet including sources of carbohydrates, portion sizes, pairing protein with carbohydrates, limiting sugar sweetened beverages in diet and the importance of consistent eating pattern to help optimize glycemic control. Written materials on DM management provided for reference (see below).     Educational Handouts (source):  -     PATIENT REPORT: [X] other: no GI distress    PO INTAKE: % per RN flowsheets    CURRENT WEIGHT: 202.3 lbs (12/16)  WEIGHT HX: N/A    PERTINENT MEDS:   Pertinent Medications: MEDICATIONS  (STANDING):  AQUAPHOR (petrolatum Ointment) 1 Application(s) Topical two times a day  bimatoprost 0.01% Ophthalmic Solution 1 Drop(s) Both EYES at bedtime  clotrimazole 1% Cream 1 Application(s) Topical two times a day  dextrose 5%. 1000 milliLiter(s) (100 mL/Hr) IV Continuous <Continuous>  dextrose 5%. 1000 milliLiter(s) (50 mL/Hr) IV Continuous <Continuous>  dextrose 50% Injectable 25 Gram(s) IV Push once  dextrose 50% Injectable 12.5 Gram(s) IV Push once  dextrose 50% Injectable 25 Gram(s) IV Push once  enalapril 10 milliGRAM(s) Oral <User Schedule>  enoxaparin Injectable 40 milliGRAM(s) SubCutaneous every 24 hours  gabapentin 300 milliGRAM(s) Oral three times a day  glucagon  Injectable 1 milliGRAM(s) IntraMuscular once  hydrocortisone 1% Cream 1 Application(s) Topical two times a day  insulin glargine Injectable (LANTUS) 28 Unit(s) SubCutaneous at bedtime  insulin lispro (ADMELOG) corrective regimen sliding scale   SubCutaneous at bedtime  insulin lispro (ADMELOG) corrective regimen sliding scale   SubCutaneous three times a day before meals  insulin lispro Injectable (ADMELOG) 4 Unit(s) SubCutaneous three times a day before meals  linagliptin 5 milliGRAM(s) Oral at bedtime  magnesium oxide 400 milliGRAM(s) Oral three times a day with meals  metFORMIN 1000 milliGRAM(s) Oral two times a day  nitrofurantoin monohydrate/macrocrystals (MACROBID) 100 milliGRAM(s) Oral two times a day  polyethylene glycol 3350 17 Gram(s) Oral two times a day  senna 2 Tablet(s) Oral at bedtime  simvastatin 40 milliGRAM(s) Oral at bedtime  sodium chloride 0.9%. 1000 milliLiter(s) (100 mL/Hr) IV Continuous <Continuous>    MEDICATIONS  (PRN):  acetaminophen     Tablet .. 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 Continue with current diet  2. Appreciate weekly weight trends  3. Continue with current bowel regimen, prn    uLiza Campos RDN also available via TEAMS NUTRITION FOLLOW UP    SOURCE: Patient [X]   Family [X]    Medical Record [X]    DIET: Diet, Consistent Carbohydrate w/Evening Snack (12-19-23 @ 14:07) [Active]  ALLERGIES: NKFA    IMPRESSION:  Met with pt and wife this afternoon at bedside. Pt was seated upright and able to articulate his nutrition hx well. Pt reported good appetite and taking adequate POs. Pt denied N/V/D/C, stated last BM this AM 12/29. Completed basic DM education including the association of BG/CHO, CHO control, avoidance of concentrated sweets, "balanced plate" concept, general healthy-eating guidelines and benefit of achieving a healthy wt with regards to DM management. Reviewed Carbohydrate Consistent diet including sources of carbohydrates, portion sizes, pairing protein with carbohydrates, limiting sugar sweetened beverages in diet and the importance of consistent eating pattern to help optimize glycemic control. Written materials on DM management provided for reference (see below).     Educational Handouts (source):  -     PATIENT REPORT: [X] other: no GI distress    PO INTAKE: % per RN flowsheets    CURRENT WEIGHT: 202.3 lbs (12/16)  WEIGHT HX: N/A    PERTINENT MEDS:   Pertinent Medications: MEDICATIONS  (STANDING):  AQUAPHOR (petrolatum Ointment) 1 Application(s) Topical two times a day  bimatoprost 0.01% Ophthalmic Solution 1 Drop(s) Both EYES at bedtime  clotrimazole 1% Cream 1 Application(s) Topical two times a day  dextrose 5%. 1000 milliLiter(s) (100 mL/Hr) IV Continuous <Continuous>  dextrose 5%. 1000 milliLiter(s) (50 mL/Hr) IV Continuous <Continuous>  dextrose 50% Injectable 25 Gram(s) IV Push once  dextrose 50% Injectable 12.5 Gram(s) IV Push once  dextrose 50% Injectable 25 Gram(s) IV Push once  enalapril 10 milliGRAM(s) Oral <User Schedule>  enoxaparin Injectable 40 milliGRAM(s) SubCutaneous every 24 hours  gabapentin 300 milliGRAM(s) Oral three times a day  glucagon  Injectable 1 milliGRAM(s) IntraMuscular once  hydrocortisone 1% Cream 1 Application(s) Topical two times a day  insulin glargine Injectable (LANTUS) 28 Unit(s) SubCutaneous at bedtime  insulin lispro (ADMELOG) corrective regimen sliding scale   SubCutaneous at bedtime  insulin lispro (ADMELOG) corrective regimen sliding scale   SubCutaneous three times a day before meals  insulin lispro Injectable (ADMELOG) 4 Unit(s) SubCutaneous three times a day before meals  linagliptin 5 milliGRAM(s) Oral at bedtime  magnesium oxide 400 milliGRAM(s) Oral three times a day with meals  metFORMIN 1000 milliGRAM(s) Oral two times a day  nitrofurantoin monohydrate/macrocrystals (MACROBID) 100 milliGRAM(s) Oral two times a day  polyethylene glycol 3350 17 Gram(s) Oral two times a day  senna 2 Tablet(s) Oral at bedtime  simvastatin 40 milliGRAM(s) Oral at bedtime  sodium chloride 0.9%. 1000 milliLiter(s) (100 mL/Hr) IV Continuous <Continuous>    MEDICATIONS  (PRN):  acetaminophen     Tablet .. 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 Continue with current diet  2. Appreciate weekly weight trends  3. Continue with current bowel regimen, prn    Luiza Campos RDN also available via TEAMS NUTRITION FOLLOW UP    SOURCE: Patient [X]   Family [X]    Medical Record [X]    DIET: Diet, Consistent Carbohydrate w/Evening Snack (12-19-23 @ 14:07) [Active]  ALLERGIES: NKFA    IMPRESSION:  Met with pt and wife this afternoon at bedside. Pt was seated upright and able to articulate his nutrition hx well. Pt reported good appetite and taking adequate POs. Pt denied N/V/D/C, stated last BM this AM 12/29. Completed basic DM education including the association of BG/CHO, CHO control, avoidance of concentrated sweets, "balanced plate" concept, general healthy-eating guidelines and benefit of achieving a healthy wt with regards to DM management. Reviewed Carbohydrate Consistent diet including sources of carbohydrates, portion sizes, pairing protein with carbohydrates, limiting sugar sweetened beverages in diet and the importance of consistent eating pattern to help optimize glycemic control. Written materials on DM management provided for reference (see below).     Educational Handouts (source):  - Carbohydrate Counting for people with diabetes (Nutrition Care Manual)  - Plate Method for Diabetes (NCM)  - Diabetes Label Reading Tips (NCM)    PATIENT REPORT: [X] other: no GI distress    PO INTAKE: % per RN flowsheets    CURRENT WEIGHT: 202.3 lbs (12/16)  WEIGHT HX: N/A    PERTINENT MEDS:   Pertinent Medications: MEDICATIONS  (STANDING):  AQUAPHOR (petrolatum Ointment) 1 Application(s) Topical two times a day  bimatoprost 0.01% Ophthalmic Solution 1 Drop(s) Both EYES at bedtime  clotrimazole 1% Cream 1 Application(s) Topical two times a day  dextrose 5%. 1000 milliLiter(s) (100 mL/Hr) IV Continuous <Continuous>  dextrose 5%. 1000 milliLiter(s) (50 mL/Hr) IV Continuous <Continuous>  dextrose 50% Injectable 25 Gram(s) IV Push once  dextrose 50% Injectable 12.5 Gram(s) IV Push once  dextrose 50% Injectable 25 Gram(s) IV Push once  enalapril 10 milliGRAM(s) Oral <User Schedule>  enoxaparin Injectable 40 milliGRAM(s) SubCutaneous every 24 hours  gabapentin 300 milliGRAM(s) Oral three times a day  glucagon  Injectable 1 milliGRAM(s) IntraMuscular once  hydrocortisone 1% Cream 1 Application(s) Topical two times a day  insulin glargine Injectable (LANTUS) 28 Unit(s) SubCutaneous at bedtime  insulin lispro (ADMELOG) corrective regimen sliding scale   SubCutaneous at bedtime  insulin lispro (ADMELOG) corrective regimen sliding scale   SubCutaneous three times a day before meals  insulin lispro Injectable (ADMELOG) 4 Unit(s) SubCutaneous three times a day before meals  linagliptin 5 milliGRAM(s) Oral at bedtime  magnesium oxide 400 milliGRAM(s) Oral three times a day with meals  metFORMIN 1000 milliGRAM(s) Oral two times a day  nitrofurantoin monohydrate/macrocrystals (MACROBID) 100 milliGRAM(s) Oral two times a day  polyethylene glycol 3350 17 Gram(s) Oral two times a day  senna 2 Tablet(s) Oral at bedtime  simvastatin 40 milliGRAM(s) Oral at bedtime  sodium chloride 0.9%. 1000 milliLiter(s) (100 mL/Hr) IV Continuous <Continuous>    MEDICATIONS  (PRN):  acetaminophen     Tablet .. 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 Continue with current diet  2. Appreciate weekly weight trends  3. Continue with current bowel regimen, prn    Luiza Campos RDN also available via TEAMS

## 2023-12-29 NOTE — PROGRESS NOTE ADULT - NS ATTEND AMEND GEN_ALL_CORE FT
Rehab Attending- Patient seen and examined by me - Case discussed, above note reviewed by me with modifications made    patient seen and evaluated bedside  voiding well- on macrobid for UTI till DC  to train wife for Tresiba administration  labs reviewed by me- stable  Left BKA inspected- suture line intact- less erythema?DTI over patella tendon- to protect with 4x4  to continue intensive rehab program    Vital Signs Last 24 Hrs  T(C): 36.8 (28 Dec 2023 09:03), Max: 36.8 (27 Dec 2023 19:39)  T(F): 98.2 (28 Dec 2023 09:03), Max: 98.3 (27 Dec 2023 19:39)  HR: 93 (28 Dec 2023 12:20) (87 - 93)  BP: 133/85 (28 Dec 2023 12:20) (133/85 - 151/91)  BP(mean): --  RR: 16 (28 Dec 2023 09:03) (16 - 16)  SpO2: 95% (28 Dec 2023 09:03) (95% - 96%)    Parameters below as of 28 Dec 2023 09:03  Patient On (Oxygen Delivery Method): room air      Physical Exam: Gen - NAD in chair  HEENT - NCAT, EOMI, steri to right lid  Neck - Supple, No limited ROM  Pulm - CTAB, No wheeze, No rhonchi, No crackles  Cardiovascular - RRR, S1S2, No murmurs  Abdomen - Soft, NT/ND, +BS  Extremities - LLE with recent BKA.   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 No drainage. C/d/i. skin well approximated and staples present. Stage 1 sacral decubitis ulcer present. erythema noted over Patella tendon - padded with DSD  Wounds: Patient with scab of 4th digit of RLE. Rehab Attending- Patient seen and examined by me - Case discussed, above note reviewed by me with modifications made    patient seen and evaluated bedside  Anticipate Dc in AM  voiding well- on macrobid for UTI till DC  Tresiba not approved by Ins- can Dc on Lantus instead  Tradjenta, metformin as well  to continue intensive rehab program    Vital Signs Last 24 Hrs  T(C): 36.9 (29 Dec 2023 08:04), Max: 36.9 (29 Dec 2023 08:04)  T(F): 98.4 (29 Dec 2023 08:04), Max: 98.4 (29 Dec 2023 08:04)  HR: 85 (29 Dec 2023 13:20) (85 - 88)  BP: 120/67 (29 Dec 2023 13:20) (104/65 - 138/75)  BP(mean): --  RR: 16 (29 Dec 2023 08:04) (16 - 16)  SpO2: 96% (29 Dec 2023 08:04) (94% - 96%)    Parameters below as of 29 Dec 2023 08:04  Patient On (Oxygen Delivery Method): room air      Physical Exam: Gen - NAD in chair  HEENT - NCAT, EOMI, steri to right lid  Neck - Supple, No limited ROM  Pulm - CTAB, No wheeze, No rhonchi, No crackles  Cardiovascular - RRR, S1S2, No murmurs  Abdomen - Soft, NT/ND, +BS  Extremities - LLE with recent BKA.   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 No drainage. C/d/i. skin well approximated and staples present. Stage 1 sacral decubitis ulcer present. erythema over Patella tendon now resolved - padded with DSD  Wounds: Patient with scab of 4th digit of RLE.

## 2023-12-30 VITALS
SYSTOLIC BLOOD PRESSURE: 146 MMHG | TEMPERATURE: 98 F | OXYGEN SATURATION: 96 % | HEART RATE: 80 BPM | RESPIRATION RATE: 14 BRPM | DIASTOLIC BLOOD PRESSURE: 83 MMHG

## 2023-12-30 LAB
GLUCOSE BLDC GLUCOMTR-MCNC: 100 MG/DL — HIGH (ref 70–99)
GLUCOSE BLDC GLUCOMTR-MCNC: 100 MG/DL — HIGH (ref 70–99)
GLUCOSE BLDC GLUCOMTR-MCNC: 228 MG/DL — HIGH (ref 70–99)
GLUCOSE BLDC GLUCOMTR-MCNC: 228 MG/DL — HIGH (ref 70–99)

## 2023-12-30 PROCEDURE — 97530 THERAPEUTIC ACTIVITIES: CPT

## 2023-12-30 PROCEDURE — 97163 PT EVAL HIGH COMPLEX 45 MIN: CPT

## 2023-12-30 PROCEDURE — 71045 X-RAY EXAM CHEST 1 VIEW: CPT

## 2023-12-30 PROCEDURE — 93225 XTRNL ECG REC<48 HRS REC: CPT

## 2023-12-30 PROCEDURE — 70450 CT HEAD/BRAIN W/O DYE: CPT

## 2023-12-30 PROCEDURE — 85025 COMPLETE CBC W/AUTO DIFF WBC: CPT

## 2023-12-30 PROCEDURE — 93005 ELECTROCARDIOGRAM TRACING: CPT

## 2023-12-30 PROCEDURE — 93306 TTE W/DOPPLER COMPLETE: CPT

## 2023-12-30 PROCEDURE — 84484 ASSAY OF TROPONIN QUANT: CPT

## 2023-12-30 PROCEDURE — 97535 SELF CARE MNGMENT TRAINING: CPT

## 2023-12-30 PROCEDURE — 97167 OT EVAL HIGH COMPLEX 60 MIN: CPT

## 2023-12-30 PROCEDURE — 87086 URINE CULTURE/COLONY COUNT: CPT

## 2023-12-30 PROCEDURE — 97112 NEUROMUSCULAR REEDUCATION: CPT

## 2023-12-30 PROCEDURE — 80053 COMPREHEN METABOLIC PANEL: CPT

## 2023-12-30 PROCEDURE — 97110 THERAPEUTIC EXERCISES: CPT

## 2023-12-30 PROCEDURE — 99238 HOSP IP/OBS DSCHRG MGMT 30/<: CPT

## 2023-12-30 PROCEDURE — 36415 COLL VENOUS BLD VENIPUNCTURE: CPT

## 2023-12-30 PROCEDURE — 85027 COMPLETE CBC AUTOMATED: CPT

## 2023-12-30 PROCEDURE — 87186 SC STD MICRODIL/AGAR DIL: CPT

## 2023-12-30 PROCEDURE — 83735 ASSAY OF MAGNESIUM: CPT

## 2023-12-30 PROCEDURE — 97116 GAIT TRAINING THERAPY: CPT

## 2023-12-30 PROCEDURE — 84145 PROCALCITONIN (PCT): CPT

## 2023-12-30 PROCEDURE — 99232 SBSQ HOSP IP/OBS MODERATE 35: CPT

## 2023-12-30 PROCEDURE — 81001 URINALYSIS AUTO W/SCOPE: CPT

## 2023-12-30 PROCEDURE — 82962 GLUCOSE BLOOD TEST: CPT

## 2023-12-30 PROCEDURE — 84100 ASSAY OF PHOSPHORUS: CPT

## 2023-12-30 PROCEDURE — 97542 WHEELCHAIR MNGMENT TRAINING: CPT

## 2023-12-30 RX ADMIN — MAGNESIUM OXIDE 400 MG ORAL TABLET 400 MILLIGRAM(S): 241.3 TABLET ORAL at 09:07

## 2023-12-30 RX ADMIN — Medication 100 MILLIGRAM(S): at 05:43

## 2023-12-30 RX ADMIN — MAGNESIUM OXIDE 400 MG ORAL TABLET 400 MILLIGRAM(S): 241.3 TABLET ORAL at 11:34

## 2023-12-30 RX ADMIN — ENOXAPARIN SODIUM 40 MILLIGRAM(S): 100 INJECTION SUBCUTANEOUS at 05:42

## 2023-12-30 RX ADMIN — METFORMIN HYDROCHLORIDE 1000 MILLIGRAM(S): 850 TABLET ORAL at 05:43

## 2023-12-30 RX ADMIN — Medication 1 APPLICATION(S): at 05:45

## 2023-12-30 RX ADMIN — GABAPENTIN 300 MILLIGRAM(S): 400 CAPSULE ORAL at 05:44

## 2023-12-30 RX ADMIN — Medication 100 MILLIGRAM(S): at 08:03

## 2023-12-30 RX ADMIN — Medication 4 UNIT(S): at 12:02

## 2023-12-30 RX ADMIN — Medication 1 APPLICATION(S): at 07:55

## 2023-12-30 RX ADMIN — Medication 10 MILLIGRAM(S): at 11:34

## 2023-12-30 RX ADMIN — Medication 1 APPLICATION(S): at 05:44

## 2023-12-30 RX ADMIN — Medication 4: at 12:01

## 2023-12-30 NOTE — PROGRESS NOTE ADULT - ASSESSMENT
ELVIS VILLATORO is a 63y with a history of diabetes mellitus type 2, HTN, dyslipidemia, retinopathy who presented to [facility] on 11/29/23  with complaint of non-healing left foot wound and found to have osteomyelitis. Hospital course complicated by hyperglycemia and need for surgical revision. Now admitted to [facility] after for initiation of a multidisciplinary rehab program consisting focused on functional mobility, transfers and ADLs- pt/ot/dv tppx    #UTI  - UA reviewed from 12/21, placed on nitrofurantoin for +Ecoli since 12/24  - continue for total 7 days until 12/31.     # leucocytosis - resolved.  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  -echo on 12/18: normal LV and RV functions.   - encouarged PO hydration    HLD  -Simvastatin    Diabetes mellitus type 2 with hyperglycemia   - increased lantus from 24 to 28 units per diabetic educatior discussion. continue ISS and lispro 4 units TID before meals.   - continue Metformin 1000 mg BID  - Linagplin 5 mg daily  - monitor FSBS and adjust insulin. watch for hypoglycemia     VTE ppx   Lovenox ELVIS VILLATORO is a 63y with a history of diabetes mellitus type 2, HTN, dyslipidemia, retinopathy who presented to [facility] on 11/29/23  with complaint of non-healing left foot wound and found to have osteomyelitis. Hospital course complicated by hyperglycemia and need for surgical revision. Now admitted to [facility] after for initiation of a multidisciplinary rehab program consisting focused on functional mobility, transfers and ADLs- pt/ot/dv tppx    #UTI  - UA reviewed from 12/21, placed on nitrofurantoin for +Ecoli since 12/24  - continue for total 7 days until 12/31.     # leucocytosis - resolved.  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  -echo on 12/18: normal LV and RV functions.   - encouarged PO hydration    HLD  -Simvastatin    Diabetes mellitus type 2 with hyperglycemia   - increased lantus from 24 to 28 units per diabetic educatior discussion. continue ISS and lispro 4 units TID before meals.   - continue Metformin 1000 mg BID  - Linagplin 5 mg daily  - monitor FSBS and adjust insulin. watch for hypoglycemia     VTE ppx   Lovenox

## 2023-12-30 NOTE — PROGRESS NOTE ADULT - PROVIDER SPECIALTY LIST ADULT
Hospitalist
Hospitalist
Physiatry
Hospitalist
Physiatry
Rehab Medicine
Hospitalist
Physiatry
Physiatry
Rehab Medicine
Rehab Medicine
Physiatry
Hospitalist
Physiatry
Physiatry
Rehab Medicine
Rehab Medicine
Physiatry
Physiatry

## 2023-12-30 NOTE — PROGRESS NOTE ADULT - REASON FOR ADMISSION
Left BKA

## 2023-12-30 NOTE — PROGRESS NOTE ADULT - SUBJECTIVE AND OBJECTIVE BOX
Hospitalist: Edilson Marquez DO    CHIEF COMPLAINT: Patient is a 63y old  male who presents with a chief complaint of Left BKA (29 Dec 2023 10:57)      SUBJECTIVE / OVERNIGHT EVENTS: Patient seen and examined. No acute events overnight. Pain well controlled and patient without any complaints.    MEDICATIONS  (STANDING):  AQUAPHOR (petrolatum Ointment) 1 Application(s) Topical two times a day  bimatoprost 0.01% Ophthalmic Solution 1 Drop(s) Both EYES at bedtime  clotrimazole 1% Cream 1 Application(s) Topical two times a day  dextrose 5%. 1000 milliLiter(s) (100 mL/Hr) IV Continuous <Continuous>  dextrose 5%. 1000 milliLiter(s) (50 mL/Hr) IV Continuous <Continuous>  dextrose 50% Injectable 25 Gram(s) IV Push once  dextrose 50% Injectable 25 Gram(s) IV Push once  dextrose 50% Injectable 12.5 Gram(s) IV Push once  enalapril 10 milliGRAM(s) Oral <User Schedule>  enoxaparin Injectable 40 milliGRAM(s) SubCutaneous every 24 hours  gabapentin 300 milliGRAM(s) Oral three times a day  glucagon  Injectable 1 milliGRAM(s) IntraMuscular once  hydrocortisone 1% Cream 1 Application(s) Topical two times a day  insulin glargine Injectable (LANTUS) 28 Unit(s) SubCutaneous at bedtime  insulin lispro (ADMELOG) corrective regimen sliding scale   SubCutaneous at bedtime  insulin lispro (ADMELOG) corrective regimen sliding scale   SubCutaneous three times a day before meals  insulin lispro Injectable (ADMELOG) 4 Unit(s) SubCutaneous three times a day before meals  linagliptin 5 milliGRAM(s) Oral at bedtime  magnesium oxide 400 milliGRAM(s) Oral three times a day with meals  metFORMIN 1000 milliGRAM(s) Oral two times a day  nitrofurantoin monohydrate/macrocrystals (MACROBID) 100 milliGRAM(s) Oral two times a day  polyethylene glycol 3350 17 Gram(s) Oral two times a day  senna 2 Tablet(s) Oral at bedtime  simvastatin 40 milliGRAM(s) Oral at bedtime  sodium chloride 0.9%. 1000 milliLiter(s) (100 mL/Hr) IV Continuous <Continuous>    MEDICATIONS  (PRN):  acetaminophen     Tablet .. 650 milliGRAM(s) Oral every 6 hours PRN Moderate Pain (4 - 6)  dextrose Oral Gel 15 Gram(s) Oral once PRN Blood Glucose LESS THAN 70 milliGRAM(s)/deciliter  guaiFENesin Oral Liquid (Sugar-Free) 100 milliGRAM(s) Oral every 6 hours PRN Cough      VITALS:  T(F): 98 (12-30-23 @ 08:58), Max: 98 (12-29-23 @ 20:35)  HR: 80 (12-30-23 @ 08:58) (80 - 82)  BP: 146/83 (12-30-23 @ 08:58) (118/74 - 146/83)  RR: 14 (12-30-23 @ 08:58) (14 - 18)  SpO2: 96% (12-30-23 @ 08:58)      REVIEW OF SYSTEMS:  For ROV please refer back to H&P     PHYSICAL EXAM:  GENERAL: NAD  HEENT: NCAT  CHEST/LUNG: No increased WOB, Clear to percussion bilaterally; No rales, rhonchi, wheezing  HEART: Regular rate and rhythm; No murmurs  ABDOMEN: Soft, Nontender, Nondistended; Bowel sounds present  MUSCULOSKELETAL/EXTREMITIES: + LEFT BKA  PSYCH: Appropriate affect, Alert & Oriented to person place and time  Skin: LEFT BKA dressing C/D/I, dry wound of the 4th toe of the right foot.         CAPILLARY BLOOD GLUCOSE      POCT Blood Glucose.: 228 mg/dL (30 Dec 2023 11:29)  POCT Blood Glucose.: 100 mg/dL (30 Dec 2023 08:02)  POCT Blood Glucose.: 113 mg/dL (29 Dec 2023 22:05)  POCT Blood Glucose.: 181 mg/dL (29 Dec 2023 17:24)        MICROBIOLOGY:          RADIOLOGY & ADDITIONAL TESTS:    Imaging Personally Reviewed:    [X] Consultant(s) Notes Reviewed:  [X] Care Discussed with Consultants/Other Providers:

## 2023-12-30 NOTE — PROGRESS NOTE ADULT - SUBJECTIVE AND OBJECTIVE BOX
Pending DC home today.    VITALS  T(C): 36.7 (12-29-23 @ 20:35), Max: 36.7 (12-29-23 @ 20:35)  HR: 82 (12-29-23 @ 20:35) (82 - 85)  BP: 118/74 (12-29-23 @ 20:35) (118/74 - 120/67)  RR: 18 (12-29-23 @ 20:35) (18 - 18)  SpO2: 97% (12-29-23 @ 20:35) (97% - 97%)  Wt(kg): --     MEDICATIONS   acetaminophen     Tablet .. 650 milliGRAM(s) every 6 hours PRN  AQUAPHOR (petrolatum Ointment) 1 Application(s) two times a day  bimatoprost 0.01% Ophthalmic Solution 1 Drop(s) at bedtime  clotrimazole 1% Cream 1 Application(s) two times a day  dextrose 5%. 1000 milliLiter(s) <Continuous>  dextrose 5%. 1000 milliLiter(s) <Continuous>  dextrose 50% Injectable 25 Gram(s) once  dextrose 50% Injectable 25 Gram(s) once  dextrose 50% Injectable 12.5 Gram(s) once  dextrose Oral Gel 15 Gram(s) once PRN  enalapril 10 milliGRAM(s) <User Schedule>  enoxaparin Injectable 40 milliGRAM(s) every 24 hours  gabapentin 300 milliGRAM(s) three times a day  glucagon  Injectable 1 milliGRAM(s) once  guaiFENesin Oral Liquid (Sugar-Free) 100 milliGRAM(s) every 6 hours PRN  hydrocortisone 1% Cream 1 Application(s) two times a day  insulin glargine Injectable (LANTUS) 28 Unit(s) at bedtime  insulin lispro (ADMELOG) corrective regimen sliding scale   three times a day before meals  insulin lispro (ADMELOG) corrective regimen sliding scale   at bedtime  insulin lispro Injectable (ADMELOG) 4 Unit(s) three times a day before meals  linagliptin 5 milliGRAM(s) at bedtime  magnesium oxide 400 milliGRAM(s) three times a day with meals  metFORMIN 1000 milliGRAM(s) two times a day  nitrofurantoin monohydrate/macrocrystals (MACROBID) 100 milliGRAM(s) two times a day  polyethylene glycol 3350 17 Gram(s) two times a day  senna 2 Tablet(s) at bedtime  simvastatin 40 milliGRAM(s) at bedtime  sodium chloride 0.9%. 1000 milliLiter(s) <Continuous>      RECENT LABS/IMAGING                   POCT Blood Glucose.: 100 mg/dL (12-30-23 @ 08:02)  POCT Blood Glucose.: 113 mg/dL (12-29-23 @ 22:05)  POCT Blood Glucose.: 181 mg/dL (12-29-23 @ 17:24)  POCT Blood Glucose.: 234 mg/dL (12-29-23 @ 12:02)  POCT Blood Glucose.: 263 mg/dL (12-29-23 @ 12:00)  POCT Blood Glucose.: 241 mg/dL (12-29-23 @ 11:58)    ------------------------------------------  PHYSICAL EXAM  Constitutional - NAD, Comfortable  Pulm - Breathing comfortably, No wheezing  Abd - Nondistended  Extremities - No edema  Neurologic Exam - Awake, Alert  Psychiatric - Mood WNL     ASSESSMENT/PLAN  63y Male with impairments in mobility and ADLs   - Pending DC home today, Rx as written per primary team. No additional narcotics will be Rx.

## 2024-01-05 ENCOUNTER — APPOINTMENT (OUTPATIENT)
Dept: VASCULAR SURGERY | Facility: CLINIC | Age: 64
End: 2024-01-05
Payer: COMMERCIAL

## 2024-01-05 VITALS
HEART RATE: 93 BPM | SYSTOLIC BLOOD PRESSURE: 134 MMHG | TEMPERATURE: 97.9 F | BODY MASS INDEX: 34.8 KG/M2 | DIASTOLIC BLOOD PRESSURE: 84 MMHG | WEIGHT: 235 LBS | HEIGHT: 69 IN

## 2024-01-05 DIAGNOSIS — L29.9 PRURITUS, UNSPECIFIED: ICD-10-CM

## 2024-01-05 PROCEDURE — 99024 POSTOP FOLLOW-UP VISIT: CPT

## 2024-01-05 RX ORDER — HYDROCORTISONE 10 MG/G
1 CREAM TOPICAL
Qty: 1 | Refills: 0 | Status: ACTIVE | COMMUNITY
Start: 2024-01-05 | End: 1900-01-01

## 2024-01-05 NOTE — ED PROVIDER NOTE - CRITICAL CARE ATTENDING CONTRIBUTION TO CARE
Encouraged mucinex/guafenisin, warm salt water gargles, chloraseptic spray, soothers/lozenges, sinus rinses (neilmed), flonase (2 sprays per nostril daily x 2 weeks), vitamin c, fluids and rest.  May alternate tylenol and NSAIDS (ibuprofen, advil, aleve type products) every 4-6 hours for the next few days as needed.    Prescription for zpack (antibiotic) sent to pharmacy.  Return to clinic with any worsening or changes in symptoms.   
I have seen the patient with PA. please refer to MDM

## 2024-01-05 NOTE — HISTORY OF PRESENT ILLNESS
[FreeTextEntry1] : s/p L guillotine, debridement, l bka  12/12/23 for necrotizing infection [de-identified] : 1/5/24: post op visit.  had been doing well, out of rehab.  however on Wed, he fell on his stump, had sig pain and some bleeding from the stump.  pain well controlled

## 2024-01-05 NOTE — PHYSICAL EXAM
[JVD] : no jugular venous distention  [Respiratory Effort] : normal respiratory effort [Normal Rate and Rhythm] : normal rate and rhythm [de-identified] : awake, alert [FreeTextEntry1] : L BKA stump healing, however mid ant portion w some blood, appears to have some dehiscence. staples in tact, no signs infection

## 2024-01-12 ENCOUNTER — APPOINTMENT (OUTPATIENT)
Dept: VASCULAR SURGERY | Facility: CLINIC | Age: 64
End: 2024-01-12
Payer: COMMERCIAL

## 2024-01-12 VITALS
HEIGHT: 69 IN | WEIGHT: 235 LBS | DIASTOLIC BLOOD PRESSURE: 82 MMHG | SYSTOLIC BLOOD PRESSURE: 127 MMHG | HEART RATE: 98 BPM | BODY MASS INDEX: 34.8 KG/M2

## 2024-01-12 PROCEDURE — 99024 POSTOP FOLLOW-UP VISIT: CPT

## 2024-01-12 NOTE — ASSESSMENT
[FreeTextEntry1] : 64 y/o M s/p L guillotine, debridement, l bka 12/12/23 for necrotizing infection. Post op course complicated by wound partial dehiscence after fall. Staples removed today. Central staples left in place.   Plan:  Patient will return next week for final staple removal  Will start  following final staple removal  Prosthetic company notified

## 2024-01-12 NOTE — HISTORY OF PRESENT ILLNESS
[FreeTextEntry1] : s/p L silverio, debridement, l bka 12/12/23 for necrotizing infection   Interval History: 1/5/24: post op visit. had been doing well, out of rehab. He fell on his stump, had sig pain and some bleeding from the stump. pain well controlled.  1/12: Stump healed over. No open wounds. Pain controlled.

## 2024-01-12 NOTE — PHYSICAL EXAM
[Ankle Swelling On The Right] : of the right ankle [Varicose Veins Of The Right Leg] : of the right leg [Ankle Swelling (On Exam)] : not present [Varicose Veins Of Lower Extremities] : not present [] : not present [de-identified] : No acute distress  [de-identified] : Left BKA site with scarring of central section. No open wounds. Flap warm.

## 2024-01-26 ENCOUNTER — APPOINTMENT (OUTPATIENT)
Dept: VASCULAR SURGERY | Facility: CLINIC | Age: 64
End: 2024-01-26

## 2024-01-26 VITALS
HEIGHT: 69 IN | DIASTOLIC BLOOD PRESSURE: 86 MMHG | WEIGHT: 235 LBS | BODY MASS INDEX: 34.8 KG/M2 | TEMPERATURE: 98.1 F | HEART RATE: 90 BPM | SYSTOLIC BLOOD PRESSURE: 152 MMHG

## 2024-01-26 VITALS — SYSTOLIC BLOOD PRESSURE: 152 MMHG | HEART RATE: 91 BPM | DIASTOLIC BLOOD PRESSURE: 86 MMHG

## 2024-01-26 NOTE — HISTORY OF PRESENT ILLNESS
[FreeTextEntry1] : s/p L guillotine, debridement, l bka  12/12/23 for necrotizing infection [de-identified] : 1/5/24: post op visit.  had been doing well, out of rehab.  however on Wed, he fell on his stump, had sig pain and some bleeding from the stump.  pain well controlled  1/12: Stump healed over. No open wounds. Pain controlled.  1/26/24:  pain controlled, incision coapted

## 2024-01-26 NOTE — PHYSICAL EXAM
[JVD] : no jugular venous distention  [Respiratory Effort] : normal respiratory effort [Normal Rate and Rhythm] : normal rate and rhythm [de-identified] : awake, alert [FreeTextEntry1] : L BKA stump healing,remaining staples intact, no surrounding cellulitis

## 2024-02-14 ENCOUNTER — APPOINTMENT (OUTPATIENT)
Dept: PHYSICAL MEDICINE AND REHAB | Facility: CLINIC | Age: 64
End: 2024-02-14
Payer: COMMERCIAL

## 2024-02-14 DIAGNOSIS — S88.112D COMPLETE TRAUMATIC AMPUTATION AT LVL BETWEEN KNEE AND ANKLE, LEFT LOWER LEG, SUBSEQUENT ENCOUNTER: ICD-10-CM

## 2024-02-14 DIAGNOSIS — Z89.519 ACQUIRED ABSENCE OF UNSPECIFIED LEG BELOW KNEE: ICD-10-CM

## 2024-02-14 DIAGNOSIS — G54.6 PHANTOM LIMB SYNDROME WITH PAIN: ICD-10-CM

## 2024-02-14 PROCEDURE — 99204 OFFICE O/P NEW MOD 45 MIN: CPT

## 2024-02-14 PROCEDURE — 99214 OFFICE O/P EST MOD 30 MIN: CPT

## 2024-02-14 RX ORDER — GABAPENTIN 300 MG/1
300 CAPSULE ORAL 3 TIMES DAILY
Qty: 90 | Refills: 3 | Status: ACTIVE | COMMUNITY
Start: 2024-02-14 | End: 1900-01-01

## 2024-02-15 RX ORDER — METFORMIN HYDROCHLORIDE 1000 MG/1
1000 TABLET, COATED ORAL
Refills: 0 | Status: ACTIVE | COMMUNITY

## 2024-02-15 RX ORDER — ASCORBIC ACID 125 MG
TABLET,CHEWABLE ORAL
Refills: 0 | Status: ACTIVE | COMMUNITY

## 2024-02-15 RX ORDER — INSULIN GLARGINE 100 [IU]/ML
100 INJECTION, SOLUTION SUBCUTANEOUS
Refills: 0 | Status: ACTIVE | COMMUNITY

## 2024-02-15 RX ORDER — LINAGLIPTIN 5 MG/1
5 TABLET, FILM COATED ORAL
Refills: 0 | Status: ACTIVE | COMMUNITY

## 2024-02-15 NOTE — REVIEW OF SYSTEMS
[Patient Intake Form Reviewed] : Patient intake form was reviewed [Difficulty Walking] : difficulty walking [Negative] : Gastrointestinal [Fever] : no fever [Recent Change In Weight] : ~T no recent weight change [Incontinence] : no incontinence [Muscle Weakness] : no muscle weakness [Skin Wound] : no skin wound

## 2024-02-15 NOTE — ASSESSMENT
[FreeTextEntry1] : Patient is a 63-year-old RHD male past medical history of hypertension, DM2 who is s/p left BKA (December 12, 2023) secondary to infection who is residual limb is well-healed.  Patient is motivated to obtain a BK prosthesis to enhance functional mobility.  Patient requires a BK prosthesis for safe ambulation.  Patient is a K2 ambulator.  Prescription provided for a left custom molded endoskeletal BK prosthesis with a total contact acrylic socket, flexible inner socket, test socket, silicone locking liner with locking mechanism, ultralight alignable components, K2 prosthetic foot, outer protective cover, 6 multiple ply socks, 6 single ply socks.  Prescription provided to initiate course of outpatient physical therapy 2-3 times per week, see Rx.  Prescription provided for gabapentin 300 mg p.o. 3 times daily with 3 refills.  I spent a total of 45 minutes on the date of the encounter evaluating and treating the patient including a discussion of treatment options. normal

## 2024-02-15 NOTE — PHYSICAL EXAM
[FreeTextEntry1] : General: Well-developed male in no apparent distress.  Patient is awake, alert and oriented x 3.  Cooperative. HEENT: Normocephalic, atraumatic.  MMM Lungs: Clear to auscultation. Cardiac: Regular rate and rhythm. Abdomen: Bowel sounds present, nondistended. Extremities: Right lower extremity no cyanosis, clubbing or edema noted.  Left BKA: Bulbous shape, small eschar medial surgical wound site.  No erythema or discharge noted.  No skin adhesions, no tenderness to palpation.  Full active range of motion at the knee.  Motor: Both upper extremities: Tone normal, active range of motion within functional limits with 5/5 motor power throughout. Both lower extremities: Tone normal, active range of motion within functional limits with 5/5 motor power throughout  Sensory: Intact to light touch both lower extremities.  Proprioception intact at the right first MTP joint. Muscle stretch reflexes: 0 KJ bilaterally.  Functional status: Independent sit to stand transfer.  Patient ambulated in the office with a rolling walker via hopping with supervision.  Patient is a K2 ambulator.

## 2024-02-15 NOTE — HISTORY OF PRESENT ILLNESS
[FreeTextEntry1] : Patient is a 63-year-old right-hand-dominant male past medical history of hypertension, DM 2 who is status post left BKA (December 12, 2023) secondary to infection.  Patient reports that his residual limb is been well-healed for the past 3 weeks and is motivated to obtain a prosthesis.  Premorbidly patient was independent community ambulator without an assistive device and independent all transfers activities of daily living.  Independent stairs.  Patient was working as a  until his hospitalization.  Currently the patient is ambulating independently with a rolling walker via hopping.  Patient is independent in wheelchair transfers.  Independent ADLs.  Patient reports 1 fall since discharge home when transferring from the couch.  Patient is not receiving outpatient physical therapy.  Patient reports phantom sensations had 1 episode of severe phantom pain for which he takes gabapentin 300 mg 3 times daily.  Patient lives with his spouse in a private house with 1 flight of steps to negotiate with a handrail.

## 2024-06-10 ENCOUNTER — INPATIENT (INPATIENT)
Facility: HOSPITAL | Age: 64
LOS: 10 days | Discharge: NURSING, REHAB OR LONG-TERM CARE | DRG: 310 | End: 2024-06-21
Attending: STUDENT IN AN ORGANIZED HEALTH CARE EDUCATION/TRAINING PROGRAM | Admitting: INTERNAL MEDICINE
Payer: COMMERCIAL

## 2024-06-10 VITALS — HEART RATE: 48 BPM

## 2024-06-10 DIAGNOSIS — I44.2 ATRIOVENTRICULAR BLOCK, COMPLETE: ICD-10-CM

## 2024-06-10 DIAGNOSIS — Z98.89 OTHER SPECIFIED POSTPROCEDURAL STATES: Chronic | ICD-10-CM

## 2024-06-10 DIAGNOSIS — Z89.429 ACQUIRED ABSENCE OF OTHER TOE(S), UNSPECIFIED SIDE: Chronic | ICD-10-CM

## 2024-06-10 LAB
ALBUMIN SERPL ELPH-MCNC: 3.5 G/DL — SIGNIFICANT CHANGE UP (ref 3.3–5)
ALBUMIN SERPL ELPH-MCNC: 3.6 G/DL — SIGNIFICANT CHANGE UP (ref 3.3–5)
ALP SERPL-CCNC: 58 U/L — SIGNIFICANT CHANGE UP (ref 40–120)
ALP SERPL-CCNC: 61 U/L — SIGNIFICANT CHANGE UP (ref 40–120)
ALT FLD-CCNC: 12 U/L — SIGNIFICANT CHANGE UP (ref 10–45)
ALT FLD-CCNC: 13 U/L — SIGNIFICANT CHANGE UP (ref 10–45)
ANION GAP SERPL CALC-SCNC: 16 MMOL/L — SIGNIFICANT CHANGE UP (ref 5–17)
ANION GAP SERPL CALC-SCNC: 16 MMOL/L — SIGNIFICANT CHANGE UP (ref 5–17)
APPEARANCE UR: CLEAR — SIGNIFICANT CHANGE UP
APTT BLD: 27.6 SEC — SIGNIFICANT CHANGE UP (ref 24.5–35.6)
AST SERPL-CCNC: 14 U/L — SIGNIFICANT CHANGE UP (ref 10–40)
AST SERPL-CCNC: 28 U/L — SIGNIFICANT CHANGE UP (ref 10–40)
BACTERIA # UR AUTO: NEGATIVE /HPF — SIGNIFICANT CHANGE UP
BASE EXCESS BLDV CALC-SCNC: 1 MMOL/L — SIGNIFICANT CHANGE UP (ref -2–3)
BASE EXCESS BLDV CALC-SCNC: 2.4 MMOL/L — SIGNIFICANT CHANGE UP (ref -2–3)
BASOPHILS # BLD AUTO: 0.03 K/UL — SIGNIFICANT CHANGE UP (ref 0–0.2)
BASOPHILS NFR BLD AUTO: 0.2 % — SIGNIFICANT CHANGE UP (ref 0–2)
BILIRUB SERPL-MCNC: 0.6 MG/DL — SIGNIFICANT CHANGE UP (ref 0.2–1.2)
BILIRUB SERPL-MCNC: 0.7 MG/DL — SIGNIFICANT CHANGE UP (ref 0.2–1.2)
BILIRUB UR-MCNC: NEGATIVE — SIGNIFICANT CHANGE UP
BUN SERPL-MCNC: 16 MG/DL — SIGNIFICANT CHANGE UP (ref 7–23)
BUN SERPL-MCNC: 18 MG/DL — SIGNIFICANT CHANGE UP (ref 7–23)
CA-I SERPL-SCNC: 1.16 MMOL/L — SIGNIFICANT CHANGE UP (ref 1.15–1.33)
CA-I SERPL-SCNC: 1.2 MMOL/L — SIGNIFICANT CHANGE UP (ref 1.15–1.33)
CALCIUM SERPL-MCNC: 8.8 MG/DL — SIGNIFICANT CHANGE UP (ref 8.4–10.5)
CALCIUM SERPL-MCNC: 9.4 MG/DL — SIGNIFICANT CHANGE UP (ref 8.4–10.5)
CAST: 2 /LPF — SIGNIFICANT CHANGE UP (ref 0–4)
CHLORIDE BLDV-SCNC: 96 MMOL/L — SIGNIFICANT CHANGE UP (ref 96–108)
CHLORIDE BLDV-SCNC: 99 MMOL/L — SIGNIFICANT CHANGE UP (ref 96–108)
CHLORIDE SERPL-SCNC: 97 MMOL/L — SIGNIFICANT CHANGE UP (ref 96–108)
CHLORIDE SERPL-SCNC: 99 MMOL/L — SIGNIFICANT CHANGE UP (ref 96–108)
CK SERPL-CCNC: 67 U/L — SIGNIFICANT CHANGE UP (ref 30–200)
CO2 BLDV-SCNC: 26 MMOL/L — SIGNIFICANT CHANGE UP (ref 22–26)
CO2 BLDV-SCNC: 28 MMOL/L — HIGH (ref 22–26)
CO2 SERPL-SCNC: 20 MMOL/L — LOW (ref 22–31)
CO2 SERPL-SCNC: 22 MMOL/L — SIGNIFICANT CHANGE UP (ref 22–31)
COLOR SPEC: YELLOW — SIGNIFICANT CHANGE UP
CREAT SERPL-MCNC: 0.82 MG/DL — SIGNIFICANT CHANGE UP (ref 0.5–1.3)
CREAT SERPL-MCNC: 1.03 MG/DL — SIGNIFICANT CHANGE UP (ref 0.5–1.3)
DIFF PNL FLD: NEGATIVE — SIGNIFICANT CHANGE UP
EGFR: 81 ML/MIN/1.73M2 — SIGNIFICANT CHANGE UP
EGFR: 98 ML/MIN/1.73M2 — SIGNIFICANT CHANGE UP
EOSINOPHIL # BLD AUTO: 0.01 K/UL — SIGNIFICANT CHANGE UP (ref 0–0.5)
EOSINOPHIL NFR BLD AUTO: 0.1 % — SIGNIFICANT CHANGE UP (ref 0–6)
FLUAV AG NPH QL: SIGNIFICANT CHANGE UP
FLUBV AG NPH QL: SIGNIFICANT CHANGE UP
GAS PNL BLDV: 129 MMOL/L — LOW (ref 136–145)
GAS PNL BLDV: 131 MMOL/L — LOW (ref 136–145)
GAS PNL BLDV: SIGNIFICANT CHANGE UP
GLUCOSE BLDV-MCNC: 133 MG/DL — HIGH (ref 70–99)
GLUCOSE BLDV-MCNC: 272 MG/DL — HIGH (ref 70–99)
GLUCOSE SERPL-MCNC: 137 MG/DL — HIGH (ref 70–99)
GLUCOSE SERPL-MCNC: 269 MG/DL — HIGH (ref 70–99)
GLUCOSE UR QL: 250 MG/DL
HCO3 BLDV-SCNC: 25 MMOL/L — SIGNIFICANT CHANGE UP (ref 22–29)
HCO3 BLDV-SCNC: 27 MMOL/L — SIGNIFICANT CHANGE UP (ref 22–29)
HCT VFR BLD CALC: 41.7 % — SIGNIFICANT CHANGE UP (ref 39–50)
HCT VFR BLDA CALC: 41 % — SIGNIFICANT CHANGE UP (ref 39–51)
HCT VFR BLDA CALC: 41 % — SIGNIFICANT CHANGE UP (ref 39–51)
HGB BLD CALC-MCNC: 13.7 G/DL — SIGNIFICANT CHANGE UP (ref 12.6–17.4)
HGB BLD CALC-MCNC: 13.8 G/DL — SIGNIFICANT CHANGE UP (ref 12.6–17.4)
HGB BLD-MCNC: 13.2 G/DL — SIGNIFICANT CHANGE UP (ref 13–17)
HOROWITZ INDEX BLDV+IHG-RTO: 21 — SIGNIFICANT CHANGE UP
IMM GRANULOCYTES NFR BLD AUTO: 0.5 % — SIGNIFICANT CHANGE UP (ref 0–0.9)
INR BLD: 1.24 RATIO — HIGH (ref 0.85–1.18)
KETONES UR-MCNC: 15 MG/DL
LACTATE BLDV-MCNC: 2.4 MMOL/L — HIGH (ref 0.5–2)
LACTATE BLDV-MCNC: 2.8 MMOL/L — HIGH (ref 0.5–2)
LACTATE SERPL-SCNC: 2.8 MMOL/L — HIGH (ref 0.5–2)
LACTATE SERPL-SCNC: 3.1 MMOL/L — HIGH (ref 0.5–2)
LEUKOCYTE ESTERASE UR-ACNC: NEGATIVE — SIGNIFICANT CHANGE UP
LYMPHOCYTES # BLD AUTO: 1.24 K/UL — SIGNIFICANT CHANGE UP (ref 1–3.3)
LYMPHOCYTES # BLD AUTO: 7.6 % — LOW (ref 13–44)
MAGNESIUM SERPL-MCNC: 1.7 MG/DL — SIGNIFICANT CHANGE UP (ref 1.6–2.6)
MCHC RBC-ENTMCNC: 26.7 PG — LOW (ref 27–34)
MCHC RBC-ENTMCNC: 31.7 GM/DL — LOW (ref 32–36)
MCV RBC AUTO: 84.2 FL — SIGNIFICANT CHANGE UP (ref 80–100)
MONOCYTES # BLD AUTO: 1.19 K/UL — HIGH (ref 0–0.9)
MONOCYTES NFR BLD AUTO: 7.3 % — SIGNIFICANT CHANGE UP (ref 2–14)
NEUTROPHILS # BLD AUTO: 13.85 K/UL — HIGH (ref 1.8–7.4)
NEUTROPHILS NFR BLD AUTO: 84.3 % — HIGH (ref 43–77)
NITRITE UR-MCNC: NEGATIVE — SIGNIFICANT CHANGE UP
NRBC # BLD: 0 /100 WBCS — SIGNIFICANT CHANGE UP (ref 0–0)
NT-PROBNP SERPL-SCNC: 1788 PG/ML — HIGH (ref 0–300)
PCO2 BLDV: 38 MMHG — LOW (ref 42–55)
PCO2 BLDV: 42 MMHG — SIGNIFICANT CHANGE UP (ref 42–55)
PH BLDV: 7.42 — SIGNIFICANT CHANGE UP (ref 7.32–7.43)
PH BLDV: 7.43 — SIGNIFICANT CHANGE UP (ref 7.32–7.43)
PH UR: 6 — SIGNIFICANT CHANGE UP (ref 5–8)
PHOSPHATE SERPL-MCNC: 2.9 MG/DL — SIGNIFICANT CHANGE UP (ref 2.5–4.5)
PLATELET # BLD AUTO: 196 K/UL — SIGNIFICANT CHANGE UP (ref 150–400)
PO2 BLDV: 47 MMHG — HIGH (ref 25–45)
PO2 BLDV: 55 MMHG — HIGH (ref 25–45)
POTASSIUM BLDV-SCNC: 4.3 MMOL/L — SIGNIFICANT CHANGE UP (ref 3.5–5.1)
POTASSIUM BLDV-SCNC: 5 MMOL/L — SIGNIFICANT CHANGE UP (ref 3.5–5.1)
POTASSIUM SERPL-MCNC: 4.4 MMOL/L — SIGNIFICANT CHANGE UP (ref 3.5–5.3)
POTASSIUM SERPL-MCNC: 5.2 MMOL/L — SIGNIFICANT CHANGE UP (ref 3.5–5.3)
POTASSIUM SERPL-SCNC: 4.4 MMOL/L — SIGNIFICANT CHANGE UP (ref 3.5–5.3)
POTASSIUM SERPL-SCNC: 5.2 MMOL/L — SIGNIFICANT CHANGE UP (ref 3.5–5.3)
PROT SERPL-MCNC: 6.9 G/DL — SIGNIFICANT CHANGE UP (ref 6–8.3)
PROT SERPL-MCNC: 7.1 G/DL — SIGNIFICANT CHANGE UP (ref 6–8.3)
PROT UR-MCNC: 300 MG/DL
PROTHROM AB SERPL-ACNC: 12.9 SEC — SIGNIFICANT CHANGE UP (ref 9.5–13)
RBC # BLD: 4.95 M/UL — SIGNIFICANT CHANGE UP (ref 4.2–5.8)
RBC # FLD: 14.6 % — HIGH (ref 10.3–14.5)
RBC CASTS # UR COMP ASSIST: 2 /HPF — SIGNIFICANT CHANGE UP (ref 0–4)
RSV RNA NPH QL NAA+NON-PROBE: SIGNIFICANT CHANGE UP
SAO2 % BLDV: 80.4 % — SIGNIFICANT CHANGE UP (ref 67–88)
SAO2 % BLDV: 90.6 % — HIGH (ref 67–88)
SARS-COV-2 RNA SPEC QL NAA+PROBE: SIGNIFICANT CHANGE UP
SODIUM SERPL-SCNC: 133 MMOL/L — LOW (ref 135–145)
SODIUM SERPL-SCNC: 137 MMOL/L — SIGNIFICANT CHANGE UP (ref 135–145)
SP GR SPEC: >1.03 — HIGH (ref 1–1.03)
SQUAMOUS # UR AUTO: 3 /HPF — SIGNIFICANT CHANGE UP (ref 0–5)
TROPONIN T, HIGH SENSITIVITY RESULT: 57 NG/L — HIGH (ref 0–51)
TROPONIN T, HIGH SENSITIVITY RESULT: 60 NG/L — HIGH (ref 0–51)
TROPONIN T, HIGH SENSITIVITY RESULT: 60 NG/L — HIGH (ref 0–51)
UROBILINOGEN FLD QL: 0.2 MG/DL — SIGNIFICANT CHANGE UP (ref 0.2–1)
WBC # BLD: 16.4 K/UL — HIGH (ref 3.8–10.5)
WBC # FLD AUTO: 16.4 K/UL — HIGH (ref 3.8–10.5)
WBC UR QL: 1 /HPF — SIGNIFICANT CHANGE UP (ref 0–5)

## 2024-06-10 PROCEDURE — 71045 X-RAY EXAM CHEST 1 VIEW: CPT | Mod: 26

## 2024-06-10 PROCEDURE — 99292 CRITICAL CARE ADDL 30 MIN: CPT | Mod: 25

## 2024-06-10 PROCEDURE — 70496 CT ANGIOGRAPHY HEAD: CPT | Mod: 26,MC

## 2024-06-10 PROCEDURE — 99223 1ST HOSP IP/OBS HIGH 75: CPT

## 2024-06-10 PROCEDURE — 93010 ELECTROCARDIOGRAM REPORT: CPT

## 2024-06-10 PROCEDURE — 99291 CRITICAL CARE FIRST HOUR: CPT

## 2024-06-10 PROCEDURE — 70498 CT ANGIOGRAPHY NECK: CPT | Mod: 26,MC

## 2024-06-10 PROCEDURE — 36573 INSJ PICC RS&I 5 YR+: CPT

## 2024-06-10 PROCEDURE — 76937 US GUIDE VASCULAR ACCESS: CPT | Mod: 26,59

## 2024-06-10 PROCEDURE — 74177 CT ABD & PELVIS W/CONTRAST: CPT | Mod: 26,MC

## 2024-06-10 RX ORDER — DEXTROSE MONOHYDRATE AND SODIUM CHLORIDE 5; .3 G/100ML; G/100ML
1000 INJECTION, SOLUTION INTRAVENOUS
Refills: 0 | Status: DISCONTINUED | OUTPATIENT
Start: 2024-06-10 | End: 2024-06-21

## 2024-06-10 RX ORDER — INSULIN LISPRO 100 [IU]/ML
INJECTION, SOLUTION SUBCUTANEOUS AT BEDTIME
Refills: 0 | Status: DISCONTINUED | OUTPATIENT
Start: 2024-06-10 | End: 2024-06-12

## 2024-06-10 RX ORDER — DEXTROSE 30 % IN WATER 30 %
25 VIAL (ML) INTRAVENOUS ONCE
Refills: 0 | Status: DISCONTINUED | OUTPATIENT
Start: 2024-06-10 | End: 2024-06-21

## 2024-06-10 RX ORDER — ACETAMINOPHEN 325 MG
650 TABLET ORAL ONCE
Refills: 0 | Status: COMPLETED | OUTPATIENT
Start: 2024-06-10 | End: 2024-06-10

## 2024-06-10 RX ORDER — VANCOMYCIN HYDROCHLORIDE 50 MG/ML
1000 KIT ORAL ONCE
Refills: 0 | Status: COMPLETED | OUTPATIENT
Start: 2024-06-10 | End: 2024-06-10

## 2024-06-10 RX ORDER — INSULIN LISPRO 100 [IU]/ML
INJECTION, SOLUTION SUBCUTANEOUS
Refills: 0 | Status: DISCONTINUED | OUTPATIENT
Start: 2024-06-10 | End: 2024-06-12

## 2024-06-10 RX ORDER — LATANOPROST PF 0.05 MG/ML
1 SOLUTION/ DROPS OPHTHALMIC AT BEDTIME
Refills: 0 | Status: DISCONTINUED | OUTPATIENT
Start: 2024-06-10 | End: 2024-06-21

## 2024-06-10 RX ORDER — GABAPENTIN
300 POWDER (GRAM) MISCELLANEOUS EVERY 8 HOURS
Refills: 0 | Status: DISCONTINUED | OUTPATIENT
Start: 2024-06-10 | End: 2024-06-21

## 2024-06-10 RX ORDER — PIPERACILLIN SODIUM AND TAZOBACTAM SODIUM 3; .375 G/15ML; G/15ML
3.38 INJECTION, POWDER, LYOPHILIZED, FOR SOLUTION INTRAVENOUS ONCE
Refills: 0 | Status: COMPLETED | OUTPATIENT
Start: 2024-06-10 | End: 2024-06-10

## 2024-06-10 RX ORDER — INSULIN GLARGINE 100 [IU]/ML
23 INJECTION, SOLUTION SUBCUTANEOUS AT BEDTIME
Refills: 0 | Status: DISCONTINUED | OUTPATIENT
Start: 2024-06-10 | End: 2024-06-12

## 2024-06-10 RX ORDER — GLUCAGON HYDROCHLORIDE 1 MG/ML
1 INJECTION, POWDER, FOR SOLUTION INTRAMUSCULAR; INTRAVENOUS; SUBCUTANEOUS ONCE
Refills: 0 | Status: DISCONTINUED | OUTPATIENT
Start: 2024-06-10 | End: 2024-06-14

## 2024-06-10 RX ORDER — PIPERACILLIN SODIUM AND TAZOBACTAM SODIUM 3; .375 G/15ML; G/15ML
3.38 INJECTION, POWDER, LYOPHILIZED, FOR SOLUTION INTRAVENOUS EVERY 8 HOURS
Refills: 0 | Status: DISCONTINUED | OUTPATIENT
Start: 2024-06-10 | End: 2024-06-12

## 2024-06-10 RX ORDER — MAGNESIUM SULFATE 100 %
2 POWDER (GRAM) MISCELLANEOUS
Refills: 0 | Status: COMPLETED | OUTPATIENT
Start: 2024-06-10 | End: 2024-06-10

## 2024-06-10 RX ORDER — DEXTROSE 30 % IN WATER 30 %
15 VIAL (ML) INTRAVENOUS ONCE
Refills: 0 | Status: DISCONTINUED | OUTPATIENT
Start: 2024-06-10 | End: 2024-06-21

## 2024-06-10 RX ORDER — VANCOMYCIN HYDROCHLORIDE 50 MG/ML
1500 KIT ORAL EVERY 12 HOURS
Refills: 0 | Status: DISCONTINUED | OUTPATIENT
Start: 2024-06-10 | End: 2024-06-12

## 2024-06-10 RX ORDER — DEXTROSE 30 % IN WATER 30 %
12.5 VIAL (ML) INTRAVENOUS ONCE
Refills: 0 | Status: DISCONTINUED | OUTPATIENT
Start: 2024-06-10 | End: 2024-06-21

## 2024-06-10 RX ORDER — DEXTROSE MONOHYDRATE 100 MG/ML
125 INJECTION, SOLUTION INTRAVENOUS ONCE
Refills: 0 | Status: DISCONTINUED | OUTPATIENT
Start: 2024-06-10 | End: 2024-06-21

## 2024-06-10 RX ADMIN — Medication 650 MILLIGRAM(S): at 23:29

## 2024-06-10 RX ADMIN — INSULIN LISPRO 2: 100 INJECTION, SOLUTION SUBCUTANEOUS at 18:54

## 2024-06-10 RX ADMIN — Medication 25 GRAM(S): at 21:54

## 2024-06-10 RX ADMIN — PIPERACILLIN SODIUM AND TAZOBACTAM SODIUM 200 GRAM(S): 3; .375 INJECTION, POWDER, LYOPHILIZED, FOR SOLUTION INTRAVENOUS at 16:20

## 2024-06-10 RX ADMIN — Medication 25 GRAM(S): at 23:28

## 2024-06-10 RX ADMIN — Medication 5 MILLIGRAM(S): at 22:59

## 2024-06-10 RX ADMIN — Medication 650 MILLIGRAM(S): at 22:59

## 2024-06-10 RX ADMIN — INSULIN GLARGINE 23 UNIT(S): 100 INJECTION, SOLUTION SUBCUTANEOUS at 21:54

## 2024-06-10 RX ADMIN — Medication 1 APPLICATION(S): at 21:54

## 2024-06-10 RX ADMIN — LATANOPROST PF 1 DROP(S): 0.05 SOLUTION/ DROPS OPHTHALMIC at 21:20

## 2024-06-10 RX ADMIN — VANCOMYCIN HYDROCHLORIDE 300 MILLIGRAM(S): KIT at 21:20

## 2024-06-11 ENCOUNTER — RESULT REVIEW (OUTPATIENT)
Age: 64
End: 2024-06-11

## 2024-06-11 LAB
A1C WITH ESTIMATED AVERAGE GLUCOSE RESULT: 7.3 % — HIGH (ref 4–5.6)
ADD ON TEST-SPECIMEN IN LAB: SIGNIFICANT CHANGE UP
ALBUMIN SERPL ELPH-MCNC: 3.3 G/DL — SIGNIFICANT CHANGE UP (ref 3.3–5)
ALP SERPL-CCNC: 59 U/L — SIGNIFICANT CHANGE UP (ref 40–120)
ALT FLD-CCNC: 11 U/L — SIGNIFICANT CHANGE UP (ref 10–45)
AMPHET UR-MCNC: NEGATIVE — SIGNIFICANT CHANGE UP
ANION GAP SERPL CALC-SCNC: 12 MMOL/L — SIGNIFICANT CHANGE UP (ref 5–17)
APTT BLD: 27.1 SEC — SIGNIFICANT CHANGE UP (ref 24.5–35.6)
AST SERPL-CCNC: 12 U/L — SIGNIFICANT CHANGE UP (ref 10–40)
BARBITURATES UR SCN-MCNC: NEGATIVE — SIGNIFICANT CHANGE UP
BASOPHILS # BLD AUTO: 0.02 K/UL — SIGNIFICANT CHANGE UP (ref 0–0.2)
BASOPHILS NFR BLD AUTO: 0.1 % — SIGNIFICANT CHANGE UP (ref 0–2)
BENZODIAZ UR-MCNC: NEGATIVE — SIGNIFICANT CHANGE UP
BILIRUB SERPL-MCNC: 0.5 MG/DL — SIGNIFICANT CHANGE UP (ref 0.2–1.2)
BLD GP AB SCN SERPL QL: NEGATIVE — SIGNIFICANT CHANGE UP
BUN SERPL-MCNC: 16 MG/DL — SIGNIFICANT CHANGE UP (ref 7–23)
CALCIUM SERPL-MCNC: 9.2 MG/DL — SIGNIFICANT CHANGE UP (ref 8.4–10.5)
CHLORIDE SERPL-SCNC: 99 MMOL/L — SIGNIFICANT CHANGE UP (ref 96–108)
CHOLEST SERPL-MCNC: 115 MG/DL — SIGNIFICANT CHANGE UP
CO2 SERPL-SCNC: 22 MMOL/L — SIGNIFICANT CHANGE UP (ref 22–31)
COCAINE METAB.OTHER UR-MCNC: NEGATIVE — SIGNIFICANT CHANGE UP
CREAT SERPL-MCNC: 0.91 MG/DL — SIGNIFICANT CHANGE UP (ref 0.5–1.3)
EGFR: 94 ML/MIN/1.73M2 — SIGNIFICANT CHANGE UP
EOSINOPHIL # BLD AUTO: 0.14 K/UL — SIGNIFICANT CHANGE UP (ref 0–0.5)
EOSINOPHIL NFR BLD AUTO: 1 % — SIGNIFICANT CHANGE UP (ref 0–6)
ESTIMATED AVERAGE GLUCOSE: 163 MG/DL — HIGH (ref 68–114)
GLUCOSE SERPL-MCNC: 173 MG/DL — HIGH (ref 70–99)
HCT VFR BLD CALC: 39.9 % — SIGNIFICANT CHANGE UP (ref 39–50)
HDLC SERPL-MCNC: 38 MG/DL — LOW
HGB BLD-MCNC: 12.7 G/DL — LOW (ref 13–17)
HIV 1 & 2 AB SERPL IA.RAPID: SIGNIFICANT CHANGE UP
IMM GRANULOCYTES NFR BLD AUTO: 0.5 % — SIGNIFICANT CHANGE UP (ref 0–0.9)
INR BLD: 1.09 RATIO — SIGNIFICANT CHANGE UP (ref 0.85–1.18)
LACTATE SERPL-SCNC: 1 MMOL/L — SIGNIFICANT CHANGE UP (ref 0.5–2)
LIPID PNL WITH DIRECT LDL SERPL: 59 MG/DL — SIGNIFICANT CHANGE UP
LYMPHOCYTES # BLD AUTO: 15.8 % — SIGNIFICANT CHANGE UP (ref 13–44)
LYMPHOCYTES # BLD AUTO: 2.22 K/UL — SIGNIFICANT CHANGE UP (ref 1–3.3)
MAGNESIUM SERPL-MCNC: 2.5 MG/DL — SIGNIFICANT CHANGE UP (ref 1.6–2.6)
MCHC RBC-ENTMCNC: 26.1 PG — LOW (ref 27–34)
MCHC RBC-ENTMCNC: 31.8 GM/DL — LOW (ref 32–36)
MCV RBC AUTO: 82.1 FL — SIGNIFICANT CHANGE UP (ref 80–100)
METHADONE UR-MCNC: NEGATIVE — SIGNIFICANT CHANGE UP
MONOCYTES # BLD AUTO: 1.26 K/UL — HIGH (ref 0–0.9)
MONOCYTES NFR BLD AUTO: 9 % — SIGNIFICANT CHANGE UP (ref 2–14)
NEUTROPHILS # BLD AUTO: 10.33 K/UL — HIGH (ref 1.8–7.4)
NEUTROPHILS NFR BLD AUTO: 73.6 % — SIGNIFICANT CHANGE UP (ref 43–77)
NON HDL CHOLESTEROL: 77 MG/DL — SIGNIFICANT CHANGE UP
NRBC # BLD: 0 /100 WBCS — SIGNIFICANT CHANGE UP (ref 0–0)
OPIATES UR-MCNC: NEGATIVE — SIGNIFICANT CHANGE UP
OXYCODONE UR-MCNC: NEGATIVE — SIGNIFICANT CHANGE UP
PCP SPEC-MCNC: SIGNIFICANT CHANGE UP
PCP UR-MCNC: NEGATIVE — SIGNIFICANT CHANGE UP
PHOSPHATE SERPL-MCNC: 3 MG/DL — SIGNIFICANT CHANGE UP (ref 2.5–4.5)
PLATELET # BLD AUTO: 214 K/UL — SIGNIFICANT CHANGE UP (ref 150–400)
POTASSIUM SERPL-MCNC: 4.1 MMOL/L — SIGNIFICANT CHANGE UP (ref 3.5–5.3)
POTASSIUM SERPL-SCNC: 4.1 MMOL/L — SIGNIFICANT CHANGE UP (ref 3.5–5.3)
PROT SERPL-MCNC: 6.7 G/DL — SIGNIFICANT CHANGE UP (ref 6–8.3)
PROTHROM AB SERPL-ACNC: 11.4 SEC — SIGNIFICANT CHANGE UP (ref 9.5–13)
RBC # BLD: 4.86 M/UL — SIGNIFICANT CHANGE UP (ref 4.2–5.8)
RBC # FLD: 14.4 % — SIGNIFICANT CHANGE UP (ref 10.3–14.5)
RH IG SCN BLD-IMP: POSITIVE — SIGNIFICANT CHANGE UP
SODIUM SERPL-SCNC: 133 MMOL/L — LOW (ref 135–145)
T PALLIDUM AB TITR SER: NEGATIVE — SIGNIFICANT CHANGE UP
THC UR QL: NEGATIVE — SIGNIFICANT CHANGE UP
TRIGL SERPL-MCNC: 91 MG/DL — SIGNIFICANT CHANGE UP
TSH SERPL-MCNC: 2.54 UIU/ML — SIGNIFICANT CHANGE UP (ref 0.27–4.2)
VIT B12 SERPL-MCNC: 1476 PG/ML — HIGH (ref 232–1245)
WBC # BLD: 14.04 K/UL — HIGH (ref 3.8–10.5)
WBC # FLD AUTO: 14.04 K/UL — HIGH (ref 3.8–10.5)

## 2024-06-11 PROCEDURE — 99291 CRITICAL CARE FIRST HOUR: CPT

## 2024-06-11 PROCEDURE — 93306 TTE W/DOPPLER COMPLETE: CPT | Mod: 26

## 2024-06-11 PROCEDURE — 99292 CRITICAL CARE ADDL 30 MIN: CPT

## 2024-06-11 PROCEDURE — 99233 SBSQ HOSP IP/OBS HIGH 50: CPT

## 2024-06-11 PROCEDURE — 70553 MRI BRAIN STEM W/O & W/DYE: CPT | Mod: 26

## 2024-06-11 PROCEDURE — 93010 ELECTROCARDIOGRAM REPORT: CPT

## 2024-06-11 RX ORDER — ACYCLOVIR SODIUM 50 MG/ML
700 VIAL (ML) INTRAVENOUS EVERY 8 HOURS
Refills: 0 | Status: DISCONTINUED | OUTPATIENT
Start: 2024-06-11 | End: 2024-06-12

## 2024-06-11 RX ORDER — ACYCLOVIR SODIUM 50 MG/ML
VIAL (ML) INTRAVENOUS
Refills: 0 | Status: DISCONTINUED | OUTPATIENT
Start: 2024-06-11 | End: 2024-06-11

## 2024-06-11 RX ORDER — HYDRALAZINE HYDROCHLORIDE 50 MG/1
10 TABLET ORAL ONCE
Refills: 0 | Status: COMPLETED | OUTPATIENT
Start: 2024-06-11 | End: 2024-06-11

## 2024-06-11 RX ORDER — ACYCLOVIR SODIUM 50 MG/ML
700 VIAL (ML) INTRAVENOUS ONCE
Refills: 0 | Status: COMPLETED | OUTPATIENT
Start: 2024-06-11 | End: 2024-06-11

## 2024-06-11 RX ORDER — LORAZEPAM 0.5 MG
2 TABLET ORAL ONCE
Refills: 0 | Status: DISCONTINUED | OUTPATIENT
Start: 2024-06-11 | End: 2024-06-11

## 2024-06-11 RX ORDER — ACETAMINOPHEN 325 MG
1000 TABLET ORAL ONCE
Refills: 0 | Status: COMPLETED | OUTPATIENT
Start: 2024-06-11 | End: 2024-06-11

## 2024-06-11 RX ORDER — FENTANYL CITRATE 50 UG/ML
50 INJECTION, SOLUTION INTRAMUSCULAR; INTRAVENOUS ONCE
Refills: 0 | Status: DISCONTINUED | OUTPATIENT
Start: 2024-06-11 | End: 2024-06-11

## 2024-06-11 RX ORDER — ACYCLOVIR SODIUM 50 MG/ML
VIAL (ML) INTRAVENOUS
Refills: 0 | Status: DISCONTINUED | OUTPATIENT
Start: 2024-06-11 | End: 2024-06-12

## 2024-06-11 RX ORDER — ISOPROTERENOL HYDROCHLORIDE 0.2 MG/ML
2 INJECTION INTRACARDIAC; INTRAMUSCULAR; INTRAVENOUS; SUBCUTANEOUS
Qty: 1 | Refills: 0 | Status: DISCONTINUED | OUTPATIENT
Start: 2024-06-11 | End: 2024-06-12

## 2024-06-11 RX ORDER — PANTOPRAZOLE SODIUM 40 MG/10ML
40 INJECTION, POWDER, FOR SOLUTION INTRAVENOUS ONCE
Refills: 0 | Status: COMPLETED | OUTPATIENT
Start: 2024-06-11 | End: 2024-06-11

## 2024-06-11 RX ORDER — ACETAMINOPHEN 325 MG
650 TABLET ORAL ONCE
Refills: 0 | Status: COMPLETED | OUTPATIENT
Start: 2024-06-11 | End: 2024-06-11

## 2024-06-11 RX ADMIN — PIPERACILLIN SODIUM AND TAZOBACTAM SODIUM 25 GRAM(S): 3; .375 INJECTION, POWDER, LYOPHILIZED, FOR SOLUTION INTRAVENOUS at 01:06

## 2024-06-11 RX ADMIN — HYDRALAZINE HYDROCHLORIDE 10 MILLIGRAM(S): 50 TABLET ORAL at 18:35

## 2024-06-11 RX ADMIN — FENTANYL CITRATE 50 MICROGRAM(S): 50 INJECTION, SOLUTION INTRAMUSCULAR; INTRAVENOUS at 17:45

## 2024-06-11 RX ADMIN — INSULIN LISPRO 2: 100 INJECTION, SOLUTION SUBCUTANEOUS at 22:55

## 2024-06-11 RX ADMIN — FENTANYL CITRATE 50 MICROGRAM(S): 50 INJECTION, SOLUTION INTRAMUSCULAR; INTRAVENOUS at 17:35

## 2024-06-11 RX ADMIN — PANTOPRAZOLE SODIUM 40 MILLIGRAM(S): 40 INJECTION, POWDER, FOR SOLUTION INTRAVENOUS at 09:41

## 2024-06-11 RX ADMIN — Medication 400 MILLIGRAM(S): at 10:04

## 2024-06-11 RX ADMIN — PIPERACILLIN SODIUM AND TAZOBACTAM SODIUM 25 GRAM(S): 3; .375 INJECTION, POWDER, LYOPHILIZED, FOR SOLUTION INTRAVENOUS at 23:31

## 2024-06-11 RX ADMIN — LATANOPROST PF 1 DROP(S): 0.05 SOLUTION/ DROPS OPHTHALMIC at 22:51

## 2024-06-11 RX ADMIN — VANCOMYCIN HYDROCHLORIDE 300 MILLIGRAM(S): KIT at 21:00

## 2024-06-11 RX ADMIN — Medication 2 MILLIGRAM(S): at 16:59

## 2024-06-11 RX ADMIN — PIPERACILLIN SODIUM AND TAZOBACTAM SODIUM 25 GRAM(S): 3; .375 INJECTION, POWDER, LYOPHILIZED, FOR SOLUTION INTRAVENOUS at 06:44

## 2024-06-11 RX ADMIN — Medication 1000 MILLIGRAM(S): at 11:00

## 2024-06-11 RX ADMIN — PIPERACILLIN SODIUM AND TAZOBACTAM SODIUM 25 GRAM(S): 3; .375 INJECTION, POWDER, LYOPHILIZED, FOR SOLUTION INTRAVENOUS at 14:42

## 2024-06-11 RX ADMIN — INSULIN GLARGINE 23 UNIT(S): 100 INJECTION, SOLUTION SUBCUTANEOUS at 22:58

## 2024-06-11 RX ADMIN — Medication 114 MILLIGRAM(S): at 19:47

## 2024-06-11 RX ADMIN — HYDRALAZINE HYDROCHLORIDE 10 MILLIGRAM(S): 50 TABLET ORAL at 08:42

## 2024-06-11 RX ADMIN — ISOPROTERENOL HYDROCHLORIDE 30 MICROGRAM(S)/MIN: 0.2 INJECTION INTRACARDIAC; INTRAMUSCULAR; INTRAVENOUS; SUBCUTANEOUS at 19:27

## 2024-06-11 RX ADMIN — ISOPROTERENOL HYDROCHLORIDE 30 MICROGRAM(S)/MIN: 0.2 INJECTION INTRACARDIAC; INTRAMUSCULAR; INTRAVENOUS; SUBCUTANEOUS at 18:26

## 2024-06-11 RX ADMIN — VANCOMYCIN HYDROCHLORIDE 300 MILLIGRAM(S): KIT at 07:45

## 2024-06-12 LAB
ADD ON TEST-SPECIMEN IN LAB: SIGNIFICANT CHANGE UP
ADD ON TEST-SPECIMEN IN LAB: SIGNIFICANT CHANGE UP
ALBUMIN SERPL ELPH-MCNC: 3.5 G/DL — SIGNIFICANT CHANGE UP (ref 3.3–5)
ALP SERPL-CCNC: 58 U/L — SIGNIFICANT CHANGE UP (ref 40–120)
ALT FLD-CCNC: 9 U/L — LOW (ref 10–45)
ANION GAP SERPL CALC-SCNC: 13 MMOL/L — SIGNIFICANT CHANGE UP (ref 5–17)
ANION GAP SERPL CALC-SCNC: 17 MMOL/L — SIGNIFICANT CHANGE UP (ref 5–17)
ANION GAP SERPL CALC-SCNC: 17 MMOL/L — SIGNIFICANT CHANGE UP (ref 5–17)
ANION GAP SERPL CALC-SCNC: 19 MMOL/L — HIGH (ref 5–17)
APPEARANCE UR: CLEAR — SIGNIFICANT CHANGE UP
AST SERPL-CCNC: 14 U/L — SIGNIFICANT CHANGE UP (ref 10–40)
AUTO DIFF PNL BLD: NEGATIVE — SIGNIFICANT CHANGE UP
B BURGDOR C6 AB SER-ACNC: NEGATIVE — SIGNIFICANT CHANGE UP
B BURGDOR IGG+IGM SER-ACNC: 0.09 INDEX — SIGNIFICANT CHANGE UP (ref 0.01–0.89)
BACTERIA # UR AUTO: NEGATIVE /HPF — SIGNIFICANT CHANGE UP
BASE EXCESS BLDV CALC-SCNC: 2.6 MMOL/L — SIGNIFICANT CHANGE UP (ref -2–3)
BASE EXCESS BLDV CALC-SCNC: 4.5 MMOL/L — HIGH (ref -2–3)
BASOPHILS # BLD AUTO: 0 K/UL — SIGNIFICANT CHANGE UP (ref 0–0.2)
BASOPHILS NFR BLD AUTO: 0 % — SIGNIFICANT CHANGE UP (ref 0–2)
BILIRUB SERPL-MCNC: 0.5 MG/DL — SIGNIFICANT CHANGE UP (ref 0.2–1.2)
BILIRUB UR-MCNC: NEGATIVE — SIGNIFICANT CHANGE UP
BUN SERPL-MCNC: 17 MG/DL — SIGNIFICANT CHANGE UP (ref 7–23)
BUN SERPL-MCNC: 18 MG/DL — SIGNIFICANT CHANGE UP (ref 7–23)
BUN SERPL-MCNC: 19 MG/DL — SIGNIFICANT CHANGE UP (ref 7–23)
BUN SERPL-MCNC: 20 MG/DL — SIGNIFICANT CHANGE UP (ref 7–23)
C-ANCA SER-ACNC: NEGATIVE — SIGNIFICANT CHANGE UP
C3 SERPL-MCNC: 129 MG/DL — SIGNIFICANT CHANGE UP (ref 81–157)
C4 SERPL-MCNC: 40 MG/DL — HIGH (ref 13–39)
CA-I SERPL-SCNC: 1.12 MMOL/L — LOW (ref 1.15–1.33)
CA-I SERPL-SCNC: 1.18 MMOL/L — SIGNIFICANT CHANGE UP (ref 1.15–1.33)
CALCIUM SERPL-MCNC: 8.6 MG/DL — SIGNIFICANT CHANGE UP (ref 8.4–10.5)
CALCIUM SERPL-MCNC: 8.8 MG/DL — SIGNIFICANT CHANGE UP (ref 8.4–10.5)
CALCIUM SERPL-MCNC: 8.9 MG/DL — SIGNIFICANT CHANGE UP (ref 8.4–10.5)
CALCIUM SERPL-MCNC: 9 MG/DL — SIGNIFICANT CHANGE UP (ref 8.4–10.5)
CAST: 1 /LPF — SIGNIFICANT CHANGE UP (ref 0–4)
CHLORIDE BLDV-SCNC: 92 MMOL/L — LOW (ref 96–108)
CHLORIDE BLDV-SCNC: 98 MMOL/L — SIGNIFICANT CHANGE UP (ref 96–108)
CHLORIDE SERPL-SCNC: 101 MMOL/L — SIGNIFICANT CHANGE UP (ref 96–108)
CHLORIDE SERPL-SCNC: 93 MMOL/L — LOW (ref 96–108)
CHLORIDE SERPL-SCNC: 94 MMOL/L — LOW (ref 96–108)
CHLORIDE SERPL-SCNC: 96 MMOL/L — SIGNIFICANT CHANGE UP (ref 96–108)
CMV DNA CSF QL NAA+PROBE: SIGNIFICANT CHANGE UP IU/ML
CMV DNA SPEC NAA+PROBE-LOG#: SIGNIFICANT CHANGE UP LOG10IU/ML
CO2 BLDV-SCNC: 28 MMOL/L — HIGH (ref 22–26)
CO2 BLDV-SCNC: 31 MMOL/L — HIGH (ref 22–26)
CO2 SERPL-SCNC: 18 MMOL/L — LOW (ref 22–31)
CO2 SERPL-SCNC: 22 MMOL/L — SIGNIFICANT CHANGE UP (ref 22–31)
CO2 SERPL-SCNC: 23 MMOL/L — SIGNIFICANT CHANGE UP (ref 22–31)
CO2 SERPL-SCNC: 23 MMOL/L — SIGNIFICANT CHANGE UP (ref 22–31)
COLOR SPEC: YELLOW — SIGNIFICANT CHANGE UP
CREAT ?TM UR-MCNC: 96 MG/DL — SIGNIFICANT CHANGE UP
CREAT SERPL-MCNC: 1.2 MG/DL — SIGNIFICANT CHANGE UP (ref 0.5–1.3)
CREAT SERPL-MCNC: 1.45 MG/DL — HIGH (ref 0.5–1.3)
CREAT SERPL-MCNC: 1.57 MG/DL — HIGH (ref 0.5–1.3)
CREAT SERPL-MCNC: 1.59 MG/DL — HIGH (ref 0.5–1.3)
CRP SERPL-MCNC: 9 MG/L — HIGH (ref 0–4)
CULTURE RESULTS: SIGNIFICANT CHANGE UP
DIFF PNL FLD: NEGATIVE — SIGNIFICANT CHANGE UP
EBV DNA SERPL NAA+PROBE-ACNC: SIGNIFICANT CHANGE UP IU/ML
EBVPCR LOG: SIGNIFICANT CHANGE UP LOG10IU/ML
EGFR: 48 ML/MIN/1.73M2 — LOW
EGFR: 49 ML/MIN/1.73M2 — LOW
EGFR: 54 ML/MIN/1.73M2 — LOW
EGFR: 68 ML/MIN/1.73M2 — SIGNIFICANT CHANGE UP
EOSINOPHIL # BLD AUTO: 0 K/UL — SIGNIFICANT CHANGE UP (ref 0–0.5)
EOSINOPHIL NFR BLD AUTO: 0 % — SIGNIFICANT CHANGE UP (ref 0–6)
ERYTHROCYTE [SEDIMENTATION RATE] IN BLOOD: 15 MM/HR — SIGNIFICANT CHANGE UP (ref 0–20)
FOLATE SERPL-MCNC: >20 NG/ML — SIGNIFICANT CHANGE UP
GAS PNL BLDV: 130 MMOL/L — LOW (ref 136–145)
GAS PNL BLDV: 133 MMOL/L — LOW (ref 136–145)
GAS PNL BLDV: SIGNIFICANT CHANGE UP
GIANT PLATELETS BLD QL SMEAR: PRESENT — SIGNIFICANT CHANGE UP
GLUCOSE BLDV-MCNC: 150 MG/DL — HIGH (ref 70–99)
GLUCOSE BLDV-MCNC: 384 MG/DL — HIGH (ref 70–99)
GLUCOSE SERPL-MCNC: 102 MG/DL — HIGH (ref 70–99)
GLUCOSE SERPL-MCNC: 269 MG/DL — HIGH (ref 70–99)
GLUCOSE SERPL-MCNC: 348 MG/DL — HIGH (ref 70–99)
GLUCOSE SERPL-MCNC: 390 MG/DL — HIGH (ref 70–99)
GLUCOSE UR QL: 250 MG/DL
HCO3 BLDV-SCNC: 27 MMOL/L — SIGNIFICANT CHANGE UP (ref 22–29)
HCO3 BLDV-SCNC: 30 MMOL/L — HIGH (ref 22–29)
HCT VFR BLD CALC: 41.1 % — SIGNIFICANT CHANGE UP (ref 39–50)
HCT VFR BLDA CALC: 39 % — SIGNIFICANT CHANGE UP (ref 39–51)
HCT VFR BLDA CALC: 41 % — SIGNIFICANT CHANGE UP (ref 39–51)
HGB BLD CALC-MCNC: 13 G/DL — SIGNIFICANT CHANGE UP (ref 12.6–17.4)
HGB BLD CALC-MCNC: 13.6 G/DL — SIGNIFICANT CHANGE UP (ref 12.6–17.4)
HGB BLD-MCNC: 13.2 G/DL — SIGNIFICANT CHANGE UP (ref 13–17)
HOROWITZ INDEX BLDV+IHG-RTO: 21 — SIGNIFICANT CHANGE UP
HOROWITZ INDEX BLDV+IHG-RTO: 21 — SIGNIFICANT CHANGE UP
HSV DNA1: SIGNIFICANT CHANGE UP
HSV DNA2: SIGNIFICANT CHANGE UP
HSV1 DNA BLD QL NAA+PROBE: SIGNIFICANT CHANGE UP
HSV1 IGG SER-ACNC: 50.9 INDEX — HIGH
HSV1 IGG SERPL QL IA: POSITIVE
HSV2 DNA BLD QL NAA+PROBE: SIGNIFICANT CHANGE UP
HSV2 IGG FLD-ACNC: 0.08 INDEX — SIGNIFICANT CHANGE UP
HSV2 IGG SERPL QL IA: NEGATIVE — SIGNIFICANT CHANGE UP
KETONES UR-MCNC: NEGATIVE MG/DL — SIGNIFICANT CHANGE UP
LACTATE BLDV-MCNC: 2.2 MMOL/L — HIGH (ref 0.5–2)
LACTATE BLDV-MCNC: 2.6 MMOL/L — HIGH (ref 0.5–2)
LACTATE SERPL-SCNC: 1.7 MMOL/L — SIGNIFICANT CHANGE UP (ref 0.5–2)
LEUKOCYTE ESTERASE UR-ACNC: NEGATIVE — SIGNIFICANT CHANGE UP
LYMPHOCYTES # BLD AUTO: 1.77 K/UL — SIGNIFICANT CHANGE UP (ref 1–3.3)
LYMPHOCYTES # BLD AUTO: 8 % — LOW (ref 13–44)
MAGNESIUM SERPL-MCNC: 1.9 MG/DL — SIGNIFICANT CHANGE UP (ref 1.6–2.6)
MANUAL SMEAR VERIFICATION: SIGNIFICANT CHANGE UP
MCHC RBC-ENTMCNC: 26.5 PG — LOW (ref 27–34)
MCHC RBC-ENTMCNC: 32.1 GM/DL — SIGNIFICANT CHANGE UP (ref 32–36)
MCV RBC AUTO: 82.5 FL — SIGNIFICANT CHANGE UP (ref 80–100)
MONOCYTES # BLD AUTO: 1.11 K/UL — HIGH (ref 0–0.9)
MONOCYTES NFR BLD AUTO: 5 % — SIGNIFICANT CHANGE UP (ref 2–14)
NEUTROPHILS # BLD AUTO: 19.25 K/UL — HIGH (ref 1.8–7.4)
NEUTROPHILS NFR BLD AUTO: 86 % — HIGH (ref 43–77)
NEUTS BAND # BLD: 1 % — SIGNIFICANT CHANGE UP (ref 0–8)
NITRITE UR-MCNC: NEGATIVE — SIGNIFICANT CHANGE UP
NRBC # BLD: 0 /100 WBCS — SIGNIFICANT CHANGE UP (ref 0–0)
OSMOLALITY UR: 280 MOS/KG — LOW (ref 300–900)
P-ANCA SER-ACNC: NEGATIVE — SIGNIFICANT CHANGE UP
PCO2 BLDV: 41 MMHG — LOW (ref 42–55)
PCO2 BLDV: 46 MMHG — SIGNIFICANT CHANGE UP (ref 42–55)
PH BLDV: 7.42 — SIGNIFICANT CHANGE UP (ref 7.32–7.43)
PH BLDV: 7.43 — SIGNIFICANT CHANGE UP (ref 7.32–7.43)
PH UR: 5 — SIGNIFICANT CHANGE UP (ref 5–8)
PHOSPHATE SERPL-MCNC: 3 MG/DL — SIGNIFICANT CHANGE UP (ref 2.5–4.5)
PLAT MORPH BLD: NORMAL — SIGNIFICANT CHANGE UP
PLATELET # BLD AUTO: 258 K/UL — SIGNIFICANT CHANGE UP (ref 150–400)
PO2 BLDV: 35 MMHG — SIGNIFICANT CHANGE UP (ref 25–45)
PO2 BLDV: 36 MMHG — SIGNIFICANT CHANGE UP (ref 25–45)
POTASSIUM BLDV-SCNC: 3.4 MMOL/L — LOW (ref 3.5–5.1)
POTASSIUM BLDV-SCNC: 3.6 MMOL/L — SIGNIFICANT CHANGE UP (ref 3.5–5.1)
POTASSIUM SERPL-MCNC: 3.3 MMOL/L — LOW (ref 3.5–5.3)
POTASSIUM SERPL-MCNC: 3.4 MMOL/L — LOW (ref 3.5–5.3)
POTASSIUM SERPL-MCNC: 3.9 MMOL/L — SIGNIFICANT CHANGE UP (ref 3.5–5.3)
POTASSIUM SERPL-MCNC: 4.1 MMOL/L — SIGNIFICANT CHANGE UP (ref 3.5–5.3)
POTASSIUM SERPL-SCNC: 3.3 MMOL/L — LOW (ref 3.5–5.3)
POTASSIUM SERPL-SCNC: 3.4 MMOL/L — LOW (ref 3.5–5.3)
POTASSIUM SERPL-SCNC: 3.9 MMOL/L — SIGNIFICANT CHANGE UP (ref 3.5–5.3)
POTASSIUM SERPL-SCNC: 4.1 MMOL/L — SIGNIFICANT CHANGE UP (ref 3.5–5.3)
POTASSIUM UR-SCNC: 29 MMOL/L — SIGNIFICANT CHANGE UP
PROT ?TM UR-MCNC: 25 MG/DL — HIGH (ref 0–12)
PROT SERPL-MCNC: 6.9 G/DL — SIGNIFICANT CHANGE UP (ref 6–8.3)
PROT UR-MCNC: SIGNIFICANT CHANGE UP MG/DL
PROT/CREAT UR-RTO: 0.3 RATIO — HIGH (ref 0–0.2)
RBC # BLD: 4.98 M/UL — SIGNIFICANT CHANGE UP (ref 4.2–5.8)
RBC # FLD: 14.7 % — HIGH (ref 10.3–14.5)
RBC BLD AUTO: SIGNIFICANT CHANGE UP
RBC CASTS # UR COMP ASSIST: 3 /HPF — SIGNIFICANT CHANGE UP (ref 0–4)
SAO2 % BLDV: 58.2 % — LOW (ref 67–88)
SAO2 % BLDV: 66 % — LOW (ref 67–88)
SODIUM SERPL-SCNC: 131 MMOL/L — LOW (ref 135–145)
SODIUM SERPL-SCNC: 133 MMOL/L — LOW (ref 135–145)
SODIUM SERPL-SCNC: 135 MMOL/L — SIGNIFICANT CHANGE UP (ref 135–145)
SODIUM SERPL-SCNC: 137 MMOL/L — SIGNIFICANT CHANGE UP (ref 135–145)
SODIUM UR-SCNC: 33 MMOL/L — SIGNIFICANT CHANGE UP
SP GR SPEC: 1.01 — SIGNIFICANT CHANGE UP (ref 1–1.03)
SPECIMEN SOURCE: SIGNIFICANT CHANGE UP
SQUAMOUS # UR AUTO: 2 /HPF — SIGNIFICANT CHANGE UP (ref 0–5)
UROBILINOGEN FLD QL: 0.2 MG/DL — SIGNIFICANT CHANGE UP (ref 0.2–1)
UUN UR-MCNC: 322 MG/DL — SIGNIFICANT CHANGE UP
VZV IGG FLD QL IA: 1374 INDEX — SIGNIFICANT CHANGE UP
VZV IGG FLD QL IA: POSITIVE — SIGNIFICANT CHANGE UP
WBC # BLD: 22.13 K/UL — HIGH (ref 3.8–10.5)
WBC # FLD AUTO: 22.13 K/UL — HIGH (ref 3.8–10.5)
WBC UR QL: 0 /HPF — SIGNIFICANT CHANGE UP (ref 0–5)

## 2024-06-12 PROCEDURE — 76705 ECHO EXAM OF ABDOMEN: CPT | Mod: 26

## 2024-06-12 PROCEDURE — 99291 CRITICAL CARE FIRST HOUR: CPT

## 2024-06-12 PROCEDURE — 99222 1ST HOSP IP/OBS MODERATE 55: CPT

## 2024-06-12 PROCEDURE — 99232 SBSQ HOSP IP/OBS MODERATE 35: CPT

## 2024-06-12 PROCEDURE — 99292 CRITICAL CARE ADDL 30 MIN: CPT | Mod: 25

## 2024-06-12 PROCEDURE — 95718 EEG PHYS/QHP 2-12 HR W/VEEG: CPT

## 2024-06-12 PROCEDURE — 93010 ELECTROCARDIOGRAM REPORT: CPT

## 2024-06-12 RX ORDER — POTASSIUM CHLORIDE 600 MG/1
40 TABLET, FILM COATED, EXTENDED RELEASE ORAL EVERY 4 HOURS
Refills: 0 | Status: COMPLETED | OUTPATIENT
Start: 2024-06-12 | End: 2024-06-12

## 2024-06-12 RX ORDER — CEFTRIAXONE SODIUM 500 MG
2000 VIAL (EA) INJECTION EVERY 24 HOURS
Refills: 0 | Status: DISCONTINUED | OUTPATIENT
Start: 2024-06-12 | End: 2024-06-14

## 2024-06-12 RX ORDER — ATORVASTATIN CALCIUM 20 MG/1
40 TABLET, FILM COATED ORAL AT BEDTIME
Refills: 0 | Status: DISCONTINUED | OUTPATIENT
Start: 2024-06-12 | End: 2024-06-21

## 2024-06-12 RX ORDER — INSULIN GLARGINE 100 [IU]/ML
28 INJECTION, SOLUTION SUBCUTANEOUS AT BEDTIME
Refills: 0 | Status: DISCONTINUED | OUTPATIENT
Start: 2024-06-12 | End: 2024-06-12

## 2024-06-12 RX ORDER — INSULIN REGULAR, HUMAN 100/ML
4 VIAL (ML) INJECTION
Qty: 100 | Refills: 0 | Status: DISCONTINUED | OUTPATIENT
Start: 2024-06-12 | End: 2024-06-12

## 2024-06-12 RX ORDER — BUMETANIDE 0.25 MG/ML
2 INJECTION INTRAMUSCULAR; INTRAVENOUS ONCE
Refills: 0 | Status: COMPLETED | OUTPATIENT
Start: 2024-06-12 | End: 2024-06-12

## 2024-06-12 RX ORDER — MAGNESIUM SULFATE 100 %
1 POWDER (GRAM) MISCELLANEOUS ONCE
Refills: 0 | Status: COMPLETED | OUTPATIENT
Start: 2024-06-12 | End: 2024-06-12

## 2024-06-12 RX ORDER — INSULIN REGULAR, HUMAN 100/ML
10 VIAL (ML) INJECTION ONCE
Refills: 0 | Status: COMPLETED | OUTPATIENT
Start: 2024-06-12 | End: 2024-06-12

## 2024-06-12 RX ORDER — PIPERACILLIN SODIUM AND TAZOBACTAM SODIUM 3; .375 G/15ML; G/15ML
3.38 INJECTION, POWDER, LYOPHILIZED, FOR SOLUTION INTRAVENOUS EVERY 8 HOURS
Refills: 0 | Status: DISCONTINUED | OUTPATIENT
Start: 2024-06-12 | End: 2024-06-12

## 2024-06-12 RX ORDER — INSULIN LISPRO 100 [IU]/ML
4 INJECTION, SOLUTION SUBCUTANEOUS
Refills: 0 | Status: DISCONTINUED | OUTPATIENT
Start: 2024-06-12 | End: 2024-06-12

## 2024-06-12 RX ORDER — POTASSIUM CHLORIDE 600 MG/1
40 TABLET, FILM COATED, EXTENDED RELEASE ORAL ONCE
Refills: 0 | Status: COMPLETED | OUTPATIENT
Start: 2024-06-12 | End: 2024-06-12

## 2024-06-12 RX ORDER — ASPIRIN 325 MG/1
81 TABLET, FILM COATED ORAL DAILY
Refills: 0 | Status: DISCONTINUED | OUTPATIENT
Start: 2024-06-12 | End: 2024-06-21

## 2024-06-12 RX ORDER — INSULIN GLARGINE 100 [IU]/ML
30 INJECTION, SOLUTION SUBCUTANEOUS AT BEDTIME
Refills: 0 | Status: DISCONTINUED | OUTPATIENT
Start: 2024-06-12 | End: 2024-06-21

## 2024-06-12 RX ORDER — SODIUM CHLORIDE 0.9 % (FLUSH) 0.9 %
1000 SYRINGE (ML) INJECTION ONCE
Refills: 0 | Status: COMPLETED | OUTPATIENT
Start: 2024-06-12 | End: 2024-06-12

## 2024-06-12 RX ORDER — ISOPROTERENOL HYDROCHLORIDE 0.2 MG/ML
2 INJECTION INTRACARDIAC; INTRAMUSCULAR; INTRAVENOUS; SUBCUTANEOUS
Qty: 1 | Refills: 0 | Status: DISCONTINUED | OUTPATIENT
Start: 2024-06-12 | End: 2024-06-14

## 2024-06-12 RX ORDER — INSULIN LISPRO 100 [IU]/ML
6 INJECTION, SOLUTION SUBCUTANEOUS
Refills: 0 | Status: DISCONTINUED | OUTPATIENT
Start: 2024-06-12 | End: 2024-06-12

## 2024-06-12 RX ORDER — INSULIN LISPRO 100 [IU]/ML
INJECTION, SOLUTION SUBCUTANEOUS AT BEDTIME
Refills: 0 | Status: DISCONTINUED | OUTPATIENT
Start: 2024-06-12 | End: 2024-06-21

## 2024-06-12 RX ORDER — INSULIN REGULAR, HUMAN 100/ML
4 VIAL (ML) INJECTION ONCE
Refills: 0 | Status: DISCONTINUED | OUTPATIENT
Start: 2024-06-12 | End: 2024-06-12

## 2024-06-12 RX ORDER — SODIUM CHLORIDE 0.9 % (FLUSH) 0.9 %
1000 SYRINGE (ML) INJECTION
Refills: 0 | Status: DISCONTINUED | OUTPATIENT
Start: 2024-06-12 | End: 2024-06-13

## 2024-06-12 RX ORDER — INSULIN LISPRO 100 [IU]/ML
6 INJECTION, SOLUTION SUBCUTANEOUS
Refills: 0 | Status: DISCONTINUED | OUTPATIENT
Start: 2024-06-12 | End: 2024-06-21

## 2024-06-12 RX ORDER — INSULIN LISPRO 100 [IU]/ML
INJECTION, SOLUTION SUBCUTANEOUS
Refills: 0 | Status: DISCONTINUED | OUTPATIENT
Start: 2024-06-12 | End: 2024-06-21

## 2024-06-12 RX ORDER — ACYCLOVIR SODIUM 50 MG/ML
700 VIAL (ML) INTRAVENOUS EVERY 8 HOURS
Refills: 0 | Status: DISCONTINUED | OUTPATIENT
Start: 2024-06-12 | End: 2024-06-12

## 2024-06-12 RX ADMIN — ATORVASTATIN CALCIUM 40 MILLIGRAM(S): 20 TABLET, FILM COATED ORAL at 21:22

## 2024-06-12 RX ADMIN — INSULIN LISPRO 8: 100 INJECTION, SOLUTION SUBCUTANEOUS at 08:07

## 2024-06-12 RX ADMIN — Medication 114 MILLIGRAM(S): at 01:13

## 2024-06-12 RX ADMIN — Medication 1 APPLICATION(S): at 02:53

## 2024-06-12 RX ADMIN — VANCOMYCIN HYDROCHLORIDE 300 MILLIGRAM(S): KIT at 09:26

## 2024-06-12 RX ADMIN — POTASSIUM CHLORIDE 40 MILLIEQUIVALENT(S): 600 TABLET, FILM COATED, EXTENDED RELEASE ORAL at 18:14

## 2024-06-12 RX ADMIN — Medication 100 GRAM(S): at 02:55

## 2024-06-12 RX ADMIN — INSULIN GLARGINE 30 UNIT(S): 100 INJECTION, SOLUTION SUBCUTANEOUS at 21:23

## 2024-06-12 RX ADMIN — POTASSIUM CHLORIDE 40 MILLIEQUIVALENT(S): 600 TABLET, FILM COATED, EXTENDED RELEASE ORAL at 11:35

## 2024-06-12 RX ADMIN — Medication 100 MILLIGRAM(S): at 21:23

## 2024-06-12 RX ADMIN — ISOPROTERENOL HYDROCHLORIDE 30 MICROGRAM(S)/MIN: 0.2 INJECTION INTRACARDIAC; INTRAMUSCULAR; INTRAVENOUS; SUBCUTANEOUS at 09:25

## 2024-06-12 RX ADMIN — ATORVASTATIN CALCIUM 40 MILLIGRAM(S): 20 TABLET, FILM COATED ORAL at 01:20

## 2024-06-12 RX ADMIN — INSULIN LISPRO 6 UNIT(S): 100 INJECTION, SOLUTION SUBCUTANEOUS at 11:42

## 2024-06-12 RX ADMIN — Medication 5 MILLIGRAM(S): at 01:20

## 2024-06-12 RX ADMIN — Medication 10 UNIT(S)/HR: at 13:14

## 2024-06-12 RX ADMIN — PIPERACILLIN SODIUM AND TAZOBACTAM SODIUM 25 GRAM(S): 3; .375 INJECTION, POWDER, LYOPHILIZED, FOR SOLUTION INTRAVENOUS at 06:00

## 2024-06-12 RX ADMIN — PIPERACILLIN SODIUM AND TAZOBACTAM SODIUM 25 GRAM(S): 3; .375 INJECTION, POWDER, LYOPHILIZED, FOR SOLUTION INTRAVENOUS at 13:14

## 2024-06-12 RX ADMIN — Medication 5 MILLIGRAM(S): at 21:22

## 2024-06-12 RX ADMIN — Medication 150 MILLILITER(S): at 15:33

## 2024-06-12 RX ADMIN — ISOPROTERENOL HYDROCHLORIDE 30 MICROGRAM(S)/MIN: 0.2 INJECTION INTRACARDIAC; INTRAMUSCULAR; INTRAVENOUS; SUBCUTANEOUS at 21:21

## 2024-06-12 RX ADMIN — POTASSIUM CHLORIDE 40 MILLIEQUIVALENT(S): 600 TABLET, FILM COATED, EXTENDED RELEASE ORAL at 15:34

## 2024-06-12 RX ADMIN — INSULIN LISPRO 6 UNIT(S): 100 INJECTION, SOLUTION SUBCUTANEOUS at 18:13

## 2024-06-12 RX ADMIN — Medication 114 MILLIGRAM(S): at 08:01

## 2024-06-12 RX ADMIN — Medication 1000 MILLILITER(S): at 12:31

## 2024-06-12 RX ADMIN — ISOPROTERENOL HYDROCHLORIDE 30 MICROGRAM(S)/MIN: 0.2 INJECTION INTRACARDIAC; INTRAMUSCULAR; INTRAVENOUS; SUBCUTANEOUS at 03:05

## 2024-06-12 RX ADMIN — BUMETANIDE 2 MILLIGRAM(S): 0.25 INJECTION INTRAMUSCULAR; INTRAVENOUS at 02:54

## 2024-06-12 RX ADMIN — ASPIRIN 81 MILLIGRAM(S): 325 TABLET, FILM COATED ORAL at 01:23

## 2024-06-12 RX ADMIN — INSULIN LISPRO 12: 100 INJECTION, SOLUTION SUBCUTANEOUS at 11:35

## 2024-06-12 RX ADMIN — INSULIN LISPRO 4 UNIT(S): 100 INJECTION, SOLUTION SUBCUTANEOUS at 08:06

## 2024-06-12 RX ADMIN — LATANOPROST PF 1 DROP(S): 0.05 SOLUTION/ DROPS OPHTHALMIC at 21:22

## 2024-06-12 RX ADMIN — Medication 10 UNIT(S): at 13:13

## 2024-06-12 RX ADMIN — Medication 150 MILLILITER(S): at 21:21

## 2024-06-13 ENCOUNTER — RESULT REVIEW (OUTPATIENT)
Age: 64
End: 2024-06-13

## 2024-06-13 LAB
24R-OH-CALCIDIOL SERPL-MCNC: 33.9 NG/ML — SIGNIFICANT CHANGE UP (ref 30–80)
ACE SERPL-CCNC: 25 U/L — SIGNIFICANT CHANGE UP (ref 14–82)
ALBUMIN SERPL ELPH-MCNC: 3.3 G/DL — SIGNIFICANT CHANGE UP (ref 3.3–5)
ALP SERPL-CCNC: 53 U/L — SIGNIFICANT CHANGE UP (ref 40–120)
ALT FLD-CCNC: 10 U/L — SIGNIFICANT CHANGE UP (ref 10–45)
ANA TITR SER: NEGATIVE — SIGNIFICANT CHANGE UP
ANION GAP SERPL CALC-SCNC: 13 MMOL/L — SIGNIFICANT CHANGE UP (ref 5–17)
AST SERPL-CCNC: 16 U/L — SIGNIFICANT CHANGE UP (ref 10–40)
B BURGDOR C6 AB SER-ACNC: NEGATIVE — SIGNIFICANT CHANGE UP
B BURGDOR IGG+IGM SER-ACNC: 0.04 INDEX — SIGNIFICANT CHANGE UP (ref 0.01–0.89)
B19V DNA FLD QL NAA+PROBE: SIGNIFICANT CHANGE UP IU/ML
BASE EXCESS BLDV CALC-SCNC: 0.4 MMOL/L — SIGNIFICANT CHANGE UP (ref -2–3)
BASOPHILS # BLD AUTO: 0.04 K/UL — SIGNIFICANT CHANGE UP (ref 0–0.2)
BASOPHILS NFR BLD AUTO: 0.3 % — SIGNIFICANT CHANGE UP (ref 0–2)
BILIRUB SERPL-MCNC: 0.4 MG/DL — SIGNIFICANT CHANGE UP (ref 0.2–1.2)
BUN SERPL-MCNC: 16 MG/DL — SIGNIFICANT CHANGE UP (ref 7–23)
CA-I SERPL-SCNC: 1.19 MMOL/L — SIGNIFICANT CHANGE UP (ref 1.15–1.33)
CALCIUM SERPL-MCNC: 8.7 MG/DL — SIGNIFICANT CHANGE UP (ref 8.4–10.5)
CHLORIDE BLDV-SCNC: 105 MMOL/L — SIGNIFICANT CHANGE UP (ref 96–108)
CHLORIDE SERPL-SCNC: 105 MMOL/L — SIGNIFICANT CHANGE UP (ref 96–108)
CO2 BLDV-SCNC: 27 MMOL/L — HIGH (ref 22–26)
CO2 SERPL-SCNC: 22 MMOL/L — SIGNIFICANT CHANGE UP (ref 22–31)
CREAT SERPL-MCNC: 1.49 MG/DL — HIGH (ref 0.5–1.3)
EGFR: 52 ML/MIN/1.73M2 — LOW
EOSINOPHIL # BLD AUTO: 0.14 K/UL — SIGNIFICANT CHANGE UP (ref 0–0.5)
EOSINOPHIL NFR BLD AUTO: 1.1 % — SIGNIFICANT CHANGE UP (ref 0–6)
GAS PNL BLDV: 135 MMOL/L — LOW (ref 136–145)
GAS PNL BLDV: SIGNIFICANT CHANGE UP
GLUCOSE BLDV-MCNC: 202 MG/DL — HIGH (ref 70–99)
GLUCOSE SERPL-MCNC: 144 MG/DL — HIGH (ref 70–99)
HCO3 BLDV-SCNC: 26 MMOL/L — SIGNIFICANT CHANGE UP (ref 22–29)
HCT VFR BLD CALC: 38.3 % — LOW (ref 39–50)
HCT VFR BLDA CALC: 38 % — LOW (ref 39–51)
HGB BLD CALC-MCNC: 12.6 G/DL — SIGNIFICANT CHANGE UP (ref 12.6–17.4)
HGB BLD-MCNC: 12.4 G/DL — LOW (ref 13–17)
IMM GRANULOCYTES NFR BLD AUTO: 0.2 % — SIGNIFICANT CHANGE UP (ref 0–0.9)
LACTATE BLDV-MCNC: 1.3 MMOL/L — SIGNIFICANT CHANGE UP (ref 0.5–2)
LYMPHOCYTES # BLD AUTO: 1.48 K/UL — SIGNIFICANT CHANGE UP (ref 1–3.3)
LYMPHOCYTES # BLD AUTO: 11.7 % — LOW (ref 13–44)
MAGNESIUM SERPL-MCNC: 2.1 MG/DL — SIGNIFICANT CHANGE UP (ref 1.6–2.6)
MCHC RBC-ENTMCNC: 27.1 PG — SIGNIFICANT CHANGE UP (ref 27–34)
MCHC RBC-ENTMCNC: 32.4 GM/DL — SIGNIFICANT CHANGE UP (ref 32–36)
MCV RBC AUTO: 83.8 FL — SIGNIFICANT CHANGE UP (ref 80–100)
MONOCYTES # BLD AUTO: 1.16 K/UL — HIGH (ref 0–0.9)
MONOCYTES NFR BLD AUTO: 9.1 % — SIGNIFICANT CHANGE UP (ref 2–14)
NEUTROPHILS # BLD AUTO: 9.83 K/UL — HIGH (ref 1.8–7.4)
NEUTROPHILS NFR BLD AUTO: 77.6 % — HIGH (ref 43–77)
NRBC # BLD: 0 /100 WBCS — SIGNIFICANT CHANGE UP (ref 0–0)
PCO2 BLDV: 45 MMHG — SIGNIFICANT CHANGE UP (ref 42–55)
PH BLDV: 7.37 — SIGNIFICANT CHANGE UP (ref 7.32–7.43)
PHOSPHATE SERPL-MCNC: 2.7 MG/DL — SIGNIFICANT CHANGE UP (ref 2.5–4.5)
PLATELET # BLD AUTO: 157 K/UL — SIGNIFICANT CHANGE UP (ref 150–400)
PO2 BLDV: 32 MMHG — SIGNIFICANT CHANGE UP (ref 25–45)
POTASSIUM BLDV-SCNC: 4.4 MMOL/L — SIGNIFICANT CHANGE UP (ref 3.5–5.1)
POTASSIUM SERPL-MCNC: 4.5 MMOL/L — SIGNIFICANT CHANGE UP (ref 3.5–5.3)
POTASSIUM SERPL-SCNC: 4.5 MMOL/L — SIGNIFICANT CHANGE UP (ref 3.5–5.3)
PROT SERPL-MCNC: 6.5 G/DL — SIGNIFICANT CHANGE UP (ref 6–8.3)
PROT SERPL-MCNC: 6.6 G/DL — SIGNIFICANT CHANGE UP (ref 6–8.3)
RBC # BLD: 4.57 M/UL — SIGNIFICANT CHANGE UP (ref 4.2–5.8)
RBC # FLD: 15 % — HIGH (ref 10.3–14.5)
SAO2 % BLDV: 52.7 % — LOW (ref 67–88)
SODIUM SERPL-SCNC: 140 MMOL/L — SIGNIFICANT CHANGE UP (ref 135–145)
T PALLIDUM AB TITR SER: NEGATIVE — SIGNIFICANT CHANGE UP
VIT D25+D1,25 OH+D1,25 PNL SERPL-MCNC: 44.6 PG/ML — SIGNIFICANT CHANGE UP (ref 19.9–79.3)
WBC # BLD: 12.68 K/UL — HIGH (ref 3.8–10.5)
WBC # FLD AUTO: 12.68 K/UL — HIGH (ref 3.8–10.5)

## 2024-06-13 PROCEDURE — 93010 ELECTROCARDIOGRAM REPORT: CPT

## 2024-06-13 PROCEDURE — 78227 HEPATOBIL SYST IMAGE W/DRUG: CPT | Mod: 26

## 2024-06-13 PROCEDURE — 99232 SBSQ HOSP IP/OBS MODERATE 35: CPT

## 2024-06-13 PROCEDURE — 99292 CRITICAL CARE ADDL 30 MIN: CPT

## 2024-06-13 PROCEDURE — 76377 3D RENDER W/INTRP POSTPROCES: CPT | Mod: 26

## 2024-06-13 PROCEDURE — 93320 DOPPLER ECHO COMPLETE: CPT | Mod: 26

## 2024-06-13 PROCEDURE — 99291 CRITICAL CARE FIRST HOUR: CPT

## 2024-06-13 PROCEDURE — 93325 DOPPLER ECHO COLOR FLOW MAPG: CPT | Mod: 26

## 2024-06-13 PROCEDURE — 93312 ECHO TRANSESOPHAGEAL: CPT | Mod: 26

## 2024-06-13 RX ORDER — DEXMEDETOMIDINE HYDROCHLORIDE 4 UG/ML
0.4 INJECTION, SOLUTION INTRAVENOUS
Qty: 200 | Refills: 0 | Status: DISCONTINUED | OUTPATIENT
Start: 2024-06-13 | End: 2024-06-14

## 2024-06-13 RX ORDER — DEXMEDETOMIDINE HYDROCHLORIDE 4 UG/ML
0.5 INJECTION, SOLUTION INTRAVENOUS
Qty: 200 | Refills: 0 | Status: DISCONTINUED | OUTPATIENT
Start: 2024-06-13 | End: 2024-06-13

## 2024-06-13 RX ORDER — DEXMEDETOMIDINE HYDROCHLORIDE 4 UG/ML
0.5 INJECTION, SOLUTION INTRAVENOUS
Qty: 400 | Refills: 0 | Status: DISCONTINUED | OUTPATIENT
Start: 2024-06-13 | End: 2024-06-13

## 2024-06-13 RX ADMIN — INSULIN GLARGINE 30 UNIT(S): 100 INJECTION, SOLUTION SUBCUTANEOUS at 22:12

## 2024-06-13 RX ADMIN — Medication 100 MILLIGRAM(S): at 18:26

## 2024-06-13 RX ADMIN — Medication 1 APPLICATION(S): at 05:22

## 2024-06-13 RX ADMIN — ASPIRIN 81 MILLIGRAM(S): 325 TABLET, FILM COATED ORAL at 12:31

## 2024-06-13 RX ADMIN — INSULIN LISPRO 2: 100 INJECTION, SOLUTION SUBCUTANEOUS at 08:11

## 2024-06-13 RX ADMIN — ATORVASTATIN CALCIUM 40 MILLIGRAM(S): 20 TABLET, FILM COATED ORAL at 22:12

## 2024-06-13 RX ADMIN — DEXMEDETOMIDINE HYDROCHLORIDE 9.71 MICROGRAM(S)/KG/HR: 4 INJECTION, SOLUTION INTRAVENOUS at 22:12

## 2024-06-13 RX ADMIN — INSULIN LISPRO 6 UNIT(S): 100 INJECTION, SOLUTION SUBCUTANEOUS at 17:49

## 2024-06-13 RX ADMIN — INSULIN LISPRO 2: 100 INJECTION, SOLUTION SUBCUTANEOUS at 12:30

## 2024-06-13 RX ADMIN — LATANOPROST PF 1 DROP(S): 0.05 SOLUTION/ DROPS OPHTHALMIC at 22:13

## 2024-06-13 RX ADMIN — ISOPROTERENOL HYDROCHLORIDE 30 MICROGRAM(S)/MIN: 0.2 INJECTION INTRACARDIAC; INTRAMUSCULAR; INTRAVENOUS; SUBCUTANEOUS at 22:13

## 2024-06-13 RX ADMIN — DEXMEDETOMIDINE HYDROCHLORIDE 12.1 MICROGRAM(S)/KG/HR: 4 INJECTION, SOLUTION INTRAVENOUS at 03:45

## 2024-06-13 RX ADMIN — Medication 5 MILLIGRAM(S): at 22:12

## 2024-06-13 RX ADMIN — INSULIN LISPRO 2: 100 INJECTION, SOLUTION SUBCUTANEOUS at 17:50

## 2024-06-13 RX ADMIN — ISOPROTERENOL HYDROCHLORIDE 30 MICROGRAM(S)/MIN: 0.2 INJECTION INTRACARDIAC; INTRAMUSCULAR; INTRAVENOUS; SUBCUTANEOUS at 18:26

## 2024-06-14 DIAGNOSIS — I44.2 ATRIOVENTRICULAR BLOCK, COMPLETE: ICD-10-CM

## 2024-06-14 DIAGNOSIS — D72.829 ELEVATED WHITE BLOOD CELL COUNT, UNSPECIFIED: ICD-10-CM

## 2024-06-14 DIAGNOSIS — I63.9 CEREBRAL INFARCTION, UNSPECIFIED: ICD-10-CM

## 2024-06-14 DIAGNOSIS — K80.20 CALCULUS OF GALLBLADDER WITHOUT CHOLECYSTITIS WITHOUT OBSTRUCTION: ICD-10-CM

## 2024-06-14 DIAGNOSIS — I10 ESSENTIAL (PRIMARY) HYPERTENSION: ICD-10-CM

## 2024-06-14 DIAGNOSIS — E11.9 TYPE 2 DIABETES MELLITUS WITHOUT COMPLICATIONS: ICD-10-CM

## 2024-06-14 DIAGNOSIS — R41.82 ALTERED MENTAL STATUS, UNSPECIFIED: ICD-10-CM

## 2024-06-14 DIAGNOSIS — Z29.9 ENCOUNTER FOR PROPHYLACTIC MEASURES, UNSPECIFIED: ICD-10-CM

## 2024-06-14 DIAGNOSIS — N17.9 ACUTE KIDNEY FAILURE, UNSPECIFIED: ICD-10-CM

## 2024-06-14 DIAGNOSIS — E78.5 HYPERLIPIDEMIA, UNSPECIFIED: ICD-10-CM

## 2024-06-14 LAB
% ALBUMIN: 51.9 % — SIGNIFICANT CHANGE UP
% ALPHA 1: 5.4 % — SIGNIFICANT CHANGE UP
% ALPHA 2: 11.7 % — SIGNIFICANT CHANGE UP
% BETA: 13.9 % — SIGNIFICANT CHANGE UP
% GAMMA: 17.1 % — SIGNIFICANT CHANGE UP
ALBUMIN SERPL ELPH-MCNC: 3.4 G/DL — LOW (ref 3.6–5.5)
ALBUMIN SERPL ELPH-MCNC: 3.4 G/DL — SIGNIFICANT CHANGE UP (ref 3.3–5)
ALBUMIN/GLOB SERPL ELPH: 1.1 RATIO — SIGNIFICANT CHANGE UP
ALP SERPL-CCNC: 53 U/L — SIGNIFICANT CHANGE UP (ref 40–120)
ALPHA1 GLOB SERPL ELPH-MCNC: 0.4 G/DL — SIGNIFICANT CHANGE UP (ref 0.1–0.4)
ALPHA2 GLOB SERPL ELPH-MCNC: 0.8 G/DL — SIGNIFICANT CHANGE UP (ref 0.5–1)
ALT FLD-CCNC: 8 U/L — LOW (ref 10–45)
ANION GAP SERPL CALC-SCNC: 12 MMOL/L — SIGNIFICANT CHANGE UP (ref 5–17)
AST SERPL-CCNC: 15 U/L — SIGNIFICANT CHANGE UP (ref 10–40)
B-GLOBULIN SERPL ELPH-MCNC: 0.9 G/DL — SIGNIFICANT CHANGE UP (ref 0.5–1)
BASOPHILS # BLD AUTO: 0.02 K/UL — SIGNIFICANT CHANGE UP (ref 0–0.2)
BASOPHILS NFR BLD AUTO: 0.2 % — SIGNIFICANT CHANGE UP (ref 0–2)
BILIRUB SERPL-MCNC: 0.2 MG/DL — SIGNIFICANT CHANGE UP (ref 0.2–1.2)
BUN SERPL-MCNC: 16 MG/DL — SIGNIFICANT CHANGE UP (ref 7–23)
CALCIUM SERPL-MCNC: 8.7 MG/DL — SIGNIFICANT CHANGE UP (ref 8.4–10.5)
CHLORIDE SERPL-SCNC: 104 MMOL/L — SIGNIFICANT CHANGE UP (ref 96–108)
CO2 SERPL-SCNC: 21 MMOL/L — LOW (ref 22–31)
CREAT SERPL-MCNC: 1.31 MG/DL — HIGH (ref 0.5–1.3)
EGFR: 61 ML/MIN/1.73M2 — SIGNIFICANT CHANGE UP
EOSINOPHIL # BLD AUTO: 0.29 K/UL — SIGNIFICANT CHANGE UP (ref 0–0.5)
EOSINOPHIL NFR BLD AUTO: 2.6 % — SIGNIFICANT CHANGE UP (ref 0–6)
GAMMA GLOBULIN: 1.1 G/DL — SIGNIFICANT CHANGE UP (ref 0.6–1.6)
GLUCOSE SERPL-MCNC: 227 MG/DL — HIGH (ref 70–99)
HAV IGM SER-ACNC: SIGNIFICANT CHANGE UP
HBV CORE IGM SER-ACNC: SIGNIFICANT CHANGE UP
HBV SURFACE AG SER-ACNC: SIGNIFICANT CHANGE UP
HCT VFR BLD CALC: 35.4 % — LOW (ref 39–50)
HCV AB S/CO SERPL IA: 0.09 S/CO — SIGNIFICANT CHANGE UP (ref 0–0.99)
HCV AB SERPL-IMP: SIGNIFICANT CHANGE UP
HGB BLD-MCNC: 11.3 G/DL — LOW (ref 13–17)
IMM GRANULOCYTES NFR BLD AUTO: 0.4 % — SIGNIFICANT CHANGE UP (ref 0–0.9)
LYMPHOCYTES # BLD AUTO: 1.58 K/UL — SIGNIFICANT CHANGE UP (ref 1–3.3)
LYMPHOCYTES # BLD AUTO: 14.2 % — SIGNIFICANT CHANGE UP (ref 13–44)
MAGNESIUM SERPL-MCNC: 2.1 MG/DL — SIGNIFICANT CHANGE UP (ref 1.6–2.6)
MCHC RBC-ENTMCNC: 26.6 PG — LOW (ref 27–34)
MCHC RBC-ENTMCNC: 31.9 GM/DL — LOW (ref 32–36)
MCV RBC AUTO: 83.3 FL — SIGNIFICANT CHANGE UP (ref 80–100)
MONOCYTES # BLD AUTO: 0.96 K/UL — HIGH (ref 0–0.9)
MONOCYTES NFR BLD AUTO: 8.6 % — SIGNIFICANT CHANGE UP (ref 2–14)
NEUTROPHILS # BLD AUTO: 8.25 K/UL — HIGH (ref 1.8–7.4)
NEUTROPHILS NFR BLD AUTO: 74 % — SIGNIFICANT CHANGE UP (ref 43–77)
NRBC # BLD: 0 /100 WBCS — SIGNIFICANT CHANGE UP (ref 0–0)
PHOSPHATE SERPL-MCNC: 3 MG/DL — SIGNIFICANT CHANGE UP (ref 2.5–4.5)
PLATELET # BLD AUTO: 150 K/UL — SIGNIFICANT CHANGE UP (ref 150–400)
POTASSIUM SERPL-MCNC: 4.3 MMOL/L — SIGNIFICANT CHANGE UP (ref 3.5–5.3)
POTASSIUM SERPL-SCNC: 4.3 MMOL/L — SIGNIFICANT CHANGE UP (ref 3.5–5.3)
PROT PATTERN SERPL ELPH-IMP: SIGNIFICANT CHANGE UP
PROT SERPL-MCNC: 6.5 G/DL — SIGNIFICANT CHANGE UP (ref 6–8.3)
PROT SERPL-MCNC: 6.5 G/DL — SIGNIFICANT CHANGE UP (ref 6–8.3)
RBC # BLD: 4.25 M/UL — SIGNIFICANT CHANGE UP (ref 4.2–5.8)
RBC # FLD: 14.9 % — HIGH (ref 10.3–14.5)
SODIUM SERPL-SCNC: 137 MMOL/L — SIGNIFICANT CHANGE UP (ref 135–145)
THYROPEROXIDASE AB SERPL-ACNC: <10 IU/ML — SIGNIFICANT CHANGE UP
VIT B1 SERPL-MCNC: 185.1 NMOL/L — SIGNIFICANT CHANGE UP (ref 66.5–200)
WBC # BLD: 11.14 K/UL — HIGH (ref 3.8–10.5)
WBC # FLD AUTO: 11.14 K/UL — HIGH (ref 3.8–10.5)
WNV IGG TITR FLD: NEGATIVE — SIGNIFICANT CHANGE UP
WNV IGM SPEC QL: NEGATIVE — SIGNIFICANT CHANGE UP

## 2024-06-14 PROCEDURE — 99232 SBSQ HOSP IP/OBS MODERATE 35: CPT | Mod: 57

## 2024-06-14 PROCEDURE — 99223 1ST HOSP IP/OBS HIGH 75: CPT | Mod: GC

## 2024-06-14 PROCEDURE — 33208 INSRT HEART PM ATRIAL & VENT: CPT

## 2024-06-14 PROCEDURE — 71045 X-RAY EXAM CHEST 1 VIEW: CPT | Mod: 26

## 2024-06-14 PROCEDURE — 93010 ELECTROCARDIOGRAM REPORT: CPT | Mod: 59

## 2024-06-14 PROCEDURE — 93010 ELECTROCARDIOGRAM REPORT: CPT

## 2024-06-14 PROCEDURE — 99232 SBSQ HOSP IP/OBS MODERATE 35: CPT

## 2024-06-14 PROCEDURE — 99291 CRITICAL CARE FIRST HOUR: CPT

## 2024-06-14 RX ORDER — HYDRALAZINE HYDROCHLORIDE 50 MG/1
10 TABLET ORAL ONCE
Refills: 0 | Status: COMPLETED | OUTPATIENT
Start: 2024-06-14 | End: 2024-06-14

## 2024-06-14 RX ORDER — HYDRALAZINE HYDROCHLORIDE 50 MG/1
50 TABLET ORAL THREE TIMES A DAY
Refills: 0 | Status: DISCONTINUED | OUTPATIENT
Start: 2024-06-14 | End: 2024-06-21

## 2024-06-14 RX ORDER — ACETAMINOPHEN 325 MG
1000 TABLET ORAL ONCE
Refills: 0 | Status: COMPLETED | OUTPATIENT
Start: 2024-06-14 | End: 2024-06-14

## 2024-06-14 RX ORDER — HYDRALAZINE HYDROCHLORIDE 50 MG/1
5 TABLET ORAL ONCE
Refills: 0 | Status: COMPLETED | OUTPATIENT
Start: 2024-06-14 | End: 2024-06-14

## 2024-06-14 RX ORDER — MAGNESIUM, ALUMINUM HYDROXIDE 400-400
30 TABLET,CHEWABLE ORAL EVERY 4 HOURS
Refills: 0 | Status: DISCONTINUED | OUTPATIENT
Start: 2024-06-14 | End: 2024-06-21

## 2024-06-14 RX ORDER — ENALAPRIL MALEATE 20 MG
10 TABLET ORAL DAILY
Refills: 0 | Status: DISCONTINUED | OUTPATIENT
Start: 2024-06-14 | End: 2024-06-16

## 2024-06-14 RX ADMIN — INSULIN LISPRO 6 UNIT(S): 100 INJECTION, SOLUTION SUBCUTANEOUS at 16:18

## 2024-06-14 RX ADMIN — HYDRALAZINE HYDROCHLORIDE 10 MILLIGRAM(S): 50 TABLET ORAL at 17:30

## 2024-06-14 RX ADMIN — Medication 10 MILLIGRAM(S): at 15:29

## 2024-06-14 RX ADMIN — INSULIN LISPRO 2: 100 INJECTION, SOLUTION SUBCUTANEOUS at 08:41

## 2024-06-14 RX ADMIN — Medication 400 MILLIGRAM(S): at 19:09

## 2024-06-14 RX ADMIN — DEXMEDETOMIDINE HYDROCHLORIDE 9.71 MICROGRAM(S)/KG/HR: 4 INJECTION, SOLUTION INTRAVENOUS at 05:07

## 2024-06-14 RX ADMIN — HYDRALAZINE HYDROCHLORIDE 50 MILLIGRAM(S): 50 TABLET ORAL at 20:00

## 2024-06-14 RX ADMIN — ISOPROTERENOL HYDROCHLORIDE 30 MICROGRAM(S)/MIN: 0.2 INJECTION INTRACARDIAC; INTRAMUSCULAR; INTRAVENOUS; SUBCUTANEOUS at 05:07

## 2024-06-14 RX ADMIN — ISOPROTERENOL HYDROCHLORIDE 30 MICROGRAM(S)/MIN: 0.2 INJECTION INTRACARDIAC; INTRAMUSCULAR; INTRAVENOUS; SUBCUTANEOUS at 08:43

## 2024-06-14 RX ADMIN — ISOPROTERENOL HYDROCHLORIDE 30 MICROGRAM(S)/MIN: 0.2 INJECTION INTRACARDIAC; INTRAMUSCULAR; INTRAVENOUS; SUBCUTANEOUS at 11:35

## 2024-06-14 RX ADMIN — INSULIN LISPRO 2: 100 INJECTION, SOLUTION SUBCUTANEOUS at 11:23

## 2024-06-14 RX ADMIN — ASPIRIN 81 MILLIGRAM(S): 325 TABLET, FILM COATED ORAL at 15:29

## 2024-06-14 RX ADMIN — INSULIN LISPRO 2: 100 INJECTION, SOLUTION SUBCUTANEOUS at 16:19

## 2024-06-14 RX ADMIN — Medication 1000 MILLIGRAM(S): at 19:48

## 2024-06-14 RX ADMIN — HYDRALAZINE HYDROCHLORIDE 10 MILLIGRAM(S): 50 TABLET ORAL at 19:58

## 2024-06-14 RX ADMIN — HYDRALAZINE HYDROCHLORIDE 5 MILLIGRAM(S): 50 TABLET ORAL at 15:59

## 2024-06-14 RX ADMIN — HYDRALAZINE HYDROCHLORIDE 5 MILLIGRAM(S): 50 TABLET ORAL at 11:24

## 2024-06-14 RX ADMIN — Medication 30 MILLILITER(S): at 18:33

## 2024-06-14 RX ADMIN — Medication 1 APPLICATION(S): at 05:06

## 2024-06-15 DIAGNOSIS — G93.40 ENCEPHALOPATHY, UNSPECIFIED: ICD-10-CM

## 2024-06-15 LAB
ALBUMIN SERPL ELPH-MCNC: 3.4 G/DL — SIGNIFICANT CHANGE UP (ref 3.3–5)
ALP SERPL-CCNC: 59 U/L — SIGNIFICANT CHANGE UP (ref 40–120)
ALT FLD-CCNC: 5 U/L — LOW (ref 10–45)
ANION GAP SERPL CALC-SCNC: 12 MMOL/L — SIGNIFICANT CHANGE UP (ref 5–17)
AST SERPL-CCNC: 18 U/L — SIGNIFICANT CHANGE UP (ref 10–40)
BILIRUB SERPL-MCNC: 0.4 MG/DL — SIGNIFICANT CHANGE UP (ref 0.2–1.2)
BUN SERPL-MCNC: 12 MG/DL — SIGNIFICANT CHANGE UP (ref 7–23)
CALCIUM SERPL-MCNC: 9.3 MG/DL — SIGNIFICANT CHANGE UP (ref 8.4–10.5)
CHLORIDE SERPL-SCNC: 104 MMOL/L — SIGNIFICANT CHANGE UP (ref 96–108)
CO2 SERPL-SCNC: 23 MMOL/L — SIGNIFICANT CHANGE UP (ref 22–31)
COPPER SERPL-MCNC: 97 UG/DL — SIGNIFICANT CHANGE UP (ref 69–132)
CREAT SERPL-MCNC: 1.26 MG/DL — SIGNIFICANT CHANGE UP (ref 0.5–1.3)
CULTURE RESULTS: SIGNIFICANT CHANGE UP
CULTURE RESULTS: SIGNIFICANT CHANGE UP
EGFR: 64 ML/MIN/1.73M2 — SIGNIFICANT CHANGE UP
GLUCOSE BLDC GLUCOMTR-MCNC: 117 MG/DL — HIGH (ref 70–99)
GLUCOSE BLDC GLUCOMTR-MCNC: 125 MG/DL — HIGH (ref 70–99)
GLUCOSE BLDC GLUCOMTR-MCNC: 127 MG/DL — HIGH (ref 70–99)
GLUCOSE BLDC GLUCOMTR-MCNC: 141 MG/DL — HIGH (ref 70–99)
GLUCOSE BLDC GLUCOMTR-MCNC: 162 MG/DL — HIGH (ref 70–99)
GLUCOSE SERPL-MCNC: 124 MG/DL — HIGH (ref 70–99)
MAGNESIUM SERPL-MCNC: 2.1 MG/DL — SIGNIFICANT CHANGE UP (ref 1.6–2.6)
PHOSPHATE SERPL-MCNC: 3.3 MG/DL — SIGNIFICANT CHANGE UP (ref 2.5–4.5)
POTASSIUM SERPL-MCNC: 3.9 MMOL/L — SIGNIFICANT CHANGE UP (ref 3.5–5.3)
POTASSIUM SERPL-SCNC: 3.9 MMOL/L — SIGNIFICANT CHANGE UP (ref 3.5–5.3)
PROT SERPL-MCNC: 6.6 G/DL — SIGNIFICANT CHANGE UP (ref 6–8.3)
PYRIDOXAL PHOS SERPL-MCNC: 9.3 UG/L — SIGNIFICANT CHANGE UP (ref 3.4–65.2)
SODIUM SERPL-SCNC: 139 MMOL/L — SIGNIFICANT CHANGE UP (ref 135–145)
SPECIMEN SOURCE: SIGNIFICANT CHANGE UP
SPECIMEN SOURCE: SIGNIFICANT CHANGE UP

## 2024-06-15 PROCEDURE — 71046 X-RAY EXAM CHEST 2 VIEWS: CPT | Mod: 26

## 2024-06-15 PROCEDURE — 99233 SBSQ HOSP IP/OBS HIGH 50: CPT

## 2024-06-15 PROCEDURE — 93010 ELECTROCARDIOGRAM REPORT: CPT

## 2024-06-15 RX ORDER — POLYETHYLENE GLYCOL 3350 1 G/G
17 POWDER ORAL DAILY
Refills: 0 | Status: DISCONTINUED | OUTPATIENT
Start: 2024-06-15 | End: 2024-06-16

## 2024-06-15 RX ORDER — AMLODIPINE BESYLATE 2.5 MG/1
10 TABLET ORAL DAILY
Refills: 0 | Status: DISCONTINUED | OUTPATIENT
Start: 2024-06-15 | End: 2024-06-21

## 2024-06-15 RX ORDER — HYDRALAZINE HYDROCHLORIDE 50 MG/1
5 TABLET ORAL ONCE
Refills: 0 | Status: DISCONTINUED | OUTPATIENT
Start: 2024-06-15 | End: 2024-06-15

## 2024-06-15 RX ORDER — CEFTRIAXONE SODIUM 500 MG
1000 VIAL (EA) INJECTION EVERY 24 HOURS
Refills: 0 | Status: COMPLETED | OUTPATIENT
Start: 2024-06-15 | End: 2024-06-17

## 2024-06-15 RX ORDER — HEPARIN SODIUM 50 [USP'U]/ML
5000 INJECTION, SOLUTION INTRAVENOUS EVERY 8 HOURS
Refills: 0 | Status: DISCONTINUED | OUTPATIENT
Start: 2024-06-15 | End: 2024-06-21

## 2024-06-15 RX ADMIN — INSULIN GLARGINE 30 UNIT(S): 100 INJECTION, SOLUTION SUBCUTANEOUS at 00:29

## 2024-06-15 RX ADMIN — HYDRALAZINE HYDROCHLORIDE 50 MILLIGRAM(S): 50 TABLET ORAL at 05:55

## 2024-06-15 RX ADMIN — LATANOPROST PF 1 DROP(S): 0.05 SOLUTION/ DROPS OPHTHALMIC at 00:23

## 2024-06-15 RX ADMIN — Medication 1 APPLICATION(S): at 05:55

## 2024-06-15 RX ADMIN — Medication 100 MILLIGRAM(S): at 06:46

## 2024-06-15 RX ADMIN — POLYETHYLENE GLYCOL 3350 17 GRAM(S): 1 POWDER ORAL at 11:55

## 2024-06-15 RX ADMIN — Medication 1 APPLICATION(S): at 17:21

## 2024-06-15 RX ADMIN — ATORVASTATIN CALCIUM 40 MILLIGRAM(S): 20 TABLET, FILM COATED ORAL at 00:22

## 2024-06-15 RX ADMIN — INSULIN LISPRO 6 UNIT(S): 100 INJECTION, SOLUTION SUBCUTANEOUS at 17:43

## 2024-06-15 RX ADMIN — ASPIRIN 81 MILLIGRAM(S): 325 TABLET, FILM COATED ORAL at 11:55

## 2024-06-15 RX ADMIN — AMLODIPINE BESYLATE 10 MILLIGRAM(S): 2.5 TABLET ORAL at 11:55

## 2024-06-15 RX ADMIN — INSULIN LISPRO 6 UNIT(S): 100 INJECTION, SOLUTION SUBCUTANEOUS at 13:04

## 2024-06-15 RX ADMIN — Medication 10 MILLIGRAM(S): at 05:55

## 2024-06-15 RX ADMIN — ATORVASTATIN CALCIUM 40 MILLIGRAM(S): 20 TABLET, FILM COATED ORAL at 21:45

## 2024-06-15 RX ADMIN — HEPARIN SODIUM 5000 UNIT(S): 50 INJECTION, SOLUTION INTRAVENOUS at 21:53

## 2024-06-15 RX ADMIN — HYDRALAZINE HYDROCHLORIDE 50 MILLIGRAM(S): 50 TABLET ORAL at 21:43

## 2024-06-15 RX ADMIN — HEPARIN SODIUM 5000 UNIT(S): 50 INJECTION, SOLUTION INTRAVENOUS at 13:07

## 2024-06-15 RX ADMIN — INSULIN GLARGINE 30 UNIT(S): 100 INJECTION, SOLUTION SUBCUTANEOUS at 21:43

## 2024-06-15 RX ADMIN — Medication 5 MILLIGRAM(S): at 21:45

## 2024-06-15 RX ADMIN — INSULIN LISPRO 6 UNIT(S): 100 INJECTION, SOLUTION SUBCUTANEOUS at 08:56

## 2024-06-15 RX ADMIN — LATANOPROST PF 1 DROP(S): 0.05 SOLUTION/ DROPS OPHTHALMIC at 21:44

## 2024-06-15 RX ADMIN — HYDRALAZINE HYDROCHLORIDE 50 MILLIGRAM(S): 50 TABLET ORAL at 13:03

## 2024-06-15 RX ADMIN — Medication 1 APPLICATION(S): at 09:00

## 2024-06-16 LAB
ALBUMIN SERPL ELPH-MCNC: 3.2 G/DL — LOW (ref 3.3–5)
ALP SERPL-CCNC: 63 U/L — SIGNIFICANT CHANGE UP (ref 40–120)
ALT FLD-CCNC: 12 U/L — SIGNIFICANT CHANGE UP (ref 10–45)
ANION GAP SERPL CALC-SCNC: 13 MMOL/L — SIGNIFICANT CHANGE UP (ref 5–17)
AST SERPL-CCNC: 31 U/L — SIGNIFICANT CHANGE UP (ref 10–40)
BILIRUB SERPL-MCNC: 0.4 MG/DL — SIGNIFICANT CHANGE UP (ref 0.2–1.2)
BUN SERPL-MCNC: 12 MG/DL — SIGNIFICANT CHANGE UP (ref 7–23)
CALCIUM SERPL-MCNC: 9.3 MG/DL — SIGNIFICANT CHANGE UP (ref 8.4–10.5)
CHLORIDE SERPL-SCNC: 103 MMOL/L — SIGNIFICANT CHANGE UP (ref 96–108)
CO2 SERPL-SCNC: 22 MMOL/L — SIGNIFICANT CHANGE UP (ref 22–31)
CREAT SERPL-MCNC: 1.25 MG/DL — SIGNIFICANT CHANGE UP (ref 0.5–1.3)
EGFR: 64 ML/MIN/1.73M2 — SIGNIFICANT CHANGE UP
GLUCOSE BLDC GLUCOMTR-MCNC: 118 MG/DL — HIGH (ref 70–99)
GLUCOSE BLDC GLUCOMTR-MCNC: 145 MG/DL — HIGH (ref 70–99)
GLUCOSE BLDC GLUCOMTR-MCNC: 153 MG/DL — HIGH (ref 70–99)
GLUCOSE BLDC GLUCOMTR-MCNC: 170 MG/DL — HIGH (ref 70–99)
GLUCOSE BLDC GLUCOMTR-MCNC: 176 MG/DL — HIGH (ref 70–99)
GLUCOSE SERPL-MCNC: 186 MG/DL — HIGH (ref 70–99)
HCT VFR BLD CALC: 40.6 % — SIGNIFICANT CHANGE UP (ref 39–50)
HGB BLD-MCNC: 13 G/DL — SIGNIFICANT CHANGE UP (ref 13–17)
MCHC RBC-ENTMCNC: 26.5 PG — LOW (ref 27–34)
MCHC RBC-ENTMCNC: 32 GM/DL — SIGNIFICANT CHANGE UP (ref 32–36)
MCV RBC AUTO: 82.9 FL — SIGNIFICANT CHANGE UP (ref 80–100)
NRBC # BLD: 0 /100 WBCS — SIGNIFICANT CHANGE UP (ref 0–0)
PLATELET # BLD AUTO: 240 K/UL — SIGNIFICANT CHANGE UP (ref 150–400)
POTASSIUM SERPL-MCNC: 4.5 MMOL/L — SIGNIFICANT CHANGE UP (ref 3.5–5.3)
POTASSIUM SERPL-SCNC: 4.5 MMOL/L — SIGNIFICANT CHANGE UP (ref 3.5–5.3)
PROT SERPL-MCNC: 7 G/DL — SIGNIFICANT CHANGE UP (ref 6–8.3)
RBC # BLD: 4.9 M/UL — SIGNIFICANT CHANGE UP (ref 4.2–5.8)
RBC # FLD: 14.7 % — HIGH (ref 10.3–14.5)
SODIUM SERPL-SCNC: 138 MMOL/L — SIGNIFICANT CHANGE UP (ref 135–145)
SPECIMEN SOURCE:: SIGNIFICANT CHANGE UP
T WHIPPLEI DNA BLD QL NAA+NON-PROBE: NEGATIVE — SIGNIFICANT CHANGE UP
T WHIPPLEI DNA BLD QL NAA+PROBE: NEGATIVE — SIGNIFICANT CHANGE UP
TROPHERYMA WHIPPLEI PCR RESULT: NEGATIVE — SIGNIFICANT CHANGE UP
WBC # BLD: 13.7 K/UL — HIGH (ref 3.8–10.5)
WBC # FLD AUTO: 13.7 K/UL — HIGH (ref 3.8–10.5)
ZINC SERPL-MCNC: 61 UG/DL — SIGNIFICANT CHANGE UP (ref 44–115)

## 2024-06-16 PROCEDURE — 99232 SBSQ HOSP IP/OBS MODERATE 35: CPT

## 2024-06-16 RX ORDER — ENALAPRIL MALEATE 20 MG
20 TABLET ORAL DAILY
Refills: 0 | Status: DISCONTINUED | OUTPATIENT
Start: 2024-06-17 | End: 2024-06-21

## 2024-06-16 RX ORDER — POLYETHYLENE GLYCOL 3350 1 G/G
17 POWDER ORAL
Refills: 0 | Status: DISCONTINUED | OUTPATIENT
Start: 2024-06-16 | End: 2024-06-21

## 2024-06-16 RX ORDER — SENNOSIDES 8.6 MG
2 TABLET ORAL AT BEDTIME
Refills: 0 | Status: DISCONTINUED | OUTPATIENT
Start: 2024-06-16 | End: 2024-06-21

## 2024-06-16 RX ADMIN — HYDRALAZINE HYDROCHLORIDE 50 MILLIGRAM(S): 50 TABLET ORAL at 13:12

## 2024-06-16 RX ADMIN — POLYETHYLENE GLYCOL 3350 17 GRAM(S): 1 POWDER ORAL at 10:34

## 2024-06-16 RX ADMIN — ATORVASTATIN CALCIUM 40 MILLIGRAM(S): 20 TABLET, FILM COATED ORAL at 22:09

## 2024-06-16 RX ADMIN — HYDRALAZINE HYDROCHLORIDE 50 MILLIGRAM(S): 50 TABLET ORAL at 22:08

## 2024-06-16 RX ADMIN — Medication 1 APPLICATION(S): at 17:12

## 2024-06-16 RX ADMIN — HEPARIN SODIUM 5000 UNIT(S): 50 INJECTION, SOLUTION INTRAVENOUS at 22:09

## 2024-06-16 RX ADMIN — POLYETHYLENE GLYCOL 3350 17 GRAM(S): 1 POWDER ORAL at 22:07

## 2024-06-16 RX ADMIN — Medication 1 APPLICATION(S): at 05:11

## 2024-06-16 RX ADMIN — ASPIRIN 81 MILLIGRAM(S): 325 TABLET, FILM COATED ORAL at 10:35

## 2024-06-16 RX ADMIN — HEPARIN SODIUM 5000 UNIT(S): 50 INJECTION, SOLUTION INTRAVENOUS at 13:12

## 2024-06-16 RX ADMIN — INSULIN LISPRO 6 UNIT(S): 100 INJECTION, SOLUTION SUBCUTANEOUS at 12:22

## 2024-06-16 RX ADMIN — INSULIN LISPRO 2: 100 INJECTION, SOLUTION SUBCUTANEOUS at 17:39

## 2024-06-16 RX ADMIN — INSULIN GLARGINE 30 UNIT(S): 100 INJECTION, SOLUTION SUBCUTANEOUS at 22:09

## 2024-06-16 RX ADMIN — Medication 5 MILLIGRAM(S): at 22:07

## 2024-06-16 RX ADMIN — Medication 2 TABLET(S): at 22:08

## 2024-06-16 RX ADMIN — Medication 1 APPLICATION(S): at 05:16

## 2024-06-16 RX ADMIN — INSULIN LISPRO 6 UNIT(S): 100 INJECTION, SOLUTION SUBCUTANEOUS at 17:39

## 2024-06-16 RX ADMIN — LATANOPROST PF 1 DROP(S): 0.05 SOLUTION/ DROPS OPHTHALMIC at 22:08

## 2024-06-16 RX ADMIN — Medication 10 MILLIGRAM(S): at 05:12

## 2024-06-16 RX ADMIN — HYDRALAZINE HYDROCHLORIDE 50 MILLIGRAM(S): 50 TABLET ORAL at 05:12

## 2024-06-16 RX ADMIN — Medication 100 MILLIGRAM(S): at 07:23

## 2024-06-16 RX ADMIN — AMLODIPINE BESYLATE 10 MILLIGRAM(S): 2.5 TABLET ORAL at 05:16

## 2024-06-16 RX ADMIN — HEPARIN SODIUM 5000 UNIT(S): 50 INJECTION, SOLUTION INTRAVENOUS at 05:12

## 2024-06-16 RX ADMIN — INSULIN LISPRO 6 UNIT(S): 100 INJECTION, SOLUTION SUBCUTANEOUS at 09:00

## 2024-06-17 ENCOUNTER — TRANSCRIPTION ENCOUNTER (OUTPATIENT)
Age: 64
End: 2024-06-17

## 2024-06-17 LAB
A-TOCOPHEROL VIT E SERPL-MCNC: 10.1 MG/L — SIGNIFICANT CHANGE UP (ref 9–29)
ALBUMIN SERPL ELPH-MCNC: 3.2 G/DL — LOW (ref 3.3–5)
ALP SERPL-CCNC: 63 U/L — SIGNIFICANT CHANGE UP (ref 40–120)
ALT FLD-CCNC: 10 U/L — SIGNIFICANT CHANGE UP (ref 10–45)
ANION GAP SERPL CALC-SCNC: 14 MMOL/L — SIGNIFICANT CHANGE UP (ref 5–17)
AST SERPL-CCNC: 17 U/L — SIGNIFICANT CHANGE UP (ref 10–40)
B BURGDOR DNA SPEC QL NAA+PROBE: NEGATIVE — SIGNIFICANT CHANGE UP
BETA+GAMMA TOCOPHEROL SERPL-MCNC: 0.3 MG/L — LOW (ref 0.5–4.9)
BILIRUB SERPL-MCNC: 0.4 MG/DL — SIGNIFICANT CHANGE UP (ref 0.2–1.2)
BUN SERPL-MCNC: 17 MG/DL — SIGNIFICANT CHANGE UP (ref 7–23)
CALCIUM SERPL-MCNC: 9.4 MG/DL — SIGNIFICANT CHANGE UP (ref 8.4–10.5)
CHLORIDE SERPL-SCNC: 104 MMOL/L — SIGNIFICANT CHANGE UP (ref 96–108)
CO2 SERPL-SCNC: 22 MMOL/L — SIGNIFICANT CHANGE UP (ref 22–31)
CREAT SERPL-MCNC: 1.44 MG/DL — HIGH (ref 0.5–1.3)
EGFR: 54 ML/MIN/1.73M2 — LOW
GLUCOSE BLDC GLUCOMTR-MCNC: 116 MG/DL — HIGH (ref 70–99)
GLUCOSE BLDC GLUCOMTR-MCNC: 154 MG/DL — HIGH (ref 70–99)
GLUCOSE BLDC GLUCOMTR-MCNC: 213 MG/DL — HIGH (ref 70–99)
GLUCOSE BLDC GLUCOMTR-MCNC: 263 MG/DL — HIGH (ref 70–99)
GLUCOSE SERPL-MCNC: 101 MG/DL — HIGH (ref 70–99)
HCT VFR BLD CALC: 40 % — SIGNIFICANT CHANGE UP (ref 39–50)
HGB BLD-MCNC: 12.4 G/DL — LOW (ref 13–17)
MCHC RBC-ENTMCNC: 26.6 PG — LOW (ref 27–34)
MCHC RBC-ENTMCNC: 31 GM/DL — LOW (ref 32–36)
MCV RBC AUTO: 85.7 FL — SIGNIFICANT CHANGE UP (ref 80–100)
NRBC # BLD: 0 /100 WBCS — SIGNIFICANT CHANGE UP (ref 0–0)
PLATELET # BLD AUTO: 249 K/UL — SIGNIFICANT CHANGE UP (ref 150–400)
POTASSIUM SERPL-MCNC: 4.2 MMOL/L — SIGNIFICANT CHANGE UP (ref 3.5–5.3)
POTASSIUM SERPL-SCNC: 4.2 MMOL/L — SIGNIFICANT CHANGE UP (ref 3.5–5.3)
PROT SERPL-MCNC: 6.7 G/DL — SIGNIFICANT CHANGE UP (ref 6–8.3)
RBC # BLD: 4.67 M/UL — SIGNIFICANT CHANGE UP (ref 4.2–5.8)
RBC # FLD: 14.9 % — HIGH (ref 10.3–14.5)
SODIUM SERPL-SCNC: 140 MMOL/L — SIGNIFICANT CHANGE UP (ref 135–145)
WBC # BLD: 12.66 K/UL — HIGH (ref 3.8–10.5)
WBC # FLD AUTO: 12.66 K/UL — HIGH (ref 3.8–10.5)

## 2024-06-17 PROCEDURE — 99232 SBSQ HOSP IP/OBS MODERATE 35: CPT

## 2024-06-17 PROCEDURE — 99232 SBSQ HOSP IP/OBS MODERATE 35: CPT | Mod: GC

## 2024-06-17 RX ADMIN — Medication 1 APPLICATION(S): at 16:51

## 2024-06-17 RX ADMIN — AMLODIPINE BESYLATE 10 MILLIGRAM(S): 2.5 TABLET ORAL at 05:37

## 2024-06-17 RX ADMIN — POLYETHYLENE GLYCOL 3350 17 GRAM(S): 1 POWDER ORAL at 16:51

## 2024-06-17 RX ADMIN — Medication 1 APPLICATION(S): at 05:36

## 2024-06-17 RX ADMIN — Medication 100 MILLIGRAM(S): at 06:15

## 2024-06-17 RX ADMIN — INSULIN LISPRO 6 UNIT(S): 100 INJECTION, SOLUTION SUBCUTANEOUS at 09:05

## 2024-06-17 RX ADMIN — INSULIN GLARGINE 30 UNIT(S): 100 INJECTION, SOLUTION SUBCUTANEOUS at 22:14

## 2024-06-17 RX ADMIN — Medication 1 APPLICATION(S): at 06:16

## 2024-06-17 RX ADMIN — HEPARIN SODIUM 5000 UNIT(S): 50 INJECTION, SOLUTION INTRAVENOUS at 12:52

## 2024-06-17 RX ADMIN — HEPARIN SODIUM 5000 UNIT(S): 50 INJECTION, SOLUTION INTRAVENOUS at 22:13

## 2024-06-17 RX ADMIN — Medication 2 TABLET(S): at 22:13

## 2024-06-17 RX ADMIN — POLYETHYLENE GLYCOL 3350 17 GRAM(S): 1 POWDER ORAL at 05:36

## 2024-06-17 RX ADMIN — HEPARIN SODIUM 5000 UNIT(S): 50 INJECTION, SOLUTION INTRAVENOUS at 05:37

## 2024-06-17 RX ADMIN — INSULIN LISPRO 6 UNIT(S): 100 INJECTION, SOLUTION SUBCUTANEOUS at 12:51

## 2024-06-17 RX ADMIN — ASPIRIN 81 MILLIGRAM(S): 325 TABLET, FILM COATED ORAL at 10:58

## 2024-06-17 RX ADMIN — INSULIN LISPRO 6 UNIT(S): 100 INJECTION, SOLUTION SUBCUTANEOUS at 17:42

## 2024-06-17 RX ADMIN — Medication 20 MILLIGRAM(S): at 05:37

## 2024-06-17 RX ADMIN — HYDRALAZINE HYDROCHLORIDE 50 MILLIGRAM(S): 50 TABLET ORAL at 12:52

## 2024-06-17 RX ADMIN — Medication 5 MILLIGRAM(S): at 22:14

## 2024-06-17 RX ADMIN — ATORVASTATIN CALCIUM 40 MILLIGRAM(S): 20 TABLET, FILM COATED ORAL at 22:14

## 2024-06-17 RX ADMIN — INSULIN LISPRO 6: 100 INJECTION, SOLUTION SUBCUTANEOUS at 17:43

## 2024-06-17 RX ADMIN — HYDRALAZINE HYDROCHLORIDE 50 MILLIGRAM(S): 50 TABLET ORAL at 22:14

## 2024-06-17 RX ADMIN — LATANOPROST PF 1 DROP(S): 0.05 SOLUTION/ DROPS OPHTHALMIC at 22:14

## 2024-06-17 RX ADMIN — HYDRALAZINE HYDROCHLORIDE 50 MILLIGRAM(S): 50 TABLET ORAL at 05:37

## 2024-06-17 RX ADMIN — INSULIN LISPRO 2: 100 INJECTION, SOLUTION SUBCUTANEOUS at 12:51

## 2024-06-18 LAB
ALBUMIN SERPL ELPH-MCNC: 3.1 G/DL — LOW (ref 3.3–5)
ALP SERPL-CCNC: 65 U/L — SIGNIFICANT CHANGE UP (ref 40–120)
ALT FLD-CCNC: 14 U/L — SIGNIFICANT CHANGE UP (ref 10–45)
ANION GAP SERPL CALC-SCNC: 13 MMOL/L — SIGNIFICANT CHANGE UP (ref 5–17)
APPEARANCE UR: CLEAR — SIGNIFICANT CHANGE UP
AST SERPL-CCNC: 18 U/L — SIGNIFICANT CHANGE UP (ref 10–40)
BILIRUB SERPL-MCNC: 0.3 MG/DL — SIGNIFICANT CHANGE UP (ref 0.2–1.2)
BILIRUB UR-MCNC: NEGATIVE — SIGNIFICANT CHANGE UP
BUN SERPL-MCNC: 16 MG/DL — SIGNIFICANT CHANGE UP (ref 7–23)
CALCIUM SERPL-MCNC: 9.5 MG/DL — SIGNIFICANT CHANGE UP (ref 8.4–10.5)
CHLORIDE SERPL-SCNC: 104 MMOL/L — SIGNIFICANT CHANGE UP (ref 96–108)
CO2 SERPL-SCNC: 22 MMOL/L — SIGNIFICANT CHANGE UP (ref 22–31)
COLOR SPEC: YELLOW — SIGNIFICANT CHANGE UP
CREAT ?TM UR-MCNC: 104 MG/DL — SIGNIFICANT CHANGE UP
CREAT SERPL-MCNC: 1.31 MG/DL — HIGH (ref 0.5–1.3)
DIFF PNL FLD: NEGATIVE — SIGNIFICANT CHANGE UP
EGFR: 61 ML/MIN/1.73M2 — SIGNIFICANT CHANGE UP
GLUCOSE BLDC GLUCOMTR-MCNC: 194 MG/DL — HIGH (ref 70–99)
GLUCOSE BLDC GLUCOMTR-MCNC: 205 MG/DL — HIGH (ref 70–99)
GLUCOSE BLDC GLUCOMTR-MCNC: 271 MG/DL — HIGH (ref 70–99)
GLUCOSE BLDC GLUCOMTR-MCNC: 282 MG/DL — HIGH (ref 70–99)
GLUCOSE SERPL-MCNC: 196 MG/DL — HIGH (ref 70–99)
GLUCOSE UR QL: 250 MG/DL
HCT VFR BLD CALC: 40.5 % — SIGNIFICANT CHANGE UP (ref 39–50)
HGB BLD-MCNC: 12.8 G/DL — LOW (ref 13–17)
KETONES UR-MCNC: NEGATIVE MG/DL — SIGNIFICANT CHANGE UP
LEUKOCYTE ESTERASE UR-ACNC: NEGATIVE — SIGNIFICANT CHANGE UP
MCHC RBC-ENTMCNC: 26.8 PG — LOW (ref 27–34)
MCHC RBC-ENTMCNC: 31.6 GM/DL — LOW (ref 32–36)
MCV RBC AUTO: 84.7 FL — SIGNIFICANT CHANGE UP (ref 80–100)
NITRITE UR-MCNC: NEGATIVE — SIGNIFICANT CHANGE UP
NRBC # BLD: 0 /100 WBCS — SIGNIFICANT CHANGE UP (ref 0–0)
OSMOLALITY UR: 282 MOS/KG — LOW (ref 300–900)
PH UR: 5.5 — SIGNIFICANT CHANGE UP (ref 5–8)
PLATELET # BLD AUTO: 239 K/UL — SIGNIFICANT CHANGE UP (ref 150–400)
POTASSIUM SERPL-MCNC: 4.4 MMOL/L — SIGNIFICANT CHANGE UP (ref 3.5–5.3)
POTASSIUM SERPL-SCNC: 4.4 MMOL/L — SIGNIFICANT CHANGE UP (ref 3.5–5.3)
POTASSIUM UR-SCNC: 23 MMOL/L — SIGNIFICANT CHANGE UP
PROT ?TM UR-MCNC: 17 MG/DL — HIGH (ref 0–12)
PROT SERPL-MCNC: 6.8 G/DL — SIGNIFICANT CHANGE UP (ref 6–8.3)
PROT UR-MCNC: NEGATIVE MG/DL — SIGNIFICANT CHANGE UP
PROT/CREAT UR-RTO: 0.2 RATIO — SIGNIFICANT CHANGE UP (ref 0–0.2)
RBC # BLD: 4.78 M/UL — SIGNIFICANT CHANGE UP (ref 4.2–5.8)
RBC # FLD: 14.9 % — HIGH (ref 10.3–14.5)
SODIUM SERPL-SCNC: 139 MMOL/L — SIGNIFICANT CHANGE UP (ref 135–145)
SODIUM UR-SCNC: 24 MMOL/L — SIGNIFICANT CHANGE UP
SP GR SPEC: 1.01 — SIGNIFICANT CHANGE UP (ref 1–1.03)
UROBILINOGEN FLD QL: 0.2 MG/DL — SIGNIFICANT CHANGE UP (ref 0.2–1)
UUN UR-MCNC: 438 MG/DL — SIGNIFICANT CHANGE UP
WBC # BLD: 12.74 K/UL — HIGH (ref 3.8–10.5)
WBC # FLD AUTO: 12.74 K/UL — HIGH (ref 3.8–10.5)

## 2024-06-18 PROCEDURE — 99232 SBSQ HOSP IP/OBS MODERATE 35: CPT | Mod: GC

## 2024-06-18 RX ADMIN — HEPARIN SODIUM 5000 UNIT(S): 50 INJECTION, SOLUTION INTRAVENOUS at 21:52

## 2024-06-18 RX ADMIN — Medication 1 APPLICATION(S): at 18:11

## 2024-06-18 RX ADMIN — HYDRALAZINE HYDROCHLORIDE 50 MILLIGRAM(S): 50 TABLET ORAL at 05:48

## 2024-06-18 RX ADMIN — INSULIN GLARGINE 30 UNIT(S): 100 INJECTION, SOLUTION SUBCUTANEOUS at 21:52

## 2024-06-18 RX ADMIN — HEPARIN SODIUM 5000 UNIT(S): 50 INJECTION, SOLUTION INTRAVENOUS at 13:22

## 2024-06-18 RX ADMIN — ATORVASTATIN CALCIUM 40 MILLIGRAM(S): 20 TABLET, FILM COATED ORAL at 21:52

## 2024-06-18 RX ADMIN — INSULIN LISPRO 6 UNIT(S): 100 INJECTION, SOLUTION SUBCUTANEOUS at 09:03

## 2024-06-18 RX ADMIN — INSULIN LISPRO 2: 100 INJECTION, SOLUTION SUBCUTANEOUS at 21:53

## 2024-06-18 RX ADMIN — ASPIRIN 81 MILLIGRAM(S): 325 TABLET, FILM COATED ORAL at 13:22

## 2024-06-18 RX ADMIN — Medication 1 APPLICATION(S): at 05:48

## 2024-06-18 RX ADMIN — POLYETHYLENE GLYCOL 3350 17 GRAM(S): 1 POWDER ORAL at 18:11

## 2024-06-18 RX ADMIN — HYDRALAZINE HYDROCHLORIDE 50 MILLIGRAM(S): 50 TABLET ORAL at 21:52

## 2024-06-18 RX ADMIN — Medication 1 APPLICATION(S): at 05:51

## 2024-06-18 RX ADMIN — INSULIN LISPRO 4: 100 INJECTION, SOLUTION SUBCUTANEOUS at 13:21

## 2024-06-18 RX ADMIN — INSULIN LISPRO 6: 100 INJECTION, SOLUTION SUBCUTANEOUS at 18:10

## 2024-06-18 RX ADMIN — POLYETHYLENE GLYCOL 3350 17 GRAM(S): 1 POWDER ORAL at 05:49

## 2024-06-18 RX ADMIN — LATANOPROST PF 1 DROP(S): 0.05 SOLUTION/ DROPS OPHTHALMIC at 21:52

## 2024-06-18 RX ADMIN — INSULIN LISPRO 2: 100 INJECTION, SOLUTION SUBCUTANEOUS at 09:03

## 2024-06-18 RX ADMIN — INSULIN LISPRO 6 UNIT(S): 100 INJECTION, SOLUTION SUBCUTANEOUS at 18:10

## 2024-06-18 RX ADMIN — HEPARIN SODIUM 5000 UNIT(S): 50 INJECTION, SOLUTION INTRAVENOUS at 05:48

## 2024-06-18 RX ADMIN — INSULIN LISPRO 6 UNIT(S): 100 INJECTION, SOLUTION SUBCUTANEOUS at 13:21

## 2024-06-18 RX ADMIN — Medication 2 TABLET(S): at 21:53

## 2024-06-18 RX ADMIN — HYDRALAZINE HYDROCHLORIDE 50 MILLIGRAM(S): 50 TABLET ORAL at 13:22

## 2024-06-18 RX ADMIN — AMLODIPINE BESYLATE 10 MILLIGRAM(S): 2.5 TABLET ORAL at 05:49

## 2024-06-18 RX ADMIN — Medication 20 MILLIGRAM(S): at 05:49

## 2024-06-19 LAB
ANION GAP SERPL CALC-SCNC: 14 MMOL/L — SIGNIFICANT CHANGE UP (ref 5–17)
B QUINTANA DNA SPEC QL NAA+PROBE: NEGATIVE — SIGNIFICANT CHANGE UP
BUN SERPL-MCNC: 21 MG/DL — SIGNIFICANT CHANGE UP (ref 7–23)
C BURNET AB SER QL IA: NEGATIVE — SIGNIFICANT CHANGE UP
CALCIUM SERPL-MCNC: 9.4 MG/DL — SIGNIFICANT CHANGE UP (ref 8.4–10.5)
CHLORIDE SERPL-SCNC: 103 MMOL/L — SIGNIFICANT CHANGE UP (ref 96–108)
CO2 SERPL-SCNC: 22 MMOL/L — SIGNIFICANT CHANGE UP (ref 22–31)
CREAT SERPL-MCNC: 1.33 MG/DL — HIGH (ref 0.5–1.3)
EGFR: 60 ML/MIN/1.73M2 — SIGNIFICANT CHANGE UP
GLUCOSE BLDC GLUCOMTR-MCNC: 132 MG/DL — HIGH (ref 70–99)
GLUCOSE BLDC GLUCOMTR-MCNC: 202 MG/DL — HIGH (ref 70–99)
GLUCOSE BLDC GLUCOMTR-MCNC: 217 MG/DL — HIGH (ref 70–99)
GLUCOSE BLDC GLUCOMTR-MCNC: 240 MG/DL — HIGH (ref 70–99)
GLUCOSE SERPL-MCNC: 120 MG/DL — HIGH (ref 70–99)
HCT VFR BLD CALC: 38.1 % — LOW (ref 39–50)
HGB BLD-MCNC: 12 G/DL — LOW (ref 13–17)
MAGNESIUM SERPL-MCNC: 2 MG/DL — SIGNIFICANT CHANGE UP (ref 1.6–2.6)
MCHC RBC-ENTMCNC: 27 PG — SIGNIFICANT CHANGE UP (ref 27–34)
MCHC RBC-ENTMCNC: 31.5 GM/DL — LOW (ref 32–36)
MCV RBC AUTO: 85.6 FL — SIGNIFICANT CHANGE UP (ref 80–100)
NRBC # BLD: 0 /100 WBCS — SIGNIFICANT CHANGE UP (ref 0–0)
PHOSPHATE SERPL-MCNC: 3.6 MG/DL — SIGNIFICANT CHANGE UP (ref 2.5–4.5)
PLATELET # BLD AUTO: 262 K/UL — SIGNIFICANT CHANGE UP (ref 150–400)
POTASSIUM SERPL-MCNC: 4.1 MMOL/L — SIGNIFICANT CHANGE UP (ref 3.5–5.3)
POTASSIUM SERPL-SCNC: 4.1 MMOL/L — SIGNIFICANT CHANGE UP (ref 3.5–5.3)
RBC # BLD: 4.45 M/UL — SIGNIFICANT CHANGE UP (ref 4.2–5.8)
RBC # FLD: 14.8 % — HIGH (ref 10.3–14.5)
SODIUM SERPL-SCNC: 139 MMOL/L — SIGNIFICANT CHANGE UP (ref 135–145)
VIT A SERPL-MCNC: 45.8 UG/DL — SIGNIFICANT CHANGE UP (ref 22–69.5)
WBC # BLD: 11.84 K/UL — HIGH (ref 3.8–10.5)
WBC # FLD AUTO: 11.84 K/UL — HIGH (ref 3.8–10.5)

## 2024-06-19 PROCEDURE — 99232 SBSQ HOSP IP/OBS MODERATE 35: CPT | Mod: GC

## 2024-06-19 RX ADMIN — HEPARIN SODIUM 5000 UNIT(S): 50 INJECTION, SOLUTION INTRAVENOUS at 05:57

## 2024-06-19 RX ADMIN — HEPARIN SODIUM 5000 UNIT(S): 50 INJECTION, SOLUTION INTRAVENOUS at 21:45

## 2024-06-19 RX ADMIN — Medication 1 APPLICATION(S): at 05:57

## 2024-06-19 RX ADMIN — INSULIN LISPRO 4: 100 INJECTION, SOLUTION SUBCUTANEOUS at 17:49

## 2024-06-19 RX ADMIN — Medication 1 APPLICATION(S): at 05:58

## 2024-06-19 RX ADMIN — Medication 1 APPLICATION(S): at 17:50

## 2024-06-19 RX ADMIN — INSULIN LISPRO 6 UNIT(S): 100 INJECTION, SOLUTION SUBCUTANEOUS at 17:49

## 2024-06-19 RX ADMIN — HYDRALAZINE HYDROCHLORIDE 50 MILLIGRAM(S): 50 TABLET ORAL at 21:43

## 2024-06-19 RX ADMIN — AMLODIPINE BESYLATE 10 MILLIGRAM(S): 2.5 TABLET ORAL at 05:58

## 2024-06-19 RX ADMIN — ASPIRIN 81 MILLIGRAM(S): 325 TABLET, FILM COATED ORAL at 13:06

## 2024-06-19 RX ADMIN — INSULIN LISPRO 6 UNIT(S): 100 INJECTION, SOLUTION SUBCUTANEOUS at 08:55

## 2024-06-19 RX ADMIN — INSULIN LISPRO 4: 100 INJECTION, SOLUTION SUBCUTANEOUS at 13:05

## 2024-06-19 RX ADMIN — HYDRALAZINE HYDROCHLORIDE 50 MILLIGRAM(S): 50 TABLET ORAL at 13:06

## 2024-06-19 RX ADMIN — INSULIN GLARGINE 30 UNIT(S): 100 INJECTION, SOLUTION SUBCUTANEOUS at 21:43

## 2024-06-19 RX ADMIN — HEPARIN SODIUM 5000 UNIT(S): 50 INJECTION, SOLUTION INTRAVENOUS at 13:06

## 2024-06-19 RX ADMIN — ATORVASTATIN CALCIUM 40 MILLIGRAM(S): 20 TABLET, FILM COATED ORAL at 21:43

## 2024-06-19 RX ADMIN — Medication 20 MILLIGRAM(S): at 05:58

## 2024-06-19 RX ADMIN — INSULIN LISPRO 6 UNIT(S): 100 INJECTION, SOLUTION SUBCUTANEOUS at 13:05

## 2024-06-19 RX ADMIN — HYDRALAZINE HYDROCHLORIDE 50 MILLIGRAM(S): 50 TABLET ORAL at 05:58

## 2024-06-19 RX ADMIN — POLYETHYLENE GLYCOL 3350 17 GRAM(S): 1 POWDER ORAL at 05:58

## 2024-06-19 RX ADMIN — Medication 5 MILLIGRAM(S): at 21:43

## 2024-06-19 RX ADMIN — Medication 2 TABLET(S): at 21:43

## 2024-06-19 RX ADMIN — POLYETHYLENE GLYCOL 3350 17 GRAM(S): 1 POWDER ORAL at 17:50

## 2024-06-19 RX ADMIN — LATANOPROST PF 1 DROP(S): 0.05 SOLUTION/ DROPS OPHTHALMIC at 21:44

## 2024-06-20 LAB
GLUCOSE BLDC GLUCOMTR-MCNC: 152 MG/DL — HIGH (ref 70–99)
GLUCOSE BLDC GLUCOMTR-MCNC: 205 MG/DL — HIGH (ref 70–99)
GLUCOSE BLDC GLUCOMTR-MCNC: 209 MG/DL — HIGH (ref 70–99)
GLUCOSE BLDC GLUCOMTR-MCNC: 238 MG/DL — HIGH (ref 70–99)

## 2024-06-20 PROCEDURE — 99232 SBSQ HOSP IP/OBS MODERATE 35: CPT | Mod: GC

## 2024-06-20 RX ORDER — ENALAPRIL MALEATE 20 MG
10 TABLET ORAL ONCE
Refills: 0 | Status: COMPLETED | OUTPATIENT
Start: 2024-06-20 | End: 2024-06-20

## 2024-06-20 RX ADMIN — ASPIRIN 81 MILLIGRAM(S): 325 TABLET, FILM COATED ORAL at 13:11

## 2024-06-20 RX ADMIN — INSULIN LISPRO 6 UNIT(S): 100 INJECTION, SOLUTION SUBCUTANEOUS at 13:10

## 2024-06-20 RX ADMIN — Medication 1 APPLICATION(S): at 06:38

## 2024-06-20 RX ADMIN — INSULIN LISPRO 2: 100 INJECTION, SOLUTION SUBCUTANEOUS at 08:54

## 2024-06-20 RX ADMIN — INSULIN GLARGINE 30 UNIT(S): 100 INJECTION, SOLUTION SUBCUTANEOUS at 21:41

## 2024-06-20 RX ADMIN — HYDRALAZINE HYDROCHLORIDE 50 MILLIGRAM(S): 50 TABLET ORAL at 05:02

## 2024-06-20 RX ADMIN — HYDRALAZINE HYDROCHLORIDE 50 MILLIGRAM(S): 50 TABLET ORAL at 21:41

## 2024-06-20 RX ADMIN — Medication 1 APPLICATION(S): at 17:21

## 2024-06-20 RX ADMIN — INSULIN LISPRO 4: 100 INJECTION, SOLUTION SUBCUTANEOUS at 17:20

## 2024-06-20 RX ADMIN — HEPARIN SODIUM 5000 UNIT(S): 50 INJECTION, SOLUTION INTRAVENOUS at 05:02

## 2024-06-20 RX ADMIN — Medication 2 TABLET(S): at 21:41

## 2024-06-20 RX ADMIN — Medication 10 MILLIGRAM(S): at 10:38

## 2024-06-20 RX ADMIN — ATORVASTATIN CALCIUM 40 MILLIGRAM(S): 20 TABLET, FILM COATED ORAL at 21:41

## 2024-06-20 RX ADMIN — POLYETHYLENE GLYCOL 3350 17 GRAM(S): 1 POWDER ORAL at 05:02

## 2024-06-20 RX ADMIN — HEPARIN SODIUM 5000 UNIT(S): 50 INJECTION, SOLUTION INTRAVENOUS at 21:41

## 2024-06-20 RX ADMIN — LATANOPROST PF 1 DROP(S): 0.05 SOLUTION/ DROPS OPHTHALMIC at 21:41

## 2024-06-20 RX ADMIN — Medication 20 MILLIGRAM(S): at 05:02

## 2024-06-20 RX ADMIN — HYDRALAZINE HYDROCHLORIDE 50 MILLIGRAM(S): 50 TABLET ORAL at 13:11

## 2024-06-20 RX ADMIN — INSULIN LISPRO 6 UNIT(S): 100 INJECTION, SOLUTION SUBCUTANEOUS at 17:21

## 2024-06-20 RX ADMIN — AMLODIPINE BESYLATE 10 MILLIGRAM(S): 2.5 TABLET ORAL at 05:02

## 2024-06-20 RX ADMIN — INSULIN LISPRO 4: 100 INJECTION, SOLUTION SUBCUTANEOUS at 13:10

## 2024-06-20 RX ADMIN — Medication 1 APPLICATION(S): at 06:15

## 2024-06-20 RX ADMIN — INSULIN LISPRO 6 UNIT(S): 100 INJECTION, SOLUTION SUBCUTANEOUS at 08:54

## 2024-06-20 RX ADMIN — HEPARIN SODIUM 5000 UNIT(S): 50 INJECTION, SOLUTION INTRAVENOUS at 13:09

## 2024-06-21 ENCOUNTER — TRANSCRIPTION ENCOUNTER (OUTPATIENT)
Age: 64
End: 2024-06-21

## 2024-06-21 VITALS
OXYGEN SATURATION: 98 % | DIASTOLIC BLOOD PRESSURE: 75 MMHG | RESPIRATION RATE: 18 BRPM | TEMPERATURE: 98 F | HEART RATE: 88 BPM | SYSTOLIC BLOOD PRESSURE: 152 MMHG

## 2024-06-21 LAB
GLUCOSE BLDC GLUCOMTR-MCNC: 138 MG/DL — HIGH (ref 70–99)
GLUCOSE BLDC GLUCOMTR-MCNC: 176 MG/DL — HIGH (ref 70–99)
GLUCOSE BLDC GLUCOMTR-MCNC: 215 MG/DL — HIGH (ref 70–99)

## 2024-06-21 PROCEDURE — 83935 ASSAY OF URINE OSMOLALITY: CPT

## 2024-06-21 PROCEDURE — C1892: CPT

## 2024-06-21 PROCEDURE — 82803 BLOOD GASES ANY COMBINATION: CPT

## 2024-06-21 PROCEDURE — C1785: CPT

## 2024-06-21 PROCEDURE — 76705 ECHO EXAM OF ABDOMEN: CPT

## 2024-06-21 PROCEDURE — 83036 HEMOGLOBIN GLYCOSYLATED A1C: CPT

## 2024-06-21 PROCEDURE — 84300 ASSAY OF URINE SODIUM: CPT

## 2024-06-21 PROCEDURE — 84165 PROTEIN E-PHORESIS SERUM: CPT

## 2024-06-21 PROCEDURE — 70553 MRI BRAIN STEM W/O & W/DYE: CPT | Mod: MC

## 2024-06-21 PROCEDURE — 97110 THERAPEUTIC EXERCISES: CPT

## 2024-06-21 PROCEDURE — 80074 ACUTE HEPATITIS PANEL: CPT

## 2024-06-21 PROCEDURE — 70498 CT ANGIOGRAPHY NECK: CPT | Mod: MC

## 2024-06-21 PROCEDURE — 86618 LYME DISEASE ANTIBODY: CPT

## 2024-06-21 PROCEDURE — 71045 X-RAY EXAM CHEST 1 VIEW: CPT

## 2024-06-21 PROCEDURE — C8929: CPT

## 2024-06-21 PROCEDURE — 82570 ASSAY OF URINE CREATININE: CPT

## 2024-06-21 PROCEDURE — 87799 DETECT AGENT NOS DNA QUANT: CPT

## 2024-06-21 PROCEDURE — 87040 BLOOD CULTURE FOR BACTERIA: CPT

## 2024-06-21 PROCEDURE — 86255 FLUORESCENT ANTIBODY SCREEN: CPT

## 2024-06-21 PROCEDURE — 84295 ASSAY OF SERUM SODIUM: CPT

## 2024-06-21 PROCEDURE — 84100 ASSAY OF PHOSPHORUS: CPT

## 2024-06-21 PROCEDURE — 84425 ASSAY OF VITAMIN B-1: CPT

## 2024-06-21 PROCEDURE — 83519 RIA NONANTIBODY: CPT

## 2024-06-21 PROCEDURE — 86334 IMMUNOFIX E-PHORESIS SERUM: CPT

## 2024-06-21 PROCEDURE — 95711 VEEG 2-12 HR UNMONITORED: CPT

## 2024-06-21 PROCEDURE — 85652 RBC SED RATE AUTOMATED: CPT

## 2024-06-21 PROCEDURE — 85520 HEPARIN ASSAY: CPT

## 2024-06-21 PROCEDURE — 74177 CT ABD & PELVIS W/CONTRAST: CPT | Mod: MC

## 2024-06-21 PROCEDURE — 87471 BARTONELLA DNA AMP PROBE: CPT

## 2024-06-21 PROCEDURE — C1889: CPT

## 2024-06-21 PROCEDURE — 87637 SARSCOV2&INF A&B&RSV AMP PRB: CPT

## 2024-06-21 PROCEDURE — 82947 ASSAY GLUCOSE BLOOD QUANT: CPT

## 2024-06-21 PROCEDURE — 93312 ECHO TRANSESOPHAGEAL: CPT

## 2024-06-21 PROCEDURE — 97530 THERAPEUTIC ACTIVITIES: CPT

## 2024-06-21 PROCEDURE — 82010 KETONE BODYS QUAN: CPT

## 2024-06-21 PROCEDURE — 76377 3D RENDER W/INTRP POSTPROCES: CPT

## 2024-06-21 PROCEDURE — A9585: CPT

## 2024-06-21 PROCEDURE — 86703 HIV-1/HIV-2 1 RESULT ANTBDY: CPT

## 2024-06-21 PROCEDURE — 82550 ASSAY OF CK (CPK): CPT

## 2024-06-21 PROCEDURE — 86788 WEST NILE VIRUS AB IGM: CPT

## 2024-06-21 PROCEDURE — A9537: CPT

## 2024-06-21 PROCEDURE — 82607 VITAMIN B-12: CPT

## 2024-06-21 PROCEDURE — 83605 ASSAY OF LACTIC ACID: CPT

## 2024-06-21 PROCEDURE — 82330 ASSAY OF CALCIUM: CPT

## 2024-06-21 PROCEDURE — 82435 ASSAY OF BLOOD CHLORIDE: CPT

## 2024-06-21 PROCEDURE — 80048 BASIC METABOLIC PNL TOTAL CA: CPT

## 2024-06-21 PROCEDURE — 82525 ASSAY OF COPPER: CPT

## 2024-06-21 PROCEDURE — 84207 ASSAY OF VITAMIN B-6: CPT

## 2024-06-21 PROCEDURE — 97161 PT EVAL LOW COMPLEX 20 MIN: CPT

## 2024-06-21 PROCEDURE — 86038 ANTINUCLEAR ANTIBODIES: CPT

## 2024-06-21 PROCEDURE — 71046 X-RAY EXAM CHEST 2 VIEWS: CPT

## 2024-06-21 PROCEDURE — 93005 ELECTROCARDIOGRAM TRACING: CPT

## 2024-06-21 PROCEDURE — 85018 HEMOGLOBIN: CPT

## 2024-06-21 PROCEDURE — 86787 VARICELLA-ZOSTER ANTIBODY: CPT

## 2024-06-21 PROCEDURE — 83880 ASSAY OF NATRIURETIC PEPTIDE: CPT

## 2024-06-21 PROCEDURE — 84540 ASSAY OF URINE/UREA-N: CPT

## 2024-06-21 PROCEDURE — 81003 URINALYSIS AUTO W/O SCOPE: CPT

## 2024-06-21 PROCEDURE — 93325 DOPPLER ECHO COLOR FLOW MAPG: CPT

## 2024-06-21 PROCEDURE — 33208 INSRT HEART PM ATRIAL & VENT: CPT

## 2024-06-21 PROCEDURE — 85025 COMPLETE CBC W/AUTO DIFF WBC: CPT

## 2024-06-21 PROCEDURE — 97116 GAIT TRAINING THERAPY: CPT

## 2024-06-21 PROCEDURE — 85610 PROTHROMBIN TIME: CPT

## 2024-06-21 PROCEDURE — 87177 OVA AND PARASITES SMEARS: CPT

## 2024-06-21 PROCEDURE — 80053 COMPREHEN METABOLIC PANEL: CPT

## 2024-06-21 PROCEDURE — 84155 ASSAY OF PROTEIN SERUM: CPT

## 2024-06-21 PROCEDURE — 96374 THER/PROPH/DIAG INJ IV PUSH: CPT

## 2024-06-21 PROCEDURE — 70496 CT ANGIOGRAPHY HEAD: CPT | Mod: MC

## 2024-06-21 PROCEDURE — 86036 ANCA SCREEN EACH ANTIBODY: CPT

## 2024-06-21 PROCEDURE — 84132 ASSAY OF SERUM POTASSIUM: CPT

## 2024-06-21 PROCEDURE — 36415 COLL VENOUS BLD VENIPUNCTURE: CPT

## 2024-06-21 PROCEDURE — 93320 DOPPLER ECHO COMPLETE: CPT

## 2024-06-21 PROCEDURE — 87798 DETECT AGENT NOS DNA AMP: CPT

## 2024-06-21 PROCEDURE — C1887: CPT

## 2024-06-21 PROCEDURE — 95700 EEG CONT REC W/VID EEG TECH: CPT

## 2024-06-21 PROCEDURE — 84630 ASSAY OF ZINC: CPT

## 2024-06-21 PROCEDURE — 85730 THROMBOPLASTIN TIME PARTIAL: CPT

## 2024-06-21 PROCEDURE — 86160 COMPLEMENT ANTIGEN: CPT

## 2024-06-21 PROCEDURE — 84156 ASSAY OF PROTEIN URINE: CPT

## 2024-06-21 PROCEDURE — 86696 HERPES SIMPLEX TYPE 2 TEST: CPT

## 2024-06-21 PROCEDURE — 86901 BLOOD TYPING SEROLOGIC RH(D): CPT

## 2024-06-21 PROCEDURE — C1898: CPT

## 2024-06-21 PROCEDURE — 82164 ANGIOTENSIN I ENZYME TEST: CPT

## 2024-06-21 PROCEDURE — 82746 ASSAY OF FOLIC ACID SERUM: CPT

## 2024-06-21 PROCEDURE — 99291 CRITICAL CARE FIRST HOUR: CPT | Mod: 25

## 2024-06-21 PROCEDURE — 84484 ASSAY OF TROPONIN QUANT: CPT

## 2024-06-21 PROCEDURE — 70450 CT HEAD/BRAIN W/O DYE: CPT | Mod: MC

## 2024-06-21 PROCEDURE — 86341 ISLET CELL ANTIBODY: CPT

## 2024-06-21 PROCEDURE — 84133 ASSAY OF URINE POTASSIUM: CPT

## 2024-06-21 PROCEDURE — 82306 VITAMIN D 25 HYDROXY: CPT

## 2024-06-21 PROCEDURE — 86789 WEST NILE VIRUS ANTIBODY: CPT

## 2024-06-21 PROCEDURE — 82652 VIT D 1 25-DIHYDROXY: CPT

## 2024-06-21 PROCEDURE — 80061 LIPID PANEL: CPT

## 2024-06-21 PROCEDURE — 86900 BLOOD TYPING SEROLOGIC ABO: CPT

## 2024-06-21 PROCEDURE — 86140 C-REACTIVE PROTEIN: CPT

## 2024-06-21 PROCEDURE — 85014 HEMATOCRIT: CPT

## 2024-06-21 PROCEDURE — 81001 URINALYSIS AUTO W/SCOPE: CPT

## 2024-06-21 PROCEDURE — 83735 ASSAY OF MAGNESIUM: CPT

## 2024-06-21 PROCEDURE — 84446 ASSAY OF VITAMIN E: CPT

## 2024-06-21 PROCEDURE — 86695 HERPES SIMPLEX TYPE 1 TEST: CPT

## 2024-06-21 PROCEDURE — 87476 LYME DIS DNA AMP PROBE: CPT

## 2024-06-21 PROCEDURE — 80307 DRUG TEST PRSMV CHEM ANLYZR: CPT

## 2024-06-21 PROCEDURE — 84590 ASSAY OF VITAMIN A: CPT

## 2024-06-21 PROCEDURE — 86376 MICROSOMAL ANTIBODY EACH: CPT

## 2024-06-21 PROCEDURE — 84443 ASSAY THYROID STIM HORMONE: CPT

## 2024-06-21 PROCEDURE — 99239 HOSP IP/OBS DSCHRG MGMT >30: CPT | Mod: GC

## 2024-06-21 PROCEDURE — 86850 RBC ANTIBODY SCREEN: CPT

## 2024-06-21 PROCEDURE — 87529 HSV DNA AMP PROBE: CPT

## 2024-06-21 PROCEDURE — 82962 GLUCOSE BLOOD TEST: CPT

## 2024-06-21 PROCEDURE — 78226 HEPATOBILIARY SYSTEM IMAGING: CPT | Mod: MC

## 2024-06-21 PROCEDURE — 85027 COMPLETE CBC AUTOMATED: CPT

## 2024-06-21 PROCEDURE — 78227 HEPATOBIL SYST IMAGE W/DRUG: CPT

## 2024-06-21 PROCEDURE — 86780 TREPONEMA PALLIDUM: CPT

## 2024-06-21 PROCEDURE — 86638 Q FEVER ANTIBODY: CPT

## 2024-06-21 RX ORDER — GABAPENTIN
1 POWDER (GRAM) MISCELLANEOUS
Qty: 0 | Refills: 0 | DISCHARGE
Start: 2024-06-21

## 2024-06-21 RX ORDER — HYDRALAZINE HYDROCHLORIDE 50 MG/1
1 TABLET ORAL
Qty: 0 | Refills: 0 | DISCHARGE
Start: 2024-06-21

## 2024-06-21 RX ORDER — AMLODIPINE BESYLATE 2.5 MG/1
1 TABLET ORAL
Qty: 0 | Refills: 0 | DISCHARGE
Start: 2024-06-21

## 2024-06-21 RX ORDER — ENALAPRIL MALEATE 20 MG
3 TABLET ORAL
Qty: 90 | Refills: 0
Start: 2024-06-21 | End: 2024-07-20

## 2024-06-21 RX ORDER — ATORVASTATIN CALCIUM 20 MG/1
1 TABLET, FILM COATED ORAL
Qty: 0 | Refills: 0 | DISCHARGE
Start: 2024-06-21

## 2024-06-21 RX ORDER — ASPIRIN 325 MG/1
1 TABLET, FILM COATED ORAL
Qty: 0 | Refills: 0 | DISCHARGE
Start: 2024-06-21

## 2024-06-21 RX ORDER — ENALAPRIL MALEATE 20 MG
3 TABLET ORAL
Refills: 0 | DISCHARGE

## 2024-06-21 RX ADMIN — Medication 1 APPLICATION(S): at 05:53

## 2024-06-21 RX ADMIN — INSULIN LISPRO 6 UNIT(S): 100 INJECTION, SOLUTION SUBCUTANEOUS at 17:31

## 2024-06-21 RX ADMIN — INSULIN LISPRO 6 UNIT(S): 100 INJECTION, SOLUTION SUBCUTANEOUS at 08:47

## 2024-06-21 RX ADMIN — HYDRALAZINE HYDROCHLORIDE 50 MILLIGRAM(S): 50 TABLET ORAL at 05:53

## 2024-06-21 RX ADMIN — AMLODIPINE BESYLATE 10 MILLIGRAM(S): 2.5 TABLET ORAL at 05:53

## 2024-06-21 RX ADMIN — ASPIRIN 81 MILLIGRAM(S): 325 TABLET, FILM COATED ORAL at 12:50

## 2024-06-21 RX ADMIN — POLYETHYLENE GLYCOL 3350 17 GRAM(S): 1 POWDER ORAL at 05:54

## 2024-06-21 RX ADMIN — INSULIN LISPRO 2: 100 INJECTION, SOLUTION SUBCUTANEOUS at 12:48

## 2024-06-21 RX ADMIN — Medication 20 MILLIGRAM(S): at 05:54

## 2024-06-21 RX ADMIN — Medication 1 APPLICATION(S): at 17:31

## 2024-06-21 RX ADMIN — HEPARIN SODIUM 5000 UNIT(S): 50 INJECTION, SOLUTION INTRAVENOUS at 05:54

## 2024-06-21 RX ADMIN — HEPARIN SODIUM 5000 UNIT(S): 50 INJECTION, SOLUTION INTRAVENOUS at 13:33

## 2024-06-21 RX ADMIN — HYDRALAZINE HYDROCHLORIDE 50 MILLIGRAM(S): 50 TABLET ORAL at 13:33

## 2024-06-21 RX ADMIN — INSULIN LISPRO 6 UNIT(S): 100 INJECTION, SOLUTION SUBCUTANEOUS at 12:49

## 2024-06-21 RX ADMIN — Medication 1 APPLICATION(S): at 06:03

## 2024-06-21 RX ADMIN — INSULIN LISPRO 4: 100 INJECTION, SOLUTION SUBCUTANEOUS at 17:30

## 2024-06-27 ENCOUNTER — APPOINTMENT (OUTPATIENT)
Dept: ELECTROPHYSIOLOGY | Facility: CLINIC | Age: 64
End: 2024-06-27

## 2024-07-03 LAB
AMPA-RECEPTOR ANTIBODY BY CBA, SERUM: NEGATIVE — SIGNIFICANT CHANGE UP
AMPHIPHYSIN AB TITR SER: NEGATIVE — SIGNIFICANT CHANGE UP
CASPR2-IGG CBA, SERUM: NEGATIVE — SIGNIFICANT CHANGE UP
CRMP-5-IGG WESTERN BLOT: NEGATIVE — SIGNIFICANT CHANGE UP
GABA-B-RECEPTOR ANTIBODY BY CBA, SERUM: NEGATIVE — SIGNIFICANT CHANGE UP
GAD65 AB SER-MCNC: 0 NMOL/L — SIGNIFICANT CHANGE UP
GFAP ALPHA IGG SER QL IF: NEGATIVE — SIGNIFICANT CHANGE UP
GLIAL NUC TYPE 1 AB TITR SER: NEGATIVE — SIGNIFICANT CHANGE UP
HU1 AB TITR SER: NEGATIVE — SIGNIFICANT CHANGE UP
HU2 AB TITR SER IF: NEGATIVE — SIGNIFICANT CHANGE UP
HU3 AB TITR SER: NEGATIVE — SIGNIFICANT CHANGE UP
IFA NOTES: SIGNIFICANT CHANGE UP
IMMUNOLOGIST REVIEW: SIGNIFICANT CHANGE UP
LGI1-IGG CBA, SERUM: NEGATIVE — SIGNIFICANT CHANGE UP
MGLUR1 IGG SER QL IF: NEGATIVE — SIGNIFICANT CHANGE UP
NEUROCHONDRIN AB SERPL QL IF: NEGATIVE — SIGNIFICANT CHANGE UP
NMDAR1 IGG SER QL CBA IFA: NEGATIVE — SIGNIFICANT CHANGE UP
PCA-1 AB TITR SER: NEGATIVE — SIGNIFICANT CHANGE UP
PCA-2 AB TITR SER: NEGATIVE — SIGNIFICANT CHANGE UP
PCA-TR AB TITR SER: NEGATIVE — SIGNIFICANT CHANGE UP

## 2024-07-04 ENCOUNTER — RESULT CHARGE (OUTPATIENT)
Age: 64
End: 2024-07-04

## 2024-07-05 ENCOUNTER — APPOINTMENT (OUTPATIENT)
Dept: ELECTROPHYSIOLOGY | Facility: CLINIC | Age: 64
End: 2024-07-05

## 2024-07-05 ENCOUNTER — NON-APPOINTMENT (OUTPATIENT)
Age: 64
End: 2024-07-05

## 2024-07-05 VITALS
WEIGHT: 220 LBS | SYSTOLIC BLOOD PRESSURE: 119 MMHG | HEART RATE: 96 BPM | OXYGEN SATURATION: 97 % | BODY MASS INDEX: 32.49 KG/M2 | DIASTOLIC BLOOD PRESSURE: 71 MMHG

## 2024-07-05 DIAGNOSIS — I44.2 ATRIOVENTRICULAR BLOCK, COMPLETE: ICD-10-CM

## 2024-07-05 PROCEDURE — 93280 PM DEVICE PROGR EVAL DUAL: CPT

## 2024-07-05 PROCEDURE — 93000 ELECTROCARDIOGRAM COMPLETE: CPT | Mod: 59

## 2024-07-05 PROCEDURE — 99024 POSTOP FOLLOW-UP VISIT: CPT

## 2024-07-12 ENCOUNTER — APPOINTMENT (OUTPATIENT)
Dept: NEUROLOGY | Facility: CLINIC | Age: 64
End: 2024-07-12

## 2024-07-16 ENCOUNTER — APPOINTMENT (OUTPATIENT)
Dept: NEUROLOGY | Facility: CLINIC | Age: 64
End: 2024-07-16
Payer: COMMERCIAL

## 2024-07-16 VITALS
DIASTOLIC BLOOD PRESSURE: 75 MMHG | HEART RATE: 83 BPM | WEIGHT: 220 LBS | BODY MASS INDEX: 32.58 KG/M2 | HEIGHT: 69 IN | SYSTOLIC BLOOD PRESSURE: 123 MMHG

## 2024-07-16 DIAGNOSIS — I63.9 CEREBRAL INFARCTION, UNSPECIFIED: ICD-10-CM

## 2024-07-16 PROCEDURE — 99205 OFFICE O/P NEW HI 60 MIN: CPT

## 2024-07-16 PROCEDURE — G2212 PROLONG OUTPT/OFFICE VIS: CPT

## 2024-07-16 PROCEDURE — G2211 COMPLEX E/M VISIT ADD ON: CPT

## 2024-07-16 PROCEDURE — 99215 OFFICE O/P EST HI 40 MIN: CPT

## 2024-08-01 ENCOUNTER — NON-APPOINTMENT (OUTPATIENT)
Age: 64
End: 2024-08-01

## 2024-08-01 ENCOUNTER — APPOINTMENT (OUTPATIENT)
Dept: INTERNAL MEDICINE | Facility: CLINIC | Age: 64
End: 2024-08-01
Payer: COMMERCIAL

## 2024-08-01 VITALS
HEIGHT: 69 IN | DIASTOLIC BLOOD PRESSURE: 46 MMHG | SYSTOLIC BLOOD PRESSURE: 86 MMHG | BODY MASS INDEX: 32.58 KG/M2 | OXYGEN SATURATION: 97 % | HEART RATE: 93 BPM | WEIGHT: 220 LBS

## 2024-08-01 VITALS — SYSTOLIC BLOOD PRESSURE: 80 MMHG | DIASTOLIC BLOOD PRESSURE: 40 MMHG

## 2024-08-01 VITALS — DIASTOLIC BLOOD PRESSURE: 60 MMHG | SYSTOLIC BLOOD PRESSURE: 106 MMHG

## 2024-08-01 DIAGNOSIS — I63.9 CEREBRAL INFARCTION, UNSPECIFIED: ICD-10-CM

## 2024-08-01 DIAGNOSIS — Z00.00 ENCOUNTER FOR GENERAL ADULT MEDICAL EXAMINATION W/OUT ABNORMAL FINDINGS: ICD-10-CM

## 2024-08-01 DIAGNOSIS — E66.9 OBESITY, UNSPECIFIED: ICD-10-CM

## 2024-08-01 DIAGNOSIS — I44.2 ATRIOVENTRICULAR BLOCK, COMPLETE: ICD-10-CM

## 2024-08-01 DIAGNOSIS — S62.002K UNSPECIFIED FRACTURE OF NAVICULAR [SCAPHOID] BONE OF LEFT WRIST, SUBSEQUENT ENCOUNTER FOR FRACTURE WITH NONUNION: ICD-10-CM

## 2024-08-01 DIAGNOSIS — M79.606 PAIN IN LEG, UNSPECIFIED: ICD-10-CM

## 2024-08-01 DIAGNOSIS — M25.532 PAIN IN LEFT WRIST: ICD-10-CM

## 2024-08-01 DIAGNOSIS — I73.9 PERIPHERAL VASCULAR DISEASE, UNSPECIFIED: ICD-10-CM

## 2024-08-01 DIAGNOSIS — Z95.0 PRESENCE OF CARDIAC PACEMAKER: ICD-10-CM

## 2024-08-01 DIAGNOSIS — B35.3 TINEA PEDIS: ICD-10-CM

## 2024-08-01 DIAGNOSIS — Z87.898 PERSONAL HISTORY OF OTHER SPECIFIED CONDITIONS: ICD-10-CM

## 2024-08-01 DIAGNOSIS — S88.112D COMPLETE TRAUMATIC AMPUTATION AT LVL BETWEEN KNEE AND ANKLE, LEFT LOWER LEG, SUBSEQUENT ENCOUNTER: ICD-10-CM

## 2024-08-01 DIAGNOSIS — M79.642 PAIN IN LEFT HAND: ICD-10-CM

## 2024-08-01 DIAGNOSIS — Z82.49 FAMILY HISTORY OF ISCHEMIC HEART DISEASE AND OTHER DISEASES OF THE CIRCULATORY SYSTEM: ICD-10-CM

## 2024-08-01 DIAGNOSIS — E78.49 OTHER HYPERLIPIDEMIA: ICD-10-CM

## 2024-08-01 DIAGNOSIS — Z87.891 PERSONAL HISTORY OF NICOTINE DEPENDENCE: ICD-10-CM

## 2024-08-01 DIAGNOSIS — I95.9 HYPOTENSION, UNSPECIFIED: ICD-10-CM

## 2024-08-01 DIAGNOSIS — M77.8 OTHER ENTHESOPATHIES, NOT ELSEWHERE CLASSIFIED: ICD-10-CM

## 2024-08-01 PROBLEM — K80.50 COMMON BILE DUCT CALCULI: Status: ACTIVE | Noted: 2024-08-01

## 2024-08-01 PROBLEM — E11.69 TYPE 2 DIABETES MELLITUS WITH OTHER SPECIFIED COMPLICATION, WITH LONG-TERM CURRENT USE OF INSULIN: Status: ACTIVE | Noted: 2024-08-01

## 2024-08-01 PROBLEM — I10 BENIGN ESSENTIAL HYPERTENSION: Status: ACTIVE | Noted: 2024-08-01

## 2024-08-01 PROCEDURE — 93000 ELECTROCARDIOGRAM COMPLETE: CPT

## 2024-08-01 PROCEDURE — 99214 OFFICE O/P EST MOD 30 MIN: CPT | Mod: 25

## 2024-08-01 PROCEDURE — G2211 COMPLEX E/M VISIT ADD ON: CPT | Mod: NC

## 2024-08-01 PROCEDURE — 99396 PREV VISIT EST AGE 40-64: CPT

## 2024-08-01 PROCEDURE — 36415 COLL VENOUS BLD VENIPUNCTURE: CPT

## 2024-08-01 RX ORDER — CLOTRIMAZOLE 10 MG/G
1 CREAM TOPICAL
Qty: 60 | Refills: 3 | Status: ACTIVE | COMMUNITY
Start: 2024-08-01 | End: 1900-01-01

## 2024-08-01 RX ORDER — ASPIRIN 81 MG
81 TABLET, DELAYED RELEASE (ENTERIC COATED) ORAL DAILY
Qty: 30 | Refills: 11 | Status: ACTIVE | COMMUNITY
Start: 2024-08-01 | End: 1900-01-01

## 2024-08-01 RX ORDER — AMLODIPINE BESYLATE 10 MG/1
10 TABLET ORAL
Qty: 1 | Refills: 1 | Status: ACTIVE | COMMUNITY

## 2024-08-01 RX ORDER — ATORVASTATIN CALCIUM 40 MG/1
40 TABLET, FILM COATED ORAL
Refills: 0 | Status: ACTIVE | COMMUNITY

## 2024-08-01 NOTE — PLAN
[FreeTextEntry1] : colon CA screening- will wait for colonoscopy since pt just had CVA.  offered Cologuard hypotension- decr hydralazine.  ck home BP .  pt was given flds and bp incr.  pt not to take any BP meds at home. ck home BP today and this . pt to call if there is a decr in BP  Blood was collected in the office. EKG-NSR  80. 1 AVB.  no chg c/o prev EKG

## 2024-08-01 NOTE — HISTORY OF PRESENT ILLNESS
[FreeTextEntry1] : CPE [de-identified] : vaccine- doesn't recall getting tdap , pneum vac, shingrix.  diet- health diet reviewed exercise- walking daily using walker.  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  headache was only prior to CVA.  no HA now CVA- admitted 6/10/24 [facility]- fever , complete HB.  next day dev aphasia- infarct R periventricular white matter has pacemaker reviewed 7/16/24 Neuro notes.  reviewed 6/10/24 CT angio- no  flow limiting stenosis or aneurysm reviewed 6/11/24 MRI brain- acute/ subacute infarct-R parietal  periventicular white matter and R occipital .  mod chronic microvascular chg- chronic L cerebellar infarct.  reviewed 6/12/24 US abd- cholelithiasis, mild wall thickening.  echogenic foci - common bile duct- rec MRCP reviewed 6/10/24 CT abd / pelvis- not acute pathology reivewed 6/13/24 He

## 2024-08-01 NOTE — HEALTH RISK ASSESSMENT
[Never (0 pts)] : Never (0 points) [No] : In the past 12 months have you used drugs other than those required for medical reasons? No [0] : 2) Feeling down, depressed, or hopeless: Not at all (0) [PHQ-2 Negative - No further assessment needed] : PHQ-2 Negative - No further assessment needed [HIV test declined] : HIV test declined [Hepatitis C test declined] : Hepatitis C test declined [Designated Healthcare Proxy] : Designated healthcare proxy [Relationship: ___] : Relationship: [unfilled] [Former] : Former [> 15 Years] : > 15 Years [NO] : No [Audit-CScore] : 0 [IVJ9Apmom] : 0 [ColonoscopyComments] : never [de-identified] : has appt w OPhth this wk.  sees Ophth 1 / mo [de-identified] : pt will f/u w dentist [AdvancecareDate] : 08/24

## 2024-08-01 NOTE — PHYSICAL EXAM
[No Acute Distress] : no acute distress [Well Nourished] : well nourished [Well Developed] : well developed [Well-Appearing] : well-appearing [Normal Sclera/Conjunctiva] : normal sclera/conjunctiva [PERRL] : pupils equal round and reactive to light [Normal Outer Ear/Nose] : the outer ears and nose were normal in appearance [Normal Oropharynx] : the oropharynx was normal [No Lymphadenopathy] : no lymphadenopathy [Supple] : supple [Thyroid Normal, No Nodules] : the thyroid was normal and there were no nodules present [No Respiratory Distress] : no respiratory distress  [No Accessory Muscle Use] : no accessory muscle use [Clear to Auscultation] : lungs were clear to auscultation bilaterally [Normal Rate] : normal rate  [Regular Rhythm] : with a regular rhythm [Normal S1, S2] : normal S1 and S2 [No Murmur] : no murmur heard [No Carotid Bruits] : no carotid bruits [Pedal Pulses Present] : the pedal pulses are present [No Edema] : there was no peripheral edema [Soft] : abdomen soft [Non Tender] : non-tender [Non-distended] : non-distended [No Masses] : no abdominal mass palpated [Normal Bowel Sounds] : normal bowel sounds [Normal Supraclavicular Nodes] : no supraclavicular lymphadenopathy [Normal Axillary Nodes] : no axillary lymphadenopathy [Normal Posterior Cervical Nodes] : no posterior cervical lymphadenopathy [Normal Anterior Cervical Nodes] : no anterior cervical lymphadenopathy [Normal Inguinal Nodes] : no inguinal lymphadenopathy [Grossly Normal Strength/Tone] : grossly normal strength/tone [No Focal Deficits] : no focal deficits [Normal Gait] : normal gait [Normal Affect] : the affect was normal [Normal Insight/Judgement] : insight and judgment were intact [Right Foot Was Examined] : Right foot ~C was examined [Left Foot Was Examined] : left foot ~C was examined [de-identified] : scaly rash [de-identified] : scaly rash

## 2024-08-02 PROBLEM — D72.829 LEUKOCYTOSIS, UNSPECIFIED TYPE: Status: ACTIVE | Noted: 2024-08-02

## 2024-08-02 LAB
25(OH)D3 SERPL-MCNC: 41.1 NG/ML
ALBUMIN SERPL ELPH-MCNC: 4.3 G/DL
ALP BLD-CCNC: 73 U/L
ALT SERPL-CCNC: 25 U/L
ANION GAP SERPL CALC-SCNC: 16 MMOL/L
AST SERPL-CCNC: 24 U/L
BASOPHILS # BLD AUTO: 0.05 K/UL
BASOPHILS NFR BLD AUTO: 0.4 %
BILIRUB SERPL-MCNC: 0.3 MG/DL
BUN SERPL-MCNC: 28 MG/DL
CALCIUM SERPL-MCNC: 10.1 MG/DL
CHLORIDE SERPL-SCNC: 101 MMOL/L
CHOLEST SERPL-MCNC: 119 MG/DL
CO2 SERPL-SCNC: 23 MMOL/L
CREAT SERPL-MCNC: 1.06 MG/DL
EGFR: 78 ML/MIN/1.73M2
EOSINOPHIL # BLD AUTO: 0.43 K/UL
EOSINOPHIL NFR BLD AUTO: 3.6 %
ESTIMATED AVERAGE GLUCOSE: 177 MG/DL
GLUCOSE SERPL-MCNC: 141 MG/DL
HBA1C MFR BLD HPLC: 7.8 %
HCT VFR BLD CALC: 43.9 %
HDLC SERPL-MCNC: 38 MG/DL
HGB BLD-MCNC: 13.2 G/DL
IMM GRANULOCYTES NFR BLD AUTO: 0.4 %
LDLC SERPL CALC-MCNC: 62 MG/DL
LDLC SERPL DIRECT ASSAY-MCNC: 62 MG/DL
LYMPHOCYTES # BLD AUTO: 2.23 K/UL
LYMPHOCYTES NFR BLD AUTO: 18.9 %
MAN DIFF?: NORMAL
MCHC RBC-ENTMCNC: 25.9 PG
MCHC RBC-ENTMCNC: 30.1 GM/DL
MCV RBC AUTO: 86.2 FL
MONOCYTES # BLD AUTO: 0.93 K/UL
MONOCYTES NFR BLD AUTO: 7.9 %
NEUTROPHILS # BLD AUTO: 8.13 K/UL
NEUTROPHILS NFR BLD AUTO: 68.8 %
NONHDLC SERPL-MCNC: 80 MG/DL
PLATELET # BLD AUTO: 261 K/UL
POTASSIUM SERPL-SCNC: 4.9 MMOL/L
PROT SERPL-MCNC: 7.6 G/DL
PSA SERPL-MCNC: 2.22 NG/ML
RBC # BLD: 5.09 M/UL
RBC # FLD: 14.7 %
SODIUM SERPL-SCNC: 141 MMOL/L
T4 FREE SERPL-MCNC: 1.4 NG/DL
TRIGL SERPL-MCNC: 93 MG/DL
TSH SERPL-ACNC: 2.88 UIU/ML
WBC # FLD AUTO: 11.82 K/UL

## 2024-08-07 ENCOUNTER — NON-APPOINTMENT (OUTPATIENT)
Age: 64
End: 2024-08-07

## 2024-08-07 LAB
BASOPHILS # BLD AUTO: 0.06 K/UL
BASOPHILS NFR BLD AUTO: 0.5 %
EOSINOPHIL # BLD AUTO: 0.43 K/UL
EOSINOPHIL NFR BLD AUTO: 3.5 %
HCT VFR BLD CALC: 44.5 %
HGB BLD-MCNC: 13.6 G/DL
IMM GRANULOCYTES NFR BLD AUTO: 0.2 %
LYMPHOCYTES # BLD AUTO: 1.99 K/UL
LYMPHOCYTES NFR BLD AUTO: 16 %
MAN DIFF?: NORMAL
MCHC RBC-ENTMCNC: 26.1 PG
MCHC RBC-ENTMCNC: 30.6 GM/DL
MCV RBC AUTO: 85.2 FL
MONOCYTES # BLD AUTO: 0.78 K/UL
MONOCYTES NFR BLD AUTO: 6.3 %
NEUTROPHILS # BLD AUTO: 9.11 K/UL
NEUTROPHILS NFR BLD AUTO: 73.5 %
PLATELET # BLD AUTO: 277 K/UL
RBC # BLD: 5.22 M/UL
RBC # FLD: 14.4 %
WBC # FLD AUTO: 12.4 K/UL

## 2024-08-08 ENCOUNTER — APPOINTMENT (OUTPATIENT)
Dept: INTERNAL MEDICINE | Facility: CLINIC | Age: 64
End: 2024-08-08

## 2024-08-08 PROCEDURE — G2211 COMPLEX E/M VISIT ADD ON: CPT

## 2024-08-08 PROCEDURE — 99214 OFFICE O/P EST MOD 30 MIN: CPT

## 2024-08-08 RX ORDER — HYDRALAZINE HYDROCHLORIDE 25 MG/1
25 TABLET ORAL 3 TIMES DAILY
Qty: 180 | Refills: 0 | Status: ACTIVE | COMMUNITY
Start: 1900-01-01 | End: 1900-01-01

## 2024-08-08 NOTE — HISTORY OF PRESENT ILLNESS
[FreeTextEntry1] : HTN [de-identified] : reviewed 8/5/24 labs- elev WBC, A1c 7.8. - pt denies any cough,abd pain, n, v, d, dysuria, rash, SOB, ear pain, sinus infection, dental infection or other evidence of infection, fever pt has not been on steroid.  HTN- last visit hypotension so decr hydralazine.  DM- ref to nutritionist.  compliant w diet.  fasting .  chol exercise- walking daily using walker.  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  CVA- admitted 6/10/24 [facility]- fever , complete HB.  next day dev aphasia- infarct R periventricular white matter has pacemaker seen 7/16/24 Neuro notes.  reviewed 6/10/24 CT angio- no  flow limiting stenosis or aneurysm reviewed 6/11/24 MRI brain- acute/ subacute infarct-R parietal  periventicular white matter and R occipital .  mod chronic microvascular chg- chronic L cerebellar infarct.  Pacemaker possible common bile duct stone on 6/12/24 US abd- rec MRCP

## 2024-08-08 NOTE — PHYSICAL EXAM
[de-identified] : area amputation stump is clean- no erythema or swelling [TextEntry] : Constitutional: no acute distress, well nourished, well developed and well-appearing. Pulmonary: no respiratory distress, lungs were clear to auscultation bilaterally, no accessory muscle use. Cardiac: normal rate, with a regular rhythm, normal S1 and S2 and no murmur heard.  Vascular: there was no peripheral edema. Abdomen: abdomen soft, non-tender, non-distended, no abdominal mass palpated and normal bowel sounds. Psychiatric: the affect was normal and insight and judgement were intact

## 2024-08-15 ENCOUNTER — APPOINTMENT (OUTPATIENT)
Dept: ULTRASOUND IMAGING | Facility: CLINIC | Age: 64
End: 2024-08-15
Payer: COMMERCIAL

## 2024-08-15 PROCEDURE — 93926 LOWER EXTREMITY STUDY: CPT | Mod: 26,RT

## 2024-08-19 ENCOUNTER — APPOINTMENT (OUTPATIENT)
Dept: CARDIOLOGY | Facility: CLINIC | Age: 64
End: 2024-08-19
Payer: COMMERCIAL

## 2024-08-19 ENCOUNTER — NON-APPOINTMENT (OUTPATIENT)
Age: 64
End: 2024-08-19

## 2024-08-19 VITALS
SYSTOLIC BLOOD PRESSURE: 111 MMHG | DIASTOLIC BLOOD PRESSURE: 70 MMHG | HEART RATE: 94 BPM | WEIGHT: 220 LBS | BODY MASS INDEX: 32.49 KG/M2 | OXYGEN SATURATION: 93 %

## 2024-08-19 DIAGNOSIS — Z95.0 PRESENCE OF CARDIAC PACEMAKER: ICD-10-CM

## 2024-08-19 DIAGNOSIS — Z86.79 PERSONAL HISTORY OF OTHER DISEASES OF THE CIRCULATORY SYSTEM: ICD-10-CM

## 2024-08-19 DIAGNOSIS — I63.9 CEREBRAL INFARCTION, UNSPECIFIED: ICD-10-CM

## 2024-08-19 DIAGNOSIS — E78.49 OTHER HYPERLIPIDEMIA: ICD-10-CM

## 2024-08-19 DIAGNOSIS — I10 ESSENTIAL (PRIMARY) HYPERTENSION: ICD-10-CM

## 2024-08-19 DIAGNOSIS — Z80.6 FAMILY HISTORY OF LEUKEMIA: ICD-10-CM

## 2024-08-19 DIAGNOSIS — H40.1190 PRIMARY OPEN-ANGLE GLAUCOMA, UNSPECIFIED EYE, STAGE UNSPECIFIED: ICD-10-CM

## 2024-08-19 PROCEDURE — 99204 OFFICE O/P NEW MOD 45 MIN: CPT | Mod: 25

## 2024-08-19 PROCEDURE — 93000 ELECTROCARDIOGRAM COMPLETE: CPT

## 2024-08-19 NOTE — CARDIOLOGY SUMMARY
[de-identified] : Sinus Rhythm  Low voltage in precordial leads. -Intraventricular conduction defect -consider left ventricular hypertrophy. -Old inferior infarct -Left axis secondary to infarct. -Nonspecific T-abnormality.  ABNORMAL

## 2024-08-19 NOTE — DISCUSSION/SUMMARY
[Hyperlipidemia] : hyperlipidemia [Essential Hypertension] : essential hypertension [Stable] : stable [None] : There are no changes in medication management [___ Month(s)] : in [unfilled] month(s) [FreeTextEntry1] : 63 y/o male with hx  of hx of type II DM, hx o remote AKA on left.  elevated [id number].  presented with CVA. and CHB and required Ppm diabetes not well controlled.  given poor exercise tolerance and long hx of type II , elevated cholesterol.  heart block and CVA recommend pharm nuc stress to r/o ischemia, [EKG obtained to assist in diagnosis and management of assessed problem(s)] : EKG obtained to assist in diagnosis and management of assessed problem(s)

## 2024-08-19 NOTE — PHYSICAL EXAM
[Well Developed] : well developed [Well Nourished] : well nourished [No Acute Distress] : no acute distress [Normal Conjunctiva] : normal conjunctiva [Normal Venous Pressure] : normal venous pressure [No Carotid Bruit] : no carotid bruit [Normal S1, S2] : normal S1, S2 [No Murmur] : no murmur [No Rub] : no rub [No Gallop] : no gallop [Clear Lung Fields] : clear lung fields [Good Air Entry] : good air entry [No Respiratory Distress] : no respiratory distress  [Soft] : abdomen soft [Non Tender] : non-tender [No Masses/organomegaly] : no masses/organomegaly [Normal Bowel Sounds] : normal bowel sounds [Normal Gait] : normal gait [No Edema] : no edema [No Cyanosis] : no cyanosis [No Clubbing] : no clubbing [No Varicosities] : no varicosities [No Rash] : no rash [No Skin Lesions] : no skin lesions [Moves all extremities] : moves all extremities [No Focal Deficits] : no focal deficits [Normal Speech] : normal speech [Alert and Oriented] : alert and oriented [Normal memory] : normal memory [de-identified] : left leg aka with prosthesis

## 2024-08-19 NOTE — HISTORY OF PRESENT ILLNESS
[FreeTextEntry1] : Pt is 65 y/o male with hx of type II dm 34 years.  s/p BKA 2023 as complication of diabetes.  hx of htn. hx elevated cholesterol.. CVA June 10th and was found to be in CHB at that time. Had PPM placed. no liver or kidney hx quit tob 1989,90 no alcohol use ruptured appendix 2015 nkda has left leg prosthesis  denies chest pain sob or dizziness  last lipid t chol 119, LDL 62, TRGC 93 last hgba1c 7.8%

## 2024-08-27 ENCOUNTER — APPOINTMENT (OUTPATIENT)
Dept: PHYSICAL MEDICINE AND REHAB | Facility: CLINIC | Age: 64
End: 2024-08-27
Payer: COMMERCIAL

## 2024-08-27 DIAGNOSIS — G54.6 PHANTOM LIMB SYNDROME WITH PAIN: ICD-10-CM

## 2024-08-27 DIAGNOSIS — S88.112D COMPLETE TRAUMATIC AMPUTATION AT LVL BETWEEN KNEE AND ANKLE, LEFT LOWER LEG, SUBSEQUENT ENCOUNTER: ICD-10-CM

## 2024-08-27 PROCEDURE — 99213 OFFICE O/P EST LOW 20 MIN: CPT

## 2024-08-27 NOTE — ASSESSMENT
[FreeTextEntry1] : Patient is a 64-year-old RHD male PMHx HTN, DM2 who is s/p left BKA (December 12, 2023) secondary to infection whose current BK prosthetic socket is significantly too large causing instability at the knee, rotation of the prosthesis during ambulation and discomfort at his knee.  Patient requires a socket replacement to improve prosthetic fit, comfort and safety of ambulation.  In addition the patient's silicone liner is stretched and worn and his prosthetic socks are frayed and require replacement as well.  The patient has advanced from a K2 to a K3 ambulator.  The patient's prosthetic foot is too soft for the patient and limiting his ambulation.  Prescription provided for a left custom molded endoskeletal BK prosthesis with a total contact acrylic socket, flexible inner socket, test socket, silicone locking liner with locking mechanism, ultralight alignable components, K3 prosthetic foot, outer protective cover, 6 multiple ply socks, 6 single ply socks.  Prescription provided to initiate course of outpatient physical therapy 2 times per week, see Rx.  Patient to continue gabapentin.   I spent a total of 20 minutes on the date of the encounter evaluating and treating the patient including a discussion of treatment options.

## 2024-08-27 NOTE — PHYSICAL EXAM
[FreeTextEntry1] : General: Well-developed male in no apparent distress.  Patient is awake, alert and oriented x 3.  Cooperative. HEENT: Normocephalic, atraumatic.  MMM Extremities: Right lower extremity no cyanosis, clubbing or edema noted.  Left BKA: Cylindrical/conical shape, residual limb is well-healed, no tenderness to palpation at the distal tibia.  No skin adhesions.  Full active range of motion at the knee. MTP to bony end 4-1/4 inches.  Motor: Both upper extremities: Tone normal, active range of motion within functional limits with 5/5 motor power throughout. Both lower extremities: Tone normal, active range of motion within functional limits with 5/5 motor power throughout  Sensory: Intact to light touch both lower extremities.    Functional status: Independent sit to stand transfer.  Patient ambulated in the office with a rolling walker with a left BK prosthesis with silicone locking liner with significant lateral thrusting and pistoning noted during ambulation.   Patient is a K3 ambulator.

## 2024-08-27 NOTE — REVIEW OF SYSTEMS
[Difficulty Walking] : difficulty walking [Negative] : Gastrointestinal [Fever] : no fever [Recent Change In Weight] : ~T no recent weight change [Incontinence] : no incontinence [Muscle Weakness] : no muscle weakness [Skin Wound] : no skin wound

## 2024-08-27 NOTE — HISTORY OF PRESENT ILLNESS
[FreeTextEntry1] : Patient is a 64-year-old RHD PMHx HTN, DM2, s/p left BKA (December 12, 2023) secondary to infection who received his first and current left BKA prosthesis in March 2024.  Patient reports that the prosthetic socket is too large causing instability at the knee during ambulation.  Patient is also noted frequent rotation of the prosthesis during ambulation.  Patient has noted discomfort at the left knee during ambulation but denies any skin breakdown or skin issues with the prosthesis.  Patient's weight has been stable, no falls reported.  Functionally the patient is ambulating with a rolling walker independently and has been using a narrow-base quad cane episodically and in physical therapy.  Patient reports completing home physical therapy recently.  Patient is independent all transfers and activities of daily living.  Patient can negotiate all environmental barriers such as curbs and ramps.  Patient states that his phantom pain has been well-controlled on gabapentin.  Patient lives with his spouse in a private house with 1 flight of steps to negotiate with a handrail.

## 2024-08-28 ENCOUNTER — APPOINTMENT (OUTPATIENT)
Dept: ULTRASOUND IMAGING | Facility: CLINIC | Age: 64
End: 2024-08-28

## 2024-09-11 ENCOUNTER — APPOINTMENT (OUTPATIENT)
Dept: INTERNAL MEDICINE | Facility: CLINIC | Age: 64
End: 2024-09-11
Payer: COMMERCIAL

## 2024-09-11 VITALS
BODY MASS INDEX: 32.58 KG/M2 | HEIGHT: 69 IN | HEART RATE: 107 BPM | WEIGHT: 220 LBS | SYSTOLIC BLOOD PRESSURE: 142 MMHG | DIASTOLIC BLOOD PRESSURE: 64 MMHG | OXYGEN SATURATION: 96 %

## 2024-09-11 VITALS — DIASTOLIC BLOOD PRESSURE: 70 MMHG | SYSTOLIC BLOOD PRESSURE: 130 MMHG

## 2024-09-11 DIAGNOSIS — G54.6 PHANTOM LIMB SYNDROME WITH PAIN: ICD-10-CM

## 2024-09-11 DIAGNOSIS — I63.9 CEREBRAL INFARCTION, UNSPECIFIED: ICD-10-CM

## 2024-09-11 DIAGNOSIS — E11.69 TYPE 2 DIABETES MELLITUS WITH OTHER SPECIFIED COMPLICATION: ICD-10-CM

## 2024-09-11 DIAGNOSIS — Z95.0 PRESENCE OF CARDIAC PACEMAKER: ICD-10-CM

## 2024-09-11 DIAGNOSIS — I10 ESSENTIAL (PRIMARY) HYPERTENSION: ICD-10-CM

## 2024-09-11 DIAGNOSIS — E66.9 OBESITY, UNSPECIFIED: ICD-10-CM

## 2024-09-11 DIAGNOSIS — Z79.4 TYPE 2 DIABETES MELLITUS WITH OTHER SPECIFIED COMPLICATION: ICD-10-CM

## 2024-09-11 DIAGNOSIS — E78.49 OTHER HYPERLIPIDEMIA: ICD-10-CM

## 2024-09-11 DIAGNOSIS — D72.829 ELEVATED WHITE BLOOD CELL COUNT, UNSPECIFIED: ICD-10-CM

## 2024-09-11 PROCEDURE — 99214 OFFICE O/P EST MOD 30 MIN: CPT

## 2024-09-11 PROCEDURE — G2211 COMPLEX E/M VISIT ADD ON: CPT | Mod: NC

## 2024-09-11 PROCEDURE — 36415 COLL VENOUS BLD VENIPUNCTURE: CPT

## 2024-09-11 NOTE — PLAN
[FreeTextEntry1] : Blood was collected in the office. elev microalb- discussed farxiga w pt- he doesn't want to chg meds. avoid NSAID pt reminded about irene MRCP

## 2024-09-11 NOTE — HISTORY OF PRESENT ILLNESS
[FreeTextEntry1] : HTN [de-identified] : reviewed 8/20/24 labs- elev WBC 11.94,  U microalb 224. 8/1/24 A1c 7.8. - pt denies any cough,abd pain, n, v, d, dysuria, rash, SOB, ear pain, sinus infection, dental infection or other evidence of infection, fever pt has not been on steroid.  HTN- ran out meds-amlodipine,   reviewed 8/19/24 Cardio- rec Nuclear stress test.  ran out Mg sev mo ago Pacemaker DM- ref to nutritionist.  compliant w diet.  fasting  chol exercise- walking daily using walker.  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  CVA- admitted 6/10/24 [facility]- fever , complete HB.  next day dev aphasia- infarct R periventricular white matter has pacemaker seen 7/16/24 Neuro notes.  reviewed 6/10/24 CT angio- no  flow limiting stenosis or aneurysm reviewed 6/11/24 MRI brain- acute/ subacute infarct-R parietal  periventicular white matter and R occipital .  mod chronic microvascular chg- chronic L cerebellar infarct.  Pacemaker possible common bile duct stone on 6/12/24 US abd- rec MRCP reviewed 8/15/24 art doppler- no sign stenosis or occlusion

## 2024-09-11 NOTE — PHYSICAL EXAM
[Right Foot Was Examined] : Right foot ~C was examined [Left Foot Was Examined] : left foot ~C was examined [] : normal [de-identified] : area amputation stump is clean- no erythema or swelling [TextEntry] : Constitutional: no acute distress, well nourished, well developed and well-appearing. Pulmonary: no respiratory distress, lungs were clear to auscultation bilaterally, no accessory muscle use. Cardiac: normal rate, with a regular rhythm, normal S1 and S2 and no murmur heard.  Vascular: there was no peripheral edema. Abdomen: abdomen soft, non-tender, non-distended, no abdominal mass palpated and normal bowel sounds. Psychiatric: the affect was normal and insight and judgement were intact

## 2024-09-13 ENCOUNTER — NON-APPOINTMENT (OUTPATIENT)
Age: 64
End: 2024-09-13

## 2024-09-14 LAB
BASOPHILS # BLD AUTO: 0.05 K/UL
BASOPHILS NFR BLD AUTO: 0.3 %
EOSINOPHIL # BLD AUTO: 0.21 K/UL
EOSINOPHIL NFR BLD AUTO: 1.5 %
HCT VFR BLD CALC: 48.4 %
HGB BLD-MCNC: 15 G/DL
IMM GRANULOCYTES NFR BLD AUTO: 0.4 %
LYMPHOCYTES # BLD AUTO: 1.96 K/UL
LYMPHOCYTES NFR BLD AUTO: 13.6 %
MAGNESIUM SERPL-MCNC: 1.7 MG/DL
MAN DIFF?: NORMAL
MCHC RBC-ENTMCNC: 26.8 PG
MCHC RBC-ENTMCNC: 31 GM/DL
MCV RBC AUTO: 86.4 FL
MONOCYTES # BLD AUTO: 0.86 K/UL
MONOCYTES NFR BLD AUTO: 6 %
NEUTROPHILS # BLD AUTO: 11.3 K/UL
NEUTROPHILS NFR BLD AUTO: 78.2 %
PLATELET # BLD AUTO: 280 K/UL
RBC # BLD: 5.6 M/UL
RBC # FLD: 15.7 %
WBC # FLD AUTO: 14.44 K/UL

## 2024-09-16 ENCOUNTER — OUTPATIENT (OUTPATIENT)
Dept: OUTPATIENT SERVICES | Facility: HOSPITAL | Age: 64
LOS: 1 days | Discharge: HOME | End: 2024-09-16

## 2024-09-16 DIAGNOSIS — Z89.429 ACQUIRED ABSENCE OF OTHER TOE(S), UNSPECIFIED SIDE: Chronic | ICD-10-CM

## 2024-09-16 DIAGNOSIS — Z98.89 OTHER SPECIFIED POSTPROCEDURAL STATES: Chronic | ICD-10-CM

## 2024-09-16 DIAGNOSIS — D72.829 ELEVATED WHITE BLOOD CELL COUNT, UNSPECIFIED: ICD-10-CM

## 2024-09-16 NOTE — H&P ADULT - NSHPLABSRESULTS_GEN_ALL_CORE
Unit RN to OR RN
T(C): 36.5 (15 Dec 2023 12:32), Max: 37.1 (14 Dec 2023 17:26)  T(F): 97.7 (15 Dec 2023 12:32), Max: 98.8 (14 Dec 2023 17:26)  HR: 91 (15 Dec 2023 12:32) (84 - 91)  BP: 112/71 (15 Dec 2023 12:32) (112/71 - 161/71)  RR: 18 (15 Dec 2023 12:32) (18 - 18)  SpO2: 93% (15 Dec 2023 12:32) (93% - 95%)                          9.3    10.78 )-----------( 283      ( 15 Dec 2023 09:27 )             29.6     12-15    136  |  101  |  11  ----------------------------<  121<H>  4.4   |  28  |  0.73    Ca    8.7      15 Dec 2023 09:26  Phos  2.6     12-15  Mg     1.9     12-15    Urinalysis Basic - ( 15 Dec 2023 09:26 )    Color: x / Appearance: x / SG: x / pH: x  Gluc: 121 mg/dL / Ketone: x  / Bili: x / Urobili: x   Blood: x / Protein: x / Nitrite: x   Leuk Esterase: x / RBC: x / WBC x   Sq Epi: x / Non Sq Epi: x / Bacteria: x    CAPILLARY BLOOD GLUCOSE    POCT Blood Glucose.: 146 mg/dL (15 Dec 2023 13:43)  POCT Blood Glucose.: 112 mg/dL (15 Dec 2023 09:53)  POCT Blood Glucose.: 212 mg/dL (14 Dec 2023 22:02)  POCT Blood Glucose.: 182 mg/dL (14 Dec 2023 17:30)      < from: Xray Foot AP + Lateral + Oblique, Left (11.29.23 @ 18:20) > Pre surgical.  IMPRESSION:  Multiple foci of subcutaneous emphysema involving the soft tissues of left foot, concerning for necrotizing soft tissue infection.    < from: CT Lower Extremity w/ IV Cont, Left (11.29.23 @ 21:05) >  Pre surgical.   IMPRESSION:  Soft tissue ulcerations seen along the plantar aspect of the first toe,   posterior aspect of the heel, and possibly along the lateral aspect of   the forefoot. Associated soft tissue gas tracking within the lateral soft   tissues along the fifth metatarsal, within the plantar soft tissues of   the midfoot/hindfoot, posterior aspect of the heel, and traveling   proximally along the full length of the Achilles tendon, which may   represent a gas-forming/necrotizing infection.  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  Status post amputation of the fifth ray to the level of the fifth   metatarsal neck. Amputation margin appears fairly well-corticated.   However, there is soft tissue gas abutting the lateral margin of the   fifth metatarsal. Underlying osteomyelitis of the fifth metatarsal cannot   be excluded and is within the differential.  Nonspecific subcutaneous edema about the visualized lower extremity,   which may be related to cellulitis andlower extremity edema. No   drainable fluid collection within the soft tissues.  Chronic osteochondral lesion of the lateral talar dome.
T(C): 36.5 (15 Dec 2023 12:32), Max: 37.1 (14 Dec 2023 17:26)  T(F): 97.7 (15 Dec 2023 12:32), Max: 98.8 (14 Dec 2023 17:26)  HR: 91 (15 Dec 2023 12:32) (84 - 91)  BP: 112/71 (15 Dec 2023 12:32) (112/71 - 161/71)  RR: 18 (15 Dec 2023 12:32) (18 - 18)  SpO2: 93% (15 Dec 2023 12:32) (93% - 95%)                          9.3    10.78 )-----------( 283      ( 15 Dec 2023 09:27 )             29.6     12-15    136  |  101  |  11  ----------------------------<  121<H>  4.4   |  28  |  0.73    Ca    8.7      15 Dec 2023 09:26  Phos  2.6     12-15  Mg     1.9     12-15    Urinalysis Basic - ( 15 Dec 2023 09:26 )    Color: x / Appearance: x / SG: x / pH: x  Gluc: 121 mg/dL / Ketone: x  / Bili: x / Urobili: x   Blood: x / Protein: x / Nitrite: x   Leuk Esterase: x / RBC: x / WBC x   Sq Epi: x / Non Sq Epi: x / Bacteria: x    CAPILLARY BLOOD GLUCOSE    POCT Blood Glucose.: 146 mg/dL (15 Dec 2023 13:43)  POCT Blood Glucose.: 112 mg/dL (15 Dec 2023 09:53)  POCT Blood Glucose.: 212 mg/dL (14 Dec 2023 22:02)  POCT Blood Glucose.: 182 mg/dL (14 Dec 2023 17:30)      < from: Xray Foot AP + Lateral + Oblique, Left (11.29.23 @ 18:20) > Pre surgical.  IMPRESSION:  Multiple foci of subcutaneous emphysema involving the soft tissues of left foot, concerning for necrotizing soft tissue infection.    < from: CT Lower Extremity w/ IV Cont, Left (11.29.23 @ 21:05) >  Pre surgical.   IMPRESSION:  Soft tissue ulcerations seen along the plantar aspect of the first toe,   posterior aspect of the heel, and possibly along the lateral aspect of   the forefoot. Associated soft tissue gas tracking within the lateral soft   tissues along the fifth metatarsal, within the plantar soft tissues of   the midfoot/hindfoot, posterior aspect of the heel, and traveling   proximally along the full length of the Achilles tendon, which may   represent a gas-forming/necrotizing infection.  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  Status post amputation of the fifth ray to the level of the fifth   metatarsal neck. Amputation margin appears fairly well-corticated.   However, there is soft tissue gas abutting the lateral margin of the   fifth metatarsal. Underlying osteomyelitis of the fifth metatarsal cannot   be excluded and is within the differential.  Nonspecific subcutaneous edema about the visualized lower extremity,   which may be related to cellulitis andlower extremity edema. No   drainable fluid collection within the soft tissues.  Chronic osteochondral lesion of the lateral talar dome.

## 2024-09-25 ENCOUNTER — RESULT REVIEW (OUTPATIENT)
Age: 64
End: 2024-09-25

## 2024-09-25 ENCOUNTER — APPOINTMENT (OUTPATIENT)
Dept: HEMATOLOGY ONCOLOGY | Facility: CLINIC | Age: 64
End: 2024-09-25
Payer: COMMERCIAL

## 2024-09-25 VITALS
OXYGEN SATURATION: 96 % | HEIGHT: 69 IN | WEIGHT: 220 LBS | RESPIRATION RATE: 16 BRPM | TEMPERATURE: 97.3 F | DIASTOLIC BLOOD PRESSURE: 75 MMHG | BODY MASS INDEX: 32.58 KG/M2 | SYSTOLIC BLOOD PRESSURE: 122 MMHG | HEART RATE: 88 BPM

## 2024-09-25 DIAGNOSIS — D72.829 ELEVATED WHITE BLOOD CELL COUNT, UNSPECIFIED: ICD-10-CM

## 2024-09-25 LAB
BASOPHILS # BLD AUTO: 0.05 K/UL — SIGNIFICANT CHANGE UP (ref 0–0.2)
BASOPHILS NFR BLD AUTO: 0.4 % — SIGNIFICANT CHANGE UP (ref 0–2)
CRP SERPL-MCNC: <3 MG/L
EOSINOPHIL # BLD AUTO: 0.32 K/UL — SIGNIFICANT CHANGE UP (ref 0–0.5)
EOSINOPHIL NFR BLD AUTO: 2.8 % — SIGNIFICANT CHANGE UP (ref 0–6)
HCT VFR BLD CALC: 44.3 % — SIGNIFICANT CHANGE UP (ref 39–50)
HGB BLD-MCNC: 14.1 G/DL — SIGNIFICANT CHANGE UP (ref 13–17)
IMM GRANULOCYTES NFR BLD AUTO: 0.3 % — SIGNIFICANT CHANGE UP (ref 0–0.9)
LYMPHOCYTES # BLD AUTO: 1.79 K/UL — SIGNIFICANT CHANGE UP (ref 1–3.3)
LYMPHOCYTES # BLD AUTO: 15.8 % — SIGNIFICANT CHANGE UP (ref 13–44)
MCHC RBC-ENTMCNC: 27.1 PG — SIGNIFICANT CHANGE UP (ref 27–34)
MCHC RBC-ENTMCNC: 31.8 G/DL — LOW (ref 32–36)
MCV RBC AUTO: 85.2 FL — SIGNIFICANT CHANGE UP (ref 80–100)
MONOCYTES # BLD AUTO: 0.79 K/UL — SIGNIFICANT CHANGE UP (ref 0–0.9)
MONOCYTES NFR BLD AUTO: 7 % — SIGNIFICANT CHANGE UP (ref 2–14)
NEUTROPHILS # BLD AUTO: 8.32 K/UL — HIGH (ref 1.8–7.4)
NEUTROPHILS NFR BLD AUTO: 73.7 % — SIGNIFICANT CHANGE UP (ref 43–77)
NRBC # BLD: 0 /100 WBCS — SIGNIFICANT CHANGE UP (ref 0–0)
NRBC BLD-RTO: 0 /100 WBCS — SIGNIFICANT CHANGE UP (ref 0–0)
PLATELET # BLD AUTO: 245 K/UL — SIGNIFICANT CHANGE UP (ref 150–400)
RBC # BLD: 5.2 M/UL — SIGNIFICANT CHANGE UP (ref 4.2–5.8)
RBC # FLD: 14.4 % — SIGNIFICANT CHANGE UP (ref 10.3–14.5)
WBC # BLD: 11.3 K/UL — HIGH (ref 3.8–10.5)
WBC # FLD AUTO: 11.3 K/UL — HIGH (ref 3.8–10.5)

## 2024-09-25 PROCEDURE — 99205 OFFICE O/P NEW HI 60 MIN: CPT

## 2024-09-25 NOTE — HISTORY OF PRESENT ILLNESS
[de-identified] : ELVIS VILLATORO is a 64 year old man with PMH of HTN, DM, CVA (6/2024), BKA, who presents for Hematology evaluation of leukocytosis.  He has a history of intermittent mild neutrophil-predominant leukocytosis (peak WBC 22) dating back to 12/2023 per our records. His most recent CBC on 9/11/24 shows WBC 14.44, with a preserved Hgb and platelet count. 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 Denies any constitutional symptoms.   Family History: - Mother: Acute leukemia - Father: Heart disease, HTN   Social History: -  - Denies tobacco, alcohol, or drug use.

## 2024-09-25 NOTE — PHYSICAL EXAM
[Ambulatory and capable of all self care but unable to carry out any work activities] : Status 2- Ambulatory and capable of all self care but unable to carry out any work activities. Up and about more than 50% of waking hours [Obese] : obese [Normal] : normoactive bowel sounds, soft and nontender, no hepatosplenomegaly or masses appreciated [de-identified] : + Left Below-knee amputation with prosthesis

## 2024-09-25 NOTE — PHYSICAL EXAM
[Ambulatory and capable of all self care but unable to carry out any work activities] : Status 2- Ambulatory and capable of all self care but unable to carry out any work activities. Up and about more than 50% of waking hours [Obese] : obese [Normal] : normoactive bowel sounds, soft and nontender, no hepatosplenomegaly or masses appreciated [de-identified] : + Left Below-knee amputation with prosthesis

## 2024-09-25 NOTE — REASON FOR VISIT
[Initial Consultation] : an initial consultation for [Leukocytosis] : leukocytosis [Spouse] : spouse [Family Member] : family member

## 2024-09-25 NOTE — HISTORY OF PRESENT ILLNESS
[de-identified] : ELVIS VILLATORO is a 64 year old man with PMH of HTN, DM, CVA (6/2024), BKA, who presents for Hematology evaluation of leukocytosis.  He has a history of intermittent mild neutrophil-predominant leukocytosis (peak WBC 22) dating back to 12/2023 per our records. His most recent CBC on 9/11/24 shows WBC 14.44, with a preserved Hgb and platelet count. 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 Denies any constitutional symptoms.   Family History: - Mother: Acute leukemia - Father: Heart disease, HTN   Social History: -  - Denies tobacco, alcohol, or drug use.

## 2024-09-25 NOTE — ASSESSMENT
[FreeTextEntry1] : ELVIS VILLATORO is a 64 year old man with PMH of HTN, DM, CVA (6/2024), BKA, who presents for Hematology evaluation of leukocytosis.  # Leukocytosis: He has a history of intermittent mild neutrophil-predominant leukocytosis (peak WBC 22) dating back to 12/2023 per our records. His most recent CBC on 9/11/24 shows WBC 14.44, with a preserved Hgb and platelet count. Suspect this is a reactive process related to chronic inflammation and/or obesity, but we will also send a flow cytometry to evaluate for an underlying hematologic disorder. - Labs: CBC, ESR, CRP, flow cytometry - Pending lab results, will arrange follow up as clinically indicated

## 2024-09-26 LAB — ERYTHROCYTE [SEDIMENTATION RATE] IN BLOOD BY WESTERGREN METHOD: 20 MM/HR

## 2024-09-30 ENCOUNTER — NON-APPOINTMENT (OUTPATIENT)
Age: 64
End: 2024-09-30

## 2024-10-07 ENCOUNTER — APPOINTMENT (OUTPATIENT)
Dept: CV DIAGNOSTICS | Facility: HOSPITAL | Age: 64
End: 2024-10-07

## 2024-10-16 ENCOUNTER — OUTPATIENT (OUTPATIENT)
Dept: OUTPATIENT SERVICES | Facility: HOSPITAL | Age: 64
LOS: 1 days | End: 2024-10-16
Payer: COMMERCIAL

## 2024-10-16 ENCOUNTER — NON-APPOINTMENT (OUTPATIENT)
Age: 64
End: 2024-10-16

## 2024-10-16 ENCOUNTER — RESULT REVIEW (OUTPATIENT)
Age: 64
End: 2024-10-16

## 2024-10-16 ENCOUNTER — APPOINTMENT (OUTPATIENT)
Dept: CV DIAGNOSTICS | Facility: HOSPITAL | Age: 64
End: 2024-10-16

## 2024-10-16 DIAGNOSIS — Z95.0 PRESENCE OF CARDIAC PACEMAKER: ICD-10-CM

## 2024-10-16 DIAGNOSIS — Z89.429 ACQUIRED ABSENCE OF OTHER TOE(S), UNSPECIFIED SIDE: Chronic | ICD-10-CM

## 2024-10-16 DIAGNOSIS — Z98.89 OTHER SPECIFIED POSTPROCEDURAL STATES: Chronic | ICD-10-CM

## 2024-10-16 PROCEDURE — 93018 CV STRESS TEST I&R ONLY: CPT | Mod: MC

## 2024-10-16 PROCEDURE — 93016 CV STRESS TEST SUPVJ ONLY: CPT | Mod: MC

## 2024-10-16 PROCEDURE — 78452 HT MUSCLE IMAGE SPECT MULT: CPT | Mod: 26,MC

## 2024-10-16 PROCEDURE — 78452 HT MUSCLE IMAGE SPECT MULT: CPT | Mod: MC

## 2024-10-16 PROCEDURE — A9500: CPT

## 2024-10-16 PROCEDURE — 93017 CV STRESS TEST TRACING ONLY: CPT

## 2024-10-22 ENCOUNTER — TRANSCRIPTION ENCOUNTER (OUTPATIENT)
Age: 64
End: 2024-10-22

## 2024-11-06 ENCOUNTER — NON-APPOINTMENT (OUTPATIENT)
Age: 64
End: 2024-11-06

## 2024-11-06 ENCOUNTER — APPOINTMENT (OUTPATIENT)
Dept: CARDIOLOGY | Facility: CLINIC | Age: 64
End: 2024-11-06
Payer: COMMERCIAL

## 2024-11-06 VITALS
WEIGHT: 220 LBS | OXYGEN SATURATION: 94 % | HEIGHT: 69 IN | HEART RATE: 93 BPM | SYSTOLIC BLOOD PRESSURE: 126 MMHG | DIASTOLIC BLOOD PRESSURE: 70 MMHG | BODY MASS INDEX: 32.58 KG/M2

## 2024-11-06 DIAGNOSIS — I44.2 ATRIOVENTRICULAR BLOCK, COMPLETE: ICD-10-CM

## 2024-11-06 DIAGNOSIS — I63.9 CEREBRAL INFARCTION, UNSPECIFIED: ICD-10-CM

## 2024-11-06 DIAGNOSIS — Z95.0 PRESENCE OF CARDIAC PACEMAKER: ICD-10-CM

## 2024-11-06 DIAGNOSIS — I25.10 ATHEROSCLEROTIC HEART DISEASE OF NATIVE CORONARY ARTERY W/OUT ANGINA PECTORIS: ICD-10-CM

## 2024-11-06 PROCEDURE — 99215 OFFICE O/P EST HI 40 MIN: CPT | Mod: 25

## 2024-11-06 PROCEDURE — 93000 ELECTROCARDIOGRAM COMPLETE: CPT

## 2024-11-07 ENCOUNTER — NON-APPOINTMENT (OUTPATIENT)
Age: 64
End: 2024-11-07

## 2024-11-07 ENCOUNTER — APPOINTMENT (OUTPATIENT)
Dept: ELECTROPHYSIOLOGY | Facility: CLINIC | Age: 64
End: 2024-11-07

## 2024-11-07 VITALS
OXYGEN SATURATION: 95 % | HEART RATE: 92 BPM | SYSTOLIC BLOOD PRESSURE: 123 MMHG | HEIGHT: 69 IN | DIASTOLIC BLOOD PRESSURE: 75 MMHG | WEIGHT: 220 LBS | BODY MASS INDEX: 32.58 KG/M2

## 2024-11-07 PROCEDURE — 93000 ELECTROCARDIOGRAM COMPLETE: CPT | Mod: 59

## 2024-11-07 PROCEDURE — 93280 PM DEVICE PROGR EVAL DUAL: CPT

## 2024-12-03 ENCOUNTER — NON-APPOINTMENT (OUTPATIENT)
Age: 64
End: 2024-12-03

## 2024-12-16 ENCOUNTER — APPOINTMENT (OUTPATIENT)
Dept: INTERNAL MEDICINE | Facility: CLINIC | Age: 64
End: 2024-12-16

## 2024-12-16 VITALS
SYSTOLIC BLOOD PRESSURE: 142 MMHG | OXYGEN SATURATION: 99 % | HEART RATE: 95 BPM | HEIGHT: 69 IN | WEIGHT: 220 LBS | DIASTOLIC BLOOD PRESSURE: 60 MMHG | BODY MASS INDEX: 32.58 KG/M2

## 2024-12-16 VITALS — SYSTOLIC BLOOD PRESSURE: 114 MMHG | DIASTOLIC BLOOD PRESSURE: 60 MMHG

## 2024-12-16 DIAGNOSIS — E11.69 TYPE 2 DIABETES MELLITUS WITH OTHER SPECIFIED COMPLICATION: ICD-10-CM

## 2024-12-16 DIAGNOSIS — I25.10 ATHEROSCLEROTIC HEART DISEASE OF NATIVE CORONARY ARTERY W/OUT ANGINA PECTORIS: ICD-10-CM

## 2024-12-16 DIAGNOSIS — I10 ESSENTIAL (PRIMARY) HYPERTENSION: ICD-10-CM

## 2024-12-16 DIAGNOSIS — D72.829 ELEVATED WHITE BLOOD CELL COUNT, UNSPECIFIED: ICD-10-CM

## 2024-12-16 DIAGNOSIS — Z23 ENCOUNTER FOR IMMUNIZATION: ICD-10-CM

## 2024-12-16 DIAGNOSIS — E78.49 OTHER HYPERLIPIDEMIA: ICD-10-CM

## 2024-12-16 DIAGNOSIS — Z79.4 TYPE 2 DIABETES MELLITUS WITH OTHER SPECIFIED COMPLICATION: ICD-10-CM

## 2024-12-16 DIAGNOSIS — K80.50 CALCULUS OF BILE DUCT W/OUT CHOLANGITIS OR CHOLECYSTITIS W/OUT OBSTRUCTION: ICD-10-CM

## 2024-12-16 PROCEDURE — 90656 IIV3 VACC NO PRSV 0.5 ML IM: CPT

## 2024-12-16 PROCEDURE — 99214 OFFICE O/P EST MOD 30 MIN: CPT | Mod: 25

## 2024-12-16 PROCEDURE — 36415 COLL VENOUS BLD VENIPUNCTURE: CPT

## 2024-12-16 PROCEDURE — G0008: CPT

## 2024-12-17 LAB
ALBUMIN SERPL ELPH-MCNC: 4.2 G/DL
ALP BLD-CCNC: 66 U/L
ALT SERPL-CCNC: 15 U/L
ANION GAP SERPL CALC-SCNC: 16 MMOL/L
AST SERPL-CCNC: 17 U/L
BASOPHILS # BLD AUTO: 0.05 K/UL
BASOPHILS NFR BLD AUTO: 0.5 %
BILIRUB SERPL-MCNC: <0.2 MG/DL
BUN SERPL-MCNC: 19 MG/DL
CALCIUM SERPL-MCNC: 10 MG/DL
CHLORIDE SERPL-SCNC: 99 MMOL/L
CHOLEST SERPL-MCNC: 141 MG/DL
CO2 SERPL-SCNC: 23 MMOL/L
CREAT SERPL-MCNC: 0.89 MG/DL
EGFR: 96 ML/MIN/1.73M2
EOSINOPHIL # BLD AUTO: 0.32 K/UL
EOSINOPHIL NFR BLD AUTO: 3 %
ESTIMATED AVERAGE GLUCOSE: 166 MG/DL
GLUCOSE SERPL-MCNC: 181 MG/DL
HBA1C MFR BLD HPLC: 7.4 %
HCT VFR BLD CALC: 41.4 %
HDLC SERPL-MCNC: 44 MG/DL
HGB BLD-MCNC: 12.9 G/DL
IMM GRANULOCYTES NFR BLD AUTO: 0.3 %
LDLC SERPL CALC-MCNC: 73 MG/DL
LYMPHOCYTES # BLD AUTO: 2 K/UL
LYMPHOCYTES NFR BLD AUTO: 18.6 %
MAN DIFF?: NORMAL
MCHC RBC-ENTMCNC: 27.3 PG
MCHC RBC-ENTMCNC: 31.2 G/DL
MCV RBC AUTO: 87.7 FL
MONOCYTES # BLD AUTO: 0.83 K/UL
MONOCYTES NFR BLD AUTO: 7.7 %
NEUTROPHILS # BLD AUTO: 7.5 K/UL
NEUTROPHILS NFR BLD AUTO: 69.9 %
NONHDLC SERPL-MCNC: 97 MG/DL
PLATELET # BLD AUTO: 236 K/UL
POTASSIUM SERPL-SCNC: 5.2 MMOL/L
PROT SERPL-MCNC: 7.5 G/DL
RBC # BLD: 4.72 M/UL
RBC # FLD: 13.1 %
SODIUM SERPL-SCNC: 137 MMOL/L
TRIGL SERPL-MCNC: 134 MG/DL
WBC # FLD AUTO: 10.73 K/UL

## 2024-12-20 NOTE — PRE-OP CHECKLIST - TEMPERATURE IN CELSIUS (DEGREES C)
Form received at [facility] on 12/20/2024.   Please note that it takes 7-10 business days for completion.  Auth emailed to patient to e-mail address on file.     1 blank page deleted from form.   37.2

## 2025-02-07 ENCOUNTER — NON-APPOINTMENT (OUTPATIENT)
Age: 65
End: 2025-02-07

## 2025-02-07 ENCOUNTER — APPOINTMENT (OUTPATIENT)
Dept: ELECTROPHYSIOLOGY | Facility: CLINIC | Age: 65
End: 2025-02-07
Payer: COMMERCIAL

## 2025-02-07 PROCEDURE — 93296 REM INTERROG EVL PM/IDS: CPT

## 2025-02-07 PROCEDURE — 93294 REM INTERROG EVL PM/LDLS PM: CPT

## 2025-03-11 NOTE — ED ADULT TRIAGE NOTE - HISTORY OF COVID-19 VACCINATION
[Time Spent: ___ minutes] : I have spent [unfilled] minutes of time on the encounter which excludes teaching and separately reported services.
Vaccine status unknown

## 2025-03-12 ENCOUNTER — INPATIENT (INPATIENT)
Facility: HOSPITAL | Age: 65
LOS: 13 days | Discharge: NURSING, REHAB OR LONG-TERM CARE | DRG: 853 | End: 2025-03-26
Attending: STUDENT IN AN ORGANIZED HEALTH CARE EDUCATION/TRAINING PROGRAM | Admitting: STUDENT IN AN ORGANIZED HEALTH CARE EDUCATION/TRAINING PROGRAM
Payer: MEDICARE

## 2025-03-12 VITALS
TEMPERATURE: 98 F | RESPIRATION RATE: 20 BRPM | WEIGHT: 220.02 LBS | HEART RATE: 114 BPM | DIASTOLIC BLOOD PRESSURE: 77 MMHG | OXYGEN SATURATION: 96 % | SYSTOLIC BLOOD PRESSURE: 129 MMHG | HEIGHT: 69 IN

## 2025-03-12 DIAGNOSIS — N39.0 URINARY TRACT INFECTION, SITE NOT SPECIFIED: ICD-10-CM

## 2025-03-12 DIAGNOSIS — Z89.429 ACQUIRED ABSENCE OF OTHER TOE(S), UNSPECIFIED SIDE: Chronic | ICD-10-CM

## 2025-03-12 DIAGNOSIS — R65.10 SYSTEMIC INFLAMMATORY RESPONSE SYNDROME (SIRS) OF NON-INFECTIOUS ORIGIN WITHOUT ACUTE ORGAN DYSFUNCTION: ICD-10-CM

## 2025-03-12 DIAGNOSIS — N17.9 ACUTE KIDNEY FAILURE, UNSPECIFIED: ICD-10-CM

## 2025-03-12 DIAGNOSIS — Z98.89 OTHER SPECIFIED POSTPROCEDURAL STATES: Chronic | ICD-10-CM

## 2025-03-12 DIAGNOSIS — E11.9 TYPE 2 DIABETES MELLITUS WITHOUT COMPLICATIONS: ICD-10-CM

## 2025-03-12 DIAGNOSIS — R73.9 HYPERGLYCEMIA, UNSPECIFIED: ICD-10-CM

## 2025-03-12 DIAGNOSIS — I73.9 PERIPHERAL VASCULAR DISEASE, UNSPECIFIED: ICD-10-CM

## 2025-03-12 DIAGNOSIS — I10 ESSENTIAL (PRIMARY) HYPERTENSION: ICD-10-CM

## 2025-03-12 DIAGNOSIS — A41.9 SEPSIS, UNSPECIFIED ORGANISM: ICD-10-CM

## 2025-03-12 DIAGNOSIS — I63.9 CEREBRAL INFARCTION, UNSPECIFIED: ICD-10-CM

## 2025-03-12 DIAGNOSIS — E78.5 HYPERLIPIDEMIA, UNSPECIFIED: ICD-10-CM

## 2025-03-12 LAB
ALBUMIN SERPL ELPH-MCNC: 3.2 G/DL — LOW (ref 3.3–5)
ALBUMIN SERPL ELPH-MCNC: 3.5 G/DL — SIGNIFICANT CHANGE UP (ref 3.3–5)
ALP SERPL-CCNC: 76 U/L — SIGNIFICANT CHANGE UP (ref 40–120)
ALP SERPL-CCNC: 82 U/L — SIGNIFICANT CHANGE UP (ref 40–120)
ALT FLD-CCNC: 10 U/L — SIGNIFICANT CHANGE UP (ref 10–45)
ALT FLD-CCNC: 11 U/L — SIGNIFICANT CHANGE UP (ref 10–45)
ANION GAP SERPL CALC-SCNC: 15 MMOL/L — SIGNIFICANT CHANGE UP (ref 5–17)
ANION GAP SERPL CALC-SCNC: 19 MMOL/L — HIGH (ref 5–17)
APPEARANCE UR: CLEAR — SIGNIFICANT CHANGE UP
APPEARANCE UR: CLEAR — SIGNIFICANT CHANGE UP
APTT BLD: 26.1 SEC — SIGNIFICANT CHANGE UP (ref 24.5–35.6)
AST SERPL-CCNC: 12 U/L — SIGNIFICANT CHANGE UP (ref 10–40)
AST SERPL-CCNC: 15 U/L — SIGNIFICANT CHANGE UP (ref 10–40)
B-OH-BUTYR SERPL-SCNC: 0.2 MMOL/L — SIGNIFICANT CHANGE UP
BACTERIA # UR AUTO: ABNORMAL /HPF
BACTERIA # UR AUTO: ABNORMAL /HPF
BASOPHILS # BLD AUTO: 0 K/UL — SIGNIFICANT CHANGE UP (ref 0–0.2)
BASOPHILS # BLD AUTO: 0.03 K/UL — SIGNIFICANT CHANGE UP (ref 0–0.2)
BASOPHILS NFR BLD AUTO: 0 % — SIGNIFICANT CHANGE UP (ref 0–2)
BASOPHILS NFR BLD AUTO: 0.1 % — SIGNIFICANT CHANGE UP (ref 0–2)
BILIRUB SERPL-MCNC: 0.3 MG/DL — SIGNIFICANT CHANGE UP (ref 0.2–1.2)
BILIRUB SERPL-MCNC: 0.4 MG/DL — SIGNIFICANT CHANGE UP (ref 0.2–1.2)
BILIRUB UR-MCNC: NEGATIVE — SIGNIFICANT CHANGE UP
BILIRUB UR-MCNC: NEGATIVE — SIGNIFICANT CHANGE UP
BUN SERPL-MCNC: 31 MG/DL — HIGH (ref 7–23)
BUN SERPL-MCNC: 34 MG/DL — HIGH (ref 7–23)
CALCIUM SERPL-MCNC: 9.3 MG/DL — SIGNIFICANT CHANGE UP (ref 8.4–10.5)
CALCIUM SERPL-MCNC: 9.6 MG/DL — SIGNIFICANT CHANGE UP (ref 8.4–10.5)
CAST: 1 /LPF — SIGNIFICANT CHANGE UP (ref 0–4)
CAST: 2 /LPF — SIGNIFICANT CHANGE UP (ref 0–4)
CHLORIDE SERPL-SCNC: 90 MMOL/L — LOW (ref 96–108)
CHLORIDE SERPL-SCNC: 94 MMOL/L — LOW (ref 96–108)
CO2 SERPL-SCNC: 21 MMOL/L — LOW (ref 22–31)
CO2 SERPL-SCNC: 23 MMOL/L — SIGNIFICANT CHANGE UP (ref 22–31)
COLOR SPEC: YELLOW — SIGNIFICANT CHANGE UP
COLOR SPEC: YELLOW — SIGNIFICANT CHANGE UP
CREAT ?TM UR-MCNC: 82 MG/DL — SIGNIFICANT CHANGE UP
CREAT SERPL-MCNC: 1.72 MG/DL — HIGH (ref 0.5–1.3)
CREAT SERPL-MCNC: 1.9 MG/DL — HIGH (ref 0.5–1.3)
DIFF PNL FLD: NEGATIVE — SIGNIFICANT CHANGE UP
DIFF PNL FLD: NEGATIVE — SIGNIFICANT CHANGE UP
EGFR: 39 ML/MIN/1.73M2 — LOW
EGFR: 39 ML/MIN/1.73M2 — LOW
EGFR: 44 ML/MIN/1.73M2 — LOW
EGFR: 44 ML/MIN/1.73M2 — LOW
EOSINOPHIL # BLD AUTO: 0 K/UL — SIGNIFICANT CHANGE UP (ref 0–0.5)
EOSINOPHIL # BLD AUTO: 7.12 K/UL — HIGH (ref 0–0.5)
EOSINOPHIL NFR BLD AUTO: 0 % — SIGNIFICANT CHANGE UP (ref 0–6)
EOSINOPHIL NFR BLD AUTO: 35.2 % — HIGH (ref 0–6)
FLUAV AG NPH QL: SIGNIFICANT CHANGE UP
FLUBV AG NPH QL: SIGNIFICANT CHANGE UP
GAS PNL BLDV: SIGNIFICANT CHANGE UP
GAS PNL BLDV: SIGNIFICANT CHANGE UP
GIANT PLATELETS BLD QL SMEAR: PRESENT — SIGNIFICANT CHANGE UP
GLUCOSE BLDC GLUCOMTR-MCNC: 322 MG/DL — HIGH (ref 70–99)
GLUCOSE SERPL-MCNC: 380 MG/DL — HIGH (ref 70–99)
GLUCOSE SERPL-MCNC: 478 MG/DL — CRITICAL HIGH (ref 70–99)
GLUCOSE UR QL: 500 MG/DL
GLUCOSE UR QL: 500 MG/DL
HCT VFR BLD CALC: 40 % — SIGNIFICANT CHANGE UP (ref 39–50)
HCT VFR BLD CALC: 41.6 % — SIGNIFICANT CHANGE UP (ref 39–50)
HGB BLD-MCNC: 13.2 G/DL — SIGNIFICANT CHANGE UP (ref 13–17)
HGB BLD-MCNC: 13.4 G/DL — SIGNIFICANT CHANGE UP (ref 13–17)
IMM GRANULOCYTES NFR BLD AUTO: 0.3 % — SIGNIFICANT CHANGE UP (ref 0–0.9)
INR BLD: 1 RATIO — SIGNIFICANT CHANGE UP (ref 0.85–1.16)
KETONES UR-MCNC: NEGATIVE MG/DL — SIGNIFICANT CHANGE UP
KETONES UR-MCNC: NEGATIVE MG/DL — SIGNIFICANT CHANGE UP
LEUKOCYTE ESTERASE UR-ACNC: ABNORMAL
LEUKOCYTE ESTERASE UR-ACNC: ABNORMAL
LIDOCAIN IGE QN: 19 U/L — SIGNIFICANT CHANGE UP (ref 7–60)
LYMPHOCYTES # BLD AUTO: 0.97 K/UL — LOW (ref 1–3.3)
LYMPHOCYTES # BLD AUTO: 1.09 K/UL — SIGNIFICANT CHANGE UP (ref 1–3.3)
LYMPHOCYTES # BLD AUTO: 4.8 % — LOW (ref 13–44)
LYMPHOCYTES # BLD AUTO: 5.2 % — LOW (ref 13–44)
MAGNESIUM SERPL-MCNC: 1.7 MG/DL — SIGNIFICANT CHANGE UP (ref 1.6–2.6)
MANUAL SMEAR VERIFICATION: SIGNIFICANT CHANGE UP
MCHC RBC-ENTMCNC: 26.4 PG — LOW (ref 27–34)
MCHC RBC-ENTMCNC: 27.3 PG — SIGNIFICANT CHANGE UP (ref 27–34)
MCHC RBC-ENTMCNC: 32.2 G/DL — SIGNIFICANT CHANGE UP (ref 32–36)
MCHC RBC-ENTMCNC: 33 G/DL — SIGNIFICANT CHANGE UP (ref 32–36)
MCV RBC AUTO: 82.1 FL — SIGNIFICANT CHANGE UP (ref 80–100)
MCV RBC AUTO: 82.6 FL — SIGNIFICANT CHANGE UP (ref 80–100)
MONOCYTES # BLD AUTO: 1.47 K/UL — HIGH (ref 0–0.9)
MONOCYTES # BLD AUTO: 2 K/UL — HIGH (ref 0–0.9)
MONOCYTES NFR BLD AUTO: 7 % — SIGNIFICANT CHANGE UP (ref 2–14)
MONOCYTES NFR BLD AUTO: 9.9 % — SIGNIFICANT CHANGE UP (ref 2–14)
NEUTROPHILS # BLD AUTO: 10.02 K/UL — HIGH (ref 1.8–7.4)
NEUTROPHILS # BLD AUTO: 18.46 K/UL — HIGH (ref 1.8–7.4)
NEUTROPHILS NFR BLD AUTO: 49.7 % — SIGNIFICANT CHANGE UP (ref 43–77)
NEUTROPHILS NFR BLD AUTO: 87.8 % — HIGH (ref 43–77)
NITRITE UR-MCNC: NEGATIVE — SIGNIFICANT CHANGE UP
NITRITE UR-MCNC: NEGATIVE — SIGNIFICANT CHANGE UP
NRBC BLD AUTO-RTO: 0 /100 WBCS — SIGNIFICANT CHANGE UP (ref 0–0)
OSMOLALITY UR: 622 MOS/KG — SIGNIFICANT CHANGE UP (ref 300–900)
PH UR: >=9 (ref 5–8)
PH UR: >=9 (ref 5–8)
PLAT MORPH BLD: ABNORMAL
PLATELET # BLD AUTO: 192 K/UL — SIGNIFICANT CHANGE UP (ref 150–400)
PLATELET # BLD AUTO: 211 K/UL — SIGNIFICANT CHANGE UP (ref 150–400)
POTASSIUM SERPL-MCNC: 4.9 MMOL/L — SIGNIFICANT CHANGE UP (ref 3.5–5.3)
POTASSIUM SERPL-MCNC: 5 MMOL/L — SIGNIFICANT CHANGE UP (ref 3.5–5.3)
POTASSIUM SERPL-SCNC: 4.9 MMOL/L — SIGNIFICANT CHANGE UP (ref 3.5–5.3)
POTASSIUM SERPL-SCNC: 5 MMOL/L — SIGNIFICANT CHANGE UP (ref 3.5–5.3)
POTASSIUM UR-SCNC: 28 MMOL/L — SIGNIFICANT CHANGE UP
PROT ?TM UR-MCNC: 40 MG/DL — HIGH (ref 0–12)
PROT SERPL-MCNC: 7 G/DL — SIGNIFICANT CHANGE UP (ref 6–8.3)
PROT SERPL-MCNC: 7.4 G/DL — SIGNIFICANT CHANGE UP (ref 6–8.3)
PROT UR-MCNC: 30 MG/DL
PROT UR-MCNC: 30 MG/DL
PROT/CREAT UR-RTO: 0.5 RATIO — HIGH (ref 0–0.2)
PROTHROM AB SERPL-ACNC: 11.4 SEC — SIGNIFICANT CHANGE UP (ref 9.9–13.4)
RBC # BLD: 4.84 M/UL — SIGNIFICANT CHANGE UP (ref 4.2–5.8)
RBC # BLD: 5.07 M/UL — SIGNIFICANT CHANGE UP (ref 4.2–5.8)
RBC # FLD: 14 % — SIGNIFICANT CHANGE UP (ref 10.3–14.5)
RBC # FLD: 14.2 % — SIGNIFICANT CHANGE UP (ref 10.3–14.5)
RBC BLD AUTO: SIGNIFICANT CHANGE UP
RBC CASTS # UR COMP ASSIST: 0 /HPF — SIGNIFICANT CHANGE UP (ref 0–4)
RBC CASTS # UR COMP ASSIST: 0 /HPF — SIGNIFICANT CHANGE UP (ref 0–4)
RSV RNA NPH QL NAA+NON-PROBE: SIGNIFICANT CHANGE UP
SARS-COV-2 RNA SPEC QL NAA+PROBE: SIGNIFICANT CHANGE UP
SODIUM SERPL-SCNC: 130 MMOL/L — LOW (ref 135–145)
SODIUM SERPL-SCNC: 132 MMOL/L — LOW (ref 135–145)
SODIUM UR-SCNC: 22 MMOL/L — SIGNIFICANT CHANGE UP
SP GR SPEC: 1.03 — SIGNIFICANT CHANGE UP (ref 1–1.03)
SP GR SPEC: 1.03 — SIGNIFICANT CHANGE UP (ref 1–1.03)
SQUAMOUS # UR AUTO: 0 /HPF — SIGNIFICANT CHANGE UP (ref 0–5)
SQUAMOUS # UR AUTO: 0 /HPF — SIGNIFICANT CHANGE UP (ref 0–5)
UROBILINOGEN FLD QL: 0.2 MG/DL — SIGNIFICANT CHANGE UP (ref 0.2–1)
UROBILINOGEN FLD QL: 0.2 MG/DL — SIGNIFICANT CHANGE UP (ref 0.2–1)
WBC # BLD: 20.21 K/UL — HIGH (ref 3.8–10.5)
WBC # BLD: 21.03 K/UL — HIGH (ref 3.8–10.5)
WBC # FLD AUTO: 20.21 K/UL — HIGH (ref 3.8–10.5)
WBC # FLD AUTO: 21.03 K/UL — HIGH (ref 3.8–10.5)
WBC UR QL: 12 /HPF — HIGH (ref 0–5)
WBC UR QL: 31 /HPF — HIGH (ref 0–5)

## 2025-03-12 PROCEDURE — 99285 EMERGENCY DEPT VISIT HI MDM: CPT | Mod: FS

## 2025-03-12 PROCEDURE — 99223 1ST HOSP IP/OBS HIGH 75: CPT

## 2025-03-12 PROCEDURE — 71045 X-RAY EXAM CHEST 1 VIEW: CPT | Mod: 26

## 2025-03-12 RX ORDER — MELATONIN 5 MG
3 TABLET ORAL AT BEDTIME
Refills: 0 | Status: DISCONTINUED | OUTPATIENT
Start: 2025-03-12 | End: 2025-03-25

## 2025-03-12 RX ORDER — INSULIN GLARGINE-YFGN 100 [IU]/ML
25 INJECTION, SOLUTION SUBCUTANEOUS AT BEDTIME
Refills: 0 | Status: DISCONTINUED | OUTPATIENT
Start: 2025-03-12 | End: 2025-03-13

## 2025-03-12 RX ORDER — ASPIRIN 325 MG
81 TABLET ORAL DAILY
Refills: 0 | Status: DISCONTINUED | OUTPATIENT
Start: 2025-03-12 | End: 2025-03-26

## 2025-03-12 RX ORDER — SODIUM CHLORIDE 9 G/1000ML
1000 INJECTION, SOLUTION INTRAVENOUS ONCE
Refills: 0 | Status: COMPLETED | OUTPATIENT
Start: 2025-03-12 | End: 2025-03-12

## 2025-03-12 RX ORDER — INSULIN LISPRO 100 U/ML
INJECTION, SOLUTION INTRAVENOUS; SUBCUTANEOUS
Refills: 0 | Status: DISCONTINUED | OUTPATIENT
Start: 2025-03-12 | End: 2025-03-13

## 2025-03-12 RX ORDER — DEXTROSE 50 % IN WATER 50 %
12.5 SYRINGE (ML) INTRAVENOUS ONCE
Refills: 0 | Status: DISCONTINUED | OUTPATIENT
Start: 2025-03-12 | End: 2025-03-26

## 2025-03-12 RX ORDER — PIPERACILLIN-TAZO-DEXTROSE,ISO 3.375G/5
3.38 IV SOLUTION, PIGGYBACK PREMIX FROZEN(ML) INTRAVENOUS ONCE
Refills: 0 | Status: COMPLETED | OUTPATIENT
Start: 2025-03-13 | End: 2025-03-12

## 2025-03-12 RX ORDER — SODIUM CHLORIDE 9 G/1000ML
1000 INJECTION, SOLUTION INTRAVENOUS
Refills: 0 | Status: DISCONTINUED | OUTPATIENT
Start: 2025-03-12 | End: 2025-03-26

## 2025-03-12 RX ORDER — ONDANSETRON HCL/PF 4 MG/2 ML
4 VIAL (ML) INJECTION EVERY 8 HOURS
Refills: 0 | Status: DISCONTINUED | OUTPATIENT
Start: 2025-03-12 | End: 2025-03-26

## 2025-03-12 RX ORDER — GLUCAGON 3 MG/1
1 POWDER NASAL ONCE
Refills: 0 | Status: DISCONTINUED | OUTPATIENT
Start: 2025-03-12 | End: 2025-03-26

## 2025-03-12 RX ORDER — DEXTROSE 50 % IN WATER 50 %
15 SYRINGE (ML) INTRAVENOUS ONCE
Refills: 0 | Status: DISCONTINUED | OUTPATIENT
Start: 2025-03-12 | End: 2025-03-26

## 2025-03-12 RX ORDER — PIPERACILLIN-TAZO-DEXTROSE,ISO 3.375G/5
3.38 IV SOLUTION, PIGGYBACK PREMIX FROZEN(ML) INTRAVENOUS ONCE
Refills: 0 | Status: COMPLETED | OUTPATIENT
Start: 2025-03-12 | End: 2025-03-12

## 2025-03-12 RX ORDER — DEXTROSE 50 % IN WATER 50 %
25 SYRINGE (ML) INTRAVENOUS ONCE
Refills: 0 | Status: DISCONTINUED | OUTPATIENT
Start: 2025-03-12 | End: 2025-03-26

## 2025-03-12 RX ORDER — HEPARIN SODIUM 1000 [USP'U]/ML
5000 INJECTION INTRAVENOUS; SUBCUTANEOUS EVERY 8 HOURS
Refills: 0 | Status: DISCONTINUED | OUTPATIENT
Start: 2025-03-12 | End: 2025-03-17

## 2025-03-12 RX ORDER — GABAPENTIN 400 MG/1
300 CAPSULE ORAL THREE TIMES A DAY
Refills: 0 | Status: DISCONTINUED | OUTPATIENT
Start: 2025-03-13 | End: 2025-03-15

## 2025-03-12 RX ORDER — LINAGLIPTIN 5 MG/1
1 TABLET, FILM COATED ORAL
Refills: 0 | DISCHARGE

## 2025-03-12 RX ORDER — ENALAPRIL MALEATE 20 MG
1 TABLET ORAL
Refills: 0 | DISCHARGE

## 2025-03-12 RX ORDER — PIPERACILLIN-TAZO-DEXTROSE,ISO 3.375G/5
3.38 IV SOLUTION, PIGGYBACK PREMIX FROZEN(ML) INTRAVENOUS EVERY 8 HOURS
Refills: 0 | Status: DISCONTINUED | OUTPATIENT
Start: 2025-03-13 | End: 2025-03-14

## 2025-03-12 RX ORDER — ATORVASTATIN CALCIUM 80 MG/1
20 TABLET, FILM COATED ORAL AT BEDTIME
Refills: 0 | Status: DISCONTINUED | OUTPATIENT
Start: 2025-03-12 | End: 2025-03-26

## 2025-03-12 RX ORDER — MAGNESIUM, ALUMINUM HYDROXIDE 200-200 MG
30 TABLET,CHEWABLE ORAL EVERY 4 HOURS
Refills: 0 | Status: DISCONTINUED | OUTPATIENT
Start: 2025-03-12 | End: 2025-03-26

## 2025-03-12 RX ORDER — ACETAMINOPHEN 500 MG/5ML
650 LIQUID (ML) ORAL EVERY 6 HOURS
Refills: 0 | Status: DISCONTINUED | OUTPATIENT
Start: 2025-03-12 | End: 2025-03-26

## 2025-03-12 RX ORDER — ACETAMINOPHEN 500 MG/5ML
1000 LIQUID (ML) ORAL ONCE
Refills: 0 | Status: COMPLETED | OUTPATIENT
Start: 2025-03-12 | End: 2025-03-12

## 2025-03-12 RX ORDER — AMLODIPINE BESYLATE 10 MG/1
10 TABLET ORAL DAILY
Refills: 0 | Status: DISCONTINUED | OUTPATIENT
Start: 2025-03-12 | End: 2025-03-15

## 2025-03-12 RX ORDER — GABAPENTIN 400 MG/1
1 CAPSULE ORAL
Refills: 0 | DISCHARGE

## 2025-03-12 RX ADMIN — SODIUM CHLORIDE 1000 MILLILITER(S): 9 INJECTION, SOLUTION INTRAVENOUS at 15:09

## 2025-03-12 RX ADMIN — Medication 200 GRAM(S): at 20:36

## 2025-03-12 RX ADMIN — Medication 400 MILLIGRAM(S): at 20:17

## 2025-03-12 RX ADMIN — ATORVASTATIN CALCIUM 20 MILLIGRAM(S): 80 TABLET, FILM COATED ORAL at 23:30

## 2025-03-12 RX ADMIN — Medication 81 MILLIGRAM(S): at 23:30

## 2025-03-12 RX ADMIN — INSULIN GLARGINE-YFGN 25 UNIT(S): 100 INJECTION, SOLUTION SUBCUTANEOUS at 23:28

## 2025-03-12 RX ADMIN — SODIUM CHLORIDE 1000 MILLILITER(S): 9 INJECTION, SOLUTION INTRAVENOUS at 15:18

## 2025-03-12 RX ADMIN — Medication 100 MILLILITER(S): at 23:38

## 2025-03-12 RX ADMIN — HEPARIN SODIUM 5000 UNIT(S): 1000 INJECTION INTRAVENOUS; SUBCUTANEOUS at 23:29

## 2025-03-12 RX ADMIN — INSULIN LISPRO 4: 100 INJECTION, SOLUTION INTRAVENOUS; SUBCUTANEOUS at 23:31

## 2025-03-12 RX ADMIN — Medication 25 MILLIGRAM(S): at 23:29

## 2025-03-12 RX ADMIN — Medication 25 GRAM(S): at 23:34

## 2025-03-12 RX ADMIN — SODIUM CHLORIDE 1000 MILLILITER(S): 9 INJECTION, SOLUTION INTRAVENOUS at 14:43

## 2025-03-12 RX ADMIN — Medication 100 MILLILITER(S): at 19:45

## 2025-03-12 RX ADMIN — AMLODIPINE BESYLATE 10 MILLIGRAM(S): 10 TABLET ORAL at 23:33

## 2025-03-12 NOTE — H&P ADULT - NSHPPHYSICALEXAM_GEN_ALL_CORE
T(C): 39.4 (03-12-25 @ 19:43), Max: 39.4 (03-12-25 @ 19:43)  HR: 116 (03-12-25 @ 19:43) (114 - 118)  BP: 160/73 (03-12-25 @ 19:43) (125/64 - 160/73)  RR: 19 (03-12-25 @ 19:43) (16 - 20)  SpO2: 94% (03-12-25 @ 19:43) (94% - 96%)  GENERAL: NAD, lying in bed    EYES: EOMI, PERRLA; conjunctiva and sclera clear  ENMT: Moist oral mucosa, no pharyngeal injection or exudates   NECK: Supple, no palpable masses; no JVD  RESPIRATORY: Normal respiratory effort; lungs are clear to auscultation bilaterally  CARDIOVASCULAR: Regular rate and rhythm, normal S1 and S2, no murmur/rub/gallop; No lower extremity edema; Peripheral pulses are 2+ bilaterally  ABDOMEN: Nontender to palpation, normoactive bowel sounds, no rebound/guarding   MUSCULOSKELETAL:  no joint swelling or tenderness to palpation  PSYCH: A+O to person, place, and time; affect appropriate  NEUROLOGY: CN 2-12 are intact and symmetric; no gross motor or sensory deficits   SKIN: No rashes; no palpable lesions T(C): 39.4 (03-12-25 @ 19:43), Max: 39.4 (03-12-25 @ 19:43)  HR: 116 (03-12-25 @ 19:43) (114 - 118)  BP: 160/73 (03-12-25 @ 19:43) (125/64 - 160/73)  RR: 19 (03-12-25 @ 19:43) (16 - 20)  SpO2: 94% (03-12-25 @ 19:43) (94% - 96%)  GENERAL: NAD, lying in bed    EYES: EOMI, PERRLA; conjunctiva and sclera clear  ENMT: Moist oral mucosa, no pharyngeal injection or exudates   NECK: Supple, no palpable masses; no JVD  RESPIRATORY: Normal respiratory effort; lungs are clear to auscultation bilaterally  CARDIOVASCULAR: paced rhythm, normal S1 and S2, no murmur/rub/gallop; No lower extremity edema; Peripheral pulses are 2+ bilaterally  ABDOMEN: Nontender to palpation, normoactive bowel sounds, no rebound/guarding   MUSCULOSKELETAL: s/p l bka  PSYCH: A+O to person, place, and time; affect appropriate  NEUROLOGY: CN 2-12 are intact and symmetric; no gross motor or sensory deficits   SKIN: No rashes; no palpable lesions

## 2025-03-12 NOTE — ED ADULT NURSE NOTE - OBJECTIVE STATEMENT
63 y/o M bibems for hyperglycemia. EMS reports the pt fingerstick in the 490s. Pt reports that he did not check his sugar the past two days and since then he has become increasingly lethargic and weak with polydipsia and polyuria. On arrival, pt fingerstick in the 400s. Denies headache, blurry vision, chest pain, sob, abd pain, n/v/d, and dysuria. AAOx4. Breathing spontaneous and unlabored. Abd firm, distended, and nontender to palpation. Skin warm, dry, and color normal for race.

## 2025-03-12 NOTE — H&P ADULT - PROBLEM SELECTOR PLAN 3
on presentation, BG ~400s, ua w no glucosuria and no ketonuria, bhb wnl, ag 19, hco3 21, ph 7.42  s/p 2 l lr    bg (~300s), ag (15), hco3 (23), ph (7.43) with slight improvement  likely stress hyperglycemia iso sepsis  hold home regimen  fu a1c   trend fingerstick glu  glargine 25u qhs + low dose correctional lispro tidac  adjust to goal bg 100-180  cont home neurontin  carb consistent diet

## 2025-03-12 NOTE — H&P ADULT - PROBLEM SELECTOR PLAN 2
Ua with bacteria + pyuria with leukocyte esterase;  s/p zosyn  follow up urine cultures    cont empirical zosyn  antipyretics, antiemetics, analgesics as needed

## 2025-03-12 NOTE — ED ADULT NURSE NOTE - NS ED NURSE REPORT GIVEN TO FT
EUFEMIA Negron EUFEMIA Patel

## 2025-03-12 NOTE — ED PROVIDER NOTE - PHYSICAL EXAMINATION
CONSTITUTIONAL: Patient is awake, alert and oriented x 3. Patient is well appearing and in no acute distress  HEAD: NCAT  NECK: supple, FROM  LUNGS: CTA b/l, no wheezing or rales   HEART: RRR.+S1S2 no murmurs  ABDOMEN: Soft, non-distended, nttp,  no rebound or guarding  EXTREMITY: No edema or calf tenderness b/l, FROM upper and lower ext b/l  SKIN: No rash or lesions  NEURO: No focal deficits

## 2025-03-12 NOTE — ED ADULT NURSE REASSESSMENT NOTE - NS ED NURSE REASSESS COMMENT FT1
Handoff taken from EUFEMIA Rueda in purple. Introduced self to pt, resting in bed, tachycardia and oral temperature of 102.9 noted. MALINA Welch notified via phone call, awaiting orders for tylenol and abx at this time. Pt denies any worsening sx. reports generalized weakness as per chief complaint. Awaiting movement to navy holding, updated on POC. Comfort and safety maintained at this time.

## 2025-03-12 NOTE — ED PROVIDER NOTE - CLINICAL SUMMARY MEDICAL DECISION MAKING FREE TEXT BOX
Jonel: 64 year old male with pmxh of dm, and htn, here for hyperglycemia.  last 2 days has not felt well, gen weakness, fatigue, and poor appetite, decrease po itnake.  not eating well.  PE: att exam: patient awake alert NAD . LUNGS CTAB no wheeze no crackle. CARD RRR no m/r/g.  Abdomen soft NT ND no rebound no guarding no CVA tenderness. EXT WWP no edema no calf tenderness CV 2+DP/PT bilaterally. neuro A&Ox3, no focal deficits. plan will get labs, assess for hyperglycemia, r/o infection, assess for dka. will reassess. patietn weak not able to get up.w ill ikely need admisision.

## 2025-03-12 NOTE — PATIENT PROFILE ADULT - FALL HARM RISK - RISK INTERVENTIONS

## 2025-03-12 NOTE — H&P ADULT - HISTORY OF PRESENT ILLNESS
65yo m w pmh obesity, htn, hld, dm c/b peripheral neuropathy + retinopathy, pad s/p l bka, cva, chb s/p ppm, p/w generalized fatigue/weakness; in er, found to be meeting severe sirs criteria, and found to have multiple metabolic disturbances including hyperglycemia, catrina w hagma; unclear source; admit to medicine for further mgmt 65yo m w pmh obesity, htn, hld, dm c/b peripheral neuropathy + retinopathy, pad s/p l bka, cva, chb s/p ppm, p/w generalized fatigue/weakness; in er, found to be meeting severe sepsis criteria, and found to have multiple metabolic disturbances including hyperglycemia, catrina w hagma; unclear source; admit to medicine for further mgmt 65yo m w pmh obesity, htn, hld, dm c/b peripheral neuropathy + retinopathy, pad s/p l bka, cva, chb s/p ppm, p/w generalized fatigue/weakness; symptoms have been ongoing for the last few days; denies any associated fever, chills, rigors, chest pain, sob/patel, cough, lightheadedness/dizziness, loc, n+v, diaphoresis, abdominal discomfort, dysuria; patient did note his blood sugar levels to be in the 300s, which was unusual to him as they are generally better controlled. patient grew concerned so presents to [facility] er for further evaluation. in er, found to be meeting severe sepsis criteria, and found to have multiple metabolic disturbances including hyperglycemia, catrina w hagma; suspect 2/2 uti; admit to medicine for further mgmt

## 2025-03-12 NOTE — ED PROVIDER NOTE - SEVERE SEPSIS ALERT DETAILS
At this time patient afebrile, he has h/o prior non-specific leukocytosis. Belief patient is dehydrated which explains lab findings

## 2025-03-12 NOTE — ED PROVIDER NOTE - OBJECTIVE STATEMENT
64-year-old male with history of diabetes and hypertension presents ED for hyperglycemia.  Patient reports that sugars are usually well-controlled, he takes metformin 1000 mg twice daily and Lantus 28 units at night.  Over the last 2 days he has felt unwell describing generalized weakness, fatigue and poor appetite with decreased p.o. intake.  He did not check his glucose yesterday or the day before but today when checking noted elevated sugar of 371.  He denies recent travel, sick contacts, fevers, cough, chest pain, shortness of breath, abdominal pain nausea vomiting or diarrhea.

## 2025-03-12 NOTE — ED ADULT NURSE NOTE - NSFALLRISKINTERV_ED_ALL_ED

## 2025-03-12 NOTE — PATIENT PROFILE ADULT - INFLUENZA IMMUNIZATION DATE (APPROXIMATE)
[facility] DISCHARGE SUMMARY    Name: Lucina Ramirez  Age: 48 year old  Sex: female  MRN: [id number]    GENERAL INFORMATION:  - Admission Date/Time: 3/15/2023  7:41 AM  - Discharge Date/Time: 3/16/2023    - Attending: Vickie Nair MD  - PCP: Verify Pcp    DISCHARGE DIAGNOSIS:  Active Hospital Problems    Diagnosis    • Left sided numbness      BRIEF HOSPITAL COURSE:   H&P HPI: \"48-year-old female with PMH DM2, anxiety/depression, obesity who presented to the ED for left arm numbness.     Patient reports that this morning when she woke up around 5:30 AM she noted left arm tingling.  She reports it is in the neck down through the arm into her fingers and that they feel asleep.  She has been opening and closing her hand trying to see if it would improve but it did not so she presented to the emergency room.  She does report this is happened off and on sometimes after she wakes up but usually improves.  She reports onset of posterior headache on Sunday from the neck radiating to the frontal region for which she tried ibuprofen 800 mg that improved it a little bit but has been having persistent headache since then.     In the emergency room she was noted to be vitally stable albeit a little hypertensive to 155/77.  Labs notable for blood sugar 339 and mildly elevated LFTs.     Telemetry neurology was consulted in the emergency room, NIH was noted be 1.  She was deemed not to be a thrombolytic candidate.  It was recommended for further hospitalization and evaluation by inpatient neurologist. \"    Neurology was consulted, it was suspected stroke versus stroke mimic.  MRI brain was negative for stroke.  She was started on supportive care for headache and deemed stable for discharge from neurological standpoint.    Her course was complicated by hyperglycemia.  A1c resulted at 10.5%.  She stated that she had been off metformin for some time but was agreeable to restarting the metformin.  We discussed need for good  glycemic control to avoid long-term complications including neuropathy which may be part of the reason for her numbness and tingling in her hand.    On 3/16 she was deemed stable for discharge.  Signs and symptoms requiring medical evaluation were discussed with the patient.  She was instructed on close follow-up and adherence to all medications as outlined in the AVS.  She was reminded about need for follow-up regarding her right thyroid nodule.  She was agreeable and verbalized understanding to the above-mentioned discharge plan.    LAST ASSESSMENT AND PLAN:  #Left-sided numbness and tingling concerning for strokelike symptoms  -CT head without acute process  -CT head and neck without large vessel occlusion  -Teleneurology was consulted, NIH at that time noted be 1, noted not to be a thrombolytic candidate  -Neurologist consulted, appreciate recs  -MRI brain, echo, carotid imaging, a1c/lipid panel and further work-up per neurology expertise  -Permissive hypertension for now  -Neurochecks every 4 hours  -PT/OT     #DM2  -Patient reports that she took herself off metformin a few months ago  -Discussed with patient importance of glycemic control and plan to restart patient on metformin on discharge  -A1c ordered  -Accu-Cheks plus SSI  -Carb consistent diet  -Goal blood glucose 1 40-1 80 while inpatient     #anxiety/depression  -Reports she was on something at home but is no longer taking  -Discussed with patient to address this issue on PCP follow-up     #Obesity BMI 42.24  -Affects all aspects of care  -Counseled on lifestyle interventions     #Right thyroid nodule  - CT imaging notable for heterogenously enhancing right thyroid nodule measuring 1.8 cm  - TSH noted to be within normal limits at 3.9  - Patient notified of above finding and need for close follow-up with her PCP within 1 week who was agreeable and verbalized understanding    Pertinent Physical Exam At Time of Discharge:    Vital Last Value 24 Hour  Range   Temperature 98.2 °F (36.8 °C) (03/16/23 0444) Temp  Min: 97.9 °F (36.6 °C)  Max: 98.2 °F (36.8 °C)   Pulse (!) 56 (03/16/23 0444) Pulse  Min: 56  Max: 67   Respiratory 16 (03/16/23 0444) Resp  Min: 12  Max: 16   Non-Invasive  Blood Pressure 115/73 (03/16/23 0444) BP  Min: 97/59  Max: 115/73   Pulse Oximetry 94 % (03/16/23 0444) SpO2  Min: 94 %  Max: 98 %     CONSTITUTIONAL: No acute distress, no acute distress   HEENT: Normocephalic, moist oral mucosa  CHEST:  No chest wall tenderness to palpitation  RESPIRATORY: Clear to auscultation bilaterally  CARDIOVASCULAR:   Regular rate and rhythm with normal S1, S2 and no murmur.  GASTROINTESTINAL: normoactive bowel sounds, nontender/nondistended,   MUSCULOSKELETAL: no joint effusions; no asymmetry   EXTREMITIES: no LE edema  NEURO:  Grossly normal, AAO x3, still reporting tingling in left hand, no focal deficits, strength 5/5 all extremities  SKIN:  No rashes  PSYCH: Appropriate     Lab Results:  Recent Labs   Lab 03/15/23  0748   WBC 7.0   RBC 5.28*   HGB 15.1   HCT 45.9        Recent Labs   Lab 03/15/23  0748   SODIUM 138   POTASSIUM 3.9   CHLORIDE 101   CO2 28   ANIONGAP 13   BUN 16   CREATININE 0.58   CALCIUM 8.7   GLUCOSE 339*     Recent Labs   Lab 03/15/23  0748   BILIRUBIN 1.1*   ALBUMIN 3.5*   ALKPT 83   GPT 78*   AST 48*     No results found  Recent Labs   Lab 03/15/23  0748   PTT 22   PT 9.6*   INR 0.9     No results found  Hemoglobin A1C (%)   Date Value   03/15/2023 10.5 (H)       Microbiology Results     None           Imaging:  MRI BRAIN WO CONTRAST   Final Result       No intracranial abnormality detected.      Read full report.      Electronically Signed by: MAYRA SANDHU MD    Signed on: 3/15/2023 5:53 PM    Workstation ID: [id number]      CTA HEAD AND NECK W CONTRAST LEVEL 1   Final Result      1.  No acute intracranial abnormality.   2.  No high-grade stenosis or occlusion in the head and neck.  No neck   dissection.   3.   Heterogeneously enhancing right thyroid nodule measures 1.8 cm.  Would   recommend outpatient ultrasound follow-up.         Findings were relayed to Dr. Mcgill on 03/15/2023 at 8:04 AM via   telephone.      Electronically Signed by: JANNET OCHOA M.D.    Signed on: 3/15/2023 8:10 AM    Workstation ID: [id number]      CT HEAD LEVEL 1   Final Result      1.  No acute intracranial abnormality.   2.  No high-grade stenosis or occlusion in the head and neck.  No neck   dissection.   3.  Heterogeneously enhancing right thyroid nodule measures 1.8 cm.  Would   recommend outpatient ultrasound follow-up.         Findings were relayed to Dr. Mcgill on 03/15/2023 at 8:04 AM via   telephone.      Electronically Signed by: JANNET OCHOA M.D.    Signed on: 3/15/2023 8:10 AM    Workstation ID: [id number]          Pathology:  * Cannot find OR log *    Test Results Pending At Discharge:      Discharge Medications:     Summary of your Discharge Medications      Take these Medications      Details   metFORMIN 500 MG tablet  Commonly known as: GLUCOPHAGE   Take 1 tablet by mouth in the morning and 1 tablet in the evening. Take with meals.               Medication List      START taking these medications    • metFORMIN 500 MG tablet; Commonly known as: GLUCOPHAGE; Take 1 tablet by   mouth in the morning and 1 tablet in the evening. Take with meals.       Primary Care Physician -- routed discharge summary to PCP  Verify Pcp    Follow up appointments:    Follow-up with primary care physician in 3-5 days.    No future appointments.    Terrie Horvath MD  [address] [id number]  [phone]    Schedule an appointment as soon as possible for a visit in 1 week(s)  for left hand tingling      Final diagnosis, treatment plan, and follow-up recommendations were discussed and explained to the patient. The patient was given an opportunity to ask questions concerning the diagnosis and treatment plan. The patient  acknowledged understanding of the diagnosis, treatment, and follow-up recommendations. The patient was advised to seek urgent care upon discharge if worsening symptoms develop prior to scheduled follow-up.    I spent 33 minutes on patient discharge.      Vickie Nair MD   [facility] [place] Hospitalist  3/16/2023     12-Mar-2023

## 2025-03-12 NOTE — H&P ADULT - ASSESSMENT
63yo m w pmh obesity, htn, hld, dm c/b peripheral neuropathy + retinopathy, pad s/p l bka, cva, chb s/p ppm, p/w generalized fatigue/weakness; in er, found to be meeting severe sirs criteria, and found to have multiple metabolic disturbances including hyperglycemia, catrina w hagma; unclear source; admit to medicine for further mgmt 65yo m w pmh obesity, htn, hld, dm c/b peripheral neuropathy + retinopathy, pad s/p l bka, cva, chb s/p ppm, p/w generalized fatigue/weakness; in er, found to be meeting severe sepsis criteria, and found to have multiple metabolic disturbances including hyperglycemia, catrina w hagma; unclear source; admit to medicine for further mgmt 63yo m w pmh obesity, htn, hld, dm c/b peripheral neuropathy + retinopathy, pad s/p l bka, cva, chb s/p ppm, p/w generalized fatigue/weakness; in er, found to be meeting severe sepsis criteria, and found to have multiple metabolic disturbances including hyperglycemia, catrina w hagma; suspect 2/2 uti; admit to medicine for further mgmt

## 2025-03-12 NOTE — H&P ADULT - PROBLEM SELECTOR PLAN 1
febrile, leukocytosis, tachycardic, + lactic acidosis; meets severe sepsis criteria  otherwise, hds  suspect 2/2 uti  s/p 2 L LR   trend lactate q4-6h until wnl  follow up blood cultures  Monitor for fever, changes in white count  broad spec empirical abx therapy as described below  ivf resusci + lytes as needed

## 2025-03-12 NOTE — H&P ADULT - PROBLEM SELECTOR PLAN 4
unclear baseline cr; in 6/2024, cr appeared to fluctuate around ~1.2; currently, ~1.9  a/w hagma, bun ~34  fu urine studies; bladder scan as needed  Monitor UO, BUN/Cr, volume status, acid-base balance, electrolytes  avoid nephrotoxic agents  dose adjustments for renally cleared meds  IVF hydration + electrolyte supplementation as needed

## 2025-03-12 NOTE — H&P ADULT - NSHPREVIEWOFSYSTEMS_GEN_ALL_CORE
CONSTITUTIONAL: No fever. +generalized fatigue/weakness  ENMT:  No sinus or throat pain  RESPIRATORY: No cough, wheezing, chills or hemoptysis; No shortness of breath  CARDIOVASCULAR: No chest pain, palpitations, dizziness, or leg swelling  GASTROINTESTINAL: No abdominal or epigastric pain. No nausea, vomiting, or hematemesis; No diarrhea or constipation. No melena or hematochezia.  GENITOURINARY: No dysuria or incontinence  NEUROLOGICAL: No headaches, memory loss, loss of strength, numbness, or tremors  SKIN: No rashes,  No hives or eczema  ENDOCRINE: No heat or cold intolerance; No hair loss  MUSCULOSKELETAL: No joint pain or swelling; No muscle, back, or extremity pain  PSYCHIATRIC: No depression, anxiety, mood swings, or difficulty sleeping  HEME/LYMPH: No easy bruising, or bleeding gums; no enlarged LN no

## 2025-03-13 DIAGNOSIS — Z29.9 ENCOUNTER FOR PROPHYLACTIC MEASURES, UNSPECIFIED: ICD-10-CM

## 2025-03-13 LAB
A1C WITH ESTIMATED AVERAGE GLUCOSE RESULT: 9.2 % — HIGH (ref 4–5.6)
ALBUMIN SERPL ELPH-MCNC: 3.9 G/DL — SIGNIFICANT CHANGE UP (ref 3.3–5)
ALP SERPL-CCNC: 99 U/L — SIGNIFICANT CHANGE UP (ref 40–120)
ALT FLD-CCNC: 17 U/L — SIGNIFICANT CHANGE UP (ref 10–45)
ANION GAP SERPL CALC-SCNC: 19 MMOL/L — HIGH (ref 5–17)
APTT BLD: 26.6 SEC — SIGNIFICANT CHANGE UP (ref 24.5–35.6)
AST SERPL-CCNC: 20 U/L — SIGNIFICANT CHANGE UP (ref 10–40)
BASOPHILS # BLD AUTO: 0.02 K/UL — SIGNIFICANT CHANGE UP (ref 0–0.2)
BASOPHILS NFR BLD AUTO: 0.3 % — SIGNIFICANT CHANGE UP (ref 0–2)
BILIRUB SERPL-MCNC: 0.5 MG/DL — SIGNIFICANT CHANGE UP (ref 0.2–1.2)
BUN SERPL-MCNC: 29 MG/DL — HIGH (ref 7–23)
CALCIUM SERPL-MCNC: 9.8 MG/DL — SIGNIFICANT CHANGE UP (ref 8.4–10.5)
CHLORIDE SERPL-SCNC: 92 MMOL/L — LOW (ref 96–108)
CHOLEST SERPL-MCNC: 159 MG/DL — SIGNIFICANT CHANGE UP
CO2 SERPL-SCNC: 22 MMOL/L — SIGNIFICANT CHANGE UP (ref 22–31)
CREAT SERPL-MCNC: 1.95 MG/DL — HIGH (ref 0.5–1.3)
EGFR: 38 ML/MIN/1.73M2 — LOW
EGFR: 38 ML/MIN/1.73M2 — LOW
EOSINOPHIL # BLD AUTO: 0 K/UL — SIGNIFICANT CHANGE UP (ref 0–0.5)
EOSINOPHIL NFR BLD AUTO: 0 % — SIGNIFICANT CHANGE UP (ref 0–6)
ESTIMATED AVERAGE GLUCOSE: 217 MG/DL — HIGH (ref 68–114)
GLUCOSE BLDC GLUCOMTR-MCNC: 263 MG/DL — HIGH (ref 70–99)
GLUCOSE BLDC GLUCOMTR-MCNC: 272 MG/DL — HIGH (ref 70–99)
GLUCOSE BLDC GLUCOMTR-MCNC: 276 MG/DL — HIGH (ref 70–99)
GLUCOSE BLDC GLUCOMTR-MCNC: 319 MG/DL — HIGH (ref 70–99)
GLUCOSE BLDC GLUCOMTR-MCNC: 343 MG/DL — HIGH (ref 70–99)
GLUCOSE SERPL-MCNC: 298 MG/DL — HIGH (ref 70–99)
HCT VFR BLD CALC: 48.5 % — SIGNIFICANT CHANGE UP (ref 39–50)
HDLC SERPL-MCNC: 39 MG/DL — LOW
HGB BLD-MCNC: 15.4 G/DL — SIGNIFICANT CHANGE UP (ref 13–17)
IMM GRANULOCYTES NFR BLD AUTO: 0.3 % — SIGNIFICANT CHANGE UP (ref 0–0.9)
INR BLD: 1.06 RATIO — SIGNIFICANT CHANGE UP (ref 0.85–1.16)
LIPID PNL WITH DIRECT LDL SERPL: 84 MG/DL — SIGNIFICANT CHANGE UP
LYMPHOCYTES # BLD AUTO: 0.76 K/UL — LOW (ref 1–3.3)
LYMPHOCYTES # BLD AUTO: 9.5 % — LOW (ref 13–44)
MAGNESIUM SERPL-MCNC: 1.7 MG/DL — SIGNIFICANT CHANGE UP (ref 1.6–2.6)
MCHC RBC-ENTMCNC: 26.7 PG — LOW (ref 27–34)
MCHC RBC-ENTMCNC: 31.8 G/DL — LOW (ref 32–36)
MCV RBC AUTO: 84.1 FL — SIGNIFICANT CHANGE UP (ref 80–100)
MONOCYTES # BLD AUTO: 0.14 K/UL — SIGNIFICANT CHANGE UP (ref 0–0.9)
MONOCYTES NFR BLD AUTO: 1.8 % — LOW (ref 2–14)
NEUTROPHILS # BLD AUTO: 7.03 K/UL — SIGNIFICANT CHANGE UP (ref 1.8–7.4)
NEUTROPHILS NFR BLD AUTO: 88.1 % — HIGH (ref 43–77)
NON HDL CHOLESTEROL: 120 MG/DL — SIGNIFICANT CHANGE UP
NRBC BLD AUTO-RTO: 0 /100 WBCS — SIGNIFICANT CHANGE UP (ref 0–0)
PHOSPHATE SERPL-MCNC: 2.8 MG/DL — SIGNIFICANT CHANGE UP (ref 2.5–4.5)
PLATELET # BLD AUTO: 197 K/UL — SIGNIFICANT CHANGE UP (ref 150–400)
POTASSIUM SERPL-MCNC: 4.7 MMOL/L — SIGNIFICANT CHANGE UP (ref 3.5–5.3)
POTASSIUM SERPL-SCNC: 4.7 MMOL/L — SIGNIFICANT CHANGE UP (ref 3.5–5.3)
PROT SERPL-MCNC: 8.5 G/DL — HIGH (ref 6–8.3)
PROTHROM AB SERPL-ACNC: 12.1 SEC — SIGNIFICANT CHANGE UP (ref 9.9–13.4)
RBC # BLD: 5.77 M/UL — SIGNIFICANT CHANGE UP (ref 4.2–5.8)
RBC # FLD: 14.2 % — SIGNIFICANT CHANGE UP (ref 10.3–14.5)
SODIUM SERPL-SCNC: 133 MMOL/L — LOW (ref 135–145)
TRIGL SERPL-MCNC: 213 MG/DL — HIGH
UUN UR-MCNC: 554 MG/DL — SIGNIFICANT CHANGE UP
WBC # BLD: 7.97 K/UL — SIGNIFICANT CHANGE UP (ref 3.8–10.5)
WBC # FLD AUTO: 7.97 K/UL — SIGNIFICANT CHANGE UP (ref 3.8–10.5)

## 2025-03-13 PROCEDURE — 99233 SBSQ HOSP IP/OBS HIGH 50: CPT | Mod: GC

## 2025-03-13 RX ORDER — INSULIN GLARGINE-YFGN 100 [IU]/ML
30 INJECTION, SOLUTION SUBCUTANEOUS AT BEDTIME
Refills: 0 | Status: DISCONTINUED | OUTPATIENT
Start: 2025-03-13 | End: 2025-03-14

## 2025-03-13 RX ORDER — INSULIN LISPRO 100 U/ML
INJECTION, SOLUTION INTRAVENOUS; SUBCUTANEOUS
Refills: 0 | Status: DISCONTINUED | OUTPATIENT
Start: 2025-03-13 | End: 2025-03-26

## 2025-03-13 RX ORDER — INSULIN LISPRO 100 U/ML
INJECTION, SOLUTION INTRAVENOUS; SUBCUTANEOUS AT BEDTIME
Refills: 0 | Status: DISCONTINUED | OUTPATIENT
Start: 2025-03-13 | End: 2025-03-26

## 2025-03-13 RX ORDER — SODIUM CHLORIDE 9 G/1000ML
1000 INJECTION, SOLUTION INTRAVENOUS
Refills: 0 | Status: DISCONTINUED | OUTPATIENT
Start: 2025-03-13 | End: 2025-03-15

## 2025-03-13 RX ORDER — ACETAMINOPHEN 500 MG/5ML
1000 LIQUID (ML) ORAL ONCE
Refills: 0 | Status: COMPLETED | OUTPATIENT
Start: 2025-03-13 | End: 2025-03-13

## 2025-03-13 RX ORDER — MAGNESIUM SULFATE 500 MG/ML
2 SYRINGE (ML) INJECTION ONCE
Refills: 0 | Status: COMPLETED | OUTPATIENT
Start: 2025-03-13 | End: 2025-03-13

## 2025-03-13 RX ADMIN — INSULIN LISPRO 3: 100 INJECTION, SOLUTION INTRAVENOUS; SUBCUTANEOUS at 13:01

## 2025-03-13 RX ADMIN — Medication 650 MILLIGRAM(S): at 21:58

## 2025-03-13 RX ADMIN — HEPARIN SODIUM 5000 UNIT(S): 1000 INJECTION INTRAVENOUS; SUBCUTANEOUS at 05:15

## 2025-03-13 RX ADMIN — GABAPENTIN 300 MILLIGRAM(S): 400 CAPSULE ORAL at 13:03

## 2025-03-13 RX ADMIN — Medication 25 MILLIGRAM(S): at 21:48

## 2025-03-13 RX ADMIN — Medication 650 MILLIGRAM(S): at 15:14

## 2025-03-13 RX ADMIN — INSULIN LISPRO 3: 100 INJECTION, SOLUTION INTRAVENOUS; SUBCUTANEOUS at 08:57

## 2025-03-13 RX ADMIN — SODIUM CHLORIDE 100 MILLILITER(S): 9 INJECTION, SOLUTION INTRAVENOUS at 13:10

## 2025-03-13 RX ADMIN — Medication 25 MILLIGRAM(S): at 13:12

## 2025-03-13 RX ADMIN — Medication 25 GRAM(S): at 08:57

## 2025-03-13 RX ADMIN — Medication 81 MILLIGRAM(S): at 13:02

## 2025-03-13 RX ADMIN — INSULIN GLARGINE-YFGN 30 UNIT(S): 100 INJECTION, SOLUTION SUBCUTANEOUS at 21:47

## 2025-03-13 RX ADMIN — ATORVASTATIN CALCIUM 20 MILLIGRAM(S): 80 TABLET, FILM COATED ORAL at 21:48

## 2025-03-13 RX ADMIN — HEPARIN SODIUM 5000 UNIT(S): 1000 INJECTION INTRAVENOUS; SUBCUTANEOUS at 21:49

## 2025-03-13 RX ADMIN — GABAPENTIN 300 MILLIGRAM(S): 400 CAPSULE ORAL at 21:48

## 2025-03-13 RX ADMIN — Medication 400 MILLIGRAM(S): at 05:53

## 2025-03-13 RX ADMIN — Medication 25 GRAM(S): at 13:01

## 2025-03-13 RX ADMIN — Medication 25 MILLIGRAM(S): at 05:14

## 2025-03-13 RX ADMIN — Medication 650 MILLIGRAM(S): at 16:20

## 2025-03-13 RX ADMIN — INSULIN LISPRO 8: 100 INJECTION, SOLUTION INTRAVENOUS; SUBCUTANEOUS at 17:30

## 2025-03-13 RX ADMIN — AMLODIPINE BESYLATE 10 MILLIGRAM(S): 10 TABLET ORAL at 05:14

## 2025-03-13 RX ADMIN — HEPARIN SODIUM 5000 UNIT(S): 1000 INJECTION INTRAVENOUS; SUBCUTANEOUS at 13:02

## 2025-03-13 RX ADMIN — Medication 25 GRAM(S): at 21:49

## 2025-03-13 RX ADMIN — GABAPENTIN 300 MILLIGRAM(S): 400 CAPSULE ORAL at 05:14

## 2025-03-13 RX ADMIN — Medication 25 GRAM(S): at 05:27

## 2025-03-13 RX ADMIN — INSULIN LISPRO 1: 100 INJECTION, SOLUTION INTRAVENOUS; SUBCUTANEOUS at 21:46

## 2025-03-13 NOTE — DIETITIAN INITIAL EVALUATION ADULT - REASON INDICATOR FOR ASSESSMENT
right 5th finger Nutrition Consult for assessment / education.   Source: Pt, Electronic Medical Record.   Chart reviewed, events noted.

## 2025-03-13 NOTE — PHYSICAL THERAPY INITIAL EVALUATION ADULT - SITTING BALANCE: STATIC
Required minimal assistance to maintain with right UE support on bed rail. Multiple bouts of posterior loss of balance./poor balance

## 2025-03-13 NOTE — DIETITIAN INITIAL EVALUATION ADULT - NSFNSGIIOFT_GEN_A_CORE
**Adjusted BMI with consideration for L BKA: 34.7    Pt denies any current N/V/D/C. Per chart, last BM 3/12. No current bowel regimen in place.

## 2025-03-13 NOTE — PROGRESS NOTE ADULT - SUBJECTIVE AND OBJECTIVE BOX
INTERVAL HPI/OVERNIGHT EVENTS:    SUBJECTIVE: Patient seen and examined at bedside.     ROS: All negative except as listed above.    VITAL SIGNS:  ICU Vital Signs Last 24 Hrs  T(C): 38.8 (13 Mar 2025 05:21), Max: 39.9 (13 Mar 2025 05:08)  T(F): 101.8 (13 Mar 2025 05:21), Max: 103.9 (13 Mar 2025 05:08)  HR: 94 (13 Mar 2025 05:08) (94 - 118)  BP: 120/73 (13 Mar 2025 05:08) (120/73 - 160/73)  BP(mean): 105 (12 Mar 2025 19:43) (105 - 105)  ABP: --  ABP(mean): --  RR: 18 (13 Mar 2025 05:08) (16 - 20)  SpO2: 94% (13 Mar 2025 05:08) (94% - 96%)    O2 Parameters below as of 13 Mar 2025 05:08  Patient On (Oxygen Delivery Method): room air            Plateau pressure:   P/F ratio:     03-12 @ 07:01  -  03-13 @ 07:00  --------------------------------------------------------  IN: 2000 mL / OUT: 200 mL / NET: 1800 mL      CAPILLARY BLOOD GLUCOSE      POCT Blood Glucose.: 322 mg/dL (12 Mar 2025 22:55)      ECG: reviewed.    PHYSICAL EXAM:    GENERAL: NAD, lying in bed comfortably  HEAD:  Atraumatic, normocephalic  EYES: EOMI, PERRLA, conjunctiva and sclera clear  NECK: Supple, trachea midline, no JVD  HEART: Regular rate and rhythm, no murmurs, rubs, or gallops  LUNGS: Unlabored respirations.  Clear to auscultation bilaterally, no crackles, wheezing, or rhonchi  ABDOMEN: Soft, nontender, nondistended, +BS  EXTREMITIES: 2+ peripheral pulses bilaterally, cap refill<2 secs. No clubbing, cyanosis, or edema  NERVOUS SYSTEM:  A&Ox3, following commands, moving all extremities, no focal deficits   SKIN: No rashes or lesions    MEDICATIONS:  MEDICATIONS  (STANDING):  amLODIPine   Tablet 10 milliGRAM(s) Oral daily  aspirin  chewable 81 milliGRAM(s) Oral daily  atorvastatin 20 milliGRAM(s) Oral at bedtime  dextrose 5%. 1000 milliLiter(s) (100 mL/Hr) IV Continuous <Continuous>  dextrose 5%. 1000 milliLiter(s) (50 mL/Hr) IV Continuous <Continuous>  dextrose 50% Injectable 25 Gram(s) IV Push once  dextrose 50% Injectable 12.5 Gram(s) IV Push once  dextrose 50% Injectable 25 Gram(s) IV Push once  gabapentin 300 milliGRAM(s) Oral three times a day  glucagon  Injectable 1 milliGRAM(s) IntraMuscular once  heparin   Injectable 5000 Unit(s) SubCutaneous every 8 hours  hydrALAZINE 25 milliGRAM(s) Oral three times a day  insulin glargine Injectable (LANTUS) 25 Unit(s) SubCutaneous at bedtime  insulin lispro (ADMELOG) corrective regimen sliding scale   SubCutaneous three times a day before meals  piperacillin/tazobactam IVPB.. 3.375 Gram(s) IV Intermittent every 8 hours  sodium chloride 0.9%. 1000 milliLiter(s) (100 mL/Hr) IV Continuous <Continuous>    MEDICATIONS  (PRN):  acetaminophen     Tablet .. 650 milliGRAM(s) Oral every 6 hours PRN Temp greater or equal to 38C (100.4F), Mild Pain (1 - 3)  aluminum hydroxide/magnesium hydroxide/simethicone Suspension 30 milliLiter(s) Oral every 4 hours PRN Dyspepsia  dextrose Oral Gel 15 Gram(s) Oral once PRN Blood Glucose LESS THAN 70 milliGRAM(s)/deciliter  melatonin 3 milliGRAM(s) Oral at bedtime PRN Insomnia  ondansetron Injectable 4 milliGRAM(s) IV Push every 8 hours PRN Nausea and/or Vomiting      ALLERGIES:  Allergies    No Known Allergies    Intolerances        LABS:                        15.4   7.97  )-----------( 197      ( 13 Mar 2025 06:45 )             48.5     03-13    133[L]  |  92[L]  |  29[H]  ----------------------------<  298[H]  4.7   |  22  |  1.95[H]    Ca    9.8      13 Mar 2025 06:46  Phos  2.8     03-13  Mg     1.7     03-13    TPro  8.5[H]  /  Alb  3.9  /  TBili  0.5  /  DBili  x   /  AST  20  /  ALT  17  /  AlkPhos  99  03-13    PT/INR - ( 13 Mar 2025 06:45 )   PT: 12.1 sec;   INR: 1.06 ratio         PTT - ( 13 Mar 2025 06:45 )  PTT:26.6 sec  Urinalysis Basic - ( 13 Mar 2025 06:46 )    Color: x / Appearance: x / SG: x / pH: x  Gluc: 298 mg/dL / Ketone: x  / Bili: x / Urobili: x   Blood: x / Protein: x / Nitrite: x   Leuk Esterase: x / RBC: x / WBC x   Sq Epi: x / Non Sq Epi: x / Bacteria: x      ABG:      vBG:  pH, Venous: 7.43 (03-12-25 @ 17:04)  pCO2, Venous: 44 mmHg (03-12-25 @ 17:04)  pO2, Venous: 55 mmHg (03-12-25 @ 17:04)  HCO3, Venous: 29 mmol/L (03-12-25 @ 17:04)  pH, Venous: 7.42 (03-12-25 @ 13:50)  pCO2, Venous: 43 mmHg (03-12-25 @ 13:50)  pO2, Venous: 53 mmHg (03-12-25 @ 13:50)  HCO3, Venous: 28 mmol/L (03-12-25 @ 13:50)    Micro:        RADIOLOGY & ADDITIONAL TESTS: Reviewed.   INTERVAL HPI/OVERNIGHT EVENTS:    SUBJECTIVE: Patient seen and examined at bedside. Complains of fatigue which is a bit improved since admission.     ROS: All negative except as listed above.    VITAL SIGNS:  ICU Vital Signs Last 24 Hrs  T(C): 38.8 (13 Mar 2025 05:21), Max: 39.9 (13 Mar 2025 05:08)  T(F): 101.8 (13 Mar 2025 05:21), Max: 103.9 (13 Mar 2025 05:08)  HR: 94 (13 Mar 2025 05:08) (94 - 118)  BP: 120/73 (13 Mar 2025 05:08) (120/73 - 160/73)  BP(mean): 105 (12 Mar 2025 19:43) (105 - 105)  ABP: --  ABP(mean): --  RR: 18 (13 Mar 2025 05:08) (16 - 20)  SpO2: 94% (13 Mar 2025 05:08) (94% - 96%)    O2 Parameters below as of 13 Mar 2025 05:08  Patient On (Oxygen Delivery Method): room air            Plateau pressure:   P/F ratio:     03-12 @ 07:01  -  03-13 @ 07:00  --------------------------------------------------------  IN: 2000 mL / OUT: 200 mL / NET: 1800 mL      CAPILLARY BLOOD GLUCOSE      POCT Blood Glucose.: 322 mg/dL (12 Mar 2025 22:55)      ECG: reviewed.    PHYSICAL EXAM:    GENERAL: NAD, lying in bed comfortably, lethargic  HEAD:  Atraumatic, normocephalic  EYES: EOMI, PERRLA, conjunctiva and sclera clear  NECK: Supple, trachea midline, no JVD  HEART: Regular rate and rhythm, no murmurs, rubs, or gallops  LUNGS: Unlabored respirations.  Clear to auscultation bilaterally, no crackles, wheezing, or rhonchi  ABDOMEN: mild distension  EXTREMITIES: LLE ampuation  NERVOUS SYSTEM:  A&Ox3, following commands, moving all extremities, no focal deficits   SKIN: No rashes or lesions    MEDICATIONS:  MEDICATIONS  (STANDING):  amLODIPine   Tablet 10 milliGRAM(s) Oral daily  aspirin  chewable 81 milliGRAM(s) Oral daily  atorvastatin 20 milliGRAM(s) Oral at bedtime  dextrose 5%. 1000 milliLiter(s) (100 mL/Hr) IV Continuous <Continuous>  dextrose 5%. 1000 milliLiter(s) (50 mL/Hr) IV Continuous <Continuous>  dextrose 50% Injectable 25 Gram(s) IV Push once  dextrose 50% Injectable 12.5 Gram(s) IV Push once  dextrose 50% Injectable 25 Gram(s) IV Push once  gabapentin 300 milliGRAM(s) Oral three times a day  glucagon  Injectable 1 milliGRAM(s) IntraMuscular once  heparin   Injectable 5000 Unit(s) SubCutaneous every 8 hours  hydrALAZINE 25 milliGRAM(s) Oral three times a day  insulin glargine Injectable (LANTUS) 25 Unit(s) SubCutaneous at bedtime  insulin lispro (ADMELOG) corrective regimen sliding scale   SubCutaneous three times a day before meals  piperacillin/tazobactam IVPB.. 3.375 Gram(s) IV Intermittent every 8 hours  sodium chloride 0.9%. 1000 milliLiter(s) (100 mL/Hr) IV Continuous <Continuous>    MEDICATIONS  (PRN):  acetaminophen     Tablet .. 650 milliGRAM(s) Oral every 6 hours PRN Temp greater or equal to 38C (100.4F), Mild Pain (1 - 3)  aluminum hydroxide/magnesium hydroxide/simethicone Suspension 30 milliLiter(s) Oral every 4 hours PRN Dyspepsia  dextrose Oral Gel 15 Gram(s) Oral once PRN Blood Glucose LESS THAN 70 milliGRAM(s)/deciliter  melatonin 3 milliGRAM(s) Oral at bedtime PRN Insomnia  ondansetron Injectable 4 milliGRAM(s) IV Push every 8 hours PRN Nausea and/or Vomiting      ALLERGIES:  Allergies    No Known Allergies    Intolerances        LABS:                        15.4   7.97  )-----------( 197      ( 13 Mar 2025 06:45 )             48.5     03-13    133[L]  |  92[L]  |  29[H]  ----------------------------<  298[H]  4.7   |  22  |  1.95[H]    Ca    9.8      13 Mar 2025 06:46  Phos  2.8     03-13  Mg     1.7     03-13    TPro  8.5[H]  /  Alb  3.9  /  TBili  0.5  /  DBili  x   /  AST  20  /  ALT  17  /  AlkPhos  99  03-13    PT/INR - ( 13 Mar 2025 06:45 )   PT: 12.1 sec;   INR: 1.06 ratio         PTT - ( 13 Mar 2025 06:45 )  PTT:26.6 sec  Urinalysis Basic - ( 13 Mar 2025 06:46 )    Color: x / Appearance: x / SG: x / pH: x  Gluc: 298 mg/dL / Ketone: x  / Bili: x / Urobili: x   Blood: x / Protein: x / Nitrite: x   Leuk Esterase: x / RBC: x / WBC x   Sq Epi: x / Non Sq Epi: x / Bacteria: x      ABG:      vBG:  pH, Venous: 7.43 (03-12-25 @ 17:04)  pCO2, Venous: 44 mmHg (03-12-25 @ 17:04)  pO2, Venous: 55 mmHg (03-12-25 @ 17:04)  HCO3, Venous: 29 mmol/L (03-12-25 @ 17:04)  pH, Venous: 7.42 (03-12-25 @ 13:50)  pCO2, Venous: 43 mmHg (03-12-25 @ 13:50)  pO2, Venous: 53 mmHg (03-12-25 @ 13:50)  HCO3, Venous: 28 mmol/L (03-12-25 @ 13:50)    Micro:        RADIOLOGY & ADDITIONAL TESTS: Reviewed.

## 2025-03-13 NOTE — DIETITIAN INITIAL EVALUATION ADULT - ADD RECOMMEND
1) Recommend liberalize to Consistent Carbohydrate, Low Sodium diet as tolerated.   	- Defer texture/consistency to SLP/team.   2) Continue to monitor PO intake, weight, labs, skin, GI status, and diet.

## 2025-03-13 NOTE — PROGRESS NOTE ADULT - ASSESSMENT
65yo m w pmh obesity, htn, hld, dm c/b peripheral neuropathy + retinopathy, pad s/p l bka, cva, chb s/p ppm, p/w generalized fatigue/weakness; in er, found to be meeting severe sepsis criteria, and found to have multiple metabolic disturbances including hyperglycemia, catrina w hagma; suspect 2/2 uti; admit to medicine for further mgmt 63yo m w pmh obesity, htn, hld, dm c/b peripheral neuropathy + retinopathy, pad s/p l bka, cva, chb s/p ppm, p/w generalized fatigue/weakness; in er, found to be meeting severe sepsis criteria, and found to have multiple metabolic disturbances including hyperglycemia, catrina w hagma; suspect 2/2 uti; admit to medicine for further mgmt

## 2025-03-13 NOTE — DIETITIAN INITIAL EVALUATION ADULT - NS FNS DIET ORDER
Diet, DASH/TLC:   Sodium & Cholesterol Restricted  Consistent Carbohydrate {No Snacks} (CSTCHO) (03-13-25 @ 11:09)

## 2025-03-13 NOTE — PROGRESS NOTE ADULT - PROBLEM SELECTOR PLAN 2
Ua with bacteria + pyuria with leukocyte esterase;  s/p zosyn  follow up urine cultures    cont empirical zosyn  antipyretics, antiemetics, analgesics as needed Ua with bacteria + pyuria with leukocyte esterase;    Plan:  -follow up urine cultures    -cont empirical zosyn  -antipyretics, antiemetics, analgesics as needed

## 2025-03-13 NOTE — PROVIDER CONTACT NOTE (OTHER) - ACTION/TREATMENT ORDERED:
MD made aware, pt medicated with Tylenol 650 mg PO X1 as ordered, will recheck temp, will continue to monitor pt.

## 2025-03-13 NOTE — DIETITIAN INITIAL EVALUATION ADULT - NSPROEDAREADYLEARN_GEN_A_NUR
Education deferred at this time; Pt made aware RD remains available PRN. Education deferred by pt at this time; Pt made aware RD remains available PRN.

## 2025-03-13 NOTE — PHYSICAL THERAPY INITIAL EVALUATION ADULT - BALANCE DISTURBANCE, SYSTEM IMPAIRMENT CONTRIBUTE, REHAB EVAL
required frequent verbal cueing for eyes open; pt very lethargic/visual/neuromuscular/musculoskeletal

## 2025-03-13 NOTE — PHYSICAL THERAPY INITIAL EVALUATION ADULT - GENERAL OBSERVATIONS, REHAB EVAL
Upon entry, pt semi-supine in bed in NAD; + IV. Pt left as received with all tubes/lines intact, bed alarm on, call bell in reach and in NAD.

## 2025-03-13 NOTE — PHYSICAL THERAPY INITIAL EVALUATION ADULT - TRANSFER TRAINING, PT EVAL
GOAL: Pt will perform sit to stand with rolling walker and left LE prosthetic independently in 2 weeks.

## 2025-03-13 NOTE — PROGRESS NOTE ADULT - ATTENDING COMMENTS
64M with severe obesity, hypertension, T2DM, PVD s/p L BKA, s/p PPM admitted with severe sepsis thought secondary to UTI.     #Severe sepsis  On basis of fever, HR, leukocytosis, HOLA  - c/w IVF x1 day  - continue with empiric piperacilli/tazobactam, tailor to culture s/s  - f/u blood cultures    #HOLA  Likely ATN from sepsis  - c/w IV fluids as above    #Type 2 diabetes, poorly controlled with hyperglycemia  A1c 9.2, on insulin and multiple oral agents at home  - increase Lantus to 30U qhs  - start Admelog 5U qac

## 2025-03-13 NOTE — PHYSICAL THERAPY INITIAL EVALUATION ADULT - ADDITIONAL COMMENTS
Pt lives in a private house with his wife; there are 3 steps to enter. Steps on the couch on the first floor. Household ambulator with rolling walker independently and community ambulator with rolling walker with assistance from wife.

## 2025-03-13 NOTE — DIETITIAN INITIAL EVALUATION ADULT - PERTINENT MEDS FT
MEDICATIONS  (STANDING):  amLODIPine   Tablet 10 milliGRAM(s) Oral daily  aspirin  chewable 81 milliGRAM(s) Oral daily  atorvastatin 20 milliGRAM(s) Oral at bedtime  dextrose 5%. 1000 milliLiter(s) (100 mL/Hr) IV Continuous <Continuous>  dextrose 5%. 1000 milliLiter(s) (50 mL/Hr) IV Continuous <Continuous>  dextrose 50% Injectable 25 Gram(s) IV Push once  dextrose 50% Injectable 12.5 Gram(s) IV Push once  dextrose 50% Injectable 25 Gram(s) IV Push once  gabapentin 300 milliGRAM(s) Oral three times a day  glucagon  Injectable 1 milliGRAM(s) IntraMuscular once  heparin   Injectable 5000 Unit(s) SubCutaneous every 8 hours  hydrALAZINE 25 milliGRAM(s) Oral three times a day  insulin glargine Injectable (LANTUS) 30 Unit(s) SubCutaneous at bedtime  insulin lispro (ADMELOG) corrective regimen sliding scale   SubCutaneous three times a day before meals  lactated ringers. 1000 milliLiter(s) (100 mL/Hr) IV Continuous <Continuous>  piperacillin/tazobactam IVPB.. 3.375 Gram(s) IV Intermittent every 8 hours  sodium chloride 0.9%. 1000 milliLiter(s) (100 mL/Hr) IV Continuous <Continuous>    MEDICATIONS  (PRN):  acetaminophen     Tablet .. 650 milliGRAM(s) Oral every 6 hours PRN Temp greater or equal to 38C (100.4F), Mild Pain (1 - 3)  aluminum hydroxide/magnesium hydroxide/simethicone Suspension 30 milliLiter(s) Oral every 4 hours PRN Dyspepsia  dextrose Oral Gel 15 Gram(s) Oral once PRN Blood Glucose LESS THAN 70 milliGRAM(s)/deciliter  melatonin 3 milliGRAM(s) Oral at bedtime PRN Insomnia  ondansetron Injectable 4 milliGRAM(s) IV Push every 8 hours PRN Nausea and/or Vomiting

## 2025-03-13 NOTE — DIETITIAN INITIAL EVALUATION ADULT - ORAL INTAKE PTA/DIET HISTORY
Pt reports having a good appetite and PO intake PTA. Follows low sugar diet, monitors BG levels daily. Pt denies any known food allergies or intolerances. Pt takes Multivitamin, vitamin C at home; consumes Glucerna oral nutrition supplements occasionally. Denies any difficulty chewing/swallowing at this time.

## 2025-03-13 NOTE — DIETITIAN INITIAL EVALUATION ADULT - PERTINENT LABORATORY DATA
03-13    133[L]  |  92[L]  |  29[H]  ----------------------------<  298[H]  4.7   |  22  |  1.95[H]    Ca    9.8      13 Mar 2025 06:46  Phos  2.8     03-13  Mg     1.7     03-13    TPro  8.5[H]  /  Alb  3.9  /  TBili  0.5  /  DBili  x   /  AST  20  /  ALT  17  /  AlkPhos  99  03-13  POCT Blood Glucose.: 276 mg/dL (03-13-25 @ 12:44)  A1C with Estimated Average Glucose Result: 9.2 % (03-13-25 @ 06:45)  A1C with Estimated Average Glucose Result: 7.3 % (06-10-24 @ 20:29)

## 2025-03-13 NOTE — PHYSICAL THERAPY INITIAL EVALUATION ADULT - NSPTDISCHREC_GEN_A_CORE
if patient returns home, will require assist for all functional mobility and home PT./Sub-acute Rehab

## 2025-03-13 NOTE — PROGRESS NOTE ADULT - PROBLEM SELECTOR PLAN 1
febrile, leukocytosis, tachycardic, + lactic acidosis; meets severe sepsis criteria  otherwise, hds  suspect 2/2 uti  s/p 2 L LR   trend lactate q4-6h until wnl  follow up blood cultures  Monitor for fever, changes in white count  broad spec empirical abx therapy as described below  ivf resusci + lytes as needed febrile, leukocytosis, tachycardic, + lactic acidosis; meets severe sepsis criteria  suspect 2/2 uti  CXR- no consolidations  s/p 2 L LR     Plan:   -Cw LR @ 100cc/hr  -follow up blood cultures  -trend lactate q4-6h until wnl  -Monitor for fever, changes in white count  - broad spec empirical abx therapy as described below

## 2025-03-13 NOTE — DIETITIAN INITIAL EVALUATION ADULT - ENERGY INTAKE
Poor (<50%) In house, pt reports dislike of institutional food options. Flowsheets indicate pt consumed <25% of meal this AM in setting of weakness.

## 2025-03-13 NOTE — PROGRESS NOTE ADULT - PROBLEM SELECTOR PLAN 7
apresoline  norvasc  hold home vasotec in lieu of catrina Cw hydralazine  cw Amlodipine  hold home Enalopril iso HOLA

## 2025-03-13 NOTE — DIETITIAN INITIAL EVALUATION ADULT - OTHER INFO
- Wt hx:         - UBW: 220 pounds per pt; denies any changes.          - Dosing wt: 220 pounds (3/12)         - No new wts to assess per chart at this time; [facility] (12/17/24, 11/6/24, 9/28/24, 8/1/24).   	-- Stable wt hx noted per chart review.          - RD to continue to monitor weight trends as able.   - Nutritionally Pertinent Meds in-house: Abx, IVF, zofran, magnesium sulfate.   - Nutritionally Pertinent Labs: Hyponatremic.     - Endo:  	- T2DM; regimen PTA: Metformin, Lantus per pt.  	- A1c 9.2% (3/13) - indicates poor glycemic control.  	- BG >400 on admission.   	- SSI, Lantus in-house.     - Neph:  	- HOLA.

## 2025-03-13 NOTE — PHYSICAL THERAPY INITIAL EVALUATION ADULT - PERTINENT HX OF CURRENT PROBLEM, REHAB EVAL
Pt is a 64 year old male with PMH of obesity, HTN, HLD, DM, peripheral neuropathy + retinopathy, PAD, CVA, and CHB presenting with generalized fatigue and weakness as well as high blood glucose levels in the 300's. CXR without focal consolidations. In ED, patient met severe sepsis criteria with multiple metabolic disturbances including hyperglycemia, HOLA, and UTI. Pt admitted for severe sepsis secondary to UTI.

## 2025-03-13 NOTE — PROGRESS NOTE ADULT - PROBLEM SELECTOR PLAN 3
on presentation, BG ~400s, ua w no glucosuria and no ketonuria, bhb wnl, ag 19, hco3 21, ph 7.42  s/p 2 l lr    bg (~300s), ag (15), hco3 (23), ph (7.43) with slight improvement  likely stress hyperglycemia iso sepsis  hold home regimen  fu a1c   trend fingerstick glu  glargine 25u qhs + low dose correctional lispro tidac  adjust to goal bg 100-180  cont home neurontin  carb consistent diet on presentation, BG ~400s,   UA: w no glucosuria and no ketonuria, BHB wnl, with HAGMA  s/p 2L LR  likely stress hyperglycemia iso sepsis    Plan:   -Lantus increased from 25 to 30U this morning + low dose correctional lispro TID  -hold home regimen  -fu a1c   -trend fingerstick glu  - goal inpatient BG- 180  -cont home neurontin  - carb consistent diet

## 2025-03-14 DIAGNOSIS — R14.0 ABDOMINAL DISTENSION (GASEOUS): ICD-10-CM

## 2025-03-14 DIAGNOSIS — E87.1 HYPO-OSMOLALITY AND HYPONATREMIA: ICD-10-CM

## 2025-03-14 LAB
-  AMOXICILLIN/CLAVULANIC ACID: SIGNIFICANT CHANGE UP
-  AMPICILLIN/SULBACTAM: SIGNIFICANT CHANGE UP
-  AMPICILLIN: SIGNIFICANT CHANGE UP
-  AZTREONAM: SIGNIFICANT CHANGE UP
-  CEFAZOLIN: SIGNIFICANT CHANGE UP
-  CEFEPIME: SIGNIFICANT CHANGE UP
-  CEFOXITIN: SIGNIFICANT CHANGE UP
-  CEFTRIAXONE: SIGNIFICANT CHANGE UP
-  CEFUROXIME: SIGNIFICANT CHANGE UP
-  CIPROFLOXACIN: SIGNIFICANT CHANGE UP
-  ERTAPENEM: SIGNIFICANT CHANGE UP
-  GENTAMICIN: SIGNIFICANT CHANGE UP
-  LEVOFLOXACIN: SIGNIFICANT CHANGE UP
-  MEROPENEM: SIGNIFICANT CHANGE UP
-  NITROFURANTOIN: SIGNIFICANT CHANGE UP
-  PIPERACILLIN/TAZOBACTAM: SIGNIFICANT CHANGE UP
-  TOBRAMYCIN: SIGNIFICANT CHANGE UP
-  TRIMETHOPRIM/SULFAMETHOXAZOLE: SIGNIFICANT CHANGE UP
ALBUMIN SERPL ELPH-MCNC: 2.8 G/DL — LOW (ref 3.3–5)
ALP SERPL-CCNC: 100 U/L — SIGNIFICANT CHANGE UP (ref 40–120)
ALT FLD-CCNC: 25 U/L — SIGNIFICANT CHANGE UP (ref 10–45)
ANION GAP SERPL CALC-SCNC: 15 MMOL/L — SIGNIFICANT CHANGE UP (ref 5–17)
APPEARANCE UR: ABNORMAL
AST SERPL-CCNC: 27 U/L — SIGNIFICANT CHANGE UP (ref 10–40)
BACTERIA # UR AUTO: NEGATIVE /HPF — SIGNIFICANT CHANGE UP
BASOPHILS # BLD AUTO: 0 K/UL — SIGNIFICANT CHANGE UP (ref 0–0.2)
BASOPHILS NFR BLD AUTO: 0 % — SIGNIFICANT CHANGE UP (ref 0–2)
BILIRUB SERPL-MCNC: 0.4 MG/DL — SIGNIFICANT CHANGE UP (ref 0.2–1.2)
BILIRUB UR-MCNC: NEGATIVE — SIGNIFICANT CHANGE UP
BUN SERPL-MCNC: 50 MG/DL — HIGH (ref 7–23)
CALCIUM SERPL-MCNC: 8.4 MG/DL — SIGNIFICANT CHANGE UP (ref 8.4–10.5)
CAST: 3 /LPF — SIGNIFICANT CHANGE UP (ref 0–4)
CHLORIDE SERPL-SCNC: 90 MMOL/L — LOW (ref 96–108)
CO2 SERPL-SCNC: 22 MMOL/L — SIGNIFICANT CHANGE UP (ref 22–31)
COLOR SPEC: YELLOW — SIGNIFICANT CHANGE UP
CREAT ?TM UR-MCNC: 178 MG/DL — SIGNIFICANT CHANGE UP
CREAT SERPL-MCNC: 3.74 MG/DL — HIGH (ref 0.5–1.3)
CULTURE RESULTS: ABNORMAL
DIFF PNL FLD: NEGATIVE — SIGNIFICANT CHANGE UP
EGFR: 17 ML/MIN/1.73M2 — LOW
EGFR: 17 ML/MIN/1.73M2 — LOW
EOSINOPHIL # BLD AUTO: 0 K/UL — SIGNIFICANT CHANGE UP (ref 0–0.5)
EOSINOPHIL NFR BLD AUTO: 0 % — SIGNIFICANT CHANGE UP (ref 0–6)
GIANT PLATELETS BLD QL SMEAR: PRESENT — SIGNIFICANT CHANGE UP
GLUCOSE BLDC GLUCOMTR-MCNC: 281 MG/DL — HIGH (ref 70–99)
GLUCOSE BLDC GLUCOMTR-MCNC: 340 MG/DL — HIGH (ref 70–99)
GLUCOSE BLDC GLUCOMTR-MCNC: 350 MG/DL — HIGH (ref 70–99)
GLUCOSE BLDC GLUCOMTR-MCNC: 350 MG/DL — HIGH (ref 70–99)
GLUCOSE SERPL-MCNC: 279 MG/DL — HIGH (ref 70–99)
GLUCOSE UR QL: 250 MG/DL
HCT VFR BLD CALC: 35.2 % — LOW (ref 39–50)
HGB BLD-MCNC: 11.5 G/DL — LOW (ref 13–17)
KETONES UR-MCNC: ABNORMAL MG/DL
LEUKOCYTE ESTERASE UR-ACNC: ABNORMAL
LYMPHOCYTES # BLD AUTO: 0.4 K/UL — LOW (ref 1–3.3)
LYMPHOCYTES # BLD AUTO: 2.6 % — LOW (ref 13–44)
MAGNESIUM SERPL-MCNC: 1.8 MG/DL — SIGNIFICANT CHANGE UP (ref 1.6–2.6)
MANUAL SMEAR VERIFICATION: SIGNIFICANT CHANGE UP
MCHC RBC-ENTMCNC: 27 PG — SIGNIFICANT CHANGE UP (ref 27–34)
MCHC RBC-ENTMCNC: 32.7 G/DL — SIGNIFICANT CHANGE UP (ref 32–36)
MCV RBC AUTO: 82.6 FL — SIGNIFICANT CHANGE UP (ref 80–100)
METAMYELOCYTES # FLD: 0.9 % — HIGH (ref 0–0)
METAMYELOCYTES NFR BLD: 0.9 % — HIGH (ref 0–0)
METHOD TYPE: SIGNIFICANT CHANGE UP
MONOCYTES # BLD AUTO: 0.14 K/UL — SIGNIFICANT CHANGE UP (ref 0–0.9)
MONOCYTES NFR BLD AUTO: 0.9 % — LOW (ref 2–14)
NEUTROPHILS # BLD AUTO: 14.54 K/UL — HIGH (ref 1.8–7.4)
NEUTROPHILS NFR BLD AUTO: 84.2 % — HIGH (ref 43–77)
NEUTS BAND # BLD: 11.4 % — HIGH (ref 0–8)
NEUTS BAND NFR BLD: 11.4 % — HIGH (ref 0–8)
NITRITE UR-MCNC: NEGATIVE — SIGNIFICANT CHANGE UP
ORGANISM # SPEC MICROSCOPIC CNT: ABNORMAL
ORGANISM # SPEC MICROSCOPIC CNT: ABNORMAL
OSMOLALITY SERPL: 299 MOSMOL/KG — SIGNIFICANT CHANGE UP (ref 280–301)
OSMOLALITY UR: 324 MOS/KG — SIGNIFICANT CHANGE UP (ref 300–900)
PH UR: 5 — SIGNIFICANT CHANGE UP (ref 5–8)
PHOSPHATE SERPL-MCNC: 2.4 MG/DL — LOW (ref 2.5–4.5)
PLAT MORPH BLD: ABNORMAL
PLATELET # BLD AUTO: 157 K/UL — SIGNIFICANT CHANGE UP (ref 150–400)
POTASSIUM SERPL-MCNC: 4.3 MMOL/L — SIGNIFICANT CHANGE UP (ref 3.5–5.3)
POTASSIUM SERPL-SCNC: 4.3 MMOL/L — SIGNIFICANT CHANGE UP (ref 3.5–5.3)
POTASSIUM UR-SCNC: 63 MMOL/L — SIGNIFICANT CHANGE UP
PROT ?TM UR-MCNC: 92 MG/DL — HIGH (ref 0–12)
PROT SERPL-MCNC: 6.6 G/DL — SIGNIFICANT CHANGE UP (ref 6–8.3)
PROT UR-MCNC: 100 MG/DL
PROT/CREAT UR-RTO: 0.5 RATIO — HIGH (ref 0–0.2)
RBC # BLD: 4.26 M/UL — SIGNIFICANT CHANGE UP (ref 4.2–5.8)
RBC # FLD: 14.7 % — HIGH (ref 10.3–14.5)
RBC BLD AUTO: SIGNIFICANT CHANGE UP
RBC CASTS # UR COMP ASSIST: 3 /HPF — SIGNIFICANT CHANGE UP (ref 0–4)
REVIEW: SIGNIFICANT CHANGE UP
SODIUM SERPL-SCNC: 127 MMOL/L — LOW (ref 135–145)
SODIUM UR-SCNC: 18 MMOL/L — SIGNIFICANT CHANGE UP
SP GR SPEC: 1.02 — SIGNIFICANT CHANGE UP (ref 1–1.03)
SPECIMEN SOURCE: SIGNIFICANT CHANGE UP
SQUAMOUS # UR AUTO: 10 /HPF — HIGH (ref 0–5)
UROBILINOGEN FLD QL: 0.2 MG/DL — SIGNIFICANT CHANGE UP (ref 0.2–1)
WBC # BLD: 15.21 K/UL — HIGH (ref 3.8–10.5)
WBC # FLD AUTO: 15.21 K/UL — HIGH (ref 3.8–10.5)
WBC UR QL: 12 /HPF — HIGH (ref 0–5)

## 2025-03-14 PROCEDURE — 99233 SBSQ HOSP IP/OBS HIGH 50: CPT | Mod: GC

## 2025-03-14 PROCEDURE — 99222 1ST HOSP IP/OBS MODERATE 55: CPT

## 2025-03-14 PROCEDURE — 76770 US EXAM ABDO BACK WALL COMP: CPT | Mod: 26

## 2025-03-14 PROCEDURE — 74018 RADEX ABDOMEN 1 VIEW: CPT | Mod: 26

## 2025-03-14 RX ORDER — SENNA 187 MG
2 TABLET ORAL DAILY
Refills: 0 | Status: DISCONTINUED | OUTPATIENT
Start: 2025-03-14 | End: 2025-03-26

## 2025-03-14 RX ORDER — TAMSULOSIN HYDROCHLORIDE 0.4 MG/1
0.4 CAPSULE ORAL AT BEDTIME
Refills: 0 | Status: DISCONTINUED | OUTPATIENT
Start: 2025-03-14 | End: 2025-03-17

## 2025-03-14 RX ORDER — INSULIN GLARGINE-YFGN 100 [IU]/ML
35 INJECTION, SOLUTION SUBCUTANEOUS AT BEDTIME
Refills: 0 | Status: DISCONTINUED | OUTPATIENT
Start: 2025-03-14 | End: 2025-03-15

## 2025-03-14 RX ORDER — PIPERACILLIN-TAZO-DEXTROSE,ISO 3.375G/5
3.38 IV SOLUTION, PIGGYBACK PREMIX FROZEN(ML) INTRAVENOUS EVERY 12 HOURS
Refills: 0 | Status: DISCONTINUED | OUTPATIENT
Start: 2025-03-14 | End: 2025-03-15

## 2025-03-14 RX ORDER — MAGNESIUM SULFATE 500 MG/ML
2 SYRINGE (ML) INJECTION ONCE
Refills: 0 | Status: COMPLETED | OUTPATIENT
Start: 2025-03-14 | End: 2025-03-14

## 2025-03-14 RX ORDER — INSULIN LISPRO 100 U/ML
6 INJECTION, SOLUTION INTRAVENOUS; SUBCUTANEOUS
Refills: 0 | Status: DISCONTINUED | OUTPATIENT
Start: 2025-03-14 | End: 2025-03-15

## 2025-03-14 RX ORDER — POLYETHYLENE GLYCOL 3350 17 G/17G
17 POWDER, FOR SOLUTION ORAL
Refills: 0 | Status: DISCONTINUED | OUTPATIENT
Start: 2025-03-14 | End: 2025-03-15

## 2025-03-14 RX ADMIN — SODIUM CHLORIDE 100 MILLILITER(S): 9 INJECTION, SOLUTION INTRAVENOUS at 06:08

## 2025-03-14 RX ADMIN — TAMSULOSIN HYDROCHLORIDE 0.4 MILLIGRAM(S): 0.4 CAPSULE ORAL at 22:03

## 2025-03-14 RX ADMIN — ATORVASTATIN CALCIUM 20 MILLIGRAM(S): 80 TABLET, FILM COATED ORAL at 22:03

## 2025-03-14 RX ADMIN — GABAPENTIN 300 MILLIGRAM(S): 400 CAPSULE ORAL at 13:31

## 2025-03-14 RX ADMIN — GABAPENTIN 300 MILLIGRAM(S): 400 CAPSULE ORAL at 22:03

## 2025-03-14 RX ADMIN — POLYETHYLENE GLYCOL 3350 17 GRAM(S): 17 POWDER, FOR SOLUTION ORAL at 17:02

## 2025-03-14 RX ADMIN — INSULIN LISPRO 2: 100 INJECTION, SOLUTION INTRAVENOUS; SUBCUTANEOUS at 22:04

## 2025-03-14 RX ADMIN — INSULIN LISPRO 8: 100 INJECTION, SOLUTION INTRAVENOUS; SUBCUTANEOUS at 13:42

## 2025-03-14 RX ADMIN — INSULIN LISPRO 6: 100 INJECTION, SOLUTION INTRAVENOUS; SUBCUTANEOUS at 09:03

## 2025-03-14 RX ADMIN — Medication 25 GRAM(S): at 13:42

## 2025-03-14 RX ADMIN — INSULIN LISPRO 8: 100 INJECTION, SOLUTION INTRAVENOUS; SUBCUTANEOUS at 17:37

## 2025-03-14 RX ADMIN — Medication 81 MILLIGRAM(S): at 11:37

## 2025-03-14 RX ADMIN — Medication 63.75 MILLIMOLE(S): at 09:02

## 2025-03-14 RX ADMIN — HEPARIN SODIUM 5000 UNIT(S): 1000 INJECTION INTRAVENOUS; SUBCUTANEOUS at 22:04

## 2025-03-14 RX ADMIN — Medication 25 MILLIGRAM(S): at 22:08

## 2025-03-14 RX ADMIN — INSULIN GLARGINE-YFGN 35 UNIT(S): 100 INJECTION, SOLUTION SUBCUTANEOUS at 22:03

## 2025-03-14 RX ADMIN — GABAPENTIN 300 MILLIGRAM(S): 400 CAPSULE ORAL at 06:07

## 2025-03-14 RX ADMIN — HEPARIN SODIUM 5000 UNIT(S): 1000 INJECTION INTRAVENOUS; SUBCUTANEOUS at 13:33

## 2025-03-14 RX ADMIN — HEPARIN SODIUM 5000 UNIT(S): 1000 INJECTION INTRAVENOUS; SUBCUTANEOUS at 06:07

## 2025-03-14 RX ADMIN — Medication 2 TABLET(S): at 16:58

## 2025-03-14 RX ADMIN — Medication 25 GRAM(S): at 09:02

## 2025-03-14 RX ADMIN — Medication 25 MILLIGRAM(S): at 06:08

## 2025-03-14 RX ADMIN — Medication 25 GRAM(S): at 06:07

## 2025-03-14 RX ADMIN — AMLODIPINE BESYLATE 10 MILLIGRAM(S): 10 TABLET ORAL at 06:07

## 2025-03-14 RX ADMIN — Medication 3 MILLIGRAM(S): at 22:03

## 2025-03-14 NOTE — PROGRESS NOTE ADULT - PROBLEM SELECTOR PLAN 2
Ua with bacteria + pyuria with leukocyte esterase;    Plan:  -follow up urine cultures    -cont empirical zosyn  -antipyretics, antiemetics, analgesics as needed Unclear baseline cr, in 6/2024, cr appeared to fluctuate around ~1.2; Currently, ~1.9  Now with increasing Cr at 3.74    Plan:   nephrology consulted, f/u recs  f/u renal and bladder ultrasound- preliminary read is mild left hydronephrosis  fu urine studies  bladder scan-312  Monitor UO, BUN/Cr, volume status, acid-base balance, electrolytes  avoid nephrotoxic agents  dose adjustments for renally cleared meds Unclear baseline cr, in 6/2024, cr appeared to fluctuate around ~1.2; Currently, ~1.9  Now with increasing Cr at 3.74  Likely pre-renal with c/f ATN    Plan:   nephrology consulted, f/u recs  f/u renal and bladder ultrasound- preliminary read is mild left hydronephrosis  fu urine studies  bladder scan-312  Monitor UO, BUN/Cr, volume status, acid-base balance, electrolytes  avoid nephrotoxic agents  dose adjustments for renally cleared meds

## 2025-03-14 NOTE — PROGRESS NOTE ADULT - PROBLEM SELECTOR PLAN 4
unclear baseline cr; in 6/2024, cr appeared to fluctuate around ~1.2; currently, ~1.9  a/w hagma, bun ~34  fu urine studies; bladder scan as needed  Monitor UO, BUN/Cr, volume status, acid-base balance, electrolytes  avoid nephrotoxic agents  dose adjustments for renally cleared meds  IVF hydration + electrolyte supplementation as needed Bowel distension on physical exam. patient with history of appendectomy.    Plan:   Bowel regimen  f/u abdominal xray

## 2025-03-14 NOTE — PROGRESS NOTE ADULT - PROBLEM SELECTOR PLAN 1
febrile, leukocytosis, tachycardic, + lactic acidosis; meets severe sepsis criteria  suspect 2/2 uti  CXR- no consolidations  s/p 2 L LR     Plan:   -Cw LR @ 100cc/hr  -follow up blood cultures  -trend lactate q4-6h until wnl  -Monitor for fever, changes in white count  - broad spec empirical abx therapy as described below febrile, leukocytosis, tachycardic, + lactic acidosis; meets severe sepsis criteria  suspect 2/2 uti  CXR- no consolidations  s/p 2 L LR     Plan:   -holding fluids iso of hyponatremia. s/p LR @ 100cc/hr  -follow up blood cultures  -trend lactate q4-6h until wnl  -Monitor for fever, changes in white count  - broad spec empirical abx therapy as described below

## 2025-03-14 NOTE — PROGRESS NOTE ADULT - PROBLEM SELECTOR PLAN 5
CW home ASA  cw atorvastatin 20mg Ua with bacteria + pyuria with leukocyte esterase;    Plan:  -follow up urine cultures    -cont empirical zosyn  -antipyretics, antiemetics, analgesics as needed

## 2025-03-14 NOTE — PROGRESS NOTE ADULT - SUBJECTIVE AND OBJECTIVE BOX
INTERVAL HPI/OVERNIGHT EVENTS:    SUBJECTIVE: Patient seen and examined at bedside.     ROS: All negative except as listed above.    VITAL SIGNS:  ICU Vital Signs Last 24 Hrs  T(C): 37.2 (14 Mar 2025 04:28), Max: 38.4 (13 Mar 2025 15:31)  T(F): 98.9 (14 Mar 2025 04:28), Max: 101.2 (13 Mar 2025 15:31)  HR: 100 (14 Mar 2025 04:28) (95 - 104)  BP: 143/73 (14 Mar 2025 04:28) (90/55 - 143/73)  BP(mean): --  ABP: --  ABP(mean): --  RR: 18 (14 Mar 2025 04:28) (18 - 18)  SpO2: 98% (14 Mar 2025 04:28) (93% - 98%)    O2 Parameters below as of 14 Mar 2025 04:28  Patient On (Oxygen Delivery Method): room air            Plateau pressure:   P/F ratio:     03-13 @ 07:01  -  03-14 @ 07:00  --------------------------------------------------------  IN: 1800 mL / OUT: 100 mL / NET: 1700 mL      CAPILLARY BLOOD GLUCOSE      POCT Blood Glucose.: 263 mg/dL (13 Mar 2025 21:37)      ECG: reviewed.    PHYSICAL EXAM:    GENERAL: NAD, lying in bed comfortably  HEAD:  Atraumatic, normocephalic  EYES: EOMI, PERRLA, conjunctiva and sclera clear  NECK: Supple, trachea midline, no JVD  HEART: Regular rate and rhythm, no murmurs, rubs, or gallops  LUNGS: Unlabored respirations.  Clear to auscultation bilaterally, no crackles, wheezing, or rhonchi  ABDOMEN: Soft, nontender, nondistended, +BS  EXTREMITIES: 2+ peripheral pulses bilaterally, cap refill<2 secs. No clubbing, cyanosis, or edema  NERVOUS SYSTEM:  A&Ox3, following commands, moving all extremities, no focal deficits   SKIN: No rashes or lesions    MEDICATIONS:  MEDICATIONS  (STANDING):  amLODIPine   Tablet 10 milliGRAM(s) Oral daily  aspirin  chewable 81 milliGRAM(s) Oral daily  atorvastatin 20 milliGRAM(s) Oral at bedtime  dextrose 5%. 1000 milliLiter(s) (100 mL/Hr) IV Continuous <Continuous>  dextrose 5%. 1000 milliLiter(s) (50 mL/Hr) IV Continuous <Continuous>  dextrose 50% Injectable 25 Gram(s) IV Push once  dextrose 50% Injectable 12.5 Gram(s) IV Push once  dextrose 50% Injectable 25 Gram(s) IV Push once  gabapentin 300 milliGRAM(s) Oral three times a day  glucagon  Injectable 1 milliGRAM(s) IntraMuscular once  heparin   Injectable 5000 Unit(s) SubCutaneous every 8 hours  hydrALAZINE 25 milliGRAM(s) Oral three times a day  insulin glargine Injectable (LANTUS) 30 Unit(s) SubCutaneous at bedtime  insulin lispro (ADMELOG) corrective regimen sliding scale   SubCutaneous three times a day before meals  insulin lispro (ADMELOG) corrective regimen sliding scale   SubCutaneous at bedtime  lactated ringers. 1000 milliLiter(s) (100 mL/Hr) IV Continuous <Continuous>  piperacillin/tazobactam IVPB.. 3.375 Gram(s) IV Intermittent every 8 hours  sodium chloride 0.9%. 1000 milliLiter(s) (100 mL/Hr) IV Continuous <Continuous>    MEDICATIONS  (PRN):  acetaminophen     Tablet .. 650 milliGRAM(s) Oral every 6 hours PRN Temp greater or equal to 38C (100.4F), Mild Pain (1 - 3)  aluminum hydroxide/magnesium hydroxide/simethicone Suspension 30 milliLiter(s) Oral every 4 hours PRN Dyspepsia  dextrose Oral Gel 15 Gram(s) Oral once PRN Blood Glucose LESS THAN 70 milliGRAM(s)/deciliter  melatonin 3 milliGRAM(s) Oral at bedtime PRN Insomnia  ondansetron Injectable 4 milliGRAM(s) IV Push every 8 hours PRN Nausea and/or Vomiting      ALLERGIES:  Allergies    No Known Allergies    Intolerances        LABS:                        11.5   15.21 )-----------( 157      ( 14 Mar 2025 06:46 )             35.2     03-14    127[L]  |  90[L]  |  50[H]  ----------------------------<  279[H]  4.3   |  22  |  3.74[H]    Ca    8.4      14 Mar 2025 06:46  Phos  2.4     03-14  Mg     1.8     03-14    TPro  6.6  /  Alb  2.8[L]  /  TBili  0.4  /  DBili  x   /  AST  27  /  ALT  25  /  AlkPhos  100  03-14    PT/INR - ( 13 Mar 2025 06:45 )   PT: 12.1 sec;   INR: 1.06 ratio         PTT - ( 13 Mar 2025 06:45 )  PTT:26.6 sec  Urinalysis Basic - ( 14 Mar 2025 06:46 )    Color: x / Appearance: x / SG: x / pH: x  Gluc: 279 mg/dL / Ketone: x  / Bili: x / Urobili: x   Blood: x / Protein: x / Nitrite: x   Leuk Esterase: x / RBC: x / WBC x   Sq Epi: x / Non Sq Epi: x / Bacteria: x      ABG:      vBG:    Micro:    Culture - Blood (collected 03-12-25 @ 19:45)  Source: Blood Blood-Peripheral  Preliminary Report (03-14-25 @ 01:02):    No growth at 24 hours    Culture - Blood (collected 03-12-25 @ 19:35)  Source: Blood Blood-Peripheral  Preliminary Report (03-14-25 @ 01:02):    No growth at 24 hours          RADIOLOGY & ADDITIONAL TESTS: Reviewed.   INTERVAL HPI/OVERNIGHT EVENTS:    SUBJECTIVE: Patient seen and examined at bedside. patient complains of fatigue and body aches.    ROS: All negative except as listed above.    VITAL SIGNS:  ICU Vital Signs Last 24 Hrs  T(C): 37.2 (14 Mar 2025 04:28), Max: 38.4 (13 Mar 2025 15:31)  T(F): 98.9 (14 Mar 2025 04:28), Max: 101.2 (13 Mar 2025 15:31)  HR: 100 (14 Mar 2025 04:28) (95 - 104)  BP: 143/73 (14 Mar 2025 04:28) (90/55 - 143/73)  BP(mean): --  ABP: --  ABP(mean): --  RR: 18 (14 Mar 2025 04:28) (18 - 18)  SpO2: 98% (14 Mar 2025 04:28) (93% - 98%)    O2 Parameters below as of 14 Mar 2025 04:28  Patient On (Oxygen Delivery Method): room air            Plateau pressure:   P/F ratio:     03-13 @ 07:01  -  03-14 @ 07:00  --------------------------------------------------------  IN: 1800 mL / OUT: 100 mL / NET: 1700 mL      CAPILLARY BLOOD GLUCOSE      POCT Blood Glucose.: 263 mg/dL (13 Mar 2025 21:37)      ECG: reviewed.    PHYSICAL EXAM:    GENERAL: NAD, lying in bed.  HEAD:  Atraumatic, normocephalic  EYES: EOMI, PERRLA, conjunctiva and sclera clear  NECK: Supple, trachea midline, no JVD  HEART: Regular rate and rhythm, no murmurs, rubs, or gallops  LUNGS: Unlabored respirations.  Clear to auscultation bilaterally, no crackles, wheezing, or rhonchi  ABDOMEN: distention of the abdomen.   EXTREMITIES: amputation of the left lower leg bk  NERVOUS SYSTEM:  A&Ox3, following commands, moving all extremities, no focal deficits   SKIN: No rashes or lesions    MEDICATIONS:  MEDICATIONS  (STANDING):  amLODIPine   Tablet 10 milliGRAM(s) Oral daily  aspirin  chewable 81 milliGRAM(s) Oral daily  atorvastatin 20 milliGRAM(s) Oral at bedtime  dextrose 5%. 1000 milliLiter(s) (100 mL/Hr) IV Continuous <Continuous>  dextrose 5%. 1000 milliLiter(s) (50 mL/Hr) IV Continuous <Continuous>  dextrose 50% Injectable 25 Gram(s) IV Push once  dextrose 50% Injectable 12.5 Gram(s) IV Push once  dextrose 50% Injectable 25 Gram(s) IV Push once  gabapentin 300 milliGRAM(s) Oral three times a day  glucagon  Injectable 1 milliGRAM(s) IntraMuscular once  heparin   Injectable 5000 Unit(s) SubCutaneous every 8 hours  hydrALAZINE 25 milliGRAM(s) Oral three times a day  insulin glargine Injectable (LANTUS) 30 Unit(s) SubCutaneous at bedtime  insulin lispro (ADMELOG) corrective regimen sliding scale   SubCutaneous three times a day before meals  insulin lispro (ADMELOG) corrective regimen sliding scale   SubCutaneous at bedtime  lactated ringers. 1000 milliLiter(s) (100 mL/Hr) IV Continuous <Continuous>  piperacillin/tazobactam IVPB.. 3.375 Gram(s) IV Intermittent every 8 hours  sodium chloride 0.9%. 1000 milliLiter(s) (100 mL/Hr) IV Continuous <Continuous>    MEDICATIONS  (PRN):  acetaminophen     Tablet .. 650 milliGRAM(s) Oral every 6 hours PRN Temp greater or equal to 38C (100.4F), Mild Pain (1 - 3)  aluminum hydroxide/magnesium hydroxide/simethicone Suspension 30 milliLiter(s) Oral every 4 hours PRN Dyspepsia  dextrose Oral Gel 15 Gram(s) Oral once PRN Blood Glucose LESS THAN 70 milliGRAM(s)/deciliter  melatonin 3 milliGRAM(s) Oral at bedtime PRN Insomnia  ondansetron Injectable 4 milliGRAM(s) IV Push every 8 hours PRN Nausea and/or Vomiting      ALLERGIES:  Allergies    No Known Allergies    Intolerances        LABS:                        11.5   15.21 )-----------( 157      ( 14 Mar 2025 06:46 )             35.2     03-14    127[L]  |  90[L]  |  50[H]  ----------------------------<  279[H]  4.3   |  22  |  3.74[H]    Ca    8.4      14 Mar 2025 06:46  Phos  2.4     03-14  Mg     1.8     03-14    TPro  6.6  /  Alb  2.8[L]  /  TBili  0.4  /  DBili  x   /  AST  27  /  ALT  25  /  AlkPhos  100  03-14    PT/INR - ( 13 Mar 2025 06:45 )   PT: 12.1 sec;   INR: 1.06 ratio         PTT - ( 13 Mar 2025 06:45 )  PTT:26.6 sec  Urinalysis Basic - ( 14 Mar 2025 06:46 )    Color: x / Appearance: x / SG: x / pH: x  Gluc: 279 mg/dL / Ketone: x  / Bili: x / Urobili: x   Blood: x / Protein: x / Nitrite: x   Leuk Esterase: x / RBC: x / WBC x   Sq Epi: x / Non Sq Epi: x / Bacteria: x      ABG:      vBG:    Micro:    Culture - Blood (collected 03-12-25 @ 19:45)  Source: Blood Blood-Peripheral  Preliminary Report (03-14-25 @ 01:02):    No growth at 24 hours    Culture - Blood (collected 03-12-25 @ 19:35)  Source: Blood Blood-Peripheral  Preliminary Report (03-14-25 @ 01:02):    No growth at 24 hours          RADIOLOGY & ADDITIONAL TESTS: Reviewed.

## 2025-03-14 NOTE — CONSULT NOTE ADULT - PROBLEM SELECTOR RECOMMENDATION 9
on ?CKD, baseline creatine noted to fluctuate between 0.9 and 1.1 in late 2024 per review of HIE  UA, urine lytes, and UPCR (0.5)  bladder scan with ~300ml urine noted at bedside, prelim renal/bladder sono lists mild L hydro    -patient advised to sit up to allow gravity to a/w bladder emptying    -serial bladder scans (would check Q8)      urine sodium low, urine osm high, with FENa of 0.3 as calculated from BMP on 03/12, strongly indicative prerenal etiology  recent hemodynamics noted (fevers, episode of hypotension on 03/13) which is concerning for evolving ATN  -continue to hold outpatient ACEi  -abd distended, no BM and minimal flatus; d/w primary team, would check Abd Xray, consider stool regimen (xray and PEG ordered after d/w primary team)  -patient currently off IVF.  if patient not tolerating PO, can restart ISOTONIC IVF such as NS (would avoid LR given hyponatremia)  -f/u official read for renal/bladder sono  -strict I/O please  -renally dosed abx for UTI.  f/u UCx speciation/sensitivity, as leukocytosis worsening again.  BlCx NGTD

## 2025-03-14 NOTE — PROGRESS NOTE ADULT - ATTENDING COMMENTS
64M with severe obesity, hypertension, T2DM, PVD s/p L BKA, s/p PPM admitted with severe sepsis thought secondary to UTI.     #Severe sepsis  On basis of fever, HR, leukocytosis, HOLA  - continue with empiric piperacilli/tazobactam, tailor to culture s/s, reduce to q12h dosing given worsening renal function  - f/u blood cultures, urine culture with E. coli    #HOLA  Suspect ATN from sepsis and hypovolemia  - holding further IV fluids today - is eating and drinking appropriately, if needs further boluses will avoid hypotonic fluids given hyponatremia  - appreciate nephrology's evaluation    #Hyponatremia  Corrected sodium low 130's, appreciate nephrology's evaluation will avoid hypotonic fluids if needs further resuscitation    #Type 2 diabetes, poorly controlled with hyperglycemia  A1c 9.2, on insulin and multiple oral agents at home  - will continue to increase Lantus and Admelog as needed to achieve euglycemia

## 2025-03-14 NOTE — PROGRESS NOTE ADULT - PROBLEM SELECTOR PLAN 3
on presentation, BG ~400s,   UA: w no glucosuria and no ketonuria, BHB wnl, with HAGMA  s/p 2L LR  likely stress hyperglycemia iso sepsis    Plan:   -Lantus increased from 25 to 30U this morning + low dose correctional lispro TID  -hold home regimen  -fu a1c   -trend fingerstick glu  - goal inpatient BG- 180  -cont home neurontin  - carb consistent diet Patient states that he has been drinking more during th epast few days. Can also be in setting of SIADH 2/2 sepsis    Plan:   f/u urine lytes + serum osmolality  -f/u nephrology recommendations  -holding isotonic fluids Patient states that he has been drinking more during th epast few days. 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  -holding isotonic fluids

## 2025-03-14 NOTE — PROGRESS NOTE ADULT - ASSESSMENT
65yo m w pmh obesity, htn, hld, dm c/b peripheral neuropathy + retinopathy, pad s/p l bka, cva, chb s/p ppm, p/w generalized fatigue/weakness; in er, found to be meeting severe sepsis criteria, and found to have multiple metabolic disturbances including hyperglycemia, catrina w hagma; suspect 2/2 uti; admit to medicine for further mgmt

## 2025-03-14 NOTE — CONSULT NOTE ADULT - PROBLEM SELECTOR RECOMMENDATION 2
sodium 127 today, corrected for glucose is 130-131  yesterday, sodium corrected for glucose was 136-138  avoid hypotonic fluids for further volume resuscitation  HOLA also likely contributing to evolving hyponatremia

## 2025-03-14 NOTE — PROGRESS NOTE ADULT - PROBLEM SELECTOR PLAN 6
s/p L BKA    cw ASA  cw atorvastatin 20 on presentation, BG ~400s,   UA: w no glucosuria and no ketonuria, BHB wnl, with HAGMA  s/p 2L LR  likely stress hyperglycemia iso sepsis    Plan:   -Lantus increased from 25 to 30U this morning + low dose correctional lispro TID  -hold home regimen  -fu a1c   -trend fingerstick glu  - goal inpatient BG- 180  -cont home neurontin  - carb consistent diet

## 2025-03-14 NOTE — PROGRESS NOTE ADULT - PROBLEM SELECTOR PLAN 9
DIET: CC  Dispo: pending course  DVT: subq heparin Cw hydralazine  cw Amlodipine  hold home Enalopril iso HOLA

## 2025-03-14 NOTE — CONSULT NOTE ADULT - NSCONSULTADDITIONALINFOA_GEN_ALL_CORE
Please do not hesitate to TEAMS Dr. Lopez if further input needed during the day.  For questions Weekdays after 5PM and on weekends, please page the Renal Fellow on call.

## 2025-03-14 NOTE — CONSULT NOTE ADULT - SUBJECTIVE AND OBJECTIVE BOX
[facility] DIVISION OF KIDNEY DISEASE AND HYPERTENSION  [phone]  NEPHROLOGY  INITIAL CONSULT NOTE  --------------------------------------------------------------------------------  HPI:  63yo Male with a PMHx of CKD? baseline creatinine 0.9 to 1.1, episodes of prior HOLA as per family during prior hospitalizations, obesity, HTN, HLD, DM c/b peripheral neuropathy + retinopathy, PAD s/p LBKA, CVA, Complete Heart Block s/p ppm, p/w generalized fatigue/weakness for several days prior to admission.  He denies any associated fever, chills, rigors, or dysuria PT, but does report abdominal discomfort with no BM x 3 days (since PTA) at time of assessment. At home, the patient noted blood sugar levels to be in the 300s, which was unusual to him as they are generally better controlled. patient grew concerned so presented to [facility] ER for further evaluation. In ER, he was found to be meeting severe sepsis criteria, and found to have multiple metabolic disturbances including hyperglycemia, HOLA with HAGMA; suspected 2/2 uti        PAST HISTORY  --------------------------------------------------------------------------------  PAST MEDICAL & SURGICAL HISTORY:  Retinopathy      Diabetes mellitus type 2 in obese      Hyperlipidemia      Toe infection  s/p amputation      Ruptured appendix      S/P appendectomy      Toe amputation status  left pinky toe        FAMILY HISTORY:  Family history of diabetes mellitus in grandmother (Grandparent)      PAST SOCIAL HISTORY:    ALLERGIES & MEDICATIONS  --------------------------------------------------------------------------------  Allergies    No Known Allergies    Intolerances      Standing Inpatient Medications  amLODIPine   Tablet 10 milliGRAM(s) Oral daily  aspirin  chewable 81 milliGRAM(s) Oral daily  atorvastatin 20 milliGRAM(s) Oral at bedtime  dextrose 5%. 1000 milliLiter(s) IV Continuous <Continuous>  dextrose 5%. 1000 milliLiter(s) IV Continuous <Continuous>  dextrose 50% Injectable 25 Gram(s) IV Push once  dextrose 50% Injectable 12.5 Gram(s) IV Push once  dextrose 50% Injectable 25 Gram(s) IV Push once  gabapentin 300 milliGRAM(s) Oral three times a day  glucagon  Injectable 1 milliGRAM(s) IntraMuscular once  heparin   Injectable 5000 Unit(s) SubCutaneous every 8 hours  hydrALAZINE 25 milliGRAM(s) Oral three times a day  insulin glargine Injectable (LANTUS) 30 Unit(s) SubCutaneous at bedtime  insulin lispro (ADMELOG) corrective regimen sliding scale   SubCutaneous three times a day before meals  insulin lispro (ADMELOG) corrective regimen sliding scale   SubCutaneous at bedtime  lactated ringers. 1000 milliLiter(s) IV Continuous <Continuous>  piperacillin/tazobactam IVPB.. 3.375 Gram(s) IV Intermittent every 8 hours  polyethylene glycol 3350 17 Gram(s) Oral two times a day  sodium chloride 0.9%. 1000 milliLiter(s) IV Continuous <Continuous>    PRN Inpatient Medications  acetaminophen     Tablet .. 650 milliGRAM(s) Oral every 6 hours PRN  aluminum hydroxide/magnesium hydroxide/simethicone Suspension 30 milliLiter(s) Oral every 4 hours PRN  dextrose Oral Gel 15 Gram(s) Oral once PRN  melatonin 3 milliGRAM(s) Oral at bedtime PRN  ondansetron Injectable 4 milliGRAM(s) IV Push every 8 hours PRN      REVIEW OF SYSTEMS  --------------------------------------------------------------------------------  General: +fevers, denies rigors. +fatigue  CVS: denies CP/palpitations  Respiratory: denies SOB/+ dry cough  GI: +"bloated" sensation, minimal to no flatus, last BM >3 days ago, denies N/V/diarrhea  : decreased urine output, denies dysuria  Neuro: denies headache/diplopia    All other systems were reviewed and are negative, except as noted.    VITALS/PHYSICAL EXAM  --------------------------------------------------------------------------------  T(C): 37.7 (03-14-25 @ 12:00), Max: 38.4 (03-13-25 @ 15:31)  HR: 79 (03-14-25 @ 12:00) (79 - 104)  BP: 108/52 (03-14-25 @ 12:00) (107/64 - 143/73)  RR: 18 (03-14-25 @ 12:00) (18 - 18)  SpO2: 93% (03-14-25 @ 12:00) (93% - 98%)  Wt(kg): --  Height (cm): 175.3 (03-12-25 @ 21:40)  Weight (kg): 100 (03-12-25 @ 21:40)  BMI (kg/m2): 32.5 (03-12-25 @ 21:40)  BSA (m2): 2.15 (03-12-25 @ 21:40)      03-13-25 @ 07:01  -  03-14-25 @ 07:00  --------------------------------------------------------  IN: 1800 mL / OUT: 100 mL / NET: 1700 mL    03-14-25 @ 07:01  -  03-14-25 @ 14:54  --------------------------------------------------------  IN: 120 mL / OUT: 0 mL / NET: 120 mL      Physical Exam:  	Gen: NAD, lying in bed  	Pulm: CTA B/L ant/lat fields  	CV: RRR, S1S2  	Abd: hypoactive bowel sounds, distended, NT to palpation  	: No suprapubic tenderness, no crain  	Extremity: LBKA, trace pedal edema RLE  	Neuro: A&Ox3    LABS/STUDIES  --------------------------------------------------------------------------------              11.5   15.21 >-----------<  157      [03-14-25 @ 06:46]              35.2     127  |  90  |  50  ----------------------------<  279      [03-14-25 @ 06:46]  4.3   |  22  |  3.74        Ca     8.4     [03-14-25 @ 06:46]      Mg     1.8     [03-14-25 @ 06:46]      Phos  2.4     [03-14-25 @ 06:46]    TPro  6.6  /  Alb  2.8  /  TBili  0.4  /  DBili  x   /  AST  27  /  ALT  25  /  AlkPhos  100  [03-14-25 @ 06:46]    PT/INR: PT 12.1 , INR 1.06       [03-13-25 @ 06:45]  PTT: 26.6       [03-13-25 @ 06:45]          Creatinine Trend:  SCr 3.74 [03-14 @ 06:46]  SCr 1.95 [03-13 @ 06:46]  SCr 1.72 [03-12 @ 17:04]  SCr 1.90 [03-12 @ 14:07]    Urinalysis - [03-14-25 @ 06:46]      Color  / Appearance  / SG  / pH       Gluc 279 / Ketone   / Bili  / Urobili        Blood  / Protein  / Leuk Est  / Nitrite       RBC  / WBC  / Hyaline  / Gran  / Sq Epi  / Non Sq Epi  / Bacteria     Urine Creatinine 82      [03-12-25 @ 19:39]  Urine Protein 40      [03-12-25 @ 19:39]  Urine Sodium 22      [03-12-25 @ 19:39]  Urine Urea Nitrogen 554      [03-12-25 @ 19:39]  Urine Potassium 28      [03-12-25 @ 19:39]  Urine Osmolality 622      [03-12-25 @ 19:39]    Vitamin D (25OH) 33.9      [06-13-24 @ 01:51]  TSH 2.54      [06-11-24 @ 03:28]  Lipid: chol 159, , HDL 39, LDL --      [03-13-25 @ 06:46]    HBsAg Nonreact      [06-12-24 @ 01:10]  HCV 0.09, Nonreact      [06-12-24 @ 01:10]  HIV Nonreact      [06-11-24 @ 12:40]    BIBI: titer Negative, pattern --      [06-12-24 @ 01:10]  C3 Complement 129      [06-12-24 @ 01:10]  C4 Complement 40      [06-12-24 @ 01:10]  ANCA: cANCA Negative, pANCA Negative, atypical ANCA Negative      [06-12-24 @ 01:10]  Syphilis Screen (Treponema Pallidum Ab) Negative      [06-13-24 @ 01:51]  Immunofixation Serum:   No Monoclonal Band Identified      Reference Range: None Detected      [06-12-24 @ 01:10]  SPEP Interpretation: Normal Electrophoresis Pattern      [06-12-24 @ 01:10]      Assessment / Plan:  64yMale

## 2025-03-15 LAB
ALBUMIN SERPL ELPH-MCNC: 2.5 G/DL — LOW (ref 3.3–5)
ALP SERPL-CCNC: 157 U/L — HIGH (ref 40–120)
ALT FLD-CCNC: 50 U/L — HIGH (ref 10–45)
ANION GAP SERPL CALC-SCNC: 17 MMOL/L — SIGNIFICANT CHANGE UP (ref 5–17)
AST SERPL-CCNC: 57 U/L — HIGH (ref 10–40)
BASOPHILS # BLD AUTO: 0.02 K/UL — SIGNIFICANT CHANGE UP (ref 0–0.2)
BASOPHILS NFR BLD AUTO: 0.1 % — SIGNIFICANT CHANGE UP (ref 0–2)
BILIRUB SERPL-MCNC: 0.3 MG/DL — SIGNIFICANT CHANGE UP (ref 0.2–1.2)
BUN SERPL-MCNC: 69 MG/DL — HIGH (ref 7–23)
CALCIUM SERPL-MCNC: 7.9 MG/DL — LOW (ref 8.4–10.5)
CHLORIDE SERPL-SCNC: 86 MMOL/L — LOW (ref 96–108)
CO2 SERPL-SCNC: 21 MMOL/L — LOW (ref 22–31)
CREAT SERPL-MCNC: 4.84 MG/DL — HIGH (ref 0.5–1.3)
EGFR: 13 ML/MIN/1.73M2 — LOW
EGFR: 13 ML/MIN/1.73M2 — LOW
EOSINOPHIL # BLD AUTO: 0.01 K/UL — SIGNIFICANT CHANGE UP (ref 0–0.5)
EOSINOPHIL NFR BLD AUTO: 0 % — SIGNIFICANT CHANGE UP (ref 0–6)
FLUAV AG NPH QL: SIGNIFICANT CHANGE UP
FLUBV AG NPH QL: SIGNIFICANT CHANGE UP
GLUCOSE BLDC GLUCOMTR-MCNC: 291 MG/DL — HIGH (ref 70–99)
GLUCOSE BLDC GLUCOMTR-MCNC: 353 MG/DL — HIGH (ref 70–99)
GLUCOSE BLDC GLUCOMTR-MCNC: 355 MG/DL — HIGH (ref 70–99)
GLUCOSE BLDC GLUCOMTR-MCNC: 375 MG/DL — HIGH (ref 70–99)
GLUCOSE BLDC GLUCOMTR-MCNC: 392 MG/DL — HIGH (ref 70–99)
GLUCOSE SERPL-MCNC: 340 MG/DL — HIGH (ref 70–99)
HCT VFR BLD CALC: 32 % — LOW (ref 39–50)
HGB BLD-MCNC: 10.5 G/DL — LOW (ref 13–17)
IMM GRANULOCYTES NFR BLD AUTO: 0.9 % — SIGNIFICANT CHANGE UP (ref 0–0.9)
LYMPHOCYTES # BLD AUTO: 0.59 K/UL — LOW (ref 1–3.3)
LYMPHOCYTES # BLD AUTO: 2.9 % — LOW (ref 13–44)
MAGNESIUM SERPL-MCNC: 2.4 MG/DL — SIGNIFICANT CHANGE UP (ref 1.6–2.6)
MCHC RBC-ENTMCNC: 26.9 PG — LOW (ref 27–34)
MCHC RBC-ENTMCNC: 32.8 G/DL — SIGNIFICANT CHANGE UP (ref 32–36)
MCV RBC AUTO: 81.8 FL — SIGNIFICANT CHANGE UP (ref 80–100)
MONOCYTES # BLD AUTO: 1.55 K/UL — HIGH (ref 0–0.9)
MONOCYTES NFR BLD AUTO: 7.6 % — SIGNIFICANT CHANGE UP (ref 2–14)
NEUTROPHILS # BLD AUTO: 18.07 K/UL — HIGH (ref 1.8–7.4)
NEUTROPHILS NFR BLD AUTO: 88.5 % — HIGH (ref 43–77)
NRBC BLD AUTO-RTO: 0 /100 WBCS — SIGNIFICANT CHANGE UP (ref 0–0)
PHOSPHATE SERPL-MCNC: 4.3 MG/DL — SIGNIFICANT CHANGE UP (ref 2.5–4.5)
PLATELET # BLD AUTO: 140 K/UL — LOW (ref 150–400)
POTASSIUM SERPL-MCNC: 4.5 MMOL/L — SIGNIFICANT CHANGE UP (ref 3.5–5.3)
POTASSIUM SERPL-SCNC: 4.5 MMOL/L — SIGNIFICANT CHANGE UP (ref 3.5–5.3)
PROT SERPL-MCNC: 6.3 G/DL — SIGNIFICANT CHANGE UP (ref 6–8.3)
RBC # BLD: 3.91 M/UL — LOW (ref 4.2–5.8)
RBC # FLD: 14.9 % — HIGH (ref 10.3–14.5)
RSV RNA NPH QL NAA+NON-PROBE: SIGNIFICANT CHANGE UP
SARS-COV-2 RNA SPEC QL NAA+PROBE: SIGNIFICANT CHANGE UP
SODIUM SERPL-SCNC: 124 MMOL/L — LOW (ref 135–145)
UUN UR-MCNC: 325 MG/DL — SIGNIFICANT CHANGE UP
WBC # BLD: 20.43 K/UL — HIGH (ref 3.8–10.5)
WBC # FLD AUTO: 20.43 K/UL — HIGH (ref 3.8–10.5)

## 2025-03-15 PROCEDURE — 93970 EXTREMITY STUDY: CPT | Mod: 26

## 2025-03-15 PROCEDURE — 51703 INSERT BLADDER CATH COMPLEX: CPT | Mod: 59

## 2025-03-15 PROCEDURE — 71045 X-RAY EXAM CHEST 1 VIEW: CPT | Mod: 26

## 2025-03-15 PROCEDURE — 53600 DILATE URETHRA STRICTURE: CPT

## 2025-03-15 PROCEDURE — 99233 SBSQ HOSP IP/OBS HIGH 50: CPT | Mod: GC

## 2025-03-15 PROCEDURE — 74176 CT ABD & PELVIS W/O CONTRAST: CPT | Mod: 26

## 2025-03-15 PROCEDURE — G0545: CPT

## 2025-03-15 PROCEDURE — 99223 1ST HOSP IP/OBS HIGH 75: CPT

## 2025-03-15 PROCEDURE — 78580 LUNG PERFUSION IMAGING: CPT | Mod: 26

## 2025-03-15 PROCEDURE — 76705 ECHO EXAM OF ABDOMEN: CPT | Mod: 26

## 2025-03-15 PROCEDURE — 71250 CT THORAX DX C-: CPT | Mod: 26

## 2025-03-15 RX ORDER — POLYETHYLENE GLYCOL 3350 17 G/17G
17 POWDER, FOR SOLUTION ORAL
Refills: 0 | Status: DISCONTINUED | OUTPATIENT
Start: 2025-03-15 | End: 2025-03-26

## 2025-03-15 RX ORDER — INSULIN LISPRO 100 U/ML
12 INJECTION, SOLUTION INTRAVENOUS; SUBCUTANEOUS
Refills: 0 | Status: DISCONTINUED | OUTPATIENT
Start: 2025-03-15 | End: 2025-03-17

## 2025-03-15 RX ORDER — MEROPENEM 1 G/30ML
500 INJECTION INTRAVENOUS ONCE
Refills: 0 | Status: COMPLETED | OUTPATIENT
Start: 2025-03-15 | End: 2025-03-15

## 2025-03-15 RX ORDER — INSULIN GLARGINE-YFGN 100 [IU]/ML
45 INJECTION, SOLUTION SUBCUTANEOUS AT BEDTIME
Refills: 0 | Status: DISCONTINUED | OUTPATIENT
Start: 2025-03-15 | End: 2025-03-17

## 2025-03-15 RX ORDER — VANCOMYCIN HCL IN 5 % DEXTROSE 1.5G/250ML
1000 PLASTIC BAG, INJECTION (ML) INTRAVENOUS ONCE
Refills: 0 | Status: COMPLETED | OUTPATIENT
Start: 2025-03-15 | End: 2025-03-15

## 2025-03-15 RX ORDER — MEROPENEM 1 G/30ML
500 INJECTION INTRAVENOUS EVERY 12 HOURS
Refills: 0 | Status: DISCONTINUED | OUTPATIENT
Start: 2025-03-16 | End: 2025-03-18

## 2025-03-15 RX ORDER — ACETAMINOPHEN 500 MG/5ML
1000 LIQUID (ML) ORAL ONCE
Refills: 0 | Status: COMPLETED | OUTPATIENT
Start: 2025-03-15 | End: 2025-03-15

## 2025-03-15 RX ORDER — MEROPENEM 1 G/30ML
INJECTION INTRAVENOUS
Refills: 0 | Status: DISCONTINUED | OUTPATIENT
Start: 2025-03-15 | End: 2025-03-18

## 2025-03-15 RX ORDER — INSULIN LISPRO 100 U/ML
8 INJECTION, SOLUTION INTRAVENOUS; SUBCUTANEOUS
Refills: 0 | Status: DISCONTINUED | OUTPATIENT
Start: 2025-03-15 | End: 2025-03-15

## 2025-03-15 RX ADMIN — GABAPENTIN 300 MILLIGRAM(S): 400 CAPSULE ORAL at 17:46

## 2025-03-15 RX ADMIN — HEPARIN SODIUM 5000 UNIT(S): 1000 INJECTION INTRAVENOUS; SUBCUTANEOUS at 17:46

## 2025-03-15 RX ADMIN — Medication 1000 MILLIGRAM(S): at 04:43

## 2025-03-15 RX ADMIN — INSULIN LISPRO 1: 100 INJECTION, SOLUTION INTRAVENOUS; SUBCUTANEOUS at 22:11

## 2025-03-15 RX ADMIN — MEROPENEM 100 MILLIGRAM(S): 1 INJECTION INTRAVENOUS at 12:37

## 2025-03-15 RX ADMIN — Medication 650 MILLIGRAM(S): at 00:30

## 2025-03-15 RX ADMIN — AMLODIPINE BESYLATE 10 MILLIGRAM(S): 10 TABLET ORAL at 06:44

## 2025-03-15 RX ADMIN — Medication 25 GRAM(S): at 02:44

## 2025-03-15 RX ADMIN — ATORVASTATIN CALCIUM 20 MILLIGRAM(S): 80 TABLET, FILM COATED ORAL at 22:10

## 2025-03-15 RX ADMIN — GABAPENTIN 300 MILLIGRAM(S): 400 CAPSULE ORAL at 06:44

## 2025-03-15 RX ADMIN — INSULIN LISPRO 8 UNIT(S): 100 INJECTION, SOLUTION INTRAVENOUS; SUBCUTANEOUS at 12:36

## 2025-03-15 RX ADMIN — INSULIN LISPRO 10: 100 INJECTION, SOLUTION INTRAVENOUS; SUBCUTANEOUS at 17:45

## 2025-03-15 RX ADMIN — Medication 25 MILLIGRAM(S): at 06:44

## 2025-03-15 RX ADMIN — Medication 81 MILLIGRAM(S): at 12:07

## 2025-03-15 RX ADMIN — Medication 100 MILLILITER(S): at 22:14

## 2025-03-15 RX ADMIN — HEPARIN SODIUM 5000 UNIT(S): 1000 INJECTION INTRAVENOUS; SUBCUTANEOUS at 22:10

## 2025-03-15 RX ADMIN — INSULIN LISPRO 10: 100 INJECTION, SOLUTION INTRAVENOUS; SUBCUTANEOUS at 12:37

## 2025-03-15 RX ADMIN — Medication 650 MILLIGRAM(S): at 03:08

## 2025-03-15 RX ADMIN — Medication 400 MILLIGRAM(S): at 03:18

## 2025-03-15 RX ADMIN — Medication 2 TABLET(S): at 12:06

## 2025-03-15 RX ADMIN — Medication 250 MILLIGRAM(S): at 17:45

## 2025-03-15 RX ADMIN — INSULIN GLARGINE-YFGN 45 UNIT(S): 100 INJECTION, SOLUTION SUBCUTANEOUS at 22:10

## 2025-03-15 RX ADMIN — HEPARIN SODIUM 5000 UNIT(S): 1000 INJECTION INTRAVENOUS; SUBCUTANEOUS at 06:44

## 2025-03-15 RX ADMIN — INSULIN LISPRO 10: 100 INJECTION, SOLUTION INTRAVENOUS; SUBCUTANEOUS at 08:57

## 2025-03-15 RX ADMIN — TAMSULOSIN HYDROCHLORIDE 0.4 MILLIGRAM(S): 0.4 CAPSULE ORAL at 22:10

## 2025-03-15 RX ADMIN — INSULIN LISPRO 12 UNIT(S): 100 INJECTION, SOLUTION INTRAVENOUS; SUBCUTANEOUS at 17:45

## 2025-03-15 RX ADMIN — POLYETHYLENE GLYCOL 3350 17 GRAM(S): 17 POWDER, FOR SOLUTION ORAL at 22:36

## 2025-03-15 RX ADMIN — POLYETHYLENE GLYCOL 3350 17 GRAM(S): 17 POWDER, FOR SOLUTION ORAL at 06:44

## 2025-03-15 NOTE — PROGRESS NOTE ADULT - ATTENDING COMMENTS
I have seen this patient with the fellow and agree with their assessment and plan. I was physically present for significant portions of the evaluation and management (E/M) service provided.  I agree with the above history, physical, and plan which I have reviewed and edited where appropriate.    Terri Sequeira MD  Office   Contact me directly via [employer] Teams     (After 5 pm or on weekends please page the on-call fellow/attending, can check [email or web address] for schedule. Login is shashank [email or web address], schedule under [facility] medicine, psych, derm)

## 2025-03-15 NOTE — PROCEDURE NOTE - ADDITIONAL PROCEDURE DETAILS
Pt with UR and c/o difficulty voiding.   RNs unable to place catheter secondary to meatal stenosis.  Using traditional sterile technique, meatus gently dilated with amplatz about 2cm in depth. After dilation, an 18F coude was placed to the hub with immediate return of yellow urine. 10cc in the balloon. secured with stat lock. tolerated well.

## 2025-03-15 NOTE — PROGRESS NOTE ADULT - ASSESSMENT
63 y/o male with Acute on chronic kidney injury, hyponatremia      HOLA (acute kidney injury):  On ?CKD, baseline creatine noted to fluctuate between 0.9 and 1.1 in late 2024 per review of HIE      -patient advised to sit up to allow gravity to a/w bladder emptying  - Urology is placing the crain today.   -urine sodium low, urine osm high, with FENa of 0.3 as calculated from BMP on 03/12, strongly indicative prerenal etiology  - recent hemodynamics noted (fevers, episode of hypotension on 03/13) which is concerning for evolving ATN  - Mild L hydronephrosis.   No signs of uremia.     PLAN:  -continue to hold outpatient ACEi  -abd distended, no BM and minimal flatus;   - can restart ISOTONIC IVF such as NS (would avoid LR given hyponatremia)  -strict I/O please  -renally dosed abx for UTI.  f/u UCx speciation/sensitivity, as leukocytosis worsening again.  BlCx NGTD.  No urgent indication for HD today. Will continue to assess daily.      Hyponatremia.   sodium 124 - corrected for glucose ~127-128  Likely due to impaired free water clearance and pain due to constipation.   avoid hypotonic fluids for further volume resuscitation   65 y/o male with Acute on chronic kidney injury, hyponatremia      HOLA (acute kidney injury):  On ?CKD, baseline creatine noted to fluctuate between 0.9 and 1.1 in late 2024 per review of HIE      -patient advised to sit up to allow gravity to a/w bladder emptying  - Urology is placing the crain today.   -urine sodium low, urine osm high, with FENa of 0.3 as calculated from BMP on 03/12, strongly indicative prerenal etiology  - recent hemodynamics noted (fevers, episode of hypotension on 03/13) which is concerning for evolving ATN  - Mild L hydronephrosis.   No signs of uremia.     PLAN:  -continue to hold outpatient ACEi  -abd distended, no BM and minimal flatus;   -Can restart ISOTONIC IVF such as NS (would avoid LR given hyponatremia)  -strict I/O please  -Renally dosed abx for UTI.  f/u UCx speciation/sensitivity, as leukocytosis worsening again.  BlCx NGTD.  No urgent indication for HD today. Will continue to assess daily.      Hyponatremia.   sodium 124 - corrected for glucose ~127-128  Likely due to impaired free water clearance and pain due to constipation.   avoid hypotonic fluids for further volume resuscitation

## 2025-03-15 NOTE — PROGRESS NOTE ADULT - PROBLEM SELECTOR PLAN 1
febrile, leukocytosis, tachycardic, + lactic acidosis; meets severe sepsis criteria  suspect 2/2 uti  CXR- no consolidations  s/p 2 L LR     Plan:   -holding fluids iso of hyponatremia. s/p LR @ 100cc/hr  -follow up blood cultures  -trend lactate q4-6h until wnl  -Monitor for fever, changes in white count  - broad spec empirical abx therapy as described below  - Given new fever on 3/15, blood cx ordered febrile, leukocytosis, tachycardic, + lactic acidosis; meets severe sepsis criteria  suspect 2/2 uti  CXR- no consolidations  s/p 2 L LR     Plan:   -NS @ 100cc/hr  -follow up blood cultures  -trend lactate q4-6h until wnl  -Monitor for fever, changes in white count  - Meropenem, 1 time vanco dose. ID consulted.   - Given new fever on 3/15, blood cx ordered  - CT chest, abdomen, pelvis ordered. RUQ US ordered. DVT studies ordered

## 2025-03-15 NOTE — PROGRESS NOTE ADULT - PROBLEM SELECTOR PLAN 6
on presentation, BG ~400s,   UA: w no glucosuria and no ketonuria, BHB wnl, with HAGMA  s/p 2L LR  likely stress hyperglycemia iso sepsis    Plan:   -Lantus increased from 25 to 30U this morning + low dose correctional lispro TID  -hold home regimen  -fu a1c   -trend fingerstick glu  - goal inpatient BG- 180  -cont home neurontin  - carb consistent diet

## 2025-03-15 NOTE — PROGRESS NOTE ADULT - ATTENDING COMMENTS
64M with severe obesity, hypertension, T2DM, PVD s/p L BKA, heart block s/p PPM admitted with severe sepsis of unclear source.    #Severe sepsis  On basis of fever, HR, leukocytosis, HOLA, worsening on 3/15  UA positive and CT C/A/P with perinephric fat stranding. RUQ US pending, Dopplers of legs negative  - appreciate ID input, switching pip/tazo to meropenem, giving 1x dose vancomycin and checking vanc level in AM  - f/u repeat blood cultures, urine culture with E. coli  - MICU consult if becomes hemodynamically unstable    #Acute hypoxemic respiratory failure  Requiring 2L NC on 3/15, suspecting sepsis and atelectasis. No edema or infection on CT chest. Unable to obtain CTA to evaluate for PE due to renal failure. V/Q scan was technically limited.  - wean as able    #HOLA  Suspect ATN from sepsis, relative hypotension  - appreciate nephrology's evaluation  - appreciate urology for Contreras placement, monitoring urine output closely    #Hyponatremia  Corrected for glucose Na ~127 though due to impaired free water excretion and SIADH due to pain    #Type 2 diabetes, poorly controlled with hyperglycemia  A1c 9.2, on insulin and multiple oral agents at home  - will continue to increase Lantus and Admelog as needed to achieve euglycemia, has been requiring escalating doses

## 2025-03-15 NOTE — CONSULT NOTE ADULT - ATTENDING COMMENTS
64M with medical of PAD s/p L BKA, DM with peripheral neuropathy and retinopathy HTN, CVA, complete heart block s/p PPM presented on 3/12 with generalized fatigue. In the ED patient noted to be febrile and tachycardic. Labs notable for leukocytosis, hyponatremia, hyperglycemia, HOLA and metabolic acidosis. UA with many bacteria, negative nitrite and small leukocyte esterase. Urine culture 3/12 >100K proteus mirabilis. Blood culture negative to date. ID asked to help manage.     Work up:  Blood culture 3/12 (-)  Urine Culture 3/12 - >100k proteus mirabilis  Rapid RVP negative  Renal US:  mild hydronephrosis of the left kidney  X-ray abdomen: Mild nonspecific gaseous distention of stomach, with paucity of bowel gas distally. This may be secondary to gastric outlet obstruction.    Antimicrobials:  Zosyn 3/12    #Sepsis - source unclear at this time  #Hypoxemia ?aspiration PNA r/o PE  #Fever  #Leukocytosis  #HOLA  #Transaminitis  #Bacteruria    Recommendations:   -Spiking fevers through Zosyn, would Discontinue Zosyn   -Start Meropenem 500mg IV Q12H  -Would give Vancomycin 1000mg IVx1 - check vanc level in am   -Obtain 2 sets of blood culture  -Check MRSA PCR  -Check sputum culture   -Check full RVP  -Follow up CT chest, A/P  -Obtain RUQ US  -Obtain lower extremity dopplers  -Further testing to r/o PE per primary team    Claudia Mack MD  ID physician  [employer] Teams Preferred  After 5pm/weekends call [phone]

## 2025-03-15 NOTE — PROGRESS NOTE ADULT - SUBJECTIVE AND OBJECTIVE BOX
PROGRESS NOTE:   Authored by Anoop Riggins MD  Internal Medicine      Patient is a 64y old  Male who presents with a chief complaint of generalized fatigue/weakness (14 Mar 2025 14:53)      SUBJECTIVE / OVERNIGHT EVENTS:   Febrile 102 overnight. Hypoxic to 90s, placed on 2L nasal canula. Tylenol given. Blood cultures ordered.     Patient seen and examined at bedside    MEDICATIONS  (STANDING):  amLODIPine   Tablet 10 milliGRAM(s) Oral daily  aspirin  chewable 81 milliGRAM(s) Oral daily  atorvastatin 20 milliGRAM(s) Oral at bedtime  dextrose 5%. 1000 milliLiter(s) (100 mL/Hr) IV Continuous <Continuous>  dextrose 5%. 1000 milliLiter(s) (50 mL/Hr) IV Continuous <Continuous>  dextrose 50% Injectable 25 Gram(s) IV Push once  dextrose 50% Injectable 12.5 Gram(s) IV Push once  dextrose 50% Injectable 25 Gram(s) IV Push once  gabapentin 300 milliGRAM(s) Oral three times a day  glucagon  Injectable 1 milliGRAM(s) IntraMuscular once  heparin   Injectable 5000 Unit(s) SubCutaneous every 8 hours  hydrALAZINE 25 milliGRAM(s) Oral three times a day  insulin glargine Injectable (LANTUS) 35 Unit(s) SubCutaneous at bedtime  insulin lispro (ADMELOG) corrective regimen sliding scale   SubCutaneous three times a day before meals  insulin lispro (ADMELOG) corrective regimen sliding scale   SubCutaneous at bedtime  insulin lispro Injectable (ADMELOG) 6 Unit(s) SubCutaneous three times a day before meals  lactated ringers. 1000 milliLiter(s) (100 mL/Hr) IV Continuous <Continuous>  piperacillin/tazobactam IVPB.. 3.375 Gram(s) IV Intermittent every 12 hours  polyethylene glycol 3350 17 Gram(s) Oral two times a day  senna 2 Tablet(s) Oral daily  sodium chloride 0.9%. 1000 milliLiter(s) (100 mL/Hr) IV Continuous <Continuous>  tamsulosin 0.4 milliGRAM(s) Oral at bedtime    MEDICATIONS  (PRN):  acetaminophen     Tablet .. 650 milliGRAM(s) Oral every 6 hours PRN Temp greater or equal to 38C (100.4F), Mild Pain (1 - 3)  aluminum hydroxide/magnesium hydroxide/simethicone Suspension 30 milliLiter(s) Oral every 4 hours PRN Dyspepsia  dextrose Oral Gel 15 Gram(s) Oral once PRN Blood Glucose LESS THAN 70 milliGRAM(s)/deciliter  melatonin 3 milliGRAM(s) Oral at bedtime PRN Insomnia  ondansetron Injectable 4 milliGRAM(s) IV Push every 8 hours PRN Nausea and/or Vomiting      CAPILLARY BLOOD GLUCOSE      POCT Blood Glucose.: 350 mg/dL (14 Mar 2025 21:48)  POCT Blood Glucose.: 350 mg/dL (14 Mar 2025 17:31)  POCT Blood Glucose.: 340 mg/dL (14 Mar 2025 13:22)  POCT Blood Glucose.: 281 mg/dL (14 Mar 2025 08:41)    I&O's Summary    14 Mar 2025 07:01  -  15 Mar 2025 07:00  --------------------------------------------------------  IN: 320 mL / OUT: 80 mL / NET: 240 mL      PHYSICAL EXAM:    GENERAL: NAD, lying in bed.  HEAD:  Atraumatic, normocephalic  EYES: EOMI, PERRLA, conjunctiva and sclera clear  NECK: Supple, trachea midline, no JVD  HEART: Regular rate and rhythm, no murmurs, rubs, or gallops  LUNGS: Unlabored respirations.  Clear to auscultation bilaterally, no crackles, wheezing, or rhonchi  ABDOMEN: distention of the abdomen.   EXTREMITIES: amputation of the left lower leg bk  NERVOUS SYSTEM:  A&Ox3, following commands, moving all extremities, no focal deficits   SKIN: No rashes or lesions      LABS:                        11.5   15.21 )-----------( 157      ( 14 Mar 2025 06:46 )             35.2     03-14    127[L]  |  90[L]  |  50[H]  ----------------------------<  279[H]  4.3   |  22  |  3.74[H]    Ca    8.4      14 Mar 2025 06:46  Phos  2.4     03-14  Mg     1.8     03-14    TPro  6.6  /  Alb  2.8[L]  /  TBili  0.4  /  DBili  x   /  AST  27  /  ALT  25  /  AlkPhos  100  03-14          Urinalysis Basic - ( 14 Mar 2025 21:03 )    Color: Yellow / Appearance: Cloudy / S.019 / pH: x  Gluc: x / Ketone: Trace mg/dL  / Bili: Negative / Urobili: 0.2 mg/dL   Blood: x / Protein: 100 mg/dL / Nitrite: Negative   Leuk Esterase: Moderate / RBC: 3 /HPF / WBC 12 /HPF   Sq Epi: x / Non Sq Epi: 10 /HPF / Bacteria: Negative /HPF        Culture - Blood (collected 12 Mar 2025 19:45)  Source: Blood Blood-Peripheral  Preliminary Report (15 Mar 2025 01:01):    No growth at 48 Hours    Culture - Blood (collected 12 Mar 2025 19:35)  Source: Blood Blood-Peripheral  Preliminary Report (15 Mar 2025 01:01):    No growth at 48 Hours    Culture - Urine (collected 12 Mar 2025 16:22)  Source: Clean Catch Clean Catch (Midstream)  Final Report (14 Mar 2025 22:32):    >100,000 CFU/ml Proteus mirabilis    <10,000 CFU/ml Normal Urogenital wil present  Organism: Proteus mirabilis (14 Mar 2025 22:32)  Organism: Proteus mirabilis (14 Mar 2025 22:32)        RADIOLOGY & ADDITIONAL TESTS:  Results Reviewed:   Imaging Personally Reviewed:  Electrocardiogram Personally Reviewed:    COORDINATION OF CARE:  Care Discussed with Consultants/Other Providers [Y/N]: Y  Prior or Outpatient Records Reviewed [Y/N]: Y   PROGRESS NOTE:   Authored by Anoop Riggins MD  Internal Medicine      Patient is a 64y old  Male who presents with a chief complaint of generalized fatigue/weakness (14 Mar 2025 14:53)      SUBJECTIVE / OVERNIGHT EVENTS:   Febrile 102 overnight. Hypoxic to 90s, placed on 2L nasal canula. Tylenol given. Blood cultures ordered.     Patient seen and examined at bedside  Feeling lethargic. No Pain. No worsening SOB    MEDICATIONS  (STANDING):  amLODIPine   Tablet 10 milliGRAM(s) Oral daily  aspirin  chewable 81 milliGRAM(s) Oral daily  atorvastatin 20 milliGRAM(s) Oral at bedtime  dextrose 5%. 1000 milliLiter(s) (100 mL/Hr) IV Continuous <Continuous>  dextrose 5%. 1000 milliLiter(s) (50 mL/Hr) IV Continuous <Continuous>  dextrose 50% Injectable 25 Gram(s) IV Push once  dextrose 50% Injectable 12.5 Gram(s) IV Push once  dextrose 50% Injectable 25 Gram(s) IV Push once  gabapentin 300 milliGRAM(s) Oral three times a day  glucagon  Injectable 1 milliGRAM(s) IntraMuscular once  heparin   Injectable 5000 Unit(s) SubCutaneous every 8 hours  hydrALAZINE 25 milliGRAM(s) Oral three times a day  insulin glargine Injectable (LANTUS) 35 Unit(s) SubCutaneous at bedtime  insulin lispro (ADMELOG) corrective regimen sliding scale   SubCutaneous three times a day before meals  insulin lispro (ADMELOG) corrective regimen sliding scale   SubCutaneous at bedtime  insulin lispro Injectable (ADMELOG) 6 Unit(s) SubCutaneous three times a day before meals  lactated ringers. 1000 milliLiter(s) (100 mL/Hr) IV Continuous <Continuous>  piperacillin/tazobactam IVPB.. 3.375 Gram(s) IV Intermittent every 12 hours  polyethylene glycol 3350 17 Gram(s) Oral two times a day  senna 2 Tablet(s) Oral daily  sodium chloride 0.9%. 1000 milliLiter(s) (100 mL/Hr) IV Continuous <Continuous>  tamsulosin 0.4 milliGRAM(s) Oral at bedtime    MEDICATIONS  (PRN):  acetaminophen     Tablet .. 650 milliGRAM(s) Oral every 6 hours PRN Temp greater or equal to 38C (100.4F), Mild Pain (1 - 3)  aluminum hydroxide/magnesium hydroxide/simethicone Suspension 30 milliLiter(s) Oral every 4 hours PRN Dyspepsia  dextrose Oral Gel 15 Gram(s) Oral once PRN Blood Glucose LESS THAN 70 milliGRAM(s)/deciliter  melatonin 3 milliGRAM(s) Oral at bedtime PRN Insomnia  ondansetron Injectable 4 milliGRAM(s) IV Push every 8 hours PRN Nausea and/or Vomiting      CAPILLARY BLOOD GLUCOSE      POCT Blood Glucose.: 350 mg/dL (14 Mar 2025 21:48)  POCT Blood Glucose.: 350 mg/dL (14 Mar 2025 17:31)  POCT Blood Glucose.: 340 mg/dL (14 Mar 2025 13:22)  POCT Blood Glucose.: 281 mg/dL (14 Mar 2025 08:41)    I&O's Summary    14 Mar 2025 07:01  -  15 Mar 2025 07:00  --------------------------------------------------------  IN: 320 mL / OUT: 80 mL / NET: 240 mL      PHYSICAL EXAM:    GENERAL: NAD, lying in bed.  HEAD:  Atraumatic, normocephalic  EYES: EOMI, PERRLA, conjunctiva and sclera clear  NECK: Supple, trachea midline, no JVD  HEART: Regular rate and rhythm, no murmurs, rubs, or gallops  LUNGS: Unlabored respirations.  Clear to auscultation bilaterally, no crackles, wheezing, or rhonchi  ABDOMEN: distention of the abdomen.   EXTREMITIES: amputation of the left lower leg bk  NERVOUS SYSTEM:  A&Ox3, following commands, moving all extremities, no focal deficits   SKIN: No rashes or lesions      LABS:                        11.5   15.21 )-----------( 157      ( 14 Mar 2025 06:46 )             35.2     03-14    127[L]  |  90[L]  |  50[H]  ----------------------------<  279[H]  4.3   |  22  |  3.74[H]    Ca    8.4      14 Mar 2025 06:46  Phos  2.4     03-14  Mg     1.8     03-14    TPro  6.6  /  Alb  2.8[L]  /  TBili  0.4  /  DBili  x   /  AST  27  /  ALT  25  /  AlkPhos  100  -14          Urinalysis Basic - ( 14 Mar 2025 21:03 )    Color: Yellow / Appearance: Cloudy / S.019 / pH: x  Gluc: x / Ketone: Trace mg/dL  / Bili: Negative / Urobili: 0.2 mg/dL   Blood: x / Protein: 100 mg/dL / Nitrite: Negative   Leuk Esterase: Moderate / RBC: 3 /HPF / WBC 12 /HPF   Sq Epi: x / Non Sq Epi: 10 /HPF / Bacteria: Negative /HPF        Culture - Blood (collected 12 Mar 2025 19:45)  Source: Blood Blood-Peripheral  Preliminary Report (15 Mar 2025 01:01):    No growth at 48 Hours    Culture - Blood (collected 12 Mar 2025 19:35)  Source: Blood Blood-Peripheral  Preliminary Report (15 Mar 2025 01:01):    No growth at 48 Hours    Culture - Urine (collected 12 Mar 2025 16:22)  Source: Clean Catch Clean Catch (Midstream)  Final Report (14 Mar 2025 22:32):    >100,000 CFU/ml Proteus mirabilis    <10,000 CFU/ml Normal Urogenital wil present  Organism: Proteus mirabilis (14 Mar 2025 22:32)  Organism: Proteus mirabilis (14 Mar 2025 22:32)        RADIOLOGY & ADDITIONAL TESTS:  Results Reviewed:   Imaging Personally Reviewed:  Electrocardiogram Personally Reviewed:    COORDINATION OF CARE:  Care Discussed with Consultants/Other Providers [Y/N]: Y  Prior or Outpatient Records Reviewed [Y/N]: Y

## 2025-03-15 NOTE — PROGRESS NOTE ADULT - PROBLEM SELECTOR PLAN 4
Bowel distension on physical exam. patient with history of appendectomy.    Plan:   Bowel regimen  f/u abdominal xray

## 2025-03-15 NOTE — CONSULT NOTE ADULT - ASSESSMENT
Impression/Hospital Course: 64M with medical of PAD s/p L BKA, DM with peripheral neuropathy and retinopathy HTN, CVA, complete heart block s/p PPM presented on 3/12 with generalized fatigue. In the ED patient noted to be febrile and tachycardic. Labs notable for leukocytosis, hyponatremia, hyperglycemia, HOLA and metabolic acidosis. UA with many bacteria, negative nitrite and small leukocyte esterase. Urine culture 3/12 >100K proteus mirabilis. Blood culture negative to date. ID asked to help manage.     Work up:  Blood culture 3/12 (-)  Urine Culture 3/12 - >100k proteus mirabilis  Rapid RVP negative  Renal US:  mild hydronephrosis of the left kidney  X-ray abdomen: Mild nonspecific gaseous distention of stomach, with paucity of bowel gas distally. This may be secondary to gastric outlet obstruction.    Antimicrobials:  -Zosyn 3/12    Assessment:  #Sepsis  #Fever  #Leukocytosis  #HOLA      Recommendations: PLEASE DEFER ALL CHANGES IN PLAN UNTIL SIGNED BY ATTENDING. All recommendations are tentative pending Attending Attestation.      Marie Mc DO, PGY-4  Infectious Disease Fellow  [employer] Preferred  After 5pm/weekends call [phone]   Impression/Hospital Course: 64M with medical of PAD s/p L BKA, DM with peripheral neuropathy and retinopathy HTN, CVA, complete heart block s/p PPM presented on 3/12 with generalized fatigue. In the ED patient noted to be febrile and tachycardic. Labs notable for leukocytosis, hyponatremia, hyperglycemia, HOLA and metabolic acidosis. UA with many bacteria, negative nitrite and small leukocyte esterase. Urine culture 3/12 >100K proteus mirabilis. Blood culture negative to date. ID asked to help manage.     Work up:  Blood culture 3/12 (-)  Urine Culture 3/12 - >100k proteus mirabilis  Rapid RVP negative  Renal US:  mild hydronephrosis of the left kidney  X-ray abdomen: Mild nonspecific gaseous distention of stomach, with paucity of bowel gas distally. This may be secondary to gastric outlet obstruction.    Antimicrobials:  Zosyn 3/12    Assessment:  #Sepsis - source unclear at this time  #Fever  #Leukocytosis  #HOLA  Transaminitis  #Bacteruria      Recommendations:   -Discontinue Zosyn   -Start Meropenem 500mg IV Q12H  -Would give Vancomycin 1000mg IVx1  -Obtain 2 sets of blood culture  -Check MRSA PCR  -Check sputum culture   -Check full RVP  -Follow up CT chest, A/P  -Obtain RUQ US  -Obtain lower extremity dopplers  -Further testing to r/o PE per primary team     Case discussed with attending. Recommendations were made to the primary team.     Marie Mc DO, PGY-4  Infectious Disease Fellow  [employer] Preferred  After 5pm/weekends call [phone]   Impression/Hospital Course: 64M with medical of PAD s/p L BKA, DM with peripheral neuropathy and retinopathy HTN, CVA, complete heart block s/p PPM presented on 3/12 with generalized fatigue. In the ED patient noted to be febrile and tachycardic. Labs notable for leukocytosis, hyponatremia, hyperglycemia, HOLA and metabolic acidosis. UA with many bacteria, negative nitrite and small leukocyte esterase. Urine culture 3/12 >100K proteus mirabilis. Blood culture negative to date. ID asked to help manage.     Work up:  Blood culture 3/12 (-)  Urine Culture 3/12 - >100k proteus mirabilis  Rapid RVP negative  Renal US:  mild hydronephrosis of the left kidney  X-ray abdomen: Mild nonspecific gaseous distention of stomach, with paucity of bowel gas distally. This may be secondary to gastric outlet obstruction.    Antimicrobials:  Zosyn 3/12    Assessment:  #Sepsis - source unclear at this time  #Hypoxemia ?aspiration PNA r/o PE  #Fever  #Leukocytosis  #HOLA  Transaminitis  #Bacteruria      Recommendations:   -Discontinue Zosyn   -Start Meropenem 500mg IV Q12H  -Would give Vancomycin 1000mg IVx1 - check vanc level in am   -Obtain 2 sets of blood culture  -Check MRSA PCR  -Check sputum culture   -Check full RVP  -Follow up CT chest, A/P  -Obtain RUQ US  -Obtain lower extremity dopplers  -Further testing to r/o PE per primary team     Case discussed with attending. Recommendations were made to the primary team.     Marie Mc DO, PGY-4  Infectious Disease Fellow  [employer] Preferred  After 5pm/weekends call [phone]

## 2025-03-15 NOTE — CONSULT NOTE ADULT - SUBJECTIVE AND OBJECTIVE BOX
INFECTIOUS DISEASE CONSULT NOTE    Patient is a 64y old  Male who presents with a chief complaint of generalized fatigue/weakness (15 Mar 2025 07:21)    HPI: 64M with medical of PAD s/p L BKA, DM with peripheral neuropathy and retinopathy HTN, CVA, complete heart block s/p PPM presented on 3/12 with generalized fatigue. In the ED patient noted to be febrile and tachycardic. Labs notable for leukocytosis, hyponatremia, hyperglycemia, HOLA and metabolic acidosis. UA with many bacteria, negative nitrite and small leukocyte esterase. Urine culture 3/12 >100K proteus mirabilis. Blood culture negative to date. ID asked to help manage.        REVIEW OF SYSTEMS:      Prior hospital charts reviewed [Yes]  Primary team notes reviewed [Yes]  Other consultant notes reviewed [Yes]    PAST MEDICAL & SURGICAL HISTORY:  Retinopathy  Diabetes mellitus type 2 in obese  Hyperlipidemia  Toe infection s/p amputation  Ruptured appendix  S/P appendectomy  Toe amputation status  left pinky toe          SOCIAL HISTORY:      FAMILY HISTORY:  Family history of diabetes mellitus in grandmother (Grandparent)        Allergies:  No Known Allergies      ANTIMICROBIALS:  piperacillin/tazobactam IVPB.. 3.375 every 12 hours      ANTIMICROBIALS (past 90 days):  MEDICATIONS  (STANDING):  piperacillin/tazobactam IVPB.   200 mL/Hr IV Intermittent (03-12-25 @ 20:36)    piperacillin/tazobactam IVPB.-   25 mL/Hr IV Intermittent (03-12-25 @ 23:34)    piperacillin/tazobactam IVPB..   25 mL/Hr IV Intermittent (03-15-25 @ 02:44)    piperacillin/tazobactam IVPB..   25 mL/Hr IV Intermittent (03-14-25 @ 13:42)   25 mL/Hr IV Intermittent (03-14-25 @ 06:07)   25 mL/Hr IV Intermittent (03-13-25 @ 21:49)   25 mL/Hr IV Intermittent (03-13-25 @ 13:01)   25 mL/Hr IV Intermittent (03-13-25 @ 05:27)        OTHER MEDS:   MEDICATIONS  (STANDING):  acetaminophen     Tablet .. 650 every 6 hours PRN  aluminum hydroxide/magnesium hydroxide/simethicone Suspension 30 every 4 hours PRN  amLODIPine   Tablet 10 daily  aspirin  chewable 81 daily  atorvastatin 20 at bedtime  dextrose 50% Injectable 25 once  dextrose 50% Injectable 12.5 once  dextrose 50% Injectable 25 once  dextrose Oral Gel 15 once PRN  gabapentin 300 three times a day  glucagon  Injectable 1 once  heparin   Injectable 5000 every 8 hours  hydrALAZINE 25 three times a day  insulin glargine Injectable (LANTUS) 45 at bedtime  insulin lispro (ADMELOG) corrective regimen sliding scale  three times a day before meals  insulin lispro (ADMELOG) corrective regimen sliding scale  at bedtime  insulin lispro Injectable (ADMELOG) 8 three times a day before meals  melatonin 3 at bedtime PRN  ondansetron Injectable 4 every 8 hours PRN  polyethylene glycol 3350 17 two times a day  senna 2 daily  tamsulosin 0.4 at bedtime      VITALS:  Vital Signs Last 24 Hrs  T(F): 100 (03-15-25 @ 04:43), Max: 103.9 (03-13-25 @ 05:08)    Vital Signs Last 24 Hrs  HR: 65 (03-15-25 @ 04:43) (65 - 114)  BP: 101/59 (03-15-25 @ 04:43) (101/59 - 137/70)  RR: 18 (03-15-25 @ 04:43)  SpO2: 94% (03-15-25 @ 04:43) (90% - 95%)  Wt(kg): --    EXAM:      Labs:                        10.5   20.43 )-----------( 140      ( 15 Mar 2025 07:27 )             32.0     03-15    124[L]  |  86[L]  |  69[H]  ----------------------------<  340[H]  4.5   |  21[L]  |  4.84[H]    Ca    7.9[L]      15 Mar 2025 07:28  Phos  4.3     03-15  Mg     2.4     03-15    TPro  6.3  /  Alb  2.5[L]  /  TBili  0.3  /  DBili  x   /  AST  57[H]  /  ALT  50[H]  /  AlkPhos  157[H]  03-15      WBC Trend:  WBC Count: 20.43 (03-15-25 @ 07:27)  WBC Count: 15.21 (03-14-25 @ 06:46)  WBC Count: 7.97 (03-13-25 @ 06:45)  WBC Count: 20.21 (03-12-25 @ 17:04)      Auto Neutrophil #: 8.32 K/uL (09-25-24 @ 15:02)  Auto Neutrophil #: 8.25 K/uL (06-14-24 @ 00:42)  Auto Neutrophil #: 9.83 K/uL (06-13-24 @ 01:51)  Auto Neutrophil #: 19.25 K/uL (06-12-24 @ 01:10)  Band Neutrophils %: 1.0 % (06-12-24 @ 01:10)      Creatine Trend:  Creatinine: 4.84 (03-15)  Creatinine: 3.74 (03-14)  Creatinine: 1.95 (03-13)  Creatinine: 1.72 (03-12)      Liver Biochemical Testing Trend:  Alanine Aminotransferase (ALT/SGPT): 50 *H* (03-15)  Alanine Aminotransferase (ALT/SGPT): 25 (03-14)  Alanine Aminotransferase (ALT/SGPT): 17 (03-13)  Alanine Aminotransferase (ALT/SGPT): 10 (03-12)  Alanine Aminotransferase (ALT/SGPT): 11 (03-12)  Aspartate Aminotransferase (AST/SGOT): 57 (03-15-25 @ 07:28)  Aspartate Aminotransferase (AST/SGOT): 27 (03-14-25 @ 06:46)  Aspartate Aminotransferase (AST/SGOT): 20 (03-13-25 @ 06:46)  Aspartate Aminotransferase (AST/SGOT): 12 (03-12-25 @ 17:04)  Aspartate Aminotransferase (AST/SGOT): 15 (03-12-25 @ 14:07)  Bilirubin Total: 0.3 (03-15)  Bilirubin Total: 0.4 (03-14)  Bilirubin Total: 0.5 (03-13)  Bilirubin Total: 0.3 (03-12)  Bilirubin Total: 0.4 (03-12)      Trend LDH          Urinalysis Basic - ( 15 Mar 2025 07:28 )    Color: x / Appearance: x / SG: x / pH: x  Gluc: 340 mg/dL / Ketone: x  / Bili: x / Urobili: x   Blood: x / Protein: x / Nitrite: x   Leuk Esterase: x / RBC: x / WBC x   Sq Epi: x / Non Sq Epi: x / Bacteria: x        MICROBIOLOGY:        Culture - Blood (collected 12 Mar 2025 19:45)  Source: Blood Blood-Peripheral  Preliminary Report:    No growth at 48 Hours    Culture - Blood (collected 12 Mar 2025 19:35)  Source: Blood Blood-Peripheral  Preliminary Report:    No growth at 48 Hours    Culture - Urine (collected 12 Mar 2025 16:22)  Source: Clean Catch Clean Catch (Midstream)  Final Report:    >100,000 CFU/ml Proteus mirabilis    <10,000 CFU/ml Normal Urogenital wil present  Organism: Proteus mirabilis  Organism: Proteus mirabilis    Sensitivities:      Method Type: ARMEN      -  Amoxicillin/Clavulanic Acid: S <=8/4      -  Ampicillin: S <=8 These ampicillin results predict results for amoxicillin      -  Ampicillin/Sulbactam: S <=4/2      -  Aztreonam: S <=4      -  Cefazolin: S <=2 For uncomplicated UTI with K. pneumoniae, E. coli, or P. mirablis: ARMEN <=16 is sensitive and ARMEN >=32 is resistant. This also predicts results for oral agents cefaclor, cefdinir, cefpodoxime, cefprozil, cefuroxime axetil, cephalexin and locarbef for uncomplicated UTI. Note that some isolates may be susceptible to these agents while testing resistant to cefazolin.      -  Cefepime: S <=2      -  Cefoxitin: S <=8      -  Ceftriaxone: S <=1      -  Cefuroxime: S <=4      -  Ciprofloxacin: S <=0.25      -  Ertapenem: S <=0.5      -  Gentamicin: S <=2      -  Levofloxacin: S <=0.5      -  Meropenem: S <=1      -  Nitrofurantoin: R >64 Should not be used to treat pyelonephritis      -  Piperacillin/Tazobactam: S <=8      -  Tobramycin: S <=2      -  Trimethoprim/Sulfamethoxazole: S <=0.5/9.5    Culture - Blood (collected 10 Yonathan 2024 13:00)  Source: .Blood Blood-Peripheral  Final Report:    No growth at 5 days    Culture - Blood (collected 10 Yonathan 2024 12:45)  Source: .Blood Blood-Peripheral  Final Report:    No growth at 5 days    Culture - Urine (collected 21 Dec 2023 13:00)  Source: Clean Catch Clean Catch (Midstream)  Final Report:    10,000 - 49,000 CFU/mL Escherichia coli  Organism: Escherichia coli  Organism: Escherichia coli    Sensitivities:      Method Type: ARMEN      -  Amoxicillin/Clavulanic Acid: I 16/8      -  Ampicillin: R >16 These ampicillin results predict results for amoxicillin      -  Ampicillin/Sulbactam: R >16/8      -  Aztreonam: S <=4      -  Cefazolin: S 16 For uncomplicated UTI with K. pneumoniae, E. coli, or P. mirablis: ARMEN <=16 is sensitive and ARMEN >=32 is resistant. This also predicts results for oral agents cefaclor, cefdinir, cefpodoxime, cefprozil, cefuroxime axetil, cephalexin and locarbeffor uncomplicated UTI. Note that some isolates may be susceptible to these agents while testing resistant to cefazolin.      -  Cefepime: S <=2      -  Cefoxitin: S <=8      -  Ceftriaxone: S <=1      -  Cefuroxime: S <=4      -  Ciprofloxacin: R 2      -  Ertapenem: S <=0.5      -  Gentamicin: S <=2      -  Imipenem: S <=1      -  Levofloxacin: R 4      -  Meropenem: S <=1      -  Nitrofurantoin: S <=32 Should not be used to treat pyelonephritis      -  Piperacillin/Tazobactam: S <=8      -  Tobramycin: S <=2      -  Trimethoprim/Sulfamethoxazole: S <=0.5/9.5    Culture - Blood (collected 04 Dec 2023 05:49)  Source: .Blood Blood-Peripheral  Final Report:    No growth at 5 days    Culture - Blood (collected 04 Dec 2023 05:49)  Source: .Blood Blood-Peripheral  Final Report:    No growth at 5 days    Culture - Other (collected 30 Nov 2023 06:27)  Source: .Other Other  Final Report:    Numerous Citrobacter koseri    Moderate Enterococcus faecalis  Organism: Citrobacter koseri  Enterococcus faecalis  Organism: Enterococcus faecalis    Sensitivities:      Method Type: ARMEN      -  Ampicillin: S <=2 Predicts results to ampicillin/sulbactam, amoxacillin-clavulanate and  piperacillin-tazobactam.      -  Tetracycline: R >8      -  Vancomycin: S 2  Organism: Citrobacter koseri    Sensitivities:      Method Type: ARMEN      -  Amoxicillin/Clavulanic Acid: S <=8/4      -  Ampicillin: R >16 These ampicillin results predict results for amoxicillin      -  Ampicillin/Sulbactam: S <=4/2      -  Aztreonam: S <=4      -  Cefazolin: S <=2      -  Cefepime: S <=2      -  Cefoxitin: S <=8      -  Ceftriaxone: S <=1      -  Ciprofloxacin: S <=0.25      -  Ertapenem: S <=0.5      -  Gentamicin: S <=2      -  Imipenem: S <=1      -  Levofloxacin: S <=0.5      -  Meropenem: S <=1      -  Piperacillin/Tazobactam: S <=8      -  Tobramycin: S <=2      -  Trimethoprim/Sulfamethoxazole: S <=0.5/9.5    Culture - Blood (collected 29 Nov 2023 18:23)  Source: .Blood Blood-Venous  Final Report:    No growth at 5 days                                              Blood Gas Venous - Lactate: 2.6 (03-12 @ 17:04)  Blood Gas Venous - Lactate: 4.4 (03-12 @ 13:50)    A1C with Estimated Average Glucose Result: 9.2 % (03-13-25 @ 06:45)      RADIOLOGY:  < from: US Kidney and Bladder (03.14.25 @ 16:10) >    IMPRESSION:  1. Mild hydronephrosis of the left kidney. Etiology is uncertain.   Noncontrast abdominal pelvic CT could be performed for further evaluation.  2. The right kidney is unremarkable without hydronephrosis.    < end of copied text >  < from: Xray Chest 1 View AP/PA (03.12.25 @ 17:30) >    IMPRESSION:  No focal consolidation.    < end of copied text >  < from: Xray Abdomen 1 View PORTABLE -Urgent (Xray Abdomen 1 View PORTABLE -Urgent .) (03.14.25 @ 16:49) >  IMPRESSION:  Mild nonspecific gaseous distention of stomach, with paucity of bowel gas   distally. This may be secondary to gastric outlet obstruction.    < end of copied text >  imaging below personally reviewed   INFECTIOUS DISEASE CONSULT NOTE    Patient is a 64y old  Male who presents with a chief complaint of generalized fatigue/weakness (15 Mar 2025 07:21)    HPI: 64M with medical of PAD s/p L BKA, DM with peripheral neuropathy and retinopathy HTN, CVA, complete heart block s/p PPM presented on 3/12 with generalized fatigue. In the ED patient noted to be febrile and tachycardic. Labs notable for leukocytosis, hyponatremia, hyperglycemia, HOLA and metabolic acidosis. UA with many bacteria, negative nitrite and small leukocyte esterase. Urine culture 3/12 >100K proteus mirabilis. Blood culture negative to date. ID asked to help manage.        REVIEW OF SYSTEMS:  Constitutional: +fatigue  Skin: No rash  ENMT: No sore throat, No ulcers  Respiratory: No cough, no SOB  Cardiovascular:  No chest pain, No palpitations   Gastrointestinal: +abdominal distention  Genitourinary: No dysuria  MSK: No Joint pain, No back pain,  Neurological: No HA      Prior hospital charts reviewed [Yes]  Primary team notes reviewed [Yes]  Other consultant notes reviewed [Yes]    PAST MEDICAL & SURGICAL HISTORY:  Retinopathy  Diabetes mellitus type 2 in obese  Hyperlipidemia  Toe infection s/p amputation  Ruptured appendix  S/P appendectomy  Toe amputation status  left pinky toe          SOCIAL HISTORY:  Previously worked in an [employer] store  Lives with wife    FAMILY HISTORY:  Family history of diabetes mellitus in grandmother (Grandparent)        Allergies:  No Known Allergies      ANTIMICROBIALS:  piperacillin/tazobactam IVPB.. 3.375 every 12 hours      ANTIMICROBIALS (past 90 days):  MEDICATIONS  (STANDING):  piperacillin/tazobactam IVPB.   200 mL/Hr IV Intermittent (03-12-25 @ 20:36)    piperacillin/tazobactam IVPB.-   25 mL/Hr IV Intermittent (03-12-25 @ 23:34)    piperacillin/tazobactam IVPB..   25 mL/Hr IV Intermittent (03-15-25 @ 02:44)    piperacillin/tazobactam IVPB..   25 mL/Hr IV Intermittent (03-14-25 @ 13:42)   25 mL/Hr IV Intermittent (03-14-25 @ 06:07)   25 mL/Hr IV Intermittent (03-13-25 @ 21:49)   25 mL/Hr IV Intermittent (03-13-25 @ 13:01)   25 mL/Hr IV Intermittent (03-13-25 @ 05:27)        OTHER MEDS:   MEDICATIONS  (STANDING):  acetaminophen     Tablet .. 650 every 6 hours PRN  aluminum hydroxide/magnesium hydroxide/simethicone Suspension 30 every 4 hours PRN  amLODIPine   Tablet 10 daily  aspirin  chewable 81 daily  atorvastatin 20 at bedtime  dextrose 50% Injectable 25 once  dextrose 50% Injectable 12.5 once  dextrose 50% Injectable 25 once  dextrose Oral Gel 15 once PRN  gabapentin 300 three times a day  glucagon  Injectable 1 once  heparin   Injectable 5000 every 8 hours  hydrALAZINE 25 three times a day  insulin glargine Injectable (LANTUS) 45 at bedtime  insulin lispro (ADMELOG) corrective regimen sliding scale  three times a day before meals  insulin lispro (ADMELOG) corrective regimen sliding scale  at bedtime  insulin lispro Injectable (ADMELOG) 8 three times a day before meals  melatonin 3 at bedtime PRN  ondansetron Injectable 4 every 8 hours PRN  polyethylene glycol 3350 17 two times a day  senna 2 daily  tamsulosin 0.4 at bedtime      VITALS:  Vital Signs Last 24 Hrs  T(F): 100 (03-15-25 @ 04:43), Max: 103.9 (03-13-25 @ 05:08)    Vital Signs Last 24 Hrs  HR: 65 (03-15-25 @ 04:43) (65 - 114)  BP: 101/59 (03-15-25 @ 04:43) (101/59 - 137/70)  RR: 18 (03-15-25 @ 04:43)  SpO2: 94% (03-15-25 @ 04:43) (90% - 95%)  Wt(kg): --    EXAM:  General: in no acute distress  HEENT:  anicteric sclera  CV: +S1/S2, RRR, no murmurs heard  Lungs: No respiratory distress, CTA b/l  Abd:  BS4+, distended, non-tender to palpation  : No suprapubic tenderness  Neuro: AAOx3  Ext: s/p L bka,  Skin: no rashes seen      Labs:                        10.5   20.43 )-----------( 140      ( 15 Mar 2025 07:27 )             32.0     03-15    124[L]  |  86[L]  |  69[H]  ----------------------------<  340[H]  4.5   |  21[L]  |  4.84[H]    Ca    7.9[L]      15 Mar 2025 07:28  Phos  4.3     03-15  Mg     2.4     03-15    TPro  6.3  /  Alb  2.5[L]  /  TBili  0.3  /  DBili  x   /  AST  57[H]  /  ALT  50[H]  /  AlkPhos  157[H]  03-15      WBC Trend:  WBC Count: 20.43 (03-15-25 @ 07:27)  WBC Count: 15.21 (03-14-25 @ 06:46)  WBC Count: 7.97 (03-13-25 @ 06:45)  WBC Count: 20.21 (03-12-25 @ 17:04)      Auto Neutrophil #: 8.32 K/uL (09-25-24 @ 15:02)  Auto Neutrophil #: 8.25 K/uL (06-14-24 @ 00:42)  Auto Neutrophil #: 9.83 K/uL (06-13-24 @ 01:51)  Auto Neutrophil #: 19.25 K/uL (06-12-24 @ 01:10)  Band Neutrophils %: 1.0 % (06-12-24 @ 01:10)      Creatine Trend:  Creatinine: 4.84 (03-15)  Creatinine: 3.74 (03-14)  Creatinine: 1.95 (03-13)  Creatinine: 1.72 (03-12)      Liver Biochemical Testing Trend:  Alanine Aminotransferase (ALT/SGPT): 50 *H* (03-15)  Alanine Aminotransferase (ALT/SGPT): 25 (03-14)  Alanine Aminotransferase (ALT/SGPT): 17 (03-13)  Alanine Aminotransferase (ALT/SGPT): 10 (03-12)  Alanine Aminotransferase (ALT/SGPT): 11 (03-12)  Aspartate Aminotransferase (AST/SGOT): 57 (03-15-25 @ 07:28)  Aspartate Aminotransferase (AST/SGOT): 27 (03-14-25 @ 06:46)  Aspartate Aminotransferase (AST/SGOT): 20 (03-13-25 @ 06:46)  Aspartate Aminotransferase (AST/SGOT): 12 (03-12-25 @ 17:04)  Aspartate Aminotransferase (AST/SGOT): 15 (03-12-25 @ 14:07)  Bilirubin Total: 0.3 (03-15)  Bilirubin Total: 0.4 (03-14)  Bilirubin Total: 0.5 (03-13)  Bilirubin Total: 0.3 (03-12)  Bilirubin Total: 0.4 (03-12)      Trend LDH          Urinalysis Basic - ( 15 Mar 2025 07:28 )  Urinalysis (03.14.25 @ 21:03)   pH Urine: 5.0  Blood, Urine: Negative  Glucose Qualitative, Urine: 250 mg/dL  Color: Yellow  Urine Appearance: Cloudy  Bilirubin: Negative  Ketone - Urine: Trace mg/dL  Specific Gravity: 1.019  Protein, Urine: 100 mg/dL  Urobilinogen: 0.2 mg/dL  Nitrite: Negative  Leukocyte Esterase Concentration: Moderate          MICROBIOLOGY:        Culture - Blood (collected 12 Mar 2025 19:45)  Source: Blood Blood-Peripheral  Preliminary Report:    No growth at 48 Hours    Culture - Blood (collected 12 Mar 2025 19:35)  Source: Blood Blood-Peripheral  Preliminary Report:    No growth at 48 Hours    Culture - Urine (collected 12 Mar 2025 16:22)  Source: Clean Catch Clean Catch (Midstream)  Final Report:    >100,000 CFU/ml Proteus mirabilis    <10,000 CFU/ml Normal Urogenital wil present  Organism: Proteus mirabilis  Organism: Proteus mirabilis    Sensitivities:      Method Type: ARMEN      -  Amoxicillin/Clavulanic Acid: S <=8/4      -  Ampicillin: S <=8 These ampicillin results predict results for amoxicillin      -  Ampicillin/Sulbactam: S <=4/2      -  Aztreonam: S <=4      -  Cefazolin: S <=2 For uncomplicated UTI with K. pneumoniae, E. coli, or P. mirablis: ARMEN <=16 is sensitive and ARMEN >=32 is resistant. This also predicts results for oral agents cefaclor, cefdinir, cefpodoxime, cefprozil, cefuroxime axetil, cephalexin and locarbef for uncomplicated UTI. Note that some isolates may be susceptible to these agents while testing resistant to cefazolin.      -  Cefepime: S <=2      -  Cefoxitin: S <=8      -  Ceftriaxone: S <=1      -  Cefuroxime: S <=4      -  Ciprofloxacin: S <=0.25      -  Ertapenem: S <=0.5      -  Gentamicin: S <=2      -  Levofloxacin: S <=0.5      -  Meropenem: S <=1      -  Nitrofurantoin: R >64 Should not be used to treat pyelonephritis      -  Piperacillin/Tazobactam: S <=8      -  Tobramycin: S <=2      -  Trimethoprim/Sulfamethoxazole: S <=0.5/9.5    Culture - Blood (collected 10 Yonathan 2024 13:00)  Source: .Blood Blood-Peripheral  Final Report:    No growth at 5 days    Culture - Blood (collected 10 Yonathan 2024 12:45)  Source: .Blood Blood-Peripheral  Final Report:    No growth at 5 days    Culture - Urine (collected 21 Dec 2023 13:00)  Source: Clean Catch Clean Catch (Midstream)  Final Report:    10,000 - 49,000 CFU/mL Escherichia coli  Organism: Escherichia coli  Organism: Escherichia coli    Sensitivities:      Method Type: ARMEN      -  Amoxicillin/Clavulanic Acid: I 16/8      -  Ampicillin: R >16 These ampicillin results predict results for amoxicillin      -  Ampicillin/Sulbactam: R >16/8      -  Aztreonam: S <=4      -  Cefazolin: S 16 For uncomplicated UTI with K. pneumoniae, E. coli, or P. mirablis: ARMEN <=16 is sensitive and ARMEN >=32 is resistant. This also predicts results for oral agents cefaclor, cefdinir, cefpodoxime, cefprozil, cefuroxime axetil, cephalexin and locarbeffor uncomplicated UTI. Note that some isolates may be susceptible to these agents while testing resistant to cefazolin.      -  Cefepime: S <=2      -  Cefoxitin: S <=8      -  Ceftriaxone: S <=1      -  Cefuroxime: S <=4      -  Ciprofloxacin: R 2      -  Ertapenem: S <=0.5      -  Gentamicin: S <=2      -  Imipenem: S <=1      -  Levofloxacin: R 4      -  Meropenem: S <=1      -  Nitrofurantoin: S <=32 Should not be used to treat pyelonephritis      -  Piperacillin/Tazobactam: S <=8      -  Tobramycin: S <=2      -  Trimethoprim/Sulfamethoxazole: S <=0.5/9.5    Culture - Blood (collected 04 Dec 2023 05:49)  Source: .Blood Blood-Peripheral  Final Report:    No growth at 5 days    Culture - Blood (collected 04 Dec 2023 05:49)  Source: .Blood Blood-Peripheral  Final Report:    No growth at 5 days    Culture - Other (collected 30 Nov 2023 06:27)  Source: .Other Other  Final Report:    Numerous Citrobacter koseri    Moderate Enterococcus faecalis  Organism: Citrobacter koseri  Enterococcus faecalis  Organism: Enterococcus faecalis    Sensitivities:      Method Type: ARMEN      -  Ampicillin: S <=2 Predicts results to ampicillin/sulbactam, amoxacillin-clavulanate and  piperacillin-tazobactam.      -  Tetracycline: R >8      -  Vancomycin: S 2  Organism: Citrobacter koseri    Sensitivities:      Method Type: ARMEN      -  Amoxicillin/Clavulanic Acid: S <=8/4      -  Ampicillin: R >16 These ampicillin results predict results for amoxicillin      -  Ampicillin/Sulbactam: S <=4/2      -  Aztreonam: S <=4      -  Cefazolin: S <=2      -  Cefepime: S <=2      -  Cefoxitin: S <=8      -  Ceftriaxone: S <=1      -  Ciprofloxacin: S <=0.25      -  Ertapenem: S <=0.5      -  Gentamicin: S <=2      -  Imipenem: S <=1      -  Levofloxacin: S <=0.5      -  Meropenem: S <=1      -  Piperacillin/Tazobactam: S <=8      -  Tobramycin: S <=2      -  Trimethoprim/Sulfamethoxazole: S <=0.5/9.5    Culture - Blood (collected 29 Nov 2023 18:23)  Source: .Blood Blood-Venous  Final Report:    No growth at 5 days                                              Blood Gas Venous - Lactate: 2.6 (03-12 @ 17:04)  Blood Gas Venous - Lactate: 4.4 (03-12 @ 13:50)    A1C with Estimated Average Glucose Result: 9.2 % (03-13-25 @ 06:45)      RADIOLOGY:  < from: US Kidney and Bladder (03.14.25 @ 16:10) >    IMPRESSION:  1. Mild hydronephrosis of the left kidney. Etiology is uncertain.   Noncontrast abdominal pelvic CT could be performed for further evaluation.  2. The right kidney is unremarkable without hydronephrosis.    < end of copied text >  < from: Xray Chest 1 View AP/PA (03.12.25 @ 17:30) >    IMPRESSION:  No focal consolidation.    < end of copied text >  < from: Xray Abdomen 1 View PORTABLE -Urgent (Xray Abdomen 1 View PORTABLE -Urgent .) (03.14.25 @ 16:49) >  IMPRESSION:  Mild nonspecific gaseous distention of stomach, with paucity of bowel gas   distally. This may be secondary to gastric outlet obstruction.    < end of copied text >  imaging below personally reviewed

## 2025-03-15 NOTE — PROGRESS NOTE ADULT - PROBLEM SELECTOR PLAN 3
[facility] OUTPATIENT ENCOUNTER  [facility] FAMILY MEDICINE  RESIDENT NOTE -- OFFICE VISIT    Patient: Tamy Perez Date of Service: 2021   : 1980 MRN: [id number]     SUBJECTIVE   Tamy Perez is a 40 [employer]   Patient states that he has been drinking more during th epast few days. 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  -holding isotonic fluids Patient states that he has been drinking more during th epast few days. 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

## 2025-03-15 NOTE — PROGRESS NOTE ADULT - PROBLEM SELECTOR PLAN 5
Ua with bacteria + pyuria with leukocyte esterase;    Plan:  -follow up urine cultures    -cont empirical zosyn  -antipyretics, antiemetics, analgesics as needed Ua with bacteria + pyuria with leukocyte esterase;    Plan:  -follow up urine cultures    -Broaden to Meropenem and will give 1 dose of vanco  -antipyretics, antiemetics, analgesics as needed

## 2025-03-15 NOTE — PROGRESS NOTE ADULT - SUBJECTIVE AND OBJECTIVE BOX
[facility] Division of Kidney Diseases & Hypertension  FOLLOW UP NOTE  [phone]--------------------------------------------------------------------------------  Chief Complaint:Acute renal failure    24 hour events/subjective:  Pt was seen and examined earlier today. No acute overnight events. No fresh complaints except malaise. Still has constipation.   Pt denies any worsening of SOB/ Diarrhea/ Nausea/ Vomiting/ abdominal pain/ chest pain/ tingling/ numbness.         PAST HISTORY  --------------------------------------------------------------------------------  No significant changes to PMH, PSH, FHx, SHx, unless otherwise noted    ALLERGIES & MEDICATIONS  --------------------------------------------------------------------------------  Allergies    No Known Allergies    Intolerances      Standing Inpatient Medications  amLODIPine   Tablet 10 milliGRAM(s) Oral daily  aspirin  chewable 81 milliGRAM(s) Oral daily  atorvastatin 20 milliGRAM(s) Oral at bedtime  dextrose 5%. 1000 milliLiter(s) IV Continuous <Continuous>  dextrose 5%. 1000 milliLiter(s) IV Continuous <Continuous>  dextrose 50% Injectable 25 Gram(s) IV Push once  dextrose 50% Injectable 12.5 Gram(s) IV Push once  dextrose 50% Injectable 25 Gram(s) IV Push once  gabapentin 300 milliGRAM(s) Oral three times a day  glucagon  Injectable 1 milliGRAM(s) IntraMuscular once  heparin   Injectable 5000 Unit(s) SubCutaneous every 8 hours  hydrALAZINE 25 milliGRAM(s) Oral three times a day  insulin glargine Injectable (LANTUS) 45 Unit(s) SubCutaneous at bedtime  insulin lispro (ADMELOG) corrective regimen sliding scale   SubCutaneous three times a day before meals  insulin lispro (ADMELOG) corrective regimen sliding scale   SubCutaneous at bedtime  insulin lispro Injectable (ADMELOG) 8 Unit(s) SubCutaneous three times a day before meals  piperacillin/tazobactam IVPB.. 3.375 Gram(s) IV Intermittent every 12 hours  polyethylene glycol 3350 17 Gram(s) Oral two times a day  senna 2 Tablet(s) Oral daily  sodium chloride 0.9%. 1000 milliLiter(s) IV Continuous <Continuous>  tamsulosin 0.4 milliGRAM(s) Oral at bedtime    PRN Inpatient Medications  acetaminophen     Tablet .. 650 milliGRAM(s) Oral every 6 hours PRN  aluminum hydroxide/magnesium hydroxide/simethicone Suspension 30 milliLiter(s) Oral every 4 hours PRN  dextrose Oral Gel 15 Gram(s) Oral once PRN  melatonin 3 milliGRAM(s) Oral at bedtime PRN  ondansetron Injectable 4 milliGRAM(s) IV Push every 8 hours PRN      REVIEW OF SYSTEMS  --------------------------------------------------------------------------------  as noted above.     VITALS/PHYSICAL EXAM  --------------------------------------------------------------------------------  T(C): 37.8 (03-15-25 @ 04:43), Max: 39.3 (03-15-25 @ 00:06)  HR: 65 (03-15-25 @ 04:43) (65 - 114)  BP: 101/59 (03-15-25 @ 04:43) (101/59 - 137/70)  RR: 18 (03-15-25 @ 04:43) (18 - 18)  SpO2: 94% (03-15-25 @ 04:43) (90% - 95%)  Wt(kg): --        03-14-25 @ 07:01  -  03-15-25 @ 07:00  --------------------------------------------------------  IN: 320 mL / OUT: 80 mL / NET: 240 mL      Physical Exam:  	Gen: NAD, lying in bed  	Pulm: CTA B/L ant/lat fields  	CV: RRR, S1S2  	Abd: hypoactive bowel sounds, distended, NT to palpation  	: No suprapubic tenderness, no crain  	Extremity: LBKA, trace pedal edema RLE  	Neuro: A&Ox3      LABS/STUDIES  --------------------------------------------------------------------------------              10.5   20.43 >-----------<  140      [03-15-25 @ 07:27]              32.0     124  |  86  |  69  ----------------------------<  340      [03-15-25 @ 07:28]  4.5   |  21  |  4.84        Ca     7.9     [03-15-25 @ 07:28]      Mg     2.4     [03-15-25 @ 07:28]      Phos  4.3     [03-15-25 @ 07:28]    TPro  6.3  /  Alb  2.5  /  TBili  0.3  /  DBili  x   /  AST  57  /  ALT  50  /  AlkPhos  157  [03-15-25 @ 07:28]        Serum Osmolality 299      [03-14-25 @ 14:53]    Creatinine Trend:  SCr 4.84 [03-15 @ 07:28]  SCr 3.74 [03-14 @ 06:46]  SCr 1.95 [03-13 @ 06:46]  SCr 1.72 [03-12 @ 17:04]  SCr 1.90 [03-12 @ 14:07]      Urine Creatinine 178      [03-14-25 @ 21:03]  Urine Protein 92      [03-14-25 @ 21:03]  Urine Sodium 18      [03-14-25 @ 21:03]  Urine Urea Nitrogen 325      [03-14-25 @ 21:02]  Urine Potassium 63      [03-14-25 @ 21:03]  Urine Osmolality 324      [03-14-25 @ 21:03]    Lipid: chol 159, , HDL 39, LDL --      [03-13-25 @ 06:46]

## 2025-03-15 NOTE — PROGRESS NOTE ADULT - ASSESSMENT
63yo m w pmh obesity, htn, hld, dm c/b peripheral neuropathy + retinopathy, pad s/p l bka, cva, chb s/p ppm, p/w generalized fatigue/weakness; in er, found to be meeting severe sepsis criteria, and found to have multiple metabolic disturbances including hyperglycemia, catrina w hagma; suspect 2/2 uti; admit to medicine for further mgmt

## 2025-03-15 NOTE — PROGRESS NOTE ADULT - PROBLEM SELECTOR PLAN 2
Unclear baseline cr, in 6/2024, cr appeared to fluctuate around ~1.2; Currently, ~1.9  Now with increasing Cr at 3.74  Likely pre-renal with c/f ATN    Plan:   nephrology consulted, f/u recs  f/u renal and bladder ultrasound- preliminary read is mild left hydronephrosis  fu urine studies  bladder scan-312  Monitor UO, BUN/Cr, volume status, acid-base balance, electrolytes  avoid nephrotoxic agents  dose adjustments for renally cleared meds

## 2025-03-15 NOTE — PROVIDER CONTACT NOTE (OTHER) - ACTION/TREATMENT ORDERED:
MD ordered IV tylenol and nasal cannula. (PO already given, temp reassessed & IV given). Blood cultures x 2, chest xray ordered. MD ordered IV tylenol and nasal cannula. Blood cultures x 2, chest xray ordered. Ok to given IV tylenol after PO is given if pt continues to spike fevers.

## 2025-03-16 DIAGNOSIS — R74.8 ABNORMAL LEVELS OF OTHER SERUM ENZYMES: ICD-10-CM

## 2025-03-16 LAB
ALBUMIN SERPL ELPH-MCNC: 2.7 G/DL — LOW (ref 3.3–5)
ALP SERPL-CCNC: 291 U/L — HIGH (ref 40–120)
ALT FLD-CCNC: 143 U/L — HIGH (ref 10–45)
ANION GAP SERPL CALC-SCNC: 14 MMOL/L — SIGNIFICANT CHANGE UP (ref 5–17)
ANION GAP SERPL CALC-SCNC: 18 MMOL/L — HIGH (ref 5–17)
AST SERPL-CCNC: 155 U/L — HIGH (ref 10–40)
BASOPHILS # BLD AUTO: 0.05 K/UL — SIGNIFICANT CHANGE UP (ref 0–0.2)
BASOPHILS NFR BLD AUTO: 0.3 % — SIGNIFICANT CHANGE UP (ref 0–2)
BILIRUB SERPL-MCNC: 0.4 MG/DL — SIGNIFICANT CHANGE UP (ref 0.2–1.2)
BUN SERPL-MCNC: 88 MG/DL — HIGH (ref 7–23)
BUN SERPL-MCNC: 90 MG/DL — HIGH (ref 7–23)
CALCIUM SERPL-MCNC: 8 MG/DL — LOW (ref 8.4–10.5)
CALCIUM SERPL-MCNC: 8.1 MG/DL — LOW (ref 8.4–10.5)
CHLORIDE SERPL-SCNC: 87 MMOL/L — LOW (ref 96–108)
CHLORIDE SERPL-SCNC: 89 MMOL/L — LOW (ref 96–108)
CO2 SERPL-SCNC: 20 MMOL/L — LOW (ref 22–31)
CO2 SERPL-SCNC: 23 MMOL/L — SIGNIFICANT CHANGE UP (ref 22–31)
CREAT SERPL-MCNC: 4.52 MG/DL — HIGH (ref 0.5–1.3)
CREAT SERPL-MCNC: 5.12 MG/DL — HIGH (ref 0.5–1.3)
EGFR: 12 ML/MIN/1.73M2 — LOW
EGFR: 12 ML/MIN/1.73M2 — LOW
EGFR: 14 ML/MIN/1.73M2 — LOW
EGFR: 14 ML/MIN/1.73M2 — LOW
EOSINOPHIL # BLD AUTO: 0.11 K/UL — SIGNIFICANT CHANGE UP (ref 0–0.5)
EOSINOPHIL NFR BLD AUTO: 0.7 % — SIGNIFICANT CHANGE UP (ref 0–6)
GAS PNL BLDV: SIGNIFICANT CHANGE UP
GLUCOSE BLDC GLUCOMTR-MCNC: 211 MG/DL — HIGH (ref 70–99)
GLUCOSE BLDC GLUCOMTR-MCNC: 222 MG/DL — HIGH (ref 70–99)
GLUCOSE BLDC GLUCOMTR-MCNC: 223 MG/DL — HIGH (ref 70–99)
GLUCOSE BLDC GLUCOMTR-MCNC: 247 MG/DL — HIGH (ref 70–99)
GLUCOSE SERPL-MCNC: 184 MG/DL — HIGH (ref 70–99)
GLUCOSE SERPL-MCNC: 210 MG/DL — HIGH (ref 70–99)
GRAM STN FLD: ABNORMAL
HCT VFR BLD CALC: 31.5 % — LOW (ref 39–50)
HGB BLD-MCNC: 10.6 G/DL — LOW (ref 13–17)
IMM GRANULOCYTES NFR BLD AUTO: 0.8 % — SIGNIFICANT CHANGE UP (ref 0–0.9)
LYMPHOCYTES # BLD AUTO: 0.86 K/UL — LOW (ref 1–3.3)
LYMPHOCYTES # BLD AUTO: 5.8 % — LOW (ref 13–44)
MAGNESIUM SERPL-MCNC: 2.6 MG/DL — SIGNIFICANT CHANGE UP (ref 1.6–2.6)
MCHC RBC-ENTMCNC: 27.6 PG — SIGNIFICANT CHANGE UP (ref 27–34)
MCHC RBC-ENTMCNC: 33.7 G/DL — SIGNIFICANT CHANGE UP (ref 32–36)
MCV RBC AUTO: 82 FL — SIGNIFICANT CHANGE UP (ref 80–100)
MONOCYTES # BLD AUTO: 1.11 K/UL — HIGH (ref 0–0.9)
MONOCYTES NFR BLD AUTO: 7.4 % — SIGNIFICANT CHANGE UP (ref 2–14)
MRSA PCR RESULT.: SIGNIFICANT CHANGE UP
NEUTROPHILS # BLD AUTO: 12.67 K/UL — HIGH (ref 1.8–7.4)
NEUTROPHILS NFR BLD AUTO: 85 % — HIGH (ref 43–77)
NRBC BLD AUTO-RTO: 0 /100 WBCS — SIGNIFICANT CHANGE UP (ref 0–0)
OSMOLALITY SERPL: 307 MOSMOL/KG — HIGH (ref 280–301)
PHOSPHATE SERPL-MCNC: 3.6 MG/DL — SIGNIFICANT CHANGE UP (ref 2.5–4.5)
PLATELET # BLD AUTO: 160 K/UL — SIGNIFICANT CHANGE UP (ref 150–400)
POTASSIUM SERPL-MCNC: 4.4 MMOL/L — SIGNIFICANT CHANGE UP (ref 3.5–5.3)
POTASSIUM SERPL-MCNC: 4.8 MMOL/L — SIGNIFICANT CHANGE UP (ref 3.5–5.3)
POTASSIUM SERPL-SCNC: 4.4 MMOL/L — SIGNIFICANT CHANGE UP (ref 3.5–5.3)
POTASSIUM SERPL-SCNC: 4.8 MMOL/L — SIGNIFICANT CHANGE UP (ref 3.5–5.3)
PROT SERPL-MCNC: 6.6 G/DL — SIGNIFICANT CHANGE UP (ref 6–8.3)
RBC # BLD: 3.84 M/UL — LOW (ref 4.2–5.8)
RBC # FLD: 15.1 % — HIGH (ref 10.3–14.5)
S AUREUS DNA NOSE QL NAA+PROBE: SIGNIFICANT CHANGE UP
SODIUM SERPL-SCNC: 125 MMOL/L — LOW (ref 135–145)
SODIUM SERPL-SCNC: 126 MMOL/L — LOW (ref 135–145)
VANCOMYCIN FLD-MCNC: <4 UG/ML — SIGNIFICANT CHANGE UP
WBC # BLD: 14.92 K/UL — HIGH (ref 3.8–10.5)
WBC # FLD AUTO: 14.92 K/UL — HIGH (ref 3.8–10.5)

## 2025-03-16 PROCEDURE — G0545: CPT

## 2025-03-16 PROCEDURE — 99232 SBSQ HOSP IP/OBS MODERATE 35: CPT | Mod: GC

## 2025-03-16 PROCEDURE — 99233 SBSQ HOSP IP/OBS HIGH 50: CPT | Mod: GC

## 2025-03-16 PROCEDURE — 99233 SBSQ HOSP IP/OBS HIGH 50: CPT

## 2025-03-16 RX ORDER — BISACODYL 5 MG
5 TABLET, DELAYED RELEASE (ENTERIC COATED) ORAL ONCE
Refills: 0 | Status: COMPLETED | OUTPATIENT
Start: 2025-03-16 | End: 2025-03-16

## 2025-03-16 RX ADMIN — INSULIN GLARGINE-YFGN 45 UNIT(S): 100 INJECTION, SOLUTION SUBCUTANEOUS at 22:29

## 2025-03-16 RX ADMIN — Medication 50 MILLILITER(S): at 13:00

## 2025-03-16 RX ADMIN — POLYETHYLENE GLYCOL 3350 17 GRAM(S): 17 POWDER, FOR SOLUTION ORAL at 13:14

## 2025-03-16 RX ADMIN — HEPARIN SODIUM 5000 UNIT(S): 1000 INJECTION INTRAVENOUS; SUBCUTANEOUS at 22:29

## 2025-03-16 RX ADMIN — Medication 5 MILLIGRAM(S): at 11:15

## 2025-03-16 RX ADMIN — POLYETHYLENE GLYCOL 3350 17 GRAM(S): 17 POWDER, FOR SOLUTION ORAL at 22:32

## 2025-03-16 RX ADMIN — POLYETHYLENE GLYCOL 3350 17 GRAM(S): 17 POWDER, FOR SOLUTION ORAL at 05:40

## 2025-03-16 RX ADMIN — INSULIN LISPRO 12 UNIT(S): 100 INJECTION, SOLUTION INTRAVENOUS; SUBCUTANEOUS at 13:00

## 2025-03-16 RX ADMIN — INSULIN LISPRO 12 UNIT(S): 100 INJECTION, SOLUTION INTRAVENOUS; SUBCUTANEOUS at 17:40

## 2025-03-16 RX ADMIN — Medication 650 MILLIGRAM(S): at 00:56

## 2025-03-16 RX ADMIN — INSULIN LISPRO 4: 100 INJECTION, SOLUTION INTRAVENOUS; SUBCUTANEOUS at 13:00

## 2025-03-16 RX ADMIN — INSULIN LISPRO 12 UNIT(S): 100 INJECTION, SOLUTION INTRAVENOUS; SUBCUTANEOUS at 09:10

## 2025-03-16 RX ADMIN — MEROPENEM 100 MILLIGRAM(S): 1 INJECTION INTRAVENOUS at 05:39

## 2025-03-16 RX ADMIN — INSULIN LISPRO 4: 100 INJECTION, SOLUTION INTRAVENOUS; SUBCUTANEOUS at 09:09

## 2025-03-16 RX ADMIN — Medication 2 TABLET(S): at 11:15

## 2025-03-16 RX ADMIN — Medication 650 MILLIGRAM(S): at 15:30

## 2025-03-16 RX ADMIN — INSULIN LISPRO 4: 100 INJECTION, SOLUTION INTRAVENOUS; SUBCUTANEOUS at 17:40

## 2025-03-16 RX ADMIN — Medication 81 MILLIGRAM(S): at 11:16

## 2025-03-16 RX ADMIN — HEPARIN SODIUM 5000 UNIT(S): 1000 INJECTION INTRAVENOUS; SUBCUTANEOUS at 13:12

## 2025-03-16 RX ADMIN — TAMSULOSIN HYDROCHLORIDE 0.4 MILLIGRAM(S): 0.4 CAPSULE ORAL at 22:28

## 2025-03-16 RX ADMIN — Medication 650 MILLIGRAM(S): at 14:24

## 2025-03-16 RX ADMIN — HEPARIN SODIUM 5000 UNIT(S): 1000 INJECTION INTRAVENOUS; SUBCUTANEOUS at 05:39

## 2025-03-16 RX ADMIN — ATORVASTATIN CALCIUM 20 MILLIGRAM(S): 80 TABLET, FILM COATED ORAL at 22:29

## 2025-03-16 RX ADMIN — MEROPENEM 100 MILLIGRAM(S): 1 INJECTION INTRAVENOUS at 17:07

## 2025-03-16 NOTE — PROGRESS NOTE ADULT - ATTENDING COMMENTS
64M with severe obesity, hypertension, T2DM, PVD s/p L BKA, heart block s/p PPM admitted with severe sepsis of unclear source.    #Severe sepsis  On basis of fever, HR, leukocytosis, HOLA  RUQ w/ gallstones and gallbladder wall thickening, Dopplers of legs negative  - appreciate ID input, continuing meropenem  - f/u repeat blood cultures, urine culture with E. coli  - f/u HIDA scan    #Acute hypoxemic respiratory failure  Requiring 2L NC on 3/15, suspecting sepsis and atelectasis. No edema or infection on CT chest. Unable to obtain CTA to evaluate for PE due to renal failure. V/Q scan was technically limited.  - wean as able    #HOLA  Suspect ATN from sepsis, relative hypotension  - appreciate nephrology's evaluation  - appreciate urology for Contreras placement, remains with adequate urine output    #Hyponatremia  Thought due to impaired free water excretion and SIADH due to pain, will give NS today and repeat BMP    #Type 2 diabetes, poorly controlled with hyperglycemia  A1c 9.2, on insulin and multiple oral agents at home  - will continue to increase Lantus and Admelog as needed to achieve euglycemia, has been requiring escalating doses

## 2025-03-16 NOTE — PROGRESS NOTE ADULT - PROBLEM SELECTOR PLAN 3
Patient states that he has been drinking more during th epast few days. 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 febrile, leukocytosis, tachycardic, + lactic acidosis; meets severe sepsis criteria  suspect 2/2 uti --> Urine culture 3/12 >100K proteus mirabilis.   CXR- no consolidations  s/p 2 L LR     Plan:   - NS @ 50cc/hr  -follow up blood cultures  -f/u  sputum cx  -f/u MRSA PCR  -trend lactate q4-6h until wnl  -Monitor for fever, changes in white count  - Meropenem, 1 time vanco dose. ID consulted.   - Given new fever on 3/15, blood cx ordered  - CT chest, abdomen, pelvis ordered. RUQ US ordered. DVT studies ordered febrile, leukocytosis, tachycardic, + lactic acidosis; meets severe sepsis criteria  suspect 2/2 uti --> Urine culture 3/12 >100K proteus mirabilis.   CXR- no consolidations  CT A/P with 0.6 cm calculus within the proximal left ureter with mild left hydronephrosis  RUQ US Cholelithiasis with gallbladder wall thickening. Question trace pericholecystic fluid.   MRSA PCR- negative  Plan:   - Meropenem 500mg BID  - NS @ 50cc/hr  - f/u HIDA scan  - follow up blood cultures 3/15  - f/u  sputum cx  - trend lactate q4-6h until wnl  -Monitor for fever, changes in white count febrile, leukocytosis, tachycardic, + lactic acidosis; meets severe sepsis criteria  suspect 2/2 uti --> Urine culture 3/12 >100K proteus mirabilis.   CXR- no consolidations  CT A/P with 0.6 cm calculus within the proximal left ureter with mild left hydronephrosis  RUQ US Cholelithiasis with gallbladder wall thickening. Question trace pericholecystic fluid.   MRSA PCR- negative  Plan:   - Meropenem 500mg BID  - NS @ 50cc/hr  - f/u HIDA scan Tuesday. NPO at midnight Monday  - follow up blood cultures 3/15  - f/u  sputum cx  - trend lactate q4-6h until wnl  -Monitor for fever, changes in white count

## 2025-03-16 NOTE — PROGRESS NOTE ADULT - PROBLEM SELECTOR PLAN 10
cw atorvastatin 20mg Cw hydralazine  cw Amlodipine  hold home Enalopril iso HOLA stop hydralazine and amlodipine in the setting of sepsis  hold home Enalopril iso HOLA

## 2025-03-16 NOTE — PROVIDER CONTACT NOTE (OTHER) - ACTION/TREATMENT ORDERED:
MD made aware, medicated with Tylenol 650 mg PO X1 as ordered, awaiting result, will continue to monitor.

## 2025-03-16 NOTE — PROGRESS NOTE ADULT - PROBLEM SELECTOR PLAN 2
Unclear baseline cr, in 6/2024, cr appeared to fluctuate around ~1.2; Currently, ~1.9  Now with increasing Cr at 3.74  Likely pre-renal with c/f ATN    Plan:   nephrology consulted, f/u recs  f/u renal and bladder ultrasound- preliminary read is mild left hydronephrosis  fu urine studies  bladder scan-312  Monitor UO, BUN/Cr, volume status, acid-base balance, electrolytes  avoid nephrotoxic agents  dose adjustments for renally cleared meds Unclear baseline cr, in 6/2024, cr appeared to fluctuate around ~1.2; Currently, ~1.9  Now with increasing Cr at 3.74  Likely pre-renal with c/f ATN  CT with 0.6cm calcalus in proximal left ureter with mild left hydromephrosis  Contreras placed 3/15    Plan:   nephrology consulted, f/u recs  f/u renal and bladder ultrasound- preliminary read is mild left hydronephrosis  fu urine studies  bladder scan-312  Monitor UO, BUN/Cr, volume status, acid-base balance, electrolytes  avoid nephrotoxic agents  dose adjustments for renally cleared meds Patient states that he has been drinking more during th epast few days. 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 Likely iso decreased free water clearance. Can be in setting of SIADH 2/2 sepsis   SNa -125    Plan:   consider hypertonic fluids  f/u urine lytes + serum osmolality  -f/u nephrology recommendations

## 2025-03-16 NOTE — PROGRESS NOTE ADULT - SUBJECTIVE AND OBJECTIVE BOX
Interval History/ROS: Patient being followed for fever and leukocytosis  Tmax 100.9   Labs pending  CT A/P with 0.6 cm calculus within the proximal left ureter with mild left hydronephrosis  LE dopplers negative for DVT  RUQ US Cholelithiasis with gallbladder wall thickening. Question trace pericholecystic fluid.     REVIEW OF SYSTEMS  Constitutional: +fever  Skin: No rash  ENMT: No sore throat, No ulcers  Respiratory: No cough, no SOB  Cardiovascular:  No chest pain, No palpitations   Gastrointestinal: No nausea, no vomiting  Genitourinary: No dysuria  MSK: No Joint pain, No back pain,  Neurological: No HA      Allergies  No Known Allergies        ANTIMICROBIALS:    meropenem  IVPB    meropenem  IVPB 500 every 12 hours      OTHER MEDS: MEDICATIONS  (STANDING):  acetaminophen     Tablet .. 650 every 6 hours PRN  aluminum hydroxide/magnesium hydroxide/simethicone Suspension 30 every 4 hours PRN  aspirin  chewable 81 daily  atorvastatin 20 at bedtime  dextrose 50% Injectable 25 once  dextrose 50% Injectable 12.5 once  dextrose 50% Injectable 25 once  dextrose Oral Gel 15 once PRN  glucagon  Injectable 1 once  heparin   Injectable 5000 every 8 hours  insulin glargine Injectable (LANTUS) 45 at bedtime  insulin lispro (ADMELOG) corrective regimen sliding scale  three times a day before meals  insulin lispro (ADMELOG) corrective regimen sliding scale  at bedtime  insulin lispro Injectable (ADMELOG) 12 three times a day before meals  melatonin 3 at bedtime PRN  ondansetron Injectable 4 every 8 hours PRN  polyethylene glycol 3350 17 <User Schedule>  senna 2 daily  tamsulosin 0.4 at bedtime      Vital Signs Last 24 Hrs  T(F): 99.8 (03-16-25 @ 04:10), Max: 103.9 (03-13-25 @ 05:08)    Vital Signs Last 24 Hrs  HR: 96 (03-16-25 @ 04:10) (82 - 100)  BP: 129/61 (03-16-25 @ 04:10) (117/47 - 157/75)  RR: 18 (03-16-25 @ 04:10)  SpO2: 98% (03-16-25 @ 04:10) (94% - 98%)  Wt(kg): --    EXAM:  General: in no acute distress  HEENT:  anicteric sclera  CV: +S1/S2, RRR, no murmurs heard  Lungs: No respiratory distress, CTA b/l  Abd:  BS4+,  non-tender to palpation  : No suprapubic tenderness  Neuro: AAOx3  Ext: s/p L bka,  Skin: no rashes seen        Labs:                        10.5   20.43 )-----------( 140      ( 15 Mar 2025 07:27 )             32.0     03-15    124[L]  |  86[L]  |  69[H]  ----------------------------<  340[H]  4.5   |  21[L]  |  4.84[H]    Ca    7.9[L]      15 Mar 2025 07:28  Phos  4.3     03-15  Mg     2.4     03-15    TPro  6.3  /  Alb  2.5[L]  /  TBili  0.3  /  DBili  x   /  AST  57[H]  /  ALT  50[H]  /  AlkPhos  157[H]  03-15      WBC Trend:  WBC Count: 20.43 (03-15-25 @ 07:27)  WBC Count: 15.21 (03-14-25 @ 06:46)  WBC Count: 7.97 (03-13-25 @ 06:45)  WBC Count: 20.21 (03-12-25 @ 17:04)      Creatine Trend:  Creatinine: 4.84 (03-15)  Creatinine: 3.74 (03-14)  Creatinine: 1.95 (03-13)  Creatinine: 1.72 (03-12)      Liver Biochemical Testing Trend:  Alanine Aminotransferase (ALT/SGPT): 50 *H* (03-15)  Alanine Aminotransferase (ALT/SGPT): 25 (03-14)  Alanine Aminotransferase (ALT/SGPT): 17 (03-13)  Alanine Aminotransferase (ALT/SGPT): 10 (03-12)  Alanine Aminotransferase (ALT/SGPT): 11 (03-12)  Aspartate Aminotransferase (AST/SGOT): 57 (03-15-25 @ 07:28)  Aspartate Aminotransferase (AST/SGOT): 27 (03-14-25 @ 06:46)  Aspartate Aminotransferase (AST/SGOT): 20 (03-13-25 @ 06:46)  Aspartate Aminotransferase (AST/SGOT): 12 (03-12-25 @ 17:04)  Aspartate Aminotransferase (AST/SGOT): 15 (03-12-25 @ 14:07)  Bilirubin Total: 0.3 (03-15)  Bilirubin Total: 0.4 (03-14)  Bilirubin Total: 0.5 (03-13)  Bilirubin Total: 0.3 (03-12)  Bilirubin Total: 0.4 (03-12)      Trend LDH      Urinalysis Basic - ( 15 Mar 2025 07:28 )    Color: x / Appearance: x / SG: x / pH: x  Gluc: 340 mg/dL / Ketone: x  / Bili: x / Urobili: x   Blood: x / Protein: x / Nitrite: x   Leuk Esterase: x / RBC: x / WBC x   Sq Epi: x / Non Sq Epi: x / Bacteria: x        MICROBIOLOGY:        Culture - Blood (collected 12 Mar 2025 19:45)  Source: Blood Blood-Peripheral  Preliminary Report:    No growth at 72 Hours    Culture - Blood (collected 12 Mar 2025 19:35)  Source: Blood Blood-Peripheral  Preliminary Report:    No growth at 72 Hours    Culture - Urine (collected 12 Mar 2025 16:22)  Source: Clean Catch Clean Catch (Midstream)  Final Report:    >100,000 CFU/ml Proteus mirabilis    <10,000 CFU/ml Normal Urogenital wil present  Organism: Proteus mirabilis  Organism: Proteus mirabilis    Sensitivities:      Method Type: ARMEN      -  Amoxicillin/Clavulanic Acid: S <=8/4      -  Ampicillin: S <=8 These ampicillin results predict results for amoxicillin      -  Ampicillin/Sulbactam: S <=4/2      -  Aztreonam: S <=4      -  Cefazolin: S <=2 For uncomplicated UTI with K. pneumoniae, E. coli, or P. mirablis: ARMEN <=16 is sensitive and ARMEN >=32 is resistant. This also predicts results for oral agents cefaclor, cefdinir, cefpodoxime, cefprozil, cefuroxime axetil, cephalexin and locarbef for uncomplicated UTI. Note that some isolates may be susceptible to these agents while testing resistant to cefazolin.      -  Cefepime: S <=2      -  Cefoxitin: S <=8      -  Ceftriaxone: S <=1      -  Cefuroxime: S <=4      -  Ciprofloxacin: S <=0.25      -  Ertapenem: S <=0.5      -  Gentamicin: S <=2      -  Levofloxacin: S <=0.5      -  Meropenem: S <=1      -  Nitrofurantoin: R >64 Should not be used to treat pyelonephritis      -  Piperacillin/Tazobactam: S <=8      -  Tobramycin: S <=2      -  Trimethoprim/Sulfamethoxazole: S <=0.5/9.5    Culture - Blood (collected 10 Yonathan 2024 13:00)  Source: .Blood Blood-Peripheral  Final Report:    No growth at 5 days    Culture - Blood (collected 10 Yonathan 2024 12:45)  Source: .Blood Blood-Peripheral  Final Report:    No growth at 5 days    Culture - Urine (collected 21 Dec 2023 13:00)  Source: Clean Catch Clean Catch (Midstream)  Final Report:    10,000 - 49,000 CFU/mL Escherichia coli  Organism: Escherichia coli  Organism: Escherichia coli    Sensitivities:      Method Type: ARMEN      -  Amoxicillin/Clavulanic Acid: I 16/8      -  Ampicillin: R >16 These ampicillin results predict results for amoxicillin      -  Ampicillin/Sulbactam: R >16/8      -  Aztreonam: S <=4      -  Cefazolin: S 16 For uncomplicated UTI with K. pneumoniae, E. coli, or P. mirablis: ARMEN <=16 is sensitive and ARMEN >=32 is resistant. This also predicts results for oral agents cefaclor, cefdinir, cefpodoxime, cefprozil, cefuroxime axetil, cephalexin and locarbeffor uncomplicated UTI. Note that some isolates may be susceptible to these agents while testing resistant to cefazolin.      -  Cefepime: S <=2      -  Cefoxitin: S <=8      -  Ceftriaxone: S <=1      -  Cefuroxime: S <=4      -  Ciprofloxacin: R 2      -  Ertapenem: S <=0.5      -  Gentamicin: S <=2      -  Imipenem: S <=1      -  Levofloxacin: R 4      -  Meropenem: S <=1      -  Nitrofurantoin: S <=32 Should not be used to treat pyelonephritis      -  Piperacillin/Tazobactam: S <=8      -  Tobramycin: S <=2      -  Trimethoprim/Sulfamethoxazole: S <=0.5/9.5    Culture - Blood (collected 04 Dec 2023 05:49)  Source: .Blood Blood-Peripheral  Final Report:    No growth at 5 days    Culture - Blood (collected 04 Dec 2023 05:49)  Source: .Blood Blood-Peripheral  Final Report:    No growth at 5 days    Culture - Other (collected 30 Nov 2023 06:27)  Source: .Other Other  Final Report:    Numerous Citrobacter koseri    Moderate Enterococcus faecalis  Organism: Citrobacter koseri  Enterococcus faecalis  Organism: Enterococcus faecalis    Sensitivities:      Method Type: ARMEN      -  Ampicillin: S <=2 Predicts results to ampicillin/sulbactam, amoxacillin-clavulanate and  piperacillin-tazobactam.      -  Tetracycline: R >8      -  Vancomycin: S 2  Organism: Citrobacter koseri    Sensitivities:      Method Type: ARMEN      -  Amoxicillin/Clavulanic Acid: S <=8/4      -  Ampicillin: R >16 These ampicillin results predict results for amoxicillin      -  Ampicillin/Sulbactam: S <=4/2      -  Aztreonam: S <=4      -  Cefazolin: S <=2      -  Cefepime: S <=2      -  Cefoxitin: S <=8      -  Ceftriaxone: S <=1      -  Ciprofloxacin: S <=0.25      -  Ertapenem: S <=0.5      -  Gentamicin: S <=2      -  Imipenem: S <=1      -  Levofloxacin: S <=0.5      -  Meropenem: S <=1      -  Piperacillin/Tazobactam: S <=8      -  Tobramycin: S <=2      -  Trimethoprim/Sulfamethoxazole: S <=0.5/9.5    Culture - Blood (collected 29 Nov 2023 18:23)  Source: .Blood Blood-Venous  Final Report:    No growth at 5 days                                                RADIOLOGY:  imaging below personally reviewed   Interval History/ROS: Patient being followed for fever and leukocytosis  Tmax 100.9   Labs pending  CT A/P with 0.6 cm calculus within the proximal left ureter with mild left hydronephrosis  LE dopplers negative for DVT  RUQ US Cholelithiasis with gallbladder wall thickening. Question trace pericholecystic fluid.     REVIEW OF SYSTEMS  Constitutional: +fever  Skin: No rash  ENMT: No sore throat, No ulcers  Respiratory: No cough, no SOB  Cardiovascular:  No chest pain, No palpitations   Gastrointestinal: No nausea, no vomiting  Genitourinary: No dysuria  MSK: No Joint pain, No back pain,  Neurological: No HA      Allergies  No Known Allergies        ANTIMICROBIALS:    meropenem  IVPB    meropenem  IVPB 500 every 12 hours      OTHER MEDS: MEDICATIONS  (STANDING):  acetaminophen     Tablet .. 650 every 6 hours PRN  aluminum hydroxide/magnesium hydroxide/simethicone Suspension 30 every 4 hours PRN  aspirin  chewable 81 daily  atorvastatin 20 at bedtime  dextrose 50% Injectable 25 once  dextrose 50% Injectable 12.5 once  dextrose 50% Injectable 25 once  dextrose Oral Gel 15 once PRN  glucagon  Injectable 1 once  heparin   Injectable 5000 every 8 hours  insulin glargine Injectable (LANTUS) 45 at bedtime  insulin lispro (ADMELOG) corrective regimen sliding scale  three times a day before meals  insulin lispro (ADMELOG) corrective regimen sliding scale  at bedtime  insulin lispro Injectable (ADMELOG) 12 three times a day before meals  melatonin 3 at bedtime PRN  ondansetron Injectable 4 every 8 hours PRN  polyethylene glycol 3350 17 <User Schedule>  senna 2 daily  tamsulosin 0.4 at bedtime      Vital Signs Last 24 Hrs  T(F): 99.8 (03-16-25 @ 04:10), Max: 103.9 (03-13-25 @ 05:08)    Vital Signs Last 24 Hrs  HR: 96 (03-16-25 @ 04:10) (82 - 100)  BP: 129/61 (03-16-25 @ 04:10) (117/47 - 157/75)  RR: 18 (03-16-25 @ 04:10)  SpO2: 98% (03-16-25 @ 04:10) (94% - 98%)  Wt(kg): --    EXAM:  General: in no acute distress  HEENT:  anicteric sclera  CV: +S1/S2, RRR, no murmurs heard  Lungs: No respiratory distress, CTA b/l  Abd:  BS4+,  non-tender to palpation  : No suprapubic tenderness  Neuro: AAOx3  Ext: s/p L bka,  Skin: no rashes seen        Labs:                        10.5   20.43 )-----------( 140      ( 15 Mar 2025 07:27 )             32.0     03-15    124[L]  |  86[L]  |  69[H]  ----------------------------<  340[H]  4.5   |  21[L]  |  4.84[H]    Ca    7.9[L]      15 Mar 2025 07:28  Phos  4.3     03-15  Mg     2.4     03-15    TPro  6.3  /  Alb  2.5[L]  /  TBili  0.3  /  DBili  x   /  AST  57[H]  /  ALT  50[H]  /  AlkPhos  157[H]  03-15      WBC Trend:  WBC Count: 20.43 (03-15-25 @ 07:27)  WBC Count: 15.21 (03-14-25 @ 06:46)  WBC Count: 7.97 (03-13-25 @ 06:45)  WBC Count: 20.21 (03-12-25 @ 17:04)      Creatine Trend:  Creatinine: 4.84 (03-15)  Creatinine: 3.74 (03-14)  Creatinine: 1.95 (03-13)  Creatinine: 1.72 (03-12)      Liver Biochemical Testing Trend:  Alanine Aminotransferase (ALT/SGPT): 50 *H* (03-15)  Alanine Aminotransferase (ALT/SGPT): 25 (03-14)  Alanine Aminotransferase (ALT/SGPT): 17 (03-13)  Alanine Aminotransferase (ALT/SGPT): 10 (03-12)  Alanine Aminotransferase (ALT/SGPT): 11 (03-12)  Aspartate Aminotransferase (AST/SGOT): 57 (03-15-25 @ 07:28)  Aspartate Aminotransferase (AST/SGOT): 27 (03-14-25 @ 06:46)  Aspartate Aminotransferase (AST/SGOT): 20 (03-13-25 @ 06:46)  Aspartate Aminotransferase (AST/SGOT): 12 (03-12-25 @ 17:04)  Aspartate Aminotransferase (AST/SGOT): 15 (03-12-25 @ 14:07)  Bilirubin Total: 0.3 (03-15)  Bilirubin Total: 0.4 (03-14)  Bilirubin Total: 0.5 (03-13)  Bilirubin Total: 0.3 (03-12)  Bilirubin Total: 0.4 (03-12)      Trend LDH      Urinalysis Basic - ( 15 Mar 2025 07:28 )    Color: x / Appearance: x / SG: x / pH: x  Gluc: 340 mg/dL / Ketone: x  / Bili: x / Urobili: x   Blood: x / Protein: x / Nitrite: x   Leuk Esterase: x / RBC: x / WBC x   Sq Epi: x / Non Sq Epi: x / Bacteria: x        MICROBIOLOGY:        Culture - Blood (collected 12 Mar 2025 19:45)  Source: Blood Blood-Peripheral  Preliminary Report:    No growth at 72 Hours    Culture - Blood (collected 12 Mar 2025 19:35)  Source: Blood Blood-Peripheral  Preliminary Report:    No growth at 72 Hours    Culture - Urine (collected 12 Mar 2025 16:22)  Source: Clean Catch Clean Catch (Midstream)  Final Report:    >100,000 CFU/ml Proteus mirabilis    <10,000 CFU/ml Normal Urogenital wil present  Organism: Proteus mirabilis  Organism: Proteus mirabilis    Sensitivities:      Method Type: ARMEN      -  Amoxicillin/Clavulanic Acid: S <=8/4      -  Ampicillin: S <=8 These ampicillin results predict results for amoxicillin      -  Ampicillin/Sulbactam: S <=4/2      -  Aztreonam: S <=4      -  Cefazolin: S <=2 For uncomplicated UTI with K. pneumoniae, E. coli, or P. mirablis: ARMEN <=16 is sensitive and ARMEN >=32 is resistant. This also predicts results for oral agents cefaclor, cefdinir, cefpodoxime, cefprozil, cefuroxime axetil, cephalexin and locarbef for uncomplicated UTI. Note that some isolates may be susceptible to these agents while testing resistant to cefazolin.      -  Cefepime: S <=2      -  Cefoxitin: S <=8      -  Ceftriaxone: S <=1      -  Cefuroxime: S <=4      -  Ciprofloxacin: S <=0.25      -  Ertapenem: S <=0.5      -  Gentamicin: S <=2      -  Levofloxacin: S <=0.5      -  Meropenem: S <=1      -  Nitrofurantoin: R >64 Should not be used to treat pyelonephritis      -  Piperacillin/Tazobactam: S <=8      -  Tobramycin: S <=2      -  Trimethoprim/Sulfamethoxazole: S <=0.5/9.5    Culture - Blood (collected 10 Yonathan 2024 13:00)  Source: .Blood Blood-Peripheral  Final Report:    No growth at 5 days    Culture - Blood (collected 10 Yonathan 2024 12:45)  Source: .Blood Blood-Peripheral  Final Report:    No growth at 5 days    Culture - Urine (collected 21 Dec 2023 13:00)  Source: Clean Catch Clean Catch (Midstream)  Final Report:    10,000 - 49,000 CFU/mL Escherichia coli  Organism: Escherichia coli  Organism: Escherichia coli    Sensitivities:      Method Type: ARMEN      -  Amoxicillin/Clavulanic Acid: I 16/8      -  Ampicillin: R >16 These ampicillin results predict results for amoxicillin      -  Ampicillin/Sulbactam: R >16/8      -  Aztreonam: S <=4      -  Cefazolin: S 16 For uncomplicated UTI with K. pneumoniae, E. coli, or P. mirablis: ARMEN <=16 is sensitive and ARMEN >=32 is resistant. This also predicts results for oral agents cefaclor, cefdinir, cefpodoxime, cefprozil, cefuroxime axetil, cephalexin and locarbeffor uncomplicated UTI. Note that some isolates may be susceptible to these agents while testing resistant to cefazolin.      -  Cefepime: S <=2      -  Cefoxitin: S <=8      -  Ceftriaxone: S <=1      -  Cefuroxime: S <=4      -  Ciprofloxacin: R 2      -  Ertapenem: S <=0.5      -  Gentamicin: S <=2      -  Imipenem: S <=1      -  Levofloxacin: R 4      -  Meropenem: S <=1      -  Nitrofurantoin: S <=32 Should not be used to treat pyelonephritis      -  Piperacillin/Tazobactam: S <=8      -  Tobramycin: S <=2      -  Trimethoprim/Sulfamethoxazole: S <=0.5/9.5    Culture - Blood (collected 04 Dec 2023 05:49)  Source: .Blood Blood-Peripheral  Final Report:    No growth at 5 days    Culture - Blood (collected 04 Dec 2023 05:49)  Source: .Blood Blood-Peripheral  Final Report:    No growth at 5 days    Culture - Other (collected 30 Nov 2023 06:27)  Source: .Other Other  Final Report:    Numerous Citrobacter koseri    Moderate Enterococcus faecalis  Organism: Citrobacter koseri  Enterococcus faecalis  Organism: Enterococcus faecalis    Sensitivities:      Method Type: ARMEN      -  Ampicillin: S <=2 Predicts results to ampicillin/sulbactam, amoxacillin-clavulanate and  piperacillin-tazobactam.      -  Tetracycline: R >8      -  Vancomycin: S 2  Organism: Citrobacter koseri    Sensitivities:      Method Type: ARMEN      -  Amoxicillin/Clavulanic Acid: S <=8/4      -  Ampicillin: R >16 These ampicillin results predict results for amoxicillin      -  Ampicillin/Sulbactam: S <=4/2      -  Aztreonam: S <=4      -  Cefazolin: S <=2      -  Cefepime: S <=2      -  Cefoxitin: S <=8      -  Ceftriaxone: S <=1      -  Ciprofloxacin: S <=0.25      -  Ertapenem: S <=0.5      -  Gentamicin: S <=2      -  Imipenem: S <=1      -  Levofloxacin: S <=0.5      -  Meropenem: S <=1      -  Piperacillin/Tazobactam: S <=8      -  Tobramycin: S <=2      -  Trimethoprim/Sulfamethoxazole: S <=0.5/9.5    Culture - Blood (collected 29 Nov 2023 18:23)  Source: .Blood Blood-Venous  Final Report:    No growth at 5 days    RADIOLOGY:  imaging below personally reviewed      < from: US Abdomen Upper Quadrant Right (03.15.25 @ 22:02) >  IMPRESSION:    Cholelithiasis with gallbladder wall thickening. Question trace   pericholecystic fluid. Unreliable sonogram Jansen's sign. If there is   clinical suspicion for acute cholecystitis, hepatobiliary scan may be   obtained for further evaluation.    --- End of Report ---    < end of copied text >  < from: CT Abdomen and Pelvis No Cont (03.15.25 @ 15:35) >  IMPRESSION:  0.6 cm calculus within the proximal left ureter with mild left   hydronephrosis.      --- End of Report ---    < end of copied text >

## 2025-03-16 NOTE — PROGRESS NOTE ADULT - PROBLEM SELECTOR PLAN 7
CW home ASA  cw atorvastatin 20mg on presentation, BG ~400s,   UA: w no glucosuria and no ketonuria, BHB wnl, with HAGMA  s/p 2L LR  likely stress hyperglycemia iso sepsis    Plan:   -Lantus increased from 25 to 30U this morning + low dose correctional lispro TID  -hold home regimen  -fu a1c   -trend fingerstick glu  - goal inpatient BG- 180  -cont home neurontin  - carb consistent diet

## 2025-03-16 NOTE — PROGRESS NOTE ADULT - PROBLEM SELECTOR PLAN 4
Bowel distension on physical exam. patient with history of appendectomy.    Plan:   Bowel regimen  f/u abdominal xray Ua with bacteria + pyuria with leukocyte esterase;  Urine culture 3/12 >100K proteus mirabilis.   s/p zosyn (3/12-3/15)    Plan:  -follow up urine cultures    -Broaden to Meropenem   -antipyretics, antiemetics, analgesics as needed

## 2025-03-16 NOTE — PROGRESS NOTE ADULT - SUBJECTIVE AND OBJECTIVE BOX
INTERVAL HPI/OVERNIGHT EVENTS:    SUBJECTIVE: Patient seen and examined at bedside.     ROS: All negative except as listed above.    VITAL SIGNS:  ICU Vital Signs Last 24 Hrs  T(C): 37.7 (16 Mar 2025 04:10), Max: 38.2 (16 Mar 2025 00:11)  T(F): 99.8 (16 Mar 2025 04:10), Max: 100.7 (16 Mar 2025 00:11)  HR: 96 (16 Mar 2025 04:10) (82 - 100)  BP: 129/61 (16 Mar 2025 04:10) (112/58 - 157/75)  BP(mean): --  ABP: --  ABP(mean): --  RR: 18 (16 Mar 2025 04:10) (18 - 18)  SpO2: 98% (16 Mar 2025 04:10) (94% - 98%)    O2 Parameters below as of 16 Mar 2025 04:10  Patient On (Oxygen Delivery Method): room air            Plateau pressure:   P/F ratio:     03-15 @ 07:01  -  03-16 @ 07:00  --------------------------------------------------------  IN: 0 mL / OUT: 1850 mL / NET: -1850 mL      CAPILLARY BLOOD GLUCOSE      POCT Blood Glucose.: 291 mg/dL (15 Mar 2025 22:09)      ECG: reviewed.    PHYSICAL EXAM:    GENERAL: NAD, lying in bed comfortably  HEAD:  Atraumatic, normocephalic  EYES: EOMI, PERRLA, conjunctiva and sclera clear  NECK: Supple, trachea midline, no JVD  HEART: Regular rate and rhythm, no murmurs, rubs, or gallops  LUNGS: Unlabored respirations.  Clear to auscultation bilaterally, no crackles, wheezing, or rhonchi  ABDOMEN: Soft, nontender, nondistended, +BS  EXTREMITIES: 2+ peripheral pulses bilaterally, cap refill<2 secs. No clubbing, cyanosis, or edema  NERVOUS SYSTEM:  A&Ox3, following commands, moving all extremities, no focal deficits   SKIN: No rashes or lesions    MEDICATIONS:  MEDICATIONS  (STANDING):  aspirin  chewable 81 milliGRAM(s) Oral daily  atorvastatin 20 milliGRAM(s) Oral at bedtime  dextrose 5%. 1000 milliLiter(s) (100 mL/Hr) IV Continuous <Continuous>  dextrose 5%. 1000 milliLiter(s) (50 mL/Hr) IV Continuous <Continuous>  dextrose 50% Injectable 25 Gram(s) IV Push once  dextrose 50% Injectable 12.5 Gram(s) IV Push once  dextrose 50% Injectable 25 Gram(s) IV Push once  glucagon  Injectable 1 milliGRAM(s) IntraMuscular once  heparin   Injectable 5000 Unit(s) SubCutaneous every 8 hours  insulin glargine Injectable (LANTUS) 45 Unit(s) SubCutaneous at bedtime  insulin lispro (ADMELOG) corrective regimen sliding scale   SubCutaneous three times a day before meals  insulin lispro (ADMELOG) corrective regimen sliding scale   SubCutaneous at bedtime  insulin lispro Injectable (ADMELOG) 12 Unit(s) SubCutaneous three times a day before meals  meropenem  IVPB      meropenem  IVPB 500 milliGRAM(s) IV Intermittent every 12 hours  polyethylene glycol 3350 17 Gram(s) Oral <User Schedule>  senna 2 Tablet(s) Oral daily  sodium chloride 0.9%. 1000 milliLiter(s) (100 mL/Hr) IV Continuous <Continuous>  sodium chloride 0.9%. 1000 milliLiter(s) (100 mL/Hr) IV Continuous <Continuous>  tamsulosin 0.4 milliGRAM(s) Oral at bedtime    MEDICATIONS  (PRN):  acetaminophen     Tablet .. 650 milliGRAM(s) Oral every 6 hours PRN Temp greater or equal to 38C (100.4F), Mild Pain (1 - 3)  aluminum hydroxide/magnesium hydroxide/simethicone Suspension 30 milliLiter(s) Oral every 4 hours PRN Dyspepsia  dextrose Oral Gel 15 Gram(s) Oral once PRN Blood Glucose LESS THAN 70 milliGRAM(s)/deciliter  melatonin 3 milliGRAM(s) Oral at bedtime PRN Insomnia  ondansetron Injectable 4 milliGRAM(s) IV Push every 8 hours PRN Nausea and/or Vomiting      ALLERGIES:  Allergies    No Known Allergies    Intolerances        LABS:                        10.5   20.43 )-----------( 140      ( 15 Mar 2025 07:27 )             32.0     03-15    124[L]  |  86[L]  |  69[H]  ----------------------------<  340[H]  4.5   |  21[L]  |  4.84[H]    Ca    7.9[L]      15 Mar 2025 07:28  Phos  4.3     03-15  Mg     2.4     03-15    TPro  6.3  /  Alb  2.5[L]  /  TBili  0.3  /  DBili  x   /  AST  57[H]  /  ALT  50[H]  /  AlkPhos  157[H]  03-15      Urinalysis Basic - ( 15 Mar 2025 07:28 )    Color: x / Appearance: x / SG: x / pH: x  Gluc: 340 mg/dL / Ketone: x  / Bili: x / Urobili: x   Blood: x / Protein: x / Nitrite: x   Leuk Esterase: x / RBC: x / WBC x   Sq Epi: x / Non Sq Epi: x / Bacteria: x      ABG:      vBG:    Micro:    Culture - Blood (collected 03-12-25 @ 19:45)  Source: Blood Blood-Peripheral  Preliminary Report (03-16-25 @ 01:01):    No growth at 72 Hours    Culture - Blood (collected 03-12-25 @ 19:35)  Source: Blood Blood-Peripheral  Preliminary Report (03-16-25 @ 01:01):    No growth at 72 Hours          RADIOLOGY & ADDITIONAL TESTS: Reviewed.   INTERVAL HPI/OVERNIGHT EVENTS:    SUBJECTIVE: Patient seen and examined at bedside. Patient complains of fatigue and mild abdominal discomfort.     ROS: All negative except as listed above.    VITAL SIGNS:  ICU Vital Signs Last 24 Hrs  T(C): 37.7 (16 Mar 2025 04:10), Max: 38.2 (16 Mar 2025 00:11)  T(F): 99.8 (16 Mar 2025 04:10), Max: 100.7 (16 Mar 2025 00:11)  HR: 96 (16 Mar 2025 04:10) (82 - 100)  BP: 129/61 (16 Mar 2025 04:10) (112/58 - 157/75)  BP(mean): --  ABP: --  ABP(mean): --  RR: 18 (16 Mar 2025 04:10) (18 - 18)  SpO2: 98% (16 Mar 2025 04:10) (94% - 98%)    O2 Parameters below as of 16 Mar 2025 04:10  Patient On (Oxygen Delivery Method): room air            Plateau pressure:   P/F ratio:     03-15 @ 07:01  -  03-16 @ 07:00  --------------------------------------------------------  IN: 0 mL / OUT: 1850 mL / NET: -1850 mL      CAPILLARY BLOOD GLUCOSE      POCT Blood Glucose.: 291 mg/dL (15 Mar 2025 22:09)      ECG: reviewed.    PHYSICAL EXAM:    GENERAL: NAD, lying in bed comfortably  HEAD:  Atraumatic, normocephalic  EYES: EOMI, PERRLA, conjunctiva and sclera clear  NECK: Supple, trachea midline, no JVD  HEART: Regular rate and rhythm, no murmurs, rubs, or gallops  LUNGS: Unlabored respirations.  Clear to auscultation bilaterally, no crackles, wheezing, or rhonchi  ABDOMEN: Soft, nontender, nondistended, +BS  EXTREMITIES: 2+ peripheral pulses bilaterally, cap refill<2 secs. No clubbing, cyanosis, or edema  NERVOUS SYSTEM:  A&Ox3, following commands, moving all extremities, no focal deficits   SKIN: No rashes or lesions    MEDICATIONS:  MEDICATIONS  (STANDING):  aspirin  chewable 81 milliGRAM(s) Oral daily  atorvastatin 20 milliGRAM(s) Oral at bedtime  dextrose 5%. 1000 milliLiter(s) (100 mL/Hr) IV Continuous <Continuous>  dextrose 5%. 1000 milliLiter(s) (50 mL/Hr) IV Continuous <Continuous>  dextrose 50% Injectable 25 Gram(s) IV Push once  dextrose 50% Injectable 12.5 Gram(s) IV Push once  dextrose 50% Injectable 25 Gram(s) IV Push once  glucagon  Injectable 1 milliGRAM(s) IntraMuscular once  heparin   Injectable 5000 Unit(s) SubCutaneous every 8 hours  insulin glargine Injectable (LANTUS) 45 Unit(s) SubCutaneous at bedtime  insulin lispro (ADMELOG) corrective regimen sliding scale   SubCutaneous three times a day before meals  insulin lispro (ADMELOG) corrective regimen sliding scale   SubCutaneous at bedtime  insulin lispro Injectable (ADMELOG) 12 Unit(s) SubCutaneous three times a day before meals  meropenem  IVPB      meropenem  IVPB 500 milliGRAM(s) IV Intermittent every 12 hours  polyethylene glycol 3350 17 Gram(s) Oral <User Schedule>  senna 2 Tablet(s) Oral daily  sodium chloride 0.9%. 1000 milliLiter(s) (100 mL/Hr) IV Continuous <Continuous>  sodium chloride 0.9%. 1000 milliLiter(s) (100 mL/Hr) IV Continuous <Continuous>  tamsulosin 0.4 milliGRAM(s) Oral at bedtime    MEDICATIONS  (PRN):  acetaminophen     Tablet .. 650 milliGRAM(s) Oral every 6 hours PRN Temp greater or equal to 38C (100.4F), Mild Pain (1 - 3)  aluminum hydroxide/magnesium hydroxide/simethicone Suspension 30 milliLiter(s) Oral every 4 hours PRN Dyspepsia  dextrose Oral Gel 15 Gram(s) Oral once PRN Blood Glucose LESS THAN 70 milliGRAM(s)/deciliter  melatonin 3 milliGRAM(s) Oral at bedtime PRN Insomnia  ondansetron Injectable 4 milliGRAM(s) IV Push every 8 hours PRN Nausea and/or Vomiting      ALLERGIES:  Allergies    No Known Allergies    Intolerances        LABS:                        10.5   20.43 )-----------( 140      ( 15 Mar 2025 07:27 )             32.0     03-15    124[L]  |  86[L]  |  69[H]  ----------------------------<  340[H]  4.5   |  21[L]  |  4.84[H]    Ca    7.9[L]      15 Mar 2025 07:28  Phos  4.3     03-15  Mg     2.4     03-15    TPro  6.3  /  Alb  2.5[L]  /  TBili  0.3  /  DBili  x   /  AST  57[H]  /  ALT  50[H]  /  AlkPhos  157[H]  03-15      Urinalysis Basic - ( 15 Mar 2025 07:28 )    Color: x / Appearance: x / SG: x / pH: x  Gluc: 340 mg/dL / Ketone: x  / Bili: x / Urobili: x   Blood: x / Protein: x / Nitrite: x   Leuk Esterase: x / RBC: x / WBC x   Sq Epi: x / Non Sq Epi: x / Bacteria: x      ABG:      vBG:    Micro:    Culture - Blood (collected 03-12-25 @ 19:45)  Source: Blood Blood-Peripheral  Preliminary Report (03-16-25 @ 01:01):    No growth at 72 Hours    Culture - Blood (collected 03-12-25 @ 19:35)  Source: Blood Blood-Peripheral  Preliminary Report (03-16-25 @ 01:01):    No growth at 72 Hours          RADIOLOGY & ADDITIONAL TESTS: Reviewed.   INTERVAL HPI/OVERNIGHT EVENTS:    SUBJECTIVE: Patient seen and examined at bedside. Patient complains of fatigue and mild abdominal discomfort.     ROS: All negative except as listed above.    VITAL SIGNS:  ICU Vital Signs Last 24 Hrs  T(C): 37.7 (16 Mar 2025 04:10), Max: 38.2 (16 Mar 2025 00:11)  T(F): 99.8 (16 Mar 2025 04:10), Max: 100.7 (16 Mar 2025 00:11)  HR: 96 (16 Mar 2025 04:10) (82 - 100)  BP: 129/61 (16 Mar 2025 04:10) (112/58 - 157/75)  BP(mean): --  ABP: --  ABP(mean): --  RR: 18 (16 Mar 2025 04:10) (18 - 18)  SpO2: 98% (16 Mar 2025 04:10) (94% - 98%)    O2 Parameters below as of 16 Mar 2025 04:10  Patient On (Oxygen Delivery Method): room air            Plateau pressure:   P/F ratio:     03-15 @ 07:01  -  03-16 @ 07:00  --------------------------------------------------------  IN: 0 mL / OUT: 1850 mL / NET: -1850 mL      CAPILLARY BLOOD GLUCOSE      POCT Blood Glucose.: 291 mg/dL (15 Mar 2025 22:09)      ECG: reviewed.    PHYSICAL EXAM:    GENERAL: NAD, lying in bed, appears, faitgue  HEAD:  Atraumatic, normocephalic  EYES: EOMI, PERRLA, conjunctiva and sclera clear  NECK: Supple, trachea midline, no JVD  HEART: Regular rate and rhythm, no murmurs, rubs, or gallops  LUNGS: Unlabored respirations.  Clear to auscultation bilaterally, no crackles, wheezing, or rhonchi  ABDOMEN: distended  EXTREMITIES: 2+ peripheral pulses bilaterally, cap refill<2 secs. No clubbing, cyanosis, or edema  NERVOUS SYSTEM:  A&Ox3, following commands, moving all extremities, no focal deficits   SKIN: No rashes or lesions    MEDICATIONS:  MEDICATIONS  (STANDING):  aspirin  chewable 81 milliGRAM(s) Oral daily  atorvastatin 20 milliGRAM(s) Oral at bedtime  dextrose 5%. 1000 milliLiter(s) (100 mL/Hr) IV Continuous <Continuous>  dextrose 5%. 1000 milliLiter(s) (50 mL/Hr) IV Continuous <Continuous>  dextrose 50% Injectable 25 Gram(s) IV Push once  dextrose 50% Injectable 12.5 Gram(s) IV Push once  dextrose 50% Injectable 25 Gram(s) IV Push once  glucagon  Injectable 1 milliGRAM(s) IntraMuscular once  heparin   Injectable 5000 Unit(s) SubCutaneous every 8 hours  insulin glargine Injectable (LANTUS) 45 Unit(s) SubCutaneous at bedtime  insulin lispro (ADMELOG) corrective regimen sliding scale   SubCutaneous three times a day before meals  insulin lispro (ADMELOG) corrective regimen sliding scale   SubCutaneous at bedtime  insulin lispro Injectable (ADMELOG) 12 Unit(s) SubCutaneous three times a day before meals  meropenem  IVPB      meropenem  IVPB 500 milliGRAM(s) IV Intermittent every 12 hours  polyethylene glycol 3350 17 Gram(s) Oral <User Schedule>  senna 2 Tablet(s) Oral daily  sodium chloride 0.9%. 1000 milliLiter(s) (100 mL/Hr) IV Continuous <Continuous>  sodium chloride 0.9%. 1000 milliLiter(s) (100 mL/Hr) IV Continuous <Continuous>  tamsulosin 0.4 milliGRAM(s) Oral at bedtime    MEDICATIONS  (PRN):  acetaminophen     Tablet .. 650 milliGRAM(s) Oral every 6 hours PRN Temp greater or equal to 38C (100.4F), Mild Pain (1 - 3)  aluminum hydroxide/magnesium hydroxide/simethicone Suspension 30 milliLiter(s) Oral every 4 hours PRN Dyspepsia  dextrose Oral Gel 15 Gram(s) Oral once PRN Blood Glucose LESS THAN 70 milliGRAM(s)/deciliter  melatonin 3 milliGRAM(s) Oral at bedtime PRN Insomnia  ondansetron Injectable 4 milliGRAM(s) IV Push every 8 hours PRN Nausea and/or Vomiting      ALLERGIES:  Allergies    No Known Allergies    Intolerances        LABS:                        10.5   20.43 )-----------( 140      ( 15 Mar 2025 07:27 )             32.0     03-15    124[L]  |  86[L]  |  69[H]  ----------------------------<  340[H]  4.5   |  21[L]  |  4.84[H]    Ca    7.9[L]      15 Mar 2025 07:28  Phos  4.3     03-15  Mg     2.4     03-15    TPro  6.3  /  Alb  2.5[L]  /  TBili  0.3  /  DBili  x   /  AST  57[H]  /  ALT  50[H]  /  AlkPhos  157[H]  03-15      Urinalysis Basic - ( 15 Mar 2025 07:28 )    Color: x / Appearance: x / SG: x / pH: x  Gluc: 340 mg/dL / Ketone: x  / Bili: x / Urobili: x   Blood: x / Protein: x / Nitrite: x   Leuk Esterase: x / RBC: x / WBC x   Sq Epi: x / Non Sq Epi: x / Bacteria: x      ABG:      vBG:    Micro:    Culture - Blood (collected 03-12-25 @ 19:45)  Source: Blood Blood-Peripheral  Preliminary Report (03-16-25 @ 01:01):    No growth at 72 Hours    Culture - Blood (collected 03-12-25 @ 19:35)  Source: Blood Blood-Peripheral  Preliminary Report (03-16-25 @ 01:01):    No growth at 72 Hours          RADIOLOGY & ADDITIONAL TESTS: Reviewed.

## 2025-03-16 NOTE — PROGRESS NOTE ADULT - ATTENDING COMMENTS
I have seen this patient with the fellow and agree with their assessment and plan. I was physically present for significant portions of the evaluation and management (E/M) service provided.  I agree with the above history, physical, and plan which I have reviewed and edited where appropriate.    Terri Sequeira MD  Office   Contact me directly via [employer] Teams     (After 5 pm or on weekends please page the on-call fellow/attending, can check [email or web address] for schedule. Login is shashank jean-baptiste, schedule under [facility] medicine, psych, derm)

## 2025-03-16 NOTE — PROGRESS NOTE ADULT - ASSESSMENT
Impression/Hospital Course: 64M with medical of PAD s/p L BKA, DM with peripheral neuropathy and retinopathy HTN, CVA, complete heart block s/p PPM presented on 3/12 with generalized fatigue. In the ED patient noted to be febrile and tachycardic. Labs notable for leukocytosis, hyponatremia, hyperglycemia, HOLA and metabolic acidosis. UA with many bacteria, negative nitrite and small leukocyte esterase. Urine culture 3/12 >100K proteus mirabilis. Blood culture negative to date. ID asked to help manage.     Work up:  Blood culture 3/12 (-)  Urine Culture 3/12 - >100k proteus mirabilis  Rapid RVP negative  Renal US:  mild hydronephrosis of the left kidney  X-ray abdomen: Mild nonspecific gaseous distention of stomach, with paucity of bowel gas distally. This may be secondary to gastric outlet obstruction.  CT A/P with 0.6 cm calculus within the proximal left ureter with mild left hydronephrosis  RUQ US Cholelithiasis with gallbladder wall thickening. Question trace pericholecystic fluid  NM pulmonary perfusion scan indeterminate VQ scan for pulmonary embolus    Antimicrobials:  Zosyn 3/12    Assessment:  #Sepsis - source unclear at this time  #Hypoxemia ?aspiration PNA r/o PE  #Fever  #Leukocytosis  #HOLA  #Transaminitis  #Bacteruria      Recommendations:   -Continue with Meropenem 500mg IV Q12H  -Please check CBC and CMP  -Follow up Vancomycin level   -Follow up blood culture  -Follow up MRSA PCR  -Follow up sputum culture   -Check full RVP      PLEASE DEFER ALL CHANGES IN PLAN UNTIL SIGNED BY ATTENDING. All recommendations are tentative pending Attending Attestation.    Marie Mc DO, PGY-4   ID Fellow  [employer] Teams Preferred  After 5pm/weekends call [phone]   Impression/Hospital Course: 64M with medical of PAD s/p L BKA, DM with peripheral neuropathy and retinopathy HTN, CVA, complete heart block s/p PPM presented on 3/12 with generalized fatigue. In the ED patient noted to be febrile and tachycardic. Labs notable for leukocytosis, hyponatremia, hyperglycemia, HOLA and metabolic acidosis. UA with many bacteria, negative nitrite and small leukocyte esterase. Urine culture 3/12 >100K proteus mirabilis. Blood culture negative to date. ID asked to help manage.     Work up:  Blood culture 3/12 (-)  Urine Culture 3/12 - >100k proteus mirabilis  Rapid RVP negative  Renal US:  mild hydronephrosis of the left kidney  X-ray abdomen: Mild nonspecific gaseous distention of stomach, with paucity of bowel gas distally. This may be secondary to gastric outlet obstruction.  CT A/P with 0.6 cm calculus within the proximal left ureter with mild left hydronephrosis  RUQ US Cholelithiasis with gallbladder wall thickening. Question trace pericholecystic fluid  NM pulmonary perfusion scan indeterminate VQ scan for pulmonary embolus    Antimicrobials:  Zosyn 3/12    Assessment:  #Sepsis - source unclear at this time  #Hypoxemia ?aspiration PNA r/o PE  #Fever  #Leukocytosis - downtrending  #HOLA  #Transaminitis  #Bacteruria      Recommendations:   -Continue with Meropenem 500mg IV Q12H  -Please check CBC and CMP  -Follow up Vancomycin level   -Follow up blood culture  -Follow up MRSA PCR  -Follow up sputum culture   -Check full RVP  -Recommend HIDA scan     Case discussed with attending    Marie Mc DO, PGY-4   ID Fellow  [employer] Teams Preferred  After 5pm/weekends call [phone]

## 2025-03-16 NOTE — PROGRESS NOTE ADULT - PROBLEM SELECTOR PLAN 1
febrile, leukocytosis, tachycardic, + lactic acidosis; meets severe sepsis criteria  suspect 2/2 uti  CXR- no consolidations  s/p 2 L LR     Plan:   -NS @ 100cc/hr  -follow up blood cultures  -trend lactate q4-6h until wnl  -Monitor for fever, changes in white count  - Meropenem, 1 time vanco dose. ID consulted.   - Given new fever on 3/15, blood cx ordered  - CT chest, abdomen, pelvis ordered. RUQ US ordered. DVT studies ordered febrile, leukocytosis, tachycardic, + lactic acidosis; meets severe sepsis criteria  suspect 2/2 uti --> Urine culture 3/12 >100K proteus mirabilis.   CXR- no consolidations  s/p 2 L LR     Plan:     -follow up blood cultures  -f/u  sputum cx  -f/u MRSA PCR  -trend lactate q4-6h until wnl  -Monitor for fever, changes in white count  - Meropenem, 1 time vanco dose. ID consulted.   - Given new fever on 3/15, blood cx ordered  - CT chest, abdomen, pelvis ordered. RUQ US ordered. DVT studies ordered febrile, leukocytosis, tachycardic, + lactic acidosis; meets severe sepsis criteria  suspect 2/2 uti --> Urine culture 3/12 >100K proteus mirabilis.   CXR- no consolidations  s/p 2 L LR     Plan:   - NS @ 100cc/hr  -follow up blood cultures  -f/u  sputum cx  -f/u MRSA PCR  -trend lactate q4-6h until wnl  -Monitor for fever, changes in white count  - Meropenem, 1 time vanco dose. ID consulted.   - Given new fever on 3/15, blood cx ordered  - CT chest, abdomen, pelvis ordered. RUQ US ordered. DVT studies ordered Unclear baseline cr, in 6/2024, cr appeared to fluctuate around ~1.2; Currently, ~1.9  Now with increasing Cr at 3.74  Likely pre-renal with c/f ATN  CT with 0.6cm calcalus in proximal left ureter with mild left hydromephrosis  Contreras placed 3/15    Plan:   nephrology consulted, f/u recs  f/u renal and bladder ultrasound- preliminary read is mild left hydronephrosis  fu urine studies  bladder scan-312  Monitor UO, BUN/Cr, volume status, acid-base balance, electrolytes  avoid nephrotoxic agents  dose adjustments for renally cleared meds Unclear baseline cr,   Fluctuated between 0.9-1.1 in late 20246  on Admission 1.9.  Increasing now at 5.1  Likely pre-renal with vs. ATN vs. post renal iso obstruction  CT with 0.6cm calcalus in proximal left ureter with mild left hydromephrosis  Contreras placed 3/15- U/O 1.8L today  renal and bladder ultrasound-  mild left hydronephrosis  Now with good Urine output  CT A/P with 0.6 cm calculus within the proximal left ureter with mild left hydronephrosis    Plan:   500cc NS today  monitor BMP BID  fu urine studies  Monitor UO, BUN/Cr, volume status, acid-base balance, electrolytes  avoid nephrotoxic agents  dose adjustments for renally cleared meds

## 2025-03-16 NOTE — PROGRESS NOTE ADULT - PROBLEM SELECTOR PLAN 5
Tolerated infusion without difficulty.   Ua with bacteria + pyuria with leukocyte esterase;    Plan:  -follow up urine cultures    -Broaden to Meropenem and will give 1 dose of vanco  -antipyretics, antiemetics, analgesics as needed Ua with bacteria + pyuria with leukocyte esterase;  Urine culture 3/12 >100K proteus mirabilis.     Plan:  -follow up urine cultures    -Broaden to Meropenem and will give 1 dose of vanco  -antipyretics, antiemetics, analgesics as needed Uptrending transaminases, likely iso shock liver.   RUQ US Cholelithiasis with gallbladder wall thickening. Question trace pericholecystic fluid.       -CTM  -f/u HIDA scan

## 2025-03-16 NOTE — PROGRESS NOTE ADULT - SUBJECTIVE AND OBJECTIVE BOX
[facility] Division of Kidney Diseases & Hypertension  FOLLOW UP NOTE  [phone]--------------------------------------------------------------------------------  Chief Complaint:Acute renal failure    24 hour events/subjective:  Pt was seen and examined earlier today. No acute overnight events. No fresh complaints. Pt reports feeling well. His appetite is improving.   Pt denies SOB/ Constipation/ Diarrhea/ Nausea/ Vomiting/ abdominal pain/ chest pain/ tingling/ numbness.         PAST HISTORY  --------------------------------------------------------------------------------  No significant changes to PMH, PSH, FHx, SHx, unless otherwise noted    ALLERGIES & MEDICATIONS  --------------------------------------------------------------------------------  Allergies    No Known Allergies    Intolerances      Standing Inpatient Medications  aspirin  chewable 81 milliGRAM(s) Oral daily  atorvastatin 20 milliGRAM(s) Oral at bedtime  dextrose 5%. 1000 milliLiter(s) IV Continuous <Continuous>  dextrose 5%. 1000 milliLiter(s) IV Continuous <Continuous>  dextrose 50% Injectable 25 Gram(s) IV Push once  dextrose 50% Injectable 12.5 Gram(s) IV Push once  dextrose 50% Injectable 25 Gram(s) IV Push once  glucagon  Injectable 1 milliGRAM(s) IntraMuscular once  heparin   Injectable 5000 Unit(s) SubCutaneous every 8 hours  insulin glargine Injectable (LANTUS) 45 Unit(s) SubCutaneous at bedtime  insulin lispro (ADMELOG) corrective regimen sliding scale   SubCutaneous three times a day before meals  insulin lispro (ADMELOG) corrective regimen sliding scale   SubCutaneous at bedtime  insulin lispro Injectable (ADMELOG) 12 Unit(s) SubCutaneous three times a day before meals  meropenem  IVPB      meropenem  IVPB 500 milliGRAM(s) IV Intermittent every 12 hours  polyethylene glycol 3350 17 Gram(s) Oral <User Schedule>  senna 2 Tablet(s) Oral daily  sodium chloride 0.9%. 1000 milliLiter(s) IV Continuous <Continuous>  sodium chloride 0.9%. 1000 milliLiter(s) IV Continuous <Continuous>  tamsulosin 0.4 milliGRAM(s) Oral at bedtime    PRN Inpatient Medications  acetaminophen     Tablet .. 650 milliGRAM(s) Oral every 6 hours PRN  aluminum hydroxide/magnesium hydroxide/simethicone Suspension 30 milliLiter(s) Oral every 4 hours PRN  dextrose Oral Gel 15 Gram(s) Oral once PRN  melatonin 3 milliGRAM(s) Oral at bedtime PRN  ondansetron Injectable 4 milliGRAM(s) IV Push every 8 hours PRN      REVIEW OF SYSTEMS  --------------------------------------------------------------------------------  as noted above.     VITALS/PHYSICAL EXAM  --------------------------------------------------------------------------------  T(C): 37.7 (03-16-25 @ 04:10), Max: 38.2 (03-16-25 @ 00:11)  HR: 96 (03-16-25 @ 04:10) (82 - 100)  BP: 129/61 (03-16-25 @ 04:10) (117/47 - 157/75)  RR: 18 (03-16-25 @ 04:10) (18 - 18)  SpO2: 98% (03-16-25 @ 04:10) (94% - 98%)  Wt(kg): --        03-15-25 @ 07:01  -  03-16-25 @ 07:00  --------------------------------------------------------  IN: 0 mL / OUT: 1850 mL / NET: -1850 mL      Physical Exam:  Gen: NAD, lying in bed  	Pulm: CTA B/L ant/lat fields  	CV: RRR, S1S2  	Abd: hypoactive bowel sounds, distended, NT to palpation  	: No suprapubic tenderness, no crain  	Extremity: LBKA, trace pedal edema RLE  	Neuro: A&Ox3      LABS/STUDIES  --------------------------------------------------------------------------------              10.6   14.92 >-----------<  160      [03-16-25 @ 09:33]              31.5     125  |  87  |  90  ----------------------------<  184      [03-16-25 @ 09:33]  4.8   |  20  |  5.12        Ca     8.1     [03-16-25 @ 09:33]      Mg     2.6     [03-16-25 @ 09:33]      Phos  3.6     [03-16-25 @ 09:33]    TPro  6.6  /  Alb  2.7  /  TBili  0.4  /  DBili  x   /  AST  155  /  ALT  143  /  AlkPhos  291  [03-16-25 @ 09:33]        Serum Osmolality 299      [03-14-25 @ 14:53]    Creatinine Trend:  SCr 5.12 [03-16 @ 09:33]  SCr 4.84 [03-15 @ 07:28]  SCr 3.74 [03-14 @ 06:46]  SCr 1.95 [03-13 @ 06:46]  SCr 1.72 [03-12 @ 17:04]    Urine Creatinine 178      [03-14-25 @ 21:03]  Urine Protein 92      [03-14-25 @ 21:03]  Urine Sodium 18      [03-14-25 @ 21:03]  Urine Urea Nitrogen 325      [03-14-25 @ 21:02]  Urine Potassium 63      [03-14-25 @ 21:03]  Urine Osmolality 324      [03-14-25 @ 21:03]    Lipid: chol 159, , HDL 39, LDL --      [03-13-25 @ 06:46]

## 2025-03-16 NOTE — PROGRESS NOTE ADULT - ATTENDING COMMENTS
64M with medical of PAD s/p L BKA, DM with peripheral neuropathy and retinopathy HTN, CVA, complete heart block s/p PPM presented on 3/12 with generalized fatigue. In the ED patient noted to be febrile and tachycardic. Labs notable for leukocytosis, hyponatremia, hyperglycemia, HOLA and metabolic acidosis. UA with many bacteria, negative nitrite and small leukocyte esterase. Urine culture 3/12 >100K proteus mirabilis. Blood culture negative to date. ID asked to help manage.     Work up:  -Blood culture 3/12 (-)  -Urine Culture 3/12 - >100k proteus mirabilis  -Rapid RVP negative  -Renal US:  mild hydronephrosis of the left kidney  -X-ray abdomen: Mild nonspecific gaseous distention of stomach, with paucity of bowel gas distally. This may be secondary to gastric outlet obstruction.  -CT C/A/P without cont: 0.6 cm calculus within the proximal left ureter with mild left hydronephrosis.  -VQ scan: Indeterminate VQ scan for pulmonary embolus.  -LE US doppler: no DVT  -US RUQ:  Cholelithiasis with gallbladder wall thickening. Question trace pericholecystic fluid. Unreliable sonogram Jansen's sign. If there is clinical suspicion for acute cholecystitis, hepatobiliary scan may be obtained for further evaluation.    #Sepsis - source unclear at this time  #Hypoxemia ?PNA r/o PE  #Fever  #Leukocytosis  #HOLA  #Transaminitis  #Bacteruria    Recommendations:   -Continue Meropenem for now   -F/up repeat blood cx sent on 3/15  -F/up MRSA PCR  -Check sputum culture if feasible   -Check full RVP  -Would recommend HIDA scan     Claudia Mack MD  ID physician  [employer] Preferred  After 5pm/weekends call [phone] . 64M with medical of PAD s/p L BKA, DM with peripheral neuropathy and retinopathy HTN, CVA, complete heart block s/p PPM presented on 3/12 with generalized fatigue. In the ED patient noted to be febrile and tachycardic. Labs notable for leukocytosis, hyponatremia, hyperglycemia, HOLA and metabolic acidosis. UA with many bacteria, negative nitrite and small leukocyte esterase. Urine culture 3/12 >100K proteus mirabilis. Blood culture negative to date. ID asked to help manage.     Work up:  -Blood culture 3/12 (-)  -Urine Culture 3/12 - >100k proteus mirabilis  -Rapid RVP negative  -Renal US:  mild hydronephrosis of the left kidney  -X-ray abdomen: Mild nonspecific gaseous distention of stomach, with paucity of bowel gas distally. This may be secondary to gastric outlet obstruction.  -CT C/A/P without cont: 0.6 cm calculus within the proximal left ureter with mild left hydronephrosis.  -VQ scan: Indeterminate VQ scan for pulmonary embolus.  -LE US doppler: no DVT  -US RUQ:  Cholelithiasis with gallbladder wall thickening. Question trace pericholecystic fluid. Unreliable sonogram Jansen's sign. If there is clinical suspicion for acute cholecystitis, hepatobiliary scan may be obtained for further evaluation.    #Sepsis - source unclear at this time  #Hypoxemia ?PNA r/o PE  #Fever  #Leukocytosis  #HOLA  #Transaminitis  #Bacteruria    Recommendations:   -Continue Meropenem for now   -MRSA PCR neg, would dc vanc   -F/up repeat blood cx sent on 3/15  -Check sputum culture if feasible   -Check full RVP  -Would recommend HIDA scan     Claudia Mack MD  ID physician  [employer] Preferred  After 5pm/weekends call [phone] .

## 2025-03-16 NOTE — PROGRESS NOTE ADULT - ASSESSMENT
63 y/o male with Acute on chronic kidney injury, hyponatremia      HOLA (acute kidney injury):  On ?CKD, baseline creatine noted to fluctuate between 0.9 and 1.1 in late 2024 per review of HIE    - Urology team placed crain 3/15 - UOP was 1.8L in last 24 hours.   -urine sodium low, urine osm high, with FENa of 0.3 as calculated from BMP on 03/12, strongly indicative prerenal etiology  - recent hemodynamics noted (fevers, episode of hypotension on 03/13) which is concerning for evolving ATN  - Mild L hydronephrosis.   - Scr is worsening today. HOLA with likely underlying diabetic nephropathy.   - No signs of uremia.     PLAN:  -continue to hold outpatient ACEi  -abd distended, no BM and minimal flatus;   - Give 500cc NS today - Monitor BMP BID.   -strict I/O please  -Renally dosed abx for UTI.  f/u UCx speciation/sensitivity, as leukocytosis worsening again.  BlCx NGTD.  No urgent indication for HD today. Will continue to assess daily.      Hyponatremia.   sodium 125  Likely due to impaired free water clearance 2/2 his decreased renal function and pain due to constipation.   avoid hypotonic fluids for further volume resuscitation  Now he has good UOP. UOSm was 324. SNa isexpected to improve with relief of obstruction. ( But his HOLA is multifactorial)

## 2025-03-16 NOTE — PROGRESS NOTE ADULT - PROBLEM SELECTOR PLAN 6
on presentation, BG ~400s,   UA: w no glucosuria and no ketonuria, BHB wnl, with HAGMA  s/p 2L LR  likely stress hyperglycemia iso sepsis    Plan:   -Lantus increased from 25 to 30U this morning + low dose correctional lispro TID  -hold home regimen  -fu a1c   -trend fingerstick glu  - goal inpatient BG- 180  -cont home neurontin  - carb consistent diet Bowel distension on physical exam. patient with history of appendectomy.    Plan:   Patient had a BM 3/16  Bowel regimen  f/u abdominal xray- Mild nonspecific gaseous distention of stomach, with paucity of bowel gas   distally. This may be secondary to gastric outlet obstruction.

## 2025-03-17 ENCOUNTER — TRANSCRIPTION ENCOUNTER (OUTPATIENT)
Age: 65
End: 2025-03-17

## 2025-03-17 LAB
ALBUMIN SERPL ELPH-MCNC: 2.6 G/DL — LOW (ref 3.3–5)
ALP SERPL-CCNC: 458 U/L — HIGH (ref 40–120)
ALT FLD-CCNC: 155 U/L — HIGH (ref 10–45)
ANION GAP SERPL CALC-SCNC: 14 MMOL/L — SIGNIFICANT CHANGE UP (ref 5–17)
AST SERPL-CCNC: 125 U/L — HIGH (ref 10–40)
BASOPHILS # BLD AUTO: 0.06 K/UL — SIGNIFICANT CHANGE UP (ref 0–0.2)
BASOPHILS NFR BLD AUTO: 0.3 % — SIGNIFICANT CHANGE UP (ref 0–2)
BILIRUB SERPL-MCNC: 0.4 MG/DL — SIGNIFICANT CHANGE UP (ref 0.2–1.2)
BUN SERPL-MCNC: 84 MG/DL — HIGH (ref 7–23)
CALCIUM SERPL-MCNC: 8.3 MG/DL — LOW (ref 8.4–10.5)
CHLORIDE SERPL-SCNC: 91 MMOL/L — LOW (ref 96–108)
CO2 SERPL-SCNC: 24 MMOL/L — SIGNIFICANT CHANGE UP (ref 22–31)
CREAT SERPL-MCNC: 4.02 MG/DL — HIGH (ref 0.5–1.3)
EGFR: 16 ML/MIN/1.73M2 — LOW
EGFR: 16 ML/MIN/1.73M2 — LOW
EOSINOPHIL # BLD AUTO: 0.26 K/UL — SIGNIFICANT CHANGE UP (ref 0–0.5)
EOSINOPHIL NFR BLD AUTO: 1.3 % — SIGNIFICANT CHANGE UP (ref 0–6)
GLUCOSE BLDC GLUCOMTR-MCNC: 209 MG/DL — HIGH (ref 70–99)
GLUCOSE BLDC GLUCOMTR-MCNC: 210 MG/DL — HIGH (ref 70–99)
GLUCOSE BLDC GLUCOMTR-MCNC: 254 MG/DL — HIGH (ref 70–99)
GLUCOSE BLDC GLUCOMTR-MCNC: 265 MG/DL — HIGH (ref 70–99)
GLUCOSE BLDC GLUCOMTR-MCNC: 318 MG/DL — HIGH (ref 70–99)
GLUCOSE SERPL-MCNC: 217 MG/DL — HIGH (ref 70–99)
HCT VFR BLD CALC: 32 % — LOW (ref 39–50)
HGB BLD-MCNC: 10.7 G/DL — LOW (ref 13–17)
IMM GRANULOCYTES NFR BLD AUTO: 1.8 % — HIGH (ref 0–0.9)
LYMPHOCYTES # BLD AUTO: 0.87 K/UL — LOW (ref 1–3.3)
LYMPHOCYTES # BLD AUTO: 4.4 % — LOW (ref 13–44)
MAGNESIUM SERPL-MCNC: 2.6 MG/DL — SIGNIFICANT CHANGE UP (ref 1.6–2.6)
MCHC RBC-ENTMCNC: 27.3 PG — SIGNIFICANT CHANGE UP (ref 27–34)
MCHC RBC-ENTMCNC: 33.4 G/DL — SIGNIFICANT CHANGE UP (ref 32–36)
MCV RBC AUTO: 81.6 FL — SIGNIFICANT CHANGE UP (ref 80–100)
METHOD TYPE: SIGNIFICANT CHANGE UP
MONOCYTES # BLD AUTO: 1.19 K/UL — HIGH (ref 0–0.9)
MONOCYTES NFR BLD AUTO: 6 % — SIGNIFICANT CHANGE UP (ref 2–14)
NEUTROPHILS # BLD AUTO: 17.11 K/UL — HIGH (ref 1.8–7.4)
NEUTROPHILS NFR BLD AUTO: 86.2 % — HIGH (ref 43–77)
NRBC BLD AUTO-RTO: 0 /100 WBCS — SIGNIFICANT CHANGE UP (ref 0–0)
PHOSPHATE SERPL-MCNC: 3.5 MG/DL — SIGNIFICANT CHANGE UP (ref 2.5–4.5)
PLATELET # BLD AUTO: 214 K/UL — SIGNIFICANT CHANGE UP (ref 150–400)
POTASSIUM SERPL-MCNC: 4.4 MMOL/L — SIGNIFICANT CHANGE UP (ref 3.5–5.3)
POTASSIUM SERPL-SCNC: 4.4 MMOL/L — SIGNIFICANT CHANGE UP (ref 3.5–5.3)
PROT SERPL-MCNC: 6.8 G/DL — SIGNIFICANT CHANGE UP (ref 6–8.3)
PROTEUS SP DNA BLD POS QL NAA+NON-PROBE: SIGNIFICANT CHANGE UP
RBC # BLD: 3.92 M/UL — LOW (ref 4.2–5.8)
RBC # FLD: 15.2 % — HIGH (ref 10.3–14.5)
SODIUM SERPL-SCNC: 129 MMOL/L — LOW (ref 135–145)
WBC # BLD: 19.85 K/UL — HIGH (ref 3.8–10.5)
WBC # FLD AUTO: 19.85 K/UL — HIGH (ref 3.8–10.5)

## 2025-03-17 PROCEDURE — 99233 SBSQ HOSP IP/OBS HIGH 50: CPT | Mod: GC

## 2025-03-17 PROCEDURE — 99233 SBSQ HOSP IP/OBS HIGH 50: CPT

## 2025-03-17 PROCEDURE — 74420 UROGRAPHY RTRGR +-KUB: CPT | Mod: 26,LT

## 2025-03-17 PROCEDURE — 52332 CYSTOSCOPY AND TREATMENT: CPT | Mod: LT

## 2025-03-17 PROCEDURE — 99232 SBSQ HOSP IP/OBS MODERATE 35: CPT | Mod: 25

## 2025-03-17 PROCEDURE — G0545: CPT

## 2025-03-17 PROCEDURE — 99222 1ST HOSP IP/OBS MODERATE 55: CPT | Mod: FS

## 2025-03-17 DEVICE — URETERAL CATH OPEN END 5FR 70CM: Type: IMPLANTABLE DEVICE | Site: LEFT | Status: FUNCTIONAL

## 2025-03-17 DEVICE — STENT URET INLAY OPTIMA 6FRX24CM: Type: IMPLANTABLE DEVICE | Site: LEFT | Status: FUNCTIONAL

## 2025-03-17 DEVICE — GUIDEWIRE SENSOR DUAL-FLEX NITINOL STRAIGHT .035" X 150CM: Type: IMPLANTABLE DEVICE | Site: LEFT | Status: FUNCTIONAL

## 2025-03-17 RX ORDER — INSULIN GLARGINE-YFGN 100 [IU]/ML
48 INJECTION, SOLUTION SUBCUTANEOUS AT BEDTIME
Refills: 0 | Status: DISCONTINUED | OUTPATIENT
Start: 2025-03-17 | End: 2025-03-18

## 2025-03-17 RX ORDER — INSULIN LISPRO 100 U/ML
15 INJECTION, SOLUTION INTRAVENOUS; SUBCUTANEOUS
Refills: 0 | Status: DISCONTINUED | OUTPATIENT
Start: 2025-03-17 | End: 2025-03-26

## 2025-03-17 RX ORDER — BISACODYL 5 MG
5 TABLET, DELAYED RELEASE (ENTERIC COATED) ORAL ONCE
Refills: 0 | Status: COMPLETED | OUTPATIENT
Start: 2025-03-17 | End: 2025-03-17

## 2025-03-17 RX ORDER — TAMSULOSIN HYDROCHLORIDE 0.4 MG/1
0.8 CAPSULE ORAL AT BEDTIME
Refills: 0 | Status: DISCONTINUED | OUTPATIENT
Start: 2025-03-17 | End: 2025-03-26

## 2025-03-17 RX ORDER — SODIUM CHLORIDE 9 G/1000ML
1000 INJECTION, SOLUTION INTRAVENOUS
Refills: 0 | Status: DISCONTINUED | OUTPATIENT
Start: 2025-03-17 | End: 2025-03-17

## 2025-03-17 RX ORDER — HEPARIN SODIUM 1000 [USP'U]/ML
5000 INJECTION INTRAVENOUS; SUBCUTANEOUS EVERY 8 HOURS
Refills: 0 | Status: DISCONTINUED | OUTPATIENT
Start: 2025-03-17 | End: 2025-03-26

## 2025-03-17 RX ORDER — HYDROMORPHONE/SOD CHLOR,ISO/PF 2 MG/10 ML
0.25 SYRINGE (ML) INJECTION
Refills: 0 | Status: DISCONTINUED | OUTPATIENT
Start: 2025-03-17 | End: 2025-03-17

## 2025-03-17 RX ADMIN — INSULIN LISPRO 4: 100 INJECTION, SOLUTION INTRAVENOUS; SUBCUTANEOUS at 16:32

## 2025-03-17 RX ADMIN — INSULIN LISPRO 4: 100 INJECTION, SOLUTION INTRAVENOUS; SUBCUTANEOUS at 08:53

## 2025-03-17 RX ADMIN — ATORVASTATIN CALCIUM 20 MILLIGRAM(S): 80 TABLET, FILM COATED ORAL at 22:10

## 2025-03-17 RX ADMIN — TAMSULOSIN HYDROCHLORIDE 0.8 MILLIGRAM(S): 0.4 CAPSULE ORAL at 22:08

## 2025-03-17 RX ADMIN — Medication 650 MILLIGRAM(S): at 13:33

## 2025-03-17 RX ADMIN — INSULIN LISPRO 6: 100 INJECTION, SOLUTION INTRAVENOUS; SUBCUTANEOUS at 12:27

## 2025-03-17 RX ADMIN — HEPARIN SODIUM 5000 UNIT(S): 1000 INJECTION INTRAVENOUS; SUBCUTANEOUS at 05:20

## 2025-03-17 RX ADMIN — MEROPENEM 100 MILLIGRAM(S): 1 INJECTION INTRAVENOUS at 05:18

## 2025-03-17 RX ADMIN — POLYETHYLENE GLYCOL 3350 17 GRAM(S): 17 POWDER, FOR SOLUTION ORAL at 22:06

## 2025-03-17 RX ADMIN — Medication 81 MILLIGRAM(S): at 11:36

## 2025-03-17 RX ADMIN — INSULIN GLARGINE-YFGN 48 UNIT(S): 100 INJECTION, SOLUTION SUBCUTANEOUS at 22:08

## 2025-03-17 RX ADMIN — Medication 2 TABLET(S): at 11:36

## 2025-03-17 RX ADMIN — INSULIN LISPRO 12 UNIT(S): 100 INJECTION, SOLUTION INTRAVENOUS; SUBCUTANEOUS at 08:54

## 2025-03-17 RX ADMIN — SODIUM CHLORIDE 75 MILLILITER(S): 9 INJECTION, SOLUTION INTRAVENOUS at 18:42

## 2025-03-17 RX ADMIN — INSULIN LISPRO 15 UNIT(S): 100 INJECTION, SOLUTION INTRAVENOUS; SUBCUTANEOUS at 18:08

## 2025-03-17 RX ADMIN — MEROPENEM 100 MILLIGRAM(S): 1 INJECTION INTRAVENOUS at 17:55

## 2025-03-17 RX ADMIN — POLYETHYLENE GLYCOL 3350 17 GRAM(S): 17 POWDER, FOR SOLUTION ORAL at 05:20

## 2025-03-17 RX ADMIN — HEPARIN SODIUM 5000 UNIT(S): 1000 INJECTION INTRAVENOUS; SUBCUTANEOUS at 22:09

## 2025-03-17 RX ADMIN — Medication 5 MILLIGRAM(S): at 09:58

## 2025-03-17 RX ADMIN — INSULIN LISPRO 2: 100 INJECTION, SOLUTION INTRAVENOUS; SUBCUTANEOUS at 22:08

## 2025-03-17 NOTE — PROGRESS NOTE ADULT - SUBJECTIVE AND OBJECTIVE BOX
INTERVAL HPI/OVERNIGHT EVENTS:    SUBJECTIVE: Patient seen and examined at bedside.     ROS: All negative except as listed above.    VITAL SIGNS:  ICU Vital Signs Last 24 Hrs  T(C): 38.3 (17 Mar 2025 04:17), Max: 38.3 (17 Mar 2025 04:17)  T(F): 101 (17 Mar 2025 04:17), Max: 101 (17 Mar 2025 04:17)  HR: 98 (17 Mar 2025 04:17) (91 - 98)  BP: 155/75 (17 Mar 2025 04:17) (111/63 - 155/75)  BP(mean): --  ABP: --  ABP(mean): --  RR: 18 (17 Mar 2025 04:17) (16 - 20)  SpO2: 96% (17 Mar 2025 04:17) (93% - 98%)    O2 Parameters below as of 17 Mar 2025 04:17  Patient On (Oxygen Delivery Method): nasal cannula  O2 Flow (L/min): 2          Plateau pressure:   P/F ratio:     03-16 @ 07:01  -  03-17 @ 07:00  --------------------------------------------------------  IN: 0 mL / OUT: 2100 mL / NET: -2100 mL      CAPILLARY BLOOD GLUCOSE      POCT Blood Glucose.: 222 mg/dL (16 Mar 2025 21:35)      ECG: reviewed.    PHYSICAL EXAM:    GENERAL: NAD, lying in bed comfortably  HEAD:  Atraumatic, normocephalic  EYES: EOMI, PERRLA, conjunctiva and sclera clear  NECK: Supple, trachea midline, no JVD  HEART: Regular rate and rhythm, no murmurs, rubs, or gallops  LUNGS: Unlabored respirations.  Clear to auscultation bilaterally, no crackles, wheezing, or rhonchi  ABDOMEN: Soft, nontender, nondistended, +BS  EXTREMITIES: 2+ peripheral pulses bilaterally, cap refill<2 secs. No clubbing, cyanosis, or edema  NERVOUS SYSTEM:  A&Ox3, following commands, moving all extremities, no focal deficits   SKIN: No rashes or lesions    MEDICATIONS:  MEDICATIONS  (STANDING):  aspirin  chewable 81 milliGRAM(s) Oral daily  atorvastatin 20 milliGRAM(s) Oral at bedtime  dextrose 5%. 1000 milliLiter(s) (100 mL/Hr) IV Continuous <Continuous>  dextrose 5%. 1000 milliLiter(s) (50 mL/Hr) IV Continuous <Continuous>  dextrose 50% Injectable 25 Gram(s) IV Push once  dextrose 50% Injectable 12.5 Gram(s) IV Push once  dextrose 50% Injectable 25 Gram(s) IV Push once  glucagon  Injectable 1 milliGRAM(s) IntraMuscular once  heparin   Injectable 5000 Unit(s) SubCutaneous every 8 hours  insulin glargine Injectable (LANTUS) 45 Unit(s) SubCutaneous at bedtime  insulin lispro (ADMELOG) corrective regimen sliding scale   SubCutaneous three times a day before meals  insulin lispro (ADMELOG) corrective regimen sliding scale   SubCutaneous at bedtime  insulin lispro Injectable (ADMELOG) 12 Unit(s) SubCutaneous three times a day before meals  meropenem  IVPB      meropenem  IVPB 500 milliGRAM(s) IV Intermittent every 12 hours  polyethylene glycol 3350 17 Gram(s) Oral <User Schedule>  senna 2 Tablet(s) Oral daily  sodium chloride 0.9%. 500 milliLiter(s) (50 mL/Hr) IV Continuous <Continuous>  tamsulosin 0.4 milliGRAM(s) Oral at bedtime    MEDICATIONS  (PRN):  acetaminophen     Tablet .. 650 milliGRAM(s) Oral every 6 hours PRN Temp greater or equal to 38C (100.4F), Mild Pain (1 - 3)  aluminum hydroxide/magnesium hydroxide/simethicone Suspension 30 milliLiter(s) Oral every 4 hours PRN Dyspepsia  dextrose Oral Gel 15 Gram(s) Oral once PRN Blood Glucose LESS THAN 70 milliGRAM(s)/deciliter  melatonin 3 milliGRAM(s) Oral at bedtime PRN Insomnia  ondansetron Injectable 4 milliGRAM(s) IV Push every 8 hours PRN Nausea and/or Vomiting      ALLERGIES:  Allergies    No Known Allergies    Intolerances        LABS:                        10.7   19.85 )-----------( 214      ( 17 Mar 2025 06:35 )             32.0     03-17    129[L]  |  91[L]  |  84[H]  ----------------------------<  217[H]  4.4   |  24  |  4.02[H]    Ca    8.3[L]      17 Mar 2025 06:35  Phos  3.5     03-17  Mg     2.6     03-17    TPro  6.8  /  Alb  2.6[L]  /  TBili  0.4  /  DBili  x   /  AST  125[H]  /  ALT  155[H]  /  AlkPhos  458[H]  03-17      Urinalysis Basic - ( 17 Mar 2025 06:35 )    Color: x / Appearance: x / SG: x / pH: x  Gluc: 217 mg/dL / Ketone: x  / Bili: x / Urobili: x   Blood: x / Protein: x / Nitrite: x   Leuk Esterase: x / RBC: x / WBC x   Sq Epi: x / Non Sq Epi: x / Bacteria: x      ABG:      vBG:  pH, Venous: 7.27 (03-16-25 @ 09:39)  pCO2, Venous: 54 mmHg (03-16-25 @ 09:39)  pO2, Venous: 52 mmHg (03-16-25 @ 09:39)  HCO3, Venous: 25 mmol/L (03-16-25 @ 09:39)    Micro:    Culture - Blood (collected 03-15-25 @ 11:14)  Source: Blood Blood  Preliminary Report (03-16-25 @ 17:01):    No growth at 24 hours    Culture - Blood (collected 03-15-25 @ 07:30)  Source: Blood Blood  Gram Stain (03-16-25 @ 23:04):    Growth in anaerobic bottle: Gram Negative Rods  Preliminary Report (03-16-25 @ 23:05):    Growth in anaerobic bottle: Gram Negative Rods    Direct identification is available within approximately 3-5    hours either by Blood Panel Multiplexed PCR or Direct    MALDI-TOF. Details: [email or web address]  Organism: Blood Culture PCR (03-17-25 @ 00:43)  Organism: Blood Culture PCR (03-17-25 @ 00:43)      Method Type: PCR      -  Proteus species: Detec    Culture - Blood (collected 03-12-25 @ 19:45)  Source: Blood Blood-Peripheral  Preliminary Report (03-17-25 @ 01:01):    No growth at 4 days    Culture - Blood (collected 03-12-25 @ 19:35)  Source: Blood Blood-Peripheral  Preliminary Report (03-17-25 @ 01:01):    No growth at 4 days          RADIOLOGY & ADDITIONAL TESTS: Reviewed.   INTERVAL HPI/OVERNIGHT EVENTS:    SUBJECTIVE: Patient seen and examined at bedside. Patient still states that he is weak.     ROS: All negative except as listed above.    VITAL SIGNS:  ICU Vital Signs Last 24 Hrs  T(C): 38.3 (17 Mar 2025 04:17), Max: 38.3 (17 Mar 2025 04:17)  T(F): 101 (17 Mar 2025 04:17), Max: 101 (17 Mar 2025 04:17)  HR: 98 (17 Mar 2025 04:17) (91 - 98)  BP: 155/75 (17 Mar 2025 04:17) (111/63 - 155/75)  BP(mean): --  ABP: --  ABP(mean): --  RR: 18 (17 Mar 2025 04:17) (16 - 20)  SpO2: 96% (17 Mar 2025 04:17) (93% - 98%)    O2 Parameters below as of 17 Mar 2025 04:17  Patient On (Oxygen Delivery Method): nasal cannula  O2 Flow (L/min): 2          Plateau pressure:   P/F ratio:     03-16 @ 07:01  -  03-17 @ 07:00  --------------------------------------------------------  IN: 0 mL / OUT: 2100 mL / NET: -2100 mL      CAPILLARY BLOOD GLUCOSE      POCT Blood Glucose.: 222 mg/dL (16 Mar 2025 21:35)      ECG: reviewed.    PHYSICAL EXAM:    GENERAL: NAD, lying in bed, appears weak and uncomfortable  HEAD:  Atraumatic, normocephalic  EYES: EOMI, PERRLA, conjunctiva and sclera clear  NECK: Supple, trachea midline, no JVD  HEART: Regular rate and rhythm, no murmurs, rubs, or gallops  LUNGS: on NC  ABDOMEN: distended  EXTREMITIES: prosthetic lower extremity  NERVOUS SYSTEM:  confused  SKIN: No rashes or lesions    MEDICATIONS:  MEDICATIONS  (STANDING):  aspirin  chewable 81 milliGRAM(s) Oral daily  atorvastatin 20 milliGRAM(s) Oral at bedtime  dextrose 5%. 1000 milliLiter(s) (100 mL/Hr) IV Continuous <Continuous>  dextrose 5%. 1000 milliLiter(s) (50 mL/Hr) IV Continuous <Continuous>  dextrose 50% Injectable 25 Gram(s) IV Push once  dextrose 50% Injectable 12.5 Gram(s) IV Push once  dextrose 50% Injectable 25 Gram(s) IV Push once  glucagon  Injectable 1 milliGRAM(s) IntraMuscular once  heparin   Injectable 5000 Unit(s) SubCutaneous every 8 hours  insulin glargine Injectable (LANTUS) 45 Unit(s) SubCutaneous at bedtime  insulin lispro (ADMELOG) corrective regimen sliding scale   SubCutaneous three times a day before meals  insulin lispro (ADMELOG) corrective regimen sliding scale   SubCutaneous at bedtime  insulin lispro Injectable (ADMELOG) 12 Unit(s) SubCutaneous three times a day before meals  meropenem  IVPB      meropenem  IVPB 500 milliGRAM(s) IV Intermittent every 12 hours  polyethylene glycol 3350 17 Gram(s) Oral <User Schedule>  senna 2 Tablet(s) Oral daily  sodium chloride 0.9%. 500 milliLiter(s) (50 mL/Hr) IV Continuous <Continuous>  tamsulosin 0.4 milliGRAM(s) Oral at bedtime    MEDICATIONS  (PRN):  acetaminophen     Tablet .. 650 milliGRAM(s) Oral every 6 hours PRN Temp greater or equal to 38C (100.4F), Mild Pain (1 - 3)  aluminum hydroxide/magnesium hydroxide/simethicone Suspension 30 milliLiter(s) Oral every 4 hours PRN Dyspepsia  dextrose Oral Gel 15 Gram(s) Oral once PRN Blood Glucose LESS THAN 70 milliGRAM(s)/deciliter  melatonin 3 milliGRAM(s) Oral at bedtime PRN Insomnia  ondansetron Injectable 4 milliGRAM(s) IV Push every 8 hours PRN Nausea and/or Vomiting      ALLERGIES:  Allergies    No Known Allergies    Intolerances        LABS:                        10.7   19.85 )-----------( 214      ( 17 Mar 2025 06:35 )             32.0     03-17    129[L]  |  91[L]  |  84[H]  ----------------------------<  217[H]  4.4   |  24  |  4.02[H]    Ca    8.3[L]      17 Mar 2025 06:35  Phos  3.5     03-17  Mg     2.6     03-17    TPro  6.8  /  Alb  2.6[L]  /  TBili  0.4  /  DBili  x   /  AST  125[H]  /  ALT  155[H]  /  AlkPhos  458[H]  03-17      Urinalysis Basic - ( 17 Mar 2025 06:35 )    Color: x / Appearance: x / SG: x / pH: x  Gluc: 217 mg/dL / Ketone: x  / Bili: x / Urobili: x   Blood: x / Protein: x / Nitrite: x   Leuk Esterase: x / RBC: x / WBC x   Sq Epi: x / Non Sq Epi: x / Bacteria: x      ABG:      vBG:  pH, Venous: 7.27 (03-16-25 @ 09:39)  pCO2, Venous: 54 mmHg (03-16-25 @ 09:39)  pO2, Venous: 52 mmHg (03-16-25 @ 09:39)  HCO3, Venous: 25 mmol/L (03-16-25 @ 09:39)    Micro:    Culture - Blood (collected 03-15-25 @ 11:14)  Source: Blood Blood  Preliminary Report (03-16-25 @ 17:01):    No growth at 24 hours    Culture - Blood (collected 03-15-25 @ 07:30)  Source: Blood Blood  Gram Stain (03-16-25 @ 23:04):    Growth in anaerobic bottle: Gram Negative Rods  Preliminary Report (03-16-25 @ 23:05):    Growth in anaerobic bottle: Gram Negative Rods    Direct identification is available within approximately 3-5    hours either by Blood Panel Multiplexed PCR or Direct    MALDI-TOF. Details: [email or web address]  Organism: Blood Culture PCR (03-17-25 @ 00:43)  Organism: Blood Culture PCR (03-17-25 @ 00:43)      Method Type: PCR      -  Proteus species: Detec    Culture - Blood (collected 03-12-25 @ 19:45)  Source: Blood Blood-Peripheral  Preliminary Report (03-17-25 @ 01:01):    No growth at 4 days    Culture - Blood (collected 03-12-25 @ 19:35)  Source: Blood Blood-Peripheral  Preliminary Report (03-17-25 @ 01:01):    No growth at 4 days          RADIOLOGY & ADDITIONAL TESTS: Reviewed.

## 2025-03-17 NOTE — PROGRESS NOTE ADULT - SUBJECTIVE AND OBJECTIVE BOX
Post op Check    Pt seen and examined without complaints. Pain is controlled. Denies SOB/CP/N/V.     Vital Signs Last 24 Hrs  T(C): 36.6 (17 Mar 2025 17:30), Max: 38.6 (17 Mar 2025 13:20)  T(F): 97.8 (17 Mar 2025 17:30), Max: 101.4 (17 Mar 2025 13:20)  HR: 77 (17 Mar 2025 17:30) (77 - 102)  BP: 131/60 (17 Mar 2025 17:30) (114/79 - 155/75)  BP(mean): 87 (17 Mar 2025 17:30) (87 - 105)  RR: 18 (17 Mar 2025 17:30) (16 - 20)  SpO2: 97% (17 Mar 2025 17:30) (94% - 100%)    Parameters below as of 17 Mar 2025 17:30  Patient On (Oxygen Delivery Method): nasal cannula        I&O's Summary    16 Mar 2025 07:01  -  17 Mar 2025 07:00  --------------------------------------------------------  IN: 0 mL / OUT: 2100 mL / NET: -2100 mL    17 Mar 2025 07:01  -  17 Mar 2025 17:43  --------------------------------------------------------  IN: 240 mL / OUT: 1150 mL / NET: -910 mL        Physical Exam  Gen: NAD, A&Ox3  Pulm: No respiratory distress, no subcostal retractions  Abd: Soft, NT, ND  : crain in place, draining clear yellow with small amount of sediment                          10.7   19.85 )-----------( 214      ( 17 Mar 2025 06:35 )             32.0       03-17    129[L]  |  91[L]  |  84[H]  ----------------------------<  217[H]  4.4   |  24  |  4.02[H]    Ca    8.3[L]      17 Mar 2025 06:35  Phos  3.5     03-17  Mg     2.6     03-17    TPro  6.8  /  Alb  2.6[L]  /  TBili  0.4  /  DBili  x   /  AST  125[H]  /  ALT  155[H]  /  AlkPhos  458[H]  03-17

## 2025-03-17 NOTE — BRIEF OPERATIVE NOTE - NSICDXBRIEFPREOP_GEN_ALL_CORE_FT
PRE-OP DIAGNOSIS:  Left ureteral stone 17-Mar-2025 16:14:21  Kunal Kenyon  Sepsis 17-Mar-2025 16:14:33  Kunal Kenyon

## 2025-03-17 NOTE — BRIEF OPERATIVE NOTE - NSICDXBRIEFPOSTOP_GEN_ALL_CORE_FT
POST-OP DIAGNOSIS:  Left ureteral stone 17-Mar-2025 16:14:43  Kunal Kenyon  Sepsis 17-Mar-2025 16:14:55  Kunal Kenyon

## 2025-03-17 NOTE — PROGRESS NOTE ADULT - PROBLEM SELECTOR PLAN 5
Uptrending transaminases, likely iso shock liver.   RUQ US Cholelithiasis with gallbladder wall thickening. Question trace pericholecystic fluid.       -CTM  -f/u HIDA scan

## 2025-03-17 NOTE — PROGRESS NOTE ADULT - PROBLEM SELECTOR PLAN 1
Unclear baseline cr,   Fluctuated between 0.9-1.1 in late 20246  on Admission 1.9.  Increasing now at 5.1  Likely pre-renal with vs. ATN vs. post renal iso obstruction  CT with 0.6cm calcalus in proximal left ureter with mild left hydromephrosis  Contreras placed 3/15- U/O 1.8L today  renal and bladder ultrasound-  mild left hydronephrosis  Now with good Urine output  CT A/P with 0.6 cm calculus within the proximal left ureter with mild left hydronephrosis    Plan:   500cc NS today  monitor BMP BID  fu urine studies  Monitor UO, BUN/Cr, volume status, acid-base balance, electrolytes  avoid nephrotoxic agents  dose adjustments for renally cleared meds febrile, leukocytosis, tachycardic, + lactic acidosis; meets severe sepsis criteria  suspect 2/2 uti --> Urine culture 3/12 >100K proteus mirabilis.   CXR- no consolidations  CT A/P with 0.6 cm calculus within the proximal left ureter with mild left hydronephrosis  RUQ US Cholelithiasis with gallbladder wall thickening. Question trace pericholecystic fluid.   MRSA PCR- negative  new proteus bacteremia + hydro on CT  Plan:   - ureteral stent per urology today  - Meropenem 500mg BID  - NS @ 50cc/hr  - f/u HIDA scan today   - follow up blood cultures 3/18  - f/u  sputum cx  - trend lactate q4-6h until wnl  - Monitor for fever, changes in white count febrile, leukocytosis, tachycardic, + lactic acidosis; meets severe sepsis criteria  suspect 2/2 uti --> Urine culture 3/12 >100K proteus mirabilis.   CXR- no consolidations  CT A/P with 0.6 cm calculus within the proximal left ureter with mild left hydronephrosis  RUQ US Cholelithiasis with gallbladder wall thickening. Question trace pericholecystic fluid.   MRSA PCR- negative  new proteus bacteremia + hydro on CT  Plan:   - ureteral stent per urology today  -crain placed  -Flomax 0.8  -patient can go home with crain and f/u urology  - Meropenem 500mg BID  - NS @ 50cc/hr  - f/u HIDA scan today   - follow up blood cultures 3/18  - f/u  sputum cx  - trend lactate q4-6h until wnl  - Monitor for fever, changes in white count

## 2025-03-17 NOTE — CONSULT NOTE ADULT - ASSESSMENT
64M with medical of PAD s/p L BKA, DM with peripheral neuropathy and retinopathy HTN, CVA, complete heart block s/p PPM presented on 3/12 with generalized fatigue. In the ED patient noted to be febrile and tachycardic. Labs notable for leukocytosis, hyponatremia, hyperglycemia, HOLA and metabolic acidosis. UA with many bacteria, negative nitrite and small leukocyte esterase. Urine culture 3/12 >100K proteus mirabilis. Blood culture negative to date.      Urology consulted due to fevers with 6mm left proximal ureteral stone and mild hydro. WBC 19. Cr 4. Blood cultures with proteus.       Plan  - OR emergently for left stent placement  - consented  - NPO  - IVF  - abx  - f/u cultures    Discussed with Dr. Gross

## 2025-03-17 NOTE — PROGRESS NOTE ADULT - PROBLEM SELECTOR PLAN 2
sodium 129 today, corrected for glucose is 131-132  improving with resolution of HOLA  Likely due to impaired free water clearance 2/2 his decreased renal function and pain due to constipation.   avoid hypotonic fluids for further volume resuscitation

## 2025-03-17 NOTE — PROGRESS NOTE ADULT - PROBLEM SELECTOR PLAN 3
febrile, leukocytosis, tachycardic, + lactic acidosis; meets severe sepsis criteria  suspect 2/2 uti --> Urine culture 3/12 >100K proteus mirabilis.   CXR- no consolidations  CT A/P with 0.6 cm calculus within the proximal left ureter with mild left hydronephrosis  RUQ US Cholelithiasis with gallbladder wall thickening. Question trace pericholecystic fluid.   MRSA PCR- negative  Plan:   - Meropenem 500mg BID  - NS @ 50cc/hr  - f/u HIDA scan Tuesday. NPO at midnight Monday  - follow up blood cultures 3/15  - f/u  sputum cx  - trend lactate q4-6h until wnl  -Monitor for fever, changes in white count Unclear baseline cr,   Fluctuated between 0.9-1.1 in late 20246  on Admission 1.9.  Increasing now at 5.1--> now improved 4.  Likely pre-renal with vs. ATN vs. post renal iso obstruction  CT with 0.6cm calcalus in proximal left ureter with mild left hydromephrosis  Contreras placed 3/15- U/O 1.8L today  renal and bladder ultrasound-  mild left hydronephrosis  Now with good Urine output  CT A/P with 0.6 cm calculus within the proximal left ureter with mild left hydronephrosis    Plan:   - urology stent today  500cc NS today  monitor BMP BID  fu urine studies  Monitor UO, BUN/Cr, volume status, acid-base balance, electrolytes  avoid nephrotoxic agents  dose adjustments for renally cleared meds

## 2025-03-17 NOTE — PROGRESS NOTE ADULT - SUBJECTIVE AND OBJECTIVE BOX
[facility] DIVISION OF KIDNEY DISEASE AND HYPERTENSION  [phone]    RENAL FOLLOW UP NOTE- NEPHROHOSPITALIST  --------------------------------------------------------------------------------  Patient seen and examined this morning.  OOB to chair, reports ongoing fevers    PAST HISTORY  --------------------------------------------------------------------------------  No significant changes to PMH, PSH, FHx, SHx, unless otherwise noted    ALLERGIES & MEDICATIONS  --------------------------------------------------------------------------------  Allergies    No Known Allergies    Intolerances      Standing Inpatient Medications  aspirin  chewable 81 milliGRAM(s) Oral daily  atorvastatin 20 milliGRAM(s) Oral at bedtime  dextrose 5%. 1000 milliLiter(s) IV Continuous <Continuous>  dextrose 5%. 1000 milliLiter(s) IV Continuous <Continuous>  dextrose 50% Injectable 25 Gram(s) IV Push once  dextrose 50% Injectable 12.5 Gram(s) IV Push once  dextrose 50% Injectable 25 Gram(s) IV Push once  glucagon  Injectable 1 milliGRAM(s) IntraMuscular once  heparin   Injectable 5000 Unit(s) SubCutaneous every 8 hours  insulin glargine Injectable (LANTUS) 45 Unit(s) SubCutaneous at bedtime  insulin lispro (ADMELOG) corrective regimen sliding scale   SubCutaneous three times a day before meals  insulin lispro (ADMELOG) corrective regimen sliding scale   SubCutaneous at bedtime  insulin lispro Injectable (ADMELOG) 12 Unit(s) SubCutaneous three times a day before meals  meropenem  IVPB      meropenem  IVPB 500 milliGRAM(s) IV Intermittent every 12 hours  polyethylene glycol 3350 17 Gram(s) Oral <User Schedule>  senna 2 Tablet(s) Oral daily  tamsulosin 0.4 milliGRAM(s) Oral at bedtime    PRN Inpatient Medications  acetaminophen     Tablet .. 650 milliGRAM(s) Oral every 6 hours PRN  aluminum hydroxide/magnesium hydroxide/simethicone Suspension 30 milliLiter(s) Oral every 4 hours PRN  dextrose Oral Gel 15 Gram(s) Oral once PRN  melatonin 3 milliGRAM(s) Oral at bedtime PRN  ondansetron Injectable 4 milliGRAM(s) IV Push every 8 hours PRN      FOCUSED REVIEW OF SYSTEMS  --------------------------------------------------------------------------------  +fevers, denies chills  denies CP/palpitations  denies SOB/cough  denies N/V/abd pain      VITALS/PHYSICAL EXAM  --------------------------------------------------------------------------------  T(C): 37.4 (03-17-25 @ 09:07), Max: 38.3 (03-17-25 @ 04:17)  HR: 97 (03-17-25 @ 09:07) (91 - 98)  BP: 114/79 (03-17-25 @ 09:07) (111/63 - 155/75)  RR: 17 (03-17-25 @ 09:07) (16 - 20)  SpO2: 96% (03-17-25 @ 09:07) (93% - 98%)  Wt(kg): --        03-16-25 @ 07:01  -  03-17-25 @ 07:00  --------------------------------------------------------  IN: 0 mL / OUT: 2100 mL / NET: -2100 mL    03-17-25 @ 07:01  -  03-17-25 @ 11:59  --------------------------------------------------------  IN: 240 mL / OUT: 0 mL / NET: 240 mL      Physical Exam:  	Gen: NAD, OOB to chair  	Pulm: CTA B/L no w/r/r  	CV: RRR, S1S2  	Abd: +BS, soft, nontender/nondistended  	: No suprapubic tenderness.  + crain          Extremity: trace pedal edema b/l LE  	Neuro: A&O x 3      LABS/STUDIES  --------------------------------------------------------------------------------              10.7   19.85 >-----------<  214      [03-17-25 @ 06:35]              32.0     129  |  91  |  84  ----------------------------<  217      [03-17-25 @ 06:35]  4.4   |  24  |  4.02        Ca     8.3     [03-17-25 @ 06:35]      Mg     2.6     [03-17-25 @ 06:35]      Phos  3.5     [03-17-25 @ 06:35]    TPro  6.8  /  Alb  2.6  /  TBili  0.4  /  DBili  x   /  AST  125  /  ALT  155  /  AlkPhos  458  [03-17-25 @ 06:35]        Serum Osmolality 307      [03-16-25 @ 19:08]      Creatinine Trend:  SCr 4.02 [03-17 @ 06:35]  SCr 4.52 [03-16 @ 19:09]  SCr 5.12 [03-16 @ 09:33]  SCr 4.84 [03-15 @ 07:28]  SCr 3.74 [03-14 @ 06:46]              Urinalysis - [03-17-25 @ 06:35]      Color  / Appearance  / SG  / pH       Gluc 217 / Ketone   / Bili  / Urobili        Blood  / Protein  / Leuk Est  / Nitrite       RBC  / WBC  / Hyaline  / Gran  / Sq Epi  / Non Sq Epi  / Bacteria     Urine Creatinine 178      [03-14-25 @ 21:03]  Urine Protein 92      [03-14-25 @ 21:03]  Urine Sodium 18      [03-14-25 @ 21:03]  Urine Urea Nitrogen 325      [03-14-25 @ 21:02]  Urine Potassium 63      [03-14-25 @ 21:03]  Urine Osmolality 324      [03-14-25 @ 21:03]    Vitamin D (25OH) 33.9      [06-13-24 @ 01:51]  TSH 2.54      [06-11-24 @ 03:28]  Lipid: chol 159, , HDL 39, LDL --      [03-13-25 @ 06:46]

## 2025-03-17 NOTE — PROGRESS NOTE ADULT - PROBLEM SELECTOR PLAN 2
Likely iso decreased free water clearance. Can be in setting of SIADH 2/2 sepsis   SNa -125    Plan:   consider hypertonic fluids  f/u urine lytes + serum osmolality  -f/u nephrology recommendations Ua with bacteria + pyuria with leukocyte esterase;  Urine culture 3/12 >100K proteus mirabilis.   s/p zosyn (3/12-3/15)    Plan:  -follow up urine cultures    -Broaden to Meropenem   -antipyretics, antiemetics, analgesics as needed

## 2025-03-17 NOTE — PROGRESS NOTE ADULT - PROBLEM SELECTOR PLAN 6
Bowel distension on physical exam. patient with history of appendectomy.    Plan:   Patient had a BM 3/16  Bowel regimen  f/u abdominal xray- Mild nonspecific gaseous distention of stomach, with paucity of bowel gas   distally. This may be secondary to gastric outlet obstruction.

## 2025-03-17 NOTE — PROGRESS NOTE ADULT - SUBJECTIVE AND OBJECTIVE BOX
Follow Up:      Interval History/ROS:Patient is a 64y old  Male who presents with a chief complaint of generalized fatigue/weakness (17 Mar 2025 07:47)      REVIEW OF SYSTEMS  [  ] ROS unobtainable because:    [ x ] All other systems negative except as noted below    Constitutional:  [ ] fever [ ] chills  [ ] weight loss  [ ]night sweat  [ ]poor appetite/PO intake [ ]fatigue   Skin:  [ ] rash [ ] phlebitis	  Eyes: [ ] icterus [ ] pain  [ ] discharge	  ENMT: [ ] sore throat  [ ] thrush [ ] ulcers [ ] exudates [ ]anosmia  Respiratory: [ ] dyspnea [ ] hemoptysis [ ] cough [ ] sputum	  Cardiovascular:  [ ] chest pain [ ] palpitations [ ] edema	  Gastrointestinal:  [ ] nausea [ ] vomiting [ ] diarrhea [ ] constipation [ ] pain	  Genitourinary:  [ ] dysuria [ ] frequency [ ] hematuria [ ] discharge [ ] flank pain  [ ] incontinence  Musculoskeletal:  [ ] myalgias [ ] arthralgias [ ] arthritis  [ ] back pain  Neurological:  [ ] headache [ ] weakness [ ] seizures  [ ] confusion/altered mental status    Allergies  No Known Allergies        ANTIMICROBIALS:    meropenem  IVPB    meropenem  IVPB 500 every 12 hours        OTHER MEDS: MEDICATIONS  (STANDING):  acetaminophen     Tablet .. 650 every 6 hours PRN  aluminum hydroxide/magnesium hydroxide/simethicone Suspension 30 every 4 hours PRN  aspirin  chewable 81 daily  atorvastatin 20 at bedtime  dextrose 50% Injectable 25 once  dextrose 50% Injectable 12.5 once  dextrose 50% Injectable 25 once  dextrose Oral Gel 15 once PRN  glucagon  Injectable 1 once  heparin   Injectable 5000 every 8 hours  insulin glargine Injectable (LANTUS) 45 at bedtime  insulin lispro (ADMELOG) corrective regimen sliding scale  three times a day before meals  insulin lispro (ADMELOG) corrective regimen sliding scale  at bedtime  insulin lispro Injectable (ADMELOG) 12 three times a day before meals  melatonin 3 at bedtime PRN  ondansetron Injectable 4 every 8 hours PRN  polyethylene glycol 3350 17 <User Schedule>  senna 2 daily  tamsulosin 0.4 at bedtime      Vital Signs Last 24 Hrs  T(F): 101 (03-17-25 @ 04:17), Max: 103.9 (03-13-25 @ 05:08)    Vital Signs Last 24 Hrs  HR: 97 (03-17-25 @ 09:07) (91 - 98)  BP: 114/79 (03-17-25 @ 09:07) (111/63 - 155/75)  RR: 17 (03-17-25 @ 09:07)  SpO2: 96% (03-17-25 @ 09:07) (93% - 98%)  Wt(kg): --    EXAM:    GA: NAD  HEENT: oral cavity no lesion  CV: nl S1/S2, no RMG  Lungs: CTAB, no distress  Abd: BS+, soft, nontender, no rebounding pain  Ext: no edema  Neuro: No focal deficits  Skin: Intact  IV: no phlebitis    Labs:                        10.7   19.85 )-----------( 214      ( 17 Mar 2025 06:35 )             32.0     03-17    129[L]  |  91[L]  |  84[H]  ----------------------------<  217[H]  4.4   |  24  |  4.02[H]    Ca    8.3[L]      17 Mar 2025 06:35  Phos  3.5     03-17  Mg     2.6     03-17    TPro  6.8  /  Alb  2.6[L]  /  TBili  0.4  /  DBili  x   /  AST  125[H]  /  ALT  155[H]  /  AlkPhos  458[H]  03-17      WBC Trend:  WBC Count: 19.85 (03-17-25 @ 06:35)  WBC Count: 14.92 (03-16-25 @ 09:33)  WBC Count: 20.43 (03-15-25 @ 07:27)  WBC Count: 15.21 (03-14-25 @ 06:46)      Creatine Trend:  Creatinine: 4.02 (03-17)  Creatinine: 4.52 (03-16)  Creatinine: 5.12 (03-16)  Creatinine: 4.84 (03-15)      Liver Biochemical Testing Trend:  Alanine Aminotransferase (ALT/SGPT): 155 *H* (03-17)  Alanine Aminotransferase (ALT/SGPT): 143 *H* (03-16)  Alanine Aminotransferase (ALT/SGPT): 50 *H* (03-15)  Alanine Aminotransferase (ALT/SGPT): 25 (03-14)  Alanine Aminotransferase (ALT/SGPT): 17 (03-13)  Aspartate Aminotransferase (AST/SGOT): 125 (03-17-25 @ 06:35)  Aspartate Aminotransferase (AST/SGOT): 155 (03-16-25 @ 09:33)  Aspartate Aminotransferase (AST/SGOT): 57 (03-15-25 @ 07:28)  Aspartate Aminotransferase (AST/SGOT): 27 (03-14-25 @ 06:46)  Aspartate Aminotransferase (AST/SGOT): 20 (03-13-25 @ 06:46)  Bilirubin Total: 0.4 (03-17)  Bilirubin Total: 0.4 (03-16)  Bilirubin Total: 0.3 (03-15)  Bilirubin Total: 0.4 (03-14)  Bilirubin Total: 0.5 (03-13)      Trend LDH      Urinalysis Basic - ( 17 Mar 2025 06:35 )    Color: x / Appearance: x / SG: x / pH: x  Gluc: 217 mg/dL / Ketone: x  / Bili: x / Urobili: x   Blood: x / Protein: x / Nitrite: x   Leuk Esterase: x / RBC: x / WBC x   Sq Epi: x / Non Sq Epi: x / Bacteria: x        MICROBIOLOGY:  Vancomycin Level, Random: <4.0 (03-16 @ 09:25)    MRSA PCR Result.: NotDetec (03-15-25 @ 18:08)      Culture - Blood (collected 15 Mar 2025 11:14)  Source: Blood Blood  Preliminary Report:    No growth at 24 hours    Culture - Blood (collected 15 Mar 2025 07:30)  Source: Blood Blood  Preliminary Report:    Growth in anaerobic bottle: Gram Negative Rods    Direct identification is available within approximately 3-5    hours either by Blood Panel Multiplexed PCR or Direct    MALDI-TOF. Details: [email or web address]  Organism: Blood Culture PCR  Organism: Blood Culture PCR    Sensitivities:      Method Type: PCR      -  Proteus species: Detec    Culture - Blood (collected 12 Mar 2025 19:45)  Source: Blood Blood-Peripheral  Preliminary Report:    No growth at 4 days    Culture - Blood (collected 12 Mar 2025 19:35)  Source: Blood Blood-Peripheral  Preliminary Report:    No growth at 4 days    Culture - Urine (collected 12 Mar 2025 16:22)  Source: Clean Catch Clean Catch (Midstream)  Final Report:    >100,000 CFU/ml Proteus mirabilis    <10,000 CFU/ml Normal Urogenital wil present  Organism: Proteus mirabilis  Organism: Proteus mirabilis    Sensitivities:      Method Type: ARMEN      -  Amoxicillin/Clavulanic Acid: S <=8/4      -  Ampicillin: S <=8 These ampicillin results predict results for amoxicillin      -  Ampicillin/Sulbactam: S <=4/2      -  Aztreonam: S <=4      -  Cefazolin: S <=2 For uncomplicated UTI with K. pneumoniae, E. coli, or P. mirablis: ARMEN <=16 is sensitive and ARMEN >=32 is resistant. This also predicts results for oral agents cefaclor, cefdinir, cefpodoxime, cefprozil, cefuroxime axetil, cephalexin and locarbef for uncomplicated UTI. Note that some isolates may be susceptible to these agents while testing resistant to cefazolin.      -  Cefepime: S <=2      -  Cefoxitin: S <=8      -  Ceftriaxone: S <=1      -  Cefuroxime: S <=4      -  Ciprofloxacin: S <=0.25      -  Ertapenem: S <=0.5      -  Gentamicin: S <=2      -  Levofloxacin: S <=0.5      -  Meropenem: S <=1      -  Nitrofurantoin: R >64 Should not be used to treat pyelonephritis      -  Piperacillin/Tazobactam: S <=8      -  Tobramycin: S <=2      -  Trimethoprim/Sulfamethoxazole: S <=0.5/9.5    Culture - Blood (collected 10 Yonathan 2024 13:00)  Source: .Blood Blood-Peripheral  Final Report:    No growth at 5 days    Culture - Blood (collected 10 Yonathan 2024 12:45)  Source: .Blood Blood-Peripheral  Final Report:    No growth at 5 days    Culture - Urine (collected 21 Dec 2023 13:00)  Source: Clean Catch Clean Catch (Midstream)  Final Report:    10,000 - 49,000 CFU/mL Escherichia coli  Organism: Escherichia coli  Organism: Escherichia coli    Sensitivities:      Method Type: ARMEN      -  Amoxicillin/Clavulanic Acid: I 16/8      -  Ampicillin: R >16 These ampicillin results predict results for amoxicillin      -  Ampicillin/Sulbactam: R >16/8      -  Aztreonam: S <=4      -  Cefazolin: S 16 For uncomplicated UTI with K. pneumoniae, E. coli, or P. mirablis: ARMEN <=16 is sensitive and ARMEN >=32 is resistant. This also predicts results for oral agents cefaclor, cefdinir, cefpodoxime, cefprozil, cefuroxime axetil, cephalexin and locarbeffor uncomplicated UTI. Note that some isolates may be susceptible to these agents while testing resistant to cefazolin.      -  Cefepime: S <=2      -  Cefoxitin: S <=8      -  Ceftriaxone: S <=1      -  Cefuroxime: S <=4      -  Ciprofloxacin: R 2      -  Ertapenem: S <=0.5      -  Gentamicin: S <=2      -  Imipenem: S <=1      -  Levofloxacin: R 4      -  Meropenem: S <=1      -  Nitrofurantoin: S <=32 Should not be used to treat pyelonephritis      -  Piperacillin/Tazobactam: S <=8      -  Tobramycin: S <=2      -  Trimethoprim/Sulfamethoxazole: S <=0.5/9.5    Culture - Blood (collected 04 Dec 2023 05:49)  Source: .Blood Blood-Peripheral  Final Report:    No growth at 5 days    Culture - Blood (collected 04 Dec 2023 05:49)  Source: .Blood Blood-Peripheral  Final Report:    No growth at 5 days                        Rapid RVP Result: NotDetec (03-15 @ 03:11)                      Blood Gas Venous - Lactate: 3.0 (03-16 @ 09:39)    Sedimentation Rate, Erythrocyte: 15 mm/hr (06-12-24 @ 01:10)      RADIOLOGY:  imaging below personally reviewed    Xray Abdomen 1 View PORTABLE -Urgent:  (14 Mar 2025 16:49)              CT Chest No Cont:   ACC: [id number] EXAM:  CT ABDOMEN AND PELVIS   ORDERED BY: ADOLPH ESCOBAR     ACC: [id number] EXAM:  CT CHEST   ORDERED BY: ADOLPH ESCOBAR     PROCEDURE DATE:  03/15/2025          INTERPRETATION:  CLINICAL INFORMATION: Persistent fever/leukocytosis.   Evaluate for infection    COMPARISON: CT abdomen and pelvis 10/24/2011.    CONTRAST/COMPLICATIONS:  IV Contrast: NONE  Oral Contrast: NONE  .    PROCEDURE:  CT of the Chest, Abdomen and Pelvis was performed.  Sagittal and coronal reformats were performed.    FINDINGS:  CHEST:  LUNGS AND LARGE AIRWAYS: Mild dependent atelectasis. Patent central   airways. No pulmonary nodules.  PLEURA: No pleural effusion.  VESSELS: Aortic calcifications. Coronary artery calcifications.  HEART: Cardiomegaly. Left chest wall dual-lead pacemaker.  MEDIASTINUM AND ADRIANE: No lymphadenopathy.  CHEST WALL AND LOWER NECK: Within normal limits.    ABDOMEN AND PELVIS:  LIVER: Within normal limits.  BILE DUCTS: Normal caliber.  GALLBLADDER: Cholelithiasis.  SPLEEN: Within normal limits.  PANCREAS: Within normal limits.  ADRENALS: Within normal limits.  KIDNEYS/URETERS: Mild bilateral perinephric fat stranding. 0.6 cm   calculus within the proximal left ureter with mild left hydronephrosis.    BLADDER: Collapsed around a Contreras.  REPRODUCTIVE ORGANS: Prostate within normal limits.    BOWEL: No bowel obstruction. Colonic diverticulosis. Appendix is normal.  PERITONEUM/RETROPERITONEUM: Within normal limits.  VESSELS: Atherosclerotic changes.  LYMPH NODES: No lymphadenopathy.  ABDOMINAL WALL: Fat-containing left inguinal hernia.  BONES: Degenerative changes.    IMPRESSION:  0.6 cm calculus within the proximal left ureter with mild left   hydronephrosis.      --- End of Report ---          ELISHA WATT MD; Resident Radiologist  This document has been electronically signed.  REGINA NATHAN MD; Attending Radiologist  This document has been electronically signed. Mar 15 2025  4:16PM (03-15-25 @ 15:35)       Follow Up:      Interval History/ROS:Patient is a 64y old  Male who presents with a chief complaint of generalized fatigue/weakness (17 Mar 2025 07:47)  Continues to spike fevers, worsening leukocytosis, blood Cx + Proteus.    Allergies  No Known Allergie      ANTIMICROBIALS:    meropenem  IVPB    meropenem  IVPB 500 every 12 hours      OTHER MEDS: MEDICATIONS  (STANDING):  acetaminophen     Tablet .. 650 every 6 hours PRN  aluminum hydroxide/magnesium hydroxide/simethicone Suspension 30 every 4 hours PRN  aspirin  chewable 81 daily  atorvastatin 20 at bedtime  dextrose 50% Injectable 25 once  dextrose 50% Injectable 12.5 once  dextrose 50% Injectable 25 once  dextrose Oral Gel 15 once PRN  glucagon  Injectable 1 once  heparin   Injectable 5000 every 8 hours  insulin glargine Injectable (LANTUS) 45 at bedtime  insulin lispro (ADMELOG) corrective regimen sliding scale  three times a day before meals  insulin lispro (ADMELOG) corrective regimen sliding scale  at bedtime  insulin lispro Injectable (ADMELOG) 12 three times a day before meals  melatonin 3 at bedtime PRN  ondansetron Injectable 4 every 8 hours PRN  polyethylene glycol 3350 17 <User Schedule>  senna 2 daily  tamsulosin 0.4 at bedtime      Vital Signs Last 24 Hrs  T(F): 101 (03-17-25 @ 04:17), Max: 103.9 (03-13-25 @ 05:08)    Vital Signs Last 24 Hrs  HR: 97 (03-17-25 @ 09:07) (91 - 98)  BP: 114/79 (03-17-25 @ 09:07) (111/63 - 155/75)  RR: 17 (03-17-25 @ 09:07)  SpO2: 96% (03-17-25 @ 09:07) (93% - 98%)  Wt(kg): --    EXAM:    GA: NAD  CV: nl S1/S2  Lungs: CTAB,  Abd: soft, nontender  Ext: L BKA with clean stump    Skin: Intact  IV: no apparent phlebitis    Labs:                        10.7   19.85 )-----------( 214      ( 17 Mar 2025 06:35 )             32.0     03-17    129[L]  |  91[L]  |  84[H]  ----------------------------<  217[H]  4.4   |  24  |  4.02[H]    Ca    8.3[L]      17 Mar 2025 06:35  Phos  3.5     03-17  Mg     2.6     03-17    TPro  6.8  /  Alb  2.6[L]  /  TBili  0.4  /  DBili  x   /  AST  125[H]  /  ALT  155[H]  /  AlkPhos  458[H]  03-17      WBC Trend:  WBC Count: 19.85 (03-17-25 @ 06:35)  WBC Count: 14.92 (03-16-25 @ 09:33)  WBC Count: 20.43 (03-15-25 @ 07:27)  WBC Count: 15.21 (03-14-25 @ 06:46)      Creatine Trend:  Creatinine: 4.02 (03-17)  Creatinine: 4.52 (03-16)  Creatinine: 5.12 (03-16)  Creatinine: 4.84 (03-15)      Liver Biochemical Testing Trend:  Alanine Aminotransferase (ALT/SGPT): 155 *H* (03-17)  Alanine Aminotransferase (ALT/SGPT): 143 *H* (03-16)  Alanine Aminotransferase (ALT/SGPT): 50 *H* (03-15)  Alanine Aminotransferase (ALT/SGPT): 25 (03-14)  Alanine Aminotransferase (ALT/SGPT): 17 (03-13)  Aspartate Aminotransferase (AST/SGOT): 125 (03-17-25 @ 06:35)  Aspartate Aminotransferase (AST/SGOT): 155 (03-16-25 @ 09:33)  Aspartate Aminotransferase (AST/SGOT): 57 (03-15-25 @ 07:28)  Aspartate Aminotransferase (AST/SGOT): 27 (03-14-25 @ 06:46)  Aspartate Aminotransferase (AST/SGOT): 20 (03-13-25 @ 06:46)  Bilirubin Total: 0.4 (03-17)  Bilirubin Total: 0.4 (03-16)  Bilirubin Total: 0.3 (03-15)  Bilirubin Total: 0.4 (03-14)  Bilirubin Total: 0.5 (03-13)      Trend LDH      Urinalysis Basic - ( 17 Mar 2025 06:35 )    Color: x / Appearance: x / SG: x / pH: x  Gluc: 217 mg/dL / Ketone: x  / Bili: x / Urobili: x   Blood: x / Protein: x / Nitrite: x   Leuk Esterase: x / RBC: x / WBC x   Sq Epi: x / Non Sq Epi: x / Bacteria: x        MICROBIOLOGY:  Vancomycin Level, Random: <4.0 (03-16 @ 09:25)    MRSA PCR Result.: NotDetec (03-15-25 @ 18:08)      Culture - Blood (collected 15 Mar 2025 11:14)  Source: Blood Blood  Preliminary Report:    No growth at 24 hours    Culture - Blood (collected 15 Mar 2025 07:30)  Source: Blood Blood  Preliminary Report:    Growth in anaerobic bottle: Gram Negative Rods    Direct identification is available within approximately 3-5    hours either by Blood Panel Multiplexed PCR or Direct    MALDI-TOF. Details: [email or web address]  Organism: Blood Culture PCR  Organism: Blood Culture PCR    Sensitivities:      Method Type: PCR      -  Proteus species: Detec    Culture - Blood (collected 12 Mar 2025 19:45)  Source: Blood Blood-Peripheral  Preliminary Report:    No growth at 4 days    Culture - Blood (collected 12 Mar 2025 19:35)  Source: Blood Blood-Peripheral  Preliminary Report:    No growth at 4 days    Culture - Urine (collected 12 Mar 2025 16:22)  Source: Clean Catch Clean Catch (Midstream)  Final Report:    >100,000 CFU/ml Proteus mirabilis    <10,000 CFU/ml Normal Urogenital wil present  Organism: Proteus mirabilis  Organism: Proteus mirabilis    Sensitivities:      Method Type: ARMEN      -  Amoxicillin/Clavulanic Acid: S <=8/4      -  Ampicillin: S <=8 These ampicillin results predict results for amoxicillin      -  Ampicillin/Sulbactam: S <=4/2      -  Aztreonam: S <=4      -  Cefazolin: S <=2 For uncomplicated UTI with K. pneumoniae, E. coli, or P. mirablis: ARMEN <=16 is sensitive and ARMEN >=32 is resistant. This also predicts results for oral agents cefaclor, cefdinir, cefpodoxime, cefprozil, cefuroxime axetil, cephalexin and locarbef for uncomplicated UTI. Note that some isolates may be susceptible to these agents while testing resistant to cefazolin.      -  Cefepime: S <=2      -  Cefoxitin: S <=8      -  Ceftriaxone: S <=1      -  Cefuroxime: S <=4      -  Ciprofloxacin: S <=0.25      -  Ertapenem: S <=0.5      -  Gentamicin: S <=2      -  Levofloxacin: S <=0.5      -  Meropenem: S <=1      -  Nitrofurantoin: R >64 Should not be used to treat pyelonephritis      -  Piperacillin/Tazobactam: S <=8      -  Tobramycin: S <=2      -  Trimethoprim/Sulfamethoxazole: S <=0.5/9.5    Culture - Blood (collected 10 Yonathan 2024 13:00)  Source: .Blood Blood-Peripheral  Final Report:    No growth at 5 days    Culture - Blood (collected 10 Yonathan 2024 12:45)  Source: .Blood Blood-Peripheral  Final Report:    No growth at 5 days    Culture - Urine (collected 21 Dec 2023 13:00)  Source: Clean Catch Clean Catch (Midstream)  Final Report:    10,000 - 49,000 CFU/mL Escherichia coli  Organism: Escherichia coli  Organism: Escherichia coli    Sensitivities:      Method Type: ARMEN      -  Amoxicillin/Clavulanic Acid: I 16/8      -  Ampicillin: R >16 These ampicillin results predict results for amoxicillin      -  Ampicillin/Sulbactam: R >16/8      -  Aztreonam: S <=4      -  Cefazolin: S 16 For uncomplicated UTI with K. pneumoniae, E. coli, or P. mirablis: ARMEN <=16 is sensitive and ARMEN >=32 is resistant. This also predicts results for oral agents cefaclor, cefdinir, cefpodoxime, cefprozil, cefuroxime axetil, cephalexin and locarbeffor uncomplicated UTI. Note that some isolates may be susceptible to these agents while testing resistant to cefazolin.      -  Cefepime: S <=2      -  Cefoxitin: S <=8      -  Ceftriaxone: S <=1      -  Cefuroxime: S <=4      -  Ciprofloxacin: R 2      -  Ertapenem: S <=0.5      -  Gentamicin: S <=2      -  Imipenem: S <=1      -  Levofloxacin: R 4      -  Meropenem: S <=1      -  Nitrofurantoin: S <=32 Should not be used to treat pyelonephritis      -  Piperacillin/Tazobactam: S <=8      -  Tobramycin: S <=2      -  Trimethoprim/Sulfamethoxazole: S <=0.5/9.5    Culture - Blood (collected 04 Dec 2023 05:49)  Source: .Blood Blood-Peripheral  Final Report:    No growth at 5 days    Culture - Blood (collected 04 Dec 2023 05:49)  Source: .Blood Blood-Peripheral  Final Report:    No growth at 5 days                        Rapid RVP Result: NotDetec (03-15 @ 03:11)                      Blood Gas Venous - Lactate: 3.0 (03-16 @ 09:39)    Sedimentation Rate, Erythrocyte: 15 mm/hr (06-12-24 @ 01:10)      RADIOLOGY:  imaging below personally reviewed    Xray Abdomen 1 View PORTABLE -Urgent:  (14 Mar 2025 16:49)              CT Chest No Cont:   ACC: [id number] EXAM:  CT ABDOMEN AND PELVIS   ORDERED BY: ADOLPH ESCOBAR     ACC: [id number] EXAM:  CT CHEST   ORDERED BY: ADOLPH ESCOBAR     PROCEDURE DATE:  03/15/2025          INTERPRETATION:  CLINICAL INFORMATION: Persistent fever/leukocytosis.   Evaluate for infection    COMPARISON: CT abdomen and pelvis 10/24/2011.    CONTRAST/COMPLICATIONS:  IV Contrast: NONE  Oral Contrast: NONE  .    PROCEDURE:  CT of the Chest, Abdomen and Pelvis was performed.  Sagittal and coronal reformats were performed.    FINDINGS:  CHEST:  LUNGS AND LARGE AIRWAYS: Mild dependent atelectasis. Patent central   airways. No pulmonary nodules.  PLEURA: No pleural effusion.  VESSELS: Aortic calcifications. Coronary artery calcifications.  HEART: Cardiomegaly. Left chest wall dual-lead pacemaker.  MEDIASTINUM AND ADRIANE: No lymphadenopathy.  CHEST WALL AND LOWER NECK: Within normal limits.    ABDOMEN AND PELVIS:  LIVER: Within normal limits.  BILE DUCTS: Normal caliber.  GALLBLADDER: Cholelithiasis.  SPLEEN: Within normal limits.  PANCREAS: Within normal limits.  ADRENALS: Within normal limits.  KIDNEYS/URETERS: Mild bilateral perinephric fat stranding. 0.6 cm   calculus within the proximal left ureter with mild left hydronephrosis.    BLADDER: Collapsed around a Contreras.  REPRODUCTIVE ORGANS: Prostate within normal limits.    BOWEL: No bowel obstruction. Colonic diverticulosis. Appendix is normal.  PERITONEUM/RETROPERITONEUM: Within normal limits.  VESSELS: Atherosclerotic changes.  LYMPH NODES: No lymphadenopathy.  ABDOMINAL WALL: Fat-containing left inguinal hernia.  BONES: Degenerative changes.    IMPRESSION:  0.6 cm calculus within the proximal left ureter with mild left   hydronephrosis.      --- End of Report ---          ELISHA ALYSA MD; Resident Radiologist  This document has been electronically signed.  REGINA NATHAN MD; Attending Radiologist  This document has been electronically signed. Mar 15 2025  4:16PM (03-15-25 @ 15:35)

## 2025-03-17 NOTE — CONSULT NOTE ADULT - SUBJECTIVE AND OBJECTIVE BOX
HPI  63yo m w pmh obesity, htn, hld, dm c/b peripheral neuropathy + retinopathy, pad s/p l bka, cva, chb s/p ppm, p/w generalized fatigue/weakness; symptoms have been ongoing for the last few days; denies any associated fever, chills, rigors, chest pain, sob/patel, cough, lightheadedness/dizziness, loc, n+v, diaphoresis, abdominal discomfort, dysuria; patient did note his blood sugar levels to be in the 300s, which was unusual to him as they are generally better controlled. patient grew concerned so presents to [facility] er for further evaluation. in er, found to be meeting severe sepsis criteria, and found to have multiple metabolic disturbances including hyperglycemia, catrina w hagma; suspect 2/2 uti; admit to medicine for further mgmt    Urology consulted due to fevers with 6mm left proximal ureteral stone with mild hydro.       PAST MEDICAL & SURGICAL HISTORY:  Retinopathy      Diabetes mellitus type 2 in obese      Hyperlipidemia      Toe infection  s/p amputation      Ruptured appendix      S/P appendectomy      Toe amputation status  left pinky toe          MEDICATIONS  (STANDING):  aspirin  chewable 81 milliGRAM(s) Oral daily  atorvastatin 20 milliGRAM(s) Oral at bedtime  dextrose 5%. 1000 milliLiter(s) (100 mL/Hr) IV Continuous <Continuous>  dextrose 5%. 1000 milliLiter(s) (50 mL/Hr) IV Continuous <Continuous>  dextrose 50% Injectable 25 Gram(s) IV Push once  dextrose 50% Injectable 12.5 Gram(s) IV Push once  dextrose 50% Injectable 25 Gram(s) IV Push once  glucagon  Injectable 1 milliGRAM(s) IntraMuscular once  heparin   Injectable 5000 Unit(s) SubCutaneous every 8 hours  insulin glargine Injectable (LANTUS) 45 Unit(s) SubCutaneous at bedtime  insulin lispro (ADMELOG) corrective regimen sliding scale   SubCutaneous three times a day before meals  insulin lispro (ADMELOG) corrective regimen sliding scale   SubCutaneous at bedtime  insulin lispro Injectable (ADMELOG) 12 Unit(s) SubCutaneous three times a day before meals  meropenem  IVPB 500 milliGRAM(s) IV Intermittent every 12 hours  meropenem  IVPB      polyethylene glycol 3350 17 Gram(s) Oral <User Schedule>  senna 2 Tablet(s) Oral daily  tamsulosin 0.4 milliGRAM(s) Oral at bedtime    MEDICATIONS  (PRN):  acetaminophen     Tablet .. 650 milliGRAM(s) Oral every 6 hours PRN Temp greater or equal to 38C (100.4F), Mild Pain (1 - 3)  aluminum hydroxide/magnesium hydroxide/simethicone Suspension 30 milliLiter(s) Oral every 4 hours PRN Dyspepsia  dextrose Oral Gel 15 Gram(s) Oral once PRN Blood Glucose LESS THAN 70 milliGRAM(s)/deciliter  melatonin 3 milliGRAM(s) Oral at bedtime PRN Insomnia  ondansetron Injectable 4 milliGRAM(s) IV Push every 8 hours PRN Nausea and/or Vomiting      FAMILY HISTORY:  Family history of diabetes mellitus in grandmother (Grandparent)        Allergies    No Known Allergies    Intolerances        SOCIAL HISTORY:    REVIEW OF SYSTEMS: Otherwise negative as stated in HPI    Physical Exam  Vital signs  T(C): 38.6 (03-17-25 @ 13:20), Max: 38.6 (03-17-25 @ 13:20)  HR: 102 (03-17-25 @ 13:20)  BP: 141/67 (03-17-25 @ 13:20)  SpO2: 95% (03-17-25 @ 13:20)  Wt(kg): --    Output    OUT:    Ureteral Catheter (mL): 2100 mL  Total OUT: 2100 mL    Total NET: -2100 mL      OUT:    Ureteral Catheter (mL): 1000 mL  Total OUT: 1000 mL    Total NET: -1000 mL          Gen: NAD  Pulm: No respiratory distress	  CV: RRR  GI: S/ND/NT  :   MSK: moves all 4 limbs spontaneously    LABS:      03-17 @ 06:35    WBC 19.85 / Hct 32.0  / SCr 4.02     03-16 @ 19:09    WBC --    / Hct --    / SCr 4.52     03-17    129[L]  |  91[L]  |  84[H]  ----------------------------<  217[H]  4.4   |  24  |  4.02[H]    Ca    8.3[L]      17 Mar 2025 06:35  Phos  3.5     03-17  Mg     2.6     03-17    TPro  6.8  /  Alb  2.6[L]  /  TBili  0.4  /  DBili  x   /  AST  125[H]  /  ALT  155[H]  /  AlkPhos  458[H]  03-17      Urinalysis Basic - ( 17 Mar 2025 06:35 )    Color: x / Appearance: x / SG: x / pH: x  Gluc: 217 mg/dL / Ketone: x  / Bili: x / Urobili: x   Blood: x / Protein: x / Nitrite: x   Leuk Esterase: x / RBC: x / WBC x   Sq Epi: x / Non Sq Epi: x / Bacteria: x            Urine Cx:    Blood Cx:    RADIOLOGY:    < from: CT Abdomen and Pelvis No Cont (03.15.25 @ 15:35) >    IMPRESSION:  0.6 cm calculus within the proximal left ureter with mild left   hydronephrosis.    < end of copied text >     HPI  63yo m w pmh obesity, htn, hld, dm c/b peripheral neuropathy + retinopathy, pad s/p l bka, cva, chb s/p ppm, p/w generalized fatigue/weakness; symptoms have been ongoing for the last few days; denies any associated fever, chills, rigors, chest pain, sob/patel, cough, lightheadedness/dizziness, loc, n+v, diaphoresis, abdominal discomfort, dysuria; patient did note his blood sugar levels to be in the 300s, which was unusual to him as they are generally better controlled. patient grew concerned so presents to [facility] er for further evaluation. in er, found to be meeting severe sepsis criteria, and found to have multiple metabolic disturbances including hyperglycemia, catrina w hagma; suspect 2/2 uti; admit to medicine for further mgmt    Urology consulted due to fevers with 6mm left proximal ureteral stone with mild hydro. Denies previous urologic history.  Labs showed WBC 19, Cr uptrending, now 4.02 from 1.95. Urine and blood cultures showed proteus.      PAST MEDICAL & SURGICAL HISTORY:  Retinopathy      Diabetes mellitus type 2 in obese      Hyperlipidemia      Toe infection  s/p amputation      Ruptured appendix      S/P appendectomy      Toe amputation status  left pinky toe          MEDICATIONS  (STANDING):  aspirin  chewable 81 milliGRAM(s) Oral daily  atorvastatin 20 milliGRAM(s) Oral at bedtime  dextrose 5%. 1000 milliLiter(s) (100 mL/Hr) IV Continuous <Continuous>  dextrose 5%. 1000 milliLiter(s) (50 mL/Hr) IV Continuous <Continuous>  dextrose 50% Injectable 25 Gram(s) IV Push once  dextrose 50% Injectable 12.5 Gram(s) IV Push once  dextrose 50% Injectable 25 Gram(s) IV Push once  glucagon  Injectable 1 milliGRAM(s) IntraMuscular once  heparin   Injectable 5000 Unit(s) SubCutaneous every 8 hours  insulin glargine Injectable (LANTUS) 45 Unit(s) SubCutaneous at bedtime  insulin lispro (ADMELOG) corrective regimen sliding scale   SubCutaneous three times a day before meals  insulin lispro (ADMELOG) corrective regimen sliding scale   SubCutaneous at bedtime  insulin lispro Injectable (ADMELOG) 12 Unit(s) SubCutaneous three times a day before meals  meropenem  IVPB 500 milliGRAM(s) IV Intermittent every 12 hours  meropenem  IVPB      polyethylene glycol 3350 17 Gram(s) Oral <User Schedule>  senna 2 Tablet(s) Oral daily  tamsulosin 0.4 milliGRAM(s) Oral at bedtime    MEDICATIONS  (PRN):  acetaminophen     Tablet .. 650 milliGRAM(s) Oral every 6 hours PRN Temp greater or equal to 38C (100.4F), Mild Pain (1 - 3)  aluminum hydroxide/magnesium hydroxide/simethicone Suspension 30 milliLiter(s) Oral every 4 hours PRN Dyspepsia  dextrose Oral Gel 15 Gram(s) Oral once PRN Blood Glucose LESS THAN 70 milliGRAM(s)/deciliter  melatonin 3 milliGRAM(s) Oral at bedtime PRN Insomnia  ondansetron Injectable 4 milliGRAM(s) IV Push every 8 hours PRN Nausea and/or Vomiting      FAMILY HISTORY:  Family history of diabetes mellitus in grandmother (Grandparent)        Allergies    No Known Allergies    Intolerances        SOCIAL HISTORY:    REVIEW OF SYSTEMS: Otherwise negative as stated in HPI    Physical Exam  Vital signs  T(C): 38.6 (03-17-25 @ 13:20), Max: 38.6 (03-17-25 @ 13:20)  HR: 102 (03-17-25 @ 13:20)  BP: 141/67 (03-17-25 @ 13:20)  SpO2: 95% (03-17-25 @ 13:20)  Wt(kg): --    Output    OUT:    Ureteral Catheter (mL): 2100 mL  Total OUT: 2100 mL    Total NET: -2100 mL      OUT:    Ureteral Catheter (mL): 1000 mL  Total OUT: 1000 mL    Total NET: -1000 mL          Gen: NAD  Pulm: No respiratory distress	  GI: S/ND/NT  : crain in place, urine yellow      LABS:      03-17 @ 06:35    WBC 19.85 / Hct 32.0  / SCr 4.02     03-16 @ 19:09    WBC --    / Hct --    / SCr 4.52     03-17    129[L]  |  91[L]  |  84[H]  ----------------------------<  217[H]  4.4   |  24  |  4.02[H]    Ca    8.3[L]      17 Mar 2025 06:35  Phos  3.5     03-17  Mg     2.6     03-17    TPro  6.8  /  Alb  2.6[L]  /  TBili  0.4  /  DBili  x   /  AST  125[H]  /  ALT  155[H]  /  AlkPhos  458[H]  03-17      Urinalysis Basic - ( 17 Mar 2025 06:35 )    Color: x / Appearance: x / SG: x / pH: x  Gluc: 217 mg/dL / Ketone: x  / Bili: x / Urobili: x   Blood: x / Protein: x / Nitrite: x   Leuk Esterase: x / RBC: x / WBC x   Sq Epi: x / Non Sq Epi: x / Bacteria: x            Urine Cx:  Culture - Urine (03.12.25 @ 16:22)   - Cefoxitin: S <=8  - Tobramycin: S <=2  - Trimethoprim/Sulfamethoxazole: S <=0.5/9.5  - Meropenem: S <=1  - Nitrofurantoin: R >64 Should not be used to treat pyelonephritis  - Piperacillin/Tazobactam: S <=8  - Cefuroxime: S <=4  - Ciprofloxacin: S <=0.25  - Ertapenem: S <=0.5  - Gentamicin: S <=2  - Ceftriaxone: S <=1  - Levofloxacin: S <=0.5  - Amoxicillin/Clavulanic Acid: S <=8/4  - Ampicillin: S <=8 These ampicillin results predict results for amoxicillin  - Ampicillin/Sulbactam: S <=4/2  - Aztreonam: S <=4  - Cefazolin: S <=2 For uncomplicated UTI with K. pneumoniae, E. coli, or P. mirablis: ARMEN <=16 is sensitive and ARMEN >=32 is resistant. This also predicts results for oral agents cefaclor, cefdinir, cefpodoxime, cefprozil, cefuroxime axetil, cephalexin and locarbef for uncomplicated UTI. Note that some isolates may be susceptible to these agents while testing resistant to cefazolin.  - Cefepime: S <=2  Specimen Source: Clean Catch Clean Catch (Midstream)  Culture Results:   >100,000 CFU/ml Proteus mirabilis   <10,000 CFU/ml Normal Urogenital wil present  Organism Identification: Proteus mirabilis  Organism: Proteus mirabilis  Method Type: ARMEN    Blood Cx:  Culture - Blood (03.15.25 @ 07:30)   Gram Stain:   Growth in anaerobic bottle: Gram Negative Rods  - Proteus species: Detec  Specimen Source: Blood Blood  Organism: Blood Culture PCR  Culture Results:   Growth in anaerobic bottle: Proteus mirabilis   Direct identification is available within approximately 3-5   hours either by Blood Panel Multiplexed PCR or Direct   MALDI-TOF. Details: [email or web address]  Organism Identification: Blood Culture PCR  Method Type: PCR    RADIOLOGY:    < from: CT Abdomen and Pelvis No Cont (03.15.25 @ 15:35) >    IMPRESSION:  0.6 cm calculus within the proximal left ureter with mild left   hydronephrosis.    < end of copied text >

## 2025-03-17 NOTE — PROGRESS NOTE ADULT - ASSESSMENT
64M with medical of PAD s/p L BKA, DM with peripheral neuropathy and retinopathy HTN, CVA, complete heart block s/p PPM presented on 3/12 with generalized fatigue. In the ED patient noted to be febrile and tachycardic. Labs notable for leukocytosis, hyponatremia, hyperglycemia, HOLA and metabolic acidosis. UA with many bacteria, negative nitrite and small leukocyte esterase. Urine culture 3/12 >100K proteus mirabilis. Blood culture negative to date. ID asked to help manage.     Work up:  -Blood culture 3/12 (-)  -Urine Culture 3/12 - >100k proteus mirabilis  -Rapid RVP negative  -Renal US:  mild hydronephrosis of the left kidney  -X-ray abdomen: Mild nonspecific gaseous distention of stomach, with paucity of bowel gas distally. This may be secondary to gastric outlet obstruction.  -CT C/A/P without cont: 0.6 cm calculus within the proximal left ureter with mild left hydronephrosis.  -VQ scan: Indeterminate VQ scan for pulmonary embolus.  -LE US doppler: no DVT  -US RUQ:  Cholelithiasis with gallbladder wall thickening. Question trace pericholecystic fluid. Unreliable sonogram Jansen's sign. If there is clinical suspicion for acute cholecystitis, hepatobiliary scan may be obtained for further evaluation.  -Blood Cx: Proteus (1/4 bottles)    #Proteus bacteremia source ?urinary/pyelonephritis in the setting of L ureter stone with hydronephrosis   #Sepsis   #Hypoxemia ?PNA r/o PE  #Fever  #Leukocytosis  #HOLA  #Transaminitis  #Bacteruria    Recommendations:   -Continue Meropenem for now pending blood Cx sensitivities   -Urology eval in the setting of bacteremia and L ureter stone with hydronephrosis likely obstructing stone   -Check sputum culture if feasible   -F/up HIDA scan        64M with medical of PAD s/p L BKA, DM with peripheral neuropathy and retinopathy HTN, CVA, complete heart block s/p PPM presented on 3/12 with generalized fatigue. In the ED patient noted to be febrile and tachycardic. Labs notable for leukocytosis, hyponatremia, hyperglycemia, HOLA and metabolic acidosis. UA with many bacteria, negative nitrite and small leukocyte esterase. Urine culture 3/12 >100K proteus mirabilis. Blood culture negative to date. ID asked to help manage.     Work up:  -Blood culture 3/12 (-)  -Urine Culture 3/12 - >100k proteus mirabilis  -Rapid RVP negative  -Renal US:  mild hydronephrosis of the left kidney  -X-ray abdomen: Mild nonspecific gaseous distention of stomach, with paucity of bowel gas distally. This may be secondary to gastric outlet obstruction.  -CT C/A/P without cont: 0.6 cm calculus within the proximal left ureter with mild left hydronephrosis.  -VQ scan: Indeterminate VQ scan for pulmonary embolus.  -LE US doppler: no DVT  -US RUQ:  Cholelithiasis with gallbladder wall thickening. Question trace pericholecystic fluid. Unreliable sonogram Jansen's sign. If there is clinical suspicion for acute cholecystitis, hepatobiliary scan may be obtained for further evaluation.  -Blood Cx: Proteus (1/4 bottles)    #Proteus bacteremia source urinary/pyelonephritis in the setting of L ureter stone with hydronephrosis   #Sepsis   #Hypoxemia ?PNA r/o PE  #Fever  #Leukocytosis  #HOLA  #Transaminitis  #Bacteruria    Recommendations:   -Continue Meropenem for now pending blood Cx sensitivities   -Urology eval in the setting of bacteremia and L ureter stone with hydronephrosis likely obstructing stone   -f/up Proteus sensitivities   -F/up repeat blood Cx   -Check sputum culture if feasible   -F/up HIDA scan   -Monitor T curve and WBC trend     Discussed with primary team     Claudia Mack MD  ID physician  [facility] Teams Preferred  After 5pm/weekends call [phone]

## 2025-03-17 NOTE — PROGRESS NOTE ADULT - ASSESSMENT
A/P: 64y Male s/p L ureteral stent placement  -increase flomax to 0.8mg nightly  -tylenol for stent colic as needed  -trend Cr  -abx per primary team  -can advance diet per urologic standpoint  -dc with crain, will need outpatient TOV  -pt will ultimately need definitive stone management and stent removal as an outpatient

## 2025-03-17 NOTE — PROGRESS NOTE ADULT - PROBLEM SELECTOR PLAN 4
Ua with bacteria + pyuria with leukocyte esterase;  Urine culture 3/12 >100K proteus mirabilis.   s/p zosyn (3/12-3/15)    Plan:  -follow up urine cultures    -Broaden to Meropenem   -antipyretics, antiemetics, analgesics as needed Likely iso decreased free water clearance. Can be in setting of SIADH 2/2 sepsis   SNa -125  Improvement today-129    Plan:   consider hypertonic fluids  f/u urine lytes + serum osmolality  -f/u nephrology recommendations

## 2025-03-17 NOTE — PROGRESS NOTE ADULT - ATTENDING COMMENTS
64M with severe obesity, hypertension, T2DM, PVD s/p L BKA, heart block s/p PPM admitted with severe sepsis of possible urinary source.    #Severe sepsis  On basis of fever, HR, leukocytosis, HOLA, found with Proteus bacteremia  RUQ w/ gallstones and gallbladder wall thickening, Dopplers of legs negative, CT with non-obstrucitng stone and hydronephrosis  - appreciate ID input, continuing meropenem. going for stent placement with urology today for possibly infected stone  - f/u repeat blood cultures, urine culture with E. coli  - f/u HIDA scan    #Acute hypoxemic respiratory failure  Requiring 2L NC on 3/15, suspecting sepsis and atelectasis. No edema or infection on CT chest. Unable to obtain CTA to evaluate for PE due to renal failure. V/Q scan was technically limited.  - wean NC as able    #HOLA  Stable  Suspect ATN from sepsis, relative hypotension  - appreciate nephrology's evaluation  - appreciate urology for Contreras placement, remains with adequate urine output  - dose meropenem for renal function    #Hyponatremia  Thought due to impaired free water excretion and SIADH due to pain, stabilizing high 120's    #Type 2 diabetes, poorly controlled with hyperglycemia  A1c 9.2, on insulin and multiple oral agents at home  - will continue to increase Lantus and Admelog as needed to achieve euglycemia, has been requiring escalating doses

## 2025-03-17 NOTE — PROGRESS NOTE ADULT - PROBLEM SELECTOR PLAN 1
On ?CKD, baseline creatine noted to fluctuate between 0.9 and 1.1 in late 2024 per review of HIE.  likely underlying diabetic nephropathy.     Urology team placed crain 3/15 - UOP was 2.1L in last 24 hours.   FENa of 0.3 as calculated from BMP on 03/12, strongly indicative prerenal etiology  recent hemodynamics noted (fevers, episode of hypotension on 03/13) which is concerning for evolving ATN  Mild L hydronephrosis, 6mm stone noted.   AXR with possible gastric outlet obstruction but CTA/P no obstruction.    -HOLA beginning to resolve, suspect resolving ATN.  Cr decreased to 4.02 from peak 5.12 yesterday  -continue to hold outpatient ACEi  -maintain crain  -would defer further IVF for now as patient tolerating PO unless patient becomes polyuric iso post ATN diuresis    -Renally dosed abx for UTI.  UCx with Proteus. BlCx now with GNR, f/u ID recs  No urgent indication for HD

## 2025-03-17 NOTE — BRIEF OPERATIVE NOTE - NSICDXBRIEFPROCEDURE_GEN_ALL_CORE_FT
PROCEDURES:  Cystoscopy with replacement of left ureteral stent 17-Mar-2025 16:14:10  Kunal Kenyon

## 2025-03-18 ENCOUNTER — TRANSCRIPTION ENCOUNTER (OUTPATIENT)
Age: 65
End: 2025-03-18

## 2025-03-18 DIAGNOSIS — N20.1 CALCULUS OF URETER: ICD-10-CM

## 2025-03-18 LAB
-  AMPICILLIN/SULBACTAM: SIGNIFICANT CHANGE UP
-  AMPICILLIN: SIGNIFICANT CHANGE UP
-  AZTREONAM: SIGNIFICANT CHANGE UP
-  CEFAZOLIN: SIGNIFICANT CHANGE UP
-  CEFEPIME: SIGNIFICANT CHANGE UP
-  CEFOXITIN: SIGNIFICANT CHANGE UP
-  CEFTRIAXONE: SIGNIFICANT CHANGE UP
-  CIPROFLOXACIN: SIGNIFICANT CHANGE UP
-  ERTAPENEM: SIGNIFICANT CHANGE UP
-  GENTAMICIN: SIGNIFICANT CHANGE UP
-  LEVOFLOXACIN: SIGNIFICANT CHANGE UP
-  MEROPENEM: SIGNIFICANT CHANGE UP
-  PIPERACILLIN/TAZOBACTAM: SIGNIFICANT CHANGE UP
-  TOBRAMYCIN: SIGNIFICANT CHANGE UP
-  TRIMETHOPRIM/SULFAMETHOXAZOLE: SIGNIFICANT CHANGE UP
ALBUMIN SERPL ELPH-MCNC: 2.4 G/DL — LOW (ref 3.3–5)
ALP SERPL-CCNC: 551 U/L — HIGH (ref 40–120)
ALT FLD-CCNC: 190 U/L — HIGH (ref 10–45)
ANION GAP SERPL CALC-SCNC: 14 MMOL/L — SIGNIFICANT CHANGE UP (ref 5–17)
ANION GAP SERPL CALC-SCNC: 14 MMOL/L — SIGNIFICANT CHANGE UP (ref 5–17)
AST SERPL-CCNC: 141 U/L — HIGH (ref 10–40)
BASOPHILS # BLD AUTO: 0.04 K/UL — SIGNIFICANT CHANGE UP (ref 0–0.2)
BASOPHILS NFR BLD AUTO: 0.2 % — SIGNIFICANT CHANGE UP (ref 0–2)
BILIRUB SERPL-MCNC: 0.4 MG/DL — SIGNIFICANT CHANGE UP (ref 0.2–1.2)
BUN SERPL-MCNC: 69 MG/DL — HIGH (ref 7–23)
BUN SERPL-MCNC: 72 MG/DL — HIGH (ref 7–23)
CALCIUM SERPL-MCNC: 8.4 MG/DL — SIGNIFICANT CHANGE UP (ref 8.4–10.5)
CALCIUM SERPL-MCNC: 8.9 MG/DL — SIGNIFICANT CHANGE UP (ref 8.4–10.5)
CHLORIDE SERPL-SCNC: 96 MMOL/L — SIGNIFICANT CHANGE UP (ref 96–108)
CHLORIDE SERPL-SCNC: 99 MMOL/L — SIGNIFICANT CHANGE UP (ref 96–108)
CO2 SERPL-SCNC: 22 MMOL/L — SIGNIFICANT CHANGE UP (ref 22–31)
CO2 SERPL-SCNC: 24 MMOL/L — SIGNIFICANT CHANGE UP (ref 22–31)
CREAT SERPL-MCNC: 2.46 MG/DL — HIGH (ref 0.5–1.3)
CREAT SERPL-MCNC: 2.62 MG/DL — HIGH (ref 0.5–1.3)
CULTURE RESULTS: ABNORMAL
CULTURE RESULTS: SIGNIFICANT CHANGE UP
CULTURE RESULTS: SIGNIFICANT CHANGE UP
EGFR: 26 ML/MIN/1.73M2 — LOW
EGFR: 26 ML/MIN/1.73M2 — LOW
EGFR: 29 ML/MIN/1.73M2 — LOW
EGFR: 29 ML/MIN/1.73M2 — LOW
EOSINOPHIL # BLD AUTO: 0.03 K/UL — SIGNIFICANT CHANGE UP (ref 0–0.5)
EOSINOPHIL NFR BLD AUTO: 0.2 % — SIGNIFICANT CHANGE UP (ref 0–6)
GAS PNL BLDV: SIGNIFICANT CHANGE UP
GAS PNL BLDV: SIGNIFICANT CHANGE UP
GLUCOSE BLDC GLUCOMTR-MCNC: 226 MG/DL — HIGH (ref 70–99)
GLUCOSE BLDC GLUCOMTR-MCNC: 242 MG/DL — HIGH (ref 70–99)
GLUCOSE BLDC GLUCOMTR-MCNC: 243 MG/DL — HIGH (ref 70–99)
GLUCOSE BLDC GLUCOMTR-MCNC: 264 MG/DL — HIGH (ref 70–99)
GLUCOSE BLDC GLUCOMTR-MCNC: 281 MG/DL — HIGH (ref 70–99)
GLUCOSE BLDC GLUCOMTR-MCNC: 283 MG/DL — HIGH (ref 70–99)
GLUCOSE SERPL-MCNC: 273 MG/DL — HIGH (ref 70–99)
GLUCOSE SERPL-MCNC: 312 MG/DL — HIGH (ref 70–99)
HCT VFR BLD CALC: 34.1 % — LOW (ref 39–50)
HGB BLD-MCNC: 10.9 G/DL — LOW (ref 13–17)
IMM GRANULOCYTES NFR BLD AUTO: 3.4 % — HIGH (ref 0–0.9)
LYMPHOCYTES # BLD AUTO: 0.7 K/UL — LOW (ref 1–3.3)
LYMPHOCYTES # BLD AUTO: 3.6 % — LOW (ref 13–44)
MAGNESIUM SERPL-MCNC: 2.6 MG/DL — SIGNIFICANT CHANGE UP (ref 1.6–2.6)
MCHC RBC-ENTMCNC: 26.7 PG — LOW (ref 27–34)
MCHC RBC-ENTMCNC: 32 G/DL — SIGNIFICANT CHANGE UP (ref 32–36)
MCV RBC AUTO: 83.4 FL — SIGNIFICANT CHANGE UP (ref 80–100)
METHOD TYPE: SIGNIFICANT CHANGE UP
MONOCYTES # BLD AUTO: 0.77 K/UL — SIGNIFICANT CHANGE UP (ref 0–0.9)
MONOCYTES NFR BLD AUTO: 3.9 % — SIGNIFICANT CHANGE UP (ref 2–14)
NEUTROPHILS # BLD AUTO: 17.43 K/UL — HIGH (ref 1.8–7.4)
NEUTROPHILS NFR BLD AUTO: 88.7 % — HIGH (ref 43–77)
NRBC BLD AUTO-RTO: 0 /100 WBCS — SIGNIFICANT CHANGE UP (ref 0–0)
ORGANISM # SPEC MICROSCOPIC CNT: ABNORMAL
PHOSPHATE SERPL-MCNC: 3.5 MG/DL — SIGNIFICANT CHANGE UP (ref 2.5–4.5)
PLATELET # BLD AUTO: 261 K/UL — SIGNIFICANT CHANGE UP (ref 150–400)
POTASSIUM SERPL-MCNC: 5.3 MMOL/L — SIGNIFICANT CHANGE UP (ref 3.5–5.3)
POTASSIUM SERPL-MCNC: 6.4 MMOL/L — CRITICAL HIGH (ref 3.5–5.3)
POTASSIUM SERPL-SCNC: 5.3 MMOL/L — SIGNIFICANT CHANGE UP (ref 3.5–5.3)
POTASSIUM SERPL-SCNC: 6.4 MMOL/L — CRITICAL HIGH (ref 3.5–5.3)
PROT SERPL-MCNC: 7.2 G/DL — SIGNIFICANT CHANGE UP (ref 6–8.3)
RBC # BLD: 4.09 M/UL — LOW (ref 4.2–5.8)
RBC # FLD: 15.9 % — HIGH (ref 10.3–14.5)
SODIUM SERPL-SCNC: 132 MMOL/L — LOW (ref 135–145)
SODIUM SERPL-SCNC: 137 MMOL/L — SIGNIFICANT CHANGE UP (ref 135–145)
SPECIMEN SOURCE: SIGNIFICANT CHANGE UP
WBC # BLD: 19.63 K/UL — HIGH (ref 3.8–10.5)
WBC # FLD AUTO: 19.63 K/UL — HIGH (ref 3.8–10.5)

## 2025-03-18 PROCEDURE — 78226 HEPATOBILIARY SYSTEM IMAGING: CPT | Mod: 26

## 2025-03-18 PROCEDURE — 99222 1ST HOSP IP/OBS MODERATE 55: CPT

## 2025-03-18 PROCEDURE — 93010 ELECTROCARDIOGRAM REPORT: CPT

## 2025-03-18 PROCEDURE — 99233 SBSQ HOSP IP/OBS HIGH 50: CPT

## 2025-03-18 PROCEDURE — 99233 SBSQ HOSP IP/OBS HIGH 50: CPT | Mod: GC

## 2025-03-18 PROCEDURE — G0545: CPT

## 2025-03-18 PROCEDURE — 99232 SBSQ HOSP IP/OBS MODERATE 35: CPT

## 2025-03-18 RX ORDER — INSULIN GLARGINE-YFGN 100 [IU]/ML
55 INJECTION, SOLUTION SUBCUTANEOUS AT BEDTIME
Refills: 0 | Status: DISCONTINUED | OUTPATIENT
Start: 2025-03-18 | End: 2025-03-19

## 2025-03-18 RX ORDER — SODIUM ZIRCONIUM CYCLOSILICATE 5 G/5G
10 POWDER, FOR SUSPENSION ORAL ONCE
Refills: 0 | Status: COMPLETED | OUTPATIENT
Start: 2025-03-18 | End: 2025-03-18

## 2025-03-18 RX ORDER — CEFTRIAXONE 500 MG/1
2000 INJECTION, POWDER, FOR SOLUTION INTRAMUSCULAR; INTRAVENOUS
Refills: 0 | Status: DISCONTINUED | OUTPATIENT
Start: 2025-03-19 | End: 2025-03-22

## 2025-03-18 RX ORDER — CALCIUM GLUCONATE 20 MG/ML
1 INJECTION, SOLUTION INTRAVENOUS ONCE
Refills: 0 | Status: COMPLETED | OUTPATIENT
Start: 2025-03-18 | End: 2025-03-18

## 2025-03-18 RX ORDER — PIPERACILLIN-TAZO-DEXTROSE,ISO 3.375G/5
3.38 IV SOLUTION, PIGGYBACK PREMIX FROZEN(ML) INTRAVENOUS ONCE
Refills: 0 | Status: COMPLETED | OUTPATIENT
Start: 2025-03-18 | End: 2025-03-18

## 2025-03-18 RX ORDER — CEFTRIAXONE 500 MG/1
1000 INJECTION, POWDER, FOR SOLUTION INTRAMUSCULAR; INTRAVENOUS EVERY 24 HOURS
Refills: 0 | Status: DISCONTINUED | OUTPATIENT
Start: 2025-03-18 | End: 2025-03-18

## 2025-03-18 RX ORDER — PIPERACILLIN-TAZO-DEXTROSE,ISO 3.375G/5
3.38 IV SOLUTION, PIGGYBACK PREMIX FROZEN(ML) INTRAVENOUS ONCE
Refills: 0 | Status: DISCONTINUED | OUTPATIENT
Start: 2025-03-18 | End: 2025-03-18

## 2025-03-18 RX ORDER — INSULIN GLARGINE-YFGN 100 [IU]/ML
50 INJECTION, SOLUTION SUBCUTANEOUS AT BEDTIME
Refills: 0 | Status: DISCONTINUED | OUTPATIENT
Start: 2025-03-18 | End: 2025-03-18

## 2025-03-18 RX ADMIN — INSULIN LISPRO 6: 100 INJECTION, SOLUTION INTRAVENOUS; SUBCUTANEOUS at 05:49

## 2025-03-18 RX ADMIN — ATORVASTATIN CALCIUM 20 MILLIGRAM(S): 80 TABLET, FILM COATED ORAL at 22:17

## 2025-03-18 RX ADMIN — SODIUM ZIRCONIUM CYCLOSILICATE 10 GRAM(S): 5 POWDER, FOR SUSPENSION ORAL at 13:42

## 2025-03-18 RX ADMIN — INSULIN LISPRO 15 UNIT(S): 100 INJECTION, SOLUTION INTRAVENOUS; SUBCUTANEOUS at 13:33

## 2025-03-18 RX ADMIN — INSULIN GLARGINE-YFGN 55 UNIT(S): 100 INJECTION, SOLUTION SUBCUTANEOUS at 22:14

## 2025-03-18 RX ADMIN — CEFTRIAXONE 100 MILLIGRAM(S): 500 INJECTION, POWDER, FOR SOLUTION INTRAMUSCULAR; INTRAVENOUS at 17:27

## 2025-03-18 RX ADMIN — HEPARIN SODIUM 5000 UNIT(S): 1000 INJECTION INTRAVENOUS; SUBCUTANEOUS at 13:44

## 2025-03-18 RX ADMIN — Medication 200 GRAM(S): at 14:12

## 2025-03-18 RX ADMIN — CALCIUM GLUCONATE 100 GRAM(S): 20 INJECTION, SOLUTION INTRAVENOUS at 08:40

## 2025-03-18 RX ADMIN — INSULIN LISPRO 15 UNIT(S): 100 INJECTION, SOLUTION INTRAVENOUS; SUBCUTANEOUS at 17:29

## 2025-03-18 RX ADMIN — INSULIN LISPRO 4: 100 INJECTION, SOLUTION INTRAVENOUS; SUBCUTANEOUS at 13:33

## 2025-03-18 RX ADMIN — MEROPENEM 100 MILLIGRAM(S): 1 INJECTION INTRAVENOUS at 05:20

## 2025-03-18 RX ADMIN — Medication 81 MILLIGRAM(S): at 15:45

## 2025-03-18 RX ADMIN — INSULIN LISPRO 4: 100 INJECTION, SOLUTION INTRAVENOUS; SUBCUTANEOUS at 17:28

## 2025-03-18 RX ADMIN — HEPARIN SODIUM 5000 UNIT(S): 1000 INJECTION INTRAVENOUS; SUBCUTANEOUS at 22:15

## 2025-03-18 RX ADMIN — HEPARIN SODIUM 5000 UNIT(S): 1000 INJECTION INTRAVENOUS; SUBCUTANEOUS at 05:20

## 2025-03-18 RX ADMIN — TAMSULOSIN HYDROCHLORIDE 0.8 MILLIGRAM(S): 0.4 CAPSULE ORAL at 22:15

## 2025-03-18 NOTE — OCCUPATIONAL THERAPY INITIAL EVALUATION ADULT - PERTINENT HX OF CURRENT PROBLEM, REHAB EVAL
65yo m w pmh obesity, htn, hld, dm c/b peripheral neuropathy + retinopathy, pad s/p l bka, cva, chb s/p ppm, p/w generalized fatigue/weakness; symptoms have been ongoing for the last few days; denies any associated fever, chills, rigors, chest pain, sob/patel, cough, lightheadedness/dizziness, loc, n+v, diaphoresis, abdominal discomfort, dysuria; patient did note his blood sugar levels to be in the 300s, which was unusual to him as they are generally better controlled. patient grew concerned so presents to [facility] er for further evaluation. in er, found to be meeting severe sepsis criteria, and found to have multiple metabolic disturbances including hyperglycemia, hola w hagma; suspect 2/2 uti; admit to medicine for further mgmt (12 Mar 2025 18:46)    Interval History:  Patient admitted with severe sepsis due to UTI/L uteral stone. S/P L uteral stent on 3/17.  Course c/b HOLA, hyponatremia. Patient with functional deficits.

## 2025-03-18 NOTE — PROGRESS NOTE ADULT - PROBLEM SELECTOR PLAN 5
Uptrending transaminases, likely iso shock liver.   RUQ US Cholelithiasis with gallbladder wall thickening. Question trace pericholecystic fluid.       -CTM  -f/u HIDA scan Ua with bacteria + pyuria with leukocyte esterase;  Urine culture 3/12 >100K proteus mirabilis.   s/p zosyn (3/12-3/15), s/p Meropenem (3/15-3/16)    Plan:  -follow up urine cultures    -cw CTX  -antipyretics, antiemetics, analgesics as needed

## 2025-03-18 NOTE — PROGRESS NOTE ADULT - PROBLEM SELECTOR PLAN 8
CW home ASA  cw atorvastatin 20mg Bowel distension on physical exam. patient with history of appendectomy.    Plan:   Patient had a BM 3/16  Bowel regimen  f/u abdominal xray- Mild nonspecific gaseous distention of stomach, with paucity of bowel gas   distally. This may be secondary to gastric outlet obstruction.

## 2025-03-18 NOTE — OCCUPATIONAL THERAPY INITIAL EVALUATION ADULT - ADDITIONAL COMMENTS
Pt lives in a private house with his wife + 3 steps to enter and 1 flight of steps to 2nd level,  pt stays on the main floor, sleeps on couch. Pt reports independence with mobility with rolling walker. Pt requires assist from wife for donning prosthetic. Pt also receives assist for bathing with walk in shower +shower chair, mostly sponge bathed.

## 2025-03-18 NOTE — PROGRESS NOTE ADULT - PROBLEM SELECTOR PLAN 10
stop hydralazine and amlodipine in the setting of sepsis  hold home Enalopril iso HOLA s/p L BKA    cw ASA  cw atorvastatin 20

## 2025-03-18 NOTE — PROVIDER CONTACT NOTE (CRITICAL VALUE NOTIFICATION) - TEST AND RESULT REPORTED:
Blood cultures from 3/15/2025 shows growth in anorobic bottle, gram negative rods
H/H 6.0/18, K+ 2.6, calcium 0.68
k 6.4 moderately hemolysis

## 2025-03-18 NOTE — PROGRESS NOTE ADULT - PROBLEM SELECTOR PLAN 1
On ?CKD, baseline creatine noted to fluctuate between 0.9 and 1.1 in late 2024 per review of HIE.  likely underlying diabetic nephropathy.     Urology team placed crain 3/15  FENa of 0.3 as calculated from BMP on 03/12, strongly indicative prerenal etiology  recent hemodynamics noted (fevers, episode of hypotension on 03/13) which is concerning for evolving ATN  Mild L hydronephrosis, 6mm stone noted.   AXR with possible gastric outlet obstruction but CTA/P no obstruction.  Awaiting NM hepatobiliary scan    -HOLA further resolving, likely multifactorial ATN + septic stone, now s/p L ureteral stent placement    creatinine improved to 2.62 today    patient nonoliguric    Urology following, plan to d/c patient with crain  -continue to hold outpatient ACEi    if needed can add amlodipine 5mg PO Daily for BP control until HOLA resolves  -would defer further IVF for now as patient tolerating PO unless patient becomes polyuric iso post ATN diuresis    -Renally dosed abx for UTI.  UCx with Proteus. BlCx now with GNR, f/u ID recs  -potassium hemolyzed today, would not treat  No urgent indication for HD

## 2025-03-18 NOTE — PROGRESS NOTE ADULT - SUBJECTIVE AND OBJECTIVE BOX
Follow Up:      Interval History/ROS:Patient is a 64y old  Male who presents with a chief complaint of generalized fatigue/weakness (18 Mar 2025 11:25)  Seen today, s/p cystoscopy and left stent placement on 3/17, afebrile, leukocytosis ~ 19K  Sister Nikole at bedside, patient requested updating sister about current management Discussed with Nikole current condition and treatment plan. All questions answered to the best of my ability.     Allergies  No Known Allergies    ANTIMICROBIALS:    piperacillin/tazobactam IVPB.- 3.375 once    OTHER MEDS: MEDICATIONS  (STANDING):  acetaminophen     Tablet .. 650 every 6 hours PRN  aluminum hydroxide/magnesium hydroxide/simethicone Suspension 30 every 4 hours PRN  aspirin  chewable 81 daily  atorvastatin 20 at bedtime  dextrose 50% Injectable 25 once  dextrose 50% Injectable 12.5 once  dextrose 50% Injectable 25 once  dextrose Oral Gel 15 once PRN  glucagon  Injectable 1 once  heparin   Injectable 5000 every 8 hours  insulin glargine Injectable (LANTUS) 50 at bedtime  insulin lispro (ADMELOG) corrective regimen sliding scale  three times a day before meals  insulin lispro (ADMELOG) corrective regimen sliding scale  at bedtime  insulin lispro Injectable (ADMELOG) 15 three times a day before meals  melatonin 3 at bedtime PRN  ondansetron Injectable 4 every 8 hours PRN  polyethylene glycol 3350 17 <User Schedule>  senna 2 daily  tamsulosin 0.8 at bedtime      Vital Signs Last 24 Hrs  T(F): 98.6 (03-18-25 @ 08:43), Max: 103.9 (03-13-25 @ 05:08)    Vital Signs Last 24 Hrs  HR: 85 (03-18-25 @ 09:40) (77 - 102)  BP: 160/79 (03-18-25 @ 09:40) (124/64 - 168/86)  RR: 17 (03-18-25 @ 08:43)  SpO2: 95% (03-18-25 @ 09:40) (93% - 100%)  Wt(kg): --    EXAM:    GA: NAD  CV: nl S1/S2,  Lungs: CTAB, on RA   Abd: soft, nontender  Skin: Intact  IV: no phlebitis    Labs:                        10.9   19.63 )-----------( 261      ( 18 Mar 2025 06:48 )             34.1     03-18    137  |  99  |  69[H]  ----------------------------<  273[H]  5.3   |  24  |  2.46[H]    Ca    8.9      18 Mar 2025 10:57  Phos  3.5     03-18  Mg     2.6     03-18    TPro  7.2  /  Alb  2.4[L]  /  TBili  0.4  /  DBili  x   /  AST  141[H]  /  ALT  190[H]  /  AlkPhos  551[H]  03-18      WBC Trend:  WBC Count: 19.63 (03-18-25 @ 06:48)  WBC Count: 19.85 (03-17-25 @ 06:35)  WBC Count: 14.92 (03-16-25 @ 09:33)  WBC Count: 20.43 (03-15-25 @ 07:27)      Creatine Trend:  Creatinine: 2.46 (03-18)  Creatinine: 2.62 (03-18)  Creatinine: 4.02 (03-17)  Creatinine: 4.52 (03-16)      Liver Biochemical Testing Trend:  Alanine Aminotransferase (ALT/SGPT): 190 *H* (03-18)  Alanine Aminotransferase (ALT/SGPT): 155 *H* (03-17)  Alanine Aminotransferase (ALT/SGPT): 143 *H* (03-16)  Alanine Aminotransferase (ALT/SGPT): 50 *H* (03-15)  Alanine Aminotransferase (ALT/SGPT): 25 (03-14)  Aspartate Aminotransferase (AST/SGOT): 141 (03-18-25 @ 06:48)  Aspartate Aminotransferase (AST/SGOT): 125 (03-17-25 @ 06:35)  Aspartate Aminotransferase (AST/SGOT): 155 (03-16-25 @ 09:33)  Aspartate Aminotransferase (AST/SGOT): 57 (03-15-25 @ 07:28)  Aspartate Aminotransferase (AST/SGOT): 27 (03-14-25 @ 06:46)  Bilirubin Total: 0.4 (03-18)  Bilirubin Total: 0.4 (03-17)  Bilirubin Total: 0.4 (03-16)  Bilirubin Total: 0.3 (03-15)  Bilirubin Total: 0.4 (03-14)      Trend LDH      Urinalysis Basic - ( 18 Mar 2025 10:57 )    Color: x / Appearance: x / SG: x / pH: x  Gluc: 273 mg/dL / Ketone: x  / Bili: x / Urobili: x   Blood: x / Protein: x / Nitrite: x   Leuk Esterase: x / RBC: x / WBC x   Sq Epi: x / Non Sq Epi: x / Bacteria: x        MICROBIOLOGY:  Vancomycin Level, Random: <4.0 (03-16 @ 09:25)    MRSA PCR Result.: NotDetec (03-15-25 @ 18:08)      Culture - Blood (collected 16 Mar 2025 17:19)  Source: Blood Blood  Preliminary Report:    No growth at 24 hours    Culture - Blood (collected 15 Mar 2025 11:14)  Source: Blood Blood  Preliminary Report:    No growth at 48 Hours    Culture - Blood (collected 15 Mar 2025 07:30)  Source: Blood Blood  Final Report:    Growth in anaerobic bottle: Proteus mirabilis    Direct identification is available within approximately 3-5    hours either by Blood Panel Multiplexed PCR or Direct    MALDI-TOF. Details: [email or web address]  Organism: Blood Culture PCR  Proteus mirabilis  Organism: Proteus mirabilis    Sensitivities:      Method Type: ARMEN      -  Ampicillin: S <=8 These ampicillin results predict results for amoxicillin      -  Ampicillin/Sulbactam: S <=4/2      -  Aztreonam: S <=4      -  Cefazolin: S <=2      -  Cefepime: S <=2      -  Cefoxitin: S <=8      -  Ceftriaxone: S <=1      -  Ciprofloxacin: S <=0.25      -  Ertapenem: S <=0.5      -  Gentamicin: S <=2      -  Levofloxacin: S <=0.5      -  Meropenem: S <=1      -  Piperacillin/Tazobactam: S <=8      -  Tobramycin: S <=2      -  Trimethoprim/Sulfamethoxazole: S <=0.5/9.5  Organism: Blood Culture PCR    Sensitivities:      Method Type: PCR      -  Proteus species: Detec    Culture - Blood (collected 12 Mar 2025 19:45)  Source: Blood Blood-Peripheral  Final Report:    No growth at 5 days    Culture - Blood (collected 12 Mar 2025 19:35)  Source: Blood Blood-Peripheral  Final Report:    No growth at 5 days    Culture - Urine (collected 12 Mar 2025 16:22)  Source: Clean Catch Clean Catch (Midstream)  Final Report:    >100,000 CFU/ml Proteus mirabilis    <10,000 CFU/ml Normal Urogenital wil present  Organism: Proteus mirabilis  Organism: Proteus mirabilis    Sensitivities:      Method Type: ARMEN      -  Amoxicillin/Clavulanic Acid: S <=8/4      -  Ampicillin: S <=8 These ampicillin results predict results for amoxicillin      -  Ampicillin/Sulbactam: S <=4/2      -  Aztreonam: S <=4      -  Cefazolin: S <=2 For uncomplicated UTI with K. pneumoniae, E. coli, or P. mirablis: ARMEN <=16 is sensitive and ARMEN >=32 is resistant. This also predicts results for oral agents cefaclor, cefdinir, cefpodoxime, cefprozil, cefuroxime axetil, cephalexin and locarbef for uncomplicated UTI. Note that some isolates may be susceptible to these agents while testing resistant to cefazolin.      -  Cefepime: S <=2      -  Cefoxitin: S <=8      -  Ceftriaxone: S <=1      -  Cefuroxime: S <=4      -  Ciprofloxacin: S <=0.25      -  Ertapenem: S <=0.5      -  Gentamicin: S <=2      -  Levofloxacin: S <=0.5      -  Meropenem: S <=1      -  Nitrofurantoin: R >64 Should not be used to treat pyelonephritis      -  Piperacillin/Tazobactam: S <=8      -  Tobramycin: S <=2      -  Trimethoprim/Sulfamethoxazole: S <=0.5/9.5    Culture - Blood (collected 10 Yonathan 2024 13:00)  Source: .Blood Blood-Peripheral  Final Report:    No growth at 5 days    Culture - Blood (collected 10 Yonathan 2024 12:45)  Source: .Blood Blood-Peripheral  Final Report:    No growth at 5 days    Culture - Urine (collected 21 Dec 2023 13:00)  Source: Clean Catch Clean Catch (Midstream)  Final Report:    10,000 - 49,000 CFU/mL Escherichia coli  Organism: Escherichia coli  Organism: Escherichia coli    Sensitivities:      Method Type: ARMEN      -  Amoxicillin/Clavulanic Acid: I 16/8      -  Ampicillin: R >16 These ampicillin results predict results for amoxicillin      -  Ampicillin/Sulbactam: R >16/8      -  Aztreonam: S <=4      -  Cefazolin: S 16 For uncomplicated UTI with K. pneumoniae, E. coli, or P. mirablis: ARMEN <=16 is sensitive and ARMEN >=32 is resistant. This also predicts results for oral agents cefaclor, cefdinir, cefpodoxime, cefprozil, cefuroxime axetil, cephalexin and locarbeffor uncomplicated UTI. Note that some isolates may be susceptible to these agents while testing resistant to cefazolin.      -  Cefepime: S <=2      -  Cefoxitin: S <=8      -  Ceftriaxone: S <=1      -  Cefuroxime: S <=4      -  Ciprofloxacin: R 2      -  Ertapenem: S <=0.5      -  Gentamicin: S <=2      -  Imipenem: S <=1      -  Levofloxacin: R 4      -  Meropenem: S <=1      -  Nitrofurantoin: S <=32 Should not be used to treat pyelonephritis      -  Piperacillin/Tazobactam: S <=8      -  Tobramycin: S <=2      -  Trimethoprim/Sulfamethoxazole: S <=0.5/9.5    Culture - Blood (collected 04 Dec 2023 05:49)  Source: .Blood Blood-Peripheral  Final Report:    No growth at 5 days      Rapid RVP Result: NotDetec (03-15 @ 03:11)    Blood Gas Venous - Lactate: 0.9 (03-18 @ 09:42)  Blood Gas Venous - Lactate: 0.5 (03-18 @ 06:53)  Blood Gas Venous - Lactate: 3.0 (03-16 @ 09:39)    Sedimentation Rate, Erythrocyte: 15 mm/hr (06-12-24 @ 01:10)    RADIOLOGY:  imaging below personally reviewed        < from: [facility] Hepatobiliary [facility] (03.18.25 @ 12:49) >  IMPRESSION: Normal hepatobiliary scan. No radionuclide evidence of acute   cholecystitis.    --- End of Report ---    < end of copied text >

## 2025-03-18 NOTE — DISCHARGE NOTE PROVIDER - NSDCCPCAREPLAN_GEN_ALL_CORE_FT
PRINCIPAL DISCHARGE DIAGNOSIS  Diagnosis: Ureteral stone  Assessment and Plan of Treatment: It is common to have blood in the urine after your procedure.  It may be pink or even red; inform your doctor if you have a significant amount of clots in the urine or if you are unable to void at all.  -It is not uncommon to have some burning when you urinate, this will improve over the next few days.  -You have an internal stent (a hollow tube that runs from the kidney to your bladder) after your procedure, helping your kidney drain down to your bladder after your surgery.  Some patients do not notice that they have a stent, while others complain of the sensation of needing to urinate frequently, burning on urination, or even some back pain (especially when they go to urinate). These sensations usually improve gradually, some faster than others. This is not uncommon, but may initially warrant the use of the pain medication which you were prescribed.  While the stent is in place, your urine may continue to be bloody. This stent is temporary and must be removed/exchanged by your urologist.  -Provided that you are not restricted with fluids by your physician, you should drink 6-8 (8 oz.) glasses of fluid per day.  -You may resume your regular diet and regular medication regimen.    -You may shower or bathe.    -You may take over the counter pain medications such as Motrin and Tylenol as needed for pain.  Do not exceed 4 grams of Tylenol daily.  Each medication may be taken every 6 hours.  You may alternate these medications such that you take either one every 3 hours.  If you have severe pain that does not improve with the pain medication or you have persistent vomiting, call your doctor.  -You may have a prescription for an antibiotic when you go home.  Take this medication as instructed; if you miss one dose, resume taking it on your previous schedule until you have completed the entire course of treatment.  -Call your physician if you have a fever over 101F.  -Make a follow up appointment for with your urologist when you arrive home (or the next business day).        SECONDARY DISCHARGE DIAGNOSES  Diagnosis: Hyperglycemia  Assessment and Plan of Treatment:      PRINCIPAL DISCHARGE DIAGNOSIS  Diagnosis: Ureteral stone  Assessment and Plan of Treatment: You were found to have an infection which was though to be due to a stone in your ureter. This was also though to cause your kidney failure. Urology was consulted and they put a stent on the ureter releiving the obstruction.   It is common to have blood in the urine after your procedure.  It may be pink or even red; inform your doctor if you have a significant amount of clots in the urine or if you are unable to void at all.  -It is not uncommon to have some burning when you urinate, this will improve over the next few days.  -You have an internal stent (a hollow tube that runs from the kidney to your bladder) after your procedure, helping your kidney drain down to your bladder after your surgery.  Some patients complain of the sensation of needing to urinate frequently, burning on urination, or even some back pain. These sensations usually improve gradually, some faster than others. This is not uncommon, but may initially warrant the use of the pain medication which you were prescribed.  While the stent is in place, your urine may continue to be bloody. This stent is temporary and must be removed/exchanged by your urologist.  -You may take over the counter pain medications such as Motrin and Tylenol as needed for pain.  Do not exceed 4 grams of Tylenol daily.  Each medication may be taken every 6 hours.  You may alternate these medications such that you take either one every 3 hours.  If you have severe pain that does not improve with the pain medication or you have persistent vomiting, call your doctor.  -You may have a prescription for an antibiotic when you go home.  Take this medication as instructed  -As you have just underwent general anesthesia, you should refrain from driving, heavy lifting, smoking, alcohol consumption, or important decision making for the next 24 hours.  You may climb stairs and you may resume sexual activity.  -Make a follow up appointment for with your urologist when you arrive home (or the next business day).        SECONDARY DISCHARGE DIAGNOSES  Diagnosis: Hyperglycemia  Assessment and Plan of Treatment: Your sugar levels were elevated during your hospital stay. This was thought ot be due to sepsis, stopping some of your medication in the setting of infection and uncontrolled infection. Your insulin was adjusted to help control your sugar levels.      Diagnosis: HOLA (acute kidney injury)  Assessment and Plan of Treatment: Elda snell hospital stay, your kidney levels were found to be elevated, indicating renal failure. This was thought to be fr multiple reasons, including the infection, and the obstruciton of urine from the stone. Yur kidney levels have improved after the urological procedure, as the obstruction was relieved.   If you experience any symptoms of..

## 2025-03-18 NOTE — DISCHARGE NOTE PROVIDER - CARE PROVIDER_API CALL
[facility]  Internal Medicine  [address] [place] [address] [address]  [place] [address]  Phone: [phone]  Fax: [phone]  Follow Up Time:

## 2025-03-18 NOTE — PROGRESS NOTE ADULT - SUBJECTIVE AND OBJECTIVE BOX
Subjective  feels well    Objective    Vital signs  T(F): , Max: 101.4 (03-17-25 @ 13:20)  HR: 91 (03-18-25 @ 04:05)  BP: 168/86 (03-18-25 @ 04:05)  SpO2: 99% (03-18-25 @ 04:05)  Wt(kg): --    Output     OUT:    Ureteral Catheter (mL): 2100 mL  Total OUT: 2100 mL    Total NET: -2100 mL      OUT:    Ureteral Catheter (mL): 3650 mL  Total OUT: 3650 mL    Total NET: -3650 mL          Gen: NAD  Abd: soft, nontender, nondistended  : crain secured in place, draining clear pink tinged arianne urine     Labs      03-17 @ 06:35    WBC 19.85 / Hct 32.0  / SCr 4.02     03-16 @ 19:09    WBC --    / Hct --    / SCr 4.52         Culture - Blood (collected 03-16-25 @ 17:19)  Source: Blood Blood  Preliminary Report (03-17-25 @ 23:01):    No growth at 24 hours    Culture - Blood (collected 03-15-25 @ 11:14)  Source: Blood Blood  Preliminary Report (03-17-25 @ 17:01):    No growth at 48 Hours    Culture - Blood (collected 03-15-25 @ 07:30)  Source: Blood Blood  Gram Stain (03-16-25 @ 23:04):    Growth in anaerobic bottle: Gram Negative Rods  Preliminary Report (03-17-25 @ 13:07):    Growth in anaerobic bottle: Proteus mirabilis    Direct identification is available within approximately 3-5    hours either by Blood Panel Multiplexed PCR or Direct    MALDI-TOF. Details: [email or web address]  Organism: Blood Culture PCR (03-17-25 @ 00:43)  Organism: Blood Culture PCR (03-17-25 @ 00:43)      Method Type: PCR      -  Proteus species: Detec    Culture - Blood (collected 03-12-25 @ 19:45)  Source: Blood Blood-Peripheral  Final Report (03-18-25 @ 01:01):    No growth at 5 days    Culture - Blood (collected 03-12-25 @ 19:35)  Source: Blood Blood-Peripheral  Final Report (03-18-25 @ 01:01):    No growth at 5 days    Culture - Urine (collected 03-12-25 @ 16:22)  Source: Clean Catch Clean Catch (Midstream)  Final Report (03-14-25 @ 22:32):    >100,000 CFU/ml Proteus mirabilis    <10,000 CFU/ml Normal Urogenital wil present  Organism: Proteus mirabilis (03-14-25 @ 22:32)  Organism: Proteus mirabilis (03-14-25 @ 22:32)      Method Type: ARMEN      -  Amoxicillin/Clavulanic Acid: S <=8/4      -  Ampicillin: S <=8 These ampicillin results predict results for amoxicillin      -  Ampicillin/Sulbactam: S <=4/2      -  Aztreonam: S <=4      -  Cefazolin: S <=2 For uncomplicated UTI with K. pneumoniae, E. coli, or P. mirablis: ARMEN <=16 is sensitive and ARMEN >=32 is resistant. This also predicts results for oral agents cefaclor, cefdinir, cefpodoxime, cefprozil, cefuroxime axetil, cephalexin and locarbef for uncomplicated UTI. Note that some isolates may be susceptible to these agents while testing resistant to cefazolin.      -  Cefepime: S <=2      -  Cefoxitin: S <=8      -  Ceftriaxone: S <=1      -  Cefuroxime: S <=4      -  Ciprofloxacin: S <=0.25      -  Ertapenem: S <=0.5      -  Gentamicin: S <=2      -  Levofloxacin: S <=0.5      -  Meropenem: S <=1      -  Nitrofurantoin: R >64 Should not be used to treat pyelonephritis      -  Piperacillin/Tazobactam: S <=8      -  Tobramycin: S <=2      -  Trimethoprim/Sulfamethoxazole: S <=0.5/9.5        Urine Cx: ?  Blood Cx: ?    Imaging

## 2025-03-18 NOTE — DISCHARGE NOTE PROVIDER - NSDCMRMEDTOKEN_GEN_ALL_CORE_FT
amLODIPine 10 mg oral tablet: 1 tab(s) orally once a day  aspirin 81 mg oral tablet, chewable: 1 tab(s) orally once a day  enalapril 10 mg oral tablet: 1 tab(s) orally once a day  gabapentin 300 mg oral capsule: 1 cap(s) orally 3 times a day  hydrALAZINE 25 mg oral tablet: 1 tab(s) orally 3 times a day  Lantus Solostar Pen 100 units/mL subcutaneous solution: 28 unit(s) subcutaneous once a day (at bedtime)  linagliptin 5 mg oral tablet: 1 tab(s) orally once a day  metFORMIN 1000 mg oral tablet: 1 tab(s) orally 2 times a day  simvastatin 40 mg oral tablet: 1 tab(s) orally once a day  Supplement: Vitamin C, Calcium, Multivitamin, Lutein, Vitamin E, Vitamin D2   amLODIPine 10 mg oral tablet: 1 tab(s) orally once a day  aspirin 81 mg oral tablet, chewable: 1 tab(s) orally once a day  enalapril 10 mg oral tablet: 1 tab(s) orally once a day  gabapentin 300 mg oral capsule: 1 cap(s) orally 3 times a day  hydrALAZINE 25 mg oral tablet: 1 tab(s) orally 3 times a day  Lantus Solostar Pen 100 units/mL subcutaneous solution: 28 unit(s) subcutaneous once a day (at bedtime)  latanoprost 0.005% ophthalmic solution: 1 drop(s) to each affected eye once a day (at bedtime)  linagliptin 5 mg oral tablet: 1 tab(s) orally once a day  metFORMIN 1000 mg oral tablet: 1 tab(s) orally 2 times a day  simvastatin 40 mg oral tablet: 1 tab(s) orally once a day  Supplement: Vitamin C, Calcium, Multivitamin, Lutein, Vitamin E, Vitamin D2  tamsulosin 0.4 mg oral capsule: 2 cap(s) orally once a day (at bedtime)

## 2025-03-18 NOTE — DISCHARGE NOTE PROVIDER - HOSPITAL COURSE
HPI:  63yo m w pmh obesity, htn, hld, dm c/b peripheral neuropathy + retinopathy, pad s/p l bka, cva, chb s/p ppm, p/w generalized fatigue/weakness; symptoms have been ongoing for the last few days; denies any associated fever, chills, rigors, chest pain, sob/patel, cough, lightheadedness/dizziness, loc, n+v, diaphoresis, abdominal discomfort, dysuria; patient did note his blood sugar levels to be in the 300s, which was unusual to him as they are generally better controlled. patient grew concerned so presents to [facility] er for further evaluation. in er, found to be meeting severe sepsis criteria, and found to have multiple metabolic disturbances including hyperglycemia, catrina w hagma; suspect 2/2 uti; admit to medicine for further mgmt (12 Mar 2025 18:46)    Hospital Course:      Important Medication Changes and Reason:    Active or Pending Issues Requiring Follow-up:    Advanced Directives:   [ ] Full code  [ ] DNR  [ ] Hospice    Discharge Diagnoses:         HPI:  65yo m w pmh obesity, htn, hld, dm c/b peripheral neuropathy + retinopathy, pad s/p l bka, cva, chb s/p ppm, p/w generalized fatigue/weakness; symptoms have been ongoing for the last few days; denies any associated fever, chills, rigors, chest pain, sob/patel, cough, lightheadedness/dizziness, loc, n+v, diaphoresis, abdominal discomfort, dysuria; patient did note his blood sugar levels to be in the 300s, which was unusual to him as they are generally better controlled. patient grew concerned so presents to [facility] er for further evaluation. in er, found to be meeting severe sepsis criteria, and found to have multiple metabolic disturbances including hyperglycemia, hola w hagma; suspect 2/2 uti; admit to medicine for further mgmt (12 Mar 2025 18:46)    Hospital Course:  Patient was seen and was found to be in severe sepsis febrile, leukocytosis, tachycardic, + lactic acidosis. Urine culture 3/12 >100K proteus mirabilis. He was also found to be proteus bacteremia. CXR- no consolidations. RUQ US Cholelithiasis with gallbladder wall thickening. Question trace pericholecystic fluid. HIDA- with no cholecystitis. CT A/P with 0.6 cm calculus within the proximal left ureter with mild left hydronephrosis. He underwent a cystoscopy and left  ureter stent placement 3/17. Initially, he was on meropenem which was later de-escalated to CTX. Patient was got a crain.   Fabiola was found to have an HOLA thought to be multifactorial in the setting of pre-renal vs. ATN vs. post renal iso obstruction 2/2 septic stone, with his baseline fluctuating between 0.9-1.1 in late 2024, his peak Creatinine 5.1 with improvement post urologic procedure.   His Blood sugar was also found to be more darwin elevated likely in the setting of uncontrolled as his A1C was > 9 and in the setting of sepsis. Patient's Lantus was adjusted.                Important Medication Changes and Reason:  - Abx  - DM  Active or Pending Issues Requiring Follow-up:    Advanced Directives:   [x] Full code  [ ] DNR  [ ] Hospice    Discharge Diagnoses:  HOLA  sepsis         HPI:  63yo m w pmh obesity, htn, hld, dm c/b peripheral neuropathy + retinopathy, pad s/p l bka, cva, chb s/p ppm, p/w generalized fatigue/weakness; symptoms have been ongoing for the last few days; denies any associated fever, chills, rigors, chest pain, sob/patel, cough, lightheadedness/dizziness, loc, n+v, diaphoresis, abdominal discomfort, dysuria; patient did note his blood sugar levels to be in the 300s, which was unusual to him as they are generally better controlled. patient grew concerned so presents to [facility] er for further evaluation. in er, found to be meeting severe sepsis criteria, and found to have multiple metabolic disturbances including hyperglycemia, hola w hagma; suspect 2/2 uti; admit to medicine for further mgmt (12 Mar 2025 18:46)    Hospital Course:  Patient was seen and was found to be in severe sepsis febrile, leukocytosis, tachycardic, + lactic acidosis. Urine culture 3/12 >100K proteus mirabilis. He was also found to be proteus bacteremia. CXR- no consolidations. RUQ US Cholelithiasis with gallbladder wall thickening. Question trace pericholecystic fluid. HIDA- with no cholecystitis. CT A/P with 0.6 cm calculus within the proximal left ureter with mild left hydronephrosis. He underwent a cystoscopy and left  ureter stent placement 3/17. Initially, he was on meropenem which was later de-escalated to CTX. Patient was got a crain.   Patient was found to have an HOLA thought to be multifactorial in the setting of pre-renal vs. ATN vs. post renal iso obstruction 2/2 septic stone, with his baseline fluctuating between 0.9-1.1 in late 2024, his peak Creatinine 5.1 with improvement post urologic procedure. His Enalopril was held.   His Blood sugar was also found to be more darwin elevated likely in the setting of uncontrolled as his A1C was > 9 and in the setting of sepsis. Patient's Lantus was adjusted.                Important Medication Changes and Reason:  - Abx  - DM  - Holding Enalopril  Active or Pending Issues Requiring Follow-up:  - f/u Urology at scheduled appointment  - f/u Nephrology at scheduled appointment  - f/u PCP regarding medication management and Enalapril continuation Diabetes management    Advanced Directives:   [x] Full code  [ ] DNR  [ ] Hospice    Discharge Diagnoses:  HOLA  ureter stone  sepsis         HPI:  65yo m w pmh obesity, htn, hld, dm c/b peripheral neuropathy + retinopathy, pad s/p l bka, cva, chb s/p ppm, p/w generalized fatigue/weakness; symptoms have been ongoing for the last few days; denies any associated fever, chills, rigors, chest pain, sob/patel, cough, lightheadedness/dizziness, loc, n+v, diaphoresis, abdominal discomfort, dysuria; patient did note his blood sugar levels to be in the 300s, which was unusual to him as they are generally better controlled. patient grew concerned so presents to [facility] er for further evaluation. in er, found to be meeting severe sepsis criteria, and found to have multiple metabolic disturbances including hyperglycemia, hola w hagma; suspect 2/2 uti; admit to medicine for further mgmt (12 Mar 2025 18:46)    Hospital Course:  Patient was seen and was found to be in severe sepsis febrile, leukocytosis, tachycardic, + lactic acidosis. Urine culture 3/12 >100K proteus mirabilis. He was also found to be proteus bacteremia. CXR- no consolidations. RUQ US Cholelithiasis with gallbladder wall thickening. Question trace pericholecystic fluid. HIDA- with no cholecystitis. CT A/P with 0.6 cm calculus within the proximal left ureter with mild left hydronephrosis. He underwent a cystoscopy and left  ureter stent placement 3/17. Initially, he was on meropenem which was later de-escalated to CTX. Patient was got a crain.   Patient was found to have an HOLA thought to be multifactorial in the setting of pre-renal vs. ATN vs. post renal iso obstruction 2/2 septic stone, with his baseline fluctuating between 0.9-1.1 in late 2024, his peak Creatinine 5.1 with improvement post urologic procedure. His Enalopril was held.   His Blood sugar was also found to be more darwin elevated likely in the setting of uncontrolled as his A1C was > 9 and in the setting of sepsis. Patient's Lantus was adjusted.  WBC continued to improve and abx were completed. When pt was ready to be discharged, he become markedly confused for 1-2 days, for which CT head was performed showing evidence of old CVA but nothing new. Mental status subsequently improved. Per family discussions and chart review, pt has experienced episodes of confusion before in the hospital which self resolved.     On day of discharge, pt is HD stable and at baseline mental status, ready for DC back to [facility].                Important Medication Changes and Reason:  - Abx  - DM  - Holding Enalopril  Active or Pending Issues Requiring Follow-up:  - f/u Urology at scheduled appointment  - f/u Nephrology at scheduled appointment  - f/u PCP regarding medication management and Enalapril continuation Diabetes management    Advanced Directives:   [x] Full code  [ ] DNR  [ ] Hospice    Discharge Diagnoses:  HOLA  ureter stone  sepsis         HPI:  65yo m w pmh obesity, htn, hld, dm c/b peripheral neuropathy + retinopathy, pad s/p l bka, cva, chb s/p ppm, p/w generalized fatigue/weakness; symptoms have been ongoing for the last few days; denies any associated fever, chills, rigors, chest pain, sob/patel, cough, lightheadedness/dizziness, loc, n+v, diaphoresis, abdominal discomfort, dysuria; patient did note his blood sugar levels to be in the 300s, which was unusual to him as they are generally better controlled. patient grew concerned so presents to [facility] er for further evaluation. in er, found to be meeting severe sepsis criteria, and found to have multiple metabolic disturbances including hyperglycemia, hola w hagma; suspect 2/2 uti; admit to medicine for further mgmt (12 Mar 2025 18:46)    Hospital Course:  Patient was seen and was found to be in severe sepsis febrile, leukocytosis, tachycardic, + lactic acidosis. Urine culture 3/12 >100K proteus mirabilis. He was also found to be proteus bacteremia. CXR- no consolidations. RUQ US Cholelithiasis with gallbladder wall thickening. Question trace pericholecystic fluid. HIDA- with no cholecystitis. CT A/P with 0.6 cm calculus within the proximal left ureter with mild left hydronephrosis. He underwent a cystoscopy and left  ureter stent placement 3/17. Initially, he was on meropenem which was later de-escalated to CTX. Patient was got a crain.   Patient was found to have an HOLA thought to be multifactorial in the setting of pre-renal vs. ATN vs. post renal iso obstruction 2/2 septic stone, with his baseline fluctuating between 0.9-1.1 in late 2024, his peak Creatinine 5.1 with improvement post urologic procedure. His Enalopril was held.   His Blood sugar was also found to be more darwin elevated likely in the setting of uncontrolled as his A1C was > 9 and in the setting of sepsis. Patient's Lantus was adjusted.  WBC continued to improve and abx were completed. When pt was ready to be discharged, he become markedly confused for 1-2 days, for which CT head was performed showing evidence of old CVA but nothing new. Mental status subsequently improved. Per family discussions and chart review, pt has experienced episodes of confusion before in the hospital which self resolved.     On day of discharge, pt is HD stable and at baseline mental status, ready for DC back to [facility].    Important Medication Changes and Reason:  - Abx  - DM    Active or Pending Issues Requiring Follow-up:  - f/u Urology at scheduled appointment  - f/u Nephrology at scheduled appointment  - f/u PCP regarding medication management and Enalapril continuation Diabetes management    Advanced Directives:   [x] Full code  [ ] DNR  [ ] Hospice    Discharge Diagnoses:  HOLA  ureter stone  sepsis

## 2025-03-18 NOTE — DISCHARGE NOTE PROVIDER - NSDCADMDATE_GEN_ALL_CORE_FT
Detail Level: Simple 12-Mar-2025 18:10 Consent: Written consent was obtained and risks were reviewed including but not limited to scarring, infection, bleeding, scabbing, incomplete removal, nerve damage and allergy to anesthesia. Hide Topical Anesthesia?: No X Size Of Lesion In Cm: 0.9 Size Of Lesion In Cm: 0.8 Depth Of Biopsy: dermis Electrodesiccation Text: The wound bed was treated with electrodesiccation after the biopsy was performed. Was A Bandage Applied: Yes Dressing: Band-Aid Wound Care: Vaseline Hemostasis: Aluminum Chloride Billing Type: Third-Party Bill Anesthesia Type: 1% lidocaine with epinephrine and a 1:10 solution of 8.4% sodium bicarbonate Electrodesiccation And Curettage Text: The wound bed was treated with electrodesiccation and curettage after the biopsy was performed. Biopsy Method: curette Silver Nitrate Text: The wound bed was treated with silver nitrate after the biopsy was performed. Notification Instructions: Patient will be notified of biopsy results. However, patient instructed to call the office if not contacted within 2 weeks. Post-Care Instructions: I reviewed with the patient in detail post-care instructions. Patient is to keep the biopsy site dry overnight, and then apply bacitracin twice daily until healed. Patient may apply hydrogen peroxide soaks to remove any crusting. Type Of Destruction Used: Curettage Anesthesia Volume In Cc: 0.5 Curettage Text: The wound bed was treated with curettage after the biopsy was performed. Cryotherapy Text: The wound bed was treated with cryotherapy after the biopsy was performed. Biopsy Type: H and E

## 2025-03-18 NOTE — PROGRESS NOTE ADULT - PROBLEM SELECTOR PLAN 2
sodium 132 today, corrected for glucose is 135-137  improving with resolution of HOLA  Likely due to impaired free water clearance 2/2 his decreased renal function and pain due to constipation.   avoid hypotonic fluids for further volume resuscitation

## 2025-03-18 NOTE — PROGRESS NOTE ADULT - PROBLEM SELECTOR PLAN 6
Bowel distension on physical exam. patient with history of appendectomy.    Plan:   Patient had a BM 3/16  Bowel regimen  f/u abdominal xray- Mild nonspecific gaseous distention of stomach, with paucity of bowel gas   distally. This may be secondary to gastric outlet obstruction. Likely iso decreased free water clearance. Can be in setting of SIADH 2/2 sepsis   SNa -125  Improvement today-137    Plan:   avoid hypotonic fluids   f/u urine lytes + serum osmolality  -f/u nephrology recommendations

## 2025-03-18 NOTE — PROGRESS NOTE ADULT - ASSESSMENT
64M with medical of PAD s/p L BKA, DM with peripheral neuropathy and retinopathy HTN, CVA, complete heart block s/p PPM presented on 3/12 with generalized fatigue. In the ED patient noted to be febrile and tachycardic. Labs notable for leukocytosis, hyponatremia, hyperglycemia, HOLA and metabolic acidosis. UA with many bacteria, negative nitrite and small leukocyte esterase. Urine culture 3/12 >100K proteus mirabilis. Blood culture negative to date. ID asked to help manage.     Work up:  -Blood culture 3/12 (-)  -Urine Culture 3/12 - >100k proteus mirabilis  -Rapid RVP negative  -Renal US:  mild hydronephrosis of the left kidney  -X-ray abdomen: Mild nonspecific gaseous distention of stomach, with paucity of bowel gas distally. This may be secondary to gastric outlet obstruction.  -CT C/A/P without cont: 0.6 cm calculus within the proximal left ureter with mild left hydronephrosis.  -VQ scan: Indeterminate VQ scan for pulmonary embolus.  -LE US doppler: no DVT  -US RUQ:  Cholelithiasis with gallbladder wall thickening. Question trace pericholecystic fluid. Unreliable sonogram Jansen's sign. If there is clinical suspicion for acute cholecystitis, hepatobiliary scan may be obtained for further evaluation.  -Blood Cx (3/15): Proteus (1/4 bottles)  -HIDA scan:  Normal hepatobiliary scan. No radionuclide evidence of acute cholecystitis.  -Repeat blood Cx (3/16): NGTD     #Proteus bacteremia source urinary/pyelonephritis in the setting of L ureter stone with hydronephrosis s/p cystoscopy and left stent placement on 3/17  #Sepsis, fever, leukocytosis   #Hypoxemia ?PNA r/o PE  #HOLA  #Transaminitis    Recommendations:   -s/p cystoscopy and left stent placement on 3/17  -Proteus sensitivities reviewed, HIDA scan neg for cholecystitis - Discontinue Meropenem and start Ceftriaxone 2 g IV daily   -F/up final repeat blood Cx   -Trend LFTs   -Monitor T curve and WBC trend     Discussed with primary team     Claudia Mack MD  ID physician  [employer] Preferred  After 5pm/weekends call [phone]

## 2025-03-18 NOTE — PROGRESS NOTE ADULT - PROBLEM SELECTOR PLAN 7
on presentation, BG ~400s,   UA: w no glucosuria and no ketonuria, BHB wnl, with HAGMA  s/p 2L LR  likely stress hyperglycemia iso sepsis    Plan:   -Lantus increased from 25 to 30U this morning + low dose correctional lispro TID  -hold home regimen  -fu a1c   -trend fingerstick glu  - goal inpatient BG- 180  -cont home neurontin  - carb consistent diet Uptrending transaminases, likely iso shock liver.   RUQ US Cholelithiasis with gallbladder wall thickening. Question trace pericholecystic fluid.       -CTM  -f/u HIDA scan- no cholecytsitis

## 2025-03-18 NOTE — PROGRESS NOTE ADULT - ATTENDING COMMENTS
64M with severe obesity, hypertension, T2DM, PVD s/p L BKA, heart block s/p PPM admitted with severe sepsis likely due to infected kidney stone.    #Severe sepsis  On basis of fever, HR, leukocytosis, HOLA, found with Proteus bacteremia, suspected from ureteral stone infection  - appreciate ID input, continuing meropenem. s/p stent placement with urology on 3/17 to f/u outpt for definitive stone management  - f/u repeat blood cultures, blood culture with pan sensitive Proteus  - f/u HIDA scan    #Acute hypoxemic respiratory failure  Requiring 2L NC on 3/15, suspecting sepsis and atelectasis. No edema or infection on CT chest. Unable to obtain CTA to evaluate for PE due to renal failure. V/Q scan was technically limited.  - wean NC as able    #HOLA  Stable  Suspect ATN from sepsis, improving  - appreciate nephrology's evaluation  - appreciate urology for Contreras placement, remains with adequate urine output  - dose abx for renal function    #Hyponatremia  Thought due to impaired free water excretion, now stabilizing    #Type 2 diabetes, poorly controlled with hyperglycemia  A1c 9.2, on insulin and multiple oral agents at home  - will continue to increase Lantus and Admelog as needed to achieve euglycemia, has been requiring escalating doses 64M with severe obesity, hypertension, T2DM, PVD s/p L BKA, heart block s/p PPM admitted with severe sepsis likely due to infected kidney stone.    #Severe sepsis  On basis of fever, HR, leukocytosis, HOLA, found with Proteus bacteremia, suspected from ureteral stone infection. HIDA scan negative  - appreciate ID input, de-escalating meropenem today. s/p stent placement with urology on 3/17 to f/u outpt for definitive stone management  - f/u repeat blood cultures, blood culture with pan sensitive Proteus    #Acute hypoxemic respiratory failure  Requiring 2L NC on 3/15, suspecting sepsis and atelectasis. No edema or infection on CT chest. Unable to obtain CTA to evaluate for PE due to renal failure. V/Q scan was technically limited.  - wean NC as able    #HOLA  Stable  Suspect ATN from sepsis, improving  - appreciate nephrology's evaluation  - appreciate urology for Contreras placement, remains with adequate urine output  - dose abx for renal function    #Hyponatremia  Thought due to impaired free water excretion, now stabilizing    #Type 2 diabetes, poorly controlled with hyperglycemia  A1c 9.2, on insulin and multiple oral agents at home  - will continue to increase Lantus and Admelog as needed to achieve euglycemia, has been requiring escalating doses

## 2025-03-18 NOTE — PROGRESS NOTE ADULT - SUBJECTIVE AND OBJECTIVE BOX
[facility] DIVISION OF KIDNEY DISEASE AND HYPERTENSION  [phone]    RENAL FOLLOW UP NOTE- NEPHROHOSPITALIST  --------------------------------------------------------------------------------  Patient seen and examined this morning.  patient s/p emergent cystoscopy with L ureteral stent placement yesterday.  Being held NPO today for possible NM hepatobiliary scan    PAST HISTORY  --------------------------------------------------------------------------------  No significant changes to PMH, PSH, FHx, SHx, unless otherwise noted    ALLERGIES & MEDICATIONS  --------------------------------------------------------------------------------  Allergies    No Known Allergies    Intolerances      Standing Inpatient Medications  aspirin  chewable 81 milliGRAM(s) Oral daily  atorvastatin 20 milliGRAM(s) Oral at bedtime  dextrose 5%. 1000 milliLiter(s) IV Continuous <Continuous>  dextrose 5%. 1000 milliLiter(s) IV Continuous <Continuous>  dextrose 50% Injectable 25 Gram(s) IV Push once  dextrose 50% Injectable 12.5 Gram(s) IV Push once  dextrose 50% Injectable 25 Gram(s) IV Push once  glucagon  Injectable 1 milliGRAM(s) IntraMuscular once  heparin   Injectable 5000 Unit(s) SubCutaneous every 8 hours  insulin glargine Injectable (LANTUS) 48 Unit(s) SubCutaneous at bedtime  insulin lispro (ADMELOG) corrective regimen sliding scale   SubCutaneous three times a day before meals  insulin lispro (ADMELOG) corrective regimen sliding scale   SubCutaneous at bedtime  insulin lispro Injectable (ADMELOG) 15 Unit(s) SubCutaneous three times a day before meals  meropenem  IVPB 500 milliGRAM(s) IV Intermittent every 12 hours  meropenem  IVPB      polyethylene glycol 3350 17 Gram(s) Oral <User Schedule>  senna 2 Tablet(s) Oral daily  tamsulosin 0.8 milliGRAM(s) Oral at bedtime    PRN Inpatient Medications  acetaminophen     Tablet .. 650 milliGRAM(s) Oral every 6 hours PRN  aluminum hydroxide/magnesium hydroxide/simethicone Suspension 30 milliLiter(s) Oral every 4 hours PRN  dextrose Oral Gel 15 Gram(s) Oral once PRN  melatonin 3 milliGRAM(s) Oral at bedtime PRN  ondansetron Injectable 4 milliGRAM(s) IV Push every 8 hours PRN      FOCUSED REVIEW OF SYSTEMS  --------------------------------------------------------------------------------  +fevers/rigors  denies CP/palpitations  denies SOB  denies N/V +abd "bloating"      VITALS/PHYSICAL EXAM  --------------------------------------------------------------------------------  T(C): 37 (03-18-25 @ 08:43), Max: 38.6 (03-17-25 @ 13:20)  HR: 85 (03-18-25 @ 09:40) (77 - 102)  BP: 160/79 (03-18-25 @ 09:40) (124/64 - 168/86)  RR: 17 (03-18-25 @ 08:43) (16 - 18)  SpO2: 95% (03-18-25 @ 09:40) (93% - 100%)  Wt(kg): --  Height (cm): 175.3 (03-17-25 @ 16:02)  Weight (kg): 100 (03-17-25 @ 16:02)  BMI (kg/m2): 32.5 (03-17-25 @ 16:02)  BSA (m2): 2.15 (03-17-25 @ 16:02)      03-17-25 @ 07:01  -  03-18-25 @ 07:00  --------------------------------------------------------  IN: 1040 mL / OUT: 3650 mL / NET: -2610 mL    03-18-25 @ 07:01  -  03-18-25 @ 11:25  --------------------------------------------------------  IN: 0 mL / OUT: 1600 mL / NET: -1600 mL      Physical Exam:  	Gen: Lying in bed, no acute physical distress  	Pulm: CTA B/L ant/lat fields  	CV: RRR, S1S2  	Abd: +BS, soft, nontender/nondistended  	: No suprapubic tenderness.  + crain draining clear urine          Extremity: No LE edema  	Neuro: A&O x 3      LABS/STUDIES  --------------------------------------------------------------------------------              10.9   19.63 >-----------<  261      [03-18-25 @ 06:48]              34.1     132  |  96  |  72  ----------------------------<  312      [03-18-25 @ 06:48]  6.4   |  22  |  2.62        Ca     8.4     [03-18-25 @ 06:48]      Mg     2.6     [03-18-25 @ 06:48]      Phos  3.5     [03-18-25 @ 06:48]    TPro  7.2  /  Alb  2.4  /  TBili  0.4  /  DBili  x   /  AST  141  /  ALT  190  /  AlkPhos  551  [03-18-25 @ 06:48]        Serum Osmolality 307      [03-16-25 @ 19:08]      Creatinine Trend:  SCr 2.62 [03-18 @ 06:48]  SCr 4.02 [03-17 @ 06:35]  SCr 4.52 [03-16 @ 19:09]  SCr 5.12 [03-16 @ 09:33]  SCr 4.84 [03-15 @ 07:28]              Urinalysis - [03-18-25 @ 06:48]      Color  / Appearance  / SG  / pH       Gluc 312 / Ketone   / Bili  / Urobili        Blood  / Protein  / Leuk Est  / Nitrite       RBC  / WBC  / Hyaline  / Gran  / Sq Epi  / Non Sq Epi  / Bacteria     Urine Creatinine 178      [03-14-25 @ 21:03]  Urine Protein 92      [03-14-25 @ 21:03]  Urine Sodium 18      [03-14-25 @ 21:03]  Urine Urea Nitrogen 325      [03-14-25 @ 21:02]  Urine Potassium 63      [03-14-25 @ 21:03]  Urine Osmolality 324      [03-14-25 @ 21:03]    Vitamin D (25OH) 33.9      [06-13-24 @ 01:51]  TSH 2.54      [06-11-24 @ 03:28]  Lipid: chol 159, , HDL 39, LDL --      [03-13-25 @ 06:46]       [facility] DIVISION OF KIDNEY DISEASE AND HYPERTENSION  [phone]    RENAL FOLLOW UP NOTE- NEPHROHOSPITALIST  --------------------------------------------------------------------------------  Patient seen and examined this morning.  patient s/p emergent cystoscopy with L ureteral stent placement yesterday.  Being held NPO today for possible NM hepatobiliary scan    PAST HISTORY  --------------------------------------------------------------------------------  No significant changes to PMH, PSH, FHx, SHx, unless otherwise noted    ALLERGIES & MEDICATIONS  --------------------------------------------------------------------------------  Allergies    No Known Allergies    Intolerances      Standing Inpatient Medications  aspirin  chewable 81 milliGRAM(s) Oral daily  atorvastatin 20 milliGRAM(s) Oral at bedtime  dextrose 5%. 1000 milliLiter(s) IV Continuous <Continuous>  dextrose 5%. 1000 milliLiter(s) IV Continuous <Continuous>  dextrose 50% Injectable 25 Gram(s) IV Push once  dextrose 50% Injectable 12.5 Gram(s) IV Push once  dextrose 50% Injectable 25 Gram(s) IV Push once  glucagon  Injectable 1 milliGRAM(s) IntraMuscular once  heparin   Injectable 5000 Unit(s) SubCutaneous every 8 hours  insulin glargine Injectable (LANTUS) 48 Unit(s) SubCutaneous at bedtime  insulin lispro (ADMELOG) corrective regimen sliding scale   SubCutaneous three times a day before meals  insulin lispro (ADMELOG) corrective regimen sliding scale   SubCutaneous at bedtime  insulin lispro Injectable (ADMELOG) 15 Unit(s) SubCutaneous three times a day before meals  meropenem  IVPB 500 milliGRAM(s) IV Intermittent every 12 hours  meropenem  IVPB      polyethylene glycol 3350 17 Gram(s) Oral <User Schedule>  senna 2 Tablet(s) Oral daily  tamsulosin 0.8 milliGRAM(s) Oral at bedtime    PRN Inpatient Medications  acetaminophen     Tablet .. 650 milliGRAM(s) Oral every 6 hours PRN  aluminum hydroxide/magnesium hydroxide/simethicone Suspension 30 milliLiter(s) Oral every 4 hours PRN  dextrose Oral Gel 15 Gram(s) Oral once PRN  melatonin 3 milliGRAM(s) Oral at bedtime PRN  ondansetron Injectable 4 milliGRAM(s) IV Push every 8 hours PRN      FOCUSED REVIEW OF SYSTEMS  --------------------------------------------------------------------------------  +fevers/rigors  denies CP/palpitations  denies SOB  denies N/V +abd "bloating"      VITALS/PHYSICAL EXAM  --------------------------------------------------------------------------------  T(C): 37 (03-18-25 @ 08:43), Max: 38.6 (03-17-25 @ 13:20)  HR: 85 (03-18-25 @ 09:40) (77 - 102)  BP: 160/79 (03-18-25 @ 09:40) (124/64 - 168/86)  RR: 17 (03-18-25 @ 08:43) (16 - 18)  SpO2: 95% (03-18-25 @ 09:40) (93% - 100%)  Wt(kg): --  Height (cm): 175.3 (03-17-25 @ 16:02)  Weight (kg): 100 (03-17-25 @ 16:02)  BMI (kg/m2): 32.5 (03-17-25 @ 16:02)  BSA (m2): 2.15 (03-17-25 @ 16:02)      03-17-25 @ 07:01  -  03-18-25 @ 07:00  --------------------------------------------------------  IN: 1040 mL / OUT: 3650 mL / NET: -2610 mL    03-18-25 @ 07:01  -  03-18-25 @ 11:25  --------------------------------------------------------  IN: 0 mL / OUT: 1600 mL / NET: -1600 mL      Physical Exam:  	Gen: Lying in bed, no acute physical distress  	Pulm: CTA B/L ant/lat fields  	CV: RRR, S1S2  	Abd: +BS, soft, nontender/nondistended  	: No suprapubic tenderness.  + crain draining clear urine          Extremity: No LE edema; LBKA  	Neuro: A&O x 3      LABS/STUDIES  --------------------------------------------------------------------------------              10.9   19.63 >-----------<  261      [03-18-25 @ 06:48]              34.1     132  |  96  |  72  ----------------------------<  312      [03-18-25 @ 06:48]  6.4   |  22  |  2.62        Ca     8.4     [03-18-25 @ 06:48]      Mg     2.6     [03-18-25 @ 06:48]      Phos  3.5     [03-18-25 @ 06:48]    TPro  7.2  /  Alb  2.4  /  TBili  0.4  /  DBili  x   /  AST  141  /  ALT  190  /  AlkPhos  551  [03-18-25 @ 06:48]        Serum Osmolality 307      [03-16-25 @ 19:08]      Creatinine Trend:  SCr 2.62 [03-18 @ 06:48]  SCr 4.02 [03-17 @ 06:35]  SCr 4.52 [03-16 @ 19:09]  SCr 5.12 [03-16 @ 09:33]  SCr 4.84 [03-15 @ 07:28]              Urinalysis - [03-18-25 @ 06:48]      Color  / Appearance  / SG  / pH       Gluc 312 / Ketone   / Bili  / Urobili        Blood  / Protein  / Leuk Est  / Nitrite       RBC  / WBC  / Hyaline  / Gran  / Sq Epi  / Non Sq Epi  / Bacteria     Urine Creatinine 178      [03-14-25 @ 21:03]  Urine Protein 92      [03-14-25 @ 21:03]  Urine Sodium 18      [03-14-25 @ 21:03]  Urine Urea Nitrogen 325      [03-14-25 @ 21:02]  Urine Potassium 63      [03-14-25 @ 21:03]  Urine Osmolality 324      [03-14-25 @ 21:03]    Vitamin D (25OH) 33.9      [06-13-24 @ 01:51]  TSH 2.54      [06-11-24 @ 03:28]  Lipid: chol 159, , HDL 39, LDL --      [03-13-25 @ 06:46]

## 2025-03-18 NOTE — PROGRESS NOTE ADULT - PROBLEM SELECTOR PLAN 4
Likely iso decreased free water clearance. Can be in setting of SIADH 2/2 sepsis   SNa -125  Improvement today-129    Plan:   consider hypertonic fluids  f/u urine lytes + serum osmolality  -f/u nephrology recommendations febrile, leukocytosis, tachycardic, + lactic acidosis; meets severe sepsis criteria  suspect 2/2 uti --> Urine culture 3/12 >100K proteus mirabilis.   CXR- no consolidations  CT A/P with 0.6 cm calculus within the proximal left ureter with mild left hydronephrosis  RUQ US Cholelithiasis with gallbladder wall thickening. Question trace pericholecystic fluid.   MRSA PCR- negative  new proteus bacteremia + hydro on CT  HIDA- with no cholecystitis    Plan:   - cw CTX 2g  - sp Meropenem 500mg BID (3/15-3/16)  - follow up blood cultures 3/18  -repeat Bc 3/20  - f/u sputum cx  - trend lactate q4-6h until wnl  - Monitor for fever, changes in white count

## 2025-03-18 NOTE — PROGRESS NOTE ADULT - PROBLEM SELECTOR PLAN 11
cw atorvastatin 20mg stop hydralazine and amlodipine in the setting of sepsis  hold home Enalopril iso HOLA

## 2025-03-18 NOTE — CONSULT NOTE ADULT - SUBJECTIVE AND OBJECTIVE BOX
Patient is a 64y old  Male who presents with a chief complaint of generalized fatigue/weakness (18 Mar 2025 07:28)    Admission HPI:  65yo m w pmh obesity, htn, hld, dm c/b peripheral neuropathy + retinopathy, pad s/p l bka, cva, chb s/p ppm, p/w generalized fatigue/weakness; symptoms have been ongoing for the last few days; denies any associated fever, chills, rigors, chest pain, sob/patel, cough, lightheadedness/dizziness, loc, n+v, diaphoresis, abdominal discomfort, dysuria; patient did note his blood sugar levels to be in the 300s, which was unusual to him as they are generally better controlled. patient grew concerned so presents to [facility] er for further evaluation. in er, found to be meeting severe sepsis criteria, and found to have multiple metabolic disturbances including hyperglycemia, hola w hagma; suspect 2/2 uti; admit to medicine for further mgmt (12 Mar 2025 18:46)    Interval History:  Patient admitted with severe sepsis due to UTI/L uteral stone. S/P L uteral stent on 3/17.  Course c/b HOLA, hyponatremia. Patient with functional deficits.     REVIEW OF SYSTEMS: No chest pain, shortness of breath, nausea, vomiting or diarhea; other ROS neg     PAST MEDICAL & SURGICAL HISTORY  Non-Insulin Dependent Diabetes Mellitus    DM (Diabetes Mellitus)    HTN (Hypertension)    Dyslipidemia    Retinopathy    Diabetes mellitus type 2 in obese    Hyperlipidemia    Toe infection    Ruptured appendix    S/P appendectomy    Toe amputation status    FUNCTIONAL HISTORY:   Lives w spouse in home w 3 HERB.  PTA Amb w RW and use of LLE prosthesis    CURRENT FUNCTIONAL STATUS:  Mod A transfers    FAMILY HISTORY   Family history of diabetes mellitus in grandmother (Grandparent)    MEDICATIONS   acetaminophen     Tablet .. 650 milliGRAM(s) Oral every 6 hours PRN  aluminum hydroxide/magnesium hydroxide/simethicone Suspension 30 milliLiter(s) Oral every 4 hours PRN  aspirin  chewable 81 milliGRAM(s) Oral daily  atorvastatin 20 milliGRAM(s) Oral at bedtime  dextrose 5%. 1000 milliLiter(s) IV Continuous <Continuous>  dextrose 5%. 1000 milliLiter(s) IV Continuous <Continuous>  dextrose 50% Injectable 25 Gram(s) IV Push once  dextrose 50% Injectable 12.5 Gram(s) IV Push once  dextrose 50% Injectable 25 Gram(s) IV Push once  dextrose Oral Gel 15 Gram(s) Oral once PRN  glucagon  Injectable 1 milliGRAM(s) IntraMuscular once  heparin   Injectable 5000 Unit(s) SubCutaneous every 8 hours  insulin glargine Injectable (LANTUS) 48 Unit(s) SubCutaneous at bedtime  insulin lispro (ADMELOG) corrective regimen sliding scale   SubCutaneous three times a day before meals  insulin lispro (ADMELOG) corrective regimen sliding scale   SubCutaneous at bedtime  insulin lispro Injectable (ADMELOG) 15 Unit(s) SubCutaneous three times a day before meals  melatonin 3 milliGRAM(s) Oral at bedtime PRN  meropenem  IVPB 500 milliGRAM(s) IV Intermittent every 12 hours  meropenem  IVPB      ondansetron Injectable 4 milliGRAM(s) IV Push every 8 hours PRN  polyethylene glycol 3350 17 Gram(s) Oral <User Schedule>  senna 2 Tablet(s) Oral daily  tamsulosin 0.8 milliGRAM(s) Oral at bedtime    ALLERGIES  No Known Allergies    VITALS  T(C): 37 (03-18-25 @ 08:43), Max: 38.6 (03-17-25 @ 13:20)  HR: 86 (03-18-25 @ 08:43) (77 - 102)  BP: 164/80 (03-18-25 @ 08:43) (124/64 - 168/86)  RR: 17 (03-18-25 @ 08:43) (16 - 18)  SpO2: 95% (03-18-25 @ 08:43) (93% - 100%)  Wt(kg): --    PHYSICAL EXAM  Constitutional - NAD, Comfortable  HEENT - NCAT, EOMI  Neck - Supple  Chest - No distress, no use of accessory muscles for respiration  Cardiovascular -Well perfused  Abdomen - BS+, Soft, NTND  Extremities - No C/C/E, No calf tenderness   Neurologic Exam -                    Cognitive - Awake, Alert, AAO to self, place, date, year, situation     Communication - Fluent, No dysarthria, no aphasia     Cranial Nerves - CN 2-12 intact     Motor - No focal deficits      Sensory - Intact to LT     Reflexes - DTR Intact, No primitive reflexive  Psychiatric - Mood stable, Affect WNL    RECENT LABS/IMAGING  CBC Full  -  ( 18 Mar 2025 06:48 )  WBC Count : 19.63 K/uL  RBC Count : 4.09 M/uL  Hemoglobin : 10.9 g/dL  Hematocrit : 34.1 %  Platelet Count - Automated : 261 K/uL  Mean Cell Volume : 83.4 fl  Mean Cell Hemoglobin : 26.7 pg  Mean Cell Hemoglobin Concentration : 32.0 g/dL  Auto Neutrophil # : x  Auto Lymphocyte # : x  Auto Monocyte # : x  Auto Eosinophil # : x  Auto Basophil # : x  Auto Neutrophil % : x  Auto Lymphocyte % : x  Auto Monocyte % : x  Auto Eosinophil % : x  Auto Basophil % : x    03-18    132[L]  |  96  |  72[H]  ----------------------------<  312[H]  6.4[HH]   |  22  |  2.62[H]    Ca    8.4      18 Mar 2025 06:48  Phos  3.5     03-18  Mg     2.6     03-18    TPro  7.2  /  Alb  2.4[L]  /  TBili  0.4  /  DBili  x   /  AST  141[H]  /  ALT  190[H]  /  AlkPhos  551[H]  03-18    Urinalysis Basic - ( 18 Mar 2025 06:48 )    Color: x / Appearance: x / SG: x / pH: x  Gluc: 312 mg/dL / Ketone: x  / Bili: x / Urobili: x   Blood: x / Protein: x / Nitrite: x   Leuk Esterase: x / RBC: x / WBC x   Sq Epi: x / Non Sq Epi: x / Bacteria: x    Impression:  63 yo with functional deficits secondary to diagnosis of debility    Plan:  PT- ROM, Bed Mob, Transfers, Amb w AD and bracing as needed  OT- ADLs, bracing  SLP- Dysphagia eval and treat  Prec- Falls, Cardiac  DVT Prophylaxis - On Heparin  Monitor wbc ct, Na+, K+, renal function  Skin- Turn q2 h  Dispo-     Total time taken to review relevant records and imaging results, examine patient, write note, and, when applicable, discuss case with patient, family, , resident, medical student and other medical providers:     [  ] 40 minutes (84348)  [  ] 55 minutes ([id number])  [  ] 75 minutes (00622)    [  ] 25 minutes (43328)  [  ] 35 minutes (64386)  [  ] 50 minutes (89563)           Patient is a 64y old  Male who presents with a chief complaint of generalized fatigue/weakness (18 Mar 2025 07:28)    Admission HPI:  63yo m w pmh obesity, htn, hld, dm c/b peripheral neuropathy + retinopathy, pad s/p l bka, cva, chb s/p ppm, p/w generalized fatigue/weakness; symptoms have been ongoing for the last few days; denies any associated fever, chills, rigors, chest pain, sob/patel, cough, lightheadedness/dizziness, loc, n+v, diaphoresis, abdominal discomfort, dysuria; patient did note his blood sugar levels to be in the 300s, which was unusual to him as they are generally better controlled. patient grew concerned so presents to [facility] er for further evaluation. in er, found to be meeting severe sepsis criteria, and found to have multiple metabolic disturbances including hyperglycemia, hola w hagma; suspect 2/2 uti; admit to medicine for further mgmt (12 Mar 2025 18:46)    Interval History:  Patient admitted with severe sepsis due to UTI/L uteral stone. S/P L uteral stent on 3/17.  Course c/b HOLA, hyponatremia. Patient with functional deficits.     REVIEW OF SYSTEMS: + weakness, + difficulty walking, No chest pain, shortness of breath, nausea, vomiting or diarhea; other ROS neg     PAST MEDICAL & SURGICAL HISTORY  Non-Insulin Dependent Diabetes Mellitus    DM (Diabetes Mellitus)    HTN (Hypertension)    Dyslipidemia    Retinopathy    Diabetes mellitus type 2 in obese    Hyperlipidemia    Toe infection    Ruptured appendix    S/P appendectomy    Toe amputation status    FUNCTIONAL HISTORY:   Lives w spouse in home w 3 HERB.  PTA Amb w RW and use of LLE prosthesis    CURRENT FUNCTIONAL STATUS:  Mod A transfers    FAMILY HISTORY   Family history of diabetes mellitus in grandmother (Grandparent)    MEDICATIONS   acetaminophen     Tablet .. 650 milliGRAM(s) Oral every 6 hours PRN  aluminum hydroxide/magnesium hydroxide/simethicone Suspension 30 milliLiter(s) Oral every 4 hours PRN  aspirin  chewable 81 milliGRAM(s) Oral daily  atorvastatin 20 milliGRAM(s) Oral at bedtime  dextrose 5%. 1000 milliLiter(s) IV Continuous <Continuous>  dextrose 5%. 1000 milliLiter(s) IV Continuous <Continuous>  dextrose 50% Injectable 25 Gram(s) IV Push once  dextrose 50% Injectable 12.5 Gram(s) IV Push once  dextrose 50% Injectable 25 Gram(s) IV Push once  dextrose Oral Gel 15 Gram(s) Oral once PRN  glucagon  Injectable 1 milliGRAM(s) IntraMuscular once  heparin   Injectable 5000 Unit(s) SubCutaneous every 8 hours  insulin glargine Injectable (LANTUS) 48 Unit(s) SubCutaneous at bedtime  insulin lispro (ADMELOG) corrective regimen sliding scale   SubCutaneous three times a day before meals  insulin lispro (ADMELOG) corrective regimen sliding scale   SubCutaneous at bedtime  insulin lispro Injectable (ADMELOG) 15 Unit(s) SubCutaneous three times a day before meals  melatonin 3 milliGRAM(s) Oral at bedtime PRN  meropenem  IVPB 500 milliGRAM(s) IV Intermittent every 12 hours  meropenem  IVPB      ondansetron Injectable 4 milliGRAM(s) IV Push every 8 hours PRN  polyethylene glycol 3350 17 Gram(s) Oral <User Schedule>  senna 2 Tablet(s) Oral daily  tamsulosin 0.8 milliGRAM(s) Oral at bedtime    ALLERGIES  No Known Allergies    VITALS  T(C): 37 (03-18-25 @ 08:43), Max: 38.6 (03-17-25 @ 13:20)  HR: 86 (03-18-25 @ 08:43) (77 - 102)  BP: 164/80 (03-18-25 @ 08:43) (124/64 - 168/86)  RR: 17 (03-18-25 @ 08:43) (16 - 18)  SpO2: 95% (03-18-25 @ 08:43) (93% - 100%)  Wt(kg): --    PHYSICAL EXAM  Constitutional - NAD, Comfortable  HEENT - NCAT, EOMI  Neck - Supple  Chest - No distress, no use of accessory muscles for respiration  Cardiovascular -Well perfused  Abdomen - BS+, Soft, NTND  Extremities - L residual intact, no edema No R calf tenderness   Neurologic Exam -                 AAO x 3  Motor 4/5 bl UE and LEs  No clonus  Sensation intact     Psychiatric - Mood stable, Affect WNL    RECENT LABS/IMAGING  CBC Full  -  ( 18 Mar 2025 06:48 )  WBC Count : 19.63 K/uL  RBC Count : 4.09 M/uL  Hemoglobin : 10.9 g/dL  Hematocrit : 34.1 %  Platelet Count - Automated : 261 K/uL  Mean Cell Volume : 83.4 fl  Mean Cell Hemoglobin : 26.7 pg  Mean Cell Hemoglobin Concentration : 32.0 g/dL  Auto Neutrophil # : x  Auto Lymphocyte # : x  Auto Monocyte # : x  Auto Eosinophil # : x  Auto Basophil # : x  Auto Neutrophil % : x  Auto Lymphocyte % : x  Auto Monocyte % : x  Auto Eosinophil % : x  Auto Basophil % : x    03-18    132[L]  |  96  |  72[H]  ----------------------------<  312[H]  6.4[HH]   |  22  |  2.62[H]    Ca    8.4      18 Mar 2025 06:48  Phos  3.5     03-18  Mg     2.6     03-18    TPro  7.2  /  Alb  2.4[L]  /  TBili  0.4  /  DBili  x   /  AST  141[H]  /  ALT  190[H]  /  AlkPhos  551[H]  03-18    Urinalysis Basic - ( 18 Mar 2025 06:48 )    Color: x / Appearance: x / SG: x / pH: x  Gluc: 312 mg/dL / Ketone: x  / Bili: x / Urobili: x   Blood: x / Protein: x / Nitrite: x   Leuk Esterase: x / RBC: x / WBC x   Sq Epi: x / Non Sq Epi: x / Bacteria: x    Impression:  63 yo with functional deficits secondary to diagnosis of debility    Plan:  PT- ROM, Bed Mob, Transfers, Amb w AD and bracing as needed  OT- ADLs, bracing  SLP- Dysphagia eval and treat  Prec- Falls, Cardiac  DVT Prophylaxis - On Heparin  Monitor wbc ct, Na+, K+, renal function  Skin- Turn q2 h  Dispo- Acute Rehab- patient requires active and ongoing therapeutic interventions of multiple disciplines and can tolerate and benefit from 3 hours of intensive therapies x 2-4wks depending on progress at rehabilitation facility. Can actively participate and benefit from  an intensive rehabilitation program. Requires supervision by a rehabilitation physician and a coordinated interdisciplinary approach to providing rehabilitation.     Patient had good experience and prefers [facility] - explained differences but he was very happy there and would like to return    Total time taken to review relevant records and imaging results, examine patient, write note, and, when applicable, discuss case with patient, family, , resident, medical student and other medical providers:     [  ] 40 minutes (25108)  [X] 55 minutes (27854)  [  ] 75 minutes (15231)    [  ] 25 minutes (50654)  [  ] 35 minutes (27084)  [  ] 50 minutes (86160)

## 2025-03-18 NOTE — PROGRESS NOTE ADULT - PROBLEM SELECTOR PLAN 1
febrile, leukocytosis, tachycardic, + lactic acidosis; meets severe sepsis criteria  suspect 2/2 uti --> Urine culture 3/12 >100K proteus mirabilis.   CXR- no consolidations  CT A/P with 0.6 cm calculus within the proximal left ureter with mild left hydronephrosis  RUQ US Cholelithiasis with gallbladder wall thickening. Question trace pericholecystic fluid.   MRSA PCR- negative  new proteus bacteremia + hydro on CT  Plan:   - ureteral stent per urology today  -crain placed  -Flomax 0.8  -patient can go home with crain and f/u urology  - Meropenem 500mg BID  - f/u HIDA scan today   - follow up blood cultures 3/18  - f/u  sputum cx  - trend lactate q4-6h until wnl  - Monitor for fever, changes in white count CT A/P with 0.6 cm calculus within the proximal left ureter with mild left hydronephrosis  s/p cystoscopy and left stent placement 3/17    Plan:   cw CTX 2g QD (3/18--)  cw flomax  continue crain, plan for patient to d/c with crain

## 2025-03-18 NOTE — PROGRESS NOTE ADULT - PROBLEM SELECTOR PLAN 3
Unclear baseline cr,   Fluctuated between 0.9-1.1 in late 20246  on Admission 1.9.  Increasing now at 5.1--> now improved 4.  Likely pre-renal with vs. ATN vs. post renal iso obstruction  CT with 0.6cm calcalus in proximal left ureter with mild left hydromephrosis  Contreras placed 3/15- U/O 1.8L today  renal and bladder ultrasound-  mild left hydronephrosis  Now with good Urine output  CT A/P with 0.6 cm calculus within the proximal left ureter with mild left hydronephrosis    Plan:   - urology stent today  500cc NS today  monitor BMP BID  fu urine studies  Monitor UO, BUN/Cr, volume status, acid-base balance, electrolytes  avoid nephrotoxic agents  dose adjustments for renally cleared meds on presentation, BG ~400s,   UA: w no glucosuria and no ketonuria, BHB wnl, with HAGMA  s/p 2L LR  likely stress hyperglycemia iso sepsis    Plan:   -Lantus increased 50 units  cw ISS  consider endocrine consult  -hold home regimen  -fu a1c   -trend fingerstick glu  - goal inpatient BG- 180  -cont home neurontin  - carb consistent diet

## 2025-03-18 NOTE — OCCUPATIONAL THERAPY INITIAL EVALUATION ADULT - BALANCE TRAINING, PT EVAL
GOAL: Pt will improve dynamic sitting balance to good- in order to improve functional mobility in 4 weeks.

## 2025-03-18 NOTE — PROVIDER CONTACT NOTE (CRITICAL VALUE NOTIFICATION) - ACTION/TREATMENT ORDERED:
No orders pending
Labs reordered
MD will repeat cbc. Ok to postpone nuclear medicine until repeat labs confirm numbers.

## 2025-03-18 NOTE — PROGRESS NOTE ADULT - SUBJECTIVE AND OBJECTIVE BOX
INTERVAL HPI/OVERNIGHT EVENTS:    SUBJECTIVE: Patient seen and examined at bedside.     ROS: All negative except as listed above.    VITAL SIGNS:  ICU Vital Signs Last 24 Hrs  T(C): 37.1 (18 Mar 2025 04:05), Max: 38.6 (17 Mar 2025 13:20)  T(F): 98.7 (18 Mar 2025 04:05), Max: 101.4 (17 Mar 2025 13:20)  HR: 91 (18 Mar 2025 04:05) (77 - 102)  BP: 168/86 (18 Mar 2025 04:05) (114/79 - 168/86)  BP(mean): 98 (17 Mar 2025 18:00) (87 - 105)  ABP: --  ABP(mean): --  RR: 18 (18 Mar 2025 04:05) (16 - 18)  SpO2: 99% (18 Mar 2025 04:05) (93% - 100%)    O2 Parameters below as of 18 Mar 2025 04:05  Patient On (Oxygen Delivery Method): nasal cannula  O2 Flow (L/min): 2          Plateau pressure:   P/F ratio:     03-17 @ 07:01  -  03-18 @ 07:00  --------------------------------------------------------  IN: 1040 mL / OUT: 3650 mL / NET: -2610 mL      CAPILLARY BLOOD GLUCOSE      POCT Blood Glucose.: 281 mg/dL (18 Mar 2025 05:37)      ECG: reviewed.    PHYSICAL EXAM:    GENERAL: NAD, lying in bed comfortably  HEAD:  Atraumatic, normocephalic  EYES: EOMI, PERRLA, conjunctiva and sclera clear  NECK: Supple, trachea midline, no JVD  HEART: Regular rate and rhythm, no murmurs, rubs, or gallops  LUNGS: Unlabored respirations.  Clear to auscultation bilaterally, no crackles, wheezing, or rhonchi  ABDOMEN: Soft, nontender, nondistended, +BS  EXTREMITIES: 2+ peripheral pulses bilaterally, cap refill<2 secs. No clubbing, cyanosis, or edema  NERVOUS SYSTEM:  A&Ox3, following commands, moving all extremities, no focal deficits   SKIN: No rashes or lesions    MEDICATIONS:  MEDICATIONS  (STANDING):  aspirin  chewable 81 milliGRAM(s) Oral daily  atorvastatin 20 milliGRAM(s) Oral at bedtime  dextrose 5%. 1000 milliLiter(s) (100 mL/Hr) IV Continuous <Continuous>  dextrose 5%. 1000 milliLiter(s) (50 mL/Hr) IV Continuous <Continuous>  dextrose 50% Injectable 25 Gram(s) IV Push once  dextrose 50% Injectable 12.5 Gram(s) IV Push once  dextrose 50% Injectable 25 Gram(s) IV Push once  glucagon  Injectable 1 milliGRAM(s) IntraMuscular once  heparin   Injectable 5000 Unit(s) SubCutaneous every 8 hours  insulin glargine Injectable (LANTUS) 48 Unit(s) SubCutaneous at bedtime  insulin lispro (ADMELOG) corrective regimen sliding scale   SubCutaneous three times a day before meals  insulin lispro (ADMELOG) corrective regimen sliding scale   SubCutaneous at bedtime  insulin lispro Injectable (ADMELOG) 15 Unit(s) SubCutaneous three times a day before meals  meropenem  IVPB 500 milliGRAM(s) IV Intermittent every 12 hours  meropenem  IVPB      polyethylene glycol 3350 17 Gram(s) Oral <User Schedule>  senna 2 Tablet(s) Oral daily  tamsulosin 0.8 milliGRAM(s) Oral at bedtime    MEDICATIONS  (PRN):  acetaminophen     Tablet .. 650 milliGRAM(s) Oral every 6 hours PRN Temp greater or equal to 38C (100.4F), Mild Pain (1 - 3)  aluminum hydroxide/magnesium hydroxide/simethicone Suspension 30 milliLiter(s) Oral every 4 hours PRN Dyspepsia  dextrose Oral Gel 15 Gram(s) Oral once PRN Blood Glucose LESS THAN 70 milliGRAM(s)/deciliter  melatonin 3 milliGRAM(s) Oral at bedtime PRN Insomnia  ondansetron Injectable 4 milliGRAM(s) IV Push every 8 hours PRN Nausea and/or Vomiting      ALLERGIES:  Allergies    No Known Allergies    Intolerances        LABS:                        10.9   19.63 )-----------( 261      ( 18 Mar 2025 06:48 )             34.1     03-18    132[L]  |  96  |  72[H]  ----------------------------<  312[H]  6.4[HH]   |  22  |  2.62[H]    Ca    8.4      18 Mar 2025 06:48  Phos  3.5     03-18  Mg     2.6     03-18    TPro  7.2  /  Alb  2.4[L]  /  TBili  0.4  /  DBili  x   /  AST  141[H]  /  ALT  190[H]  /  AlkPhos  551[H]  03-18      Urinalysis Basic - ( 18 Mar 2025 06:48 )    Color: x / Appearance: x / SG: x / pH: x  Gluc: 312 mg/dL / Ketone: x  / Bili: x / Urobili: x   Blood: x / Protein: x / Nitrite: x   Leuk Esterase: x / RBC: x / WBC x   Sq Epi: x / Non Sq Epi: x / Bacteria: x      ABG:      vBG:  pH, Venous: 7.38 (03-18-25 @ 06:53)  pCO2, Venous: 34 mmHg (03-18-25 @ 06:53)  pO2, Venous: 43 mmHg (03-18-25 @ 06:53)  HCO3, Venous: 20 mmol/L (03-18-25 @ 06:53)    Micro:    Culture - Blood (collected 03-16-25 @ 17:19)  Source: Blood Blood  Preliminary Report (03-17-25 @ 23:01):    No growth at 24 hours    Culture - Blood (collected 03-15-25 @ 11:14)  Source: Blood Blood  Preliminary Report (03-17-25 @ 17:01):    No growth at 48 Hours    Culture - Blood (collected 03-15-25 @ 07:30)  Source: Blood Blood  Gram Stain (03-16-25 @ 23:04):    Growth in anaerobic bottle: Gram Negative Rods  Preliminary Report (03-17-25 @ 13:07):    Growth in anaerobic bottle: Proteus mirabilis    Direct identification is available within approximately 3-5    hours either by Blood Panel Multiplexed PCR or Direct    MALDI-TOF. Details: [email or web address]  Organism: Blood Culture PCR (03-17-25 @ 00:43)  Organism: Blood Culture PCR (03-17-25 @ 00:43)      Method Type: PCR      -  Proteus species: Detec    Culture - Blood (collected 03-12-25 @ 19:45)  Source: Blood Blood-Peripheral  Final Report (03-18-25 @ 01:01):    No growth at 5 days    Culture - Blood (collected 03-12-25 @ 19:35)  Source: Blood Blood-Peripheral  Final Report (03-18-25 @ 01:01):    No growth at 5 days          RADIOLOGY & ADDITIONAL TESTS: Reviewed.   INTERVAL HPI/OVERNIGHT EVENTS:    SUBJECTIVE: Patient seen and examined at bedside. Patient states that his abdominal pain has improved. Complains of fatigue.  He is hungry as he is NPO for HIDA scan. Spoke with patients sister Nikole at bedside as well as patient's wife on the phone. Explained the patients conditioned and answered all their questions.     ROS: All negative except as listed above.    VITAL SIGNS:  ICU Vital Signs Last 24 Hrs  T(C): 37.1 (18 Mar 2025 04:05), Max: 38.6 (17 Mar 2025 13:20)  T(F): 98.7 (18 Mar 2025 04:05), Max: 101.4 (17 Mar 2025 13:20)  HR: 91 (18 Mar 2025 04:05) (77 - 102)  BP: 168/86 (18 Mar 2025 04:05) (114/79 - 168/86)  BP(mean): 98 (17 Mar 2025 18:00) (87 - 105)  ABP: --  ABP(mean): --  RR: 18 (18 Mar 2025 04:05) (16 - 18)  SpO2: 99% (18 Mar 2025 04:05) (93% - 100%)    O2 Parameters below as of 18 Mar 2025 04:05  Patient On (Oxygen Delivery Method): nasal cannula  O2 Flow (L/min): 2          Plateau pressure:   P/F ratio:     03-17 @ 07:01  -  03-18 @ 07:00  --------------------------------------------------------  IN: 1040 mL / OUT: 3650 mL / NET: -2610 mL      CAPILLARY BLOOD GLUCOSE      POCT Blood Glucose.: 281 mg/dL (18 Mar 2025 05:37)      ECG: reviewed.    PHYSICAL EXAM:    GENERAL: NAD, lying in bed,   HEAD:  Atraumatic, normocephalic  EYES: EOMI, PERRLA, conjunctiva and sclera clear  NECK: Supple, trachea midline, no JVD  HEART: Regular rate and rhythm, no murmurs, rubs, or gallops  LUNGS: on NC, no wheezing  ABDOMEN: distension  EXTREMITIES: 2+ peripheral pulses bilaterally, cap refill<2 secs. No clubbing, cyanosis, or edema  NERVOUS SYSTEM:  A&Ox3, following commands, moving all extremities, no focal deficits   SKIN: No rashes or lesions    MEDICATIONS:  MEDICATIONS  (STANDING):  aspirin  chewable 81 milliGRAM(s) Oral daily  atorvastatin 20 milliGRAM(s) Oral at bedtime  dextrose 5%. 1000 milliLiter(s) (100 mL/Hr) IV Continuous <Continuous>  dextrose 5%. 1000 milliLiter(s) (50 mL/Hr) IV Continuous <Continuous>  dextrose 50% Injectable 25 Gram(s) IV Push once  dextrose 50% Injectable 12.5 Gram(s) IV Push once  dextrose 50% Injectable 25 Gram(s) IV Push once  glucagon  Injectable 1 milliGRAM(s) IntraMuscular once  heparin   Injectable 5000 Unit(s) SubCutaneous every 8 hours  insulin glargine Injectable (LANTUS) 48 Unit(s) SubCutaneous at bedtime  insulin lispro (ADMELOG) corrective regimen sliding scale   SubCutaneous three times a day before meals  insulin lispro (ADMELOG) corrective regimen sliding scale   SubCutaneous at bedtime  insulin lispro Injectable (ADMELOG) 15 Unit(s) SubCutaneous three times a day before meals  meropenem  IVPB 500 milliGRAM(s) IV Intermittent every 12 hours  meropenem  IVPB      polyethylene glycol 3350 17 Gram(s) Oral <User Schedule>  senna 2 Tablet(s) Oral daily  tamsulosin 0.8 milliGRAM(s) Oral at bedtime    MEDICATIONS  (PRN):  acetaminophen     Tablet .. 650 milliGRAM(s) Oral every 6 hours PRN Temp greater or equal to 38C (100.4F), Mild Pain (1 - 3)  aluminum hydroxide/magnesium hydroxide/simethicone Suspension 30 milliLiter(s) Oral every 4 hours PRN Dyspepsia  dextrose Oral Gel 15 Gram(s) Oral once PRN Blood Glucose LESS THAN 70 milliGRAM(s)/deciliter  melatonin 3 milliGRAM(s) Oral at bedtime PRN Insomnia  ondansetron Injectable 4 milliGRAM(s) IV Push every 8 hours PRN Nausea and/or Vomiting      ALLERGIES:  Allergies    No Known Allergies    Intolerances        LABS:                        10.9   19.63 )-----------( 261      ( 18 Mar 2025 06:48 )             34.1     03-18    132[L]  |  96  |  72[H]  ----------------------------<  312[H]  6.4[HH]   |  22  |  2.62[H]    Ca    8.4      18 Mar 2025 06:48  Phos  3.5     03-18  Mg     2.6     03-18    TPro  7.2  /  Alb  2.4[L]  /  TBili  0.4  /  DBili  x   /  AST  141[H]  /  ALT  190[H]  /  AlkPhos  551[H]  03-18      Urinalysis Basic - ( 18 Mar 2025 06:48 )    Color: x / Appearance: x / SG: x / pH: x  Gluc: 312 mg/dL / Ketone: x  / Bili: x / Urobili: x   Blood: x / Protein: x / Nitrite: x   Leuk Esterase: x / RBC: x / WBC x   Sq Epi: x / Non Sq Epi: x / Bacteria: x      ABG:      vBG:  pH, Venous: 7.38 (03-18-25 @ 06:53)  pCO2, Venous: 34 mmHg (03-18-25 @ 06:53)  pO2, Venous: 43 mmHg (03-18-25 @ 06:53)  HCO3, Venous: 20 mmol/L (03-18-25 @ 06:53)    Micro:    Culture - Blood (collected 03-16-25 @ 17:19)  Source: Blood Blood  Preliminary Report (03-17-25 @ 23:01):    No growth at 24 hours    Culture - Blood (collected 03-15-25 @ 11:14)  Source: Blood Blood  Preliminary Report (03-17-25 @ 17:01):    No growth at 48 Hours    Culture - Blood (collected 03-15-25 @ 07:30)  Source: Blood Blood  Gram Stain (03-16-25 @ 23:04):    Growth in anaerobic bottle: Gram Negative Rods  Preliminary Report (03-17-25 @ 13:07):    Growth in anaerobic bottle: Proteus mirabilis    Direct identification is available within approximately 3-5    hours either by Blood Panel Multiplexed PCR or Direct    MALDI-TOF. Details: [email or web address]  Organism: Blood Culture PCR (03-17-25 @ 00:43)  Organism: Blood Culture PCR (03-17-25 @ 00:43)      Method Type: PCR      -  Proteus species: Detec    Culture - Blood (collected 03-12-25 @ 19:45)  Source: Blood Blood-Peripheral  Final Report (03-18-25 @ 01:01):    No growth at 5 days    Culture - Blood (collected 03-12-25 @ 19:35)  Source: Blood Blood-Peripheral  Final Report (03-18-25 @ 01:01):    No growth at 5 days          RADIOLOGY & ADDITIONAL TESTS: Reviewed.

## 2025-03-18 NOTE — PROGRESS NOTE ADULT - ASSESSMENT
64M with medical of PAD s/p L BKA, DM with peripheral neuropathy and retinopathy HTN, CVA, complete heart block s/p PPM presented on 3/12 with generalized fatigue. In the ED patient noted to be febrile and tachycardic. Labs notable for leukocytosis, hyponatremia, hyperglycemia, HOLA and metabolic acidosis. UA with many bacteria, negative nitrite and small leukocyte esterase. Urine culture 3/12 >100K proteus mirabilis. Blood culture negative to date.      Urology consulted due to fevers with 6mm left proximal ureteral stone and mild hydro. WBC 19. Cr 4. Blood cultures with proteus.     s/p cystoscopy and left stent placement 3/17      Plan:  - s/p stent placement  - continue abx  - monitor fevers  - trend wbc  - f/u cultures  - continue flomax   - continue crain, plan for patient to d/c with crain   - Patient will need to follow-up with urology outpatient for definitive stone management after discharge. No further inpatient urologic intervention. Stent discharge instructions below     Discussed with Dr. Gross       It is common to have blood in the urine after your procedure.  It may be pink or even red; inform your doctor if you have a significant amount of clots in the urine or if you are unable to void at all.  -It is not uncommon to have some burning when you urinate, this will improve over the next few days.  -You have an internal stent (a hollow tube that runs from the kidney to your bladder) after your procedure, helping your kidney drain down to your bladder after your surgery.  Some patients do not notice that they have a stent, while others complain of the sensation of needing to urinate frequently, burning on urination, or even some back pain (especially when they go to urinate). These sensations usually improve gradually, some faster than others. This is not uncommon, but may initially warrant the use of the pain medication which you were prescribed.  While the stent is in place, your urine may continue to be bloody. This stent is temporary and must be removed/exchanged by your urologist.  -Provided that you are not restricted with fluids by your physician, you should drink 6-8 (8 oz.) glasses of fluid per day.  -You may resume your regular diet and regular medication regimen.    -You may shower or bathe.    -You may take over the counter pain medications such as Motrin and Tylenol as needed for pain.  Do not exceed 4 grams of Tylenol daily.  Each medication may be taken every 6 hours.  You may alternate these medications such that you take either one every 3 hours.  If you have severe pain that does not improve with the pain medication or you have persistent vomiting, call your doctor.  -You may have a prescription for an antibiotic when you go home.  Take this medication as instructed; if you miss one dose, resume taking it on your previous schedule until you have completed the entire course of treatment.  -As you have just underwent general anesthesia, you should refrain from driving, heavy lifting, smoking, alcohol consumption, or important decision making for the next 24 hours.  You may climb stairs and you may resume sexual activity.  -Call your physician if you have a fever over 101F.  -Make a follow up appointment for with your urologist when you arrive home (or the next business day).  -Call your urologist during normal business hours with any other routine questions.

## 2025-03-18 NOTE — DISCHARGE NOTE PROVIDER - NSDCCPTREATMENT_GEN_ALL_CORE_FT
PRINCIPAL PROCEDURE  Procedure: CT abdomen pel wo con  Findings and Treatment: IMPRESSION:  0.6 cm calculus within the proximal left ureter with mild left   hydronephrosis.        SECONDARY PROCEDURE  Procedure: CT head  Findings and Treatment: IMPRESSION: No acute intracranial hemorrhage, mass effect, or shift of   the midline structures.  Encephalomalacia and gliosis within the right posterior corona radiata   and right occipital lobe related to prior infarction.  Similar-appearing mild chronic white matter microvascular type changes.      Procedure: Insertion, ureteral stent  Findings and Treatment:

## 2025-03-18 NOTE — PROGRESS NOTE ADULT - PROBLEM SELECTOR PLAN 2
Ua with bacteria + pyuria with leukocyte esterase;  Urine culture 3/12 >100K proteus mirabilis.   s/p zosyn (3/12-3/15)    Plan:  -follow up urine cultures    -Broaden to Meropenem   -antipyretics, antiemetics, analgesics as needed Unclear baseline cr, Fluctuated between 0.9-1.1 in late 20246  on Admission 1.9. Increasing now at 5.1--> now improved 2.62  Likely pre-renal with vs. ATN vs. post renal iso obstruction 2/2 septic stone. Now s/p ureteral stone placement    Plan:   s/p Lokelma for hyperK  hold ACE-I, if needed can ad amlodipine PO 5mg for BP control  renally dose antibiotics  monitor BMP BID  fu urine studies  Monitor UO, BUN/Cr, volume status, acid-base balance, electrolytes  avoid nephrotoxic agents  dose adjustments for renally cleared meds

## 2025-03-18 NOTE — DISCHARGE NOTE PROVIDER - NSFOLLOWUPCLINICS_GEN_ALL_ED_FT
[place]  Urology  [address] [facility], [facility] [address]  [place] [address]  Phone: [phone]  Fax:     [facility]  Nephrology  [address] [place] [address], [facility] [place] [address]  Phone: [phone]  Fax:   Follow Up Time: 1 week

## 2025-03-18 NOTE — DISCHARGE NOTE PROVIDER - NSDCFUSCHEDAPPT_GEN_ALL_CORE_FT
[facility] [address]  Scheduled Appointment: 03/27/2025    [facility] [address]  Scheduled Appointment: 04/08/2025    [facility]  [facility] [address]  Scheduled Appointment: 05/14/2025

## 2025-03-19 ENCOUNTER — TRANSCRIPTION ENCOUNTER (OUTPATIENT)
Age: 65
End: 2025-03-19

## 2025-03-19 DIAGNOSIS — Z29.9 ENCOUNTER FOR PROPHYLACTIC MEASURES, UNSPECIFIED: ICD-10-CM

## 2025-03-19 LAB
ALBUMIN SERPL ELPH-MCNC: 2.8 G/DL — LOW (ref 3.3–5)
ALP SERPL-CCNC: 499 U/L — HIGH (ref 40–120)
ALT FLD-CCNC: 166 U/L — HIGH (ref 10–45)
ANION GAP SERPL CALC-SCNC: 12 MMOL/L — SIGNIFICANT CHANGE UP (ref 5–17)
AST SERPL-CCNC: 93 U/L — HIGH (ref 10–40)
BASOPHILS # BLD AUTO: 0.05 K/UL — SIGNIFICANT CHANGE UP (ref 0–0.2)
BASOPHILS NFR BLD AUTO: 0.2 % — SIGNIFICANT CHANGE UP (ref 0–2)
BILIRUB SERPL-MCNC: 0.3 MG/DL — SIGNIFICANT CHANGE UP (ref 0.2–1.2)
BUN SERPL-MCNC: 50 MG/DL — HIGH (ref 7–23)
CALCIUM SERPL-MCNC: 8.8 MG/DL — SIGNIFICANT CHANGE UP (ref 8.4–10.5)
CHLORIDE SERPL-SCNC: 101 MMOL/L — SIGNIFICANT CHANGE UP (ref 96–108)
CO2 SERPL-SCNC: 26 MMOL/L — SIGNIFICANT CHANGE UP (ref 22–31)
CREAT SERPL-MCNC: 1.99 MG/DL — HIGH (ref 0.5–1.3)
EGFR: 37 ML/MIN/1.73M2 — LOW
EGFR: 37 ML/MIN/1.73M2 — LOW
EOSINOPHIL # BLD AUTO: 0.35 K/UL — SIGNIFICANT CHANGE UP (ref 0–0.5)
EOSINOPHIL NFR BLD AUTO: 1.7 % — SIGNIFICANT CHANGE UP (ref 0–6)
GLUCOSE BLDC GLUCOMTR-MCNC: 102 MG/DL — HIGH (ref 70–99)
GLUCOSE BLDC GLUCOMTR-MCNC: 109 MG/DL — HIGH (ref 70–99)
GLUCOSE BLDC GLUCOMTR-MCNC: 136 MG/DL — HIGH (ref 70–99)
GLUCOSE BLDC GLUCOMTR-MCNC: 152 MG/DL — HIGH (ref 70–99)
GLUCOSE BLDC GLUCOMTR-MCNC: 86 MG/DL — SIGNIFICANT CHANGE UP (ref 70–99)
GLUCOSE SERPL-MCNC: 131 MG/DL — HIGH (ref 70–99)
HCT VFR BLD CALC: 31.9 % — LOW (ref 39–50)
HGB BLD-MCNC: 10.5 G/DL — LOW (ref 13–17)
IMM GRANULOCYTES NFR BLD AUTO: 4.1 % — HIGH (ref 0–0.9)
LYMPHOCYTES # BLD AUTO: 1.49 K/UL — SIGNIFICANT CHANGE UP (ref 1–3.3)
LYMPHOCYTES # BLD AUTO: 7.3 % — LOW (ref 13–44)
MAGNESIUM SERPL-MCNC: 2.1 MG/DL — SIGNIFICANT CHANGE UP (ref 1.6–2.6)
MCHC RBC-ENTMCNC: 27.7 PG — SIGNIFICANT CHANGE UP (ref 27–34)
MCHC RBC-ENTMCNC: 32.9 G/DL — SIGNIFICANT CHANGE UP (ref 32–36)
MCV RBC AUTO: 84.2 FL — SIGNIFICANT CHANGE UP (ref 80–100)
MONOCYTES # BLD AUTO: 1.21 K/UL — HIGH (ref 0–0.9)
MONOCYTES NFR BLD AUTO: 5.9 % — SIGNIFICANT CHANGE UP (ref 2–14)
NEUTROPHILS # BLD AUTO: 16.57 K/UL — HIGH (ref 1.8–7.4)
NEUTROPHILS NFR BLD AUTO: 80.8 % — HIGH (ref 43–77)
NRBC BLD AUTO-RTO: 0 /100 WBCS — SIGNIFICANT CHANGE UP (ref 0–0)
PHOSPHATE SERPL-MCNC: 2.5 MG/DL — SIGNIFICANT CHANGE UP (ref 2.5–4.5)
PLATELET # BLD AUTO: 370 K/UL — SIGNIFICANT CHANGE UP (ref 150–400)
POTASSIUM SERPL-MCNC: 4.4 MMOL/L — SIGNIFICANT CHANGE UP (ref 3.5–5.3)
POTASSIUM SERPL-SCNC: 4.4 MMOL/L — SIGNIFICANT CHANGE UP (ref 3.5–5.3)
PROT SERPL-MCNC: 6.8 G/DL — SIGNIFICANT CHANGE UP (ref 6–8.3)
RBC # BLD: 3.79 M/UL — LOW (ref 4.2–5.8)
RBC # FLD: 15.9 % — HIGH (ref 10.3–14.5)
SODIUM SERPL-SCNC: 139 MMOL/L — SIGNIFICANT CHANGE UP (ref 135–145)
WBC # BLD: 20.51 K/UL — HIGH (ref 3.8–10.5)
WBC # FLD AUTO: 20.51 K/UL — HIGH (ref 3.8–10.5)

## 2025-03-19 PROCEDURE — 99232 SBSQ HOSP IP/OBS MODERATE 35: CPT

## 2025-03-19 PROCEDURE — G0545: CPT

## 2025-03-19 PROCEDURE — 99233 SBSQ HOSP IP/OBS HIGH 50: CPT | Mod: GC

## 2025-03-19 RX ORDER — AMLODIPINE BESYLATE 10 MG/1
10 TABLET ORAL DAILY
Refills: 0 | Status: DISCONTINUED | OUTPATIENT
Start: 2025-03-19 | End: 2025-03-26

## 2025-03-19 RX ORDER — INSULIN GLARGINE-YFGN 100 [IU]/ML
52 INJECTION, SOLUTION SUBCUTANEOUS AT BEDTIME
Refills: 0 | Status: DISCONTINUED | OUTPATIENT
Start: 2025-03-19 | End: 2025-03-20

## 2025-03-19 RX ORDER — LABETALOL HYDROCHLORIDE 200 MG/1
10 TABLET, FILM COATED ORAL ONCE
Refills: 0 | Status: DISCONTINUED | OUTPATIENT
Start: 2025-03-19 | End: 2025-03-19

## 2025-03-19 RX ORDER — LIDOCAINE HYDROCHLORIDE 20 MG/ML
1 JELLY TOPICAL ONCE
Refills: 0 | Status: COMPLETED | OUTPATIENT
Start: 2025-03-19 | End: 2025-03-19

## 2025-03-19 RX ADMIN — HEPARIN SODIUM 5000 UNIT(S): 1000 INJECTION INTRAVENOUS; SUBCUTANEOUS at 13:02

## 2025-03-19 RX ADMIN — LIDOCAINE HYDROCHLORIDE 1 PATCH: 20 JELLY TOPICAL at 19:00

## 2025-03-19 RX ADMIN — LIDOCAINE HYDROCHLORIDE 1 PATCH: 20 JELLY TOPICAL at 09:55

## 2025-03-19 RX ADMIN — INSULIN GLARGINE-YFGN 52 UNIT(S): 100 INJECTION, SOLUTION SUBCUTANEOUS at 22:47

## 2025-03-19 RX ADMIN — Medication 10 MILLIGRAM(S): at 13:49

## 2025-03-19 RX ADMIN — CEFTRIAXONE 100 MILLIGRAM(S): 500 INJECTION, POWDER, FOR SOLUTION INTRAMUSCULAR; INTRAVENOUS at 17:16

## 2025-03-19 RX ADMIN — LIDOCAINE HYDROCHLORIDE 1 PATCH: 20 JELLY TOPICAL at 21:53

## 2025-03-19 RX ADMIN — INSULIN LISPRO 15 UNIT(S): 100 INJECTION, SOLUTION INTRAVENOUS; SUBCUTANEOUS at 13:03

## 2025-03-19 RX ADMIN — Medication 650 MILLIGRAM(S): at 11:43

## 2025-03-19 RX ADMIN — ATORVASTATIN CALCIUM 20 MILLIGRAM(S): 80 TABLET, FILM COATED ORAL at 21:29

## 2025-03-19 RX ADMIN — Medication 650 MILLIGRAM(S): at 09:55

## 2025-03-19 RX ADMIN — INSULIN LISPRO 15 UNIT(S): 100 INJECTION, SOLUTION INTRAVENOUS; SUBCUTANEOUS at 08:56

## 2025-03-19 RX ADMIN — AMLODIPINE BESYLATE 10 MILLIGRAM(S): 10 TABLET ORAL at 11:50

## 2025-03-19 RX ADMIN — Medication 25 MILLIGRAM(S): at 21:30

## 2025-03-19 RX ADMIN — Medication 25 MILLIGRAM(S): at 17:16

## 2025-03-19 RX ADMIN — TAMSULOSIN HYDROCHLORIDE 0.8 MILLIGRAM(S): 0.4 CAPSULE ORAL at 21:29

## 2025-03-19 RX ADMIN — HEPARIN SODIUM 5000 UNIT(S): 1000 INJECTION INTRAVENOUS; SUBCUTANEOUS at 21:30

## 2025-03-19 RX ADMIN — HEPARIN SODIUM 5000 UNIT(S): 1000 INJECTION INTRAVENOUS; SUBCUTANEOUS at 05:25

## 2025-03-19 RX ADMIN — Medication 81 MILLIGRAM(S): at 11:50

## 2025-03-19 NOTE — PROGRESS NOTE ADULT - PROBLEM SELECTOR PLAN 9
no CW home ASA  cw atorvastatin 20mg Bowel distension on physical exam. patient with history of appendectomy.    Plan:   Patient had a BM 3/16  Bowel regimen  f/u abdominal xray- Mild nonspecific gaseous distention of stomach, with paucity of bowel gas   distally. This may be secondary to gastric outlet obstruction.

## 2025-03-19 NOTE — PROGRESS NOTE ADULT - PROBLEM SELECTOR PLAN 7
Uptrending transaminases, likely iso shock liver.   RUQ US Cholelithiasis with gallbladder wall thickening. Question trace pericholecystic fluid.       -CTM  -f/u HIDA scan- no cholecytsitis (IMPROVED) Likely iso decreased free water clearance. Can be in setting of SIADH 2/2 sepsis   SNa -125  Improvement today-137    Plan:   avoid hypotonic fluids   f/u urine lytes + serum osmolality  -f/u nephrology recommendations

## 2025-03-19 NOTE — PROGRESS NOTE ADULT - SUBJECTIVE AND OBJECTIVE BOX
Follow Up:      Interval History/ROS:Patient is a 64y old  Male who presents with a chief complaint of generalized fatigue/weakness (19 Mar 2025 12:02)  Seen today, remains afebrile, feeling better, no new complaints, persistent leukocytosis ~ 20K      Allergies  No Known Allergies        ANTIMICROBIALS:    cefTRIAXone   IVPB 2000 <User Schedule>        OTHER MEDS: MEDICATIONS  (STANDING):  acetaminophen     Tablet .. 650 every 6 hours PRN  aluminum hydroxide/magnesium hydroxide/simethicone Suspension 30 every 4 hours PRN  amLODIPine   Tablet 10 daily  aspirin  chewable 81 daily  atorvastatin 20 at bedtime  dextrose 50% Injectable 25 once  dextrose 50% Injectable 12.5 once  dextrose 50% Injectable 25 once  dextrose Oral Gel 15 once PRN  glucagon  Injectable 1 once  heparin   Injectable 5000 every 8 hours  insulin glargine Injectable (LANTUS) 52 at bedtime  insulin lispro (ADMELOG) corrective regimen sliding scale  three times a day before meals  insulin lispro (ADMELOG) corrective regimen sliding scale  at bedtime  insulin lispro Injectable (ADMELOG) 15 three times a day before meals  melatonin 3 at bedtime PRN  ondansetron Injectable 4 every 8 hours PRN  polyethylene glycol 3350 17 <User Schedule>  senna 2 daily  tamsulosin 0.8 at bedtime      Vital Signs Last 24 Hrs  T(F): 97.9 (03-19-25 @ 12:46), Max: 103.9 (03-13-25 @ 05:08)    Vital Signs Last 24 Hrs  HR: 92 (03-19-25 @ 12:46) (87 - 94)  BP: 184/85 (03-19-25 @ 13:07) (143/65 - 184/85)  RR: 18 (03-19-25 @ 12:46)  SpO2: 96% (03-19-25 @ 12:46) (94% - 96%)  Wt(kg): --    EXAM:      GA: NAD  CV: nl S1/S2,  Lungs: CTAB, on RA   Abd: soft, nontender  Skin: Intact  IV: no phlebitis    Labs:                        10.5   20.51 )-----------( 370      ( 19 Mar 2025 07:03 )             31.9     03-19    139  |  101  |  50[H]  ----------------------------<  131[H]  4.4   |  26  |  1.99[H]    Ca    8.8      19 Mar 2025 07:04  Phos  2.5     03-19  Mg     2.1     03-19    TPro  6.8  /  Alb  2.8[L]  /  TBili  0.3  /  DBili  x   /  AST  93[H]  /  ALT  166[H]  /  AlkPhos  499[H]  03-19      WBC Trend:  WBC Count: 20.51 (03-19-25 @ 07:03)  WBC Count: 19.63 (03-18-25 @ 06:48)  WBC Count: 19.85 (03-17-25 @ 06:35)  WBC Count: 14.92 (03-16-25 @ 09:33)      Creatine Trend:  Creatinine: 1.99 (03-19)  Creatinine: 2.46 (03-18)  Creatinine: 2.62 (03-18)  Creatinine: 4.02 (03-17)      Liver Biochemical Testing Trend:  Alanine Aminotransferase (ALT/SGPT): 166 *H* (03-19)  Alanine Aminotransferase (ALT/SGPT): 190 *H* (03-18)  Alanine Aminotransferase (ALT/SGPT): 155 *H* (03-17)  Alanine Aminotransferase (ALT/SGPT): 143 *H* (03-16)  Alanine Aminotransferase (ALT/SGPT): 50 *H* (03-15)  Aspartate Aminotransferase (AST/SGOT): 93 (03-19-25 @ 07:04)  Aspartate Aminotransferase (AST/SGOT): 141 (03-18-25 @ 06:48)  Aspartate Aminotransferase (AST/SGOT): 125 (03-17-25 @ 06:35)  Aspartate Aminotransferase (AST/SGOT): 155 (03-16-25 @ 09:33)  Aspartate Aminotransferase (AST/SGOT): 57 (03-15-25 @ 07:28)  Bilirubin Total: 0.3 (03-19)  Bilirubin Total: 0.4 (03-18)  Bilirubin Total: 0.4 (03-17)  Bilirubin Total: 0.4 (03-16)  Bilirubin Total: 0.3 (03-15)      Trend LDH      Urinalysis Basic - ( 19 Mar 2025 07:04 )    Color: x / Appearance: x / SG: x / pH: x  Gluc: 131 mg/dL / Ketone: x  / Bili: x / Urobili: x   Blood: x / Protein: x / Nitrite: x   Leuk Esterase: x / RBC: x / WBC x   Sq Epi: x / Non Sq Epi: x / Bacteria: x        MICROBIOLOGY:  Vancomycin Level, Random: <4.0 (03-16 @ 09:25)    MRSA PCR Result.: NotDetec (03-15-25 @ 18:08)      Culture - Blood (collected 18 Mar 2025 05:48)  Source: Blood Blood-Peripheral  Preliminary Report:    No growth at 24 hours    Culture - Blood (collected 18 Mar 2025 05:39)  Source: Blood Blood-Peripheral  Preliminary Report:    No growth at 24 hours    Culture - Blood (collected 16 Mar 2025 17:19)  Source: Blood Blood  Preliminary Report:    No growth at 48 Hours    Culture - Blood (collected 15 Mar 2025 11:14)  Source: Blood Blood  Preliminary Report:    No growth at 72 Hours    Culture - Blood (collected 15 Mar 2025 07:30)  Source: Blood Blood  Final Report:    Growth in anaerobic bottle: Proteus mirabilis    Direct identification is available within approximately 3-5    hours either by Blood Panel Multiplexed PCR or Direct    MALDI-TOF. Details: [email or web address]  Organism: Blood Culture PCR  Proteus mirabilis  Organism: Proteus mirabilis    Sensitivities:      Method Type: ARMEN      -  Ampicillin: S <=8 These ampicillin results predict results for amoxicillin      -  Ampicillin/Sulbactam: S <=4/2      -  Aztreonam: S <=4      -  Cefazolin: S <=2      -  Cefepime: S <=2      -  Cefoxitin: S <=8      -  Ceftriaxone: S <=1      -  Ciprofloxacin: S <=0.25      -  Ertapenem: S <=0.5      -  Gentamicin: S <=2      -  Levofloxacin: S <=0.5      -  Meropenem: S <=1      -  Piperacillin/Tazobactam: S <=8      -  Tobramycin: S <=2      -  Trimethoprim/Sulfamethoxazole: S <=0.5/9.5  Organism: Blood Culture PCR    Sensitivities:      Method Type: PCR      -  Proteus species: Detec    Culture - Blood (collected 12 Mar 2025 19:45)  Source: Blood Blood-Peripheral  Final Report:    No growth at 5 days    Culture - Blood (collected 12 Mar 2025 19:35)  Source: Blood Blood-Peripheral  Final Report:    No growth at 5 days    Culture - Urine (collected 12 Mar 2025 16:22)  Source: Clean Catch Clean Catch (Midstream)  Final Report:    >100,000 CFU/ml Proteus mirabilis    <10,000 CFU/ml Normal Urogenital wil present  Organism: Proteus mirabilis  Organism: Proteus mirabilis    Sensitivities:      Method Type: ARMEN      -  Amoxicillin/Clavulanic Acid: S <=8/4      -  Ampicillin: S <=8 These ampicillin results predict results for amoxicillin      -  Ampicillin/Sulbactam: S <=4/2      -  Aztreonam: S <=4      -  Cefazolin: S <=2 For uncomplicated UTI with K. pneumoniae, E. coli, or P. mirablis: ARMEN <=16 is sensitive and ARMEN >=32 is resistant. This also predicts results for oral agents cefaclor, cefdinir, cefpodoxime, cefprozil, cefuroxime axetil, cephalexin and locarbef for uncomplicated UTI. Note that some isolates may be susceptible to these agents while testing resistant to cefazolin.      -  Cefepime: S <=2      -  Cefoxitin: S <=8      -  Ceftriaxone: S <=1      -  Cefuroxime: S <=4      -  Ciprofloxacin: S <=0.25      -  Ertapenem: S <=0.5      -  Gentamicin: S <=2      -  Levofloxacin: S <=0.5      -  Meropenem: S <=1      -  Nitrofurantoin: R >64 Should not be used to treat pyelonephritis      -  Piperacillin/Tazobactam: S <=8      -  Tobramycin: S <=2      -  Trimethoprim/Sulfamethoxazole: S <=0.5/9.5    Culture - Blood (collected 10 Yonathan 2024 13:00)  Source: .Blood Blood-Peripheral  Final Report:    No growth at 5 days    Culture - Blood (collected 10 Yonathan 2024 12:45)  Source: .Blood Blood-Peripheral  Final Report:    No growth at 5 days    Rapid RVP Result: NotDetec (03-15 @ 03:11)      Blood Gas Venous - Lactate: 0.9 (03-18 @ 09:42)  Blood Gas Venous - Lactate: 0.5 (03-18 @ 06:53)    Sedimentation Rate, Erythrocyte: 15 mm/hr (06-12-24 @ 01:10)      RADIOLOGY:  imaging below personally reviewed      < from: [facility] Hepatobiliary Imaging (03.18.25 @ 12:49) >  IMPRESSION: Normal hepatobiliary scan. No radionuclide evidence of acute   cholecystitis.    --- End of Report ---    < end of copied text >

## 2025-03-19 NOTE — PROGRESS NOTE ADULT - ASSESSMENT
64M with medical of PAD s/p L BKA, DM with peripheral neuropathy and retinopathy HTN, CVA, complete heart block s/p PPM presented on 3/12 with generalized fatigue. In the ED patient noted to be febrile and tachycardic. Labs notable for leukocytosis, hyponatremia, hyperglycemia, HOLA and metabolic acidosis. UA with many bacteria, negative nitrite and small leukocyte esterase. Urine culture 3/12 >100K proteus mirabilis. Blood culture negative to date. ID asked to help manage.     Work up:  -Blood culture 3/12 (-)  -Urine Culture 3/12 - >100k proteus mirabilis  -Rapid RVP negative  -Renal US:  mild hydronephrosis of the left kidney  -X-ray abdomen: Mild nonspecific gaseous distention of stomach, with paucity of bowel gas distally. This may be secondary to gastric outlet obstruction.  -CT C/A/P without cont: 0.6 cm calculus within the proximal left ureter with mild left hydronephrosis.  -VQ scan: Indeterminate VQ scan for pulmonary embolus.  -LE US doppler: no DVT  -US RUQ:  Cholelithiasis with gallbladder wall thickening. Question trace pericholecystic fluid. Unreliable sonogram Jansen's sign. If there is clinical suspicion for acute cholecystitis, hepatobiliary scan may be obtained for further evaluation.  -Blood Cx (3/15): Proteus (1/4 bottles)  -HIDA scan:  Normal hepatobiliary scan. No radionuclide evidence of acute cholecystitis.  -Repeat blood Cx (3/16): NGTD     #Proteus bacteremia source urinary/pyelonephritis in the setting of L ureter stone with hydronephrosis s/p cystoscopy and left stent placement on 3/17  #Sepsis, fever, leukocytosis   #Hypoxemia ?PNA r/o PE  #HOLA  #Transaminitis    Recommendations:   -s/p cystoscopy and left stent placement on 3/17, persistent leukocytosis ?reactive postop  -Continue Ceftriaxone 2 g IV daily   -F/up repeat blood Cx sent on 3/18  -When clinically stable for discharge, leukocytosis trends down and repeat blood cx neg for at least 48hrs, plan to transition to Levofloxacin (kidney function improving, Crl today ~ 43 mL/min, expecting kidney function to continue to recover, if CrCl > 50, plan to do full dose of 750 mg po daily) (till 3/24) - QTc this admission 462 msec  -Trend LFTs   -Monitor T curve and WBC trend     Discussed with primary team     Claudia Mack MD  ID physician  [employer]  After 5pm/weekends call [phone]

## 2025-03-19 NOTE — PROGRESS NOTE ADULT - PROBLEM SELECTOR PLAN 1
On ?CKD, baseline creatine noted to fluctuate between 0.9 and 1.1 in late 2024 per review of HIE.  likely underlying diabetic nephropathy.     Urology team placed crain 3/15  FENa of 0.3 as calculated from BMP on 03/12, strongly indicative prerenal etiology  recent hemodynamics noted (fevers, episode of hypotension on 03/13) which is concerning for evolving ATN  Mild L hydronephrosis, 6mm stone noted.   AXR with possible gastric outlet obstruction but CTA/P no obstruction.  Normal NM hepatobiliary scan    -HOLA further resolving, likely multifactorial ATN + septic stone, now s/p L ureteral stent placement    creatinine improved to 1.99 today    patient nonoliguric    Urology following, plan to d/c patient with rcain  -continue to hold outpatient ACEi    Continue amlodipine for BP control until HOLA resolves and outpatient BP meds can be resumed  -would defer further IVF for now as patient tolerating PO unless patient becomes polyuric iso post ATN diuresis    -Renally dosed abx for UTI.  UCx with Proteus. BlCx now with GNR, f/u ID recs

## 2025-03-19 NOTE — PROGRESS NOTE ADULT - PROBLEM SELECTOR PLAN 6
Likely iso decreased free water clearance. Can be in setting of SIADH 2/2 sepsis   SNa -125  Improvement today-137    Plan:   avoid hypotonic fluids   f/u urine lytes + serum osmolality  -f/u nephrology recommendations Ua with bacteria + pyuria with leukocyte esterase;  Urine culture 3/12 >100K proteus mirabilis.   s/p zosyn (3/12-3/15), s/p Meropenem (3/15-3/16)    Plan:  -follow up urine cultures    -cw CTX  -antipyretics, antiemetics, analgesics as needed

## 2025-03-19 NOTE — PROGRESS NOTE ADULT - PROBLEM SELECTOR PLAN 1
CT A/P with 0.6 cm calculus within the proximal left ureter with mild left hydronephrosis  s/p cystoscopy and left stent placement 3/17    Plan:   cw CTX 2g QD (3/18--)  cw flomax  continue crain, plan for patient to d/c with crain CT A/P with 0.6 cm calculus within the proximal left ureter with mild left hydronephrosis  s/p cystoscopy and left stent placement 3/17    Plan:   cw CTX 2g QD (3/18--)  continue for 10-14 days. transition to PO on dc, but requires downtrending leukocytosis and 48hrs of negative BCx  cw flomax  continue crain, plan for patient to d/c with crain

## 2025-03-19 NOTE — PROGRESS NOTE ADULT - SUBJECTIVE AND OBJECTIVE BOX
INTERVAL HPI/OVERNIGHT EVENTS:    SUBJECTIVE: Patient seen and examined at bedside.     ROS: All negative except as listed above.    VITAL SIGNS:  ICU Vital Signs Last 24 Hrs  T(C): 37.7 (19 Mar 2025 04:58), Max: 37.7 (18 Mar 2025 17:02)  T(F): 99.8 (19 Mar 2025 04:58), Max: 99.8 (18 Mar 2025 17:02)  HR: 94 (19 Mar 2025 04:58) (85 - 94)  BP: 167/861 (19 Mar 2025 04:58) (143/65 - 167/861)  BP(mean): --  ABP: --  ABP(mean): --  RR: 18 (19 Mar 2025 04:58) (17 - 18)  SpO2: 94% (19 Mar 2025 04:58) (94% - 96%)    O2 Parameters below as of 19 Mar 2025 04:58  Patient On (Oxygen Delivery Method): nasal cannula            Plateau pressure:   P/F ratio:     03-18 @ 07:01  -  03-19 @ 07:00  --------------------------------------------------------  IN: 480 mL / OUT: 4600 mL / NET: -4120 mL      CAPILLARY BLOOD GLUCOSE      POCT Blood Glucose.: 226 mg/dL (18 Mar 2025 21:28)      ECG: reviewed.    PHYSICAL EXAM:    GENERAL: NAD, lying in bed comfortably  HEAD:  Atraumatic, normocephalic  EYES: EOMI, PERRLA, conjunctiva and sclera clear  NECK: Supple, trachea midline, no JVD  HEART: Regular rate and rhythm, no murmurs, rubs, or gallops  LUNGS: Unlabored respirations.  Clear to auscultation bilaterally, no crackles, wheezing, or rhonchi  ABDOMEN: Soft, nontender, nondistended, +BS  EXTREMITIES: 2+ peripheral pulses bilaterally, cap refill<2 secs. No clubbing, cyanosis, or edema  NERVOUS SYSTEM:  A&Ox3, following commands, moving all extremities, no focal deficits   SKIN: No rashes or lesions    MEDICATIONS:  MEDICATIONS  (STANDING):  aspirin  chewable 81 milliGRAM(s) Oral daily  atorvastatin 20 milliGRAM(s) Oral at bedtime  cefTRIAXone   IVPB 2000 milliGRAM(s) IV Intermittent <User Schedule>  dextrose 5%. 1000 milliLiter(s) (100 mL/Hr) IV Continuous <Continuous>  dextrose 5%. 1000 milliLiter(s) (50 mL/Hr) IV Continuous <Continuous>  dextrose 50% Injectable 25 Gram(s) IV Push once  dextrose 50% Injectable 12.5 Gram(s) IV Push once  dextrose 50% Injectable 25 Gram(s) IV Push once  glucagon  Injectable 1 milliGRAM(s) IntraMuscular once  heparin   Injectable 5000 Unit(s) SubCutaneous every 8 hours  insulin glargine Injectable (LANTUS) 55 Unit(s) SubCutaneous at bedtime  insulin lispro (ADMELOG) corrective regimen sliding scale   SubCutaneous three times a day before meals  insulin lispro (ADMELOG) corrective regimen sliding scale   SubCutaneous at bedtime  insulin lispro Injectable (ADMELOG) 15 Unit(s) SubCutaneous three times a day before meals  polyethylene glycol 3350 17 Gram(s) Oral <User Schedule>  senna 2 Tablet(s) Oral daily  tamsulosin 0.8 milliGRAM(s) Oral at bedtime    MEDICATIONS  (PRN):  acetaminophen     Tablet .. 650 milliGRAM(s) Oral every 6 hours PRN Temp greater or equal to 38C (100.4F), Mild Pain (1 - 3)  aluminum hydroxide/magnesium hydroxide/simethicone Suspension 30 milliLiter(s) Oral every 4 hours PRN Dyspepsia  dextrose Oral Gel 15 Gram(s) Oral once PRN Blood Glucose LESS THAN 70 milliGRAM(s)/deciliter  melatonin 3 milliGRAM(s) Oral at bedtime PRN Insomnia  ondansetron Injectable 4 milliGRAM(s) IV Push every 8 hours PRN Nausea and/or Vomiting      ALLERGIES:  Allergies    No Known Allergies    Intolerances        LABS:                        10.5   20.51 )-----------( 370      ( 19 Mar 2025 07:03 )             31.9     03-18    137  |  99  |  69[H]  ----------------------------<  273[H]  5.3   |  24  |  2.46[H]    Ca    8.9      18 Mar 2025 10:57  Phos  3.5     03-18  Mg     2.6     03-18    TPro  7.2  /  Alb  2.4[L]  /  TBili  0.4  /  DBili  x   /  AST  141[H]  /  ALT  190[H]  /  AlkPhos  551[H]  03-18      Urinalysis Basic - ( 18 Mar 2025 10:57 )    Color: x / Appearance: x / SG: x / pH: x  Gluc: 273 mg/dL / Ketone: x  / Bili: x / Urobili: x   Blood: x / Protein: x / Nitrite: x   Leuk Esterase: x / RBC: x / WBC x   Sq Epi: x / Non Sq Epi: x / Bacteria: x      ABG:      vBG:  pH, Venous: 7.33 (03-18-25 @ 09:42)  pCO2, Venous: 57 mmHg (03-18-25 @ 09:42)  pO2, Venous: 51 mmHg (03-18-25 @ 09:42)  HCO3, Venous: 30 mmol/L (03-18-25 @ 09:42)    Micro:    Culture - Blood (collected 03-16-25 @ 17:19)  Source: Blood Blood  Preliminary Report (03-18-25 @ 23:00):    No growth at 48 Hours    Culture - Blood (collected 03-15-25 @ 11:14)  Source: Blood Blood  Preliminary Report (03-18-25 @ 17:01):    No growth at 72 Hours    Culture - Blood (collected 03-15-25 @ 07:30)  Source: Blood Blood  Gram Stain (03-16-25 @ 23:04):    Growth in anaerobic bottle: Gram Negative Rods  Final Report (03-18-25 @ 07:53):    Growth in anaerobic bottle: Proteus mirabilis    Direct identification is available within approximately 3-5    hours either by Blood Panel Multiplexed PCR or Direct    MALDI-TOF. Details: [email or web address]  Organism: Blood Culture PCR  Proteus mirabilis (03-18-25 @ 07:53)  Organism: Proteus mirabilis (03-18-25 @ 07:53)      Method Type: ARMEN      -  Ampicillin: S <=8 These ampicillin results predict results for amoxicillin      -  Ampicillin/Sulbactam: S <=4/2      -  Aztreonam: S <=4      -  Cefazolin: S <=2      -  Cefepime: S <=2      -  Cefoxitin: S <=8      -  Ceftriaxone: S <=1      -  Ciprofloxacin: S <=0.25      -  Ertapenem: S <=0.5      -  Gentamicin: S <=2      -  Levofloxacin: S <=0.5      -  Meropenem: S <=1      -  Piperacillin/Tazobactam: S <=8      -  Tobramycin: S <=2      -  Trimethoprim/Sulfamethoxazole: S <=0.5/9.5  Organism: Blood Culture PCR (03-18-25 @ 07:53)      Method Type: PCR      -  Proteus species: Detec    Culture - Blood (collected 03-12-25 @ 19:45)  Source: Blood Blood-Peripheral  Final Report (03-18-25 @ 01:01):    No growth at 5 days    Culture - Blood (collected 03-12-25 @ 19:35)  Source: Blood Blood-Peripheral  Final Report (03-18-25 @ 01:01):    No growth at 5 days          RADIOLOGY & ADDITIONAL TESTS: Reviewed.   INTERVAL HPI/OVERNIGHT EVENTS:    SUBJECTIVE: Patient seen and examined at bedside. No acute events overnight. Patient complains of fatigue. Spoke with patients sister Nikole and     ROS: All negative except as listed above.    VITAL SIGNS:  ICU Vital Signs Last 24 Hrs  T(C): 37.7 (19 Mar 2025 04:58), Max: 37.7 (18 Mar 2025 17:02)  T(F): 99.8 (19 Mar 2025 04:58), Max: 99.8 (18 Mar 2025 17:02)  HR: 94 (19 Mar 2025 04:58) (85 - 94)  BP: 167/861 (19 Mar 2025 04:58) (143/65 - 167/861)  BP(mean): --  ABP: --  ABP(mean): --  RR: 18 (19 Mar 2025 04:58) (17 - 18)  SpO2: 94% (19 Mar 2025 04:58) (94% - 96%)    O2 Parameters below as of 19 Mar 2025 04:58  Patient On (Oxygen Delivery Method): nasal cannula            Plateau pressure:   P/F ratio:     03-18 @ 07:01  -  03-19 @ 07:00  --------------------------------------------------------  IN: 480 mL / OUT: 4600 mL / NET: -4120 mL      CAPILLARY BLOOD GLUCOSE      POCT Blood Glucose.: 226 mg/dL (18 Mar 2025 21:28)      ECG: reviewed.    PHYSICAL EXAM:    GENERAL: NAD, lying in bed comfortably  HEAD:  Atraumatic, normocephalic  EYES: EOMI, PERRLA, conjunctiva and sclera clear  NECK: Supple, trachea midline, no JVD  HEART: Regular rate and rhythm, no murmurs, rubs, or gallops  LUNGS: Unlabored respirations.  Clear to auscultation bilaterally, no crackles, wheezing, or rhonchi  ABDOMEN: Soft, nontender, nondistended, +BS  EXTREMITIES: 2+ peripheral pulses bilaterally, cap refill<2 secs. No clubbing, cyanosis, or edema  NERVOUS SYSTEM:  A&Ox3, following commands, moving all extremities, no focal deficits   SKIN: No rashes or lesions    MEDICATIONS:  MEDICATIONS  (STANDING):  aspirin  chewable 81 milliGRAM(s) Oral daily  atorvastatin 20 milliGRAM(s) Oral at bedtime  cefTRIAXone   IVPB 2000 milliGRAM(s) IV Intermittent <User Schedule>  dextrose 5%. 1000 milliLiter(s) (100 mL/Hr) IV Continuous <Continuous>  dextrose 5%. 1000 milliLiter(s) (50 mL/Hr) IV Continuous <Continuous>  dextrose 50% Injectable 25 Gram(s) IV Push once  dextrose 50% Injectable 12.5 Gram(s) IV Push once  dextrose 50% Injectable 25 Gram(s) IV Push once  glucagon  Injectable 1 milliGRAM(s) IntraMuscular once  heparin   Injectable 5000 Unit(s) SubCutaneous every 8 hours  insulin glargine Injectable (LANTUS) 55 Unit(s) SubCutaneous at bedtime  insulin lispro (ADMELOG) corrective regimen sliding scale   SubCutaneous three times a day before meals  insulin lispro (ADMELOG) corrective regimen sliding scale   SubCutaneous at bedtime  insulin lispro Injectable (ADMELOG) 15 Unit(s) SubCutaneous three times a day before meals  polyethylene glycol 3350 17 Gram(s) Oral <User Schedule>  senna 2 Tablet(s) Oral daily  tamsulosin 0.8 milliGRAM(s) Oral at bedtime    MEDICATIONS  (PRN):  acetaminophen     Tablet .. 650 milliGRAM(s) Oral every 6 hours PRN Temp greater or equal to 38C (100.4F), Mild Pain (1 - 3)  aluminum hydroxide/magnesium hydroxide/simethicone Suspension 30 milliLiter(s) Oral every 4 hours PRN Dyspepsia  dextrose Oral Gel 15 Gram(s) Oral once PRN Blood Glucose LESS THAN 70 milliGRAM(s)/deciliter  melatonin 3 milliGRAM(s) Oral at bedtime PRN Insomnia  ondansetron Injectable 4 milliGRAM(s) IV Push every 8 hours PRN Nausea and/or Vomiting      ALLERGIES:  Allergies    No Known Allergies    Intolerances        LABS:                        10.5   20.51 )-----------( 370      ( 19 Mar 2025 07:03 )             31.9     03-18    137  |  99  |  69[H]  ----------------------------<  273[H]  5.3   |  24  |  2.46[H]    Ca    8.9      18 Mar 2025 10:57  Phos  3.5     03-18  Mg     2.6     03-18    TPro  7.2  /  Alb  2.4[L]  /  TBili  0.4  /  DBili  x   /  AST  141[H]  /  ALT  190[H]  /  AlkPhos  551[H]  03-18      Urinalysis Basic - ( 18 Mar 2025 10:57 )    Color: x / Appearance: x / SG: x / pH: x  Gluc: 273 mg/dL / Ketone: x  / Bili: x / Urobili: x   Blood: x / Protein: x / Nitrite: x   Leuk Esterase: x / RBC: x / WBC x   Sq Epi: x / Non Sq Epi: x / Bacteria: x      ABG:      vBG:  pH, Venous: 7.33 (03-18-25 @ 09:42)  pCO2, Venous: 57 mmHg (03-18-25 @ 09:42)  pO2, Venous: 51 mmHg (03-18-25 @ 09:42)  HCO3, Venous: 30 mmol/L (03-18-25 @ 09:42)    Micro:    Culture - Blood (collected 03-16-25 @ 17:19)  Source: Blood Blood  Preliminary Report (03-18-25 @ 23:00):    No growth at 48 Hours    Culture - Blood (collected 03-15-25 @ 11:14)  Source: Blood Blood  Preliminary Report (03-18-25 @ 17:01):    No growth at 72 Hours    Culture - Blood (collected 03-15-25 @ 07:30)  Source: Blood Blood  Gram Stain (03-16-25 @ 23:04):    Growth in anaerobic bottle: Gram Negative Rods  Final Report (03-18-25 @ 07:53):    Growth in anaerobic bottle: Proteus mirabilis    Direct identification is available within approximately 3-5    hours either by Blood Panel Multiplexed PCR or Direct    MALDI-TOF. Details: [email or web address]  Organism: Blood Culture PCR  Proteus mirabilis (03-18-25 @ 07:53)  Organism: Proteus mirabilis (03-18-25 @ 07:53)      Method Type: ARMEN      -  Ampicillin: S <=8 These ampicillin results predict results for amoxicillin      -  Ampicillin/Sulbactam: S <=4/2      -  Aztreonam: S <=4      -  Cefazolin: S <=2      -  Cefepime: S <=2      -  Cefoxitin: S <=8      -  Ceftriaxone: S <=1      -  Ciprofloxacin: S <=0.25      -  Ertapenem: S <=0.5      -  Gentamicin: S <=2      -  Levofloxacin: S <=0.5      -  Meropenem: S <=1      -  Piperacillin/Tazobactam: S <=8      -  Tobramycin: S <=2      -  Trimethoprim/Sulfamethoxazole: S <=0.5/9.5  Organism: Blood Culture PCR (03-18-25 @ 07:53)      Method Type: PCR      -  Proteus species: Detec    Culture - Blood (collected 03-12-25 @ 19:45)  Source: Blood Blood-Peripheral  Final Report (03-18-25 @ 01:01):    No growth at 5 days    Culture - Blood (collected 03-12-25 @ 19:35)  Source: Blood Blood-Peripheral  Final Report (03-18-25 @ 01:01):    No growth at 5 days          RADIOLOGY & ADDITIONAL TESTS: Reviewed.   INTERVAL HPI/OVERNIGHT EVENTS:    SUBJECTIVE: Patient seen and examined at bedside. No acute events overnight. Patient complains of fatigue. Spoke with patients sister Nikole and wife Raman. Discussed the change of antibiotics, the improvement of kidney function and hyponatremia. Answered all of their questions.     ROS: All negative except as listed above.    VITAL SIGNS:  ICU Vital Signs Last 24 Hrs  T(C): 37.7 (19 Mar 2025 04:58), Max: 37.7 (18 Mar 2025 17:02)  T(F): 99.8 (19 Mar 2025 04:58), Max: 99.8 (18 Mar 2025 17:02)  HR: 94 (19 Mar 2025 04:58) (85 - 94)  BP: 167/861 (19 Mar 2025 04:58) (143/65 - 167/861)  BP(mean): --  ABP: --  ABP(mean): --  RR: 18 (19 Mar 2025 04:58) (17 - 18)  SpO2: 94% (19 Mar 2025 04:58) (94% - 96%)    O2 Parameters below as of 19 Mar 2025 04:58  Patient On (Oxygen Delivery Method): nasal cannula            Plateau pressure:   P/F ratio:     03-18 @ 07:01  -  03-19 @ 07:00  --------------------------------------------------------  IN: 480 mL / OUT: 4600 mL / NET: -4120 mL      CAPILLARY BLOOD GLUCOSE      POCT Blood Glucose.: 226 mg/dL (18 Mar 2025 21:28)      ECG: reviewed.    PHYSICAL EXAM:    GENERAL: NAD, lying in bed, appears comfortable, drinking.  HEAD:  Atraumatic, normocephalic  EYES: EOMI, PERRLA, conjunctiva and sclera clear  NECK: Supple, trachea midline, no JVD  HEART: Regular rate and rhythm, no murmurs, rubs, or gallops  LUNGS: Unlabored respirations.  Clear to auscultation bilaterally, no crackles, wheezing, or rhonchi  ABDOMEN: Soft, nontender, nondistended, +BS  EXTREMITIES: 2+ L bka, RLE edema  NERVOUS SYSTEM:  A&Ox3, following commands, moving all extremities, no focal deficits   SKIN: No rashes or lesions    MEDICATIONS:  MEDICATIONS  (STANDING):  aspirin  chewable 81 milliGRAM(s) Oral daily  atorvastatin 20 milliGRAM(s) Oral at bedtime  cefTRIAXone   IVPB 2000 milliGRAM(s) IV Intermittent <User Schedule>  dextrose 5%. 1000 milliLiter(s) (100 mL/Hr) IV Continuous <Continuous>  dextrose 5%. 1000 milliLiter(s) (50 mL/Hr) IV Continuous <Continuous>  dextrose 50% Injectable 25 Gram(s) IV Push once  dextrose 50% Injectable 12.5 Gram(s) IV Push once  dextrose 50% Injectable 25 Gram(s) IV Push once  glucagon  Injectable 1 milliGRAM(s) IntraMuscular once  heparin   Injectable 5000 Unit(s) SubCutaneous every 8 hours  insulin glargine Injectable (LANTUS) 55 Unit(s) SubCutaneous at bedtime  insulin lispro (ADMELOG) corrective regimen sliding scale   SubCutaneous three times a day before meals  insulin lispro (ADMELOG) corrective regimen sliding scale   SubCutaneous at bedtime  insulin lispro Injectable (ADMELOG) 15 Unit(s) SubCutaneous three times a day before meals  polyethylene glycol 3350 17 Gram(s) Oral <User Schedule>  senna 2 Tablet(s) Oral daily  tamsulosin 0.8 milliGRAM(s) Oral at bedtime    MEDICATIONS  (PRN):  acetaminophen     Tablet .. 650 milliGRAM(s) Oral every 6 hours PRN Temp greater or equal to 38C (100.4F), Mild Pain (1 - 3)  aluminum hydroxide/magnesium hydroxide/simethicone Suspension 30 milliLiter(s) Oral every 4 hours PRN Dyspepsia  dextrose Oral Gel 15 Gram(s) Oral once PRN Blood Glucose LESS THAN 70 milliGRAM(s)/deciliter  melatonin 3 milliGRAM(s) Oral at bedtime PRN Insomnia  ondansetron Injectable 4 milliGRAM(s) IV Push every 8 hours PRN Nausea and/or Vomiting      ALLERGIES:  Allergies    No Known Allergies    Intolerances        LABS:                        10.5   20.51 )-----------( 370      ( 19 Mar 2025 07:03 )             31.9     03-18    137  |  99  |  69[H]  ----------------------------<  273[H]  5.3   |  24  |  2.46[H]    Ca    8.9      18 Mar 2025 10:57  Phos  3.5     03-18  Mg     2.6     03-18    TPro  7.2  /  Alb  2.4[L]  /  TBili  0.4  /  DBili  x   /  AST  141[H]  /  ALT  190[H]  /  AlkPhos  551[H]  03-18      Urinalysis Basic - ( 18 Mar 2025 10:57 )    Color: x / Appearance: x / SG: x / pH: x  Gluc: 273 mg/dL / Ketone: x  / Bili: x / Urobili: x   Blood: x / Protein: x / Nitrite: x   Leuk Esterase: x / RBC: x / WBC x   Sq Epi: x / Non Sq Epi: x / Bacteria: x      ABG:      vBG:  pH, Venous: 7.33 (03-18-25 @ 09:42)  pCO2, Venous: 57 mmHg (03-18-25 @ 09:42)  pO2, Venous: 51 mmHg (03-18-25 @ 09:42)  HCO3, Venous: 30 mmol/L (03-18-25 @ 09:42)    Micro:    Culture - Blood (collected 03-16-25 @ 17:19)  Source: Blood Blood  Preliminary Report (03-18-25 @ 23:00):    No growth at 48 Hours    Culture - Blood (collected 03-15-25 @ 11:14)  Source: Blood Blood  Preliminary Report (03-18-25 @ 17:01):    No growth at 72 Hours    Culture - Blood (collected 03-15-25 @ 07:30)  Source: Blood Blood  Gram Stain (03-16-25 @ 23:04):    Growth in anaerobic bottle: Gram Negative Rods  Final Report (03-18-25 @ 07:53):    Growth in anaerobic bottle: Proteus mirabilis    Direct identification is available within approximately 3-5    hours either by Blood Panel Multiplexed PCR or Direct    MALDI-TOF. Details: [email or web address]  Organism: Blood Culture PCR  Proteus mirabilis (03-18-25 @ 07:53)  Organism: Proteus mirabilis (03-18-25 @ 07:53)      Method Type: ARMEN      -  Ampicillin: S <=8 These ampicillin results predict results for amoxicillin      -  Ampicillin/Sulbactam: S <=4/2      -  Aztreonam: S <=4      -  Cefazolin: S <=2      -  Cefepime: S <=2      -  Cefoxitin: S <=8      -  Ceftriaxone: S <=1      -  Ciprofloxacin: S <=0.25      -  Ertapenem: S <=0.5      -  Gentamicin: S <=2      -  Levofloxacin: S <=0.5      -  Meropenem: S <=1      -  Piperacillin/Tazobactam: S <=8      -  Tobramycin: S <=2      -  Trimethoprim/Sulfamethoxazole: S <=0.5/9.5  Organism: Blood Culture PCR (03-18-25 @ 07:53)      Method Type: PCR      -  Proteus species: Detec    Culture - Blood (collected 03-12-25 @ 19:45)  Source: Blood Blood-Peripheral  Final Report (03-18-25 @ 01:01):    No growth at 5 days    Culture - Blood (collected 03-12-25 @ 19:35)  Source: Blood Blood-Peripheral  Final Report (03-18-25 @ 01:01):    No growth at 5 days          RADIOLOGY & ADDITIONAL TESTS: Reviewed.

## 2025-03-19 NOTE — PROGRESS NOTE ADULT - ATTENDING COMMENTS
64M with severe obesity, hypertension, T2DM, PVD s/p L BKA, heart block s/p PPM admitted with severe sepsis likely due to infected kidney stone.    #Severe sepsis  Resolving  On basis of fever, HR, leukocytosis, HOLA, found with Proteus bacteremia, suspected from ureteral stone infection  - appreciate ID input, de-escalating meropenem to ceftriaxone today. s/p stent placement with urology on 3/17 to f/u outpt for definitive stone management  - f/u repeat blood cultures  - monitor WBC count, has not yet downtrended    #HOAL  Stable  Suspect ATN from sepsis, improving  - appreciate nephrology's evaluation  - appreciate urology for Contreras placement, remains with adequate urine output and will follow up outpatient for TOV      #Type 2 diabetes, poorly controlled with hyperglycemia  A1c 9.2, on insulin and multiple oral agents at home  - will continue to increase Lantus and Admelog as needed to achieve euglycemia, lower Lantus this evening as glucose borderline low this morning    Dispo - recommended for acute rehab, though patient prefers to go to [place]

## 2025-03-19 NOTE — PROGRESS NOTE ADULT - PROBLEM SELECTOR PLAN 5
Ua with bacteria + pyuria with leukocyte esterase;  Urine culture 3/12 >100K proteus mirabilis.   s/p zosyn (3/12-3/15), s/p Meropenem (3/15-3/16)    Plan:  -follow up urine cultures    -cw CTX  -antipyretics, antiemetics, analgesics as needed hold home Enalopril iso HOLA    continued amlodipine 10 (3/19)

## 2025-03-19 NOTE — PROGRESS NOTE ADULT - PROBLEM SELECTOR PLAN 2
Unclear baseline cr, Fluctuated between 0.9-1.1 in late 20246  on Admission 1.9. Increasing now at 5.1--> now improved 2.62  Likely pre-renal with vs. ATN vs. post renal iso obstruction 2/2 septic stone. Now s/p ureteral stone placement    Plan:   s/p Lokelma for hyperK  hold ACE-I, if needed can ad amlodipine PO 5mg for BP control  renally dose antibiotics  monitor BMP BID  fu urine studies  Monitor UO, BUN/Cr, volume status, acid-base balance, electrolytes  avoid nephrotoxic agents  dose adjustments for renally cleared meds Unclear baseline cr, Fluctuated between 0.9-1.1 in late 20246  on Admission 1.9. Increasing now at 5.1--> now improved 1.99  Likely pre-renal with vs. ATN vs. post renal iso obstruction 2/2 septic stone. Now s/p ureteral stone placement    Plan:   hold ACE-I  renally dose antibiotics  monitor BMP BID  fu urine studies  Monitor UO, BUN/Cr, volume status, acid-base balance, electrolytes  avoid nephrotoxic agents  dose adjustments for renally cleared meds

## 2025-03-19 NOTE — PROGRESS NOTE ADULT - PROBLEM SELECTOR PLAN 8
Bowel distension on physical exam. patient with history of appendectomy.    Plan:   Patient had a BM 3/16  Bowel regimen  f/u abdominal xray- Mild nonspecific gaseous distention of stomach, with paucity of bowel gas   distally. This may be secondary to gastric outlet obstruction. improving transaminases, likely iso shock  RUQ US Cholelithiasis with gallbladder wall thickening. Question trace pericholecystic fluid.       -CTM  -f/u HIDA scan- no cholecytsitis

## 2025-03-19 NOTE — PROGRESS NOTE ADULT - SUBJECTIVE AND OBJECTIVE BOX
[facility] DIVISION OF KIDNEY DISEASE AND HYPERTENSION  [phone]    RENAL FOLLOW UP NOTE-NEPHROHOSPITALIST  --------------------------------------------------------------------------------  Patient seen and examined this morning, resting in bed    PAST HISTORY  --------------------------------------------------------------------------------  No significant changes to PMH, PSH, FHx, SHx, unless otherwise noted    ALLERGIES & MEDICATIONS  --------------------------------------------------------------------------------  Allergies    No Known Allergies    Intolerances      Standing Inpatient Medications  amLODIPine   Tablet 10 milliGRAM(s) Oral daily  aspirin  chewable 81 milliGRAM(s) Oral daily  atorvastatin 20 milliGRAM(s) Oral at bedtime  cefTRIAXone   IVPB 2000 milliGRAM(s) IV Intermittent <User Schedule>  dextrose 5%. 1000 milliLiter(s) IV Continuous <Continuous>  dextrose 5%. 1000 milliLiter(s) IV Continuous <Continuous>  dextrose 50% Injectable 25 Gram(s) IV Push once  dextrose 50% Injectable 12.5 Gram(s) IV Push once  dextrose 50% Injectable 25 Gram(s) IV Push once  glucagon  Injectable 1 milliGRAM(s) IntraMuscular once  heparin   Injectable 5000 Unit(s) SubCutaneous every 8 hours  insulin glargine Injectable (LANTUS) 52 Unit(s) SubCutaneous at bedtime  insulin lispro (ADMELOG) corrective regimen sliding scale   SubCutaneous three times a day before meals  insulin lispro (ADMELOG) corrective regimen sliding scale   SubCutaneous at bedtime  insulin lispro Injectable (ADMELOG) 15 Unit(s) SubCutaneous three times a day before meals  polyethylene glycol 3350 17 Gram(s) Oral <User Schedule>  senna 2 Tablet(s) Oral daily  tamsulosin 0.8 milliGRAM(s) Oral at bedtime    PRN Inpatient Medications  acetaminophen     Tablet .. 650 milliGRAM(s) Oral every 6 hours PRN  aluminum hydroxide/magnesium hydroxide/simethicone Suspension 30 milliLiter(s) Oral every 4 hours PRN  dextrose Oral Gel 15 Gram(s) Oral once PRN  melatonin 3 milliGRAM(s) Oral at bedtime PRN  ondansetron Injectable 4 milliGRAM(s) IV Push every 8 hours PRN      FOCUSED REVIEW OF SYSTEMS  --------------------------------------------------------------------------------  denies fevers/rigors  denies CP/palpitations  denies SOB  denies N/V/abd pain. +BM        VITALS/PHYSICAL EXAM  --------------------------------------------------------------------------------  T(C): 37.7 (03-19-25 @ 04:58), Max: 37.7 (03-18-25 @ 17:02)  HR: 94 (03-19-25 @ 04:58) (87 - 94)  BP: 167/861 (03-19-25 @ 04:58) (143/65 - 167/861)  RR: 18 (03-19-25 @ 04:58) (18 - 18)  SpO2: 94% (03-19-25 @ 04:58) (94% - 96%)  Wt(kg): --  Height (cm): 175.3 (03-17-25 @ 16:02)  Weight (kg): 100 (03-17-25 @ 16:02)  BMI (kg/m2): 32.5 (03-17-25 @ 16:02)  BSA (m2): 2.15 (03-17-25 @ 16:02)      03-18-25 @ 07:01  -  03-19-25 @ 07:00  --------------------------------------------------------  IN: 480 mL / OUT: 4600 mL / NET: -4120 mL    03-19-25 @ 07:01  -  03-19-25 @ 12:02  --------------------------------------------------------  IN: 0 mL / OUT: 575 mL / NET: -575 mL      Physical Exam:  	Gen: NAD, lying in bed  	Pulm: CTA B/L ant/lat fields  	CV: RRR, S1S2  	Abd: +BS, soft, nontender/nondistended  	: No suprapubic tenderness.  + crain          Extremity: No RLE edema. CARROLKA  	Neuro: A&O x 3      LABS/STUDIES  --------------------------------------------------------------------------------              10.5   20.51 >-----------<  370      [03-19-25 @ 07:03]              31.9     139  |  101  |  50  ----------------------------<  131      [03-19-25 @ 07:04]  4.4   |  26  |  1.99        Ca     8.8     [03-19-25 @ 07:04]      Mg     2.1     [03-19-25 @ 07:04]      Phos  2.5     [03-19-25 @ 07:04]    TPro  6.8  /  Alb  2.8  /  TBili  0.3  /  DBili  x   /  AST  93  /  ALT  166  /  AlkPhos  499  [03-19-25 @ 07:04]            Creatinine Trend:  SCr 1.99 [03-19 @ 07:04]  SCr 2.46 [03-18 @ 10:57]  SCr 2.62 [03-18 @ 06:48]  SCr 4.02 [03-17 @ 06:35]  SCr 4.52 [03-16 @ 19:09]              Urinalysis - [03-19-25 @ 07:04]      Color  / Appearance  / SG  / pH       Gluc 131 / Ketone   / Bili  / Urobili        Blood  / Protein  / Leuk Est  / Nitrite       RBC  / WBC  / Hyaline  / Gran  / Sq Epi  / Non Sq Epi  / Bacteria     Urine Creatinine 178      [03-14-25 @ 21:03]  Urine Protein 92      [03-14-25 @ 21:03]  Urine Sodium 18      [03-14-25 @ 21:03]  Urine Urea Nitrogen 325      [03-14-25 @ 21:02]  Urine Potassium 63      [03-14-25 @ 21:03]  Urine Osmolality 324      [03-14-25 @ 21:03]    Vitamin D (25OH) 33.9      [06-13-24 @ 01:51]  TSH 2.54      [06-11-24 @ 03:28]  Lipid: chol 159, , HDL 39, LDL --      [03-13-25 @ 06:46]

## 2025-03-19 NOTE — PROGRESS NOTE ADULT - PROBLEM SELECTOR PLAN 4
febrile, leukocytosis, tachycardic, + lactic acidosis; meets severe sepsis criteria  suspect 2/2 uti --> Urine culture 3/12 >100K proteus mirabilis.   CXR- no consolidations  CT A/P with 0.6 cm calculus within the proximal left ureter with mild left hydronephrosis  RUQ US Cholelithiasis with gallbladder wall thickening. Question trace pericholecystic fluid.   MRSA PCR- negative  new proteus bacteremia + hydro on CT  HIDA- with no cholecystitis    Plan:   - cw CTX 2g  - sp Meropenem 500mg BID (3/15-3/16)  - follow up blood cultures 3/18  -repeat Bc 3/20  - f/u sputum cx  - trend lactate q4-6h until wnl  - Monitor for fever, changes in white count

## 2025-03-19 NOTE — PROGRESS NOTE ADULT - PROBLEM SELECTOR PLAN 3
on presentation, BG ~400s,   UA: w no glucosuria and no ketonuria, BHB wnl, with HAGMA  s/p 2L LR  likely stress hyperglycemia iso sepsis    Plan:   -Lantus increased 50 units  cw ISS  consider endocrine consult  -hold home regimen  -fu a1c   -trend fingerstick glu  - goal inpatient BG- 180  -cont home neurontin  - carb consistent diet on presentation, BG ~400s,   UA: w no glucosuria and no ketonuria, BHB wnl, with HAGMA  s/p 2L LR  likely stress hyperglycemia iso sepsis    Plan:   -Lantus increased 52 units  cw ISS  consider endocrine consult  -hold home regimen  A1C - 9.2%  -trend fingerstick glu  - goal inpatient BG- 180  -cont home neurontin  - carb consistent diet

## 2025-03-20 LAB
ALBUMIN SERPL ELPH-MCNC: 2.8 G/DL — LOW (ref 3.3–5)
ALP SERPL-CCNC: 545 U/L — HIGH (ref 40–120)
ALT FLD-CCNC: 156 U/L — HIGH (ref 10–45)
ANION GAP SERPL CALC-SCNC: 13 MMOL/L — SIGNIFICANT CHANGE UP (ref 5–17)
AST SERPL-CCNC: 91 U/L — HIGH (ref 10–40)
BASOPHILS # BLD AUTO: 0.14 K/UL — SIGNIFICANT CHANGE UP (ref 0–0.2)
BASOPHILS NFR BLD AUTO: 0.7 % — SIGNIFICANT CHANGE UP (ref 0–2)
BILIRUB SERPL-MCNC: 0.4 MG/DL — SIGNIFICANT CHANGE UP (ref 0.2–1.2)
BUN SERPL-MCNC: 32 MG/DL — HIGH (ref 7–23)
CALCIUM SERPL-MCNC: 9.1 MG/DL — SIGNIFICANT CHANGE UP (ref 8.4–10.5)
CHLORIDE SERPL-SCNC: 102 MMOL/L — SIGNIFICANT CHANGE UP (ref 96–108)
CO2 SERPL-SCNC: 25 MMOL/L — SIGNIFICANT CHANGE UP (ref 22–31)
CREAT SERPL-MCNC: 1.46 MG/DL — HIGH (ref 0.5–1.3)
CULTURE RESULTS: SIGNIFICANT CHANGE UP
EGFR: 53 ML/MIN/1.73M2 — LOW
EGFR: 53 ML/MIN/1.73M2 — LOW
EOSINOPHIL # BLD AUTO: 0.29 K/UL — SIGNIFICANT CHANGE UP (ref 0–0.5)
EOSINOPHIL NFR BLD AUTO: 1.5 % — SIGNIFICANT CHANGE UP (ref 0–6)
GLUCOSE BLDC GLUCOMTR-MCNC: 117 MG/DL — HIGH (ref 70–99)
GLUCOSE BLDC GLUCOMTR-MCNC: 142 MG/DL — HIGH (ref 70–99)
GLUCOSE BLDC GLUCOMTR-MCNC: 226 MG/DL — HIGH (ref 70–99)
GLUCOSE BLDC GLUCOMTR-MCNC: 235 MG/DL — HIGH (ref 70–99)
GLUCOSE BLDC GLUCOMTR-MCNC: 98 MG/DL — SIGNIFICANT CHANGE UP (ref 70–99)
GLUCOSE SERPL-MCNC: 112 MG/DL — HIGH (ref 70–99)
HCT VFR BLD CALC: 33.3 % — LOW (ref 39–50)
HGB BLD-MCNC: 10.9 G/DL — LOW (ref 13–17)
IMM GRANULOCYTES NFR BLD AUTO: 4.5 % — HIGH (ref 0–0.9)
LYMPHOCYTES # BLD AUTO: 1.8 K/UL — SIGNIFICANT CHANGE UP (ref 1–3.3)
LYMPHOCYTES # BLD AUTO: 9.3 % — LOW (ref 13–44)
MAGNESIUM SERPL-MCNC: 1.8 MG/DL — SIGNIFICANT CHANGE UP (ref 1.6–2.6)
MCHC RBC-ENTMCNC: 27.3 PG — SIGNIFICANT CHANGE UP (ref 27–34)
MCHC RBC-ENTMCNC: 32.7 G/DL — SIGNIFICANT CHANGE UP (ref 32–36)
MCV RBC AUTO: 83.3 FL — SIGNIFICANT CHANGE UP (ref 80–100)
MONOCYTES # BLD AUTO: 1.21 K/UL — HIGH (ref 0–0.9)
MONOCYTES NFR BLD AUTO: 6.2 % — SIGNIFICANT CHANGE UP (ref 2–14)
NEUTROPHILS # BLD AUTO: 15.09 K/UL — HIGH (ref 1.8–7.4)
NEUTROPHILS NFR BLD AUTO: 77.8 % — HIGH (ref 43–77)
NRBC BLD AUTO-RTO: 0 /100 WBCS — SIGNIFICANT CHANGE UP (ref 0–0)
PHOSPHATE SERPL-MCNC: 3.4 MG/DL — SIGNIFICANT CHANGE UP (ref 2.5–4.5)
PLATELET # BLD AUTO: 435 K/UL — HIGH (ref 150–400)
POTASSIUM SERPL-MCNC: 4.5 MMOL/L — SIGNIFICANT CHANGE UP (ref 3.5–5.3)
POTASSIUM SERPL-SCNC: 4.5 MMOL/L — SIGNIFICANT CHANGE UP (ref 3.5–5.3)
PROT SERPL-MCNC: 7.3 G/DL — SIGNIFICANT CHANGE UP (ref 6–8.3)
RBC # BLD: 4 M/UL — LOW (ref 4.2–5.8)
RBC # FLD: 15.6 % — HIGH (ref 10.3–14.5)
SODIUM SERPL-SCNC: 140 MMOL/L — SIGNIFICANT CHANGE UP (ref 135–145)
SPECIMEN SOURCE: SIGNIFICANT CHANGE UP
WBC # BLD: 19.4 K/UL — HIGH (ref 3.8–10.5)
WBC # FLD AUTO: 19.4 K/UL — HIGH (ref 3.8–10.5)

## 2025-03-20 PROCEDURE — 99221 1ST HOSP IP/OBS SF/LOW 40: CPT

## 2025-03-20 PROCEDURE — 99232 SBSQ HOSP IP/OBS MODERATE 35: CPT

## 2025-03-20 PROCEDURE — 99232 SBSQ HOSP IP/OBS MODERATE 35: CPT | Mod: GC

## 2025-03-20 RX ORDER — INSULIN GLARGINE-YFGN 100 [IU]/ML
45 INJECTION, SOLUTION SUBCUTANEOUS AT BEDTIME
Refills: 0 | Status: DISCONTINUED | OUTPATIENT
Start: 2025-03-20 | End: 2025-03-21

## 2025-03-20 RX ADMIN — INSULIN LISPRO 15 UNIT(S): 100 INJECTION, SOLUTION INTRAVENOUS; SUBCUTANEOUS at 12:50

## 2025-03-20 RX ADMIN — INSULIN LISPRO 4: 100 INJECTION, SOLUTION INTRAVENOUS; SUBCUTANEOUS at 18:21

## 2025-03-20 RX ADMIN — Medication 25 MILLIGRAM(S): at 05:16

## 2025-03-20 RX ADMIN — HEPARIN SODIUM 5000 UNIT(S): 1000 INJECTION INTRAVENOUS; SUBCUTANEOUS at 05:16

## 2025-03-20 RX ADMIN — INSULIN GLARGINE-YFGN 45 UNIT(S): 100 INJECTION, SOLUTION SUBCUTANEOUS at 21:53

## 2025-03-20 RX ADMIN — INSULIN LISPRO 15 UNIT(S): 100 INJECTION, SOLUTION INTRAVENOUS; SUBCUTANEOUS at 18:21

## 2025-03-20 RX ADMIN — HEPARIN SODIUM 5000 UNIT(S): 1000 INJECTION INTRAVENOUS; SUBCUTANEOUS at 21:49

## 2025-03-20 RX ADMIN — HEPARIN SODIUM 5000 UNIT(S): 1000 INJECTION INTRAVENOUS; SUBCUTANEOUS at 13:44

## 2025-03-20 RX ADMIN — POLYETHYLENE GLYCOL 3350 17 GRAM(S): 17 POWDER, FOR SOLUTION ORAL at 17:51

## 2025-03-20 RX ADMIN — AMLODIPINE BESYLATE 10 MILLIGRAM(S): 10 TABLET ORAL at 05:15

## 2025-03-20 RX ADMIN — ATORVASTATIN CALCIUM 20 MILLIGRAM(S): 80 TABLET, FILM COATED ORAL at 21:49

## 2025-03-20 RX ADMIN — CEFTRIAXONE 100 MILLIGRAM(S): 500 INJECTION, POWDER, FOR SOLUTION INTRAMUSCULAR; INTRAVENOUS at 18:11

## 2025-03-20 RX ADMIN — INSULIN LISPRO 4: 100 INJECTION, SOLUTION INTRAVENOUS; SUBCUTANEOUS at 12:50

## 2025-03-20 RX ADMIN — Medication 25 MILLIGRAM(S): at 13:44

## 2025-03-20 RX ADMIN — TAMSULOSIN HYDROCHLORIDE 0.8 MILLIGRAM(S): 0.4 CAPSULE ORAL at 21:49

## 2025-03-20 RX ADMIN — Medication 2 TABLET(S): at 11:35

## 2025-03-20 RX ADMIN — Medication 81 MILLIGRAM(S): at 11:35

## 2025-03-20 RX ADMIN — Medication 25 MILLIGRAM(S): at 21:49

## 2025-03-20 NOTE — PROGRESS NOTE ADULT - PROBLEM SELECTOR PLAN 7
(IMPROVED) Likely iso decreased free water clearance. Can be in setting of SIADH 2/2 sepsis   SNa -125  Improvement today-137    Plan:   avoid hypotonic fluids   f/u urine lytes + serum osmolality  -f/u nephrology recommendations

## 2025-03-20 NOTE — PROGRESS NOTE ADULT - PROBLEM SELECTOR PLAN 6
Ua with bacteria + pyuria with leukocyte esterase;  Urine culture 3/12 >100K proteus mirabilis.   s/p zosyn (3/12-3/15), s/p Meropenem (3/15-3/16)    Plan:  -follow up urine cultures    -cw CTX  -antipyretics, antiemetics, analgesics as needed

## 2025-03-20 NOTE — CONSULT NOTE ADULT - SUBJECTIVE AND OBJECTIVE BOX
Wound SURGERY CONSULT NOTE    HPI:  63yo m w pmh obesity, htn, hld, dm c/b peripheral neuropathy + retinopathy, pad s/p l bka, cva, chb s/p ppm, p/w generalized fatigue/weakness; symptoms have been ongoing for the last few days; denies any associated fever, chills, rigors, chest pain, sob/patel, cough, lightheadedness/dizziness, loc, n+v, diaphoresis, abdominal discomfort, dysuria; patient did note his blood sugar levels to be in the 300s, which was unusual to him as they are generally better controlled. patient grew concerned so presents to [facility] er for further evaluation. in er, found to be meeting severe sepsis criteria, and found to have multiple metabolic disturbances including hyperglycemia, catrina w hagma; suspect 2/2 uti; admit to medicine for further mgmt (12 Mar 2025 18:46)        Wound consult requested by team to assist w/ management of skin injury.   Pt w/o c/o pain, drainage, odor, color change,  or worsening swelling. Offloading and pericare initiated upon admission as pt Increasingly sedentary 2/2 to illness. Pt is continent of urine & stool.   Appetite good.  All questions asked and answered to pt's expressed understanding and satisfaction.    Current Diet: Diet, Consistent Carbohydrate w/Evening Snack:   No Concentrated Potassium  Low Sodium  Supplement Feeding Modality:  Oral  Glucerna Shake Cans or Servings Per Day:  1       Frequency:  Daily (03-18-25 @ 13:15)      PAST MEDICAL & SURGICAL HISTORY:  Retinopathy      Diabetes mellitus type 2 in obese      Hyperlipidemia      Toe infection  s/p amputation      Ruptured appendix      S/P appendectomy      Toe amputation status  left pinky toe          REVIEW OF SYSTEMS:   General/ Breast/ Skin/Vasc/ Neuro/ MSK: see HPI  All other systems negative    MEDICATIONS  (STANDING):  amLODIPine   Tablet 10 milliGRAM(s) Oral daily  aspirin  chewable 81 milliGRAM(s) Oral daily  atorvastatin 20 milliGRAM(s) Oral at bedtime  cefTRIAXone   IVPB 2000 milliGRAM(s) IV Intermittent <User Schedule>  dextrose 5%. 1000 milliLiter(s) (100 mL/Hr) IV Continuous <Continuous>  dextrose 5%. 1000 milliLiter(s) (50 mL/Hr) IV Continuous <Continuous>  dextrose 50% Injectable 25 Gram(s) IV Push once  dextrose 50% Injectable 12.5 Gram(s) IV Push once  dextrose 50% Injectable 25 Gram(s) IV Push once  glucagon  Injectable 1 milliGRAM(s) IntraMuscular once  heparin   Injectable 5000 Unit(s) SubCutaneous every 8 hours  hydrALAZINE 25 milliGRAM(s) Oral three times a day  insulin glargine Injectable (LANTUS) 45 Unit(s) SubCutaneous at bedtime  insulin lispro (ADMELOG) corrective regimen sliding scale   SubCutaneous three times a day before meals  insulin lispro (ADMELOG) corrective regimen sliding scale   SubCutaneous at bedtime  insulin lispro Injectable (ADMELOG) 15 Unit(s) SubCutaneous three times a day before meals  polyethylene glycol 3350 17 Gram(s) Oral <User Schedule>  senna 2 Tablet(s) Oral daily  tamsulosin 0.8 milliGRAM(s) Oral at bedtime    MEDICATIONS  (PRN):  acetaminophen     Tablet .. 650 milliGRAM(s) Oral every 6 hours PRN Temp greater or equal to 38C (100.4F), Mild Pain (1 - 3)  aluminum hydroxide/magnesium hydroxide/simethicone Suspension 30 milliLiter(s) Oral every 4 hours PRN Dyspepsia  dextrose Oral Gel 15 Gram(s) Oral once PRN Blood Glucose LESS THAN 70 milliGRAM(s)/deciliter  melatonin 3 milliGRAM(s) Oral at bedtime PRN Insomnia  ondansetron Injectable 4 milliGRAM(s) IV Push every 8 hours PRN Nausea and/or Vomiting      Allergies    No Known Allergies    Intolerances        SOCIAL HISTORY:      ; no hx of smoking, ETOH, drugs    FAMILY HISTORY:    Family history of diabetes mellitus in grandmother (Grandparent)      PHYSICAL EXAM:  Vital Signs Last 24 Hrs  T(C): 36.9 (20 Mar 2025 13:49), Max: 37.2 (20 Mar 2025 00:04)  T(F): 98.5 (20 Mar 2025 13:49), Max: 99 (20 Mar 2025 00:04)  HR: 102 (20 Mar 2025 13:49) (95 - 102)  BP: 144/71 (20 Mar 2025 13:49) (132/78 - 176/84)  BP(mean): --  RR: 18 (20 Mar 2025 13:49) (18 - 18)  SpO2: 96% (20 Mar 2025 13:49) (93% - 96%)    Parameters below as of 20 Mar 2025 13:49  Patient On (Oxygen Delivery Method): room air        NAD/  A&Ox3/ Obese  WD/ WN/Versa Care P500 bed  HEENT: sclera clear, mucosa moist, throat clear, trachea midline, neck supple  Respiratory: nonlabored w/ equal chest rise  Gastrointestinal: soft NT/ND  : Deferred  Neurology: verbal,  follows commands  Psych: calm/ appropriate  Musculoskeletal:  FROM, no deformities/ contractures  Vascular: BLE equally warm,  no cyanosis, clubbing,  nor acute ischemia              BLE edema equal             LBKA: (+) Prostheses  Skin:  moist w/ good turgor    BL buttocks sacral region moist erythema        there is no blistering, no increased warmth, no tenderness        there is no induration, fluctuance, nor crepitus  Lt back blanchable erythema    LABS/ CULTURES/ RADIOLOGY:                        10.9   19.40 )-----------( 435      ( 20 Mar 2025 06:59 )             33.3       140  |  102  |  32  ----------------------------<  112      [03-20-25 @ 06:57]  4.5   |  25  |  1.46        Ca     9.1     [03-20-25 @ 06:57]      Mg     1.8     [03-20-25 @ 06:57]      Phos  3.4     [03-20-25 @ 06:57]    TPro  7.3  /  Alb  2.8  /  TBili  0.4  /  DBili  x   /  AST  91  /  ALT  156  /  AlkPhos  545  [03-20-25 @ 06:57]            A1C with Estimated Average Glucose Result: 9.2 % (03-13-25 @ 06:45)        Culture - Blood (collected 03-18-25 @ 05:48)  Source: Blood Blood-Peripheral  Preliminary Report (03-20-25 @ 11:01):    No growth at 48 Hours    Culture - Blood (collected 03-18-25 @ 05:39)  Source: Blood Blood-Peripheral  Preliminary Report (03-20-25 @ 11:01):    No growth at 48 Hours    Culture - Blood (collected 03-16-25 @ 17:19)  Source: Blood Blood  Preliminary Report (03-19-25 @ 23:23):    No growth at 72 Hours    Culture - Blood (collected 03-15-25 @ 11:14)  Source: Blood Blood  Final Report (03-20-25 @ 17:00):    No growth at 5 days    Culture - Blood (collected 03-15-25 @ 07:30)  Source: Blood Blood  Gram Stain (03-16-25 @ 23:04):    Growth in anaerobic bottle: Gram Negative Rods  Final Report (03-18-25 @ 07:53):    Growth in anaerobic bottle: Proteus mirabilis    Direct identification is available within approximately 3-5    hours either by Blood Panel Multiplexed PCR or Direct    MALDI-TOF. Details: [email or web address]  Organism: Blood Culture PCR  Proteus mirabilis (03-18-25 @ 07:53)  Organism: Proteus mirabilis (03-18-25 @ 07:53)      Method Type: ARMEN      -  Ampicillin: S <=8 These ampicillin results predict results for amoxicillin      -  Ampicillin/Sulbactam: S <=4/2      -  Aztreonam: S <=4      -  Cefazolin: S <=2      -  Cefepime: S <=2      -  Cefoxitin: S <=8      -  Ceftriaxone: S <=1      -  Ciprofloxacin: S <=0.25      -  Ertapenem: S <=0.5      -  Gentamicin: S <=2      -  Levofloxacin: S <=0.5      -  Meropenem: S <=1      -  Piperacillin/Tazobactam: S <=8      -  Tobramycin: S <=2      -  Trimethoprim/Sulfamethoxazole: S <=0.5/9.5  Organism: Blood Culture PCR (03-18-25 @ 07:53)      Method Type: PCR      -  Proteus species: Detec

## 2025-03-20 NOTE — PROGRESS NOTE ADULT - ATTENDING COMMENTS
64M with severe obesity, hypertension, T2DM, PVD s/p L BKA, heart block s/p PPM admitted with severe sepsis likely due to infected kidney stone.    #Severe sepsis  Resolving  On basis of fever, HR, leukocytosis, HOLA, found with Proteus bacteremia, suspected from ureteral stone infection  - appreciate ID input, de-escalated meropenem to ceftriaxone s/p stent placement with urology on 3/17 to f/u outpt for definitive stone management  - f/u repeat blood cultures NGTD  - monitor WBC count, slow to come down    #HOLA  Suspect ATN from sepsis, improving  - appreciate nephrology's evaluation  - appreciate urology for Contreras placement, remains with adequate urine output and will follow up outpatient for TOV      #Type 2 diabetes, poorly controlled with hyperglycemia  A1c 9.2, on insulin and multiple oral agents at home  - will continue to increase Lantus and Admelog as needed to achieve euglycemia, lower Lantus this evening as glucose borderline low this morning    Dispo - recommended for acute rehab, though patient [facility] to go to [facility] [place] to [place] with [place] antibiotics

## 2025-03-20 NOTE — CONSULT NOTE ADULT - ASSESSMENT
A/P:63yo m w pmh obesity, htn, hld, dm c/b peripheral neuropathy + retinopathy, pad s/p l bka, cva, chb s/p ppm, p/w generalized fatigue/weakness; symptoms have been ongoing for the last few days; denies any associated fever, chills, rigors, chest pain, sob/patel, cough, lightheadedness/dizziness, loc, n+v, diaphoresis, abdominal discomfort, dysuria; patient did note his blood sugar levels to be in the 300s, which was unusual to him as they are generally better controlled. patient grew concerned so presents to [facility] er for further evaluation. in er, found to be meeting severe sepsis criteria, and found to have multiple metabolic disturbances including hyperglycemia, catrina w hagma; suspect 2/2 uti; admit to medicine for further mgmt     Wound Consult requested to assist w/ management of  Prophylactic measures      Moisturize intact skin w/ SWEEN cream BID  Nutrition Consult for optimization in pt w/  Increased nutritional needs      Hyperglycemia - improving w/ ADA diet and Lispro consider Endo Consult, consider HgA1c    Continue turning and positioning w/ offloading assistive devices as per protocol  Buttocks/ Sacrum  cavilon daily Jose BID and prn soiling        Continue w/ attends under pads and Pericare as per protocol  Waffle Cushion to chair when oob to chair  Continue w/ low air loss pressure redistribution bed surface   Care as per medicine, will remain available as requested  Upon discharge f/u as outpatient at [facility] [address] [facility] [phone]  Seen and D/w team & RN  Thank you for this consult,  Love Cardoso, STEPHANIE-BC, [facility]  [phone]  Nights/ Weekends/ Holidays please call:  General Surgery Consult pager ([phone]) for emergencies  Wound PT for multilayer leg wrapping or VAC issues ([phone])

## 2025-03-20 NOTE — CHART NOTE - NSCHARTNOTEFT_GEN_A_CORE
NUTRITION FOLLOW UP NOTE    PATIENT SEEN FOR: nutrition service follow-up     SOURCE: [x] Patient  [x] Current Medical Record  [x] RN  [] Family/support person at bedside  [] Patient unavailable/inappropriate  [] Other:    CHART REVIEWED/EVENTS NOTED.  [] No changes to nutrition care plan to note  [x] Nutrition Status:  - history of left BKA  - HOLA likely pre-renal with vs. ATN vs. post renal iso obstruction 2/ septic stone  - Ureteral stone s/p cystoscopy and left stent placement 3/17    DIET ORDER:   Diet, Consistent Carbohydrate w/Evening Snack:   No Concentrated Potassium  Low Sodium  Supplement Feeding Modality:  Oral  Glucerna Shake Cans or Servings Per Day:  1       Frequency:  Daily (25)      CURRENT DIET ORDER IS:  [] Appropriate:  [] Inadequate:  [x] Other: see below for recommendation     NUTRITION INTAKE/PROVISION:  [x] PO: pt reports good PO intake with foods provided, also reports drinking protein supplements provided. States has been ordering meals. However noted poor PO intake per chart. Denies difficulty chewing/swallowing. Pt did not voice any food preferences when asked, made aware RD remains available upon request and will follow-up per protocol.   [] Enteral Nutrition:  [] Parenteral Nutrition:    ANTHROPOMETRICS:  Drug Dosing Weight  Height (cm): 175.3 (17 Mar 2025 16:02)  Weight (kg): 100 (17 Mar 2025 16:02)  BMI (kg/m2): 32.5 (17 Mar 2025 16:02)  Weights:   Daily Weight in k (-18), Weight in k.8 ()   - weight fluctuation in house might be multifactorial: fluid shift, bed scale accuracy.     MEDICATIONS:  MEDICATIONS  (STANDING):  amLODIPine   Tablet 10 milliGRAM(s) Oral daily  aspirin  chewable 81 milliGRAM(s) Oral daily  atorvastatin 20 milliGRAM(s) Oral at bedtime  cefTRIAXone   IVPB 2000 milliGRAM(s) IV Intermittent <User Schedule>  dextrose 5%. 1000 milliLiter(s) (100 mL/Hr) IV Continuous <Continuous>  dextrose 5%. 1000 milliLiter(s) (50 mL/Hr) IV Continuous <Continuous>  dextrose 50% Injectable 25 Gram(s) IV Push once  dextrose 50% Injectable 12.5 Gram(s) IV Push once  dextrose 50% Injectable 25 Gram(s) IV Push once  glucagon  Injectable 1 milliGRAM(s) IntraMuscular once  heparin   Injectable 5000 Unit(s) SubCutaneous every 8 hours  hydrALAZINE 25 milliGRAM(s) Oral three times a day  insulin glargine Injectable (LANTUS) 45 Unit(s) SubCutaneous at bedtime  insulin lispro (ADMELOG) corrective regimen sliding scale   SubCutaneous three times a day before meals  insulin lispro (ADMELOG) corrective regimen sliding scale   SubCutaneous at bedtime  insulin lispro Injectable (ADMELOG) 15 Unit(s) SubCutaneous three times a day before meals  polyethylene glycol 3350 17 Gram(s) Oral <User Schedule>  senna 2 Tablet(s) Oral daily  tamsulosin 0.8 milliGRAM(s) Oral at bedtime    MEDICATIONS  (PRN):  acetaminophen     Tablet .. 650 milliGRAM(s) Oral every 6 hours PRN Temp greater or equal to 38C (100.4F), Mild Pain (1 - 3)  aluminum hydroxide/magnesium hydroxide/simethicone Suspension 30 milliLiter(s) Oral every 4 hours PRN Dyspepsia  dextrose Oral Gel 15 Gram(s) Oral once PRN Blood Glucose LESS THAN 70 milliGRAM(s)/deciliter  melatonin 3 milliGRAM(s) Oral at bedtime PRN Insomnia  ondansetron Injectable 4 milliGRAM(s) IV Push every 8 hours PRN Nausea and/or Vomiting      NUTRITIONALLY PERTINENT LABS:   Na140 mmol/L Glu 112 mg/dL[H] K+ 4.5 mmol/L Cr  1.46 mg/dL[H] BUN 32 mg/dL[H]  Phos 3.4 mg/dL  Alb 2.8 g/dL[L]  Chol 159 mg/dL LDL --    HDL 39 mg/dL[L] Trig 213 mg/dL[H]  U/L[H] AST 91 U/L[H] Alkaline Phosphatase 545 U/L[H]  25 @ 06:45 a1c 9.2    A1C with Estimated Average Glucose Result: 9.2 % (25 @ 06:45)    Finger Sticks:  POCT Blood Glucose.: 235 mg/dL ( @ 12:42)  POCT Blood Glucose.: 117 mg/dL ( @ 10:21)  POCT Blood Glucose.: 98 mg/dL ( @ 08:31)  POCT Blood Glucose.: 152 mg/dL ( @ 22:09)  POCT Blood Glucose.: 86 mg/dL ( @ 19:40)  POCT Blood Glucose.: 102 mg/dL ( @ 17:47)      NUTRITIONALLY PERTINENT MEDICATIONS/LABS:  [x] Reviewed  [x] Relevant notes on medications/labs:  - DM: ordered for Lantus 5 units QHS, Lispro 12 units TID and ISS to regulate blood glucose in house  - history of hyperkalemia s/p Lokelma, add no concentrated potassium in current diet     EDEMA:  [x] Reviewed  [x] Relevant notes: Generalized +2 edema per flowsheet 3/20    GI/ I&O:  [x] Reviewed  [x] Relevant notes: denies GI distress at present, last BM today per flowsheet, ordered for Miralax and Senna   [x] Other: ordered for Simethicone and Zofran     SKIN:   [] No pressure injuries documented, per nursing flowsheet  [] Pressure injury previously noted  [x] Change in pressure injury documentation: stage II to coccyx per flowsheet 3/19   [] Other:    ESTIMATED NEEDS:  [] No change:  [x] Updated:  Energy: 4331-8895  kcal/day (27-32 kcal/kg)  Protein:   g/day (1.2-1.4 g/kg)  Fluid:   ml/day or [x] defer to team  Based on: IBW of 72.5kg:     NUTRITION DIAGNOSIS:  [x] Prior Dx: Altered Nutrition Related Lab Values  [x] New Dx: Increased nutrient needs related to increased physiological demand for nutrients as evidenced by stage II pressure injury per flowsheet   Goal: Pt will meet >75% estimated nutrient needs as tolerated.     EDUCATION:  [] Yes:  [x] Not appropriate/warranted: pt declines, states everything is fine when offered     NUTRITION CARE PLAN:  1. Diet: consider removing no concentrated potassium restriction, continue Low sodium Consistent Carbohydrate diet with evening snack as tolerated. RD will continue to monitor renal indices and adjust diet as needed.   2. Supplements: Recommend to increase Glucerna to 2x daily (570 calories, 28g protein)   3. Multivitamin/mineral supplementation: consider MVI, pending no medical contraindication, for micronutrient support/aid wound healing.   4: Monitor and encourage PO intake. Encourage use of daily menus. Honor dietary preferences as expressed as able.     [x] Achieved - Continue current nutrition intervention(s)  [] Current medical condition precludes nutrition intervention at this time.    MONITORING AND EVALUATION:   RD remains available upon request and will follow up per protocol:   Cielo Marvin MS, RDN, CDN   Available on [facility] TEAMS NUTRITION FOLLOW UP NOTE    PATIENT SEEN FOR: nutrition service follow-up     SOURCE: [x] Patient  [x] Current Medical Record  [x] RN  [] Family/support person at bedside  [] Patient unavailable/inappropriate  [] Other:    CHART REVIEWED/EVENTS NOTED.  [] No changes to nutrition care plan to note  [x] Nutrition Status:  - history of left BKA  - HOLA likely pre-renal with vs. ATN vs. post renal iso obstruction 2/ septic stone  - Ureteral stone s/p cystoscopy and left stent placement 3/17    DIET ORDER:   Diet, Consistent Carbohydrate w/Evening Snack:   No Concentrated Potassium  Low Sodium  Supplement Feeding Modality:  Oral  Glucerna Shake Cans or Servings Per Day:  1       Frequency:  Daily (25)      CURRENT DIET ORDER IS:  [] Appropriate:  [] Inadequate:  [x] Other: see below for recommendation     NUTRITION INTAKE/PROVISION:  [x] PO: pt reports good PO intake with foods provided, also reports drinking protein supplements provided. States has been ordering meals. However noted poor PO intake per chart. Denies difficulty chewing/swallowing. Pt did not voice any food preferences when asked, made aware RD remains available upon request and will follow-up per protocol.   [] Enteral Nutrition:  [] Parenteral Nutrition:    ANTHROPOMETRICS:  Drug Dosing Weight  Height (cm): 175.3 (17 Mar 2025 16:02)  Weight (kg): 100 (17 Mar 2025 16:02)  BMI (kg/m2): 32.5 (17 Mar 2025 16:02)  Weights:   Daily Weight in k (-18), Weight in k.8 ()   - weight fluctuation in house might be multifactorial: fluid shift, bed scale accuracy.     MEDICATIONS:  MEDICATIONS  (STANDING):  amLODIPine   Tablet 10 milliGRAM(s) Oral daily  aspirin  chewable 81 milliGRAM(s) Oral daily  atorvastatin 20 milliGRAM(s) Oral at bedtime  cefTRIAXone   IVPB 2000 milliGRAM(s) IV Intermittent <User Schedule>  dextrose 5%. 1000 milliLiter(s) (100 mL/Hr) IV Continuous <Continuous>  dextrose 5%. 1000 milliLiter(s) (50 mL/Hr) IV Continuous <Continuous>  dextrose 50% Injectable 25 Gram(s) IV Push once  dextrose 50% Injectable 12.5 Gram(s) IV Push once  dextrose 50% Injectable 25 Gram(s) IV Push once  glucagon  Injectable 1 milliGRAM(s) IntraMuscular once  heparin   Injectable 5000 Unit(s) SubCutaneous every 8 hours  hydrALAZINE 25 milliGRAM(s) Oral three times a day  insulin glargine Injectable (LANTUS) 45 Unit(s) SubCutaneous at bedtime  insulin lispro (ADMELOG) corrective regimen sliding scale   SubCutaneous three times a day before meals  insulin lispro (ADMELOG) corrective regimen sliding scale   SubCutaneous at bedtime  insulin lispro Injectable (ADMELOG) 15 Unit(s) SubCutaneous three times a day before meals  polyethylene glycol 3350 17 Gram(s) Oral <User Schedule>  senna 2 Tablet(s) Oral daily  tamsulosin 0.8 milliGRAM(s) Oral at bedtime    MEDICATIONS  (PRN):  acetaminophen     Tablet .. 650 milliGRAM(s) Oral every 6 hours PRN Temp greater or equal to 38C (100.4F), Mild Pain (1 - 3)  aluminum hydroxide/magnesium hydroxide/simethicone Suspension 30 milliLiter(s) Oral every 4 hours PRN Dyspepsia  dextrose Oral Gel 15 Gram(s) Oral once PRN Blood Glucose LESS THAN 70 milliGRAM(s)/deciliter  melatonin 3 milliGRAM(s) Oral at bedtime PRN Insomnia  ondansetron Injectable 4 milliGRAM(s) IV Push every 8 hours PRN Nausea and/or Vomiting      NUTRITIONALLY PERTINENT LABS:   Na140 mmol/L Glu 112 mg/dL[H] K+ 4.5 mmol/L Cr  1.46 mg/dL[H] BUN 32 mg/dL[H]  Phos 3.4 mg/dL  Alb 2.8 g/dL[L]  Chol 159 mg/dL LDL --    HDL 39 mg/dL[L] Trig 213 mg/dL[H]  U/L[H] AST 91 U/L[H] Alkaline Phosphatase 545 U/L[H]  25 @ 06:45 a1c 9.2    A1C with Estimated Average Glucose Result: 9.2 % (25 @ 06:45)    Finger Sticks:  POCT Blood Glucose.: 235 mg/dL ( @ 12:42)  POCT Blood Glucose.: 117 mg/dL ( @ 10:21)  POCT Blood Glucose.: 98 mg/dL ( @ 08:31)  POCT Blood Glucose.: 152 mg/dL ( @ 22:09)  POCT Blood Glucose.: 86 mg/dL ( @ 19:40)  POCT Blood Glucose.: 102 mg/dL ( @ 17:47)      NUTRITIONALLY PERTINENT MEDICATIONS/LABS:  [x] Reviewed  [x] Relevant notes on medications/labs:  - DM: ordered for Lantus 5 units QHS, Lispro 12 units TID and ISS to regulate blood glucose in house  - history of hyperkalemia s/p Lokelma, add no concentrated potassium in current diet     EDEMA:  [x] Reviewed  [x] Relevant notes: Generalized +2 edema per flowsheet 3/20    GI/ I&O:  [x] Reviewed  [x] Relevant notes: denies GI distress at present, last BM today per flowsheet, ordered for Miralax and Senna   [x] Other: ordered for Simethicone and Zofran     SKIN:   [] No pressure injuries documented, per nursing flowsheet  [] Pressure injury previously noted  [x] Change in pressure injury documentation: stage II to coccyx per flowsheet 3/19   [] Other:    ESTIMATED NEEDS:  [] No change:  [x] Updated:  Energy: 5051-9856  kcal/day (27-32 kcal/kg)  Protein:   g/day (1.2-1.4 g/kg)  Fluid:   ml/day or [x] defer to team  Based on: IBW of 72.5kg:     NUTRITION DIAGNOSIS:  [x] Prior Dx: Altered Nutrition Related Lab Values  [x] New Dx: Increased nutrient needs related to increased physiological demand for nutrients as evidenced by stage II pressure injury per flowsheet   Goal: Pt will meet >75% estimated nutrient needs as tolerated.     EDUCATION:  [] Yes:  [x] Not appropriate/warranted: pt declines, states everything is fine when offered     NUTRITION CARE PLAN:  1. Diet: consider removing no concentrated potassium restriction, continue Low sodium Consistent Carbohydrate diet with evening snack as tolerated. RD will continue to monitor renal indices and adjust diet as needed.   2. Supplements: Recommend to continue Glucerna 1x daily (285 calories, 14g protein). RD will add diet Mighty Shake 1x daily.   3. Multivitamin/mineral supplementation: consider MVI, pending no medical contraindication, for micronutrient support/aid wound healing.   4: Monitor and encourage PO intake. Encourage use of daily menus. Honor dietary preferences as expressed as able.    [x] Achieved - Continue current nutrition intervention(s)  [] Current medical condition precludes nutrition intervention at this time.    MONITORING AND EVALUATION:   RD remains available upon request and will follow up per protocol:   Cielo Marvin MS, RDN, CDN   Available on MS TEAMS

## 2025-03-20 NOTE — PROGRESS NOTE ADULT - PROBLEM SELECTOR PLAN 8
improving transaminases, likely iso shock  RUQ US Cholelithiasis with gallbladder wall thickening. Question trace pericholecystic fluid.       -CTM  -f/u HIDA scan- no cholecytsitis

## 2025-03-20 NOTE — PROGRESS NOTE ADULT - PROBLEM SELECTOR PLAN 1
On ?CKD, baseline creatine noted to fluctuate between 0.9 and 1.1 in late 2024 per review of HIE.  likely underlying diabetic nephropathy.     Urology team placed crain 3/15  FENa of 0.3 as calculated from BMP on 03/12, strongly indicative prerenal etiology  recent hemodynamics noted (fevers, episode of hypotension on 03/13) which is concerning for evolving ATN  Mild L hydronephrosis, 6mm stone noted.   AXR with possible gastric outlet obstruction but CTA/P no obstruction.  Normal NM hepatobiliary scan    -HOLA further resolving, likely multifactorial ATN + septic stone, now s/p L ureteral stent placement    creatinine improved to 1.46 today    patient nonoliguric    Urology following, plan to d/c patient with crain  -continue to hold outpatient ACEi    Continue amlodipine for BP control until HOLA resolves and outpatient BP meds can be resumed  -would defer further IVF for now as patient tolerating PO unless patient becomes polyuric iso post ATN diuresis    -Renally dosed abx for UTI.  UCx with Proteus. BlCx now with GNR, f/u ID recs

## 2025-03-20 NOTE — PROGRESS NOTE ADULT - SUBJECTIVE AND OBJECTIVE BOX
[facility]  [phone]    RENAL FOLLOW UP NOTE- NEPHROHOSPITALIST  --------------------------------------------------------------------------------  Patient seen and examined this morning, wound care at bedside.  patient appears to be less alert with difficulties communicating, unable to find the word for "water"  - "give me the clear stuff"-- after stammering/hesitating.  Unable to state immediately that he is in the hospital    PAST HISTORY  --------------------------------------------------------------------------------  No significant changes to PMH, PSH, FHx, SHx, unless otherwise noted    ALLERGIES & MEDICATIONS  --------------------------------------------------------------------------------  Allergies    No Known Allergies    Intolerances      Standing Inpatient Medications  amLODIPine   Tablet 10 milliGRAM(s) Oral daily  aspirin  chewable 81 milliGRAM(s) Oral daily  atorvastatin 20 milliGRAM(s) Oral at bedtime  cefTRIAXone   IVPB 2000 milliGRAM(s) IV Intermittent <User Schedule>  dextrose 5%. 1000 milliLiter(s) IV Continuous <Continuous>  dextrose 5%. 1000 milliLiter(s) IV Continuous <Continuous>  dextrose 50% Injectable 25 Gram(s) IV Push once  dextrose 50% Injectable 12.5 Gram(s) IV Push once  dextrose 50% Injectable 25 Gram(s) IV Push once  glucagon  Injectable 1 milliGRAM(s) IntraMuscular once  heparin   Injectable 5000 Unit(s) SubCutaneous every 8 hours  hydrALAZINE 25 milliGRAM(s) Oral three times a day  insulin glargine Injectable (LANTUS) 45 Unit(s) SubCutaneous at bedtime  insulin lispro (ADMELOG) corrective regimen sliding scale   SubCutaneous three times a day before meals  insulin lispro (ADMELOG) corrective regimen sliding scale   SubCutaneous at bedtime  insulin lispro Injectable (ADMELOG) 15 Unit(s) SubCutaneous three times a day before meals  polyethylene glycol 3350 17 Gram(s) Oral <User Schedule>  senna 2 Tablet(s) Oral daily  tamsulosin 0.8 milliGRAM(s) Oral at bedtime    PRN Inpatient Medications  acetaminophen     Tablet .. 650 milliGRAM(s) Oral every 6 hours PRN  aluminum hydroxide/magnesium hydroxide/simethicone Suspension 30 milliLiter(s) Oral every 4 hours PRN  dextrose Oral Gel 15 Gram(s) Oral once PRN  melatonin 3 milliGRAM(s) Oral at bedtime PRN  ondansetron Injectable 4 milliGRAM(s) IV Push every 8 hours PRN      FOCUSED REVIEW OF SYSTEMS  --------------------------------------------------------------------------------  denies pain  denies SOB/cough  denies N/V/abd pain, +poor appetite      VITALS/PHYSICAL EXAM  --------------------------------------------------------------------------------  T(C): 36.9 (03-20-25 @ 10:50), Max: 37.3 (03-19-25 @ 17:00)  HR: 96 (03-20-25 @ 10:50) (91 - 102)  BP: 133/69 (03-20-25 @ 10:50) (132/78 - 184/85)  RR: 18 (03-20-25 @ 10:50) (17 - 18)  SpO2: 93% (03-20-25 @ 10:50) (93% - 96%)  Wt(kg): --        03-19-25 @ 07:01  -  03-20-25 @ 07:00  --------------------------------------------------------  IN: 0 mL / OUT: 4575 mL / NET: -4575 mL      Physical Exam:  	Gen: NAD, OOB to chair  	Pulm: CTA B/L no w/r/r  	CV: RRR, S1S2  	Abd: +BS, soft, nontender/nondistended  	: No suprapubic tenderness.  + crain          Extremity: LBKA, no RLE edema      LABS/STUDIES  --------------------------------------------------------------------------------              10.9   19.40 >-----------<  435      [03-20-25 @ 06:59]              33.3     140  |  102  |  32  ----------------------------<  112      [03-20-25 @ 06:57]  4.5   |  25  |  1.46        Ca     9.1     [03-20-25 @ 06:57]      Mg     1.8     [03-20-25 @ 06:57]      Phos  3.4     [03-20-25 @ 06:57]    TPro  7.3  /  Alb  2.8  /  TBili  0.4  /  DBili  x   /  AST  91  /  ALT  156  /  AlkPhos  545  [03-20-25 @ 06:57]            Creatinine Trend:  SCr 1.46 [03-20 @ 06:57]  SCr 1.99 [03-19 @ 07:04]  SCr 2.46 [03-18 @ 10:57]  SCr 2.62 [03-18 @ 06:48]  SCr 4.02 [03-17 @ 06:35]              Urinalysis - [03-20-25 @ 06:57]      Color  / Appearance  / SG  / pH       Gluc 112 / Ketone   / Bili  / Urobili        Blood  / Protein  / Leuk Est  / Nitrite       RBC  / WBC  / Hyaline  / Gran  / Sq Epi  / Non Sq Epi  / Bacteria     Urine Creatinine 178      [03-14-25 @ 21:03]  Urine Protein 92      [03-14-25 @ 21:03]  Urine Sodium 18      [03-14-25 @ 21:03]  Urine Urea Nitrogen 325      [03-14-25 @ 21:02]  Urine Potassium 63      [03-14-25 @ 21:03]  Urine Osmolality 324      [03-14-25 @ 21:03]    Vitamin D (25OH) 33.9      [06-13-24 @ 01:51]  TSH 2.54      [06-11-24 @ 03:28]  Lipid: chol 159, , HDL 39, LDL --      [03-13-25 @ 06:46]

## 2025-03-20 NOTE — PROGRESS NOTE ADULT - PROBLEM SELECTOR PLAN 4
febrile, leukocytosis, tachycardic, + lactic acidosis; meets severe sepsis criteria  suspect 2/2 uti --> Urine culture 3/12 >100K proteus mirabilis.   CXR- no consolidations  CT A/P with 0.6 cm calculus within the proximal left ureter with mild left hydronephrosis  RUQ US Cholelithiasis with gallbladder wall thickening. Question trace pericholecystic fluid.   MRSA PCR- negative  new proteus bacteremia + hydro on CT  HIDA- with no cholecystitis    Plan:   - cw CTX 2g  - sp Meropenem 500mg BID (3/15-3/16)  - follow up blood cultures 3/18  -repeat Bc 3/20  - f/u sputum cx  - trend lactate q4-6h until wnl  - Monitor for fever, changes in white count febrile, leukocytosis, tachycardic, + lactic acidosis; meets severe sepsis criteria  suspect 2/2 uti --> Urine culture 3/12 >100K proteus mirabilis.   CXR- no consolidations  CT A/P with 0.6 cm calculus within the proximal left ureter with mild left hydronephrosis  RUQ US Cholelithiasis with gallbladder wall thickening. Question trace pericholecystic fluid.   MRSA PCR- negative  new proteus bacteremia + hydro on CT  HIDA- with no cholecystitis    Plan:   - cw CTX 2g  - sp Meropenem 500mg BID (3/15-3/16)  - follow up blood cultures 3/18  - f/u sputum cx  - trend lactate q4-6h until wnl  - Monitor for fever, changes in white count

## 2025-03-20 NOTE — PROGRESS NOTE ADULT - SUBJECTIVE AND OBJECTIVE BOX
INTERVAL HPI/OVERNIGHT EVENTS:    SUBJECTIVE: Patient seen and examined at bedside.     ROS: All negative except as listed above.    VITAL SIGNS:  ICU Vital Signs Last 24 Hrs  T(C): 36.7 (20 Mar 2025 04:04), Max: 37.3 (19 Mar 2025 17:00)  T(F): 98 (20 Mar 2025 04:04), Max: 99.2 (19 Mar 2025 17:00)  HR: 102 (20 Mar 2025 05:12) (91 - 102)  BP: 144/92 (20 Mar 2025 05:12) (132/78 - 184/85)  BP(mean): --  ABP: --  ABP(mean): --  RR: 18 (20 Mar 2025 04:04) (17 - 18)  SpO2: 94% (20 Mar 2025 04:04) (94% - 96%)    O2 Parameters below as of 20 Mar 2025 04:04  Patient On (Oxygen Delivery Method): room air            Plateau pressure:   P/F ratio:     03-19 @ 07:01  -  03-20 @ 07:00  --------------------------------------------------------  IN: 0 mL / OUT: 4575 mL / NET: -4575 mL      CAPILLARY BLOOD GLUCOSE      POCT Blood Glucose.: 152 mg/dL (19 Mar 2025 22:09)      ECG: reviewed.    PHYSICAL EXAM:    GENERAL: NAD, lying in bed comfortably  HEAD:  Atraumatic, normocephalic  EYES: EOMI, PERRLA, conjunctiva and sclera clear  NECK: Supple, trachea midline, no JVD  HEART: Regular rate and rhythm, no murmurs, rubs, or gallops  LUNGS: Unlabored respirations.  Clear to auscultation bilaterally, no crackles, wheezing, or rhonchi  ABDOMEN: Soft, nontender, nondistended, +BS  EXTREMITIES: 2+ peripheral pulses bilaterally, cap refill<2 secs. No clubbing, cyanosis, or edema  NERVOUS SYSTEM:  A&Ox3, following commands, moving all extremities, no focal deficits   SKIN: No rashes or lesions    MEDICATIONS:  MEDICATIONS  (STANDING):  amLODIPine   Tablet 10 milliGRAM(s) Oral daily  aspirin  chewable 81 milliGRAM(s) Oral daily  atorvastatin 20 milliGRAM(s) Oral at bedtime  cefTRIAXone   IVPB 2000 milliGRAM(s) IV Intermittent <User Schedule>  dextrose 5%. 1000 milliLiter(s) (100 mL/Hr) IV Continuous <Continuous>  dextrose 5%. 1000 milliLiter(s) (50 mL/Hr) IV Continuous <Continuous>  dextrose 50% Injectable 25 Gram(s) IV Push once  dextrose 50% Injectable 12.5 Gram(s) IV Push once  dextrose 50% Injectable 25 Gram(s) IV Push once  glucagon  Injectable 1 milliGRAM(s) IntraMuscular once  heparin   Injectable 5000 Unit(s) SubCutaneous every 8 hours  hydrALAZINE 25 milliGRAM(s) Oral three times a day  insulin glargine Injectable (LANTUS) 52 Unit(s) SubCutaneous at bedtime  insulin lispro (ADMELOG) corrective regimen sliding scale   SubCutaneous three times a day before meals  insulin lispro (ADMELOG) corrective regimen sliding scale   SubCutaneous at bedtime  insulin lispro Injectable (ADMELOG) 15 Unit(s) SubCutaneous three times a day before meals  polyethylene glycol 3350 17 Gram(s) Oral <User Schedule>  senna 2 Tablet(s) Oral daily  tamsulosin 0.8 milliGRAM(s) Oral at bedtime    MEDICATIONS  (PRN):  acetaminophen     Tablet .. 650 milliGRAM(s) Oral every 6 hours PRN Temp greater or equal to 38C (100.4F), Mild Pain (1 - 3)  aluminum hydroxide/magnesium hydroxide/simethicone Suspension 30 milliLiter(s) Oral every 4 hours PRN Dyspepsia  dextrose Oral Gel 15 Gram(s) Oral once PRN Blood Glucose LESS THAN 70 milliGRAM(s)/deciliter  melatonin 3 milliGRAM(s) Oral at bedtime PRN Insomnia  ondansetron Injectable 4 milliGRAM(s) IV Push every 8 hours PRN Nausea and/or Vomiting      ALLERGIES:  Allergies    No Known Allergies    Intolerances        LABS:                        10.5   20.51 )-----------( 370      ( 19 Mar 2025 07:03 )             31.9     03-19    139  |  101  |  50[H]  ----------------------------<  131[H]  4.4   |  26  |  1.99[H]    Ca    8.8      19 Mar 2025 07:04  Phos  2.5     03-19  Mg     2.1     03-19    TPro  6.8  /  Alb  2.8[L]  /  TBili  0.3  /  DBili  x   /  AST  93[H]  /  ALT  166[H]  /  AlkPhos  499[H]  03-19      Urinalysis Basic - ( 19 Mar 2025 07:04 )    Color: x / Appearance: x / SG: x / pH: x  Gluc: 131 mg/dL / Ketone: x  / Bili: x / Urobili: x   Blood: x / Protein: x / Nitrite: x   Leuk Esterase: x / RBC: x / WBC x   Sq Epi: x / Non Sq Epi: x / Bacteria: x      ABG:      vBG:    Micro:    Culture - Blood (collected 03-18-25 @ 05:48)  Source: Blood Blood-Peripheral  Preliminary Report (03-19-25 @ 10:01):    No growth at 24 hours    Culture - Blood (collected 03-18-25 @ 05:39)  Source: Blood Blood-Peripheral  Preliminary Report (03-19-25 @ 10:01):    No growth at 24 hours    Culture - Blood (collected 03-16-25 @ 17:19)  Source: Blood Blood  Preliminary Report (03-19-25 @ 23:23):    No growth at 72 Hours    Culture - Blood (collected 03-15-25 @ 11:14)  Source: Blood Blood  Preliminary Report (03-19-25 @ 17:01):    No growth at 4 days    Culture - Blood (collected 03-15-25 @ 07:30)  Source: Blood Blood  Gram Stain (03-16-25 @ 23:04):    Growth in anaerobic bottle: Gram Negative Rods  Final Report (03-18-25 @ 07:53):    Growth in anaerobic bottle: Proteus mirabilis    Direct identification is available within approximately 3-5    hours either by Blood Panel Multiplexed PCR or Direct    MALDI-TOF. Details: [email or web address]  Organism: Blood Culture PCR  Proteus mirabilis (03-18-25 @ 07:53)  Organism: Proteus mirabilis (03-18-25 @ 07:53)      Method Type: ARMEN      -  Ampicillin: S <=8 These ampicillin results predict results for amoxicillin      -  Ampicillin/Sulbactam: S <=4/2      -  Aztreonam: S <=4      -  Cefazolin: S <=2      -  Cefepime: S <=2      -  Cefoxitin: S <=8      -  Ceftriaxone: S <=1      -  Ciprofloxacin: S <=0.25      -  Ertapenem: S <=0.5      -  Gentamicin: S <=2      -  Levofloxacin: S <=0.5      -  Meropenem: S <=1      -  Piperacillin/Tazobactam: S <=8      -  Tobramycin: S <=2      -  Trimethoprim/Sulfamethoxazole: S <=0.5/9.5  Organism: Blood Culture PCR (03-18-25 @ 07:53)      Method Type: PCR      -  Proteus species: Detec    Culture - Blood (collected 03-12-25 @ 19:45)  Source: Blood Blood-Peripheral  Final Report (03-18-25 @ 01:01):    No growth at 5 days    Culture - Blood (collected 03-12-25 @ 19:35)  Source: Blood Blood-Peripheral  Final Report (03-18-25 @ 01:01):    No growth at 5 days          RADIOLOGY & ADDITIONAL TESTS: Reviewed.   INTERVAL HPI/OVERNIGHT EVENTS:    SUBJECTIVE: Patient seen and examined at bedside. Patient states that he is fatigue this AM. Was called by RN stating that patient has no appettite. Was also contacted by nephrology team stating that the patient speech was slow, and that he did not know that he was in the hospital. RN contacted that he was back to baseline. Examined patient again at bedside, provided his breakfast and immediately stated that he feels better and more energetic. Spoke with sister 2x on the phone at bedside. Also attempted to call patients wife at bedside and left a voicemail.     ROS: All negative except as listed above.    VITAL SIGNS:  ICU Vital Signs Last 24 Hrs  T(C): 36.7 (20 Mar 2025 04:04), Max: 37.3 (19 Mar 2025 17:00)  T(F): 98 (20 Mar 2025 04:04), Max: 99.2 (19 Mar 2025 17:00)  HR: 102 (20 Mar 2025 05:12) (91 - 102)  BP: 144/92 (20 Mar 2025 05:12) (132/78 - 184/85)  BP(mean): --  ABP: --  ABP(mean): --  RR: 18 (20 Mar 2025 04:04) (17 - 18)  SpO2: 94% (20 Mar 2025 04:04) (94% - 96%)    O2 Parameters below as of 20 Mar 2025 04:04  Patient On (Oxygen Delivery Method): room air            Plateau pressure:   P/F ratio:     03-19 @ 07:01  -  03-20 @ 07:00  --------------------------------------------------------  IN: 0 mL / OUT: 4575 mL / NET: -4575 mL      CAPILLARY BLOOD GLUCOSE      POCT Blood Glucose.: 152 mg/dL (19 Mar 2025 22:09)      ECG: reviewed.    PHYSICAL EXAM:    GENERAL: NAD, sitting in chair  HEAD:  Atraumatic, normocephalic  EYES: EOMI, PERRLA, conjunctiva and sclera clear  NECK: Supple, trachea midline, no JVD  HEART: Regular rate and rhythm, no murmurs, rubs, or gallops  LUNGS: Unlabored respirations.   ABDOMEN: mild distension  EXTREMITIES: left bka  NERVOUS SYSTEM:  A&Ox3, following commands, moving all extremities, no focal deficits   SKIN: No rashes or lesions    MEDICATIONS:  MEDICATIONS  (STANDING):  amLODIPine   Tablet 10 milliGRAM(s) Oral daily  aspirin  chewable 81 milliGRAM(s) Oral daily  atorvastatin 20 milliGRAM(s) Oral at bedtime  cefTRIAXone   IVPB 2000 milliGRAM(s) IV Intermittent <User Schedule>  dextrose 5%. 1000 milliLiter(s) (100 mL/Hr) IV Continuous <Continuous>  dextrose 5%. 1000 milliLiter(s) (50 mL/Hr) IV Continuous <Continuous>  dextrose 50% Injectable 25 Gram(s) IV Push once  dextrose 50% Injectable 12.5 Gram(s) IV Push once  dextrose 50% Injectable 25 Gram(s) IV Push once  glucagon  Injectable 1 milliGRAM(s) IntraMuscular once  heparin   Injectable 5000 Unit(s) SubCutaneous every 8 hours  hydrALAZINE 25 milliGRAM(s) Oral three times a day  insulin glargine Injectable (LANTUS) 52 Unit(s) SubCutaneous at bedtime  insulin lispro (ADMELOG) corrective regimen sliding scale   SubCutaneous three times a day before meals  insulin lispro (ADMELOG) corrective regimen sliding scale   SubCutaneous at bedtime  insulin lispro Injectable (ADMELOG) 15 Unit(s) SubCutaneous three times a day before meals  polyethylene glycol 3350 17 Gram(s) Oral <User Schedule>  senna 2 Tablet(s) Oral daily  tamsulosin 0.8 milliGRAM(s) Oral at bedtime    MEDICATIONS  (PRN):  acetaminophen     Tablet .. 650 milliGRAM(s) Oral every 6 hours PRN Temp greater or equal to 38C (100.4F), Mild Pain (1 - 3)  aluminum hydroxide/magnesium hydroxide/simethicone Suspension 30 milliLiter(s) Oral every 4 hours PRN Dyspepsia  dextrose Oral Gel 15 Gram(s) Oral once PRN Blood Glucose LESS THAN 70 milliGRAM(s)/deciliter  melatonin 3 milliGRAM(s) Oral at bedtime PRN Insomnia  ondansetron Injectable 4 milliGRAM(s) IV Push every 8 hours PRN Nausea and/or Vomiting      ALLERGIES:  Allergies    No Known Allergies    Intolerances        LABS:                        10.5   20.51 )-----------( 370      ( 19 Mar 2025 07:03 )             31.9     03-19    139  |  101  |  50[H]  ----------------------------<  131[H]  4.4   |  26  |  1.99[H]    Ca    8.8      19 Mar 2025 07:04  Phos  2.5     03-19  Mg     2.1     03-19    TPro  6.8  /  Alb  2.8[L]  /  TBili  0.3  /  DBili  x   /  AST  93[H]  /  ALT  166[H]  /  AlkPhos  499[H]  03-19      Urinalysis Basic - ( 19 Mar 2025 07:04 )    Color: x / Appearance: x / SG: x / pH: x  Gluc: 131 mg/dL / Ketone: x  / Bili: x / Urobili: x   Blood: x / Protein: x / Nitrite: x   Leuk Esterase: x / RBC: x / WBC x   Sq Epi: x / Non Sq Epi: x / Bacteria: x      ABG:      vBG:    Micro:    Culture - Blood (collected 03-18-25 @ 05:48)  Source: Blood Blood-Peripheral  Preliminary Report (03-19-25 @ 10:01):    No growth at 24 hours    Culture - Blood (collected 03-18-25 @ 05:39)  Source: Blood Blood-Peripheral  Preliminary Report (03-19-25 @ 10:01):    No growth at 24 hours    Culture - Blood (collected 03-16-25 @ 17:19)  Source: Blood Blood  Preliminary Report (03-19-25 @ 23:23):    No growth at 72 Hours    Culture - Blood (collected 03-15-25 @ 11:14)  Source: Blood Blood  Preliminary Report (03-19-25 @ 17:01):    No growth at 4 days    Culture - Blood (collected 03-15-25 @ 07:30)  Source: Blood Blood  Gram Stain (03-16-25 @ 23:04):    Growth in anaerobic bottle: Gram Negative Rods  Final Report (03-18-25 @ 07:53):    Growth in anaerobic bottle: Proteus mirabilis    Direct identification is available within approximately 3-5    hours either by Blood Panel Multiplexed PCR or Direct    MALDI-TOF. Details: [email or web address]  Organism: Blood Culture PCR  Proteus mirabilis (03-18-25 @ 07:53)  Organism: Proteus mirabilis (03-18-25 @ 07:53)      Method Type: ARMEN      -  Ampicillin: S <=8 These ampicillin results predict results for amoxicillin      -  Ampicillin/Sulbactam: S <=4/2      -  Aztreonam: S <=4      -  Cefazolin: S <=2      -  Cefepime: S <=2      -  Cefoxitin: S <=8      -  Ceftriaxone: S <=1      -  Ciprofloxacin: S <=0.25      -  Ertapenem: S <=0.5      -  Gentamicin: S <=2      -  Levofloxacin: S <=0.5      -  Meropenem: S <=1      -  Piperacillin/Tazobactam: S <=8      -  Tobramycin: S <=2      -  Trimethoprim/Sulfamethoxazole: S <=0.5/9.5  Organism: Blood Culture PCR (03-18-25 @ 07:53)      Method Type: PCR      -  Proteus species: Detec    Culture - Blood (collected 03-12-25 @ 19:45)  Source: Blood Blood-Peripheral  Final Report (03-18-25 @ 01:01):    No growth at 5 days    Culture - Blood (collected 03-12-25 @ 19:35)  Source: Blood Blood-Peripheral  Final Report (03-18-25 @ 01:01):    No growth at 5 days          RADIOLOGY & ADDITIONAL TESTS: Reviewed.

## 2025-03-20 NOTE — CONSULT NOTE ADULT - REASON FOR ADMISSION
generalized fatigue/weakness

## 2025-03-20 NOTE — PROGRESS NOTE ADULT - PROBLEM SELECTOR PLAN 3
on presentation, BG ~400s,   UA: w no glucosuria and no ketonuria, BHB wnl, with HAGMA  s/p 2L LR  likely stress hyperglycemia iso sepsis    Plan:   -Lantus increased 52 units  cw ISS  consider endocrine consult  -hold home regimen  A1C - 9.2%  -trend fingerstick glu  - goal inpatient BG- 180  -cont home neurontin  - carb consistent diet on presentation, BG ~400s,   UA: w no glucosuria and no ketonuria, BHB wnl, with HAGMA  s/p 2L LR  likely stress hyperglycemia iso sepsis    Plan:   -Lantus decreased to 45 units  cw ISS  consider endocrine consult  -hold home regimen  A1C - 9.2%  -trend fingerstick glu  - goal inpatient BG- 180  -cont home neurontin  - carb consistent diet

## 2025-03-20 NOTE — PROGRESS NOTE ADULT - ASSESSMENT
65yo m w pmh obesity, htn, hld, dm c/b peripheral neuropathy + retinopathy, pad s/p l bka, cva, chb s/p ppm, p/w generalized fatigue/weakness; in er, found to be meeting severe sepsis criteria, and found to have multiple metabolic disturbances including hyperglycemia, catrina w hagma; suspect 2/2 uti; admit to medicine for further mgmt 63yo m w pmh obesity, htn, hld, dm c/b peripheral neuropathy + retinopathy, pad s/p l bka, cva, chb s/p ppm, p/w generalized fatigue/weakness; in er, found to be meeting severe sepsis criteria, and found to have multiple metabolic disturbances including hyperglycemia, catrina w hagma; suspect 2/2 uti; admit to medicine for further managment.

## 2025-03-20 NOTE — PROGRESS NOTE ADULT - PROBLEM SELECTOR PLAN 1
CT A/P with 0.6 cm calculus within the proximal left ureter with mild left hydronephrosis  s/p cystoscopy and left stent placement 3/17    Plan:   cw CTX 2g QD (3/18--)  continue for 10-14 days. transition to PO on dc, but requires downtrending leukocytosis and 48hrs of negative BCx  cw flomax  continue crain, plan for patient to d/c with crain CT A/P with 0.6 cm calculus within the proximal left ureter with mild left hydronephrosis  s/p cystoscopy and left stent placement 3/17    Plan:   cw CTX 2g QD (3/18--)  continue for 10-14 days. transition to PO levofloxacin on dc, but requires downtrending leukocytosis and 48hrs of negative BCx  cw flomax  continue crain, plan for patient to d/c with crain

## 2025-03-21 LAB
ALBUMIN SERPL ELPH-MCNC: 2.9 G/DL — LOW (ref 3.3–5)
ALP SERPL-CCNC: 501 U/L — HIGH (ref 40–120)
ALT FLD-CCNC: 114 U/L — HIGH (ref 10–45)
ANION GAP SERPL CALC-SCNC: 12 MMOL/L — SIGNIFICANT CHANGE UP (ref 5–17)
AST SERPL-CCNC: 68 U/L — HIGH (ref 10–40)
BASOPHILS # BLD AUTO: 0.06 K/UL — SIGNIFICANT CHANGE UP (ref 0–0.2)
BASOPHILS NFR BLD AUTO: 0.3 % — SIGNIFICANT CHANGE UP (ref 0–2)
BILIRUB SERPL-MCNC: 0.3 MG/DL — SIGNIFICANT CHANGE UP (ref 0.2–1.2)
BUN SERPL-MCNC: 22 MG/DL — SIGNIFICANT CHANGE UP (ref 7–23)
CALCIUM SERPL-MCNC: 9.2 MG/DL — SIGNIFICANT CHANGE UP (ref 8.4–10.5)
CHLORIDE SERPL-SCNC: 101 MMOL/L — SIGNIFICANT CHANGE UP (ref 96–108)
CO2 SERPL-SCNC: 27 MMOL/L — SIGNIFICANT CHANGE UP (ref 22–31)
CREAT SERPL-MCNC: 1.32 MG/DL — HIGH (ref 0.5–1.3)
CULTURE RESULTS: SIGNIFICANT CHANGE UP
EGFR: 60 ML/MIN/1.73M2 — SIGNIFICANT CHANGE UP
EGFR: 60 ML/MIN/1.73M2 — SIGNIFICANT CHANGE UP
EOSINOPHIL # BLD AUTO: 0.29 K/UL — SIGNIFICANT CHANGE UP (ref 0–0.5)
EOSINOPHIL NFR BLD AUTO: 1.4 % — SIGNIFICANT CHANGE UP (ref 0–6)
GLUCOSE BLDC GLUCOMTR-MCNC: 123 MG/DL — HIGH (ref 70–99)
GLUCOSE BLDC GLUCOMTR-MCNC: 201 MG/DL — HIGH (ref 70–99)
GLUCOSE BLDC GLUCOMTR-MCNC: 236 MG/DL — HIGH (ref 70–99)
GLUCOSE BLDC GLUCOMTR-MCNC: 90 MG/DL — SIGNIFICANT CHANGE UP (ref 70–99)
GLUCOSE SERPL-MCNC: 102 MG/DL — HIGH (ref 70–99)
HCT VFR BLD CALC: 32.7 % — LOW (ref 39–50)
HGB BLD-MCNC: 10.5 G/DL — LOW (ref 13–17)
IMM GRANULOCYTES NFR BLD AUTO: 4.4 % — HIGH (ref 0–0.9)
LYMPHOCYTES # BLD AUTO: 1.9 K/UL — SIGNIFICANT CHANGE UP (ref 1–3.3)
LYMPHOCYTES # BLD AUTO: 9 % — LOW (ref 13–44)
MAGNESIUM SERPL-MCNC: 1.6 MG/DL — SIGNIFICANT CHANGE UP (ref 1.6–2.6)
MCHC RBC-ENTMCNC: 27.1 PG — SIGNIFICANT CHANGE UP (ref 27–34)
MCHC RBC-ENTMCNC: 32.1 G/DL — SIGNIFICANT CHANGE UP (ref 32–36)
MCV RBC AUTO: 84.3 FL — SIGNIFICANT CHANGE UP (ref 80–100)
MONOCYTES # BLD AUTO: 1.04 K/UL — HIGH (ref 0–0.9)
MONOCYTES NFR BLD AUTO: 5 % — SIGNIFICANT CHANGE UP (ref 2–14)
NEUTROPHILS # BLD AUTO: 16.8 K/UL — HIGH (ref 1.8–7.4)
NEUTROPHILS NFR BLD AUTO: 79.9 % — HIGH (ref 43–77)
NRBC BLD AUTO-RTO: 0 /100 WBCS — SIGNIFICANT CHANGE UP (ref 0–0)
PHOSPHATE SERPL-MCNC: 3.5 MG/DL — SIGNIFICANT CHANGE UP (ref 2.5–4.5)
PLATELET # BLD AUTO: 448 K/UL — HIGH (ref 150–400)
POTASSIUM SERPL-MCNC: 4.2 MMOL/L — SIGNIFICANT CHANGE UP (ref 3.5–5.3)
POTASSIUM SERPL-SCNC: 4.2 MMOL/L — SIGNIFICANT CHANGE UP (ref 3.5–5.3)
PROT SERPL-MCNC: 7.2 G/DL — SIGNIFICANT CHANGE UP (ref 6–8.3)
RBC # BLD: 3.88 M/UL — LOW (ref 4.2–5.8)
RBC # FLD: 15.4 % — HIGH (ref 10.3–14.5)
SODIUM SERPL-SCNC: 140 MMOL/L — SIGNIFICANT CHANGE UP (ref 135–145)
SPECIMEN SOURCE: SIGNIFICANT CHANGE UP
WBC # BLD: 21.01 K/UL — HIGH (ref 3.8–10.5)
WBC # FLD AUTO: 21.01 K/UL — HIGH (ref 3.8–10.5)

## 2025-03-21 PROCEDURE — 99232 SBSQ HOSP IP/OBS MODERATE 35: CPT

## 2025-03-21 PROCEDURE — G0545: CPT

## 2025-03-21 PROCEDURE — 74177 CT ABD & PELVIS W/CONTRAST: CPT | Mod: 26

## 2025-03-21 PROCEDURE — 99233 SBSQ HOSP IP/OBS HIGH 50: CPT

## 2025-03-21 PROCEDURE — 99233 SBSQ HOSP IP/OBS HIGH 50: CPT | Mod: GC

## 2025-03-21 RX ORDER — MAGNESIUM SULFATE 500 MG/ML
2 SYRINGE (ML) INJECTION ONCE
Refills: 0 | Status: COMPLETED | OUTPATIENT
Start: 2025-03-21 | End: 2025-03-21

## 2025-03-21 RX ORDER — INSULIN GLARGINE-YFGN 100 [IU]/ML
35 INJECTION, SOLUTION SUBCUTANEOUS AT BEDTIME
Refills: 0 | Status: DISCONTINUED | OUTPATIENT
Start: 2025-03-21 | End: 2025-03-22

## 2025-03-21 RX ORDER — SODIUM CHLORIDE 9 G/1000ML
1000 INJECTION, SOLUTION INTRAVENOUS
Refills: 0 | Status: DISCONTINUED | OUTPATIENT
Start: 2025-03-21 | End: 2025-03-23

## 2025-03-21 RX ADMIN — SODIUM CHLORIDE 75 MILLILITER(S): 9 INJECTION, SOLUTION INTRAVENOUS at 20:31

## 2025-03-21 RX ADMIN — Medication 2 TABLET(S): at 13:04

## 2025-03-21 RX ADMIN — HEPARIN SODIUM 5000 UNIT(S): 1000 INJECTION INTRAVENOUS; SUBCUTANEOUS at 05:50

## 2025-03-21 RX ADMIN — INSULIN LISPRO 4: 100 INJECTION, SOLUTION INTRAVENOUS; SUBCUTANEOUS at 17:54

## 2025-03-21 RX ADMIN — Medication 81 MILLIGRAM(S): at 13:05

## 2025-03-21 RX ADMIN — ATORVASTATIN CALCIUM 20 MILLIGRAM(S): 80 TABLET, FILM COATED ORAL at 21:26

## 2025-03-21 RX ADMIN — INSULIN LISPRO 15 UNIT(S): 100 INJECTION, SOLUTION INTRAVENOUS; SUBCUTANEOUS at 17:55

## 2025-03-21 RX ADMIN — CEFTRIAXONE 100 MILLIGRAM(S): 500 INJECTION, POWDER, FOR SOLUTION INTRAMUSCULAR; INTRAVENOUS at 17:45

## 2025-03-21 RX ADMIN — Medication 25 GRAM(S): at 13:04

## 2025-03-21 RX ADMIN — Medication 25 MILLIGRAM(S): at 21:26

## 2025-03-21 RX ADMIN — INSULIN LISPRO 15 UNIT(S): 100 INJECTION, SOLUTION INTRAVENOUS; SUBCUTANEOUS at 13:05

## 2025-03-21 RX ADMIN — Medication 25 MILLIGRAM(S): at 05:50

## 2025-03-21 RX ADMIN — AMLODIPINE BESYLATE 10 MILLIGRAM(S): 10 TABLET ORAL at 05:50

## 2025-03-21 RX ADMIN — HEPARIN SODIUM 5000 UNIT(S): 1000 INJECTION INTRAVENOUS; SUBCUTANEOUS at 13:08

## 2025-03-21 RX ADMIN — Medication 25 MILLIGRAM(S): at 13:08

## 2025-03-21 RX ADMIN — HEPARIN SODIUM 5000 UNIT(S): 1000 INJECTION INTRAVENOUS; SUBCUTANEOUS at 21:28

## 2025-03-21 RX ADMIN — TAMSULOSIN HYDROCHLORIDE 0.8 MILLIGRAM(S): 0.4 CAPSULE ORAL at 21:26

## 2025-03-21 RX ADMIN — INSULIN GLARGINE-YFGN 35 UNIT(S): 100 INJECTION, SOLUTION SUBCUTANEOUS at 21:32

## 2025-03-21 RX ADMIN — POLYETHYLENE GLYCOL 3350 17 GRAM(S): 17 POWDER, FOR SOLUTION ORAL at 13:08

## 2025-03-21 NOTE — PROGRESS NOTE ADULT - PROBLEM SELECTOR PLAN 1
On ?CKD, baseline creatine noted to fluctuate between 0.9 and 1.1 in late 2024 per review of HIE.  likely underlying diabetic nephropathy.     Urology team placed crain 3/15  FENa of 0.3 as calculated from BMP on 03/12, strongly indicative prerenal etiology  recent hemodynamics noted (fevers, episode of hypotension on 03/13) which is concerning for evolving ATN  Mild L hydronephrosis, 6mm stone noted.   AXR with possible gastric outlet obstruction but CTA/P no obstruction.  Normal NM hepatobiliary scan    -HOLA further resolving, likely multifactorial ATN + septic stone, now s/p L ureteral stent placement    creatinine improved to 1.32 today    patient nonoliguric    Urology following, plan to d/c patient with crain  -continue to hold outpatient ACEi    Continue amlodipine for BP control until HOLA resolves and outpatient BP meds can be resumed  -would defer further IVF for now as patient tolerating PO unless patient becomes polyuric iso post ATN diuresis      patient at risk of post ATN diuresis electrolyte abnormalities, agree with mag repletion ordered today for mg of 1.6    -Renally dosed abx for UTI.  UCx with Proteus. BlCx now with GNR, f/u ID recs

## 2025-03-21 NOTE — PROGRESS NOTE ADULT - SUBJECTIVE AND OBJECTIVE BOX
Follow Up:      Interval History/ROS:Patient is a 65y old  Male who presents with a chief complaint of generalized fatigue/weakness (21 Mar 2025 07:45)  Seen at bedside, complaining of generalized weakness, afebrile, worsening leukocytosis.     Allergies  No Known Allergies        ANTIMICROBIALS:    cefTRIAXone   IVPB 2000 <User Schedule>        OTHER MEDS: MEDICATIONS  (STANDING):  acetaminophen     Tablet .. 650 every 6 hours PRN  aluminum hydroxide/magnesium hydroxide/simethicone Suspension 30 every 4 hours PRN  amLODIPine   Tablet 10 daily  aspirin  chewable 81 daily  atorvastatin 20 at bedtime  dextrose 50% Injectable 25 once  dextrose 50% Injectable 12.5 once  dextrose 50% Injectable 25 once  dextrose Oral Gel 15 once PRN  glucagon  Injectable 1 once  heparin   Injectable 5000 every 8 hours  hydrALAZINE 25 three times a day  insulin glargine Injectable (LANTUS) 35 at bedtime  insulin lispro (ADMELOG) corrective regimen sliding scale  three times a day before meals  insulin lispro (ADMELOG) corrective regimen sliding scale  at bedtime  insulin lispro Injectable (ADMELOG) 15 three times a day before meals  melatonin 3 at bedtime PRN  ondansetron Injectable 4 every 8 hours PRN  polyethylene glycol 3350 17 <User Schedule>  senna 2 daily  tamsulosin 0.8 at bedtime      Vital Signs Last 24 Hrs  T(F): 98.9 (03-21-25 @ 04:40), Max: 102.8 (03-15-25 @ 00:06)    Vital Signs Last 24 Hrs  HR: 95 (03-21-25 @ 04:40) (95 - 102)  BP: 147/75 (03-21-25 @ 04:40) (144/71 - 170/87)  RR: 18 (03-21-25 @ 04:40)  SpO2: 96% (03-21-25 @ 04:40) (96% - 96%)  Wt(kg): --    EXAM:    GA: NAD  CV: nl S1/S2  Lungs: CTAB  Abd: soft, distended, nontender  Skin: Intact  IV: no phlebitis    Labs:                        10.5   21.01 )-----------( 448      ( 21 Mar 2025 07:02 )             32.7     03-21    140  |  101  |  22  ----------------------------<  102[H]  4.2   |  27  |  1.32[H]    Ca    9.2      21 Mar 2025 10:51  Phos  3.5     03-21  Mg     1.6     03-21    TPro  7.2  /  Alb  2.9[L]  /  TBili  0.3  /  DBili  x   /  AST  68[H]  /  ALT  114[H]  /  AlkPhos  501[H]  03-21      WBC Trend:  WBC Count: 21.01 (03-21-25 @ 07:02)  WBC Count: 19.40 (03-20-25 @ 06:59)  WBC Count: 20.51 (03-19-25 @ 07:03)  WBC Count: 19.63 (03-18-25 @ 06:48)      Creatine Trend:  Creatinine: 1.32 (03-21)  Creatinine: 1.46 (03-20)  Creatinine: 1.99 (03-19)  Creatinine: 2.46 (03-18)      Liver Biochemical Testing Trend:  Alanine Aminotransferase (ALT/SGPT): 114 *H* (03-21)  Alanine Aminotransferase (ALT/SGPT): 156 *H* (03-20)  Alanine Aminotransferase (ALT/SGPT): 166 *H* (03-19)  Alanine Aminotransferase (ALT/SGPT): 190 *H* (03-18)  Alanine Aminotransferase (ALT/SGPT): 155 *H* (03-17)  Aspartate Aminotransferase (AST/SGOT): 68 (03-21-25 @ 10:51)  Aspartate Aminotransferase (AST/SGOT): 91 (03-20-25 @ 06:57)  Aspartate Aminotransferase (AST/SGOT): 93 (03-19-25 @ 07:04)  Aspartate Aminotransferase (AST/SGOT): 141 (03-18-25 @ 06:48)  Aspartate Aminotransferase (AST/SGOT): 125 (03-17-25 @ 06:35)  Bilirubin Total: 0.3 (03-21)  Bilirubin Total: 0.4 (03-20)  Bilirubin Total: 0.3 (03-19)  Bilirubin Total: 0.4 (03-18)  Bilirubin Total: 0.4 (03-17)      Trend LDH      Urinalysis Basic - ( 21 Mar 2025 10:51 )    Color: x / Appearance: x / SG: x / pH: x  Gluc: 102 mg/dL / Ketone: x  / Bili: x / Urobili: x   Blood: x / Protein: x / Nitrite: x   Leuk Esterase: x / RBC: x / WBC x   Sq Epi: x / Non Sq Epi: x / Bacteria: x        MICROBIOLOGY:  Vancomycin Level, Random: <4.0 (03-16 @ 09:25)    MRSA PCR Result.: NotDetec (03-15-25 @ 18:08)      Culture - Blood (collected 18 Mar 2025 05:48)  Source: Blood Blood-Peripheral  Preliminary Report:    No growth at 72 Hours    Culture - Blood (collected 18 Mar 2025 05:39)  Source: Blood Blood-Peripheral  Preliminary Report:    No growth at 72 Hours    Culture - Blood (collected 16 Mar 2025 17:19)  Source: Blood Blood  Preliminary Report:    No growth at 4 days    Culture - Blood (collected 15 Mar 2025 11:14)  Source: Blood Blood  Final Report:    No growth at 5 days    Culture - Blood (collected 15 Mar 2025 07:30)  Source: Blood Blood  Final Report:    Growth in anaerobic bottle: Proteus mirabilis    Direct identification is available within approximately 3-5    hours either by Blood Panel Multiplexed PCR or Direct    MALDI-TOF. Details: [email or web address]  Organism: Blood Culture PCR  Proteus mirabilis  Organism: Proteus mirabilis    Sensitivities:      -  Levofloxacin: S <=0.5      -  Tobramycin: S <=2      -  Aztreonam: S <=4      -  Gentamicin: S <=2      -  Cefepime: S <=2      -  Cefazolin: S <=2      -  Piperacillin/Tazobactam: S <=8      -  Ciprofloxacin: S <=0.25      -  Ceftriaxone: S <=1      -  Ampicillin: S <=8 These ampicillin results predict results for amoxicillin      Method Type: ARMEN      -  Meropenem: S <=1      -  Ampicillin/Sulbactam: S <=4/2      -  Cefoxitin: S <=8      -  Trimethoprim/Sulfamethoxazole: S <=0.5/9.5      -  Ertapenem: S <=0.5  Organism: Blood Culture PCR    Sensitivities:      -  Proteus species: Detec      Method Type: PCR    Culture - Blood (collected 12 Mar 2025 19:45)  Source: Blood Blood-Peripheral  Final Report:    No growth at 5 days    Culture - Blood (collected 12 Mar 2025 19:35)  Source: Blood Blood-Peripheral  Final Report:    No growth at 5 days    Culture - Urine (collected 12 Mar 2025 16:22)  Source: Clean Catch Clean Catch (Midstream)  Final Report:    >100,000 CFU/ml Proteus mirabilis    <10,000 CFU/ml Normal Urogenital wil present  Organism: Proteus mirabilis  Organism: Proteus mirabilis    Sensitivities:      -  Levofloxacin: S <=0.5      -  Tobramycin: S <=2      -  Nitrofurantoin: R >64 Should not be used to treat pyelonephritis      -  Aztreonam: S <=4      -  Gentamicin: S <=2      Method Type: ARMEN      -  Cefepime: S <=2      -  Cefazolin: S <=2 For uncomplicated UTI with K. pneumoniae, E. coli, or P. mirablis: ARMEN <=16 is sensitive and ARMNE >=32 is resistant. This also predicts results for oral agents cefaclor, cefdinir, cefpodoxime, cefprozil, cefuroxime axetil, cephalexin and locarbef for uncomplicated UTI. Note that some isolates may be susceptible to these agents while testing resistant to cefazolin.      -  Piperacillin/Tazobactam: S <=8      -  Ciprofloxacin: S <=0.25      -  Ceftriaxone: S <=1      -  Ampicillin: S <=8 These ampicillin results predict results for amoxicillin      -  Meropenem: S <=1      -  Ampicillin/Sulbactam: S <=4/2      -  Cefoxitin: S <=8      -  Cefuroxime: S <=4      -  Amoxicillin/Clavulanic Acid: S <=8/4      -  Trimethoprim/Sulfamethoxazole: S <=0.5/9.5      -  Ertapenem: S <=0.5    Culture - Blood (collected 10 Yonathan 2024 13:00)  Source: .Blood Blood-Peripheral  Final Report:    No growth at 5 days    Culture - Blood (collected 10 Yonathan 2024 12:45)  Source: .Blood Blood-Peripheral  Final Report:    No growth at 5 days    Rapid RVP Result: NotDetec (03-15 @ 03:11)    Sedimentation Rate, Erythrocyte: 15 mm/hr (06-12-24 @ 01:10)    RADIOLOGY:  imaging below personally reviewed      < from: NM Hepatobiliary Imaging (03.18.25 @ 12:49) >  IMPRESSION: Normal hepatobiliary scan. No radionuclide evidence of acute   cholecystitis.    --- End of Report ---    < end of copied text >  < from: US Abdomen Upper Quadrant Right (03.15.25 @ 22:02) >  IMPRESSION:    Cholelithiasis with gallbladder wall thickening. Question trace   pericholecystic fluid. Unreliable sonogram Jansen's sign. If there is   clinical suspicion for acute cholecystitis, hepatobiliary scan may be   obtained for further evaluation.    --- End of Report ---    < end of copied text >

## 2025-03-21 NOTE — PROGRESS NOTE ADULT - SUBJECTIVE AND OBJECTIVE BOX
[facility] DIVISION OF KIDNEY DISEASE AND HYPERTENSION  [phone]    RENAL FOLLOW UP NOTE- NEPHROHOSPITALIST  --------------------------------------------------------------------------------  Patient seen and examined this morning, resting in bed.    PAST HISTORY  --------------------------------------------------------------------------------  No significant changes to PMH, PSH, FHx, SHx, unless otherwise noted    ALLERGIES & MEDICATIONS  --------------------------------------------------------------------------------  Allergies    No Known Allergies    Intolerances      Standing Inpatient Medications  amLODIPine   Tablet 10 milliGRAM(s) Oral daily  aspirin  chewable 81 milliGRAM(s) Oral daily  atorvastatin 20 milliGRAM(s) Oral at bedtime  cefTRIAXone   IVPB 2000 milliGRAM(s) IV Intermittent <User Schedule>  dextrose 5%. 1000 milliLiter(s) IV Continuous <Continuous>  dextrose 5%. 1000 milliLiter(s) IV Continuous <Continuous>  dextrose 50% Injectable 25 Gram(s) IV Push once  dextrose 50% Injectable 12.5 Gram(s) IV Push once  dextrose 50% Injectable 25 Gram(s) IV Push once  glucagon  Injectable 1 milliGRAM(s) IntraMuscular once  heparin   Injectable 5000 Unit(s) SubCutaneous every 8 hours  hydrALAZINE 25 milliGRAM(s) Oral three times a day  insulin glargine Injectable (LANTUS) 35 Unit(s) SubCutaneous at bedtime  insulin lispro (ADMELOG) corrective regimen sliding scale   SubCutaneous three times a day before meals  insulin lispro (ADMELOG) corrective regimen sliding scale   SubCutaneous at bedtime  insulin lispro Injectable (ADMELOG) 15 Unit(s) SubCutaneous three times a day before meals  polyethylene glycol 3350 17 Gram(s) Oral <User Schedule>  senna 2 Tablet(s) Oral daily  tamsulosin 0.8 milliGRAM(s) Oral at bedtime    PRN Inpatient Medications  acetaminophen     Tablet .. 650 milliGRAM(s) Oral every 6 hours PRN  aluminum hydroxide/magnesium hydroxide/simethicone Suspension 30 milliLiter(s) Oral every 4 hours PRN  dextrose Oral Gel 15 Gram(s) Oral once PRN  melatonin 3 milliGRAM(s) Oral at bedtime PRN  ondansetron Injectable 4 milliGRAM(s) IV Push every 8 hours PRN      FOCUSED REVIEW OF SYSTEMS  --------------------------------------------------------------------------------  denies fevers/rigors  denies CP/palpitations  denies SOB  denies N/V/abd pain +flatus +BMs      VITALS/PHYSICAL EXAM  --------------------------------------------------------------------------------  T(C): 36.6 (03-21-25 @ 14:13), Max: 37.4 (03-21-25 @ 02:01)  HR: 90 (03-21-25 @ 14:13) (90 - 101)  BP: 112/76 (03-21-25 @ 14:13) (112/76 - 170/87)  RR: 18 (03-21-25 @ 14:13) (18 - 18)  SpO2: 97% (03-21-25 @ 14:13) (96% - 97%)  Wt(kg): --        03-20-25 @ 07:01  -  03-21-25 @ 07:00  --------------------------------------------------------  IN: 0 mL / OUT: 2700 mL / NET: -2700 mL      Physical Exam:  	Gen: NAD, lying in bed  	Pulm: CTA B/L ant/lat fields  	CV: RRR, S1S2  	Abd: +BS, soft, nontender/nondistended  	: No suprapubic tenderness.  +crain          Extremity: LBKA, no RLE edema  	Neuro: A&O x 3      LABS/STUDIES  --------------------------------------------------------------------------------              10.5   21.01 >-----------<  448      [03-21-25 @ 07:02]              32.7     140  |  101  |  22  ----------------------------<  102      [03-21-25 @ 10:51]  4.2   |  27  |  1.32        Ca     9.2     [03-21-25 @ 10:51]      Mg     1.6     [03-21-25 @ 10:51]      Phos  3.5     [03-21-25 @ 10:51]    TPro  7.2  /  Alb  2.9  /  TBili  0.3  /  DBili  x   /  AST  68  /  ALT  114  /  AlkPhos  501  [03-21-25 @ 10:51]            Creatinine Trend:  SCr 1.32 [03-21 @ 10:51]  SCr 1.46 [03-20 @ 06:57]  SCr 1.99 [03-19 @ 07:04]  SCr 2.46 [03-18 @ 10:57]  SCr 2.62 [03-18 @ 06:48]              Urinalysis - [03-21-25 @ 10:51]      Color  / Appearance  / SG  / pH       Gluc 102 / Ketone   / Bili  / Urobili        Blood  / Protein  / Leuk Est  / Nitrite       RBC  / WBC  / Hyaline  / Gran  / Sq Epi  / Non Sq Epi  / Bacteria     Urine Creatinine 178      [03-14-25 @ 21:03]  Urine Protein 92      [03-14-25 @ 21:03]  Urine Sodium 18      [03-14-25 @ 21:03]  Urine Urea Nitrogen 325      [03-14-25 @ 21:02]  Urine Potassium 63      [03-14-25 @ 21:03]  Urine Osmolality 324      [03-14-25 @ 21:03]    Vitamin D (25OH) 33.9      [06-13-24 @ 01:51]  TSH 2.54      [06-11-24 @ 03:28]  Lipid: chol 159, , HDL 39, LDL --      [03-13-25 @ 06:46]

## 2025-03-21 NOTE — PROGRESS NOTE ADULT - ATTENDING COMMENTS
64M with severe obesity, hypertension, T2DM, PVD s/p L BKA, heart block s/p PPM admitted with severe sepsis suspected due to infected kidney stone.    #Severe sepsis  On basis of fever, HR, leukocytosis, HOLA, found with Proteus bacteremia, suspected from ureteral stone infection  - appreciate ID input, de-escalated meropenem to ceftriaxone s/p stent placement with urology on 3/17   - continues to appear septic, with increasing WBC count on 3/21; d/w urology who recommended repeat CT scan, which has been ordered  - f/u repeat blood cultures ordered 3/21  - continue to closely monitor WBC count and fever curve    #HOLA  Suspect ATN from sepsis, suspect creatinine now close to baseline  - appreciate nephrology's evaluation  - appreciate urology for Contreras placement, remains with adequate urine output and will follow up outpatient for TOV  - to get IV contrast today for CT - will give IV fluids to help prevent XIOMARA    #Type 2 diabetes, poorly controlled with hyperglycemia  A1c 9.2, on insulin and multiple oral agents at home  - will continue to modify Lantus and Admelog as needed to achieve euglycemia, lower Lantus this evening as glucose borderline low on 3/21    Dispo - recommended for acute rehab, though patient prefers to go to [place], dc planning to [place] with PO antibiotics once WBC downtrends and appears clinically approved

## 2025-03-21 NOTE — PROGRESS NOTE ADULT - ASSESSMENT
64M with medical of PAD s/p L BKA, DM with peripheral neuropathy and retinopathy HTN, CVA, complete heart block s/p PPM presented on 3/12 with generalized fatigue. In the ED patient noted to be febrile and tachycardic. Labs notable for leukocytosis, hyponatremia, hyperglycemia, HOLA and metabolic acidosis. UA with many bacteria, negative nitrite and small leukocyte esterase. Urine culture 3/12 >100K proteus mirabilis. Blood culture negative to date. ID asked to help manage.     Work up:  -Blood culture 3/12 (-)  -Urine Culture 3/12 - >100k proteus mirabilis  -Rapid RVP negative  -Renal US:  mild hydronephrosis of the left kidney  -X-ray abdomen: Mild nonspecific gaseous distention of stomach, with paucity of bowel gas distally. This may be secondary to gastric outlet obstruction.  -CT C/A/P without cont: 0.6 cm calculus within the proximal left ureter with mild left hydronephrosis.  -VQ scan: Indeterminate VQ scan for pulmonary embolus.  -LE US doppler: no DVT  -US RUQ:  Cholelithiasis with gallbladder wall thickening. Question trace pericholecystic fluid. Unreliable sonogram Jansen's sign. If there is clinical suspicion for acute cholecystitis, hepatobiliary scan may be obtained for further evaluation.  -Blood Cx (3/15): Proteus (1/4 bottles)  -HIDA scan:  Normal hepatobiliary scan. No radionuclide evidence of acute cholecystitis.  -Repeat blood Cx (3/16): NGTD     #Proteus bacteremia source urinary/pyelonephritis in the setting of L ureter stone with hydronephrosis s/p cystoscopy and left stent placement on 3/17  #Sepsis, fever, leukocytosis   #Hypoxemia ?PNA r/o PE  #HOLA  #Transaminitis    Recommendations:   -s/p cystoscopy and left stent placement on 3/17, persistent leukocytosis   -Continue Ceftriaxone 2 g IV daily   -Send repeat blood cx X2   -If new fevers or leukocytosis does not trend down, would get repeat CT AP w/ cont if no c/I   -When clinically stable for discharge, leukocytosis trends down and repeat blood cx neg for at least 48hrs, plan to transition to Levofloxacin 750 mg po daily (till 3/24) - QTc this admission 462 msec  -Trend LFTs   -Monitor T curve and WBC trend     Discussed with primary team     Claudia Mack MD  ID physician  [employer] Preferred  After 5pm/weekends call [phone]

## 2025-03-21 NOTE — PROGRESS NOTE ADULT - SUBJECTIVE AND OBJECTIVE BOX
INTERVAL HPI/OVERNIGHT EVENTS:    SUBJECTIVE: Patient seen and examined at bedside.     ROS: All negative except as listed above.    VITAL SIGNS:  ICU Vital Signs Last 24 Hrs  T(C): 37.2 (21 Mar 2025 04:40), Max: 37.4 (21 Mar 2025 02:01)  T(F): 98.9 (21 Mar 2025 04:40), Max: 99.4 (21 Mar 2025 02:01)  HR: 95 (21 Mar 2025 04:40) (95 - 102)  BP: 147/75 (21 Mar 2025 04:40) (133/69 - 170/87)  BP(mean): --  ABP: --  ABP(mean): --  RR: 18 (21 Mar 2025 04:40) (18 - 18)  SpO2: 96% (21 Mar 2025 04:40) (93% - 96%)    O2 Parameters below as of 21 Mar 2025 04:40  Patient On (Oxygen Delivery Method): room air            Plateau pressure:   P/F ratio:     03-20 @ 07:01  -  03-21 @ 07:00  --------------------------------------------------------  IN: 0 mL / OUT: 2700 mL / NET: -2700 mL      CAPILLARY BLOOD GLUCOSE      POCT Blood Glucose.: 142 mg/dL (20 Mar 2025 21:52)      ECG: reviewed.    PHYSICAL EXAM:    GENERAL: NAD, lying in bed comfortably  HEAD:  Atraumatic, normocephalic  EYES: EOMI, PERRLA, conjunctiva and sclera clear  NECK: Supple, trachea midline, no JVD  HEART: Regular rate and rhythm, no murmurs, rubs, or gallops  LUNGS: Unlabored respirations.  Clear to auscultation bilaterally, no crackles, wheezing, or rhonchi  ABDOMEN: Soft, nontender, nondistended, +BS  EXTREMITIES: 2+ peripheral pulses bilaterally, cap refill<2 secs. No clubbing, cyanosis, or edema  NERVOUS SYSTEM:  A&Ox3, following commands, moving all extremities, no focal deficits   SKIN: No rashes or lesions    MEDICATIONS:  MEDICATIONS  (STANDING):  amLODIPine   Tablet 10 milliGRAM(s) Oral daily  aspirin  chewable 81 milliGRAM(s) Oral daily  atorvastatin 20 milliGRAM(s) Oral at bedtime  cefTRIAXone   IVPB 2000 milliGRAM(s) IV Intermittent <User Schedule>  dextrose 5%. 1000 milliLiter(s) (100 mL/Hr) IV Continuous <Continuous>  dextrose 5%. 1000 milliLiter(s) (50 mL/Hr) IV Continuous <Continuous>  dextrose 50% Injectable 25 Gram(s) IV Push once  dextrose 50% Injectable 12.5 Gram(s) IV Push once  dextrose 50% Injectable 25 Gram(s) IV Push once  glucagon  Injectable 1 milliGRAM(s) IntraMuscular once  heparin   Injectable 5000 Unit(s) SubCutaneous every 8 hours  hydrALAZINE 25 milliGRAM(s) Oral three times a day  insulin glargine Injectable (LANTUS) 45 Unit(s) SubCutaneous at bedtime  insulin lispro (ADMELOG) corrective regimen sliding scale   SubCutaneous three times a day before meals  insulin lispro (ADMELOG) corrective regimen sliding scale   SubCutaneous at bedtime  insulin lispro Injectable (ADMELOG) 15 Unit(s) SubCutaneous three times a day before meals  polyethylene glycol 3350 17 Gram(s) Oral <User Schedule>  senna 2 Tablet(s) Oral daily  tamsulosin 0.8 milliGRAM(s) Oral at bedtime    MEDICATIONS  (PRN):  acetaminophen     Tablet .. 650 milliGRAM(s) Oral every 6 hours PRN Temp greater or equal to 38C (100.4F), Mild Pain (1 - 3)  aluminum hydroxide/magnesium hydroxide/simethicone Suspension 30 milliLiter(s) Oral every 4 hours PRN Dyspepsia  dextrose Oral Gel 15 Gram(s) Oral once PRN Blood Glucose LESS THAN 70 milliGRAM(s)/deciliter  melatonin 3 milliGRAM(s) Oral at bedtime PRN Insomnia  ondansetron Injectable 4 milliGRAM(s) IV Push every 8 hours PRN Nausea and/or Vomiting      ALLERGIES:  Allergies    No Known Allergies    Intolerances        LABS:                        10.5   21.01 )-----------( 448      ( 21 Mar 2025 07:02 )             32.7     03-20    140  |  102  |  32[H]  ----------------------------<  112[H]  4.5   |  25  |  1.46[H]    Ca    9.1      20 Mar 2025 06:57  Phos  3.4     03-20  Mg     1.8     03-20    TPro  7.3  /  Alb  2.8[L]  /  TBili  0.4  /  DBili  x   /  AST  91[H]  /  ALT  156[H]  /  AlkPhos  545[H]  03-20      Urinalysis Basic - ( 20 Mar 2025 06:57 )    Color: x / Appearance: x / SG: x / pH: x  Gluc: 112 mg/dL / Ketone: x  / Bili: x / Urobili: x   Blood: x / Protein: x / Nitrite: x   Leuk Esterase: x / RBC: x / WBC x   Sq Epi: x / Non Sq Epi: x / Bacteria: x      ABG:      vBG:    Micro:    Culture - Blood (collected 03-18-25 @ 05:48)  Source: Blood Blood-Peripheral  Preliminary Report (03-20-25 @ 11:01):    No growth at 48 Hours    Culture - Blood (collected 03-18-25 @ 05:39)  Source: Blood Blood-Peripheral  Preliminary Report (03-20-25 @ 11:01):    No growth at 48 Hours    Culture - Blood (collected 03-16-25 @ 17:19)  Source: Blood Blood  Preliminary Report (03-20-25 @ 23:00):    No growth at 4 days    Culture - Blood (collected 03-15-25 @ 11:14)  Source: Blood Blood  Final Report (03-20-25 @ 17:00):    No growth at 5 days    Culture - Blood (collected 03-15-25 @ 07:30)  Source: Blood Blood  Gram Stain (03-16-25 @ 23:04):    Growth in anaerobic bottle: Gram Negative Rods  Final Report (03-18-25 @ 07:53):    Growth in anaerobic bottle: Proteus mirabilis    Direct identification is available within approximately 3-5    hours either by Blood Panel Multiplexed PCR or Direct    MALDI-TOF. Details: [email or web address]  Organism: Blood Culture PCR  Proteus mirabilis (03-18-25 @ 07:53)  Organism: Proteus mirabilis (03-18-25 @ 07:53)      -  Cefepime: S <=2      -  Piperacillin/Tazobactam: S <=8      -  Ciprofloxacin: S <=0.25      -  Ceftriaxone: S <=1      -  Ampicillin: S <=8 These ampicillin results predict results for amoxicillin      Method Type: ARMEN      -  Meropenem: S <=1      -  Ampicillin/Sulbactam: S <=4/2      -  Cefoxitin: S <=8      -  Trimethoprim/Sulfamethoxazole: S <=0.5/9.5      -  Ertapenem: S <=0.5      -  Levofloxacin: S <=0.5      -  Tobramycin: S <=2      -  Aztreonam: S <=4      -  Gentamicin: S <=2      -  Cefazolin: S <=2  Organism: Blood Culture PCR (03-18-25 @ 07:53)      Method Type: PCR      -  Proteus species: Detec    Culture - Blood (collected 03-12-25 @ 19:45)  Source: Blood Blood-Peripheral  Final Report (03-18-25 @ 01:01):    No growth at 5 days    Culture - Blood (collected 03-12-25 @ 19:35)  Source: Blood Blood-Peripheral  Final Report (03-18-25 @ 01:01):    No growth at 5 days          RADIOLOGY & ADDITIONAL TESTS: Reviewed.   INTERVAL HPI/OVERNIGHT EVENTS:    SUBJECTIVE: Patient seen and examined at bedside. Patient states that he is weak. Denies any abdominal pain. Spoke with sister Nikole at bedside x 2, discussed more details of the urologic procedure and that we will be repeating the blood culture and CT scan.  Called wife Raman and provided an update.     ROS: All negative except as listed above.    VITAL SIGNS:  ICU Vital Signs Last 24 Hrs  T(C): 37.2 (21 Mar 2025 04:40), Max: 37.4 (21 Mar 2025 02:01)  T(F): 98.9 (21 Mar 2025 04:40), Max: 99.4 (21 Mar 2025 02:01)  HR: 95 (21 Mar 2025 04:40) (95 - 102)  BP: 147/75 (21 Mar 2025 04:40) (133/69 - 170/87)  BP(mean): --  ABP: --  ABP(mean): --  RR: 18 (21 Mar 2025 04:40) (18 - 18)  SpO2: 96% (21 Mar 2025 04:40) (93% - 96%)    O2 Parameters below as of 21 Mar 2025 04:40  Patient On (Oxygen Delivery Method): room air            Plateau pressure:   P/F ratio:     03-20 @ 07:01  -  03-21 @ 07:00  --------------------------------------------------------  IN: 0 mL / OUT: 2700 mL / NET: -2700 mL      CAPILLARY BLOOD GLUCOSE      POCT Blood Glucose.: 142 mg/dL (20 Mar 2025 21:52)      ECG: reviewed.    PHYSICAL EXAM:    GENERAL: NAD, lying in bed, appears fatigue and weka  HEAD:  Atraumatic, normocephalic  EYES: EOMI, PERRLA, conjunctiva and sclera clear  NECK: Supple, trachea midline, no JVD  HEART: Regular rate and rhythm, no murmurs, rubs, or gallops  LUNGS: Unlabored respirations.  Clear to auscultation bilaterally, no crackles, wheezing, or rhonchi  ABDOMEN: Soft, nontender, nondistended, +BS  EXTREMITIES: left bka  NERVOUS SYSTEM:  A&Ox3, following commands, moving all extremities, no focal deficits   SKIN: No rashes or lesions    MEDICATIONS:  MEDICATIONS  (STANDING):  amLODIPine   Tablet 10 milliGRAM(s) Oral daily  aspirin  chewable 81 milliGRAM(s) Oral daily  atorvastatin 20 milliGRAM(s) Oral at bedtime  cefTRIAXone   IVPB 2000 milliGRAM(s) IV Intermittent <User Schedule>  dextrose 5%. 1000 milliLiter(s) (100 mL/Hr) IV Continuous <Continuous>  dextrose 5%. 1000 milliLiter(s) (50 mL/Hr) IV Continuous <Continuous>  dextrose 50% Injectable 25 Gram(s) IV Push once  dextrose 50% Injectable 12.5 Gram(s) IV Push once  dextrose 50% Injectable 25 Gram(s) IV Push once  glucagon  Injectable 1 milliGRAM(s) IntraMuscular once  heparin   Injectable 5000 Unit(s) SubCutaneous every 8 hours  hydrALAZINE 25 milliGRAM(s) Oral three times a day  insulin glargine Injectable (LANTUS) 45 Unit(s) SubCutaneous at bedtime  insulin lispro (ADMELOG) corrective regimen sliding scale   SubCutaneous three times a day before meals  insulin lispro (ADMELOG) corrective regimen sliding scale   SubCutaneous at bedtime  insulin lispro Injectable (ADMELOG) 15 Unit(s) SubCutaneous three times a day before meals  polyethylene glycol 3350 17 Gram(s) Oral <User Schedule>  senna 2 Tablet(s) Oral daily  tamsulosin 0.8 milliGRAM(s) Oral at bedtime    MEDICATIONS  (PRN):  acetaminophen     Tablet .. 650 milliGRAM(s) Oral every 6 hours PRN Temp greater or equal to 38C (100.4F), Mild Pain (1 - 3)  aluminum hydroxide/magnesium hydroxide/simethicone Suspension 30 milliLiter(s) Oral every 4 hours PRN Dyspepsia  dextrose Oral Gel 15 Gram(s) Oral once PRN Blood Glucose LESS THAN 70 milliGRAM(s)/deciliter  melatonin 3 milliGRAM(s) Oral at bedtime PRN Insomnia  ondansetron Injectable 4 milliGRAM(s) IV Push every 8 hours PRN Nausea and/or Vomiting      ALLERGIES:  Allergies    No Known Allergies    Intolerances        LABS:                        10.5   21.01 )-----------( 448      ( 21 Mar 2025 07:02 )             32.7     03-20    140  |  102  |  32[H]  ----------------------------<  112[H]  4.5   |  25  |  1.46[H]    Ca    9.1      20 Mar 2025 06:57  Phos  3.4     03-20  Mg     1.8     03-20    TPro  7.3  /  Alb  2.8[L]  /  TBili  0.4  /  DBili  x   /  AST  91[H]  /  ALT  156[H]  /  AlkPhos  545[H]  03-20      Urinalysis Basic - ( 20 Mar 2025 06:57 )    Color: x / Appearance: x / SG: x / pH: x  Gluc: 112 mg/dL / Ketone: x  / Bili: x / Urobili: x   Blood: x / Protein: x / Nitrite: x   Leuk Esterase: x / RBC: x / WBC x   Sq Epi: x / Non Sq Epi: x / Bacteria: x      ABG:      vBG:    Micro:    Culture - Blood (collected 03-18-25 @ 05:48)  Source: Blood Blood-Peripheral  Preliminary Report (03-20-25 @ 11:01):    No growth at 48 Hours    Culture - Blood (collected 03-18-25 @ 05:39)  Source: Blood Blood-Peripheral  Preliminary Report (03-20-25 @ 11:01):    No growth at 48 Hours    Culture - Blood (collected 03-16-25 @ 17:19)  Source: Blood Blood  Preliminary Report (03-20-25 @ 23:00):    No growth at 4 days    Culture - Blood (collected 03-15-25 @ 11:14)  Source: Blood Blood  Final Report (03-20-25 @ 17:00):    No growth at 5 days    Culture - Blood (collected 03-15-25 @ 07:30)  Source: Blood Blood  Gram Stain (03-16-25 @ 23:04):    Growth in anaerobic bottle: Gram Negative Rods  Final Report (03-18-25 @ 07:53):    Growth in anaerobic bottle: Proteus mirabilis    Direct identification is available within approximately 3-5    hours either by Blood Panel Multiplexed PCR or Direct    MALDI-TOF. Details: [email or web address]  Organism: Blood Culture PCR  Proteus mirabilis (03-18-25 @ 07:53)  Organism: Proteus mirabilis (03-18-25 @ 07:53)      -  Cefepime: S <=2      -  Piperacillin/Tazobactam: S <=8      -  Ciprofloxacin: S <=0.25      -  Ceftriaxone: S <=1      -  Ampicillin: S <=8 These ampicillin results predict results for amoxicillin      Method Type: ARMEN      -  Meropenem: S <=1      -  Ampicillin/Sulbactam: S <=4/2      -  Cefoxitin: S <=8      -  Trimethoprim/Sulfamethoxazole: S <=0.5/9.5      -  Ertapenem: S <=0.5      -  Levofloxacin: S <=0.5      -  Tobramycin: S <=2      -  Aztreonam: S <=4      -  Gentamicin: S <=2      -  Cefazolin: S <=2  Organism: Blood Culture PCR (03-18-25 @ 07:53)      Method Type: PCR      -  Proteus species: Detec    Culture - Blood (collected 03-12-25 @ 19:45)  Source: Blood Blood-Peripheral  Final Report (03-18-25 @ 01:01):    No growth at 5 days    Culture - Blood (collected 03-12-25 @ 19:35)  Source: Blood Blood-Peripheral  Final Report (03-18-25 @ 01:01):    No growth at 5 days          RADIOLOGY & ADDITIONAL TESTS: Reviewed.

## 2025-03-21 NOTE — PROGRESS NOTE ADULT - PROBLEM SELECTOR PLAN 2
Unclear baseline cr, Fluctuated between 0.9-1.1 in late 20246  on Admission 1.9. Increasing now at 5.1--> now improved 1.46  Likely pre-renal with vs. ATN vs. post renal iso obstruction 2/2 septic stone. Now s/p ureteral stone placement    Plan:   hold ACE-I- consider restarting with improving kidney function  renally dose antibiotics  monitor BMP BID  fu urine studies  Monitor UO, BUN/Cr, volume status, acid-base balance, electrolytes  avoid nephrotoxic agents  dose adjustments for renally cleared meds Unclear baseline cr, Fluctuated between 0.9-1.1 in late 20246  on Admission 1.9. Increasing now at 5.1--> now improved 1.32  Likely pre-renal with vs. ATN vs. post renal iso obstruction 2/2 septic stone. Now s/p ureteral stone placement    Plan:   hold ACE-I- consider restarting with improving kidney function  renally dose antibiotics  monitor BMP BID  fu urine studies  Monitor UO, BUN/Cr, volume status, acid-base balance, electrolytes  avoid nephrotoxic agents  dose adjustments for renally cleared meds

## 2025-03-21 NOTE — PROGRESS NOTE ADULT - PROBLEM SELECTOR PLAN 4
febrile, leukocytosis, tachycardic, + lactic acidosis; meets severe sepsis criteria  suspect 2/2 uti --> Urine culture 3/12 >100K proteus mirabilis.   CXR- no consolidations  CT A/P with 0.6 cm calculus within the proximal left ureter with mild left hydronephrosis  RUQ US Cholelithiasis with gallbladder wall thickening. Question trace pericholecystic fluid.   MRSA PCR- negative  new proteus bacteremia + hydro on CT  HIDA- with no cholecystitis    Plan:   - cw CTX 2g  - sp Meropenem 500mg BID (3/15-3/16)  - follow up blood cultures 3/18  - f/u sputum cx  - trend lactate q4-6h until wnl  - Monitor for fever, changes in white count febrile, leukocytosis, tachycardic, + lactic acidosis; meets severe sepsis criteria  suspect 2/2 uti --> Urine culture 3/12 >100K proteus mirabilis.   CXR- no consolidations  CT A/P with 0.6 cm calculus within the proximal left ureter with mild left hydronephrosis  RUQ US Cholelithiasis with gallbladder wall thickening. Question trace pericholecystic fluid.   MRSA PCR- negative  new proteus bacteremia + hydro on CT  HIDA- with no cholecystitis    Plan:   - repeated Bcx and CT A/P as above  - cw CTX 2g  - sp Meropenem 500mg BID (3/15-3/16)  - follow up blood cultures 3/18  - f/u sputum cx  - trend lactate q4-6h until wnl  - Monitor for fever, changes in white count

## 2025-03-21 NOTE — PROGRESS NOTE ADULT - PROBLEM SELECTOR PLAN 1
CT A/P with 0.6 cm calculus within the proximal left ureter with mild left hydronephrosis  s/p cystoscopy and left stent placement 3/17    Plan:   cw CTX 2g QD (3/18--)  continue for 10-14 days. transition to PO levofloxacin on dc, but requires downtrending leukocytosis and 48hrs of negative BCx  cw flomax  continue crain, plan for patient to d/c with crain CT A/P with 0.6 cm calculus within the proximal left ureter with mild left hydronephrosis  s/p cystoscopy and left stent placement 3/17  Patient with lingering leukocytosis.     Plan:     repeated BCx 3/21  repeated CT a/p  cw CTX 2g QD (3/18--)  continue for 10-14 days. transition to PO levofloxacin on dc, but requires downtrending leukocytosis and 48hrs of negative BCx  cw flomax  continue crain, plan for patient to d/c with crain

## 2025-03-21 NOTE — PROGRESS NOTE ADULT - ASSESSMENT
65yo m w pmh obesity, htn, hld, dm c/b peripheral neuropathy + retinopathy, pad s/p l bka, cva, chb s/p ppm, p/w generalized fatigue/weakness; in er, found to be meeting severe sepsis criteria, and found to have multiple metabolic disturbances including hyperglycemia, catrina w hagma; suspect 2/2 uti; admit to medicine for further managment.

## 2025-03-21 NOTE — PROGRESS NOTE ADULT - PROBLEM SELECTOR PLAN 3
on presentation, BG ~400s,   UA: w no glucosuria and no ketonuria, BHB wnl, with HAGMA  s/p 2L LR  likely stress hyperglycemia iso sepsis    Plan:   -Lantus decreased to 45 units  cw ISS  consider endocrine consult  -hold home regimen  A1C - 9.2%  -trend fingerstick glu  - goal inpatient BG- 180  -cont home neurontin  - carb consistent diet on presentation, BG ~400s,   UA: w no glucosuria and no ketonuria, BHB wnl, with HAGMA  s/p 2L LR  likely stress hyperglycemia iso sepsis    Plan:   -Lantus decreased to 35 units  cw ISS  consider endocrine consult  -hold home regimen  A1C - 9.2%  -trend fingerstick glu  - goal inpatient BG- 180  -cont home neurontin  - carb consistent diet

## 2025-03-22 LAB
ALBUMIN SERPL ELPH-MCNC: 3 G/DL — LOW (ref 3.3–5)
ALP SERPL-CCNC: 458 U/L — HIGH (ref 40–120)
ALT FLD-CCNC: 100 U/L — HIGH (ref 10–45)
ANION GAP SERPL CALC-SCNC: 12 MMOL/L — SIGNIFICANT CHANGE UP (ref 5–17)
AST SERPL-CCNC: 59 U/L — HIGH (ref 10–40)
BILIRUB SERPL-MCNC: 0.2 MG/DL — SIGNIFICANT CHANGE UP (ref 0.2–1.2)
BUN SERPL-MCNC: 19 MG/DL — SIGNIFICANT CHANGE UP (ref 7–23)
CALCIUM SERPL-MCNC: 9 MG/DL — SIGNIFICANT CHANGE UP (ref 8.4–10.5)
CHLORIDE SERPL-SCNC: 102 MMOL/L — SIGNIFICANT CHANGE UP (ref 96–108)
CO2 SERPL-SCNC: 24 MMOL/L — SIGNIFICANT CHANGE UP (ref 22–31)
CREAT SERPL-MCNC: 1.28 MG/DL — SIGNIFICANT CHANGE UP (ref 0.5–1.3)
EGFR: 62 ML/MIN/1.73M2 — SIGNIFICANT CHANGE UP
EGFR: 62 ML/MIN/1.73M2 — SIGNIFICANT CHANGE UP
GLUCOSE BLDC GLUCOMTR-MCNC: 120 MG/DL — HIGH (ref 70–99)
GLUCOSE BLDC GLUCOMTR-MCNC: 158 MG/DL — HIGH (ref 70–99)
GLUCOSE BLDC GLUCOMTR-MCNC: 220 MG/DL — HIGH (ref 70–99)
GLUCOSE BLDC GLUCOMTR-MCNC: 221 MG/DL — HIGH (ref 70–99)
GLUCOSE SERPL-MCNC: 222 MG/DL — HIGH (ref 70–99)
HCT VFR BLD CALC: 32.3 % — LOW (ref 39–50)
HGB BLD-MCNC: 10.1 G/DL — LOW (ref 13–17)
LIDOCAIN IGE QN: 24 U/L — SIGNIFICANT CHANGE UP (ref 7–60)
MAGNESIUM SERPL-MCNC: 1.6 MG/DL — SIGNIFICANT CHANGE UP (ref 1.6–2.6)
MCHC RBC-ENTMCNC: 26.5 PG — LOW (ref 27–34)
MCHC RBC-ENTMCNC: 31.3 G/DL — LOW (ref 32–36)
MCV RBC AUTO: 84.8 FL — SIGNIFICANT CHANGE UP (ref 80–100)
NRBC BLD AUTO-RTO: 0 /100 WBCS — SIGNIFICANT CHANGE UP (ref 0–0)
PHOSPHATE SERPL-MCNC: 3.9 MG/DL — SIGNIFICANT CHANGE UP (ref 2.5–4.5)
PLATELET # BLD AUTO: 471 K/UL — HIGH (ref 150–400)
POTASSIUM SERPL-MCNC: 4.4 MMOL/L — SIGNIFICANT CHANGE UP (ref 3.5–5.3)
POTASSIUM SERPL-SCNC: 4.4 MMOL/L — SIGNIFICANT CHANGE UP (ref 3.5–5.3)
PROT SERPL-MCNC: 6.9 G/DL — SIGNIFICANT CHANGE UP (ref 6–8.3)
RBC # BLD: 3.81 M/UL — LOW (ref 4.2–5.8)
RBC # FLD: 15.5 % — HIGH (ref 10.3–14.5)
SODIUM SERPL-SCNC: 138 MMOL/L — SIGNIFICANT CHANGE UP (ref 135–145)
WBC # BLD: 22.26 K/UL — HIGH (ref 3.8–10.5)
WBC # FLD AUTO: 22.26 K/UL — HIGH (ref 3.8–10.5)

## 2025-03-22 PROCEDURE — 99233 SBSQ HOSP IP/OBS HIGH 50: CPT

## 2025-03-22 PROCEDURE — 99232 SBSQ HOSP IP/OBS MODERATE 35: CPT

## 2025-03-22 PROCEDURE — G0545: CPT

## 2025-03-22 RX ORDER — SODIUM CHLORIDE 9 G/1000ML
1000 INJECTION, SOLUTION INTRAVENOUS
Refills: 0 | Status: DISCONTINUED | OUTPATIENT
Start: 2025-03-22 | End: 2025-03-23

## 2025-03-22 RX ORDER — AMPICILLIN SODIUM AND SULBACTAM SODIUM 1; .5 G/1; G/1
3 INJECTION, POWDER, FOR SOLUTION INTRAMUSCULAR; INTRAVENOUS EVERY 6 HOURS
Refills: 0 | Status: COMPLETED | OUTPATIENT
Start: 2025-03-22 | End: 2025-03-24

## 2025-03-22 RX ORDER — INSULIN GLARGINE-YFGN 100 [IU]/ML
32 INJECTION, SOLUTION SUBCUTANEOUS AT BEDTIME
Refills: 0 | Status: DISCONTINUED | OUTPATIENT
Start: 2025-03-22 | End: 2025-03-26

## 2025-03-22 RX ORDER — AMPICILLIN SODIUM AND SULBACTAM SODIUM 1; .5 G/1; G/1
3 INJECTION, POWDER, FOR SOLUTION INTRAMUSCULAR; INTRAVENOUS ONCE
Refills: 0 | Status: COMPLETED | OUTPATIENT
Start: 2025-03-22 | End: 2025-03-22

## 2025-03-22 RX ORDER — AMPICILLIN SODIUM AND SULBACTAM SODIUM 1; .5 G/1; G/1
INJECTION, POWDER, FOR SOLUTION INTRAMUSCULAR; INTRAVENOUS
Refills: 0 | Status: COMPLETED | OUTPATIENT
Start: 2025-03-22 | End: 2025-03-24

## 2025-03-22 RX ADMIN — Medication 81 MILLIGRAM(S): at 12:27

## 2025-03-22 RX ADMIN — Medication 25 MILLIGRAM(S): at 21:12

## 2025-03-22 RX ADMIN — Medication 2 TABLET(S): at 12:27

## 2025-03-22 RX ADMIN — TAMSULOSIN HYDROCHLORIDE 0.8 MILLIGRAM(S): 0.4 CAPSULE ORAL at 21:12

## 2025-03-22 RX ADMIN — HEPARIN SODIUM 5000 UNIT(S): 1000 INJECTION INTRAVENOUS; SUBCUTANEOUS at 13:49

## 2025-03-22 RX ADMIN — INSULIN LISPRO 4: 100 INJECTION, SOLUTION INTRAVENOUS; SUBCUTANEOUS at 12:23

## 2025-03-22 RX ADMIN — INSULIN LISPRO 4: 100 INJECTION, SOLUTION INTRAVENOUS; SUBCUTANEOUS at 17:54

## 2025-03-22 RX ADMIN — INSULIN GLARGINE-YFGN 32 UNIT(S): 100 INJECTION, SOLUTION SUBCUTANEOUS at 22:07

## 2025-03-22 RX ADMIN — Medication 25 MILLIGRAM(S): at 13:49

## 2025-03-22 RX ADMIN — AMLODIPINE BESYLATE 10 MILLIGRAM(S): 10 TABLET ORAL at 05:59

## 2025-03-22 RX ADMIN — HEPARIN SODIUM 5000 UNIT(S): 1000 INJECTION INTRAVENOUS; SUBCUTANEOUS at 21:11

## 2025-03-22 RX ADMIN — SODIUM CHLORIDE 75 MILLILITER(S): 9 INJECTION, SOLUTION INTRAVENOUS at 13:57

## 2025-03-22 RX ADMIN — AMPICILLIN SODIUM AND SULBACTAM SODIUM 200 GRAM(S): 1; .5 INJECTION, POWDER, FOR SOLUTION INTRAMUSCULAR; INTRAVENOUS at 14:09

## 2025-03-22 RX ADMIN — INSULIN LISPRO 15 UNIT(S): 100 INJECTION, SOLUTION INTRAVENOUS; SUBCUTANEOUS at 12:23

## 2025-03-22 RX ADMIN — ATORVASTATIN CALCIUM 20 MILLIGRAM(S): 80 TABLET, FILM COATED ORAL at 21:12

## 2025-03-22 RX ADMIN — HEPARIN SODIUM 5000 UNIT(S): 1000 INJECTION INTRAVENOUS; SUBCUTANEOUS at 05:59

## 2025-03-22 RX ADMIN — INSULIN LISPRO 15 UNIT(S): 100 INJECTION, SOLUTION INTRAVENOUS; SUBCUTANEOUS at 17:55

## 2025-03-22 RX ADMIN — INSULIN LISPRO 15 UNIT(S): 100 INJECTION, SOLUTION INTRAVENOUS; SUBCUTANEOUS at 08:56

## 2025-03-22 RX ADMIN — AMPICILLIN SODIUM AND SULBACTAM SODIUM 200 GRAM(S): 1; .5 INJECTION, POWDER, FOR SOLUTION INTRAMUSCULAR; INTRAVENOUS at 17:55

## 2025-03-22 RX ADMIN — Medication 25 MILLIGRAM(S): at 05:59

## 2025-03-22 NOTE — PROGRESS NOTE ADULT - SUBJECTIVE AND OBJECTIVE BOX
Follow Up:      Interval History/ROS:  Afebrile ON. Last fever 3/17. Currently on ceftriaxone.     Allergies  No Known Allergies        ANTIMICROBIALS:  cefTRIAXone   IVPB 2000 <User Schedule>      OTHER MEDS:  MEDICATIONS  (STANDING):  acetaminophen     Tablet .. 650 every 6 hours PRN  aluminum hydroxide/magnesium hydroxide/simethicone Suspension 30 every 4 hours PRN  amLODIPine   Tablet 10 daily  aspirin  chewable 81 daily  atorvastatin 20 at bedtime  dextrose 50% Injectable 25 once  dextrose 50% Injectable 12.5 once  dextrose 50% Injectable 25 once  dextrose Oral Gel 15 once PRN  glucagon  Injectable 1 once  heparin   Injectable 5000 every 8 hours  hydrALAZINE 25 three times a day  insulin glargine Injectable (LANTUS) 35 at bedtime  insulin lispro (ADMELOG) corrective regimen sliding scale  three times a day before meals  insulin lispro (ADMELOG) corrective regimen sliding scale  at bedtime  insulin lispro Injectable (ADMELOG) 15 three times a day before meals  melatonin 3 at bedtime PRN  ondansetron Injectable 4 every 8 hours PRN  polyethylene glycol 3350 17 <User Schedule>  senna 2 daily  tamsulosin 0.8 at bedtime      Vital Signs Last 24 Hrs  T(C): 36.9 (22 Mar 2025 04:00), Max: 36.9 (22 Mar 2025 00:30)  T(F): 98.4 (22 Mar 2025 04:00), Max: 98.4 (22 Mar 2025 00:30)  HR: 93 (22 Mar 2025 05:56) (77 - 110)  BP: 144/73 (22 Mar 2025 05:56) (112/76 - 166/82)  BP(mean): --  RR: 18 (22 Mar 2025 04:00) (18 - 18)  SpO2: 99% (22 Mar 2025 04:00) (94% - 99%)    Parameters below as of 22 Mar 2025 04:00  Patient On (Oxygen Delivery Method): nasal cannula  O2 Flow (L/min): 2      PHYSICAL EXAM:  Constitutional: non-toxic, no distress  HEAD/EYES: anicteric, no conjunctival injection  ENT:  supple, no thrush  Cardiovascular:   normal S1, S2, no murmur, no edema  Respiratory:  clear BS bilaterally, no wheezes, no rales  GI:  soft, non-tender, normal bowel sounds  :  no crain, no CVA tenderness  Musculoskeletal:  no synovitis, normal ROM  Neurologic: awake and alert, normal strength, no focal findings  Skin:  no rash, no erythema, no phlebitis  Heme/Onc: no lymphadenopathy   Psychiatric:  awake, alert, appropriate mood                                10.5   21.01 )-----------( 448      ( 21 Mar 2025 07:02 )             32.7       03-21    140  |  101  |  22  ----------------------------<  102[H]  4.2   |  27  |  1.32[H]    Ca    9.2      21 Mar 2025 10:51  Phos  3.5     03-21  Mg     1.6     03-21    TPro  7.2  /  Alb  2.9[L]  /  TBili  0.3  /  DBili  x   /  AST  68[H]  /  ALT  114[H]  /  AlkPhos  501[H]  03-21      Urinalysis Basic - ( 21 Mar 2025 10:51 )    Color: x / Appearance: x / SG: x / pH: x  Gluc: 102 mg/dL / Ketone: x  / Bili: x / Urobili: x   Blood: x / Protein: x / Nitrite: x   Leuk Esterase: x / RBC: x / WBC x   Sq Epi: x / Non Sq Epi: x / Bacteria: x        MICROBIOLOGY:  v  Blood Blood-Peripheral  03-18-25   No growth at 72 Hours  --  --      Blood Blood-Peripheral  03-18-25   No growth at 72 Hours  --  --      Blood Blood  03-16-25   No growth at 5 days  --  --      Blood Blood  03-15-25   No growth at 5 days  --  --      Blood Blood  03-15-25   Growth in anaerobic bottle: Proteus mirabilis  Direct identification is available within approximately 3-5  hours either by Blood Panel Multiplexed PCR or Direct  MALDI-TOF. Details: [email or web address]  --  Blood Culture PCR  Proteus mirabilis      Blood Blood-Peripheral  03-12-25   No growth at 5 days  --  --      Blood Blood-Peripheral  03-12-25   No growth at 5 days  --  --      Clean Catch Clean Catch (Midstream)  03-12-25   >100,000 CFU/ml Proteus mirabilis  <10,000 CFU/ml Normal Urogenital wil present  --  Proteus mirabilis                RADIOLOGY:  Imaging below independently reviewed.  < from: NM Hepatobiliary Imaging (03.18.25 @ 12:49) >    ACC: [id number] EXAM:  NM HEPATOBILIARY IMG   ORDERED BY: ADOLPH ESCOBAR     PROCEDURE DATE:  03/18/2025          INTERPRETATION:  RADIOPHARMACEUTICAL: 3.3 mCi Tc-99m-Mebrofenin, I.V.    CLINICAL INFORMATION: 64 years-old Male with cholelithiasis on CT and   sonogram, questionable pericholecystic fluid on sonography. Patient is   referred to evaluate for acute cholecystitis.    TECHNIQUE:  Dynamic imaging of the anterior abdomen was performed for 60   minutes following radiopharmaceutical injection.Static images of the   abdomen in the anterior, right anterior oblique and right lateral views   were obtained immediately thereafter.    COMPARISON: Prior HIDA scan 6/13/2024.    OTHER STUDIES USED FOR CORRELATION: CT abdomen and pelvis 3/15/2025.    FINDINGS: There is prompt, homogeneous uptake of radiopharmaceutical by   the hepatocytes. Activity is first seen in the gallbladder at about 30   minutes and in the bowel at about 20 minutes. There is good clearance of   activity from the liver by the end of the study.    IMPRESSION: Normal hepatobiliary scan. No radionuclide evidence of acute   cholecystitis.    --- End of Report ---            KATHE URIBE MD; Attending Radiologist  This document has been electronically signed. Mar 18 2025  1:18PM    < end of copied text >     Follow Up:      Interval History/ROS:  Afebrile ON. Last fever 3/17. Currently on ceftriaxone. Patient was seen and examined at bedside. Denies fever. +BM however no diarrhea. RLE pain. No abdominal pain. +PO    Allergies  No Known Allergies        ANTIMICROBIALS:  cefTRIAXone   IVPB 2000 <User Schedule>      OTHER MEDS:  MEDICATIONS  (STANDING):  acetaminophen     Tablet .. 650 every 6 hours PRN  aluminum hydroxide/magnesium hydroxide/simethicone Suspension 30 every 4 hours PRN  amLODIPine   Tablet 10 daily  aspirin  chewable 81 daily  atorvastatin 20 at bedtime  dextrose 50% Injectable 25 once  dextrose 50% Injectable 12.5 once  dextrose 50% Injectable 25 once  dextrose Oral Gel 15 once PRN  glucagon  Injectable 1 once  heparin   Injectable 5000 every 8 hours  hydrALAZINE 25 three times a day  insulin glargine Injectable (LANTUS) 35 at bedtime  insulin lispro (ADMELOG) corrective regimen sliding scale  three times a day before meals  insulin lispro (ADMELOG) corrective regimen sliding scale  at bedtime  insulin lispro Injectable (ADMELOG) 15 three times a day before meals  melatonin 3 at bedtime PRN  ondansetron Injectable 4 every 8 hours PRN  polyethylene glycol 3350 17 <User Schedule>  senna 2 daily  tamsulosin 0.8 at bedtime      Vital Signs Last 24 Hrs  T(C): 36.9 (22 Mar 2025 04:00), Max: 36.9 (22 Mar 2025 00:30)  T(F): 98.4 (22 Mar 2025 04:00), Max: 98.4 (22 Mar 2025 00:30)  HR: 93 (22 Mar 2025 05:56) (77 - 110)  BP: 144/73 (22 Mar 2025 05:56) (112/76 - 166/82)  BP(mean): --  RR: 18 (22 Mar 2025 04:00) (18 - 18)  SpO2: 99% (22 Mar 2025 04:00) (94% - 99%)    Parameters below as of 22 Mar 2025 04:00  Patient On (Oxygen Delivery Method): nasal cannula  O2 Flow (L/min): 2      PHYSICAL EXAM:  Constitutional: non-toxic, no distress  Cardiovascular:   normal S1, S2, no murmur, RRR  Respiratory:  clear BS bilaterally, no wheezes, no rales  GI:  soft, non-tender, normal bowel sounds  Musculoskeletal:  L BKA site intact, no erythema, no warmth; RLE no skin opening, no calf tenderness  Neurologic: awake and oriented x3  Skin:  no rash, no phlebitis  Psychiatric:  awake, alert, anxious                                10.5   21.01 )-----------( 448      ( 21 Mar 2025 07:02 )             32.7       03-21    140  |  101  |  22  ----------------------------<  102[H]  4.2   |  27  |  1.32[H]    Ca    9.2      21 Mar 2025 10:51  Phos  3.5     03-21  Mg     1.6     03-21    TPro  7.2  /  Alb  2.9[L]  /  TBili  0.3  /  DBili  x   /  AST  68[H]  /  ALT  114[H]  /  AlkPhos  501[H]  03-21      Urinalysis Basic - ( 21 Mar 2025 10:51 )    Color: x / Appearance: x / SG: x / pH: x  Gluc: 102 mg/dL / Ketone: x  / Bili: x / Urobili: x   Blood: x / Protein: x / Nitrite: x   Leuk Esterase: x / RBC: x / WBC x   Sq Epi: x / Non Sq Epi: x / Bacteria: x        MICROBIOLOGY:  v  Blood Blood-Peripheral  03-18-25   No growth at 72 Hours  --  --      Blood Blood-Peripheral  03-18-25   No growth at 72 Hours  --  --      Blood Blood  03-16-25   No growth at 5 days  --  --      Blood Blood  03-15-25   No growth at 5 days  --  --      Blood Blood  03-15-25   Growth in anaerobic bottle: Proteus mirabilis  Direct identification is available within approximately 3-5  hours either by Blood Panel Multiplexed PCR or Direct  MALDI-TOF. Details: [email or web address]  --  Blood Culture PCR  Proteus mirabilis      Blood Blood-Peripheral  03-12-25   No growth at 5 days  --  --      Blood Blood-Peripheral  03-12-25   No growth at 5 days  --  --      Clean Catch Clean Catch (Midstream)  03-12-25   >100,000 CFU/ml Proteus mirabilis  <10,000 CFU/ml Normal Urogenital wil present  --  Proteus mirabilis                RADIOLOGY:  Imaging below independently reviewed.  < from: NM Hepatobiliary Imaging (03.18.25 @ 12:49) >    ACC: [id number] EXAM:  NM HEPATOBILIARY IMG   ORDERED BY: ADOLPH ESCOBAR     PROCEDURE DATE:  03/18/2025          INTERPRETATION:  RADIOPHARMACEUTICAL: 3.3 mCi Tc-99m-Mebrofenin, I.V.    CLINICAL INFORMATION: 64 years-old Male with cholelithiasis on CT and   sonogram, questionable pericholecystic fluid on sonography. Patient is   referred to evaluate for acute cholecystitis.    TECHNIQUE:  Dynamic imaging of the anterior abdomen was performed for 60   minutes following radiopharmaceutical injection.Static images of the   abdomen in the anterior, right anterior oblique and right lateral views   were obtained immediately thereafter.    COMPARISON: Prior HIDA scan 6/13/2024.    OTHER STUDIES USED FOR CORRELATION: CT abdomen and pelvis 3/15/2025.    FINDINGS: There is prompt, homogeneous uptake of radiopharmaceutical by   the hepatocytes. Activity is first seen in the gallbladder at about 30   minutes and in the bowel at about 20 minutes. There is good clearance of   activity from the liver by the end of the study.    IMPRESSION: Normal hepatobiliary scan. No radionuclide evidence of acute   cholecystitis.    --- End of Report ---            KATHE URIBE MD; Attending Radiologist  This document has been electronically signed. Mar 18 2025  1:18PM    < end of copied text >

## 2025-03-22 NOTE — PROGRESS NOTE ADULT - PROBLEM SELECTOR PLAN 2
Unclear baseline cr, Fluctuated between 0.9-1.1 in late 20246  on Admission 1.9. Increasing now at 5.1--> now improved 1.32  Likely pre-renal with vs. ATN vs. post renal iso obstruction 2/2 septic stone. Now s/p ureteral stone placement    Plan:   hold ACE-I- consider restarting with improving kidney function  renally dose antibiotics  monitor BMP BID  fu urine studies  Monitor UO, BUN/Cr, volume status, acid-base balance, electrolytes  avoid nephrotoxic agents  dose adjustments for renally cleared meds CT A/P with 0.6 cm calculus within the proximal left ureter with mild left hydronephrosis  s/p cystoscopy and left stent placement 3/17  Patient with lingering leukocytosis.     Plan:     repeated BCx 3/21  repeated CT a/p  cw CTX 2g QD (3/18--)  continue for 10-14 days. transition to PO levofloxacin on dc, but requires downtrending leukocytosis and 48hrs of negative BCx  cw flomax  continue crain, plan for patient to d/c with crain

## 2025-03-22 NOTE — PROVIDER CONTACT NOTE (OTHER) - NAME OF MD/NP/PA/DO NOTIFIED:
Maureen Donohue
Iwona Whitfield
Claus Mayo MD
Claus Mayo Md
MD Claus Mayo
Claus Mayo
Dr Salamanca
Dr Donnelly
MD Anguiano, Salvador
Maria Esther

## 2025-03-22 NOTE — PROVIDER CONTACT NOTE (OTHER) - ASSESSMENT
Pt remains alert, no distress noted. Bladder scan post void is 233ml. Will rescan in 6 hours.
Pt alert, OX4, not in acute form of distress.
Patient AOX4, no distress
Pt alert, OX4, not in acute form of distress.
pt blood sugar: 86
No distress noted.
all other VSS
pt 
Patient AOX4, with verbalization and concerns about many procedure

## 2025-03-22 NOTE — PROVIDER CONTACT NOTE (OTHER) - SITUATION
patient has no appetite and does not want to eat breakfast; standing pre meal insulin can't be given
Lab called cbc with diff clotted
pt admitted to UTI and hyperglycemia,. pt has bladder scan of 312 cc of urine.
Urine is blood tinged.
Pt has fever of 102.8, , oxygen sats is 90% room air, 2LNC applied. it is ok to given PO tylenol PRN or would you rather give IV tylenol? Pt also had a large incontinent void episode.
pt Bp 184/85
Patient AOX4,V/S stable but patient observed with elevated temp and slightly tachy, in no distress but verbalized going for eye procedure despite reorientation
pt refuses pre-meal insulin because he is not eating

## 2025-03-22 NOTE — PROGRESS NOTE ADULT - SUBJECTIVE AND OBJECTIVE BOX
INTERVAL HPI/OVERNIGHT EVENTS:    SUBJECTIVE: Patient seen and examined at bedside.     ROS: All negative except as listed above.    VITAL SIGNS:  ICU Vital Signs Last 24 Hrs  T(C): 36.9 (22 Mar 2025 04:00), Max: 36.9 (22 Mar 2025 00:30)  T(F): 98.4 (22 Mar 2025 04:00), Max: 98.4 (22 Mar 2025 00:30)  HR: 93 (22 Mar 2025 05:56) (77 - 110)  BP: 144/73 (22 Mar 2025 05:56) (112/76 - 166/82)  BP(mean): --  ABP: --  ABP(mean): --  RR: 18 (22 Mar 2025 04:00) (18 - 18)  SpO2: 99% (22 Mar 2025 04:00) (94% - 99%)    O2 Parameters below as of 22 Mar 2025 04:00  Patient On (Oxygen Delivery Method): nasal cannula  O2 Flow (L/min): 2          Plateau pressure:   P/F ratio:     03-20 @ 07:01  -  03-21 @ 07:00  --------------------------------------------------------  IN: 0 mL / OUT: 2700 mL / NET: -2700 mL    03-21 @ 07:01  -  03-22 @ 06:51  --------------------------------------------------------  IN: 0 mL / OUT: 3150 mL / NET: -3150 mL      CAPILLARY BLOOD GLUCOSE      POCT Blood Glucose.: 201 mg/dL (21 Mar 2025 21:29)      ECG: reviewed.    PHYSICAL EXAM:    GENERAL: NAD, lying in bed comfortably  HEAD:  Atraumatic, normocephalic  EYES: EOMI, PERRLA, conjunctiva and sclera clear  NECK: Supple, trachea midline, no JVD  HEART: Regular rate and rhythm, no murmurs, rubs, or gallops  LUNGS: Unlabored respirations.  Clear to auscultation bilaterally, no crackles, wheezing, or rhonchi  ABDOMEN: Soft, nontender, nondistended, +BS  EXTREMITIES: 2+ peripheral pulses bilaterally, cap refill<2 secs. No clubbing, cyanosis, or edema  NERVOUS SYSTEM:  A&Ox3, following commands, moving all extremities, no focal deficits   SKIN: No rashes or lesions    MEDICATIONS:  MEDICATIONS  (STANDING):  amLODIPine   Tablet 10 milliGRAM(s) Oral daily  aspirin  chewable 81 milliGRAM(s) Oral daily  atorvastatin 20 milliGRAM(s) Oral at bedtime  cefTRIAXone   IVPB 2000 milliGRAM(s) IV Intermittent <User Schedule>  dextrose 5%. 1000 milliLiter(s) (100 mL/Hr) IV Continuous <Continuous>  dextrose 5%. 1000 milliLiter(s) (50 mL/Hr) IV Continuous <Continuous>  dextrose 50% Injectable 25 Gram(s) IV Push once  dextrose 50% Injectable 12.5 Gram(s) IV Push once  dextrose 50% Injectable 25 Gram(s) IV Push once  glucagon  Injectable 1 milliGRAM(s) IntraMuscular once  heparin   Injectable 5000 Unit(s) SubCutaneous every 8 hours  hydrALAZINE 25 milliGRAM(s) Oral three times a day  insulin glargine Injectable (LANTUS) 35 Unit(s) SubCutaneous at bedtime  insulin lispro (ADMELOG) corrective regimen sliding scale   SubCutaneous three times a day before meals  insulin lispro (ADMELOG) corrective regimen sliding scale   SubCutaneous at bedtime  insulin lispro Injectable (ADMELOG) 15 Unit(s) SubCutaneous three times a day before meals  lactated ringers. 1000 milliLiter(s) (75 mL/Hr) IV Continuous <Continuous>  polyethylene glycol 3350 17 Gram(s) Oral <User Schedule>  senna 2 Tablet(s) Oral daily  tamsulosin 0.8 milliGRAM(s) Oral at bedtime    MEDICATIONS  (PRN):  acetaminophen     Tablet .. 650 milliGRAM(s) Oral every 6 hours PRN Temp greater or equal to 38C (100.4F), Mild Pain (1 - 3)  aluminum hydroxide/magnesium hydroxide/simethicone Suspension 30 milliLiter(s) Oral every 4 hours PRN Dyspepsia  dextrose Oral Gel 15 Gram(s) Oral once PRN Blood Glucose LESS THAN 70 milliGRAM(s)/deciliter  melatonin 3 milliGRAM(s) Oral at bedtime PRN Insomnia  ondansetron Injectable 4 milliGRAM(s) IV Push every 8 hours PRN Nausea and/or Vomiting      ALLERGIES:  Allergies    No Known Allergies    Intolerances        LABS:                        10.5   21.01 )-----------( 448      ( 21 Mar 2025 07:02 )             32.7     03-21    140  |  101  |  22  ----------------------------<  102[H]  4.2   |  27  |  1.32[H]    Ca    9.2      21 Mar 2025 10:51  Phos  3.5     03-21  Mg     1.6     03-21    TPro  7.2  /  Alb  2.9[L]  /  TBili  0.3  /  DBili  x   /  AST  68[H]  /  ALT  114[H]  /  AlkPhos  501[H]  03-21      Urinalysis Basic - ( 21 Mar 2025 10:51 )    Color: x / Appearance: x / SG: x / pH: x  Gluc: 102 mg/dL / Ketone: x  / Bili: x / Urobili: x   Blood: x / Protein: x / Nitrite: x   Leuk Esterase: x / RBC: x / WBC x   Sq Epi: x / Non Sq Epi: x / Bacteria: x      ABG:      vBG:    Micro:    Culture - Blood (collected 03-18-25 @ 05:48)  Source: Blood Blood-Peripheral  Preliminary Report (03-21-25 @ 10:01):    No growth at 72 Hours    Culture - Blood (collected 03-18-25 @ 05:39)  Source: Blood Blood-Peripheral  Preliminary Report (03-21-25 @ 10:01):    No growth at 72 Hours    Culture - Blood (collected 03-16-25 @ 17:19)  Source: Blood Blood  Final Report (03-21-25 @ 23:01):    No growth at 5 days    Culture - Blood (collected 03-15-25 @ 11:14)  Source: Blood Blood  Final Report (03-20-25 @ 17:00):    No growth at 5 days    Culture - Blood (collected 03-15-25 @ 07:30)  Source: Blood Blood  Gram Stain (03-16-25 @ 23:04):    Growth in anaerobic bottle: Gram Negative Rods  Final Report (03-18-25 @ 07:53):    Growth in anaerobic bottle: Proteus mirabilis    Direct identification is available within approximately 3-5    hours either by Blood Panel Multiplexed PCR or Direct    MALDI-TOF. Details: [email or web address]  Organism: Blood Culture PCR  Proteus mirabilis (03-18-25 @ 07:53)  Organism: Proteus mirabilis (03-18-25 @ 07:53)      Method Type: ARMEN      -  Ampicillin: S <=8 These ampicillin results predict results for amoxicillin      -  Ampicillin/Sulbactam: S <=4/2      -  Aztreonam: S <=4      -  Cefazolin: S <=2      -  Cefepime: S <=2      -  Cefoxitin: S <=8      -  Ceftriaxone: S <=1      -  Ciprofloxacin: S <=0.25      -  Ertapenem: S <=0.5      -  Gentamicin: S <=2      -  Levofloxacin: S <=0.5      -  Meropenem: S <=1      -  Piperacillin/Tazobactam: S <=8      -  Tobramycin: S <=2      -  Trimethoprim/Sulfamethoxazole: S <=0.5/9.5  Organism: Blood Culture PCR (03-18-25 @ 07:53)      Method Type: PCR      -  Proteus species: Detec    Culture - Blood (collected 03-12-25 @ 19:45)  Source: Blood Blood-Peripheral  Final Report (03-18-25 @ 01:01):    No growth at 5 days    Culture - Blood (collected 03-12-25 @ 19:35)  Source: Blood Blood-Peripheral  Final Report (03-18-25 @ 01:01):    No growth at 5 days          RADIOLOGY & ADDITIONAL TESTS: Reviewed.   INTERVAL HPI/OVERNIGHT EVENTS:    SUBJECTIVE: Patient seen and examined at bedside. Patient states that he is feeling weak this morning, but states that he just woke up and is feeling better after starting to eat breakfast. He denies CP, SOB. Has mild abdominal pain. Endorses sleeping well (after interruption for imaging) and eating well. Had bowel movement yesterday.     ROS: All negative except as listed above.    VITAL SIGNS:  ICU Vital Signs Last 24 Hrs  T(C): 36.9 (22 Mar 2025 04:00), Max: 36.9 (22 Mar 2025 00:30)  T(F): 98.4 (22 Mar 2025 04:00), Max: 98.4 (22 Mar 2025 00:30)  HR: 93 (22 Mar 2025 05:56) (77 - 110)  BP: 144/73 (22 Mar 2025 05:56) (112/76 - 166/82)  BP(mean): --  ABP: --  ABP(mean): --  RR: 18 (22 Mar 2025 04:00) (18 - 18)  SpO2: 99% (22 Mar 2025 04:00) (94% - 99%)    O2 Parameters below as of 22 Mar 2025 04:00  Patient On (Oxygen Delivery Method): nasal cannula  O2 Flow (L/min): 2          Plateau pressure:   P/F ratio:     03-20 @ 07:01  -  03-21 @ 07:00  --------------------------------------------------------  IN: 0 mL / OUT: 2700 mL / NET: -2700 mL    03-21 @ 07:01  -  03-22 @ 06:51  --------------------------------------------------------  IN: 0 mL / OUT: 3150 mL / NET: -3150 mL      CAPILLARY BLOOD GLUCOSE      POCT Blood Glucose.: 201 mg/dL (21 Mar 2025 21:29)      ECG: reviewed.    PHYSICAL EXAM:    GENERAL: NAD, lying in bed comfortably  HEAD:  Atraumatic, normocephalic  EYES: EOMI, PERRLA, conjunctiva and sclera clear  NECK: Supple, trachea midline, no JVD  HEART: Regular rate and rhythm, no murmurs, rubs, or gallops  LUNGS: Unlabored respirations.  Clear to auscultation bilaterally, no crackles, wheezing, or rhonchi  ABDOMEN: Soft, nontender, nondistended, +BS  EXTREMITIES: 2+ peripheral pulses bilaterally, cap refill<2 secs. No clubbing, cyanosis, or edema  NERVOUS SYSTEM:  A&Ox3, following commands, moving all extremities, no focal deficits   SKIN: No rashes or lesions    MEDICATIONS:  MEDICATIONS  (STANDING):  amLODIPine   Tablet 10 milliGRAM(s) Oral daily  aspirin  chewable 81 milliGRAM(s) Oral daily  atorvastatin 20 milliGRAM(s) Oral at bedtime  cefTRIAXone   IVPB 2000 milliGRAM(s) IV Intermittent <User Schedule>  dextrose 5%. 1000 milliLiter(s) (100 mL/Hr) IV Continuous <Continuous>  dextrose 5%. 1000 milliLiter(s) (50 mL/Hr) IV Continuous <Continuous>  dextrose 50% Injectable 25 Gram(s) IV Push once  dextrose 50% Injectable 12.5 Gram(s) IV Push once  dextrose 50% Injectable 25 Gram(s) IV Push once  glucagon  Injectable 1 milliGRAM(s) IntraMuscular once  heparin   Injectable 5000 Unit(s) SubCutaneous every 8 hours  hydrALAZINE 25 milliGRAM(s) Oral three times a day  insulin glargine Injectable (LANTUS) 35 Unit(s) SubCutaneous at bedtime  insulin lispro (ADMELOG) corrective regimen sliding scale   SubCutaneous three times a day before meals  insulin lispro (ADMELOG) corrective regimen sliding scale   SubCutaneous at bedtime  insulin lispro Injectable (ADMELOG) 15 Unit(s) SubCutaneous three times a day before meals  lactated ringers. 1000 milliLiter(s) (75 mL/Hr) IV Continuous <Continuous>  polyethylene glycol 3350 17 Gram(s) Oral <User Schedule>  senna 2 Tablet(s) Oral daily  tamsulosin 0.8 milliGRAM(s) Oral at bedtime    MEDICATIONS  (PRN):  acetaminophen     Tablet .. 650 milliGRAM(s) Oral every 6 hours PRN Temp greater or equal to 38C (100.4F), Mild Pain (1 - 3)  aluminum hydroxide/magnesium hydroxide/simethicone Suspension 30 milliLiter(s) Oral every 4 hours PRN Dyspepsia  dextrose Oral Gel 15 Gram(s) Oral once PRN Blood Glucose LESS THAN 70 milliGRAM(s)/deciliter  melatonin 3 milliGRAM(s) Oral at bedtime PRN Insomnia  ondansetron Injectable 4 milliGRAM(s) IV Push every 8 hours PRN Nausea and/or Vomiting      ALLERGIES:  Allergies    No Known Allergies    Intolerances        LABS:                        10.5   21.01 )-----------( 448      ( 21 Mar 2025 07:02 )             32.7     03-21    140  |  101  |  22  ----------------------------<  102[H]  4.2   |  27  |  1.32[H]    Ca    9.2      21 Mar 2025 10:51  Phos  3.5     03-21  Mg     1.6     03-21    TPro  7.2  /  Alb  2.9[L]  /  TBili  0.3  /  DBili  x   /  AST  68[H]  /  ALT  114[H]  /  AlkPhos  501[H]  03-21      Urinalysis Basic - ( 21 Mar 2025 10:51 )    Color: x / Appearance: x / SG: x / pH: x  Gluc: 102 mg/dL / Ketone: x  / Bili: x / Urobili: x   Blood: x / Protein: x / Nitrite: x   Leuk Esterase: x / RBC: x / WBC x   Sq Epi: x / Non Sq Epi: x / Bacteria: x      ABG:      vBG:    Micro:    Culture - Blood (collected 03-18-25 @ 05:48)  Source: Blood Blood-Peripheral  Preliminary Report (03-21-25 @ 10:01):    No growth at 72 Hours    Culture - Blood (collected 03-18-25 @ 05:39)  Source: Blood Blood-Peripheral  Preliminary Report (03-21-25 @ 10:01):    No growth at 72 Hours    Culture - Blood (collected 03-16-25 @ 17:19)  Source: Blood Blood  Final Report (03-21-25 @ 23:01):    No growth at 5 days    Culture - Blood (collected 03-15-25 @ 11:14)  Source: Blood Blood  Final Report (03-20-25 @ 17:00):    No growth at 5 days    Culture - Blood (collected 03-15-25 @ 07:30)  Source: Blood Blood  Gram Stain (03-16-25 @ 23:04):    Growth in anaerobic bottle: Gram Negative Rods  Final Report (03-18-25 @ 07:53):    Growth in anaerobic bottle: Proteus mirabilis    Direct identification is available within approximately 3-5    hours either by Blood Panel Multiplexed PCR or Direct    MALDI-TOF. Details: [email or web address]  Organism: Blood Culture PCR  Proteus mirabilis (03-18-25 @ 07:53)  Organism: Proteus mirabilis (03-18-25 @ 07:53)      Method Type: ARMEN      -  Ampicillin: S <=8 These ampicillin results predict results for amoxicillin      -  Ampicillin/Sulbactam: S <=4/2      -  Aztreonam: S <=4      -  Cefazolin: S <=2      -  Cefepime: S <=2      -  Cefoxitin: S <=8      -  Ceftriaxone: S <=1      -  Ciprofloxacin: S <=0.25      -  Ertapenem: S <=0.5      -  Gentamicin: S <=2      -  Levofloxacin: S <=0.5      -  Meropenem: S <=1      -  Piperacillin/Tazobactam: S <=8      -  Tobramycin: S <=2      -  Trimethoprim/Sulfamethoxazole: S <=0.5/9.5  Organism: Blood Culture PCR (03-18-25 @ 07:53)      Method Type: PCR      -  Proteus species: Detec    Culture - Blood (collected 03-12-25 @ 19:45)  Source: Blood Blood-Peripheral  Final Report (03-18-25 @ 01:01):    No growth at 5 days    Culture - Blood (collected 03-12-25 @ 19:35)  Source: Blood Blood-Peripheral  Final Report (03-18-25 @ 01:01):    No growth at 5 days          RADIOLOGY & ADDITIONAL TESTS: Reviewed.   INTERVAL HPI/OVERNIGHT EVENTS:    SUBJECTIVE: Patient seen and examined at bedside. Patient states that he is feeling weak this morning, but states that he just woke up and is feeling better after starting to eat breakfast. He denies CP, SOB. Has mild abdominal pain. Endorses sleeping well (after interruption for imaging) and eating well. Had bowel movement yesterday. Discussed with patient's sister Nikole at bedside.     ROS: All negative except as listed above.    VITAL SIGNS:  ICU Vital Signs Last 24 Hrs  T(C): 36.9 (22 Mar 2025 04:00), Max: 36.9 (22 Mar 2025 00:30)  T(F): 98.4 (22 Mar 2025 04:00), Max: 98.4 (22 Mar 2025 00:30)  HR: 93 (22 Mar 2025 05:56) (77 - 110)  BP: 144/73 (22 Mar 2025 05:56) (112/76 - 166/82)  BP(mean): --  ABP: --  ABP(mean): --  RR: 18 (22 Mar 2025 04:00) (18 - 18)  SpO2: 99% (22 Mar 2025 04:00) (94% - 99%)    O2 Parameters below as of 22 Mar 2025 04:00  Patient On (Oxygen Delivery Method): nasal cannula  O2 Flow (L/min): 2          Plateau pressure:   P/F ratio:     03-20 @ 07:01  -  03-21 @ 07:00  --------------------------------------------------------  IN: 0 mL / OUT: 2700 mL / NET: -2700 mL    03-21 @ 07:01  -  03-22 @ 06:51  --------------------------------------------------------  IN: 0 mL / OUT: 3150 mL / NET: -3150 mL      CAPILLARY BLOOD GLUCOSE      POCT Blood Glucose.: 201 mg/dL (21 Mar 2025 21:29)      ECG: reviewed.    PHYSICAL EXAM:    GENERAL: NAD, lying in bed. appears fatigue  HEAD:  Atraumatic, normocephalic  EYES: EOMI, PERRLA, conjunctiva and sclera clear  NECK: Supple, trachea midline, no JVD  HEART: Regular rate and rhythm, no murmurs, rubs, or gallops  LUNGS: Unlabored respirations.  Con 2L NC  ABDOMEN: mild distesntion  EXTREMITIES: left BKA,   NERVOUS SYSTEM:  A&Ox3, following commands, moving all extremities, no focal deficits   SKIN: No rashes or lesions    MEDICATIONS:  MEDICATIONS  (STANDING):  amLODIPine   Tablet 10 milliGRAM(s) Oral daily  aspirin  chewable 81 milliGRAM(s) Oral daily  atorvastatin 20 milliGRAM(s) Oral at bedtime  cefTRIAXone   IVPB 2000 milliGRAM(s) IV Intermittent <User Schedule>  dextrose 5%. 1000 milliLiter(s) (100 mL/Hr) IV Continuous <Continuous>  dextrose 5%. 1000 milliLiter(s) (50 mL/Hr) IV Continuous <Continuous>  dextrose 50% Injectable 25 Gram(s) IV Push once  dextrose 50% Injectable 12.5 Gram(s) IV Push once  dextrose 50% Injectable 25 Gram(s) IV Push once  glucagon  Injectable 1 milliGRAM(s) IntraMuscular once  heparin   Injectable 5000 Unit(s) SubCutaneous every 8 hours  hydrALAZINE 25 milliGRAM(s) Oral three times a day  insulin glargine Injectable (LANTUS) 35 Unit(s) SubCutaneous at bedtime  insulin lispro (ADMELOG) corrective regimen sliding scale   SubCutaneous three times a day before meals  insulin lispro (ADMELOG) corrective regimen sliding scale   SubCutaneous at bedtime  insulin lispro Injectable (ADMELOG) 15 Unit(s) SubCutaneous three times a day before meals  lactated ringers. 1000 milliLiter(s) (75 mL/Hr) IV Continuous <Continuous>  polyethylene glycol 3350 17 Gram(s) Oral <User Schedule>  senna 2 Tablet(s) Oral daily  tamsulosin 0.8 milliGRAM(s) Oral at bedtime    MEDICATIONS  (PRN):  acetaminophen     Tablet .. 650 milliGRAM(s) Oral every 6 hours PRN Temp greater or equal to 38C (100.4F), Mild Pain (1 - 3)  aluminum hydroxide/magnesium hydroxide/simethicone Suspension 30 milliLiter(s) Oral every 4 hours PRN Dyspepsia  dextrose Oral Gel 15 Gram(s) Oral once PRN Blood Glucose LESS THAN 70 milliGRAM(s)/deciliter  melatonin 3 milliGRAM(s) Oral at bedtime PRN Insomnia  ondansetron Injectable 4 milliGRAM(s) IV Push every 8 hours PRN Nausea and/or Vomiting      ALLERGIES:  Allergies    No Known Allergies    Intolerances        LABS:                        10.5   21.01 )-----------( 448      ( 21 Mar 2025 07:02 )             32.7     03-21    140  |  101  |  22  ----------------------------<  102[H]  4.2   |  27  |  1.32[H]    Ca    9.2      21 Mar 2025 10:51  Phos  3.5     03-21  Mg     1.6     03-21    TPro  7.2  /  Alb  2.9[L]  /  TBili  0.3  /  DBili  x   /  AST  68[H]  /  ALT  114[H]  /  AlkPhos  501[H]  03-21      Urinalysis Basic - ( 21 Mar 2025 10:51 )    Color: x / Appearance: x / SG: x / pH: x  Gluc: 102 mg/dL / Ketone: x  / Bili: x / Urobili: x   Blood: x / Protein: x / Nitrite: x   Leuk Esterase: x / RBC: x / WBC x   Sq Epi: x / Non Sq Epi: x / Bacteria: x      ABG:      vBG:    Micro:    Culture - Blood (collected 03-18-25 @ 05:48)  Source: Blood Blood-Peripheral  Preliminary Report (03-21-25 @ 10:01):    No growth at 72 Hours    Culture - Blood (collected 03-18-25 @ 05:39)  Source: Blood Blood-Peripheral  Preliminary Report (03-21-25 @ 10:01):    No growth at 72 Hours    Culture - Blood (collected 03-16-25 @ 17:19)  Source: Blood Blood  Final Report (03-21-25 @ 23:01):    No growth at 5 days    Culture - Blood (collected 03-15-25 @ 11:14)  Source: Blood Blood  Final Report (03-20-25 @ 17:00):    No growth at 5 days    Culture - Blood (collected 03-15-25 @ 07:30)  Source: Blood Blood  Gram Stain (03-16-25 @ 23:04):    Growth in anaerobic bottle: Gram Negative Rods  Final Report (03-18-25 @ 07:53):    Growth in anaerobic bottle: Proteus mirabilis    Direct identification is available within approximately 3-5    hours either by Blood Panel Multiplexed PCR or Direct    MALDI-TOF. Details: [email or web address]  Organism: Blood Culture PCR  Proteus mirabilis (03-18-25 @ 07:53)  Organism: Proteus mirabilis (03-18-25 @ 07:53)      Method Type: ARMEN      -  Ampicillin: S <=8 These ampicillin results predict results for amoxicillin      -  Ampicillin/Sulbactam: S <=4/2      -  Aztreonam: S <=4      -  Cefazolin: S <=2      -  Cefepime: S <=2      -  Cefoxitin: S <=8      -  Ceftriaxone: S <=1      -  Ciprofloxacin: S <=0.25      -  Ertapenem: S <=0.5      -  Gentamicin: S <=2      -  Levofloxacin: S <=0.5      -  Meropenem: S <=1      -  Piperacillin/Tazobactam: S <=8      -  Tobramycin: S <=2      -  Trimethoprim/Sulfamethoxazole: S <=0.5/9.5  Organism: Blood Culture PCR (03-18-25 @ 07:53)      Method Type: PCR      -  Proteus species: Detec    Culture - Blood (collected 03-12-25 @ 19:45)  Source: Blood Blood-Peripheral  Final Report (03-18-25 @ 01:01):    No growth at 5 days    Culture - Blood (collected 03-12-25 @ 19:35)  Source: Blood Blood-Peripheral  Final Report (03-18-25 @ 01:01):    No growth at 5 days          RADIOLOGY & ADDITIONAL TESTS: Reviewed.

## 2025-03-22 NOTE — PROGRESS NOTE ADULT - ASSESSMENT
64M with medical of PAD s/p L BKA, DM with peripheral neuropathy and retinopathy HTN, CVA, complete heart block s/p PPM presented on 3/12 with generalized fatigue. In the ED patient noted to be febrile and tachycardic. Labs notable for leukocytosis, hyponatremia, hyperglycemia, HOLA and metabolic acidosis. UA with many bacteria, negative nitrite and small leukocyte esterase. Urine culture 3/12 >100K proteus mirabilis. Blood culture negative to date. ID asked to help manage.     Work up:  -Blood culture 3/12 (-)  -Urine Culture 3/12 - >100k proteus mirabilis  -Rapid RVP negative  -Renal US:  mild hydronephrosis of the left kidney  -X-ray abdomen: Mild nonspecific gaseous distention of stomach, with paucity of bowel gas distally. This may be secondary to gastric outlet obstruction.  -CT C/A/P without cont: 0.6 cm calculus within the proximal left ureter with mild left hydronephrosis.  -VQ scan: Indeterminate VQ scan for pulmonary embolus.  -LE US doppler: no DVT  -US RUQ:  Cholelithiasis with gallbladder wall thickening. Question trace pericholecystic fluid. Unreliable sonogram Jansen's sign. If there is clinical suspicion for acute cholecystitis, hepatobiliary scan may be obtained for further evaluation.  -Blood Cx (3/15): Proteus (1/4 bottles)  -HIDA scan:  Normal hepatobiliary scan. No radionuclide evidence of acute cholecystitis.  -Repeat blood Cx (3/16): NGTD   -CTAP (3/21): Mild peripancreatic fat stranding greatest about the head and tail, raising suspicion for acute pancreatitis. Interval placement of left nephroureteral stent. Nonobstructing 1.1 cm left upper pole calculus.  -Lipase 3/22: neg      #Proteus bacteremia source urinary/pyelonephritis in the setting of L ureter stone with hydronephrosis s/p cystoscopy and left stent placement on 3/17  #Sepsis, fever, leukocytosis   #Hypoxemia ?PNA r/o PE  #HOLA  #Transaminitis    Recommendations:   -s/p cystoscopy and left stent placement on 3/17, persistent leukocytosis   -LFTs elevated, incl alk phos. Bili neg. HIDA neg and CTAP nonrevealing. LFTs and WBCs less likely from ceftriaxone however would attempt switching to Unasyn 3g IV Q6H and monitor WBC and LFTs.  -F/u repeat blood cx   -Send C diff study if watery diarrhea is produced.  -When clinically stable for discharge, leukocytosis trends down and repeat blood cx neg for at least 48hrs, plan to transition to Levofloxacin 750 mg po daily (till 3/24) - QTc this admission 462 msec  -Consider thrombosis workup   -Trend LFTs   -Monitor T curve and WBC trend       Topics relating to disease transmission risk assessment and mitigation, complex antimicrobial therapy counseling and treatment, and/or public health investigation, analysis, and testing were addressed.    Thank you for this consult. Inpatient ID team will follow.  Plan discussed with primary team house staff.         Noe Ernst MD, PhD  Attending Physician  Division of Infectious Diseases  Department of Medicine    Please contact through MS Teams message.  Office: [phone] (after 5 PM or weekend)

## 2025-03-22 NOTE — PROVIDER CONTACT NOTE (OTHER) - REASON
unable to advance intermittent urinary catheter
Pt noted with hematuria
pt refuses pre-meal insulin because he is not eating
Lab called cbc with diff clotted
patient not eating breakfast; standing insulin can't be given
Pt with fever of 102.8, ,oxygen 90 on room air
T= 38.1C
/85
Patient family has concerns that patient is more confused and possible stroke
T= 100.7F

## 2025-03-22 NOTE — PROGRESS NOTE ADULT - ATTENDING COMMENTS
64M with severe obesity, hypertension, T2DM, PVD s/p L BKA, heart block s/p PPM admitted with severe sepsis suspected due to infected kidney stone.    Severe sepsis  On basis of fever, HR, leukocytosis, HOLA, found with Proteus bacteremia, suspected from ureteral stone infection  - appreciate ID input, de-escalated meropenem to ceftriaxone s/p stent placement with urology on 3/17   - Repeat CT A/P  with mild mild peripancreatic fat stranding greatest about the head and tail, raising suspicion for acute pancreatitis.  - Check Lipase. IVF  - f/u repeat blood cultures ordered 3/21  - continue to closely monitor WBC count and fever curve    HOLA  Suspect ATN from sepsis, suspect creatinine now close to baseline  - appreciate nephrology's evaluation  - appreciate urology for Contreras placement, remains with adequate urine output and will follow up outpatient for TOV  - Repeat BMP in am- ? labs today      Type 2 diabetes, poorly controlled with hyperglycemia  A1c 9.2, on insulin and multiple oral agents at home  - will continue to modify Lantus and Admelog as needed to achieve euglycemia, lower Lantus this evening as glucose borderline low on 3/21    Dispo - recommended for acute rehab, though patient prefers to go to [facility], dc planning to [facility] with PO antibiotics once WBC downtrends and appears clinically approved

## 2025-03-22 NOTE — PROVIDER CONTACT NOTE (OTHER) - BACKGROUND
Admitted for HOLA
Dx= ARF
Dx= ARF
admitting dx: hyperglycemia
pt received PO amlodipine 1 hr ago
Admitted for Sepsis

## 2025-03-22 NOTE — PROGRESS NOTE ADULT - PROBLEM SELECTOR PLAN 3
on presentation, BG ~400s,   UA: w no glucosuria and no ketonuria, BHB wnl, with HAGMA  s/p 2L LR  likely stress hyperglycemia iso sepsis    Plan:   -Lantus decreased to 35 units  cw ISS  consider endocrine consult  -hold home regimen  A1C - 9.2%  -trend fingerstick glu  - goal inpatient BG- 180  -cont home neurontin  - carb consistent diet Unclear baseline cr, Fluctuated between 0.9-1.1 in late 20246  on Admission 1.9. Increasing now at 5.1--> now improved 1.32  Likely pre-renal with vs. ATN vs. post renal iso obstruction 2/2 septic stone. Now s/p ureteral stone placement    Plan:   hold ACE-I- consider restarting with improving kidney function  renally dose antibiotics  monitor BMP BID  fu urine studies  Monitor UO, BUN/Cr, volume status, acid-base balance, electrolytes  avoid nephrotoxic agents  dose adjustments for renally cleared meds

## 2025-03-22 NOTE — PROGRESS NOTE ADULT - PROBLEM SELECTOR PLAN 1
CT A/P with 0.6 cm calculus within the proximal left ureter with mild left hydronephrosis  s/p cystoscopy and left stent placement 3/17  Patient with lingering leukocytosis.     Plan:     repeated BCx 3/21  repeated CT a/p  cw CTX 2g QD (3/18--)  continue for 10-14 days. transition to PO levofloxacin on dc, but requires downtrending leukocytosis and 48hrs of negative BCx  cw flomax  continue crain, plan for patient to d/c with crain febrile, leukocytosis, tachycardic, + lactic acidosis; meets severe sepsis criteria  suspect 2/2 uti --> Urine culture 3/12 >100K proteus mirabilis.   CXR- no consolidations  CT A/P with 0.6 cm calculus within the proximal left ureter with mild left hydronephrosis  RUQ US Cholelithiasis with gallbladder wall thickening. Question trace pericholecystic fluid.   MRSA PCR- negative  new proteus bacteremia + hydro on CT  HIDA- with no cholecystitis    Plan:   Now with lingering/uptrending WBC. Repeat CT scan with mild mild peripancreatic fat stranding greatest about the head and tail, raising suspicion for acute pancreatitis.  - c/w LR at 75 cc/hr  - f/u lipase  - repeated Bcx   - cw CTX 2g  - sp Meropenem 500mg BID (3/15-3/16)  - follow up blood cultures 3/18  - f/u sputum cx  - trend lactate q4-6h until wnl  - Monitor for fever, changes in white count

## 2025-03-22 NOTE — PROGRESS NOTE ADULT - PROBLEM SELECTOR PLAN 4
febrile, leukocytosis, tachycardic, + lactic acidosis; meets severe sepsis criteria  suspect 2/2 uti --> Urine culture 3/12 >100K proteus mirabilis.   CXR- no consolidations  CT A/P with 0.6 cm calculus within the proximal left ureter with mild left hydronephrosis  RUQ US Cholelithiasis with gallbladder wall thickening. Question trace pericholecystic fluid.   MRSA PCR- negative  new proteus bacteremia + hydro on CT  HIDA- with no cholecystitis    Plan:   - repeated Bcx and CT A/P as above  - cw CTX 2g  - sp Meropenem 500mg BID (3/15-3/16)  - follow up blood cultures 3/18  - f/u sputum cx  - trend lactate q4-6h until wnl  - Monitor for fever, changes in white count on presentation, BG ~400s,   UA: w no glucosuria and no ketonuria, BHB wnl, with HAGMA  s/p 2L LR  likely stress hyperglycemia iso sepsis    Plan:   -Lantus decreased to 32 units  cw ISS  consider endocrine consult  -hold home regimen  A1C - 9.2%  -trend fingerstick glu  - goal inpatient BG- 180  -cont home neurontin  - carb consistent diet

## 2025-03-23 LAB
ALBUMIN SERPL ELPH-MCNC: 3 G/DL — LOW (ref 3.3–5)
ALP SERPL-CCNC: 438 U/L — HIGH (ref 40–120)
ALT FLD-CCNC: 89 U/L — HIGH (ref 10–45)
ANION GAP SERPL CALC-SCNC: 13 MMOL/L — SIGNIFICANT CHANGE UP (ref 5–17)
AST SERPL-CCNC: 47 U/L — HIGH (ref 10–40)
BILIRUB SERPL-MCNC: 0.3 MG/DL — SIGNIFICANT CHANGE UP (ref 0.2–1.2)
BUN SERPL-MCNC: 16 MG/DL — SIGNIFICANT CHANGE UP (ref 7–23)
CALCIUM SERPL-MCNC: 9.2 MG/DL — SIGNIFICANT CHANGE UP (ref 8.4–10.5)
CHLORIDE SERPL-SCNC: 102 MMOL/L — SIGNIFICANT CHANGE UP (ref 96–108)
CO2 SERPL-SCNC: 25 MMOL/L — SIGNIFICANT CHANGE UP (ref 22–31)
CREAT SERPL-MCNC: 1.2 MG/DL — SIGNIFICANT CHANGE UP (ref 0.5–1.3)
CULTURE RESULTS: SIGNIFICANT CHANGE UP
CULTURE RESULTS: SIGNIFICANT CHANGE UP
EGFR: 67 ML/MIN/1.73M2 — SIGNIFICANT CHANGE UP
EGFR: 67 ML/MIN/1.73M2 — SIGNIFICANT CHANGE UP
GLUCOSE BLDC GLUCOMTR-MCNC: 129 MG/DL — HIGH (ref 70–99)
GLUCOSE BLDC GLUCOMTR-MCNC: 143 MG/DL — HIGH (ref 70–99)
GLUCOSE BLDC GLUCOMTR-MCNC: 169 MG/DL — HIGH (ref 70–99)
GLUCOSE BLDC GLUCOMTR-MCNC: 190 MG/DL — HIGH (ref 70–99)
GLUCOSE BLDC GLUCOMTR-MCNC: 203 MG/DL — HIGH (ref 70–99)
GLUCOSE SERPL-MCNC: 119 MG/DL — HIGH (ref 70–99)
HCT VFR BLD CALC: 32.3 % — LOW (ref 39–50)
HGB BLD-MCNC: 10.2 G/DL — LOW (ref 13–17)
MAGNESIUM SERPL-MCNC: 1.6 MG/DL — SIGNIFICANT CHANGE UP (ref 1.6–2.6)
MCHC RBC-ENTMCNC: 26.5 PG — LOW (ref 27–34)
MCHC RBC-ENTMCNC: 31.6 G/DL — LOW (ref 32–36)
MCV RBC AUTO: 83.9 FL — SIGNIFICANT CHANGE UP (ref 80–100)
NRBC BLD AUTO-RTO: 0 /100 WBCS — SIGNIFICANT CHANGE UP (ref 0–0)
PHOSPHATE SERPL-MCNC: 3.8 MG/DL — SIGNIFICANT CHANGE UP (ref 2.5–4.5)
PLATELET # BLD AUTO: 469 K/UL — HIGH (ref 150–400)
POTASSIUM SERPL-MCNC: 4.2 MMOL/L — SIGNIFICANT CHANGE UP (ref 3.5–5.3)
POTASSIUM SERPL-SCNC: 4.2 MMOL/L — SIGNIFICANT CHANGE UP (ref 3.5–5.3)
PROT SERPL-MCNC: 7 G/DL — SIGNIFICANT CHANGE UP (ref 6–8.3)
RBC # BLD: 3.85 M/UL — LOW (ref 4.2–5.8)
RBC # FLD: 15.2 % — HIGH (ref 10.3–14.5)
SODIUM SERPL-SCNC: 140 MMOL/L — SIGNIFICANT CHANGE UP (ref 135–145)
SPECIMEN SOURCE: SIGNIFICANT CHANGE UP
SPECIMEN SOURCE: SIGNIFICANT CHANGE UP
WBC # BLD: 23.34 K/UL — HIGH (ref 3.8–10.5)
WBC # FLD AUTO: 23.34 K/UL — HIGH (ref 3.8–10.5)

## 2025-03-23 PROCEDURE — 99232 SBSQ HOSP IP/OBS MODERATE 35: CPT

## 2025-03-23 RX ADMIN — INSULIN LISPRO 2: 100 INJECTION, SOLUTION INTRAVENOUS; SUBCUTANEOUS at 17:52

## 2025-03-23 RX ADMIN — AMPICILLIN SODIUM AND SULBACTAM SODIUM 200 GRAM(S): 1; .5 INJECTION, POWDER, FOR SOLUTION INTRAMUSCULAR; INTRAVENOUS at 12:00

## 2025-03-23 RX ADMIN — INSULIN LISPRO 15 UNIT(S): 100 INJECTION, SOLUTION INTRAVENOUS; SUBCUTANEOUS at 17:53

## 2025-03-23 RX ADMIN — TAMSULOSIN HYDROCHLORIDE 0.8 MILLIGRAM(S): 0.4 CAPSULE ORAL at 21:47

## 2025-03-23 RX ADMIN — HEPARIN SODIUM 5000 UNIT(S): 1000 INJECTION INTRAVENOUS; SUBCUTANEOUS at 06:11

## 2025-03-23 RX ADMIN — Medication 25 MILLIGRAM(S): at 06:10

## 2025-03-23 RX ADMIN — HEPARIN SODIUM 5000 UNIT(S): 1000 INJECTION INTRAVENOUS; SUBCUTANEOUS at 13:42

## 2025-03-23 RX ADMIN — INSULIN LISPRO 15 UNIT(S): 100 INJECTION, SOLUTION INTRAVENOUS; SUBCUTANEOUS at 09:06

## 2025-03-23 RX ADMIN — INSULIN LISPRO 15 UNIT(S): 100 INJECTION, SOLUTION INTRAVENOUS; SUBCUTANEOUS at 13:50

## 2025-03-23 RX ADMIN — AMPICILLIN SODIUM AND SULBACTAM SODIUM 200 GRAM(S): 1; .5 INJECTION, POWDER, FOR SOLUTION INTRAMUSCULAR; INTRAVENOUS at 17:54

## 2025-03-23 RX ADMIN — AMLODIPINE BESYLATE 10 MILLIGRAM(S): 10 TABLET ORAL at 06:10

## 2025-03-23 RX ADMIN — HEPARIN SODIUM 5000 UNIT(S): 1000 INJECTION INTRAVENOUS; SUBCUTANEOUS at 21:48

## 2025-03-23 RX ADMIN — AMPICILLIN SODIUM AND SULBACTAM SODIUM 200 GRAM(S): 1; .5 INJECTION, POWDER, FOR SOLUTION INTRAMUSCULAR; INTRAVENOUS at 06:13

## 2025-03-23 RX ADMIN — Medication 81 MILLIGRAM(S): at 12:00

## 2025-03-23 RX ADMIN — Medication 25 MILLIGRAM(S): at 13:39

## 2025-03-23 RX ADMIN — ATORVASTATIN CALCIUM 20 MILLIGRAM(S): 80 TABLET, FILM COATED ORAL at 21:48

## 2025-03-23 RX ADMIN — AMPICILLIN SODIUM AND SULBACTAM SODIUM 200 GRAM(S): 1; .5 INJECTION, POWDER, FOR SOLUTION INTRAMUSCULAR; INTRAVENOUS at 00:29

## 2025-03-23 RX ADMIN — INSULIN GLARGINE-YFGN 32 UNIT(S): 100 INJECTION, SOLUTION SUBCUTANEOUS at 22:13

## 2025-03-23 RX ADMIN — Medication 25 MILLIGRAM(S): at 21:47

## 2025-03-23 NOTE — PROGRESS NOTE ADULT - TIME BILLING
- Chart review  - Independent history taking  - Independent review and interpretation of data  - Physical examination, patient assessment and evaluation  - Discussion with pharmacy  - Discussion with primary team  - Care coordination.
- Chart review  - Independent review and interpretation of data  - Patient examination, evaluation, and assessment  - Discussion at length with patient and family (Nikole) at bedside relating to clinical management  - Discussion with primary team relating to long term management on multiple occasions throughout the day  - Care coordination.
- Chart review  - Independent review and interpretation of data  - Patient examination, evaluation, and assessment  - Discussion at length with patient at bedside relating to clinical management  - Discussion with primary team relating to long term management on multiple occasions throughout the day  - Care coordination.
- Reviewing, and interpreting labs and testing.  - Independently obtaining a review of systems and performing a physical exam  - Reviewing consultant documentation/recommendations in addition to discussing plan of care with consultants.  - Counselling and educating patient and family regarding interpretation of aforementioned items and plan of care.
review of labs, imaging, notes, discussion of plan with patient, family, and consultant
- Reviewing, and interpreting labs and testing.  - Independently obtaining a review of systems and performing a physical exam  - Reviewing consultant documentation/recommendations in addition to discussing plan of care with consultants.  - Counselling and educating patient and family regarding interpretation of aforementioned items and plan of care.
review of labs, imaging, notes, discussion of plan with patient, family, and consultant
- Reviewing, and interpreting labs and testing.  - Independently obtaining a review of systems and performing a physical exam  - Reviewing consultant documentation/recommendations in addition to discussing plan of care with consultants.  - Counselling and educating patient and family regarding interpretation of aforementioned items and plan of care.

## 2025-03-23 NOTE — PROGRESS NOTE ADULT - ATTENDING COMMENTS
64M with severe obesity, hypertension, T2DM, PVD s/p L BKA, heart block s/p PPM admitted with severe sepsis suspected due to infected kidney stone.    Severe sepsis  Proteus bacteremia source urinary/pyelonephritis in the setting of L ureter stone with hydronephrosis s/p cystoscopy and left stent placement on 3/17.  Transaminitis  - On Unasyn now per ID  - Repeat CT A/P  with mild mild peripancreatic fat stranding greatest about the head and tail, raising suspicion for acute pancreatitis.  - Normal Lipase  - f/u repeat blood cultures ordered 3/21  - continue to closely monitor WBC count and fever curve    HOLA- Improved  Suspect ATN from sepsis, suspect creatinine now close to baseline  - appreciate nephrology's evaluation  - appreciate urology for Contreras placement, remains with adequate urine output and will follow up outpatient for TOV        Type 2 diabetes, poorly controlled with hyperglycemia  A1c 9.2, on insulin and multiple oral agents at home  - C/w Insulin regimen    Dispo - recommended for acute rehab.

## 2025-03-23 NOTE — PROGRESS NOTE ADULT - PROBLEM SELECTOR PLAN 2
CT A/P with 0.6 cm calculus within the proximal left ureter with mild left hydronephrosis  s/p cystoscopy and left stent placement 3/17  Patient with lingering leukocytosis.     Plan:     repeated BCx 3/21  repeated CT a/p  cw CTX 2g QD (3/18--)  continue for 10-14 days. transition to PO levofloxacin on dc, but requires downtrending leukocytosis and 48hrs of negative BCx  cw flomax  continue crain, plan for patient to d/c with crain

## 2025-03-23 NOTE — PROGRESS NOTE ADULT - PROBLEM SELECTOR PLAN 1
febrile, leukocytosis, tachycardic, + lactic acidosis; meets severe sepsis criteria  suspect 2/2 uti --> Urine culture 3/12 >100K proteus mirabilis.   CXR- no consolidations  CT A/P with 0.6 cm calculus within the proximal left ureter with mild left hydronephrosis  RUQ US Cholelithiasis with gallbladder wall thickening. Question trace pericholecystic fluid.   MRSA PCR- negative  new proteus bacteremia + hydro on CT  HIDA- with no cholecystitis    Plan:   Now with lingering/uptrending WBC. Repeat CT scan with mild mild peripancreatic fat stranding greatest about the head and tail, raising suspicion for acute pancreatitis.  - c/w LR at 75 cc/hr  - f/u lipase  - repeated Bcx   - cw CTX 2g  - sp Meropenem 500mg BID (3/15-3/16)  - follow up blood cultures 3/18  - f/u sputum cx  - trend lactate q4-6h until wnl  - Monitor for fever, changes in white count

## 2025-03-23 NOTE — PROGRESS NOTE ADULT - PROBLEM SELECTOR PLAN 4
on presentation, BG ~400s,   UA: w no glucosuria and no ketonuria, BHB wnl, with HAGMA  s/p 2L LR  likely stress hyperglycemia iso sepsis    Plan:   -Lantus decreased to 32 units  cw ISS  consider endocrine consult  -hold home regimen  A1C - 9.2%  -trend fingerstick glu  - goal inpatient BG- 180  -cont home neurontin  - carb consistent diet

## 2025-03-23 NOTE — PROGRESS NOTE ADULT - PROBLEM SELECTOR PLAN 3
Unclear baseline cr, Fluctuated between 0.9-1.1 in late 20246  on Admission 1.9. Increasing now at 5.1--> now improved 1.32  Likely pre-renal with vs. ATN vs. post renal iso obstruction 2/2 septic stone. Now s/p ureteral stone placement    Plan:   hold ACE-I- consider restarting with improving kidney function  renally dose antibiotics  monitor BMP BID  fu urine studies  Monitor UO, BUN/Cr, volume status, acid-base balance, electrolytes  avoid nephrotoxic agents  dose adjustments for renally cleared meds

## 2025-03-23 NOTE — PROGRESS NOTE ADULT - SUBJECTIVE AND OBJECTIVE BOX
DATE OF SERVICE: 03-23-25 @ 08:07    Patient is a 65y old  Male who presents with a chief complaint of generalized fatigue/weakness (22 Mar 2025 08:47)      INTERVAL/OVERNIGHT:    SUBJECTIVE:    MEDICATIONS  (STANDING):  amLODIPine   Tablet 10 milliGRAM(s) Oral daily  ampicillin/sulbactam  IVPB 3 Gram(s) IV Intermittent every 6 hours  ampicillin/sulbactam  IVPB      aspirin  chewable 81 milliGRAM(s) Oral daily  atorvastatin 20 milliGRAM(s) Oral at bedtime  dextrose 5%. 1000 milliLiter(s) (100 mL/Hr) IV Continuous <Continuous>  dextrose 5%. 1000 milliLiter(s) (50 mL/Hr) IV Continuous <Continuous>  dextrose 50% Injectable 25 Gram(s) IV Push once  dextrose 50% Injectable 12.5 Gram(s) IV Push once  dextrose 50% Injectable 25 Gram(s) IV Push once  glucagon  Injectable 1 milliGRAM(s) IntraMuscular once  heparin   Injectable 5000 Unit(s) SubCutaneous every 8 hours  hydrALAZINE 25 milliGRAM(s) Oral three times a day  insulin glargine Injectable (LANTUS) 32 Unit(s) SubCutaneous at bedtime  insulin lispro (ADMELOG) corrective regimen sliding scale   SubCutaneous three times a day before meals  insulin lispro (ADMELOG) corrective regimen sliding scale   SubCutaneous at bedtime  insulin lispro Injectable (ADMELOG) 15 Unit(s) SubCutaneous three times a day before meals  lactated ringers. 1000 milliLiter(s) (75 mL/Hr) IV Continuous <Continuous>  lactated ringers. 1000 milliLiter(s) (75 mL/Hr) IV Continuous <Continuous>  polyethylene glycol 3350 17 Gram(s) Oral <User Schedule>  senna 2 Tablet(s) Oral daily  tamsulosin 0.8 milliGRAM(s) Oral at bedtime    MEDICATIONS  (PRN):  acetaminophen     Tablet .. 650 milliGRAM(s) Oral every 6 hours PRN Temp greater or equal to 38C (100.4F), Mild Pain (1 - 3)  aluminum hydroxide/magnesium hydroxide/simethicone Suspension 30 milliLiter(s) Oral every 4 hours PRN Dyspepsia  dextrose Oral Gel 15 Gram(s) Oral once PRN Blood Glucose LESS THAN 70 milliGRAM(s)/deciliter  melatonin 3 milliGRAM(s) Oral at bedtime PRN Insomnia  ondansetron Injectable 4 milliGRAM(s) IV Push every 8 hours PRN Nausea and/or Vomiting      Vital Signs Last 24 Hrs  T(C): 36.6 (23 Mar 2025 04:25), Max: 37.3 (23 Mar 2025 00:08)  T(F): 97.8 (23 Mar 2025 04:25), Max: 99.1 (23 Mar 2025 00:08)  HR: 94 (23 Mar 2025 04:25) (91 - 100)  BP: 160/93 (23 Mar 2025 04:25) (142/69 - 166/85)  BP(mean): --  RR: 18 (23 Mar 2025 04:25) (17 - 20)  SpO2: 98% (23 Mar 2025 04:25) (96% - 98%)    Parameters below as of 23 Mar 2025 04:25  Patient On (Oxygen Delivery Method): nasal cannula  O2 Flow (L/min): 2    CAPILLARY BLOOD GLUCOSE      POCT Blood Glucose.: 158 mg/dL (22 Mar 2025 22:01)  POCT Blood Glucose.: 220 mg/dL (22 Mar 2025 17:40)  POCT Blood Glucose.: 221 mg/dL (22 Mar 2025 12:14)  POCT Blood Glucose.: 120 mg/dL (22 Mar 2025 08:27)    I&O's Summary    22 Mar 2025 07:01  -  23 Mar 2025 07:00  --------------------------------------------------------  IN: 1120 mL / OUT: 3050 mL / NET: -1930 mL        PHYSICAL EXAM:  GENERAL: NAD,  HEAD:  Atraumatic, Normocephalic  EYES: anicteric  NECK: Supple, No JVD  CHEST/LUNG: Clear to auscultation bilaterally; No wheeze  HEART: Regular rate and rhythm; No murmurs, rubs, or gallops  ABDOMEN: Soft, Nontender, Nondistended  EXTREMITIES: No b/l LE edema  NEUROLOGY: A&Ox3, non-focal  SKIN: No rashes or lesions    LABS:                        10.2   23.34 )-----------( 469      ( 23 Mar 2025 06:55 )             32.3     03-23    140  |  102  |  16  ----------------------------<  119[H]  4.2   |  25  |  1.20    Ca    9.2      23 Mar 2025 06:55  Phos  3.8     03-23  Mg     1.6     03-23    TPro  7.0  /  Alb  3.0[L]  /  TBili  0.3  /  DBili  x   /  AST  47[H]  /  ALT  89[H]  /  AlkPhos  438[H]  03-23          Urinalysis Basic - ( 23 Mar 2025 06:55 )    Color: x / Appearance: x / SG: x / pH: x  Gluc: 119 mg/dL / Ketone: x  / Bili: x / Urobili: x   Blood: x / Protein: x / Nitrite: x   Leuk Esterase: x / RBC: x / WBC x   Sq Epi: x / Non Sq Epi: x / Bacteria: x        RADIOLOGY & ADDITIONAL TESTS:    Imaging Personally Reviewed:    Consultant(s) Notes Reviewed:      Care Discussed with Consultants/Other Providers:   DATE OF SERVICE: 03-23-25 @ 08:07    Patient is a 65y old  Male who presents with a chief complaint of generalized fatigue/weakness (22 Mar 2025 08:47)      INTERVAL/OVERNIGHT: NAOE    SUBJECTIVE: Denies acute symptoms. States he is feeling better. Feels ready to go to rehab.    MEDICATIONS  (STANDING):  amLODIPine   Tablet 10 milliGRAM(s) Oral daily  ampicillin/sulbactam  IVPB 3 Gram(s) IV Intermittent every 6 hours  ampicillin/sulbactam  IVPB      aspirin  chewable 81 milliGRAM(s) Oral daily  atorvastatin 20 milliGRAM(s) Oral at bedtime  dextrose 5%. 1000 milliLiter(s) (100 mL/Hr) IV Continuous <Continuous>  dextrose 5%. 1000 milliLiter(s) (50 mL/Hr) IV Continuous <Continuous>  dextrose 50% Injectable 25 Gram(s) IV Push once  dextrose 50% Injectable 12.5 Gram(s) IV Push once  dextrose 50% Injectable 25 Gram(s) IV Push once  glucagon  Injectable 1 milliGRAM(s) IntraMuscular once  heparin   Injectable 5000 Unit(s) SubCutaneous every 8 hours  hydrALAZINE 25 milliGRAM(s) Oral three times a day  insulin glargine Injectable (LANTUS) 32 Unit(s) SubCutaneous at bedtime  insulin lispro (ADMELOG) corrective regimen sliding scale   SubCutaneous three times a day before meals  insulin lispro (ADMELOG) corrective regimen sliding scale   SubCutaneous at bedtime  insulin lispro Injectable (ADMELOG) 15 Unit(s) SubCutaneous three times a day before meals  lactated ringers. 1000 milliLiter(s) (75 mL/Hr) IV Continuous <Continuous>  lactated ringers. 1000 milliLiter(s) (75 mL/Hr) IV Continuous <Continuous>  polyethylene glycol 3350 17 Gram(s) Oral <User Schedule>  senna 2 Tablet(s) Oral daily  tamsulosin 0.8 milliGRAM(s) Oral at bedtime    MEDICATIONS  (PRN):  acetaminophen     Tablet .. 650 milliGRAM(s) Oral every 6 hours PRN Temp greater or equal to 38C (100.4F), Mild Pain (1 - 3)  aluminum hydroxide/magnesium hydroxide/simethicone Suspension 30 milliLiter(s) Oral every 4 hours PRN Dyspepsia  dextrose Oral Gel 15 Gram(s) Oral once PRN Blood Glucose LESS THAN 70 milliGRAM(s)/deciliter  melatonin 3 milliGRAM(s) Oral at bedtime PRN Insomnia  ondansetron Injectable 4 milliGRAM(s) IV Push every 8 hours PRN Nausea and/or Vomiting      Vital Signs Last 24 Hrs  T(C): 36.6 (23 Mar 2025 04:25), Max: 37.3 (23 Mar 2025 00:08)  T(F): 97.8 (23 Mar 2025 04:25), Max: 99.1 (23 Mar 2025 00:08)  HR: 94 (23 Mar 2025 04:25) (91 - 100)  BP: 160/93 (23 Mar 2025 04:25) (142/69 - 166/85)  BP(mean): --  RR: 18 (23 Mar 2025 04:25) (17 - 20)  SpO2: 98% (23 Mar 2025 04:25) (96% - 98%)    Parameters below as of 23 Mar 2025 04:25  Patient On (Oxygen Delivery Method): nasal cannula  O2 Flow (L/min): 2    CAPILLARY BLOOD GLUCOSE      POCT Blood Glucose.: 158 mg/dL (22 Mar 2025 22:01)  POCT Blood Glucose.: 220 mg/dL (22 Mar 2025 17:40)  POCT Blood Glucose.: 221 mg/dL (22 Mar 2025 12:14)  POCT Blood Glucose.: 120 mg/dL (22 Mar 2025 08:27)    I&O's Summary    22 Mar 2025 07:01  -  23 Mar 2025 07:00  --------------------------------------------------------  IN: 1120 mL / OUT: 3050 mL / NET: -1930 mL        PHYSICAL EXAM:  GENERAL: NAD,  HEAD:  Atraumatic, Normocephalic  EYES: anicteric  NECK: Supple, No JVD  CHEST/LUNG: Clear to auscultation bilaterally; No wheeze  HEART: Regular rate and rhythm; No murmurs, rubs, or gallops  ABDOMEN: Soft, Nontender, Nondistended  EXTREMITIES: No edema. LLE BKA  NEUROLOGY: A&Ox3, non-focal  SKIN: No rashes or lesions    LABS:                        10.2   23.34 )-----------( 469      ( 23 Mar 2025 06:55 )             32.3     03-23    140  |  102  |  16  ----------------------------<  119[H]  4.2   |  25  |  1.20    Ca    9.2      23 Mar 2025 06:55  Phos  3.8     03-23  Mg     1.6     03-23    TPro  7.0  /  Alb  3.0[L]  /  TBili  0.3  /  DBili  x   /  AST  47[H]  /  ALT  89[H]  /  AlkPhos  438[H]  03-23          Urinalysis Basic - ( 23 Mar 2025 06:55 )    Color: x / Appearance: x / SG: x / pH: x  Gluc: 119 mg/dL / Ketone: x  / Bili: x / Urobili: x   Blood: x / Protein: x / Nitrite: x   Leuk Esterase: x / RBC: x / WBC x   Sq Epi: x / Non Sq Epi: x / Bacteria: x        RADIOLOGY & ADDITIONAL TESTS:    Imaging Personally Reviewed:    Consultant(s) Notes Reviewed:      Care Discussed with Consultants/Other Providers:

## 2025-03-24 LAB
ALBUMIN SERPL ELPH-MCNC: 3 G/DL — LOW (ref 3.3–5)
ALP SERPL-CCNC: 394 U/L — HIGH (ref 40–120)
ALT FLD-CCNC: 72 U/L — HIGH (ref 10–45)
ANION GAP SERPL CALC-SCNC: 13 MMOL/L — SIGNIFICANT CHANGE UP (ref 5–17)
AST SERPL-CCNC: 32 U/L — SIGNIFICANT CHANGE UP (ref 10–40)
BASOPHILS # BLD AUTO: 0.04 K/UL — SIGNIFICANT CHANGE UP (ref 0–0.2)
BASOPHILS NFR BLD AUTO: 0.2 % — SIGNIFICANT CHANGE UP (ref 0–2)
BILIRUB SERPL-MCNC: 0.3 MG/DL — SIGNIFICANT CHANGE UP (ref 0.2–1.2)
BUN SERPL-MCNC: 18 MG/DL — SIGNIFICANT CHANGE UP (ref 7–23)
CALCIUM SERPL-MCNC: 9.2 MG/DL — SIGNIFICANT CHANGE UP (ref 8.4–10.5)
CHLORIDE SERPL-SCNC: 102 MMOL/L — SIGNIFICANT CHANGE UP (ref 96–108)
CO2 SERPL-SCNC: 24 MMOL/L — SIGNIFICANT CHANGE UP (ref 22–31)
CREAT SERPL-MCNC: 1.23 MG/DL — SIGNIFICANT CHANGE UP (ref 0.5–1.3)
EGFR: 65 ML/MIN/1.73M2 — SIGNIFICANT CHANGE UP
EGFR: 65 ML/MIN/1.73M2 — SIGNIFICANT CHANGE UP
EOSINOPHIL # BLD AUTO: 0.31 K/UL — SIGNIFICANT CHANGE UP (ref 0–0.5)
EOSINOPHIL NFR BLD AUTO: 1.7 % — SIGNIFICANT CHANGE UP (ref 0–6)
GLUCOSE BLDC GLUCOMTR-MCNC: 157 MG/DL — HIGH (ref 70–99)
GLUCOSE BLDC GLUCOMTR-MCNC: 202 MG/DL — HIGH (ref 70–99)
GLUCOSE BLDC GLUCOMTR-MCNC: 239 MG/DL — HIGH (ref 70–99)
GLUCOSE BLDC GLUCOMTR-MCNC: 332 MG/DL — HIGH (ref 70–99)
GLUCOSE SERPL-MCNC: 158 MG/DL — HIGH (ref 70–99)
HCT VFR BLD CALC: 31.9 % — LOW (ref 39–50)
HGB BLD-MCNC: 10 G/DL — LOW (ref 13–17)
IMM GRANULOCYTES NFR BLD AUTO: 1.4 % — HIGH (ref 0–0.9)
LYMPHOCYTES # BLD AUTO: 1.72 K/UL — SIGNIFICANT CHANGE UP (ref 1–3.3)
LYMPHOCYTES # BLD AUTO: 9.6 % — LOW (ref 13–44)
MAGNESIUM SERPL-MCNC: 1.6 MG/DL — SIGNIFICANT CHANGE UP (ref 1.6–2.6)
MCHC RBC-ENTMCNC: 26.9 PG — LOW (ref 27–34)
MCHC RBC-ENTMCNC: 31.3 G/DL — LOW (ref 32–36)
MCV RBC AUTO: 85.8 FL — SIGNIFICANT CHANGE UP (ref 80–100)
MONOCYTES # BLD AUTO: 0.82 K/UL — SIGNIFICANT CHANGE UP (ref 0–0.9)
MONOCYTES NFR BLD AUTO: 4.6 % — SIGNIFICANT CHANGE UP (ref 2–14)
NEUTROPHILS # BLD AUTO: 14.79 K/UL — HIGH (ref 1.8–7.4)
NEUTROPHILS NFR BLD AUTO: 82.5 % — HIGH (ref 43–77)
NRBC BLD AUTO-RTO: 0 /100 WBCS — SIGNIFICANT CHANGE UP (ref 0–0)
PHOSPHATE SERPL-MCNC: 3.8 MG/DL — SIGNIFICANT CHANGE UP (ref 2.5–4.5)
PLATELET # BLD AUTO: 413 K/UL — HIGH (ref 150–400)
POTASSIUM SERPL-MCNC: 4.3 MMOL/L — SIGNIFICANT CHANGE UP (ref 3.5–5.3)
POTASSIUM SERPL-SCNC: 4.3 MMOL/L — SIGNIFICANT CHANGE UP (ref 3.5–5.3)
PROT SERPL-MCNC: 7.2 G/DL — SIGNIFICANT CHANGE UP (ref 6–8.3)
RBC # BLD: 3.72 M/UL — LOW (ref 4.2–5.8)
RBC # FLD: 15.5 % — HIGH (ref 10.3–14.5)
SODIUM SERPL-SCNC: 139 MMOL/L — SIGNIFICANT CHANGE UP (ref 135–145)
WBC # BLD: 17.93 K/UL — HIGH (ref 3.8–10.5)
WBC # FLD AUTO: 17.93 K/UL — HIGH (ref 3.8–10.5)

## 2025-03-24 PROCEDURE — 99232 SBSQ HOSP IP/OBS MODERATE 35: CPT | Mod: GC

## 2025-03-24 PROCEDURE — 99232 SBSQ HOSP IP/OBS MODERATE 35: CPT

## 2025-03-24 PROCEDURE — G0545: CPT

## 2025-03-24 RX ORDER — ENALAPRIL MALEATE 20 MG
10 TABLET ORAL DAILY
Refills: 0 | Status: DISCONTINUED | OUTPATIENT
Start: 2025-03-24 | End: 2025-03-26

## 2025-03-24 RX ORDER — MAGNESIUM SULFATE 500 MG/ML
2 SYRINGE (ML) INJECTION ONCE
Refills: 0 | Status: COMPLETED | OUTPATIENT
Start: 2025-03-24 | End: 2025-03-24

## 2025-03-24 RX ADMIN — INSULIN LISPRO 15 UNIT(S): 100 INJECTION, SOLUTION INTRAVENOUS; SUBCUTANEOUS at 09:35

## 2025-03-24 RX ADMIN — Medication 25 MILLIGRAM(S): at 21:56

## 2025-03-24 RX ADMIN — INSULIN LISPRO 4: 100 INJECTION, SOLUTION INTRAVENOUS; SUBCUTANEOUS at 18:22

## 2025-03-24 RX ADMIN — INSULIN GLARGINE-YFGN 32 UNIT(S): 100 INJECTION, SOLUTION SUBCUTANEOUS at 21:53

## 2025-03-24 RX ADMIN — AMPICILLIN SODIUM AND SULBACTAM SODIUM 200 GRAM(S): 1; .5 INJECTION, POWDER, FOR SOLUTION INTRAMUSCULAR; INTRAVENOUS at 06:10

## 2025-03-24 RX ADMIN — TAMSULOSIN HYDROCHLORIDE 0.8 MILLIGRAM(S): 0.4 CAPSULE ORAL at 21:56

## 2025-03-24 RX ADMIN — AMPICILLIN SODIUM AND SULBACTAM SODIUM 200 GRAM(S): 1; .5 INJECTION, POWDER, FOR SOLUTION INTRAMUSCULAR; INTRAVENOUS at 00:23

## 2025-03-24 RX ADMIN — INSULIN LISPRO 8: 100 INJECTION, SOLUTION INTRAVENOUS; SUBCUTANEOUS at 12:54

## 2025-03-24 RX ADMIN — Medication 25 GRAM(S): at 15:44

## 2025-03-24 RX ADMIN — Medication 25 MILLIGRAM(S): at 06:10

## 2025-03-24 RX ADMIN — INSULIN LISPRO 2: 100 INJECTION, SOLUTION INTRAVENOUS; SUBCUTANEOUS at 09:35

## 2025-03-24 RX ADMIN — HEPARIN SODIUM 5000 UNIT(S): 1000 INJECTION INTRAVENOUS; SUBCUTANEOUS at 13:08

## 2025-03-24 RX ADMIN — ATORVASTATIN CALCIUM 20 MILLIGRAM(S): 80 TABLET, FILM COATED ORAL at 21:56

## 2025-03-24 RX ADMIN — Medication 81 MILLIGRAM(S): at 13:08

## 2025-03-24 RX ADMIN — AMLODIPINE BESYLATE 10 MILLIGRAM(S): 10 TABLET ORAL at 06:06

## 2025-03-24 RX ADMIN — INSULIN LISPRO 15 UNIT(S): 100 INJECTION, SOLUTION INTRAVENOUS; SUBCUTANEOUS at 12:54

## 2025-03-24 RX ADMIN — INSULIN LISPRO 15 UNIT(S): 100 INJECTION, SOLUTION INTRAVENOUS; SUBCUTANEOUS at 18:22

## 2025-03-24 RX ADMIN — Medication 2 TABLET(S): at 13:08

## 2025-03-24 RX ADMIN — AMPICILLIN SODIUM AND SULBACTAM SODIUM 200 GRAM(S): 1; .5 INJECTION, POWDER, FOR SOLUTION INTRAMUSCULAR; INTRAVENOUS at 13:08

## 2025-03-24 RX ADMIN — HEPARIN SODIUM 5000 UNIT(S): 1000 INJECTION INTRAVENOUS; SUBCUTANEOUS at 21:57

## 2025-03-24 RX ADMIN — AMPICILLIN SODIUM AND SULBACTAM SODIUM 200 GRAM(S): 1; .5 INJECTION, POWDER, FOR SOLUTION INTRAMUSCULAR; INTRAVENOUS at 18:21

## 2025-03-24 RX ADMIN — HEPARIN SODIUM 5000 UNIT(S): 1000 INJECTION INTRAVENOUS; SUBCUTANEOUS at 06:06

## 2025-03-24 RX ADMIN — Medication 25 MILLIGRAM(S): at 13:08

## 2025-03-24 NOTE — PROGRESS NOTE ADULT - NSPROGADDITIONALINFOA_GEN_ALL_CORE
Please do not hesitate to TEAMS Dr. Lopez if further input needed during the day.  For questions Weekdays after 5PM and on weekends, please page the Renal Fellow on call.
Nephrology to sign off.  Please do not hesitate to recall if further input needed.  Can TEAMS Dr. Lopez if further input needed during the day.  For questions Weekdays after 5PM and on weekends, please page the Renal Fellow on call.
Please do not hesitate to TEAMS Dr. Lopez if further input needed during the day.  For questions Weekdays after 5PM and on weekends, please page the Renal Fellow on call.
Primary team resident notified re: patient's word finding difficulties/slowness to respond, to reassess patient    Please do not hesitate to TEAMS Dr. Lopez if further input needed during the day.  For questions Weekdays after 5PM and on weekends, please page the Renal Fellow on call.

## 2025-03-24 NOTE — PROGRESS NOTE ADULT - ASSESSMENT
63yo m w pmh obesity, htn, hld, dm c/b peripheral neuropathy + retinopathy, pad s/p l bka, cva, chb s/p ppm, p/w generalized fatigue/weakness; in er, found to be meeting severe sepsis criteria, and found to have multiple metabolic disturbances including hyperglycemia, catrina w hagma; suspect 2/2 uti; admit to medicine for further managment.

## 2025-03-24 NOTE — PROGRESS NOTE ADULT - SUBJECTIVE AND OBJECTIVE BOX
Follow Up:      Interval History/ROS:Patient is a 65y old  Male who presents with a chief complaint of generalized fatigue/weakness (24 Mar 2025 12:44)  Seen at bedside, afebrile, leukocytosis trending down     Allergies  No Known Allergies        ANTIMICROBIALS:    ampicillin/sulbactam  IVPB 3 every 6 hours  ampicillin/sulbactam  IVPB          OTHER MEDS: MEDICATIONS  (STANDING):  acetaminophen     Tablet .. 650 every 6 hours PRN  aluminum hydroxide/magnesium hydroxide/simethicone Suspension 30 every 4 hours PRN  amLODIPine   Tablet 10 daily  aspirin  chewable 81 daily  atorvastatin 20 at bedtime  dextrose 50% Injectable 25 once  dextrose 50% Injectable 12.5 once  dextrose 50% Injectable 25 once  dextrose Oral Gel 15 once PRN  glucagon  Injectable 1 once  heparin   Injectable 5000 every 8 hours  hydrALAZINE 25 three times a day  insulin glargine Injectable (LANTUS) 32 at bedtime  insulin lispro (ADMELOG) corrective regimen sliding scale  three times a day before meals  insulin lispro (ADMELOG) corrective regimen sliding scale  at bedtime  insulin lispro Injectable (ADMELOG) 15 three times a day before meals  melatonin 3 at bedtime PRN  ondansetron Injectable 4 every 8 hours PRN  polyethylene glycol 3350 17 <User Schedule>  senna 2 daily  tamsulosin 0.8 at bedtime      Vital Signs Last 24 Hrs  T(F): 98.4 (03-24-25 @ 05:07), Max: 99.8 (03-18-25 @ 17:02)    Vital Signs Last 24 Hrs  HR: 90 (03-24-25 @ 06:10) (90 - 94)  BP: 147/66 (03-24-25 @ 06:10) (139/72 - 165/73)  RR: 18 (03-24-25 @ 05:07)  SpO2: 98% (03-24-25 @ 05:07) (95% - 98%)  Wt(kg): --    EXAM:    GA: NAD  CV: nl S1/S2  Lungs: CTAP  Abd: soft, nontender  Skin: Intact  IV: no phlebitis    Labs:                        10.0   17.93 )-----------( 413      ( 24 Mar 2025 07:27 )             31.9     03-24    139  |  102  |  18  ----------------------------<  158[H]  4.3   |  24  |  1.23    Ca    9.2      24 Mar 2025 07:27  Phos  3.8     03-24  Mg     1.6     03-24    TPro  7.2  /  Alb  3.0[L]  /  TBili  0.3  /  DBili  x   /  AST  32  /  ALT  72[H]  /  AlkPhos  394[H]  03-24      WBC Trend:  WBC Count: 17.93 (03-24-25 @ 07:27)  WBC Count: 23.34 (03-23-25 @ 06:55)  WBC Count: 22.26 (03-22-25 @ 10:29)  WBC Count: 21.01 (03-21-25 @ 07:02)      Creatine Trend:  Creatinine: 1.23 (03-24)  Creatinine: 1.20 (03-23)  Creatinine: 1.28 (03-22)  Creatinine: 1.32 (03-21)      Liver Biochemical Testing Trend:  Alanine Aminotransferase (ALT/SGPT): 72 *H* (03-24)  Alanine Aminotransferase (ALT/SGPT): 89 *H* (03-23)  Alanine Aminotransferase (ALT/SGPT): 100 *H* (03-22)  Alanine Aminotransferase (ALT/SGPT): 114 *H* (03-21)  Alanine Aminotransferase (ALT/SGPT): 156 *H* (03-20)  Aspartate Aminotransferase (AST/SGOT): 32 (03-24-25 @ 07:27)  Aspartate Aminotransferase (AST/SGOT): 47 (03-23-25 @ 06:55)  Aspartate Aminotransferase (AST/SGOT): 59 (03-22-25 @ 12:54)  Aspartate Aminotransferase (AST/SGOT): 68 (03-21-25 @ 10:51)  Aspartate Aminotransferase (AST/SGOT): 91 (03-20-25 @ 06:57)  Bilirubin Total: 0.3 (03-24)  Bilirubin Total: 0.3 (03-23)  Bilirubin Total: 0.2 (03-22)  Bilirubin Total: 0.3 (03-21)  Bilirubin Total: 0.4 (03-20)      Trend LDH      Urinalysis Basic - ( 24 Mar 2025 07:27 )    Color: x / Appearance: x / SG: x / pH: x  Gluc: 158 mg/dL / Ketone: x  / Bili: x / Urobili: x   Blood: x / Protein: x / Nitrite: x   Leuk Esterase: x / RBC: x / WBC x   Sq Epi: x / Non Sq Epi: x / Bacteria: x        MICROBIOLOGY:  Vancomycin Level, Random: <4.0 (03-16 @ 09:25)    MRSA PCR Result.: NotDetec (03-15-25 @ 18:08)      Culture - Blood (collected 21 Mar 2025 14:50)  Source: Blood Blood  Preliminary Report:    No growth at 48 Hours    Culture - Blood (collected 21 Mar 2025 12:43)  Source: Blood Blood  Preliminary Report:    No growth at 48 Hours    Culture - Blood (collected 18 Mar 2025 05:48)  Source: Blood Blood-Peripheral  Final Report:    No growth at 5 days    Culture - Blood (collected 18 Mar 2025 05:39)  Source: Blood Blood-Peripheral  Final Report:    No growth at 5 days    Culture - Blood (collected 16 Mar 2025 17:19)  Source: Blood Blood  Final Report:    No growth at 5 days    Culture - Blood (collected 15 Mar 2025 11:14)  Source: Blood Blood  Final Report:    No growth at 5 days    Culture - Blood (collected 15 Mar 2025 07:30)  Source: Blood Blood  Final Report:    Growth in anaerobic bottle: Proteus mirabilis    Direct identification is available within approximately 3-5    hours either by Blood Panel Multiplexed PCR or Direct    MALDI-TOF. Details: [email or web address]  Organism: Blood Culture PCR  Proteus mirabilis  Organism: Proteus mirabilis    Sensitivities:      Method Type: ARMEN      -  Ampicillin: S <=8 These ampicillin results predict results for amoxicillin      -  Ampicillin/Sulbactam: S <=4/2      -  Aztreonam: S <=4      -  Cefazolin: S <=2      -  Cefepime: S <=2      -  Cefoxitin: S <=8      -  Ceftriaxone: S <=1      -  Ciprofloxacin: S <=0.25      -  Ertapenem: S <=0.5      -  Gentamicin: S <=2      -  Levofloxacin: S <=0.5      -  Meropenem: S <=1      -  Piperacillin/Tazobactam: S <=8      -  Tobramycin: S <=2      -  Trimethoprim/Sulfamethoxazole: S <=0.5/9.5  Organism: Blood Culture PCR    Sensitivities:      Method Type: PCR      -  Proteus species: Detec    Culture - Blood (collected 12 Mar 2025 19:45)  Source: Blood Blood-Peripheral  Final Report:    No growth at 5 days    Culture - Blood (collected 12 Mar 2025 19:35)  Source: Blood Blood-Peripheral  Final Report:    No growth at 5 days    Culture - Urine (collected 12 Mar 2025 16:22)  Source: Clean Catch Clean Catch (Midstream)  Final Report:    >100,000 CFU/ml Proteus mirabilis    <10,000 CFU/ml Normal Urogenital wil present  Organism: Proteus mirabilis  Organism: Proteus mirabilis    Sensitivities:      Method Type: ARMEN      -  Amoxicillin/Clavulanic Acid: S <=8/4      -  Ampicillin: S <=8 These ampicillin results predict results for amoxicillin      -  Ampicillin/Sulbactam: S <=4/2      -  Aztreonam: S <=4      -  Cefazolin: S <=2 For uncomplicated UTI with K. pneumoniae, E. coli, or P. mirablis: ARMEN <=16 is sensitive and ARMEN >=32 is resistant. This also predicts results for oral agents cefaclor, cefdinir, cefpodoxime, cefprozil, cefuroxime axetil, cephalexin and locarbef for uncomplicated UTI. Note that some isolates may be susceptible to these agents while testing resistant to cefazolin.      -  Cefepime: S <=2      -  Cefoxitin: S <=8      -  Ceftriaxone: S <=1      -  Cefuroxime: S <=4      -  Ciprofloxacin: S <=0.25      -  Ertapenem: S <=0.5      -  Gentamicin: S <=2      -  Levofloxacin: S <=0.5      -  Meropenem: S <=1      -  Nitrofurantoin: R >64 Should not be used to treat pyelonephritis      -  Piperacillin/Tazobactam: S <=8      -  Tobramycin: S <=2      -  Trimethoprim/Sulfamethoxazole: S <=0.5/9.5                                              Sedimentation Rate, Erythrocyte: 15 mm/hr (06-12-24 @ 01:10)      RADIOLOGY:  imaging below personally reviewed                CT Abdomen and Pelvis w/ IV Cont:   ACC: [id number] EXAM:  CT ABDOMEN AND PELVIS IC   ORDERED BY:  JULIETA GARCIA     PROCEDURE DATE:  03/21/2025          INTERPRETATION:  CLINICAL INFORMATION: Uptrending leukocytosis    COMPARISON: CT chest abdomen and pelvis 3/15/2025.    CONTRAST/COMPLICATIONS:  IV Contrast: Omnipaque 350  90 cc administered   10 cc discarded  Oral Contrast: NONE  .    PROCEDURE:  CT of the Abdomen and Pelvis was performed.  Sagittal and coronal reformats were performed.    FINDINGS:  LOWER CHEST: Bibasilar linear and subsegmental atelectasis. Partially   imaged pacemaker leads. Mitral valve ring. Coronary artery calcifications.    LIVER: Within normal limits.  BILE DUCTS: Normal caliber.  GALLBLADDER: Cholelithiasis.  SPLEEN: Within normal limits.  PANCREAS: Mild peripancreatic fat stranding, greatest about the   pancreatic head and tail. No discrete fluid collection. No main   pancreatic duct dilatation.  ADRENALS: Within normal limits.  KIDNEYS/URETERS: Left nephroureteral stent with its proximal tip in the   left pelvis and its distal tip in the bladder. Mild left perinephric and   periureteral stranding. Nonobstructing 1.1 cm left upper pole calculus.   No hydronephrosis.    BLADDER: Contreras catheter.  REPRODUCTIVE ORGANS: Prostate is mildly enlarged.    BOWEL: No bowel obstruction. Appendix is not visualized.  PERITONEUM/RETROPERITONEUM: Within normal limits.  VESSELS: Atherosclerotic changes.  LYMPH NODES: No lymphadenopathy.  ABDOMINAL WALL: Within normal limits.  BONES: Degenerative changes.    IMPRESSION:  Mild peripancreatic fat stranding greatest about the head and tail,   raising suspicion for acute pancreatitis. Correlate with lipase.    Interval placement of left nephroureteral stent. Nonobstructing 1.1 cm   left upper pole calculus.        --- End of Report ---           MIHIR SEN MD; Resident Radiologist  This document has been electronically signed.  REGINA NATHAN MD; Attending Radiologist  This document has been electronically signed. Mar 22 2025  9:22AM (03-21-25 @ 21:59)

## 2025-03-24 NOTE — PROGRESS NOTE ADULT - SUBJECTIVE AND OBJECTIVE BOX
[facility] DIVISION OF KIDNEY DISEASE AND HYPERTENSION  [phone]    RENAL FOLLOW UP NOTE- NEPHROHOSPITALIST  --------------------------------------------------------------------------------  Patient seen and examined this morning.  OOB to chair.    PAST HISTORY  --------------------------------------------------------------------------------  No significant changes to PMH, PSH, FHx, SHx, unless otherwise noted    ALLERGIES & MEDICATIONS  --------------------------------------------------------------------------------  Allergies    No Known Allergies    Intolerances      Standing Inpatient Medications  amLODIPine   Tablet 10 milliGRAM(s) Oral daily  ampicillin/sulbactam  IVPB 3 Gram(s) IV Intermittent every 6 hours  ampicillin/sulbactam  IVPB      aspirin  chewable 81 milliGRAM(s) Oral daily  atorvastatin 20 milliGRAM(s) Oral at bedtime  dextrose 5%. 1000 milliLiter(s) IV Continuous <Continuous>  dextrose 5%. 1000 milliLiter(s) IV Continuous <Continuous>  dextrose 50% Injectable 25 Gram(s) IV Push once  dextrose 50% Injectable 12.5 Gram(s) IV Push once  dextrose 50% Injectable 25 Gram(s) IV Push once  glucagon  Injectable 1 milliGRAM(s) IntraMuscular once  heparin   Injectable 5000 Unit(s) SubCutaneous every 8 hours  hydrALAZINE 25 milliGRAM(s) Oral three times a day  insulin glargine Injectable (LANTUS) 32 Unit(s) SubCutaneous at bedtime  insulin lispro (ADMELOG) corrective regimen sliding scale   SubCutaneous three times a day before meals  insulin lispro (ADMELOG) corrective regimen sliding scale   SubCutaneous at bedtime  insulin lispro Injectable (ADMELOG) 15 Unit(s) SubCutaneous three times a day before meals  polyethylene glycol 3350 17 Gram(s) Oral <User Schedule>  senna 2 Tablet(s) Oral daily  tamsulosin 0.8 milliGRAM(s) Oral at bedtime    PRN Inpatient Medications  acetaminophen     Tablet .. 650 milliGRAM(s) Oral every 6 hours PRN  aluminum hydroxide/magnesium hydroxide/simethicone Suspension 30 milliLiter(s) Oral every 4 hours PRN  dextrose Oral Gel 15 Gram(s) Oral once PRN  melatonin 3 milliGRAM(s) Oral at bedtime PRN  ondansetron Injectable 4 milliGRAM(s) IV Push every 8 hours PRN      FOCUSED REVIEW OF SYSTEMS  --------------------------------------------------------------------------------  denies fevers/rigors  denies CP/palpitations  denies SOB/cough  denies N/V/abd pain.  has to have BM at time of assessment      VITALS/PHYSICAL EXAM  --------------------------------------------------------------------------------  T(C): 36.9 (03-24-25 @ 05:07), Max: 37.1 (03-23-25 @ 20:59)  HR: 90 (03-24-25 @ 06:10) (90 - 98)  BP: 147/66 (03-24-25 @ 06:10) (139/72 - 165/73)  RR: 18 (03-24-25 @ 05:07) (16 - 18)  SpO2: 98% (03-24-25 @ 05:07) (95% - 98%)  Wt(kg): --        03-23-25 @ 07:01  -  03-24-25 @ 07:00  --------------------------------------------------------  IN: 1150 mL / OUT: 3225 mL / NET: -2075 mL      Physical Exam:  	Gen: NAD, OOB to chair  	Pulm: CTA B/L no w/r/r  	CV: RRR, S1S2  	Abd: +BS, soft, nontender/nondistended  	: No suprapubic tenderness.  + crain          Extremity: L BKA, no RLE edema  	Neuro: A&O x 3      LABS/STUDIES  --------------------------------------------------------------------------------              10.0   17.93 >-----------<  413      [03-24-25 @ 07:27]              31.9     139  |  102  |  18  ----------------------------<  158      [03-24-25 @ 07:27]  4.3   |  24  |  1.23        Ca     9.2     [03-24-25 @ 07:27]      Mg     1.6     [03-24-25 @ 07:27]      Phos  3.8     [03-24-25 @ 07:27]    TPro  7.2  /  Alb  3.0  /  TBili  0.3  /  DBili  x   /  AST  32  /  ALT  72  /  AlkPhos  394  [03-24-25 @ 07:27]            Creatinine Trend:  SCr 1.23 [03-24 @ 07:27]  SCr 1.20 [03-23 @ 06:55]  SCr 1.28 [03-22 @ 12:54]  SCr 1.32 [03-21 @ 10:51]  SCr 1.46 [03-20 @ 06:57]              Urinalysis - [03-24-25 @ 07:27]      Color  / Appearance  / SG  / pH       Gluc 158 / Ketone   / Bili  / Urobili        Blood  / Protein  / Leuk Est  / Nitrite       RBC  / WBC  / Hyaline  / Gran  / Sq Epi  / Non Sq Epi  / Bacteria       Vitamin D (25OH) 33.9      [06-13-24 @ 01:51]  TSH 2.54      [06-11-24 @ 03:28]  Lipid: chol 159, , HDL 39, LDL --      [03-13-25 @ 06:46]

## 2025-03-24 NOTE — PROGRESS NOTE ADULT - ATTENDING COMMENTS
Patient seen and examined at bedside. No acute events overnight. Afebrile and WBC downtrending. Finishing antibiotics today. Mildly confused today. He had prior stroke so this may be a recrudescence of this. Will repeat CT head. If this is unremarkable then it is discharge planning.

## 2025-03-24 NOTE — PROGRESS NOTE ADULT - PROBLEM SELECTOR PLAN 1
On ?CKD, baseline creatine noted to fluctuate between 0.9 and 1.1 in late 2024 per review of HIE.  likely underlying diabetic nephropathy.     Urology team placed crain 3/15  FENa of 0.3 as calculated from BMP on 03/12, strongly indicative prerenal etiology  recent hemodynamics noted (fevers, episode of hypotension on 03/13) which is concerning for evolving ATN  Mild L hydronephrosis, 6mm stone noted.   AXR with possible gastric outlet obstruction but CTA/P no obstruction.  Normal NM hepatobiliary scan    -HOLA further resolving, likely multifactorial ATN + septic stone, now s/p L ureteral stent placement    creatinine improved and stable at 1.2 for two days    patient nonoliguric    Urology following, plan to d/c patient with crain  -no renal objection to resuming outpatient Enalapril  -would defer further IVF for now as patient tolerating PO unless patient becomes polyuric iso post ATN diuresis      patient at risk of post ATN diuresis electrolyte abnormalities, mag repletion 2gm IV x 1 ordered today for mg of 1.6    -Abx per ID

## 2025-03-24 NOTE — PROGRESS NOTE ADULT - SUBJECTIVE AND OBJECTIVE BOX
DATE OF SERVICE: 03-24-25 @ 07:29    Patient is a 65y old  Male who presents with a chief complaint of generalized fatigue/weakness (23 Mar 2025 08:07)      SUBJECTIVE / OVERNIGHT EVENTS:  Overnight,  Pt seen and examined at bedside.    ROS negative except as above.    MEDICATIONS  (STANDING):  amLODIPine   Tablet 10 milliGRAM(s) Oral daily  ampicillin/sulbactam  IVPB 3 Gram(s) IV Intermittent every 6 hours  ampicillin/sulbactam  IVPB      aspirin  chewable 81 milliGRAM(s) Oral daily  atorvastatin 20 milliGRAM(s) Oral at bedtime  dextrose 5%. 1000 milliLiter(s) (100 mL/Hr) IV Continuous <Continuous>  dextrose 5%. 1000 milliLiter(s) (50 mL/Hr) IV Continuous <Continuous>  dextrose 50% Injectable 25 Gram(s) IV Push once  dextrose 50% Injectable 12.5 Gram(s) IV Push once  dextrose 50% Injectable 25 Gram(s) IV Push once  glucagon  Injectable 1 milliGRAM(s) IntraMuscular once  heparin   Injectable 5000 Unit(s) SubCutaneous every 8 hours  hydrALAZINE 25 milliGRAM(s) Oral three times a day  insulin glargine Injectable (LANTUS) 32 Unit(s) SubCutaneous at bedtime  insulin lispro (ADMELOG) corrective regimen sliding scale   SubCutaneous three times a day before meals  insulin lispro (ADMELOG) corrective regimen sliding scale   SubCutaneous at bedtime  insulin lispro Injectable (ADMELOG) 15 Unit(s) SubCutaneous three times a day before meals  polyethylene glycol 3350 17 Gram(s) Oral <User Schedule>  senna 2 Tablet(s) Oral daily  tamsulosin 0.8 milliGRAM(s) Oral at bedtime    MEDICATIONS  (PRN):  acetaminophen     Tablet .. 650 milliGRAM(s) Oral every 6 hours PRN Temp greater or equal to 38C (100.4F), Mild Pain (1 - 3)  aluminum hydroxide/magnesium hydroxide/simethicone Suspension 30 milliLiter(s) Oral every 4 hours PRN Dyspepsia  dextrose Oral Gel 15 Gram(s) Oral once PRN Blood Glucose LESS THAN 70 milliGRAM(s)/deciliter  melatonin 3 milliGRAM(s) Oral at bedtime PRN Insomnia  ondansetron Injectable 4 milliGRAM(s) IV Push every 8 hours PRN Nausea and/or Vomiting      Vital Signs Last 24 Hrs  T(C): 36.9 (24 Mar 2025 05:07), Max: 37.1 (23 Mar 2025 20:59)  T(F): 98.4 (24 Mar 2025 05:07), Max: 98.8 (23 Mar 2025 20:59)  HR: 90 (24 Mar 2025 06:10) (90 - 98)  BP: 147/66 (24 Mar 2025 06:10) (136/50 - 165/73)  BP(mean): --  RR: 18 (24 Mar 2025 05:07) (16 - 18)  SpO2: 98% (24 Mar 2025 05:07) (95% - 98%)    Parameters below as of 24 Mar 2025 05:07  Patient On (Oxygen Delivery Method): room air      CAPILLARY BLOOD GLUCOSE      POCT Blood Glucose.: 203 mg/dL (23 Mar 2025 21:30)  POCT Blood Glucose.: 190 mg/dL (23 Mar 2025 17:18)  POCT Blood Glucose.: 143 mg/dL (23 Mar 2025 13:35)  POCT Blood Glucose.: 169 mg/dL (23 Mar 2025 12:19)  POCT Blood Glucose.: 129 mg/dL (23 Mar 2025 08:32)    I&O's Summary    23 Mar 2025 07:01  -  24 Mar 2025 07:00  --------------------------------------------------------  IN: 1150 mL / OUT: 3225 mL / NET: -2075 mL        PHYSICAL EXAM:  GENERAL: NAD, well-developed  HEAD:  Atraumatic, Normocephalic  EYES: EOMI, conjunctiva and sclera clear  NECK: Supple, No JVD  CHEST/LUNG: Clear to auscultation bilaterally; No wheeze  HEART: Regular rate and rhythm; No murmurs, rubs, or gallops  ABDOMEN: Soft, Nontender, Nondistended; Bowel sounds present  EXTREMITIES:  2+ Peripheral Pulses, No clubbing, cyanosis, or edema  NEUROLOGY: AOx3, non-focal  SKIN: No rashes or lesions    LABS:                        10.2   23.34 )-----------( 469      ( 23 Mar 2025 06:55 )             32.3     03-23    140  |  102  |  16  ----------------------------<  119[H]  4.2   |  25  |  1.20    Ca    9.2      23 Mar 2025 06:55  Phos  3.8     03-23  Mg     1.6     03-23    TPro  7.0  /  Alb  3.0[L]  /  TBili  0.3  /  DBili  x   /  AST  47[H]  /  ALT  89[H]  /  AlkPhos  438[H]  03-23            MICRO:      RADIOLOGY & ADDITIONAL TESTS:           DATE OF SERVICE: 03-24-25 @ 07:29    Patient is a 65y old  Male who presents with a chief complaint of generalized fatigue/weakness (23 Mar 2025 08:07)      SUBJECTIVE / OVERNIGHT EVENTS:  Overnight, no acute events.   Pt seen and examined at bedside. Pt very confused this AM. Reporting he doesn't know how long he's been here or when his birthday was or what he is doing here. Denies abdominal pain. Unsure of last BM.     ROS negative except as above.    MEDICATIONS  (STANDING):  amLODIPine   Tablet 10 milliGRAM(s) Oral daily  ampicillin/sulbactam  IVPB 3 Gram(s) IV Intermittent every 6 hours  ampicillin/sulbactam  IVPB      aspirin  chewable 81 milliGRAM(s) Oral daily  atorvastatin 20 milliGRAM(s) Oral at bedtime  dextrose 5%. 1000 milliLiter(s) (100 mL/Hr) IV Continuous <Continuous>  dextrose 5%. 1000 milliLiter(s) (50 mL/Hr) IV Continuous <Continuous>  dextrose 50% Injectable 25 Gram(s) IV Push once  dextrose 50% Injectable 12.5 Gram(s) IV Push once  dextrose 50% Injectable 25 Gram(s) IV Push once  glucagon  Injectable 1 milliGRAM(s) IntraMuscular once  heparin   Injectable 5000 Unit(s) SubCutaneous every 8 hours  hydrALAZINE 25 milliGRAM(s) Oral three times a day  insulin glargine Injectable (LANTUS) 32 Unit(s) SubCutaneous at bedtime  insulin lispro (ADMELOG) corrective regimen sliding scale   SubCutaneous three times a day before meals  insulin lispro (ADMELOG) corrective regimen sliding scale   SubCutaneous at bedtime  insulin lispro Injectable (ADMELOG) 15 Unit(s) SubCutaneous three times a day before meals  polyethylene glycol 3350 17 Gram(s) Oral <User Schedule>  senna 2 Tablet(s) Oral daily  tamsulosin 0.8 milliGRAM(s) Oral at bedtime    MEDICATIONS  (PRN):  acetaminophen     Tablet .. 650 milliGRAM(s) Oral every 6 hours PRN Temp greater or equal to 38C (100.4F), Mild Pain (1 - 3)  aluminum hydroxide/magnesium hydroxide/simethicone Suspension 30 milliLiter(s) Oral every 4 hours PRN Dyspepsia  dextrose Oral Gel 15 Gram(s) Oral once PRN Blood Glucose LESS THAN 70 milliGRAM(s)/deciliter  melatonin 3 milliGRAM(s) Oral at bedtime PRN Insomnia  ondansetron Injectable 4 milliGRAM(s) IV Push every 8 hours PRN Nausea and/or Vomiting      Vital Signs Last 24 Hrs  T(C): 36.9 (24 Mar 2025 05:07), Max: 37.1 (23 Mar 2025 20:59)  T(F): 98.4 (24 Mar 2025 05:07), Max: 98.8 (23 Mar 2025 20:59)  HR: 90 (24 Mar 2025 06:10) (90 - 98)  BP: 147/66 (24 Mar 2025 06:10) (136/50 - 165/73)  BP(mean): --  RR: 18 (24 Mar 2025 05:07) (16 - 18)  SpO2: 98% (24 Mar 2025 05:07) (95% - 98%)    Parameters below as of 24 Mar 2025 05:07  Patient On (Oxygen Delivery Method): room air      CAPILLARY BLOOD GLUCOSE      POCT Blood Glucose.: 203 mg/dL (23 Mar 2025 21:30)  POCT Blood Glucose.: 190 mg/dL (23 Mar 2025 17:18)  POCT Blood Glucose.: 143 mg/dL (23 Mar 2025 13:35)  POCT Blood Glucose.: 169 mg/dL (23 Mar 2025 12:19)  POCT Blood Glucose.: 129 mg/dL (23 Mar 2025 08:32)    I&O's Summary    23 Mar 2025 07:01  -  24 Mar 2025 07:00  --------------------------------------------------------  IN: 1150 mL / OUT: 3225 mL / NET: -2075 mL        PHYSICAL EXAM:  GENERAL: NAD, well-developed  HEAD:  Atraumatic, Normocephalic  EYES: EOMI, conjunctiva and sclera clear  NECK: Supple, No JVD  CHEST/LUNG: Clear to auscultation bilaterally; No wheeze  HEART: Regular rate and rhythm; No murmurs, rubs, or gallops  ABDOMEN: Soft, Nontender, Nondistended; Bowel sounds present  EXTREMITIES:  2+ Peripheral Pulses, No clubbing, cyanosis, or edema  NEUROLOGY: AOx1, confused  SKIN: No rashes or lesions    LABS:                        10.2   23.34 )-----------( 469      ( 23 Mar 2025 06:55 )             32.3     03-23    140  |  102  |  16  ----------------------------<  119[H]  4.2   |  25  |  1.20    Ca    9.2      23 Mar 2025 06:55  Phos  3.8     03-23  Mg     1.6     03-23    TPro  7.0  /  Alb  3.0[L]  /  TBili  0.3  /  DBili  x   /  AST  47[H]  /  ALT  89[H]  /  AlkPhos  438[H]  03-23            MICRO:      RADIOLOGY & ADDITIONAL TESTS:

## 2025-03-24 NOTE — PROGRESS NOTE ADULT - ASSESSMENT
64M with medical of PAD s/p L BKA, DM with peripheral neuropathy and retinopathy HTN, CVA, complete heart block s/p PPM presented on 3/12 with generalized fatigue. In the ED patient noted to be febrile and tachycardic. Labs notable for leukocytosis, hyponatremia, hyperglycemia, HOLA and metabolic acidosis. UA with many bacteria, negative nitrite and small leukocyte esterase. Urine culture 3/12 >100K proteus mirabilis. Blood culture negative to date. ID asked to help manage.     Work up:  -Blood culture 3/12 (-)  -Urine Culture 3/12 - >100k proteus mirabilis  -Rapid RVP negative  -Renal US:  mild hydronephrosis of the left kidney  -X-ray abdomen: Mild nonspecific gaseous distention of stomach, with paucity of bowel gas distally. This may be secondary to gastric outlet obstruction.  -CT C/A/P without cont: 0.6 cm calculus within the proximal left ureter with mild left hydronephrosis.  -VQ scan: Indeterminate VQ scan for pulmonary embolus.  -LE US doppler: no DVT  -US RUQ:  Cholelithiasis with gallbladder wall thickening. Question trace pericholecystic fluid. Unreliable sonogram Jansen's sign. If there is clinical suspicion for acute cholecystitis, hepatobiliary scan may be obtained for further evaluation.  -Blood Cx (3/15): Proteus (1/4 bottles)  -HIDA scan:  Normal hepatobiliary scan. No radionuclide evidence of acute cholecystitis.  -Repeat blood Cx (3/16): NGTD   -CTAP (3/21): Mild peripancreatic fat stranding greatest about the head and tail, raising suspicion for acute pancreatitis. Interval placement of left nephroureteral stent. Nonobstructing 1.1 cm left upper pole calculus.  -Lipase 3/22: neg      #Proteus bacteremia source urinary/pyelonephritis in the setting of L ureter stone with hydronephrosis s/p cystoscopy and left stent placement on 3/17  #Sepsis, fever, leukocytosis   #Hypoxemia ?PNA r/o PE  #HOLA  #Transaminitis    Recommendations:   -s/p cystoscopy and left stent placement on 3/17  -Ceftriaxone switched to Unasyn over the weekend, would complete course (till 3/24)  -F/u repeat blood cx   -Send C diff study if watery diarrhea is produced.  -Consider thrombosis workup   -Trend LFTs   -Monitor T curve and WBC trend     Discussed with primary team    Claudia Mack MD  ID physician  [employer] Preferred  After 5pm/weekends call [phone]

## 2025-03-25 LAB
ALBUMIN SERPL ELPH-MCNC: 3 G/DL — LOW (ref 3.3–5)
ALP SERPL-CCNC: 342 U/L — HIGH (ref 40–120)
ALT FLD-CCNC: 58 U/L — HIGH (ref 10–45)
ANION GAP SERPL CALC-SCNC: 11 MMOL/L — SIGNIFICANT CHANGE UP (ref 5–17)
AST SERPL-CCNC: 21 U/L — SIGNIFICANT CHANGE UP (ref 10–40)
BASOPHILS # BLD AUTO: 0.06 K/UL — SIGNIFICANT CHANGE UP (ref 0–0.2)
BASOPHILS NFR BLD AUTO: 0.4 % — SIGNIFICANT CHANGE UP (ref 0–2)
BILIRUB SERPL-MCNC: 0.2 MG/DL — SIGNIFICANT CHANGE UP (ref 0.2–1.2)
BUN SERPL-MCNC: 16 MG/DL — SIGNIFICANT CHANGE UP (ref 7–23)
CALCIUM SERPL-MCNC: 9 MG/DL — SIGNIFICANT CHANGE UP (ref 8.4–10.5)
CHLORIDE SERPL-SCNC: 104 MMOL/L — SIGNIFICANT CHANGE UP (ref 96–108)
CO2 SERPL-SCNC: 24 MMOL/L — SIGNIFICANT CHANGE UP (ref 22–31)
CREAT SERPL-MCNC: 1.13 MG/DL — SIGNIFICANT CHANGE UP (ref 0.5–1.3)
EGFR: 72 ML/MIN/1.73M2 — SIGNIFICANT CHANGE UP
EGFR: 72 ML/MIN/1.73M2 — SIGNIFICANT CHANGE UP
EOSINOPHIL # BLD AUTO: 0.33 K/UL — SIGNIFICANT CHANGE UP (ref 0–0.5)
EOSINOPHIL NFR BLD AUTO: 1.9 % — SIGNIFICANT CHANGE UP (ref 0–6)
GLUCOSE BLDC GLUCOMTR-MCNC: 158 MG/DL — HIGH (ref 70–99)
GLUCOSE BLDC GLUCOMTR-MCNC: 213 MG/DL — HIGH (ref 70–99)
GLUCOSE BLDC GLUCOMTR-MCNC: 219 MG/DL — HIGH (ref 70–99)
GLUCOSE BLDC GLUCOMTR-MCNC: 275 MG/DL — HIGH (ref 70–99)
GLUCOSE BLDC GLUCOMTR-MCNC: 276 MG/DL — HIGH (ref 70–99)
GLUCOSE SERPL-MCNC: 179 MG/DL — HIGH (ref 70–99)
HCT VFR BLD CALC: 30.3 % — LOW (ref 39–50)
HGB BLD-MCNC: 9.4 G/DL — LOW (ref 13–17)
IMM GRANULOCYTES NFR BLD AUTO: 0.8 % — SIGNIFICANT CHANGE UP (ref 0–0.9)
LYMPHOCYTES # BLD AUTO: 1.77 K/UL — SIGNIFICANT CHANGE UP (ref 1–3.3)
LYMPHOCYTES # BLD AUTO: 10.4 % — LOW (ref 13–44)
MAGNESIUM SERPL-MCNC: 1.8 MG/DL — SIGNIFICANT CHANGE UP (ref 1.6–2.6)
MCHC RBC-ENTMCNC: 26.6 PG — LOW (ref 27–34)
MCHC RBC-ENTMCNC: 31 G/DL — LOW (ref 32–36)
MCV RBC AUTO: 85.8 FL — SIGNIFICANT CHANGE UP (ref 80–100)
MONOCYTES # BLD AUTO: 0.84 K/UL — SIGNIFICANT CHANGE UP (ref 0–0.9)
MONOCYTES NFR BLD AUTO: 4.9 % — SIGNIFICANT CHANGE UP (ref 2–14)
NEUTROPHILS # BLD AUTO: 13.95 K/UL — HIGH (ref 1.8–7.4)
NEUTROPHILS NFR BLD AUTO: 81.6 % — HIGH (ref 43–77)
NRBC BLD AUTO-RTO: 0 /100 WBCS — SIGNIFICANT CHANGE UP (ref 0–0)
PHOSPHATE SERPL-MCNC: 3.6 MG/DL — SIGNIFICANT CHANGE UP (ref 2.5–4.5)
PLATELET # BLD AUTO: 435 K/UL — HIGH (ref 150–400)
POTASSIUM SERPL-MCNC: 4.3 MMOL/L — SIGNIFICANT CHANGE UP (ref 3.5–5.3)
POTASSIUM SERPL-SCNC: 4.3 MMOL/L — SIGNIFICANT CHANGE UP (ref 3.5–5.3)
PROT SERPL-MCNC: 7.1 G/DL — SIGNIFICANT CHANGE UP (ref 6–8.3)
RBC # BLD: 3.53 M/UL — LOW (ref 4.2–5.8)
RBC # FLD: 15.4 % — HIGH (ref 10.3–14.5)
SODIUM SERPL-SCNC: 139 MMOL/L — SIGNIFICANT CHANGE UP (ref 135–145)
WBC # BLD: 17.09 K/UL — HIGH (ref 3.8–10.5)
WBC # FLD AUTO: 17.09 K/UL — HIGH (ref 3.8–10.5)

## 2025-03-25 PROCEDURE — 74176 CT ABD & PELVIS W/O CONTRAST: CPT | Mod: 26

## 2025-03-25 PROCEDURE — 70450 CT HEAD/BRAIN W/O DYE: CPT | Mod: 26

## 2025-03-25 PROCEDURE — 99232 SBSQ HOSP IP/OBS MODERATE 35: CPT | Mod: GC

## 2025-03-25 RX ORDER — MAGNESIUM SULFATE 500 MG/ML
1 SYRINGE (ML) INJECTION ONCE
Refills: 0 | Status: COMPLETED | OUTPATIENT
Start: 2025-03-25 | End: 2025-03-25

## 2025-03-25 RX ORDER — LATANOPROST PF 0.05 MG/ML
1 SOLUTION/ DROPS OPHTHALMIC AT BEDTIME
Refills: 0 | Status: DISCONTINUED | OUTPATIENT
Start: 2025-03-25 | End: 2025-03-26

## 2025-03-25 RX ORDER — MELATONIN 5 MG
5 TABLET ORAL AT BEDTIME
Refills: 0 | Status: DISCONTINUED | OUTPATIENT
Start: 2025-03-25 | End: 2025-03-26

## 2025-03-25 RX ADMIN — Medication 25 MILLIGRAM(S): at 21:55

## 2025-03-25 RX ADMIN — Medication 10 MILLIGRAM(S): at 06:20

## 2025-03-25 RX ADMIN — INSULIN GLARGINE-YFGN 32 UNIT(S): 100 INJECTION, SOLUTION SUBCUTANEOUS at 23:07

## 2025-03-25 RX ADMIN — TAMSULOSIN HYDROCHLORIDE 0.8 MILLIGRAM(S): 0.4 CAPSULE ORAL at 21:54

## 2025-03-25 RX ADMIN — HEPARIN SODIUM 5000 UNIT(S): 1000 INJECTION INTRAVENOUS; SUBCUTANEOUS at 21:53

## 2025-03-25 RX ADMIN — INSULIN LISPRO 6: 100 INJECTION, SOLUTION INTRAVENOUS; SUBCUTANEOUS at 19:05

## 2025-03-25 RX ADMIN — Medication 3 MILLIGRAM(S): at 00:09

## 2025-03-25 RX ADMIN — HEPARIN SODIUM 5000 UNIT(S): 1000 INJECTION INTRAVENOUS; SUBCUTANEOUS at 06:20

## 2025-03-25 RX ADMIN — Medication 5 MILLIGRAM(S): at 21:54

## 2025-03-25 RX ADMIN — INSULIN LISPRO 4: 100 INJECTION, SOLUTION INTRAVENOUS; SUBCUTANEOUS at 13:49

## 2025-03-25 RX ADMIN — Medication 81 MILLIGRAM(S): at 13:50

## 2025-03-25 RX ADMIN — Medication 2 TABLET(S): at 13:50

## 2025-03-25 RX ADMIN — ATORVASTATIN CALCIUM 20 MILLIGRAM(S): 80 TABLET, FILM COATED ORAL at 21:55

## 2025-03-25 RX ADMIN — INSULIN LISPRO 15 UNIT(S): 100 INJECTION, SOLUTION INTRAVENOUS; SUBCUTANEOUS at 09:16

## 2025-03-25 RX ADMIN — AMLODIPINE BESYLATE 10 MILLIGRAM(S): 10 TABLET ORAL at 06:20

## 2025-03-25 RX ADMIN — Medication 25 MILLIGRAM(S): at 06:20

## 2025-03-25 RX ADMIN — Medication 100 GRAM(S): at 09:16

## 2025-03-25 RX ADMIN — INSULIN LISPRO 2: 100 INJECTION, SOLUTION INTRAVENOUS; SUBCUTANEOUS at 09:15

## 2025-03-25 RX ADMIN — HEPARIN SODIUM 5000 UNIT(S): 1000 INJECTION INTRAVENOUS; SUBCUTANEOUS at 13:51

## 2025-03-25 RX ADMIN — INSULIN LISPRO 15 UNIT(S): 100 INJECTION, SOLUTION INTRAVENOUS; SUBCUTANEOUS at 19:05

## 2025-03-25 RX ADMIN — LATANOPROST PF 1 DROP(S): 0.05 SOLUTION/ DROPS OPHTHALMIC at 22:35

## 2025-03-25 RX ADMIN — INSULIN LISPRO 15 UNIT(S): 100 INJECTION, SOLUTION INTRAVENOUS; SUBCUTANEOUS at 13:49

## 2025-03-25 RX ADMIN — Medication 25 MILLIGRAM(S): at 13:51

## 2025-03-25 RX ADMIN — INSULIN LISPRO 1: 100 INJECTION, SOLUTION INTRAVENOUS; SUBCUTANEOUS at 22:44

## 2025-03-25 NOTE — PROGRESS NOTE ADULT - PROBLEM SELECTOR PLAN 2
CT A/P with 0.6 cm calculus within the proximal left ureter with mild left hydronephrosis  s/p cystoscopy and left stent placement 3/17  Patient with lingering leukocytosis.     Plan:     repeated BCx 3/21  repeated CT a/p  cw CTX 2g QD (3/18--)  continue for 10-14 days. transition to PO levofloxacin on dc, but requires downtrending leukocytosis and 48hrs of negative BCx  cw flomax  continue crain, plan for patient to d/c with crain S/p stent, will maintain crain until seen by urology outpatient     Plan:   repeated CT a/p  S/p abx   cw flomax  continue crain, plan for patient to d/c with crain

## 2025-03-25 NOTE — PROGRESS NOTE ADULT - ATTENDING COMMENTS
Patient seen and examined at bedside. No acute events overnight. He seems grossly confused. I reviewed his hospitalization from 2024 where he was also confused and found to have an acute stroke. At that time it was attributed to possibly embolic or because he had a complete heart block requiring PPM. It seems it slowly improved.    - Will perform CT head and CTA H&N, may require MRI brain  - CT A&P given persistent leukocytosis since we are doing a contrast scan anyway  - Maintain diurnal cycle, start standing melatonin  - Reviewed his medications -- none would cause this presentation    Possible recrudescence? Needs further neurological work up prior to discharge to [facility].

## 2025-03-25 NOTE — PROGRESS NOTE ADULT - PROBLEM SELECTOR PLAN 5
I called and spoke to Sylvie Potts to confirm their appointment at 9:15 AM on 10/20/2023.    I mentioned that they do get a questionnaire sent to their [facility] and to please fill it out if they are able. I stated to please come 15 - 20 minutes early to their appointment to fill out our paper copy questionnaire if they haven't done the one on [facility].    I confirmed with the patient where we were located.    I asked if they had any questions, then concluded our call.           hold home Enalopril iso HOLA    continued amlodipine 10 (3/19)

## 2025-03-25 NOTE — PROGRESS NOTE ADULT - PROBLEM SELECTOR PLAN 1
febrile, leukocytosis, tachycardic, + lactic acidosis; meets severe sepsis criteria  suspect 2/2 uti --> Urine culture 3/12 >100K proteus mirabilis.   CXR- no consolidations  CT A/P with 0.6 cm calculus within the proximal left ureter with mild left hydronephrosis  RUQ US Cholelithiasis with gallbladder wall thickening. Question trace pericholecystic fluid.   MRSA PCR- negative  new proteus bacteremia + hydro on CT  HIDA- with no cholecystitis    Plan:   Now with lingering/uptrending WBC. Repeat CT scan with mild mild peripancreatic fat stranding greatest about the head and tail, raising suspicion for acute pancreatitis.  - c/w LR at 75 cc/hr  - f/u lipase  - repeated Bcx   - cw CTX 2g  - sp Meropenem 500mg BID (3/15-3/16)  - follow up blood cultures 3/18  - f/u sputum cx  - trend lactate q4-6h until wnl  - Monitor for fever, changes in white count Sepsis 2/2 UTI w/ proteus 2/2 calculi s/p ureteral stent + unasyn. Now with persistent leukocytosis w/ new findings on CT AP suspicious for pancreatitis but HIDA- with no cholecystitis.    Plan:   - ID following, appreciate recs  - Monitor off abx given downtrend w/o clinical findings   - CT AP w/ IV con   - repeated Bcx, neg 72  - Monitor for fever, changes in white count

## 2025-03-25 NOTE — PROGRESS NOTE ADULT - SUBJECTIVE AND OBJECTIVE BOX
DATE OF SERVICE: 03-25-25 @ 07:22    Patient is a 65y old  Male who presents with a chief complaint of generalized fatigue/weakness (24 Mar 2025 14:14)      SUBJECTIVE / OVERNIGHT EVENTS:  Overnight,  Pt seen and examined at bedside.    ROS negative except as above.    MEDICATIONS  (STANDING):  amLODIPine   Tablet 10 milliGRAM(s) Oral daily  aspirin  chewable 81 milliGRAM(s) Oral daily  atorvastatin 20 milliGRAM(s) Oral at bedtime  dextrose 5%. 1000 milliLiter(s) (100 mL/Hr) IV Continuous <Continuous>  dextrose 5%. 1000 milliLiter(s) (50 mL/Hr) IV Continuous <Continuous>  dextrose 50% Injectable 25 Gram(s) IV Push once  dextrose 50% Injectable 12.5 Gram(s) IV Push once  dextrose 50% Injectable 25 Gram(s) IV Push once  enalapril 10 milliGRAM(s) Oral daily  glucagon  Injectable 1 milliGRAM(s) IntraMuscular once  heparin   Injectable 5000 Unit(s) SubCutaneous every 8 hours  hydrALAZINE 25 milliGRAM(s) Oral three times a day  insulin glargine Injectable (LANTUS) 32 Unit(s) SubCutaneous at bedtime  insulin lispro (ADMELOG) corrective regimen sliding scale   SubCutaneous three times a day before meals  insulin lispro (ADMELOG) corrective regimen sliding scale   SubCutaneous at bedtime  insulin lispro Injectable (ADMELOG) 15 Unit(s) SubCutaneous three times a day before meals  polyethylene glycol 3350 17 Gram(s) Oral <User Schedule>  senna 2 Tablet(s) Oral daily  tamsulosin 0.8 milliGRAM(s) Oral at bedtime    MEDICATIONS  (PRN):  acetaminophen     Tablet .. 650 milliGRAM(s) Oral every 6 hours PRN Temp greater or equal to 38C (100.4F), Mild Pain (1 - 3)  aluminum hydroxide/magnesium hydroxide/simethicone Suspension 30 milliLiter(s) Oral every 4 hours PRN Dyspepsia  dextrose Oral Gel 15 Gram(s) Oral once PRN Blood Glucose LESS THAN 70 milliGRAM(s)/deciliter  melatonin 3 milliGRAM(s) Oral at bedtime PRN Insomnia  ondansetron Injectable 4 milliGRAM(s) IV Push every 8 hours PRN Nausea and/or Vomiting      Vital Signs Last 24 Hrs  T(C): 36.8 (25 Mar 2025 05:14), Max: 36.9 (24 Mar 2025 16:04)  T(F): 98.3 (25 Mar 2025 05:14), Max: 98.4 (24 Mar 2025 16:04)  HR: 87 (25 Mar 2025 06:18) (87 - 92)  BP: 147/70 (25 Mar 2025 06:18) (133/67 - 163/78)  BP(mean): --  RR: 18 (25 Mar 2025 05:14) (18 - 18)  SpO2: 94% (25 Mar 2025 05:14) (94% - 98%)    Parameters below as of 25 Mar 2025 05:14  Patient On (Oxygen Delivery Method): room air      CAPILLARY BLOOD GLUCOSE      POCT Blood Glucose.: 239 mg/dL (24 Mar 2025 21:04)  POCT Blood Glucose.: 202 mg/dL (24 Mar 2025 17:59)  POCT Blood Glucose.: 332 mg/dL (24 Mar 2025 12:39)  POCT Blood Glucose.: 157 mg/dL (24 Mar 2025 08:41)    I&O's Summary    24 Mar 2025 07:01  -  25 Mar 2025 07:00  --------------------------------------------------------  IN: 720 mL / OUT: 2600 mL / NET: -1880 mL        PHYSICAL EXAM:  GENERAL: NAD, well-developed  HEAD:  Atraumatic, Normocephalic  EYES: EOMI, conjunctiva and sclera clear  NECK: Supple, No JVD  CHEST/LUNG: Clear to auscultation bilaterally; No wheeze  HEART: Regular rate and rhythm; No murmurs, rubs, or gallops  ABDOMEN: Soft, Nontender, Nondistended; Bowel sounds present  EXTREMITIES:  2+ Peripheral Pulses, No clubbing, cyanosis, or edema  NEUROLOGY: AOx3, non-focal  SKIN: No rashes or lesions    LABS:                        9.4    17.09 )-----------( 435      ( 25 Mar 2025 06:43 )             30.3     03-25    139  |  104  |  16  ----------------------------<  179[H]  4.3   |  24  |  1.13    Ca    9.0      25 Mar 2025 06:43  Phos  3.6     03-25  Mg     1.8     03-25    TPro  7.1  /  Alb  3.0[L]  /  TBili  0.2  /  DBili  x   /  AST  21  /  ALT  58[H]  /  AlkPhos  342[H]  03-25            MICRO:      RADIOLOGY & ADDITIONAL TESTS:           DATE OF SERVICE: 03-25-25 @ 07:22    Patient is a 65y old  Male who presents with a chief complaint of generalized fatigue/weakness (24 Mar 2025 14:14)      SUBJECTIVE / OVERNIGHT EVENTS:  Overnight, no acute events.   Pt seen and examined at bedside. Pt persistently altered and confused. Acknowledges he is confused. Doesn't remember how to do things. Eating well.     ROS negative except as above.    MEDICATIONS  (STANDING):  amLODIPine   Tablet 10 milliGRAM(s) Oral daily  aspirin  chewable 81 milliGRAM(s) Oral daily  atorvastatin 20 milliGRAM(s) Oral at bedtime  dextrose 5%. 1000 milliLiter(s) (100 mL/Hr) IV Continuous <Continuous>  dextrose 5%. 1000 milliLiter(s) (50 mL/Hr) IV Continuous <Continuous>  dextrose 50% Injectable 25 Gram(s) IV Push once  dextrose 50% Injectable 12.5 Gram(s) IV Push once  dextrose 50% Injectable 25 Gram(s) IV Push once  enalapril 10 milliGRAM(s) Oral daily  glucagon  Injectable 1 milliGRAM(s) IntraMuscular once  heparin   Injectable 5000 Unit(s) SubCutaneous every 8 hours  hydrALAZINE 25 milliGRAM(s) Oral three times a day  insulin glargine Injectable (LANTUS) 32 Unit(s) SubCutaneous at bedtime  insulin lispro (ADMELOG) corrective regimen sliding scale   SubCutaneous three times a day before meals  insulin lispro (ADMELOG) corrective regimen sliding scale   SubCutaneous at bedtime  insulin lispro Injectable (ADMELOG) 15 Unit(s) SubCutaneous three times a day before meals  polyethylene glycol 3350 17 Gram(s) Oral <User Schedule>  senna 2 Tablet(s) Oral daily  tamsulosin 0.8 milliGRAM(s) Oral at bedtime    MEDICATIONS  (PRN):  acetaminophen     Tablet .. 650 milliGRAM(s) Oral every 6 hours PRN Temp greater or equal to 38C (100.4F), Mild Pain (1 - 3)  aluminum hydroxide/magnesium hydroxide/simethicone Suspension 30 milliLiter(s) Oral every 4 hours PRN Dyspepsia  dextrose Oral Gel 15 Gram(s) Oral once PRN Blood Glucose LESS THAN 70 milliGRAM(s)/deciliter  melatonin 3 milliGRAM(s) Oral at bedtime PRN Insomnia  ondansetron Injectable 4 milliGRAM(s) IV Push every 8 hours PRN Nausea and/or Vomiting      Vital Signs Last 24 Hrs  T(C): 36.8 (25 Mar 2025 05:14), Max: 36.9 (24 Mar 2025 16:04)  T(F): 98.3 (25 Mar 2025 05:14), Max: 98.4 (24 Mar 2025 16:04)  HR: 87 (25 Mar 2025 06:18) (87 - 92)  BP: 147/70 (25 Mar 2025 06:18) (133/67 - 163/78)  BP(mean): --  RR: 18 (25 Mar 2025 05:14) (18 - 18)  SpO2: 94% (25 Mar 2025 05:14) (94% - 98%)    Parameters below as of 25 Mar 2025 05:14  Patient On (Oxygen Delivery Method): room air      CAPILLARY BLOOD GLUCOSE      POCT Blood Glucose.: 239 mg/dL (24 Mar 2025 21:04)  POCT Blood Glucose.: 202 mg/dL (24 Mar 2025 17:59)  POCT Blood Glucose.: 332 mg/dL (24 Mar 2025 12:39)  POCT Blood Glucose.: 157 mg/dL (24 Mar 2025 08:41)    I&O's Summary    24 Mar 2025 07:01  -  25 Mar 2025 07:00  --------------------------------------------------------  IN: 720 mL / OUT: 2600 mL / NET: -1880 mL        PHYSICAL EXAM:  GENERAL: NAD, well-developed  HEAD:  Atraumatic, Normocephalic  EYES: EOMI, conjunctiva and sclera clear  NECK: Supple, No JVD  CHEST/LUNG: Clear to auscultation bilaterally; No wheeze  HEART: Regular rate and rhythm; No murmurs, rubs, or gallops  ABDOMEN: Soft, Nontender, Nondistended; Bowel sounds present  EXTREMITIES:  2+ Peripheral Pulses, No clubbing, cyanosis, or edema  NEUROLOGY: AOx0-1, non-focal   SKIN: No rashes or lesions    LABS:                        9.4    17.09 )-----------( 435      ( 25 Mar 2025 06:43 )             30.3     03-25    139  |  104  |  16  ----------------------------<  179[H]  4.3   |  24  |  1.13    Ca    9.0      25 Mar 2025 06:43  Phos  3.6     03-25  Mg     1.8     03-25    TPro  7.1  /  Alb  3.0[L]  /  TBili  0.2  /  DBili  x   /  AST  21  /  ALT  58[H]  /  AlkPhos  342[H]  03-25            MICRO:      RADIOLOGY & ADDITIONAL TESTS:

## 2025-03-25 NOTE — PROGRESS NOTE ADULT - PROBLEM SELECTOR PLAN 3
Unclear baseline cr, Fluctuated between 0.9-1.1 in late 20246  on Admission 1.9. Increasing now at 5.1--> now improved 1.32  Likely pre-renal with vs. ATN vs. post renal iso obstruction 2/2 septic stone. Now s/p ureteral stone placement    Plan:   hold ACE-I- consider restarting with improving kidney function  renally dose antibiotics  monitor BMP BID  fu urine studies  Monitor UO, BUN/Cr, volume status, acid-base balance, electrolytes  avoid nephrotoxic agents  dose adjustments for renally cleared meds Resolved     Unclear baseline cr, Fluctuated between 0.9-1.1 in late 20246  on Admission 1.9. Increasing now at 5.1--> now improved 1.32  Likely pre-renal with vs. ATN vs. post renal iso obstruction 2/2 septic stone. Now s/p ureteral stone placement    Plan:   hold ACE-I- consider restarting with improving kidney function  renally dose antibiotics  monitor BMP BID  fu urine studies  Monitor UO, BUN/Cr, volume status, acid-base balance, electrolytes  avoid nephrotoxic agents  dose adjustments for renally cleared meds

## 2025-03-26 ENCOUNTER — TRANSCRIPTION ENCOUNTER (OUTPATIENT)
Age: 65
End: 2025-03-26

## 2025-03-26 VITALS — SYSTOLIC BLOOD PRESSURE: 136 MMHG | HEART RATE: 83 BPM | DIASTOLIC BLOOD PRESSURE: 66 MMHG

## 2025-03-26 DIAGNOSIS — R41.82 ALTERED MENTAL STATUS, UNSPECIFIED: ICD-10-CM

## 2025-03-26 LAB
ANION GAP SERPL CALC-SCNC: 13 MMOL/L — SIGNIFICANT CHANGE UP (ref 5–17)
BASOPHILS # BLD AUTO: 0.04 K/UL — SIGNIFICANT CHANGE UP (ref 0–0.2)
BASOPHILS NFR BLD AUTO: 0.3 % — SIGNIFICANT CHANGE UP (ref 0–2)
BUN SERPL-MCNC: 16 MG/DL — SIGNIFICANT CHANGE UP (ref 7–23)
CALCIUM SERPL-MCNC: 9.1 MG/DL — SIGNIFICANT CHANGE UP (ref 8.4–10.5)
CHLORIDE SERPL-SCNC: 102 MMOL/L — SIGNIFICANT CHANGE UP (ref 96–108)
CO2 SERPL-SCNC: 24 MMOL/L — SIGNIFICANT CHANGE UP (ref 22–31)
CREAT SERPL-MCNC: 1.09 MG/DL — SIGNIFICANT CHANGE UP (ref 0.5–1.3)
CULTURE RESULTS: SIGNIFICANT CHANGE UP
CULTURE RESULTS: SIGNIFICANT CHANGE UP
EGFR: 75 ML/MIN/1.73M2 — SIGNIFICANT CHANGE UP
EGFR: 75 ML/MIN/1.73M2 — SIGNIFICANT CHANGE UP
EOSINOPHIL # BLD AUTO: 0.31 K/UL — SIGNIFICANT CHANGE UP (ref 0–0.5)
EOSINOPHIL NFR BLD AUTO: 2 % — SIGNIFICANT CHANGE UP (ref 0–6)
GLUCOSE BLDC GLUCOMTR-MCNC: 164 MG/DL — HIGH (ref 70–99)
GLUCOSE BLDC GLUCOMTR-MCNC: 208 MG/DL — HIGH (ref 70–99)
GLUCOSE BLDC GLUCOMTR-MCNC: 269 MG/DL — HIGH (ref 70–99)
GLUCOSE SERPL-MCNC: 233 MG/DL — HIGH (ref 70–99)
HCT VFR BLD CALC: 31.5 % — LOW (ref 39–50)
HGB BLD-MCNC: 9.8 G/DL — LOW (ref 13–17)
IMM GRANULOCYTES NFR BLD AUTO: 0.7 % — SIGNIFICANT CHANGE UP (ref 0–0.9)
LYMPHOCYTES # BLD AUTO: 1.81 K/UL — SIGNIFICANT CHANGE UP (ref 1–3.3)
LYMPHOCYTES # BLD AUTO: 11.8 % — LOW (ref 13–44)
MAGNESIUM SERPL-MCNC: 1.9 MG/DL — SIGNIFICANT CHANGE UP (ref 1.6–2.6)
MCHC RBC-ENTMCNC: 26.8 PG — LOW (ref 27–34)
MCHC RBC-ENTMCNC: 31.1 G/DL — LOW (ref 32–36)
MCV RBC AUTO: 86.1 FL — SIGNIFICANT CHANGE UP (ref 80–100)
MONOCYTES # BLD AUTO: 0.94 K/UL — HIGH (ref 0–0.9)
MONOCYTES NFR BLD AUTO: 6.1 % — SIGNIFICANT CHANGE UP (ref 2–14)
NEUTROPHILS # BLD AUTO: 12.12 K/UL — HIGH (ref 1.8–7.4)
NEUTROPHILS NFR BLD AUTO: 79.1 % — HIGH (ref 43–77)
NRBC BLD AUTO-RTO: 0 /100 WBCS — SIGNIFICANT CHANGE UP (ref 0–0)
PHOSPHATE SERPL-MCNC: 4.1 MG/DL — SIGNIFICANT CHANGE UP (ref 2.5–4.5)
PLATELET # BLD AUTO: 420 K/UL — HIGH (ref 150–400)
POTASSIUM SERPL-MCNC: 4.6 MMOL/L — SIGNIFICANT CHANGE UP (ref 3.5–5.3)
POTASSIUM SERPL-SCNC: 4.6 MMOL/L — SIGNIFICANT CHANGE UP (ref 3.5–5.3)
RBC # BLD: 3.66 M/UL — LOW (ref 4.2–5.8)
RBC # FLD: 15.4 % — HIGH (ref 10.3–14.5)
SODIUM SERPL-SCNC: 139 MMOL/L — SIGNIFICANT CHANGE UP (ref 135–145)
SPECIMEN SOURCE: SIGNIFICANT CHANGE UP
SPECIMEN SOURCE: SIGNIFICANT CHANGE UP
WBC # BLD: 15.33 K/UL — HIGH (ref 3.8–10.5)
WBC # FLD AUTO: 15.33 K/UL — HIGH (ref 3.8–10.5)

## 2025-03-26 PROCEDURE — 93005 ELECTROCARDIOGRAM TRACING: CPT

## 2025-03-26 PROCEDURE — 85610 PROTHROMBIN TIME: CPT

## 2025-03-26 PROCEDURE — 97530 THERAPEUTIC ACTIVITIES: CPT

## 2025-03-26 PROCEDURE — 76770 US EXAM ABDO BACK WALL COMP: CPT

## 2025-03-26 PROCEDURE — 96376 TX/PRO/DX INJ SAME DRUG ADON: CPT

## 2025-03-26 PROCEDURE — 96375 TX/PRO/DX INJ NEW DRUG ADDON: CPT

## 2025-03-26 PROCEDURE — C9399: CPT

## 2025-03-26 PROCEDURE — 82570 ASSAY OF URINE CREATININE: CPT

## 2025-03-26 PROCEDURE — 36415 COLL VENOUS BLD VENIPUNCTURE: CPT

## 2025-03-26 PROCEDURE — 99285 EMERGENCY DEPT VISIT HI MDM: CPT

## 2025-03-26 PROCEDURE — 97162 PT EVAL MOD COMPLEX 30 MIN: CPT

## 2025-03-26 PROCEDURE — 78580 LUNG PERFUSION IMAGING: CPT | Mod: MC

## 2025-03-26 PROCEDURE — 84132 ASSAY OF SERUM POTASSIUM: CPT

## 2025-03-26 PROCEDURE — 87640 STAPH A DNA AMP PROBE: CPT

## 2025-03-26 PROCEDURE — 70450 CT HEAD/BRAIN W/O DYE: CPT | Mod: MC

## 2025-03-26 PROCEDURE — 83935 ASSAY OF URINE OSMOLALITY: CPT

## 2025-03-26 PROCEDURE — 78226 HEPATOBILIARY SYSTEM IMAGING: CPT | Mod: MC

## 2025-03-26 PROCEDURE — 82010 KETONE BODYS QUAN: CPT

## 2025-03-26 PROCEDURE — 82435 ASSAY OF BLOOD CHLORIDE: CPT

## 2025-03-26 PROCEDURE — 97112 NEUROMUSCULAR REEDUCATION: CPT

## 2025-03-26 PROCEDURE — 82962 GLUCOSE BLOOD TEST: CPT

## 2025-03-26 PROCEDURE — 76000 FLUOROSCOPY <1 HR PHYS/QHP: CPT

## 2025-03-26 PROCEDURE — 87641 MR-STAPH DNA AMP PROBE: CPT

## 2025-03-26 PROCEDURE — 87040 BLOOD CULTURE FOR BACTERIA: CPT

## 2025-03-26 PROCEDURE — 87150 DNA/RNA AMPLIFIED PROBE: CPT

## 2025-03-26 PROCEDURE — 0225U NFCT DS DNA&RNA 21 SARSCOV2: CPT

## 2025-03-26 PROCEDURE — 83735 ASSAY OF MAGNESIUM: CPT

## 2025-03-26 PROCEDURE — 80048 BASIC METABOLIC PNL TOTAL CA: CPT

## 2025-03-26 PROCEDURE — 87186 SC STD MICRODIL/AGAR DIL: CPT

## 2025-03-26 PROCEDURE — 74176 CT ABD & PELVIS W/O CONTRAST: CPT | Mod: MC

## 2025-03-26 PROCEDURE — 80061 LIPID PANEL: CPT

## 2025-03-26 PROCEDURE — 82803 BLOOD GASES ANY COMBINATION: CPT

## 2025-03-26 PROCEDURE — 84156 ASSAY OF PROTEIN URINE: CPT

## 2025-03-26 PROCEDURE — 80053 COMPREHEN METABOLIC PANEL: CPT

## 2025-03-26 PROCEDURE — 87077 CULTURE AEROBIC IDENTIFY: CPT

## 2025-03-26 PROCEDURE — C2617: CPT

## 2025-03-26 PROCEDURE — 83930 ASSAY OF BLOOD OSMOLALITY: CPT

## 2025-03-26 PROCEDURE — C1769: CPT

## 2025-03-26 PROCEDURE — 84540 ASSAY OF URINE/UREA-N: CPT

## 2025-03-26 PROCEDURE — 85730 THROMBOPLASTIN TIME PARTIAL: CPT

## 2025-03-26 PROCEDURE — 99232 SBSQ HOSP IP/OBS MODERATE 35: CPT

## 2025-03-26 PROCEDURE — 87086 URINE CULTURE/COLONY COUNT: CPT

## 2025-03-26 PROCEDURE — 84133 ASSAY OF URINE POTASSIUM: CPT

## 2025-03-26 PROCEDURE — A9537: CPT

## 2025-03-26 PROCEDURE — 87637 SARSCOV2&INF A&B&RSV AMP PRB: CPT

## 2025-03-26 PROCEDURE — 97116 GAIT TRAINING THERAPY: CPT

## 2025-03-26 PROCEDURE — 85014 HEMATOCRIT: CPT

## 2025-03-26 PROCEDURE — 74018 RADEX ABDOMEN 1 VIEW: CPT

## 2025-03-26 PROCEDURE — 82330 ASSAY OF CALCIUM: CPT

## 2025-03-26 PROCEDURE — 71045 X-RAY EXAM CHEST 1 VIEW: CPT

## 2025-03-26 PROCEDURE — 81001 URINALYSIS AUTO W/SCOPE: CPT

## 2025-03-26 PROCEDURE — 97535 SELF CARE MNGMENT TRAINING: CPT

## 2025-03-26 PROCEDURE — 84100 ASSAY OF PHOSPHORUS: CPT

## 2025-03-26 PROCEDURE — C1758: CPT

## 2025-03-26 PROCEDURE — 71250 CT THORAX DX C-: CPT | Mod: MC

## 2025-03-26 PROCEDURE — 93970 EXTREMITY STUDY: CPT

## 2025-03-26 PROCEDURE — A9540: CPT

## 2025-03-26 PROCEDURE — 85018 HEMOGLOBIN: CPT

## 2025-03-26 PROCEDURE — 97165 OT EVAL LOW COMPLEX 30 MIN: CPT

## 2025-03-26 PROCEDURE — 96374 THER/PROPH/DIAG INJ IV PUSH: CPT

## 2025-03-26 PROCEDURE — G0545: CPT

## 2025-03-26 PROCEDURE — 84295 ASSAY OF SERUM SODIUM: CPT

## 2025-03-26 PROCEDURE — 99239 HOSP IP/OBS DSCHRG MGMT >30: CPT

## 2025-03-26 PROCEDURE — 85027 COMPLETE CBC AUTOMATED: CPT

## 2025-03-26 PROCEDURE — 83036 HEMOGLOBIN GLYCOSYLATED A1C: CPT

## 2025-03-26 PROCEDURE — 83605 ASSAY OF LACTIC ACID: CPT

## 2025-03-26 PROCEDURE — 83690 ASSAY OF LIPASE: CPT

## 2025-03-26 PROCEDURE — 80202 ASSAY OF VANCOMYCIN: CPT

## 2025-03-26 PROCEDURE — 76705 ECHO EXAM OF ABDOMEN: CPT

## 2025-03-26 PROCEDURE — 82947 ASSAY GLUCOSE BLOOD QUANT: CPT

## 2025-03-26 PROCEDURE — 85025 COMPLETE CBC W/AUTO DIFF WBC: CPT

## 2025-03-26 PROCEDURE — 97110 THERAPEUTIC EXERCISES: CPT

## 2025-03-26 PROCEDURE — 74177 CT ABD & PELVIS W/CONTRAST: CPT | Mod: MC

## 2025-03-26 PROCEDURE — 84300 ASSAY OF URINE SODIUM: CPT

## 2025-03-26 RX ORDER — LATANOPROST PF 0.05 MG/ML
1 SOLUTION/ DROPS OPHTHALMIC
Qty: 0 | Refills: 0 | DISCHARGE
Start: 2025-03-26

## 2025-03-26 RX ORDER — TAMSULOSIN HYDROCHLORIDE 0.4 MG/1
2 CAPSULE ORAL
Qty: 0 | Refills: 0 | DISCHARGE
Start: 2025-03-26

## 2025-03-26 RX ADMIN — HEPARIN SODIUM 5000 UNIT(S): 1000 INJECTION INTRAVENOUS; SUBCUTANEOUS at 05:41

## 2025-03-26 RX ADMIN — Medication 10 MILLIGRAM(S): at 05:41

## 2025-03-26 RX ADMIN — AMLODIPINE BESYLATE 10 MILLIGRAM(S): 10 TABLET ORAL at 05:41

## 2025-03-26 RX ADMIN — INSULIN LISPRO 15 UNIT(S): 100 INJECTION, SOLUTION INTRAVENOUS; SUBCUTANEOUS at 17:49

## 2025-03-26 RX ADMIN — Medication 25 MILLIGRAM(S): at 13:42

## 2025-03-26 RX ADMIN — INSULIN LISPRO 15 UNIT(S): 100 INJECTION, SOLUTION INTRAVENOUS; SUBCUTANEOUS at 12:50

## 2025-03-26 RX ADMIN — INSULIN LISPRO 15 UNIT(S): 100 INJECTION, SOLUTION INTRAVENOUS; SUBCUTANEOUS at 09:01

## 2025-03-26 RX ADMIN — Medication 81 MILLIGRAM(S): at 11:37

## 2025-03-26 RX ADMIN — Medication 25 MILLIGRAM(S): at 05:41

## 2025-03-26 RX ADMIN — INSULIN LISPRO 6: 100 INJECTION, SOLUTION INTRAVENOUS; SUBCUTANEOUS at 12:50

## 2025-03-26 RX ADMIN — INSULIN LISPRO 4: 100 INJECTION, SOLUTION INTRAVENOUS; SUBCUTANEOUS at 09:00

## 2025-03-26 RX ADMIN — HEPARIN SODIUM 5000 UNIT(S): 1000 INJECTION INTRAVENOUS; SUBCUTANEOUS at 13:42

## 2025-03-26 RX ADMIN — INSULIN LISPRO 2: 100 INJECTION, SOLUTION INTRAVENOUS; SUBCUTANEOUS at 17:49

## 2025-03-26 NOTE — PROGRESS NOTE ADULT - ATTENDING COMMENTS
Patient seen and examined at bedside. No acute events overnight. Mentation is waxing and waning, but probably close to baseline. CT head with old stroke. CT A&P with inflammation around stent which is not actionable. Will discuss with family, but likely DC to [facility]. I think he will do better cognitively there. He will need outpatient urology follow up for stone management.    Discharge Time: 35 minutes

## 2025-03-26 NOTE — PROGRESS NOTE ADULT - PROBLEM SELECTOR PLAN 5
hold home Enalopril iso HOLA    continued amlodipine 10 (3/19) on presentation, BG ~400s,   UA: w no glucosuria and no ketonuria, BHB wnl, with HAGMA  s/p 2L LR  likely stress hyperglycemia iso sepsis    Plan:   -Lantus decreased to 32 units  cw ISS  consider endocrine consult  -hold home regimen  A1C - 9.2%  -trend fingerstick glu  - goal inpatient BG- 180  -cont home neurontin  - carb consistent diet

## 2025-03-26 NOTE — PROGRESS NOTE ADULT - PROBLEM SELECTOR PLAN 8
improving transaminases, likely iso shock  RUQ US Cholelithiasis with gallbladder wall thickening. Question trace pericholecystic fluid.       -CTM  -f/u HIDA scan- no cholecytsitis (IMPROVED) Likely iso decreased free water clearance. Can be in setting of SIADH 2/2 sepsis   SNa -125  Improvement today-137    Plan:   avoid hypotonic fluids   f/u urine lytes + serum osmolality  -f/u nephrology recommendations

## 2025-03-26 NOTE — PROGRESS NOTE ADULT - PROBLEM SELECTOR PLAN 1
Sepsis 2/2 UTI w/ proteus 2/2 calculi s/p ureteral stent + unasyn. Now with persistent leukocytosis w/ new findings on CT AP suspicious for pancreatitis but HIDA- with no cholecystitis.    Plan:   - ID following, appreciate recs  - Monitor off abx given downtrend w/o clinical findings   - CT AP w/ IV con   - repeated Bcx, neg 72  - Monitor for fever, changes in white count AMS acutely since 3/24 AM, acknowledges he is confused, unable to remember how to do basic things, not oriented, doesn't know his birthday. Previously AOx3. UTI abx course completed. Hx of stroke presenting with confusion in June 2024.    Diff: neuro vs infectious vs delirium vs recrudescence ( most likely delirium vs recrudescence given improvement in clinical infection/leukocytosis)    Plan:   - CT head/angio w/ encephalomalacia + gliosis in area of prior stroke   - CT AP neg for intraabdominal infection  - Non focal exam   - Delirium precautions, standing melatonin   - RE-orientation

## 2025-03-26 NOTE — PROGRESS NOTE ADULT - ASSESSMENT
64M with medical of PAD s/p L BKA, DM with peripheral neuropathy and retinopathy HTN, CVA, complete heart block s/p PPM presented on 3/12 with generalized fatigue. In the ED patient noted to be febrile and tachycardic. Labs notable for leukocytosis, hyponatremia, hyperglycemia, HOLA and metabolic acidosis. UA with many bacteria, negative nitrite and small leukocyte esterase. Urine culture 3/12 >100K proteus mirabilis. Blood culture negative to date. ID asked to help manage.     Work up:  -Blood culture 3/12 (-)  -Urine Culture 3/12 - >100k proteus mirabilis  -Rapid RVP negative  -Renal US:  mild hydronephrosis of the left kidney  -X-ray abdomen: Mild nonspecific gaseous distention of stomach, with paucity of bowel gas distally. This may be secondary to gastric outlet obstruction.  -CT C/A/P without cont: 0.6 cm calculus within the proximal left ureter with mild left hydronephrosis.  -VQ scan: Indeterminate VQ scan for pulmonary embolus.  -LE US doppler: no DVT  -US RUQ:  Cholelithiasis with gallbladder wall thickening. Question trace pericholecystic fluid. Unreliable sonogram Jansen's sign. If there is clinical suspicion for acute cholecystitis, hepatobiliary scan may be obtained for further evaluation.  -Blood Cx (3/15): Proteus (1/4 bottles)  -HIDA scan:  Normal hepatobiliary scan. No radionuclide evidence of acute cholecystitis.  -Repeat blood Cx (3/16): NGTD   -CTAP (3/21): Mild peripancreatic fat stranding greatest about the head and tail, raising suspicion for acute pancreatitis. Interval placement of left nephroureteral stent. Nonobstructing 1.1 cm left upper pole calculus.  -Lipase 3/22: neg    #Proteus bacteremia source urinary/pyelonephritis in the setting of L ureter stone with hydronephrosis s/p cystoscopy and left stent placement on 3/17  #Sepsis, fever, leukocytosis   #Hypoxemia ?PNA r/o PE  #HOLA  #Transaminitis    Recommendations:   -s/p cystoscopy and left stent placement on 3/17  -Leukocytosis trending down, Completed antibiotics course, monitor off antibiotics     ID signing off, please call if any questions or change in status.     Claudia Mack MD  ID physician  [employer] Preferred  After 5pm/weekends call [phone]

## 2025-03-26 NOTE — PROGRESS NOTE ADULT - REASON FOR ADMISSION
generalized fatigue/weakness

## 2025-03-26 NOTE — PROGRESS NOTE ADULT - PROBLEM SELECTOR PLAN 4
on presentation, BG ~400s,   UA: w no glucosuria and no ketonuria, BHB wnl, with HAGMA  s/p 2L LR  likely stress hyperglycemia iso sepsis    Plan:   -Lantus decreased to 32 units  cw ISS  consider endocrine consult  -hold home regimen  A1C - 9.2%  -trend fingerstick glu  - goal inpatient BG- 180  -cont home neurontin  - carb consistent diet Resolved     Unclear baseline cr, Fluctuated between 0.9-1.1 in late 20246  on Admission 1.9. Increasing now at 5.1--> now improved 1.32  Likely pre-renal with vs. ATN vs. post renal iso obstruction 2/2 septic stone. Now s/p ureteral stone placement    Plan:   Restart ACE  renally dose antibiotics  monitor BMP BID  fu urine studies  Monitor UO, BUN/Cr, volume status, acid-base balance, electrolytes  avoid nephrotoxic agents  dose adjustments for renally cleared meds

## 2025-03-26 NOTE — PROGRESS NOTE ADULT - PROVIDER SPECIALTY LIST ADULT
Infectious Disease
Internal Medicine
Nephrology
Urology
Urology
Infectious Disease
Internal Medicine
Internal Medicine
Nephrology
Infectious Disease
Infectious Disease
Internal Medicine
Nephrology
Nephrology
Infectious Disease
Nephrology
Internal Medicine
Nephrology
Internal Medicine

## 2025-03-26 NOTE — DISCHARGE NOTE NURSING/CASE MANAGEMENT/SOCIAL WORK - FINANCIAL ASSISTANCE
[facility] provides services at a reduced cost to those who are determined to be eligible through [facility]’s financial assistance program. Information regarding [facility]’s financial assistance program can be found by going to [email or web address] or by calling [phone].

## 2025-03-26 NOTE — PROGRESS NOTE ADULT - SUBJECTIVE AND OBJECTIVE BOX
DATE OF SERVICE: 03-26-25 @ 07:25    Patient is a 65y old  Male who presents with a chief complaint of generalized fatigue/weakness (25 Mar 2025 07:21)      SUBJECTIVE / OVERNIGHT EVENTS:  Overnight,  Pt seen and examined at bedside.    ROS negative except as above.    MEDICATIONS  (STANDING):  amLODIPine   Tablet 10 milliGRAM(s) Oral daily  aspirin  chewable 81 milliGRAM(s) Oral daily  atorvastatin 20 milliGRAM(s) Oral at bedtime  dextrose 5%. 1000 milliLiter(s) (100 mL/Hr) IV Continuous <Continuous>  dextrose 5%. 1000 milliLiter(s) (50 mL/Hr) IV Continuous <Continuous>  dextrose 50% Injectable 25 Gram(s) IV Push once  dextrose 50% Injectable 12.5 Gram(s) IV Push once  dextrose 50% Injectable 25 Gram(s) IV Push once  enalapril 10 milliGRAM(s) Oral daily  glucagon  Injectable 1 milliGRAM(s) IntraMuscular once  heparin   Injectable 5000 Unit(s) SubCutaneous every 8 hours  hydrALAZINE 25 milliGRAM(s) Oral three times a day  insulin glargine Injectable (LANTUS) 32 Unit(s) SubCutaneous at bedtime  insulin lispro (ADMELOG) corrective regimen sliding scale   SubCutaneous at bedtime  insulin lispro (ADMELOG) corrective regimen sliding scale   SubCutaneous three times a day before meals  insulin lispro Injectable (ADMELOG) 15 Unit(s) SubCutaneous three times a day before meals  latanoprost 0.005% Ophthalmic Solution 1 Drop(s) Both EYES at bedtime  melatonin 5 milliGRAM(s) Oral at bedtime  polyethylene glycol 3350 17 Gram(s) Oral <User Schedule>  senna 2 Tablet(s) Oral daily  tamsulosin 0.8 milliGRAM(s) Oral at bedtime    MEDICATIONS  (PRN):  acetaminophen     Tablet .. 650 milliGRAM(s) Oral every 6 hours PRN Temp greater or equal to 38C (100.4F), Mild Pain (1 - 3)  aluminum hydroxide/magnesium hydroxide/simethicone Suspension 30 milliLiter(s) Oral every 4 hours PRN Dyspepsia  dextrose Oral Gel 15 Gram(s) Oral once PRN Blood Glucose LESS THAN 70 milliGRAM(s)/deciliter  ondansetron Injectable 4 milliGRAM(s) IV Push every 8 hours PRN Nausea and/or Vomiting      Vital Signs Last 24 Hrs  T(C): 36.6 (26 Mar 2025 07:07), Max: 36.8 (25 Mar 2025 12:58)  T(F): 97.8 (26 Mar 2025 07:07), Max: 98.3 (25 Mar 2025 12:58)  HR: 91 (26 Mar 2025 07:07) (88 - 91)  BP: 148/74 (26 Mar 2025 07:07) (142/63 - 148/74)  BP(mean): --  RR: 20 (26 Mar 2025 07:07) (18 - 20)  SpO2: 97% (26 Mar 2025 07:07) (97% - 98%)    Parameters below as of 26 Mar 2025 07:07  Patient On (Oxygen Delivery Method): room air      CAPILLARY BLOOD GLUCOSE      POCT Blood Glucose.: 276 mg/dL (25 Mar 2025 22:24)  POCT Blood Glucose.: 275 mg/dL (25 Mar 2025 18:26)  POCT Blood Glucose.: 219 mg/dL (25 Mar 2025 13:44)  POCT Blood Glucose.: 213 mg/dL (25 Mar 2025 12:22)  POCT Blood Glucose.: 158 mg/dL (25 Mar 2025 08:40)    I&O's Summary    25 Mar 2025 07:01  -  26 Mar 2025 07:00  --------------------------------------------------------  IN: 240 mL / OUT: 2800 mL / NET: -2560 mL        PHYSICAL EXAM:  GENERAL: NAD, well-developed  HEAD:  Atraumatic, Normocephalic  EYES: EOMI, conjunctiva and sclera clear  NECK: Supple, No JVD  CHEST/LUNG: Clear to auscultation bilaterally; No wheeze  HEART: Regular rate and rhythm; No murmurs, rubs, or gallops  ABDOMEN: Soft, Nontender, Nondistended; Bowel sounds present  EXTREMITIES:  2+ Peripheral Pulses, No clubbing, cyanosis, or edema  NEUROLOGY: AOx3, non-focal  SKIN: No rashes or lesions    LABS:                        9.4    17.09 )-----------( 435      ( 25 Mar 2025 06:43 )             30.3     03-25    139  |  104  |  16  ----------------------------<  179[H]  4.3   |  24  |  1.13    Ca    9.0      25 Mar 2025 06:43  Phos  3.6     03-25  Mg     1.8     03-25    TPro  7.1  /  Alb  3.0[L]  /  TBili  0.2  /  DBili  x   /  AST  21  /  ALT  58[H]  /  AlkPhos  342[H]  03-25            MICRO:      RADIOLOGY & ADDITIONAL TESTS:           DATE OF SERVICE: 03-26-25 @ 07:25    Patient is a 65y old  Male who presents with a chief complaint of generalized fatigue/weakness (25 Mar 2025 07:21)      SUBJECTIVE / OVERNIGHT EVENTS:  Overnight, no acute events.   Pt seen and examined at bedside. Remains confused this AM. Sleeping intermittently. Eating well.     ROS negative except as above.    MEDICATIONS  (STANDING):  amLODIPine   Tablet 10 milliGRAM(s) Oral daily  aspirin  chewable 81 milliGRAM(s) Oral daily  atorvastatin 20 milliGRAM(s) Oral at bedtime  dextrose 5%. 1000 milliLiter(s) (100 mL/Hr) IV Continuous <Continuous>  dextrose 5%. 1000 milliLiter(s) (50 mL/Hr) IV Continuous <Continuous>  dextrose 50% Injectable 25 Gram(s) IV Push once  dextrose 50% Injectable 12.5 Gram(s) IV Push once  dextrose 50% Injectable 25 Gram(s) IV Push once  enalapril 10 milliGRAM(s) Oral daily  glucagon  Injectable 1 milliGRAM(s) IntraMuscular once  heparin   Injectable 5000 Unit(s) SubCutaneous every 8 hours  hydrALAZINE 25 milliGRAM(s) Oral three times a day  insulin glargine Injectable (LANTUS) 32 Unit(s) SubCutaneous at bedtime  insulin lispro (ADMELOG) corrective regimen sliding scale   SubCutaneous at bedtime  insulin lispro (ADMELOG) corrective regimen sliding scale   SubCutaneous three times a day before meals  insulin lispro Injectable (ADMELOG) 15 Unit(s) SubCutaneous three times a day before meals  latanoprost 0.005% Ophthalmic Solution 1 Drop(s) Both EYES at bedtime  melatonin 5 milliGRAM(s) Oral at bedtime  polyethylene glycol 3350 17 Gram(s) Oral <User Schedule>  senna 2 Tablet(s) Oral daily  tamsulosin 0.8 milliGRAM(s) Oral at bedtime    MEDICATIONS  (PRN):  acetaminophen     Tablet .. 650 milliGRAM(s) Oral every 6 hours PRN Temp greater or equal to 38C (100.4F), Mild Pain (1 - 3)  aluminum hydroxide/magnesium hydroxide/simethicone Suspension 30 milliLiter(s) Oral every 4 hours PRN Dyspepsia  dextrose Oral Gel 15 Gram(s) Oral once PRN Blood Glucose LESS THAN 70 milliGRAM(s)/deciliter  ondansetron Injectable 4 milliGRAM(s) IV Push every 8 hours PRN Nausea and/or Vomiting      Vital Signs Last 24 Hrs  T(C): 36.6 (26 Mar 2025 07:07), Max: 36.8 (25 Mar 2025 12:58)  T(F): 97.8 (26 Mar 2025 07:07), Max: 98.3 (25 Mar 2025 12:58)  HR: 91 (26 Mar 2025 07:07) (88 - 91)  BP: 148/74 (26 Mar 2025 07:07) (142/63 - 148/74)  BP(mean): --  RR: 20 (26 Mar 2025 07:07) (18 - 20)  SpO2: 97% (26 Mar 2025 07:07) (97% - 98%)    Parameters below as of 26 Mar 2025 07:07  Patient On (Oxygen Delivery Method): room air      CAPILLARY BLOOD GLUCOSE      POCT Blood Glucose.: 276 mg/dL (25 Mar 2025 22:24)  POCT Blood Glucose.: 275 mg/dL (25 Mar 2025 18:26)  POCT Blood Glucose.: 219 mg/dL (25 Mar 2025 13:44)  POCT Blood Glucose.: 213 mg/dL (25 Mar 2025 12:22)  POCT Blood Glucose.: 158 mg/dL (25 Mar 2025 08:40)    I&O's Summary    25 Mar 2025 07:01  -  26 Mar 2025 07:00  --------------------------------------------------------  IN: 240 mL / OUT: 2800 mL / NET: -2560 mL        PHYSICAL EXAM:  GENERAL: NAD, well-developed  HEAD:  Atraumatic, Normocephalic  EYES: EOMI, conjunctiva and sclera clear  NECK: Supple, No JVD  CHEST/LUNG: Clear to auscultation bilaterally; No wheeze  HEART: Regular rate and rhythm; No murmurs, rubs, or gallops  ABDOMEN: Soft, Nontender, Nondistended; Bowel sounds present  EXTREMITIES:  2+ Peripheral Pulses, No clubbing, cyanosis, or edema  NEUROLOGY: AOx0-1, non-focal  SKIN: No rashes or lesions    LABS:                        9.4    17.09 )-----------( 435      ( 25 Mar 2025 06:43 )             30.3     03-25    139  |  104  |  16  ----------------------------<  179[H]  4.3   |  24  |  1.13    Ca    9.0      25 Mar 2025 06:43  Phos  3.6     03-25  Mg     1.8     03-25    TPro  7.1  /  Alb  3.0[L]  /  TBili  0.2  /  DBili  x   /  AST  21  /  ALT  58[H]  /  AlkPhos  342[H]  03-25            MICRO:      RADIOLOGY & ADDITIONAL TESTS:

## 2025-03-26 NOTE — PROGRESS NOTE ADULT - SUBJECTIVE AND OBJECTIVE BOX
Follow Up:      Interval History/ROS:Patient is a 65y old Male who presents with a chief complaint of generalized fatigue/weakness.  Seen today, sitting up in chair, feeling well, no new complaints.       Allergies  No Known Allergies    ANTIMICROBIALS:      OTHER MEDS: MEDICATIONS  (STANDING):  acetaminophen     Tablet .. 650 every 6 hours PRN  aluminum hydroxide/magnesium hydroxide/simethicone Suspension 30 every 4 hours PRN  amLODIPine   Tablet 10 daily  aspirin  chewable 81 daily  atorvastatin 20 at bedtime  dextrose 50% Injectable 25 once  dextrose 50% Injectable 12.5 once  dextrose 50% Injectable 25 once  dextrose Oral Gel 15 once PRN  enalapril 10 daily  glucagon  Injectable 1 once  heparin   Injectable 5000 every 8 hours  hydrALAZINE 25 three times a day  insulin glargine Injectable (LANTUS) 32 at bedtime  insulin lispro (ADMELOG) corrective regimen sliding scale  at bedtime  insulin lispro (ADMELOG) corrective regimen sliding scale  three times a day before meals  insulin lispro Injectable (ADMELOG) 15 three times a day before meals  melatonin 5 at bedtime  ondansetron Injectable 4 every 8 hours PRN  polyethylene glycol 3350 17 <User Schedule>  senna 2 daily  tamsulosin 0.8 at bedtime      Vital Signs Last 24 Hrs  T(F): 97.9 (03-26-25 @ 11:58), Max: 99.4 (03-21-25 @ 02:01)    Vital Signs Last 24 Hrs  HR: 90 (03-26-25 @ 11:58) (88 - 91)  BP: 128/58 (03-26-25 @ 11:58) (128/58 - 148/74)  RR: 20 (03-26-25 @ 11:58)  SpO2: 98% (03-26-25 @ 11:58) (97% - 98%)  Wt(kg): --    EXAM:    GA: NAD  CV: nl S1/S2  Lungs: CTAB  Abd: soft, nontender  Ext: no edema  Skin: Intact  IV: no phlebitis    Labs:                        9.8    15.33 )-----------( 420      ( 26 Mar 2025 07:10 )             31.5     03-26    139  |  102  |  16  ----------------------------<  233[H]  4.6   |  24  |  1.09    Ca    9.1      26 Mar 2025 07:09  Phos  4.1     03-26  Mg     1.9     03-26    TPro  7.1  /  Alb  3.0[L]  /  TBili  0.2  /  DBili  x   /  AST  21  /  ALT  58[H]  /  AlkPhos  342[H]  03-25      WBC Trend:  WBC Count: 15.33 (03-26-25 @ 07:10)  WBC Count: 17.09 (03-25-25 @ 06:43)  WBC Count: 17.93 (03-24-25 @ 07:27)  WBC Count: 23.34 (03-23-25 @ 06:55)    Creatine Trend:  Creatinine: 1.09 (03-26)  Creatinine: 1.13 (03-25)  Creatinine: 1.23 (03-24)  Creatinine: 1.20 (03-23)    Liver Biochemical Testing Trend:  Alanine Aminotransferase (ALT/SGPT): 58 *H* (03-25)  Alanine Aminotransferase (ALT/SGPT): 72 *H* (03-24)  Alanine Aminotransferase (ALT/SGPT): 89 *H* (03-23)  Alanine Aminotransferase (ALT/SGPT): 100 *H* (03-22)  Alanine Aminotransferase (ALT/SGPT): 114 *H* (03-21)  Aspartate Aminotransferase (AST/SGOT): 21 (03-25-25 @ 06:43)  Aspartate Aminotransferase (AST/SGOT): 32 (03-24-25 @ 07:27)  Aspartate Aminotransferase (AST/SGOT): 47 (03-23-25 @ 06:55)  Aspartate Aminotransferase (AST/SGOT): 59 (03-22-25 @ 12:54)  Aspartate Aminotransferase (AST/SGOT): 68 (03-21-25 @ 10:51)  Bilirubin Total: 0.2 (03-25)  Bilirubin Total: 0.3 (03-24)  Bilirubin Total: 0.3 (03-23)  Bilirubin Total: 0.2 (03-22)  Bilirubin Total: 0.3 (03-21)    Trend LDH    Urinalysis Basic - ( 26 Mar 2025 07:09 )    Color: x / Appearance: x / SG: x / pH: x  Gluc: 233 mg/dL / Ketone: x  / Bili: x / Urobili: x   Blood: x / Protein: x / Nitrite: x   Leuk Esterase: x / RBC: x / WBC x   Sq Epi: x / Non Sq Epi: x / Bacteria: x    MICROBIOLOGY:  Vancomycin Level, Random: <4.0 (03-16 @ 09:25)    MRSA PCR Result.: NotDetec (03-15-25 @ 18:08)    Culture - Blood (collected 21 Mar 2025 14:50)  Source: Blood Blood  Preliminary Report:    No growth at 4 days    Culture - Blood (collected 21 Mar 2025 12:43)  Source: Blood Blood  Preliminary Report:    No growth at 4 days    Culture - Blood (collected 18 Mar 2025 05:48)  Source: Blood Blood-Peripheral  Final Report:    No growth at 5 days    Culture - Blood (collected 18 Mar 2025 05:39)  Source: Blood Blood-Peripheral  Final Report:    No growth at 5 days    Culture - Blood (collected 16 Mar 2025 17:19)  Source: Blood Blood  Final Report:    No growth at 5 days    Culture - Blood (collected 15 Mar 2025 11:14)  Source: Blood Blood  Final Report:    No growth at 5 days    Culture - Blood (collected 15 Mar 2025 07:30)  Source: Blood Blood  Final Report:    Growth in anaerobic bottle: Proteus mirabilis    Direct identification is available within approximately 3-5    hours either by Blood Panel Multiplexed PCR or Direct    MALDI-TOF. Details: [email or web address]  Organism: Blood Culture PCR  Proteus mirabilis  Organism: Proteus mirabilis    Sensitivities:      -  Levofloxacin: S <=0.5      -  Tobramycin: S <=2      -  Aztreonam: S <=4      -  Gentamicin: S <=2      -  Cefepime: S <=2      -  Cefazolin: S <=2      -  Piperacillin/Tazobactam: S <=8      -  Ciprofloxacin: S <=0.25      -  Ceftriaxone: S <=1      -  Ampicillin: S <=8 These ampicillin results predict results for amoxicillin      Method Type: ARMEN      -  Meropenem: S <=1      -  Ampicillin/Sulbactam: S <=4/2      -  Cefoxitin: S <=8      -  Trimethoprim/Sulfamethoxazole: S <=0.5/9.5      -  Ertapenem: S <=0.5  Organism: Blood Culture PCR    Sensitivities:      -  Proteus species: Detec      Method Type: PCR    Culture - Blood (collected 12 Mar 2025 19:45)  Source: Blood Blood-Peripheral  Final Report:    No growth at 5 days    Culture - Blood (collected 12 Mar 2025 19:35)  Source: Blood Blood-Peripheral  Final Report:    No growth at 5 days    Culture - Urine (collected 12 Mar 2025 16:22)  Source: Clean Catch Clean Catch (Midstream)  Final Report:    >100,000 CFU/ml Proteus mirabilis    <10,000 CFU/ml Normal Urogenital wil present  Organism: Proteus mirabilis  Organism: Proteus mirabilis    Sensitivities:      -  Levofloxacin: S <=0.5      -  Tobramycin: S <=2      -  Nitrofurantoin: R >64 Should not be used to treat pyelonephritis      -  Aztreonam: S <=4      -  Gentamicin: S <=2      -  Cefepime: S <=2      -  Cefazolin: S <=2 For uncomplicated UTI with K. pneumoniae, E. coli, or P. mirablis: ARMEN <=16 is sensitive and ARMEN >=32 is resistant. This also predicts results for oral agents cefaclor, cefdinir, cefpodoxime, cefprozil, cefuroxime axetil, cephalexin and locarbef for uncomplicated UTI. Note that some isolates may be susceptible to these agents while testing resistant to cefazolin.      -  Piperacillin/Tazobactam: S <=8      -  Ciprofloxacin: S <=0.25      -  Ceftriaxone: S <=1      -  Ampicillin: S <=8 These ampicillin results predict results for amoxicillin      Method Type: ARMEN      -  Meropenem: S <=1      -  Ampicillin/Sulbactam: S <=4/2      -  Cefoxitin: S <=8      -  Cefuroxime: S <=4      -  Amoxicillin/Clavulanic Acid: S <=8/4      -  Trimethoprim/Sulfamethoxazole: S <=0.5/9.5      -  Ertapenem: S <=0.5    Sedimentation Rate, Erythrocyte: 15 mm/hr (06-12-24 @ 01:10)    RADIOLOGY:  imaging below personally reviewed    CT Head No Cont:   ACC: [id number] EXAM:  CT BRAIN   ORDERED BY:  CECILIO PEREIRA     PROCEDURE DATE:  03/25/2025      INTERPRETATION:  .    CLINICAL INFORMATION: Altered mental status. Cerebrovascular accident,   urinary tract infection, bacteremia.    TECHNIQUE: Multiple axial CT images of the head were obtained without   contrast. Sagittal and coronal reconstructed images were acquired from   the source data.    COMPARISON: Prior contrast enhanced brain MRI study from 6/11/2024. Prior   CT study of the head from 6/10/2024.    FINDINGS: There is no acute intracranial hemorrhage, mass effect, shift   of the midline structures, herniation, extra-axial fluid collection, or   hydrocephalus.    A wedge-shaped area of encephalomalacia and gliosis is seen withinthe   right occipital lobe which is new in comparison to the prior CT study. A   small ovoid shaped area of encephalomalacia and gliosis is seen within   the right posterior corona radiata. This is also new in comparison to the   prior CT study.    There is mild diffuse cerebral volume loss with prominence of the sulci,   fissures, and cisternal spaces which is normal for the patient's age.   There is mild deep and periventricular white matter hypoattenuation   statistically compatible with microvascular changes given calcific   atherosclerotic disease of the intracranial arteries.    Scattered mucosal thickening is seen throughout the paranasal sinuses.   Aerated secretions are also noted within the right sphenoid sinus. The   left tympanomastoid cavity is clear. A trace right-sided mastoid effusion   is seen at the tip. The calvarium is intact. The imaged orbits are   unremarkable.    IMPRESSION: No acute intracranial hemorrhage, mass effect, or shift of   the midline structures.    Encephalomalacia and gliosis within the right posterior corona radiata   and right occipital lobe related to prior infarction.    Similar-appearing mild chronic white matter microvascular type changes.    --- End of Report ---    GEOVANNA BARNETT MD; Attending Radiologist  This document has been electronically signed. Mar 25 2025  9:01PM (03-25-25 @ 20:54)

## 2025-03-26 NOTE — PROGRESS NOTE ADULT - PROBLEM SELECTOR PLAN 12
cw atorvastatin 20mg    DIET: CC  DVT: heparin subq  Dispo: SANDHYA s/p L BKA    cw ASA  cw atorvastatin 20

## 2025-03-26 NOTE — DISCHARGE NOTE NURSING/CASE MANAGEMENT/SOCIAL WORK - PATIENT PORTAL LINK FT
You can access the FollowMyHealth Patient Portal offered by [facility] by registering at the following website: [email or web address]. By joining [facility]’s FollowMyHealth portal, you will also be able to view your health information using other applications (apps) compatible with our system.

## 2025-03-26 NOTE — PROGRESS NOTE ADULT - PROBLEM SELECTOR PLAN 2
S/p stent, will maintain crain until seen by urology outpatient     Plan:   repeated CT a/p  S/p abx   cw flomax  continue crain, plan for patient to d/c with crain Sepsis 2/2 UTI w/ proteus 2/2 calculi s/p ureteral stent + unasyn. Now with persistent leukocytosis w/ new findings on CT AP suspicious for pancreatitis but HIDA- with no cholecystitis.    Plan:   - ID following, appreciate recs  - Monitor off abx given downtrend w/o clinical findings   - CT AP w/ IV con - residual inflammation near ureter   - repeated Bcx, neg 72  - Monitor for fever, changes in white count

## 2025-03-26 NOTE — PROGRESS NOTE ADULT - PROBLEM SELECTOR PLAN 7
(IMPROVED) Likely iso decreased free water clearance. Can be in setting of SIADH 2/2 sepsis   SNa -125  Improvement today-137    Plan:   avoid hypotonic fluids   f/u urine lytes + serum osmolality  -f/u nephrology recommendations Ua with bacteria + pyuria with leukocyte esterase;  Urine culture 3/12 >100K proteus mirabilis.   s/p zosyn (3/12-3/15), s/p Meropenem (3/15-3/16)    Plan:  -follow up urine cultures    -cw CTX  -antipyretics, antiemetics, analgesics as needed

## 2025-03-26 NOTE — DISCHARGE NOTE NURSING/CASE MANAGEMENT/SOCIAL WORK - NSDCPEFALRISK_GEN_ALL_CORE
For information on Fall & Injury Prevention, visit: [email or web address] OR  [email or web address] OR  https://www.cdc.gov/steadi/patient.html

## 2025-03-26 NOTE — PROGRESS NOTE ADULT - PROBLEM SELECTOR PLAN 3
Resolved     Unclear baseline cr, Fluctuated between 0.9-1.1 in late 20246  on Admission 1.9. Increasing now at 5.1--> now improved 1.32  Likely pre-renal with vs. ATN vs. post renal iso obstruction 2/2 septic stone. Now s/p ureteral stone placement    Plan:   hold ACE-I- consider restarting with improving kidney function  renally dose antibiotics  monitor BMP BID  fu urine studies  Monitor UO, BUN/Cr, volume status, acid-base balance, electrolytes  avoid nephrotoxic agents  dose adjustments for renally cleared meds Resolved     S/p stent, will maintain crain until seen by urology outpatient     Plan:   repeated CT a/p  S/p abx   cw flomax  continue crain, plan for patient to d/c with crain

## 2025-03-27 ENCOUNTER — APPOINTMENT (OUTPATIENT)
Dept: INTERNAL MEDICINE | Facility: CLINIC | Age: 65
End: 2025-03-27

## 2025-03-31 ENCOUNTER — NON-APPOINTMENT (OUTPATIENT)
Age: 65
End: 2025-03-31

## 2025-04-03 ENCOUNTER — APPOINTMENT (OUTPATIENT)
Dept: UROLOGY | Facility: CLINIC | Age: 65
End: 2025-04-03
Payer: MEDICARE

## 2025-04-03 ENCOUNTER — NON-APPOINTMENT (OUTPATIENT)
Age: 65
End: 2025-04-03

## 2025-04-03 ENCOUNTER — OUTPATIENT (OUTPATIENT)
Dept: OUTPATIENT SERVICES | Facility: HOSPITAL | Age: 65
LOS: 1 days | End: 2025-04-03
Payer: MEDICARE

## 2025-04-03 VITALS
TEMPERATURE: 98 F | BODY MASS INDEX: 32.58 KG/M2 | SYSTOLIC BLOOD PRESSURE: 94 MMHG | WEIGHT: 220 LBS | DIASTOLIC BLOOD PRESSURE: 60 MMHG | HEART RATE: 92 BPM | HEIGHT: 69 IN | RESPIRATION RATE: 17 BRPM

## 2025-04-03 DIAGNOSIS — Z89.429 ACQUIRED ABSENCE OF OTHER TOE(S), UNSPECIFIED SIDE: Chronic | ICD-10-CM

## 2025-04-03 DIAGNOSIS — Z98.89 OTHER SPECIFIED POSTPROCEDURAL STATES: Chronic | ICD-10-CM

## 2025-04-03 DIAGNOSIS — N20.1 CALCULUS OF URETER: ICD-10-CM

## 2025-04-03 DIAGNOSIS — Z96.0 PRESENCE OF UROGENITAL IMPLANTS: ICD-10-CM

## 2025-04-03 PROCEDURE — 51700 IRRIGATION OF BLADDER: CPT

## 2025-04-03 PROCEDURE — 99213 OFFICE O/P EST LOW 20 MIN: CPT | Mod: 25

## 2025-04-04 DIAGNOSIS — Z96.0 PRESENCE OF UROGENITAL IMPLANTS: ICD-10-CM

## 2025-04-04 DIAGNOSIS — N20.1 CALCULUS OF URETER: ICD-10-CM

## 2025-04-08 ENCOUNTER — APPOINTMENT (OUTPATIENT)
Dept: INTERNAL MEDICINE | Facility: CLINIC | Age: 65
End: 2025-04-08

## 2025-04-21 ENCOUNTER — NON-APPOINTMENT (OUTPATIENT)
Age: 65
End: 2025-04-21

## 2025-04-21 ENCOUNTER — APPOINTMENT (OUTPATIENT)
Dept: UROLOGY | Facility: CLINIC | Age: 65
End: 2025-04-21

## 2025-04-21 VITALS
BODY MASS INDEX: 32.58 KG/M2 | HEART RATE: 94 BPM | SYSTOLIC BLOOD PRESSURE: 95 MMHG | WEIGHT: 220 LBS | RESPIRATION RATE: 17 BRPM | HEIGHT: 69 IN | TEMPERATURE: 98.1 F | DIASTOLIC BLOOD PRESSURE: 59 MMHG

## 2025-04-21 DIAGNOSIS — I73.9 PERIPHERAL VASCULAR DISEASE, UNSPECIFIED: ICD-10-CM

## 2025-04-21 DIAGNOSIS — I63.9 CEREBRAL INFARCTION, UNSPECIFIED: ICD-10-CM

## 2025-04-21 DIAGNOSIS — N20.1 CALCULUS OF URETER: ICD-10-CM

## 2025-04-21 PROCEDURE — 99215 OFFICE O/P EST HI 40 MIN: CPT

## 2025-05-14 ENCOUNTER — APPOINTMENT (OUTPATIENT)
Dept: ELECTROPHYSIOLOGY | Facility: CLINIC | Age: 65
End: 2025-05-14

## 2025-05-14 VITALS — SYSTOLIC BLOOD PRESSURE: 117 MMHG | DIASTOLIC BLOOD PRESSURE: 75 MMHG | HEART RATE: 92 BPM | OXYGEN SATURATION: 97 %

## 2025-05-15 ENCOUNTER — OUTPATIENT (OUTPATIENT)
Dept: OUTPATIENT SERVICES | Facility: HOSPITAL | Age: 65
LOS: 1 days | End: 2025-05-15
Payer: MEDICARE

## 2025-05-15 VITALS
OXYGEN SATURATION: 97 % | RESPIRATION RATE: 18 BRPM | WEIGHT: 227.96 LBS | SYSTOLIC BLOOD PRESSURE: 144 MMHG | TEMPERATURE: 98 F | DIASTOLIC BLOOD PRESSURE: 80 MMHG | HEIGHT: 69 IN | HEART RATE: 96 BPM

## 2025-05-15 DIAGNOSIS — Z01.818 ENCOUNTER FOR OTHER PREPROCEDURAL EXAMINATION: ICD-10-CM

## 2025-05-15 DIAGNOSIS — Z95.0 PRESENCE OF CARDIAC PACEMAKER: ICD-10-CM

## 2025-05-15 DIAGNOSIS — Z89.429 ACQUIRED ABSENCE OF OTHER TOE(S), UNSPECIFIED SIDE: Chronic | ICD-10-CM

## 2025-05-15 DIAGNOSIS — E11.9 TYPE 2 DIABETES MELLITUS WITHOUT COMPLICATIONS: ICD-10-CM

## 2025-05-15 DIAGNOSIS — Z95.0 PRESENCE OF CARDIAC PACEMAKER: Chronic | ICD-10-CM

## 2025-05-15 DIAGNOSIS — Z86.79 PERSONAL HISTORY OF OTHER DISEASES OF THE CIRCULATORY SYSTEM: Chronic | ICD-10-CM

## 2025-05-15 DIAGNOSIS — Z89.512 ACQUIRED ABSENCE OF LEFT LEG BELOW KNEE: Chronic | ICD-10-CM

## 2025-05-15 DIAGNOSIS — N20.1 CALCULUS OF URETER: ICD-10-CM

## 2025-05-15 DIAGNOSIS — Z98.89 OTHER SPECIFIED POSTPROCEDURAL STATES: Chronic | ICD-10-CM

## 2025-05-15 DIAGNOSIS — I10 ESSENTIAL (PRIMARY) HYPERTENSION: ICD-10-CM

## 2025-05-15 DIAGNOSIS — N20.0 CALCULUS OF KIDNEY: ICD-10-CM

## 2025-05-15 LAB
ANION GAP SERPL CALC-SCNC: 17 MMOL/L — SIGNIFICANT CHANGE UP (ref 5–17)
BUN SERPL-MCNC: 17 MG/DL — SIGNIFICANT CHANGE UP (ref 7–23)
CALCIUM SERPL-MCNC: 9.7 MG/DL — SIGNIFICANT CHANGE UP (ref 8.4–10.5)
CHLORIDE SERPL-SCNC: 97 MMOL/L — SIGNIFICANT CHANGE UP (ref 96–108)
CO2 SERPL-SCNC: 23 MMOL/L — SIGNIFICANT CHANGE UP (ref 22–31)
CREAT SERPL-MCNC: 0.88 MG/DL — SIGNIFICANT CHANGE UP (ref 0.5–1.3)
EGFR: 95 ML/MIN/1.73M2 — SIGNIFICANT CHANGE UP
EGFR: 95 ML/MIN/1.73M2 — SIGNIFICANT CHANGE UP
GLUCOSE SERPL-MCNC: 174 MG/DL — HIGH (ref 70–99)
HCT VFR BLD CALC: 35 % — LOW (ref 39–50)
HGB BLD-MCNC: 10.8 G/DL — LOW (ref 13–17)
MCHC RBC-ENTMCNC: 26 PG — LOW (ref 27–34)
MCHC RBC-ENTMCNC: 30.9 G/DL — LOW (ref 32–36)
MCV RBC AUTO: 84.3 FL — SIGNIFICANT CHANGE UP (ref 80–100)
NRBC BLD AUTO-RTO: 0 /100 WBCS — SIGNIFICANT CHANGE UP (ref 0–0)
PLATELET # BLD AUTO: 401 K/UL — HIGH (ref 150–400)
POTASSIUM SERPL-MCNC: 4.7 MMOL/L — SIGNIFICANT CHANGE UP (ref 3.5–5.3)
POTASSIUM SERPL-SCNC: 4.7 MMOL/L — SIGNIFICANT CHANGE UP (ref 3.5–5.3)
RBC # BLD: 4.15 M/UL — LOW (ref 4.2–5.8)
RBC # FLD: 14.4 % — SIGNIFICANT CHANGE UP (ref 10.3–14.5)
SODIUM SERPL-SCNC: 137 MMOL/L — SIGNIFICANT CHANGE UP (ref 135–145)
WBC # BLD: 14.68 K/UL — HIGH (ref 3.8–10.5)
WBC # FLD AUTO: 14.68 K/UL — HIGH (ref 3.8–10.5)

## 2025-05-15 NOTE — H&P PST ADULT - PROBLEM SELECTOR PLAN 1
Preop instructions given  Labs CBC BMP A1c and UCx performed in PST Preop instructions given  Labs CBC BMP A1c performed in PST  Pt unable to provide urine specimen during PST visit. Pt to bring urine specimen on 5/16/2/25 Preop instructions given  Labs CBC BMP A1c performed in PST  Pt unable to provide urine specimen during PST visit. Pt to bring urine specimen on 5/16/2/25  Pt advised to obtain  ME prior to surgery

## 2025-05-15 NOTE — H&P PST ADULT - HISTORY OF PRESENT ILLNESS
This is a 65 year old male with past medical history of HTN, HLD, CHB s/p BSC Pacemaker implanted 6/2024,  DMT2 c/b peripheral neuropathy + retinopathy, pad s/p l bka, and cva maintained on Aspirin. Was admitted 3/2025 with urosepsis.  CT A/P with 0.6 cm calculus within the proximal left ureter with mild left hydronephrosis. He underwent a cystoscopy and left  ureter stent placement 3/17. Today presenting to [facility] for scheduled Left ureteroscopy, laser lithotripsy, left stent calyxo cvac ureteroscopy on 6/5/25 with Dr. Nascimento

## 2025-05-15 NOTE — H&P PST ADULT - PROBLEM SELECTOR PLAN 2
Last dose of Tradjenta and Metformin on 6/4/25 (PM dose)  Decrease Lantus 22 units night before surgery  FS DOS

## 2025-05-15 NOTE — H&P PST ADULT - NSICDXPASTSURGICALHX_GEN_ALL_CORE_FT
PAST SURGICAL HISTORY:  History of complete heart block     History of pacemaker     Left below-knee amputee     S/P appendectomy     Toe amputation status left pinky toe

## 2025-05-15 NOTE — H&P PST ADULT - ASSESSMENT
DASI Score: 4.61  DASI Activity: able to go up one flight of stairs or walk 1-2 blocks with difficulty, uses walker  Loose or removable teeth: denies

## 2025-05-16 LAB
A1C WITH ESTIMATED AVERAGE GLUCOSE RESULT: 7.8 % — HIGH (ref 4–5.6)
ESTIMATED AVERAGE GLUCOSE: 177 MG/DL — HIGH (ref 68–114)

## 2025-05-16 PROCEDURE — 87086 URINE CULTURE/COLONY COUNT: CPT

## 2025-05-16 PROCEDURE — G0463: CPT

## 2025-05-16 PROCEDURE — 83036 HEMOGLOBIN GLYCOSYLATED A1C: CPT

## 2025-05-16 PROCEDURE — 87077 CULTURE AEROBIC IDENTIFY: CPT

## 2025-05-16 PROCEDURE — 80048 BASIC METABOLIC PNL TOTAL CA: CPT

## 2025-05-16 PROCEDURE — 85027 COMPLETE CBC AUTOMATED: CPT

## 2025-05-16 PROCEDURE — 87186 SC STD MICRODIL/AGAR DIL: CPT

## 2025-05-19 LAB
-  AMOXICILLIN/CLAVULANIC ACID: SIGNIFICANT CHANGE UP
-  AMPICILLIN/SULBACTAM: SIGNIFICANT CHANGE UP
-  AMPICILLIN: SIGNIFICANT CHANGE UP
-  AZTREONAM: SIGNIFICANT CHANGE UP
-  CEFAZOLIN: SIGNIFICANT CHANGE UP
-  CEFEPIME: SIGNIFICANT CHANGE UP
-  CEFOXITIN: SIGNIFICANT CHANGE UP
-  CEFTRIAXONE: SIGNIFICANT CHANGE UP
-  CIPROFLOXACIN: SIGNIFICANT CHANGE UP
-  ERTAPENEM: SIGNIFICANT CHANGE UP
-  GENTAMICIN: SIGNIFICANT CHANGE UP
-  IMIPENEM: SIGNIFICANT CHANGE UP
-  LEVOFLOXACIN: SIGNIFICANT CHANGE UP
-  MEROPENEM: SIGNIFICANT CHANGE UP
-  NITROFURANTOIN: SIGNIFICANT CHANGE UP
-  PIPERACILLIN/TAZOBACTAM: SIGNIFICANT CHANGE UP
-  TIGECYCLINE: SIGNIFICANT CHANGE UP
-  TOBRAMYCIN: SIGNIFICANT CHANGE UP
-  TRIMETHOPRIM/SULFAMETHOXAZOLE: SIGNIFICANT CHANGE UP
CULTURE RESULTS: ABNORMAL
METHOD TYPE: SIGNIFICANT CHANGE UP
ORGANISM # SPEC MICROSCOPIC CNT: ABNORMAL
ORGANISM # SPEC MICROSCOPIC CNT: ABNORMAL
SPECIMEN SOURCE: SIGNIFICANT CHANGE UP

## 2025-05-22 RX ORDER — CIPROFLOXACIN HYDROCHLORIDE 500 MG/1
500 TABLET, FILM COATED ORAL
Qty: 10 | Refills: 0 | Status: ACTIVE | COMMUNITY
Start: 2025-05-22 | End: 1900-01-01

## 2025-05-27 ENCOUNTER — NON-APPOINTMENT (OUTPATIENT)
Age: 65
End: 2025-05-27

## 2025-06-02 ENCOUNTER — NON-APPOINTMENT (OUTPATIENT)
Age: 65
End: 2025-06-02

## 2025-06-02 PROCEDURE — 93000 ELECTROCARDIOGRAM COMPLETE: CPT | Mod: XU

## 2025-06-02 PROCEDURE — 93280 PM DEVICE PROGR EVAL DUAL: CPT

## 2025-06-05 ENCOUNTER — TRANSCRIPTION ENCOUNTER (OUTPATIENT)
Age: 65
End: 2025-06-05

## 2025-06-05 ENCOUNTER — OUTPATIENT (OUTPATIENT)
Dept: OUTPATIENT SERVICES | Facility: HOSPITAL | Age: 65
LOS: 1 days | End: 2025-06-05
Payer: MEDICARE

## 2025-06-05 ENCOUNTER — APPOINTMENT (OUTPATIENT)
Dept: UROLOGY | Facility: HOSPITAL | Age: 65
End: 2025-06-05

## 2025-06-05 ENCOUNTER — RESULT REVIEW (OUTPATIENT)
Age: 65
End: 2025-06-05

## 2025-06-05 VITALS
DIASTOLIC BLOOD PRESSURE: 62 MMHG | OXYGEN SATURATION: 98 % | HEART RATE: 93 BPM | SYSTOLIC BLOOD PRESSURE: 132 MMHG | RESPIRATION RATE: 15 BRPM

## 2025-06-05 VITALS
OXYGEN SATURATION: 95 % | DIASTOLIC BLOOD PRESSURE: 70 MMHG | SYSTOLIC BLOOD PRESSURE: 117 MMHG | RESPIRATION RATE: 16 BRPM | WEIGHT: 227.96 LBS | HEART RATE: 93 BPM | TEMPERATURE: 98 F | HEIGHT: 69 IN

## 2025-06-05 DIAGNOSIS — Z86.79 PERSONAL HISTORY OF OTHER DISEASES OF THE CIRCULATORY SYSTEM: Chronic | ICD-10-CM

## 2025-06-05 DIAGNOSIS — Z89.512 ACQUIRED ABSENCE OF LEFT LEG BELOW KNEE: Chronic | ICD-10-CM

## 2025-06-05 DIAGNOSIS — Z98.89 OTHER SPECIFIED POSTPROCEDURAL STATES: Chronic | ICD-10-CM

## 2025-06-05 DIAGNOSIS — Z89.429 ACQUIRED ABSENCE OF OTHER TOE(S), UNSPECIFIED SIDE: Chronic | ICD-10-CM

## 2025-06-05 DIAGNOSIS — Z95.0 PRESENCE OF CARDIAC PACEMAKER: Chronic | ICD-10-CM

## 2025-06-05 DIAGNOSIS — N20.1 CALCULUS OF URETER: ICD-10-CM

## 2025-06-05 LAB
GLUCOSE BLDC GLUCOMTR-MCNC: 131 MG/DL — HIGH (ref 70–99)
GLUCOSE BLDC GLUCOMTR-MCNC: 177 MG/DL — HIGH (ref 70–99)

## 2025-06-05 PROCEDURE — 53899C: CUSTOM | Mod: LT,59

## 2025-06-05 PROCEDURE — C2625: CPT

## 2025-06-05 PROCEDURE — 82365 CALCULUS SPECTROSCOPY: CPT

## 2025-06-05 PROCEDURE — C9399: CPT

## 2025-06-05 PROCEDURE — 52332 CYSTOSCOPY AND TREATMENT: CPT | Mod: XU,LT

## 2025-06-05 PROCEDURE — C9761: CPT | Mod: LT

## 2025-06-05 PROCEDURE — 82962 GLUCOSE BLOOD TEST: CPT

## 2025-06-05 PROCEDURE — C1889: CPT

## 2025-06-05 PROCEDURE — 52356 CYSTO/URETERO W/LITHOTRIPSY: CPT | Mod: LT

## 2025-06-05 PROCEDURE — 76000 FLUOROSCOPY <1 HR PHYS/QHP: CPT

## 2025-06-05 PROCEDURE — 74420 UROGRAPHY RTRGR +-KUB: CPT | Mod: 26

## 2025-06-05 PROCEDURE — C1758: CPT

## 2025-06-05 PROCEDURE — 88300 SURGICAL PATH GROSS: CPT

## 2025-06-05 PROCEDURE — 88300 SURGICAL PATH GROSS: CPT | Mod: 26

## 2025-06-05 PROCEDURE — C1769: CPT

## 2025-06-05 PROCEDURE — C1747: CPT

## 2025-06-05 DEVICE — URETERAL SHEATH NAVIGATOR HD 13/15FR X 36CM: Type: IMPLANTABLE DEVICE | Site: LEFT | Status: FUNCTIONAL

## 2025-06-05 DEVICE — URETEROSCOPE CVAC 2 STEERABLE IRR ASP DISP: Type: IMPLANTABLE DEVICE | Site: LEFT | Status: FUNCTIONAL

## 2025-06-05 DEVICE — LASER FIBER FLEXIVA 242 ID: Type: IMPLANTABLE DEVICE | Site: LEFT | Status: FUNCTIONAL

## 2025-06-05 DEVICE — URETERAL STENT INLAY OPTIMA 8FR 26CM: Type: IMPLANTABLE DEVICE | Site: LEFT | Status: FUNCTIONAL

## 2025-06-05 DEVICE — GUIDEWIRE SENSOR DUAL-FLEX NITINOL STRAIGHT .035" X 150CM: Type: IMPLANTABLE DEVICE | Site: LEFT | Status: FUNCTIONAL

## 2025-06-05 DEVICE — URETERAL CATH SOF-FLEX OPEN END 6FR .040" X 70CM: Type: IMPLANTABLE DEVICE | Site: LEFT | Status: FUNCTIONAL

## 2025-06-05 RX ORDER — LIDOCAINE HCL/PF 10 MG/ML
0.2 VIAL (ML) INJECTION ONCE
Refills: 0 | Status: DISCONTINUED | OUTPATIENT
Start: 2025-06-05 | End: 2025-06-05

## 2025-06-05 RX ORDER — ONDANSETRON HCL/PF 4 MG/2 ML
4 VIAL (ML) INJECTION ONCE
Refills: 0 | Status: DISCONTINUED | OUTPATIENT
Start: 2025-06-05 | End: 2025-06-05

## 2025-06-05 RX ORDER — SODIUM CHLORIDE 9 G/1000ML
1000 INJECTION, SOLUTION INTRAVENOUS
Refills: 0 | Status: DISCONTINUED | OUTPATIENT
Start: 2025-06-05 | End: 2025-06-05

## 2025-06-05 RX ORDER — CEFAZOLIN SODIUM IN 0.9 % NACL 3 G/100 ML
2000 INTRAVENOUS SOLUTION, PIGGYBACK (ML) INTRAVENOUS ONCE
Refills: 0 | Status: COMPLETED | OUTPATIENT
Start: 2025-06-05 | End: 2025-06-05

## 2025-06-05 RX ORDER — HYDROMORPHONE/SOD CHLOR,ISO/PF 2 MG/10 ML
0.5 SYRINGE (ML) INJECTION
Refills: 0 | Status: DISCONTINUED | OUTPATIENT
Start: 2025-06-05 | End: 2025-06-05

## 2025-06-05 RX ORDER — ATORVASTATIN CALCIUM 80 MG/1
1 TABLET, FILM COATED ORAL
Refills: 0 | DISCHARGE

## 2025-06-05 RX ORDER — FENTANYL CITRATE-0.9 % NACL/PF 100MCG/2ML
50 SYRINGE (ML) INTRAVENOUS
Refills: 0 | Status: DISCONTINUED | OUTPATIENT
Start: 2025-06-05 | End: 2025-06-05

## 2025-06-05 NOTE — ASU DISCHARGE PLAN (ADULT/PEDIATRIC) - RETURN TO WORK/SCHOOL
Was called to the room for a episode of choking. Per mom, patient had eaten 4oz formula 1 hour prior to episode and afterwards mom had sat him up for 30 minutes and burped him. Patient was lying in mom's arms when he started making choking noises and stopped breathing for 10-15 seconds with no apparent cyanosis. Mom turned him around and pat his back numerous times, after which the patient appeared very tired. Dr. Thrasher and Dr. Grayson Avina arrived at the room and the patient was back at baseline activity. Physical exam was unremarkable. There were no noted desaturations on the pulse oximetry during the episode. Because the patient had a second episode in the past 48 hours despite normal EKG and pulmonary examination, Neurology was consulted to r/o neurogenic causes of the BRUEs. Neuro to see today. Initial recommendations include obtaining a head US and VEEG. Will continue to monitor the patient on pulse ox.    Jazz Thrasher MD  PGY-1 Was called to the room for a episode of choking. Per mom, patient had eaten 4oz formula 1 hour prior to episode and afterwards mom had sat him up for 30 minutes and burped him. Patient was lying in mom's arms when he started making choking noises and stopped breathing for 10-15 seconds with no apparent cyanosis. Mom turned him around and pat his back numerous times, after which the patient appeared very tired. Dr. Thrasher and Dr. Grayson Avina arrived at the room and the patient was back at baseline activity. Physical exam was unremarkable. There were no noted desaturations on the pulse oximetry during the episode.  Re-education with mom regarding feeding cues and amounts. Episodes likely related to reflux vs overfeeding. However, this is the patient's second episode in the past 48 hours despite normal EKG and pulmonary examination. Neurology consulted to r/o neurogenic causes of the BRUEs. Neuro to see today. Initial recommendations include obtaining a head US and VEEG. Will continue to monitor the patient on pulse ox.    Jazz Thrasher MD  PGY-1      Agree with above    Ron Dominguez MD  Pediatric Chief Resident  [phone] Yes

## 2025-06-05 NOTE — ASU DISCHARGE PLAN (ADULT/PEDIATRIC) - CARE PROVIDER_API CALL
Leisa Nascimento  Urology  [address] [place] [address]  Phone: [phone]  Fax: [phone]  Follow Up Time:

## 2025-06-05 NOTE — ASU PATIENT PROFILE, ADULT - FALL HARM RISK - HARM RISK INTERVENTIONS

## 2025-06-05 NOTE — ASU PREOP CHECKLIST - BOWEL PREP
Patient requesting refill on  oxyCODONE-acetaminophen (PERCOCET)  mg   Last office visit 10/24/23   shows last refill on 10/10/23  Patient does have a pain contract on file with [facility] department  Patient last UDS 08/14/23 was consistent with current therapy     CODEINE  Not Detected   Not Detected  Not Detected   Not Detected     MORPHINE  Not Detected   Not Detected  Not Detected   Not Detected     6-ACETYLMORPHINE  Not Detected   Not Detected  Not Detected   Not Detected     OXYCODONE  Present   Present  Present   Not Detected     NOROYXCODONE  Present   Present  Present   Present     OXYMORPHONE  Present   Present  Present   Not Detected     NOROXYMORPHONE  Not Detected   Not Detected  Not Detected   Not Detected     HYDROCODONE  Not Detected   Not Detected  Not Detected   Not Detected     NORHYDROCODONE  Not Detected   Not Detected  Not Detected   Not Detected     HYDROMORPHONE  Not Detected   Not Detected  Not Detected   Not Detected     BUPRENORPHINE  Not Detected   Not Detected  Not Detected   Not Detected     NORUBPRENORPHINE  Not Detected   Not Detected  Not Detected   Not Detected     FENTANYL  Not Detected   Not Detected  Not Detected   Not Detected     NORFENTANYL  Not Detected   Not Detected  Not Detected   Not Detected     MEPERIDINE METABOLITE  Not Detected   Not Detected  Not Detected   Not Detected     TAPENTADOL  Not Detected   Not Detected  Not Detected   Not Detected     TAPENTADOL-O-SULF  Not Detected   Not Detected  Not Detected   Not Detected     METHADONE  Not Detected   Not Detected  Not Detected   Not Detected     TRAMADOL  Not Detected   Not Detected  Not Detected   Not Detected     AMPHETAMINE  Not Detected   Not Detected  Not Detected   Not Detected     METHAMPHETAMINE  Not Detected   Not Detected  Not Detected   Not Detected     MDMA- ECSTASY  Not Detected   Not Detected  Not Detected   Not Detected     MDA  Not Detected   Not Detected  Not Detected    Not Detected     MDEA- Kait  Not Detected   Not Detected  Not Detected   Not Detected     METHYLPHENIDATE  Not Detected   Not Detected  Not Detected   Not Detected     PHENTERMINE  Not Detected   Not Detected  Not Detected   Not Detected     BENZOYLECGONINE  Not Detected   Not Detected  Not Detected   Not Detected     ALPRAZOLAM  Not Detected   Not Detected  Not Detected   Not Detected     ALPHA-OH-ALPRAZOLAM  Not Detected   Not Detected  Not Detected   Not Detected     CLONAZEPAM  Not Detected   Not Detected  Not Detected   Not Detected     7-AMINOCLONAZEPAM  Not Detected             n/a

## 2025-06-05 NOTE — ASU DISCHARGE PLAN (ADULT/PEDIATRIC) - FINANCIAL ASSISTANCE
[facility] provides services at a reduced cost to those who are determined to be eligible through [facility]’s financial assistance program. Information regarding [facility]’s financial assistance program can be found by going to [email or web address] or by calling [phone].

## 2025-06-05 NOTE — ASU DISCHARGE PLAN (ADULT/PEDIATRIC) - ASU DC SPECIAL INSTRUCTIONSFT
STENT: You have an internal stent (a hollow tube that runs from the kidney to your bladder) after your procedure, which helps urine drain from the kidney to your bladder. Some patients experience urinary frequency, burning, or even back pain (especially with urination). These sensations will gradually get better. Increasing your fluid intake can also improve these symptoms. While the stent is in place, your urine may be blood tinged. This stent is temporary and must be removed by your urologist as an outpatient with in 3 months unless otherwise specified. Your stent is on a string taped to your penis. DO NOT REMOVE this.   GENERAL: It is common to have blood in your urine after your procedure. It may be pink or even red; inform your doctor if you have a significant amount of clot in the urine or if you are unable to void at all. The urine may clear and then become bloody again especially as you are more physically active.  BATHING: You may shower or bathe.  DIET: You may resume your regular diet and regular medication regimen.  PAIN: You may take Tylenol (acetaminophen) 650-975mg and/or Motrin (ibuprofen) 400-600mg, both available over the counter, for pain every 6 hours as needed. Do not exceed 4000mg of Tylenol (acetaminophen) daily. You may alternate these medications such that you take one or the other every 3 hours for around the clock pain coverage. If you have a stent, the following medications may have been sent to your pharmacy for stent related discomfort: Flomax (tamsulosin) 0.4mg at bedtime until stent removed.   ANTIBIOTICS: You may be given a prescription for an antibiotic, please take this medication as instructed and be sure to complete the entire course. Continue the antibiotics you were taking at home to completion.   STOOL SOFTENERS: Do not allow yourself to become constipated as straining may cause bleeding. Take stool softeners or a laxative (ex. Miralax, Colace, Senokot, ExLax, etc), available over the counter, if needed.  ACTIVITY: No heavy lifting or strenuous exercise until you are evaluated at your post-operative appointment. Otherwise, you may return to your usual level of physical activity.    FOLLOW-UP: If you did not already schedule your post-operative appointment, please call your urologist to schedule and follow-up appointment.  Call office for cystoscopy/stent removal in 1 week.  CALL YOUR UROLOGIST IF: You have any bleeding that does not stop, inability to void >8 hours, fever over 100.4 F, chills, persistent nausea/vomiting, changes in your incision concerning for infection, or if your pain is not controlled on your discharge pain medications. STENT: You have an internal stent (a hollow tube that runs from the kidney to your bladder) after your procedure, which helps urine drain from the kidney to your bladder. Some patients experience urinary frequency, burning, or even back pain (especially with urination). These sensations will gradually get better. Increasing your fluid intake can also improve these symptoms. While the stent is in place, your urine may be blood tinged. This stent is temporary and must be removed by your urologist as an outpatient with in 3 months unless otherwise specified. Your stent is on a string taped to your penis. DO NOT REMOVE this.   GENERAL: It is common to have blood in your urine after your procedure. It may be pink or even red; inform your doctor if you have a significant amount of clot in the urine or if you are unable to void at all. The urine may clear and then become bloody again especially as you are more physically active.  BATHING: You may shower or bathe.  DIET: You may resume your regular diet and regular medication regimen.  PAIN: You may take Tylenol (acetaminophen) 650-975mg and/or Motrin (ibuprofen) 400-600mg, both available over the counter, for pain every 6 hours as needed. Do not exceed 4000mg of Tylenol (acetaminophen) daily. You may alternate these medications such that you take one or the other every 3 hours for around the clock pain coverage. YOUR LAST DOSE OF TYLENOL 1000MG WAS AT 930AM  If you have a stent, the following medications may have been sent to your pharmacy for stent related discomfort: Flomax (tamsulosin) 0.4mg at bedtime until stent removed.   ANTIBIOTICS: You may be given a prescription for an antibiotic, please take this medication as instructed and be sure to complete the entire course. Continue the antibiotics you were taking at home to completion.   STOOL SOFTENERS: Do not allow yourself to become constipated as straining may cause bleeding. Take stool softeners or a laxative (ex. Miralax, Colace, Senokot, ExLax, etc), available over the counter, if needed.  ACTIVITY: No heavy lifting or strenuous exercise until you are evaluated at your post-operative appointment. Otherwise, you may return to your usual level of physical activity.    FOLLOW-UP: If you did not already schedule your post-operative appointment, please call your urologist to schedule and follow-up appointment.  Call office for cystoscopy/stent removal in 1 week.  CALL YOUR UROLOGIST IF: You have any bleeding that does not stop, inability to void >8 hours, fever over 100.4 F, chills, persistent nausea/vomiting, changes in your incision concerning for infection, or if your pain is not controlled on your discharge pain medications.

## 2025-06-09 ENCOUNTER — APPOINTMENT (OUTPATIENT)
Dept: UROLOGY | Facility: CLINIC | Age: 65
End: 2025-06-09
Payer: MEDICARE

## 2025-06-09 PROCEDURE — G2211 COMPLEX E/M VISIT ADD ON: CPT

## 2025-06-09 PROCEDURE — 99213 OFFICE O/P EST LOW 20 MIN: CPT

## 2025-06-09 RX ORDER — TAMSULOSIN HYDROCHLORIDE 0.4 MG/1
0.4 CAPSULE ORAL
Qty: 180 | Refills: 3 | Status: ACTIVE | COMMUNITY
Start: 2025-06-09 | End: 1900-01-01

## 2025-06-10 ENCOUNTER — APPOINTMENT (OUTPATIENT)
Dept: UROLOGY | Facility: CLINIC | Age: 65
End: 2025-06-10

## 2025-06-11 LAB — SURGICAL PATHOLOGY STUDY: SIGNIFICANT CHANGE UP

## 2025-06-18 LAB
CELL MATERIAL STONE EST-MCNT: SIGNIFICANT CHANGE UP
LABORATORY COMMENT REPORT: SIGNIFICANT CHANGE UP
NIDUS STONE QN: SIGNIFICANT CHANGE UP

## 2025-06-20 ENCOUNTER — RX RENEWAL (OUTPATIENT)
Age: 65
End: 2025-06-20

## 2025-07-21 ENCOUNTER — RX RENEWAL (OUTPATIENT)
Age: 65
End: 2025-07-21

## 2025-08-01 ENCOUNTER — APPOINTMENT (OUTPATIENT)
Dept: INTERNAL MEDICINE | Facility: CLINIC | Age: 65
End: 2025-08-01
Payer: MEDICARE

## 2025-08-01 VITALS
WEIGHT: 222 LBS | BODY MASS INDEX: 32.88 KG/M2 | HEART RATE: 97 BPM | SYSTOLIC BLOOD PRESSURE: 126 MMHG | OXYGEN SATURATION: 96 % | DIASTOLIC BLOOD PRESSURE: 79 MMHG | HEIGHT: 69 IN

## 2025-08-01 DIAGNOSIS — E66.811 OBESITY, CLASS 1: ICD-10-CM

## 2025-08-01 DIAGNOSIS — Z13.6 ENCOUNTER FOR SCREENING FOR CARDIOVASCULAR DISORDERS: ICD-10-CM

## 2025-08-01 DIAGNOSIS — G54.6 PHANTOM LIMB SYNDROME WITH PAIN: ICD-10-CM

## 2025-08-01 DIAGNOSIS — Z87.891 PERSONAL HISTORY OF NICOTINE DEPENDENCE: ICD-10-CM

## 2025-08-01 DIAGNOSIS — E78.49 OTHER HYPERLIPIDEMIA: ICD-10-CM

## 2025-08-01 DIAGNOSIS — E11.69 TYPE 2 DIABETES MELLITUS WITH OTHER SPECIFIED COMPLICATION: ICD-10-CM

## 2025-08-01 DIAGNOSIS — I25.10 ATHEROSCLEROTIC HEART DISEASE OF NATIVE CORONARY ARTERY W/OUT ANGINA PECTORIS: ICD-10-CM

## 2025-08-01 DIAGNOSIS — L08.9 OTHER INJURY OF UNSPECIFIED BODY REGION, INITIAL ENCOUNTER: ICD-10-CM

## 2025-08-01 DIAGNOSIS — N20.0 CALCULUS OF KIDNEY: ICD-10-CM

## 2025-08-01 DIAGNOSIS — T14.8XXA OTHER INJURY OF UNSPECIFIED BODY REGION, INITIAL ENCOUNTER: ICD-10-CM

## 2025-08-01 DIAGNOSIS — S88.112D COMPLETE TRAUMATIC AMPUTATION AT LVL BETWEEN KNEE AND ANKLE, LEFT LOWER LEG, SUBSEQUENT ENCOUNTER: ICD-10-CM

## 2025-08-01 DIAGNOSIS — I10 ESSENTIAL (PRIMARY) HYPERTENSION: ICD-10-CM

## 2025-08-01 DIAGNOSIS — I44.2 ATRIOVENTRICULAR BLOCK, COMPLETE: ICD-10-CM

## 2025-08-01 DIAGNOSIS — I63.9 CEREBRAL INFARCTION, UNSPECIFIED: ICD-10-CM

## 2025-08-01 DIAGNOSIS — K80.50 CALCULUS OF BILE DUCT W/OUT CHOLANGITIS OR CHOLECYSTITIS W/OUT OBSTRUCTION: ICD-10-CM

## 2025-08-01 DIAGNOSIS — Z12.5 ENCOUNTER FOR SCREENING FOR MALIGNANT NEOPLASM OF PROSTATE: ICD-10-CM

## 2025-08-01 DIAGNOSIS — I73.9 PERIPHERAL VASCULAR DISEASE, UNSPECIFIED: ICD-10-CM

## 2025-08-01 DIAGNOSIS — Z79.4 TYPE 2 DIABETES MELLITUS WITH OTHER SPECIFIED COMPLICATION: ICD-10-CM

## 2025-08-01 DIAGNOSIS — Z00.00 ENCOUNTER FOR GENERAL ADULT MEDICAL EXAMINATION W/OUT ABNORMAL FINDINGS: ICD-10-CM

## 2025-08-01 PROCEDURE — 93000 ELECTROCARDIOGRAM COMPLETE: CPT

## 2025-08-01 PROCEDURE — G2211 COMPLEX E/M VISIT ADD ON: CPT

## 2025-08-01 PROCEDURE — G0439: CPT

## 2025-08-01 PROCEDURE — 99214 OFFICE O/P EST MOD 30 MIN: CPT | Mod: 25

## 2025-08-01 PROCEDURE — G0402 INITIAL PREVENTIVE EXAM: CPT

## 2025-08-01 PROCEDURE — 36415 COLL VENOUS BLD VENIPUNCTURE: CPT

## 2025-08-01 RX ORDER — MUPIROCIN 20 MG/G
2 OINTMENT TOPICAL
Qty: 80 | Refills: 0 | Status: ACTIVE | COMMUNITY
Start: 2025-08-01 | End: 1900-01-01

## 2025-08-04 ENCOUNTER — NON-APPOINTMENT (OUTPATIENT)
Age: 65
End: 2025-08-04

## 2025-08-04 DIAGNOSIS — E03.8 OTHER SPECIFIED HYPOTHYROIDISM: ICD-10-CM

## 2025-08-04 LAB
ALBUMIN SERPL ELPH-MCNC: 4.2 G/DL
ALBUMIN, RANDOM URINE: 7.8 MG/DL
ALP BLD-CCNC: 82 U/L
ALT SERPL-CCNC: 18 U/L
ANION GAP SERPL CALC-SCNC: 15 MMOL/L
AST SERPL-CCNC: 17 U/L
BASOPHILS # BLD AUTO: 0.05 K/UL
BASOPHILS NFR BLD AUTO: 0.4 %
BILIRUB SERPL-MCNC: 0.2 MG/DL
BUN SERPL-MCNC: 18 MG/DL
CALCIUM SERPL-MCNC: 9.9 MG/DL
CHLORIDE SERPL-SCNC: 100 MMOL/L
CHOLEST SERPL-MCNC: 139 MG/DL
CO2 SERPL-SCNC: 25 MMOL/L
CREAT SERPL-MCNC: 0.85 MG/DL
CREAT SPEC-SCNC: 182 MG/DL
EGFRCR SERPLBLD CKD-EPI 2021: 96 ML/MIN/1.73M2
EOSINOPHIL # BLD AUTO: 0.48 K/UL
EOSINOPHIL NFR BLD AUTO: 3.9 %
ESTIMATED AVERAGE GLUCOSE: 163 MG/DL
GLUCOSE SERPL-MCNC: 129 MG/DL
HBA1C MFR BLD HPLC: 7.3 %
HCT VFR BLD CALC: 43.3 %
HDLC SERPL-MCNC: 40 MG/DL
HGB BLD-MCNC: 13.7 G/DL
IMM GRANULOCYTES NFR BLD AUTO: 0.4 %
LDLC SERPL DIRECT ASSAY-MCNC: 73 MG/DL
LDLC SERPL-MCNC: 74 MG/DL
LYMPHOCYTES # BLD AUTO: 2.62 K/UL
LYMPHOCYTES NFR BLD AUTO: 21.3 %
MAN DIFF?: NORMAL
MCHC RBC-ENTMCNC: 25.9 PG
MCHC RBC-ENTMCNC: 31.6 G/DL
MCV RBC AUTO: 81.9 FL
MICROALBUMIN/CREAT 24H UR-RTO: 43 MG/G
MONOCYTES # BLD AUTO: 0.74 K/UL
MONOCYTES NFR BLD AUTO: 6 %
NEUTROPHILS # BLD AUTO: 8.37 K/UL
NEUTROPHILS NFR BLD AUTO: 68 %
NONHDLC SERPL-MCNC: 99 MG/DL
PLATELET # BLD AUTO: 266 K/UL
POTASSIUM SERPL-SCNC: 4.3 MMOL/L
PROT SERPL-MCNC: 8 G/DL
PSA SERPL-MCNC: 2.67 NG/ML
RBC # BLD: 5.29 M/UL
RBC # FLD: 15.5 %
SODIUM SERPL-SCNC: 140 MMOL/L
T4 FREE SERPL-MCNC: 1.5 NG/DL
TRIGL SERPL-MCNC: 145 MG/DL
TSH SERPL-ACNC: 4.54 UIU/ML
WBC # FLD AUTO: 12.31 K/UL

## 2025-08-13 ENCOUNTER — APPOINTMENT (OUTPATIENT)
Dept: ELECTROPHYSIOLOGY | Facility: CLINIC | Age: 65
End: 2025-08-13

## 2025-08-13 PROCEDURE — 93296 REM INTERROG EVL PM/IDS: CPT

## 2025-08-13 PROCEDURE — 93294 REM INTERROG EVL PM/LDLS PM: CPT

## 2025-08-15 ENCOUNTER — RX RENEWAL (OUTPATIENT)
Age: 65
End: 2025-08-15

## 2025-09-03 ENCOUNTER — APPOINTMENT (OUTPATIENT)
Dept: UROLOGY | Facility: CLINIC | Age: 65
End: 2025-09-03
Payer: MEDICARE

## 2025-09-03 ENCOUNTER — OUTPATIENT (OUTPATIENT)
Dept: OUTPATIENT SERVICES | Facility: HOSPITAL | Age: 65
LOS: 1 days | End: 2025-09-03
Payer: MEDICARE

## 2025-09-03 VITALS
RESPIRATION RATE: 17 BRPM | BODY MASS INDEX: 32.88 KG/M2 | TEMPERATURE: 98.5 F | SYSTOLIC BLOOD PRESSURE: 143 MMHG | HEART RATE: 91 BPM | DIASTOLIC BLOOD PRESSURE: 84 MMHG | WEIGHT: 222 LBS | HEIGHT: 69 IN

## 2025-09-03 DIAGNOSIS — R35.0 FREQUENCY OF MICTURITION: ICD-10-CM

## 2025-09-03 DIAGNOSIS — N20.0 CALCULUS OF KIDNEY: ICD-10-CM

## 2025-09-03 DIAGNOSIS — Z86.79 PERSONAL HISTORY OF OTHER DISEASES OF THE CIRCULATORY SYSTEM: Chronic | ICD-10-CM

## 2025-09-03 DIAGNOSIS — Z89.429 ACQUIRED ABSENCE OF OTHER TOE(S), UNSPECIFIED SIDE: Chronic | ICD-10-CM

## 2025-09-03 DIAGNOSIS — Z95.0 PRESENCE OF CARDIAC PACEMAKER: Chronic | ICD-10-CM

## 2025-09-03 DIAGNOSIS — Z89.512 ACQUIRED ABSENCE OF LEFT LEG BELOW KNEE: Chronic | ICD-10-CM

## 2025-09-03 DIAGNOSIS — Z98.89 OTHER SPECIFIED POSTPROCEDURAL STATES: Chronic | ICD-10-CM

## 2025-09-03 PROCEDURE — 76775 US EXAM ABDO BACK WALL LIM: CPT | Mod: 26

## 2025-09-03 PROCEDURE — 99212 OFFICE O/P EST SF 10 MIN: CPT

## 2025-09-03 PROCEDURE — G2211 COMPLEX E/M VISIT ADD ON: CPT

## 2025-09-03 PROCEDURE — 76775 US EXAM ABDO BACK WALL LIM: CPT

## 2025-09-04 DIAGNOSIS — N20.0 CALCULUS OF KIDNEY: ICD-10-CM

## 2025-09-05 ENCOUNTER — RX RENEWAL (OUTPATIENT)
Age: 65
End: 2025-09-05

## (undated) DEVICE — IRR BULB PATHFINDER + 10"

## (undated) DEVICE — VENODYNE/SCD SLEEVE CALF MEDIUM

## (undated) DEVICE — WARMING BLANKET UPPER ADULT

## (undated) DEVICE — ACMI SELF-SEALING SEAL UP TO 7FR

## (undated) DEVICE — DRSG TEGADERM 6"X8"

## (undated) DEVICE — DRAPE LIGHT HANDLE COVER (BLUE)

## (undated) DEVICE — DRSG COMBINE 5X9"

## (undated) DEVICE — GLV 6.5 PROTEXIS (WHITE)

## (undated) DEVICE — DRSG STOCKINETTE IMPERVIOUS XL

## (undated) DEVICE — VENODYNE/SCD SLEEVE CALF LARGE

## (undated) DEVICE — STRYKER INTERPULSE HANDPIECE W IRR SUCTION TUBE

## (undated) DEVICE — DRAPE TOWEL BLUE 17" X 24"

## (undated) DEVICE — SUT POLYSORB 2-0 30" GS-21 UNDYED

## (undated) DEVICE — SUT MONOSOF 2-0 18" C-15

## (undated) DEVICE — SAW BLADE MICROAIRE OSCILATING 25.4MM X 90MM X 1.27MM

## (undated) DEVICE — SUT POLYSORB 0 18" MP UNDYED

## (undated) DEVICE — SOL IRR POUR NS 0.9% 500ML

## (undated) DEVICE — DRAPE MAYO STAND 30"

## (undated) DEVICE — SUCTION YANKAUER NO CONTROL VENT

## (undated) DEVICE — DRSG ACE BANDAGE 6"

## (undated) DEVICE — DRAPE INSTRUMENT POUCH 6.75" X 11"

## (undated) DEVICE — SYR ASEPTO

## (undated) DEVICE — BLADE SCALPEL SAFETYLOCK #11

## (undated) DEVICE — DRSG KLING 6"

## (undated) DEVICE — SUT SURGIPRO 2-0 18" C-15

## (undated) DEVICE — PACK EXTREMITY

## (undated) DEVICE — DRSG OPSITE 13.75 X 4"

## (undated) DEVICE — DRSG TELFA 3 X 8

## (undated) DEVICE — PACK CYSTO

## (undated) DEVICE — GOWN TRIMAX LG

## (undated) DEVICE — GLV 8 PROTEXIS (WHITE)

## (undated) DEVICE — DRAPE DRAINAGE BAG RELAX & GEMINI

## (undated) DEVICE — LAP PAD 18 X 18"

## (undated) DEVICE — SPECIMEN CONTAINER 100ML

## (undated) DEVICE — Device

## (undated) DEVICE — DRSG STERISTRIPS 0.5 X 4"

## (undated) DEVICE — SUT MONOSOF 3-0 30" SC-2

## (undated) DEVICE — FOLEY TRAY 16FR 5CC LTX UMETER CLOSED

## (undated) DEVICE — MARKING PEN W RULER

## (undated) DEVICE — TUBING THERMADX UROLOGY

## (undated) DEVICE — ADAPTER CHECK FLO 9FR STERILE

## (undated) DEVICE — SOL IRR BAG H2O 3000ML

## (undated) DEVICE — PREP BETADINE KIT

## (undated) DEVICE — POSITIONER FOAM EGG CRATE ULNAR 2PCS (PINK)

## (undated) DEVICE — GLV 7.5 PROTEXIS (WHITE)

## (undated) DEVICE — DRSG CURITY GAUZE SPONGE 4 X 4" 12-PLY

## (undated) DEVICE — DRAPE 1/2 SHEET 40X57"

## (undated) DEVICE — SUT SOFSILK 2-0 30" V-20

## (undated) DEVICE — SUT POLYSORB 4-0 18" TIES UNDYED

## (undated) DEVICE — POSITIONER FOAM HEADREST (PINK)

## (undated) DEVICE — SOL IRR POUR H2O 1500ML

## (undated) DEVICE — TUBING SUCTION 20FT

## (undated) DEVICE — DRAPE EQUIPMENT BANDED BAG 30 X 30" (SHOWER CAP)

## (undated) DEVICE — DRAPE LINGEMAN TUR

## (undated) DEVICE — STAPLER SKIN VISI-STAT 35 WIDE

## (undated) DEVICE — DRAPE MAGNETIC INSTRUMENT MEDIUM

## (undated) DEVICE — SUT POLYSORB 2-0 18" TIES UNDYED

## (undated) DEVICE — BLADE SCALPEL SAFETYLOCK #10

## (undated) DEVICE — POSITIONER CARDIAC BUMP

## (undated) DEVICE — FOLEY HOLDER STATLOCK 2 WAY ADULT

## (undated) DEVICE — SUT SOFSILK 3-0 30" V-20

## (undated) DEVICE — DRSG XEROFORM 1 X 8"

## (undated) DEVICE — DRAPE ISOLATION BAG 20X20"

## (undated) DEVICE — GLV 7 PROTEXIS (WHITE)

## (undated) DEVICE — DRAPE 3/4 SHEET W REINFORCEMENT 56X77"

## (undated) DEVICE — SOL IRR POUR H2O 250ML

## (undated) DEVICE — MEDICATION LABELS W MARKER

## (undated) DEVICE — DRSG XEROFORM 2 X 2"

## (undated) DEVICE — BAG URINE W METER 2L

## (undated) DEVICE — PRESSURE INFUSOR BAG 3000ML

## (undated) DEVICE — SYR LUER LOK 10CC

## (undated) DEVICE — SUT POLYSORB 3-0 30" V-20 UNDYED

## (undated) DEVICE — PREP CHLORAPREP HI-LITE ORANGE 26ML

## (undated) DEVICE — BLADE SCALPEL SAFETYLOCK #15